# Patient Record
Sex: FEMALE | Race: WHITE | NOT HISPANIC OR LATINO | Employment: OTHER | URBAN - METROPOLITAN AREA
[De-identification: names, ages, dates, MRNs, and addresses within clinical notes are randomized per-mention and may not be internally consistent; named-entity substitution may affect disease eponyms.]

---

## 2017-04-28 ENCOUNTER — GENERIC CONVERSION - ENCOUNTER (OUTPATIENT)
Dept: OTHER | Facility: OTHER | Age: 67
End: 2017-04-28

## 2017-05-09 ENCOUNTER — APPOINTMENT (OUTPATIENT)
Dept: LAB | Facility: CLINIC | Age: 67
End: 2017-05-09
Payer: MEDICARE

## 2017-05-09 ENCOUNTER — TRANSCRIBE ORDERS (OUTPATIENT)
Dept: LAB | Facility: CLINIC | Age: 67
End: 2017-05-09

## 2017-05-09 ENCOUNTER — ALLSCRIPTS OFFICE VISIT (OUTPATIENT)
Dept: OTHER | Facility: OTHER | Age: 67
End: 2017-05-09

## 2017-05-09 DIAGNOSIS — M48.061 SPINAL STENOSIS OF LUMBAR REGION WITHOUT NEUROGENIC CLAUDICATION: ICD-10-CM

## 2017-05-09 DIAGNOSIS — M54.16 RADICULOPATHY OF LUMBAR REGION: ICD-10-CM

## 2017-05-09 DIAGNOSIS — M25.551 PAIN IN RIGHT HIP: ICD-10-CM

## 2017-05-09 DIAGNOSIS — R14.0 ABDOMINAL DISTENSION (GASEOUS): ICD-10-CM

## 2017-05-09 PROCEDURE — 36415 COLL VENOUS BLD VENIPUNCTURE: CPT

## 2017-05-09 PROCEDURE — 83516 IMMUNOASSAY NONANTIBODY: CPT

## 2017-05-10 ENCOUNTER — APPOINTMENT (OUTPATIENT)
Dept: LAB | Facility: CLINIC | Age: 67
End: 2017-05-10
Payer: MEDICARE

## 2017-05-10 DIAGNOSIS — R14.0 ABDOMINAL DISTENSION (GASEOUS): ICD-10-CM

## 2017-05-10 LAB — TTG IGA SER-ACNC: <2 U/ML (ref 0–3)

## 2017-05-10 PROCEDURE — 87338 HPYLORI STOOL AG IA: CPT

## 2017-05-11 LAB — H PYLORI AG STL QL IA: NEGATIVE

## 2017-05-15 ENCOUNTER — GENERIC CONVERSION - ENCOUNTER (OUTPATIENT)
Dept: OTHER | Facility: OTHER | Age: 67
End: 2017-05-15

## 2017-06-05 ENCOUNTER — TRANSCRIBE ORDERS (OUTPATIENT)
Dept: ADMINISTRATIVE | Facility: HOSPITAL | Age: 67
End: 2017-06-05

## 2017-06-05 ENCOUNTER — ALLSCRIPTS OFFICE VISIT (OUTPATIENT)
Dept: OTHER | Facility: OTHER | Age: 67
End: 2017-06-05

## 2017-06-05 DIAGNOSIS — M48.061 SPINAL STENOSIS, LUMBAR REGION, WITHOUT NEUROGENIC CLAUDICATION: Primary | ICD-10-CM

## 2017-06-05 RX ORDER — ALPRAZOLAM 0.25 MG/1
0.25 TABLET ORAL DAILY PRN
COMMUNITY
End: 2018-07-05 | Stop reason: ALTCHOICE

## 2017-06-05 RX ORDER — POTASSIUM CHLORIDE 1500 MG/1
20 TABLET, FILM COATED, EXTENDED RELEASE ORAL 2 TIMES DAILY
Status: ON HOLD | COMMUNITY
End: 2017-06-06 | Stop reason: ALTCHOICE

## 2017-06-06 ENCOUNTER — ANESTHESIA EVENT (OUTPATIENT)
Dept: GASTROENTEROLOGY | Facility: MEDICAL CENTER | Age: 67
End: 2017-06-06
Payer: MEDICARE

## 2017-06-06 ENCOUNTER — ANESTHESIA (OUTPATIENT)
Dept: GASTROENTEROLOGY | Facility: MEDICAL CENTER | Age: 67
End: 2017-06-06
Payer: MEDICARE

## 2017-06-06 ENCOUNTER — HOSPITAL ENCOUNTER (OUTPATIENT)
Facility: MEDICAL CENTER | Age: 67
Setting detail: OUTPATIENT SURGERY
Discharge: HOME | End: 2017-06-06
Attending: INTERNAL MEDICINE | Admitting: INTERNAL MEDICINE
Payer: MEDICARE

## 2017-06-06 ENCOUNTER — GENERIC CONVERSION - ENCOUNTER (OUTPATIENT)
Dept: GASTROENTEROLOGY | Facility: MEDICAL CENTER | Age: 67
End: 2017-06-06

## 2017-06-06 VITALS
OXYGEN SATURATION: 98 % | BODY MASS INDEX: 30.82 KG/M2 | HEIGHT: 65 IN | RESPIRATION RATE: 16 BRPM | SYSTOLIC BLOOD PRESSURE: 130 MMHG | HEART RATE: 77 BPM | WEIGHT: 185 LBS | DIASTOLIC BLOOD PRESSURE: 68 MMHG | TEMPERATURE: 97.3 F

## 2017-06-06 RX ORDER — SODIUM CHLORIDE 9 MG/ML
125 INJECTION, SOLUTION INTRAVENOUS CONTINUOUS
Status: DISCONTINUED | OUTPATIENT
Start: 2017-06-06 | End: 2017-06-06 | Stop reason: HOSPADM

## 2017-06-06 RX ORDER — ONDANSETRON 2 MG/ML
4 INJECTION INTRAMUSCULAR; INTRAVENOUS ONCE AS NEEDED
Status: DISCONTINUED | OUTPATIENT
Start: 2017-06-06 | End: 2017-06-06 | Stop reason: HOSPADM

## 2017-06-06 RX ORDER — PROPOFOL 10 MG/ML
INJECTION, EMULSION INTRAVENOUS AS NEEDED
Status: DISCONTINUED | OUTPATIENT
Start: 2017-06-06 | End: 2017-06-06 | Stop reason: SURG

## 2017-06-06 RX ADMIN — PROPOFOL 50 MG: 10 INJECTION, EMULSION INTRAVENOUS at 14:38

## 2017-06-06 RX ADMIN — PROPOFOL 30 MG: 10 INJECTION, EMULSION INTRAVENOUS at 14:44

## 2017-06-06 RX ADMIN — PROPOFOL 30 MG: 10 INJECTION, EMULSION INTRAVENOUS at 14:42

## 2017-06-06 RX ADMIN — PROPOFOL 40 MG: 10 INJECTION, EMULSION INTRAVENOUS at 14:46

## 2017-06-06 RX ADMIN — SODIUM CHLORIDE 125 ML/HR: 0.9 INJECTION, SOLUTION INTRAVENOUS at 14:17

## 2017-06-06 RX ADMIN — PROPOFOL 30 MG: 10 INJECTION, EMULSION INTRAVENOUS at 14:40

## 2017-06-06 RX ADMIN — PROPOFOL 40 MG: 10 INJECTION, EMULSION INTRAVENOUS at 14:48

## 2017-06-06 RX ADMIN — PROPOFOL 40 MG: 10 INJECTION, EMULSION INTRAVENOUS at 14:50

## 2017-06-12 ENCOUNTER — APPOINTMENT (OUTPATIENT)
Dept: RADIOLOGY | Facility: MEDICAL CENTER | Age: 67
End: 2017-06-12
Payer: MEDICARE

## 2017-06-12 ENCOUNTER — HOSPITAL ENCOUNTER (OUTPATIENT)
Dept: RADIOLOGY | Age: 67
Discharge: HOME | End: 2017-06-12
Payer: MEDICARE

## 2017-06-12 ENCOUNTER — GENERIC CONVERSION - ENCOUNTER (OUTPATIENT)
Dept: OTHER | Facility: OTHER | Age: 67
End: 2017-06-12

## 2017-06-12 DIAGNOSIS — M25.551 PAIN IN RIGHT HIP: ICD-10-CM

## 2017-06-12 DIAGNOSIS — M48.061 SPINAL STENOSIS, LUMBAR REGION, WITHOUT NEUROGENIC CLAUDICATION: ICD-10-CM

## 2017-06-12 PROCEDURE — 72148 MRI LUMBAR SPINE W/O DYE: CPT

## 2017-06-12 PROCEDURE — 73502 X-RAY EXAM HIP UNI 2-3 VIEWS: CPT

## 2017-06-13 ENCOUNTER — GENERIC CONVERSION - ENCOUNTER (OUTPATIENT)
Dept: OTHER | Facility: OTHER | Age: 67
End: 2017-06-13

## 2017-06-13 ENCOUNTER — APPOINTMENT (OUTPATIENT)
Dept: PHYSICAL THERAPY | Facility: REHABILITATION | Age: 67
End: 2017-06-13
Payer: MEDICARE

## 2017-06-13 DIAGNOSIS — M54.16 RADICULOPATHY OF LUMBAR REGION: ICD-10-CM

## 2017-06-13 PROCEDURE — 97162 PT EVAL MOD COMPLEX 30 MIN: CPT

## 2017-06-13 PROCEDURE — G8991 OTHER PT/OT GOAL STATUS: HCPCS

## 2017-06-13 PROCEDURE — G8990 OTHER PT/OT CURRENT STATUS: HCPCS

## 2017-06-14 ENCOUNTER — GENERIC CONVERSION - ENCOUNTER (OUTPATIENT)
Dept: OTHER | Facility: OTHER | Age: 67
End: 2017-06-14

## 2017-06-20 ENCOUNTER — ALLSCRIPTS OFFICE VISIT (OUTPATIENT)
Dept: OTHER | Facility: OTHER | Age: 67
End: 2017-06-20

## 2017-06-20 ENCOUNTER — APPOINTMENT (OUTPATIENT)
Dept: PHYSICAL THERAPY | Facility: REHABILITATION | Age: 67
End: 2017-06-20
Payer: MEDICARE

## 2017-06-20 DIAGNOSIS — E78.5 HYPERLIPIDEMIA: ICD-10-CM

## 2017-06-20 DIAGNOSIS — Z00.00 ENCOUNTER FOR GENERAL ADULT MEDICAL EXAMINATION WITHOUT ABNORMAL FINDINGS: ICD-10-CM

## 2017-06-20 PROCEDURE — 97014 ELECTRIC STIMULATION THERAPY: CPT

## 2017-06-20 PROCEDURE — 97110 THERAPEUTIC EXERCISES: CPT

## 2017-06-20 PROCEDURE — 97140 MANUAL THERAPY 1/> REGIONS: CPT

## 2017-06-21 ENCOUNTER — GENERIC CONVERSION - ENCOUNTER (OUTPATIENT)
Dept: OTHER | Facility: OTHER | Age: 67
End: 2017-06-21

## 2017-06-22 ENCOUNTER — APPOINTMENT (OUTPATIENT)
Dept: PHYSICAL THERAPY | Facility: REHABILITATION | Age: 67
End: 2017-06-22
Payer: MEDICARE

## 2017-06-22 PROCEDURE — 97110 THERAPEUTIC EXERCISES: CPT

## 2017-06-22 PROCEDURE — 97140 MANUAL THERAPY 1/> REGIONS: CPT

## 2017-06-22 PROCEDURE — 97014 ELECTRIC STIMULATION THERAPY: CPT

## 2017-06-27 ENCOUNTER — APPOINTMENT (OUTPATIENT)
Dept: PHYSICAL THERAPY | Facility: REHABILITATION | Age: 67
End: 2017-06-27
Payer: MEDICARE

## 2017-06-27 PROCEDURE — 97140 MANUAL THERAPY 1/> REGIONS: CPT

## 2017-06-27 PROCEDURE — 97014 ELECTRIC STIMULATION THERAPY: CPT

## 2017-06-27 PROCEDURE — 97110 THERAPEUTIC EXERCISES: CPT

## 2017-06-29 ENCOUNTER — APPOINTMENT (OUTPATIENT)
Dept: PHYSICAL THERAPY | Facility: REHABILITATION | Age: 67
End: 2017-06-29
Payer: MEDICARE

## 2017-06-29 PROCEDURE — 97110 THERAPEUTIC EXERCISES: CPT

## 2017-06-29 PROCEDURE — 97014 ELECTRIC STIMULATION THERAPY: CPT

## 2017-06-29 PROCEDURE — 97140 MANUAL THERAPY 1/> REGIONS: CPT

## 2017-07-03 ENCOUNTER — APPOINTMENT (OUTPATIENT)
Dept: PHYSICAL THERAPY | Facility: REHABILITATION | Age: 67
End: 2017-07-03
Payer: MEDICARE

## 2017-07-03 PROCEDURE — 97110 THERAPEUTIC EXERCISES: CPT

## 2017-07-03 PROCEDURE — 97014 ELECTRIC STIMULATION THERAPY: CPT

## 2017-07-03 PROCEDURE — 97140 MANUAL THERAPY 1/> REGIONS: CPT

## 2017-07-06 ENCOUNTER — APPOINTMENT (OUTPATIENT)
Dept: PHYSICAL THERAPY | Facility: REHABILITATION | Age: 67
End: 2017-07-06
Payer: MEDICARE

## 2017-07-11 ENCOUNTER — APPOINTMENT (OUTPATIENT)
Dept: PHYSICAL THERAPY | Facility: REHABILITATION | Age: 67
End: 2017-07-11
Payer: MEDICARE

## 2017-07-11 PROCEDURE — 97140 MANUAL THERAPY 1/> REGIONS: CPT

## 2017-07-11 PROCEDURE — 97014 ELECTRIC STIMULATION THERAPY: CPT

## 2017-07-11 PROCEDURE — 97110 THERAPEUTIC EXERCISES: CPT

## 2017-07-13 ENCOUNTER — APPOINTMENT (OUTPATIENT)
Dept: PHYSICAL THERAPY | Facility: REHABILITATION | Age: 67
End: 2017-07-13
Payer: MEDICARE

## 2017-07-13 PROCEDURE — G8990 OTHER PT/OT CURRENT STATUS: HCPCS | Performed by: PHYSICAL THERAPIST

## 2017-07-13 PROCEDURE — 97140 MANUAL THERAPY 1/> REGIONS: CPT

## 2017-07-13 PROCEDURE — 97110 THERAPEUTIC EXERCISES: CPT

## 2017-07-13 PROCEDURE — G8991 OTHER PT/OT GOAL STATUS: HCPCS | Performed by: PHYSICAL THERAPIST

## 2017-07-13 PROCEDURE — 97014 ELECTRIC STIMULATION THERAPY: CPT

## 2017-07-14 ENCOUNTER — APPOINTMENT (OUTPATIENT)
Dept: LAB | Facility: MEDICAL CENTER | Age: 67
End: 2017-07-14
Payer: MEDICARE

## 2017-07-14 ENCOUNTER — TRANSCRIBE ORDERS (OUTPATIENT)
Dept: ADMINISTRATIVE | Facility: HOSPITAL | Age: 67
End: 2017-07-14

## 2017-07-14 DIAGNOSIS — E78.5 HYPERLIPIDEMIA: ICD-10-CM

## 2017-07-14 LAB
ALBUMIN SERPL BCP-MCNC: 3.9 G/DL (ref 3.5–5)
ALP SERPL-CCNC: 83 U/L (ref 46–116)
ALT SERPL W P-5'-P-CCNC: 38 U/L (ref 12–78)
ANION GAP SERPL CALCULATED.3IONS-SCNC: 5 MMOL/L (ref 4–13)
AST SERPL W P-5'-P-CCNC: 22 U/L (ref 5–45)
BASOPHILS # BLD AUTO: 0.03 THOUSANDS/ΜL (ref 0–0.1)
BASOPHILS NFR BLD AUTO: 0 % (ref 0–1)
BILIRUB SERPL-MCNC: 0.71 MG/DL (ref 0.2–1)
BUN SERPL-MCNC: 14 MG/DL (ref 5–25)
CALCIUM SERPL-MCNC: 9.4 MG/DL (ref 8.3–10.1)
CHLORIDE SERPL-SCNC: 110 MMOL/L (ref 100–108)
CHOLEST SERPL-MCNC: 173 MG/DL (ref 50–200)
CO2 SERPL-SCNC: 27 MMOL/L (ref 21–32)
CREAT SERPL-MCNC: 0.94 MG/DL (ref 0.6–1.3)
EOSINOPHIL # BLD AUTO: 0.15 THOUSAND/ΜL (ref 0–0.61)
EOSINOPHIL NFR BLD AUTO: 2 % (ref 0–6)
ERYTHROCYTE [DISTWIDTH] IN BLOOD BY AUTOMATED COUNT: 14.1 % (ref 11.6–15.1)
GFR SERPL CREATININE-BSD FRML MDRD: 59.8 ML/MIN/1.73SQ M
GLUCOSE P FAST SERPL-MCNC: 82 MG/DL (ref 65–99)
HCT VFR BLD AUTO: 41.5 % (ref 34.8–46.1)
HDLC SERPL-MCNC: 65 MG/DL (ref 40–60)
HGB BLD-MCNC: 13.5 G/DL (ref 11.5–15.4)
LDLC SERPL CALC-MCNC: 81 MG/DL (ref 0–100)
LYMPHOCYTES # BLD AUTO: 2.25 THOUSANDS/ΜL (ref 0.6–4.47)
LYMPHOCYTES NFR BLD AUTO: 29 % (ref 14–44)
MCH RBC QN AUTO: 31.8 PG (ref 26.8–34.3)
MCHC RBC AUTO-ENTMCNC: 32.5 G/DL (ref 31.4–37.4)
MCV RBC AUTO: 98 FL (ref 82–98)
MONOCYTES # BLD AUTO: 0.6 THOUSAND/ΜL (ref 0.17–1.22)
MONOCYTES NFR BLD AUTO: 8 % (ref 4–12)
NEUTROPHILS # BLD AUTO: 4.75 THOUSANDS/ΜL (ref 1.85–7.62)
NEUTS SEG NFR BLD AUTO: 61 % (ref 43–75)
NRBC BLD AUTO-RTO: 0 /100 WBCS
PLATELET # BLD AUTO: 258 THOUSANDS/UL (ref 149–390)
PMV BLD AUTO: 11.2 FL (ref 8.9–12.7)
POTASSIUM SERPL-SCNC: 3.7 MMOL/L (ref 3.5–5.3)
PROT SERPL-MCNC: 6.9 G/DL (ref 6.4–8.2)
RBC # BLD AUTO: 4.24 MILLION/UL (ref 3.81–5.12)
SODIUM SERPL-SCNC: 142 MMOL/L (ref 136–145)
TRIGL SERPL-MCNC: 134 MG/DL
TSH SERPL DL<=0.05 MIU/L-ACNC: 1.88 UIU/ML (ref 0.36–3.74)
WBC # BLD AUTO: 7.79 THOUSAND/UL (ref 4.31–10.16)

## 2017-07-14 PROCEDURE — 80053 COMPREHEN METABOLIC PANEL: CPT

## 2017-07-14 PROCEDURE — 85025 COMPLETE CBC W/AUTO DIFF WBC: CPT

## 2017-07-14 PROCEDURE — 84443 ASSAY THYROID STIM HORMONE: CPT

## 2017-07-14 PROCEDURE — 80061 LIPID PANEL: CPT

## 2017-07-14 PROCEDURE — 36415 COLL VENOUS BLD VENIPUNCTURE: CPT

## 2017-07-18 ENCOUNTER — APPOINTMENT (OUTPATIENT)
Dept: PHYSICAL THERAPY | Facility: REHABILITATION | Age: 67
End: 2017-07-18
Payer: MEDICARE

## 2017-07-18 ENCOUNTER — ALLSCRIPTS OFFICE VISIT (OUTPATIENT)
Dept: OTHER | Facility: OTHER | Age: 67
End: 2017-07-18

## 2017-07-20 ENCOUNTER — APPOINTMENT (OUTPATIENT)
Dept: PHYSICAL THERAPY | Facility: REHABILITATION | Age: 67
End: 2017-07-20
Payer: MEDICARE

## 2017-08-22 ENCOUNTER — GENERIC CONVERSION - ENCOUNTER (OUTPATIENT)
Dept: OTHER | Facility: OTHER | Age: 67
End: 2017-08-22

## 2017-09-19 ENCOUNTER — GENERIC CONVERSION - ENCOUNTER (OUTPATIENT)
Dept: OTHER | Facility: OTHER | Age: 67
End: 2017-09-19

## 2017-09-19 DIAGNOSIS — Z12.31 ENCOUNTER FOR SCREENING MAMMOGRAM FOR MALIGNANT NEOPLASM OF BREAST: ICD-10-CM

## 2017-10-10 ENCOUNTER — OFFICE VISIT (OUTPATIENT)
Dept: URGENT CARE | Facility: MEDICAL CENTER | Age: 67
End: 2017-10-10
Payer: MEDICARE

## 2017-10-10 PROCEDURE — 99203 OFFICE O/P NEW LOW 30 MIN: CPT

## 2017-10-10 PROCEDURE — G0463 HOSPITAL OUTPT CLINIC VISIT: HCPCS

## 2017-10-13 NOTE — PROGRESS NOTES
Assessment  1  Acute bronchitis with bronchospasm (466 0) (J20 9)    Plan  Acute bronchitis with bronchospasm    · Azithromycin 250 MG Oral Tablet; TAKE 2 TABLETS ON DAY 1 THEN TAKE 1  TABLET A DAY FOR 4 DAYS  Wheezing    · Benzonatate 100 MG Oral Capsule; TAKE 1 CAPSULE EVERY 8 HOURS AS  NEEDED   · LevoFLOXacin 500 MG Oral Tablet (Levaquin); TAKE 1 TABLET DAILY UNTIL  FINISHED   · ProAir  (90 Base) MCG/ACT Inhalation Aerosol Solution; INHALE 1 TO 2  PUFFS EVERY 4 TO 6 HOURS AS NEEDED    Discussion/Summary  Discussion Summary:   Today your symptoms are consistent with an upper respiratory infection which has now evolved into an acute bronchitis and were found to have wheezing on examinationthe levaquin as directedinhaler/cough medicine as directedfluids at homecan alternate motrin and tylenol as neededsaline nasal spray as ijxchnY14 gargles/lozenges as neededusing a humidifier in your bedroom  with your primary care physician for re-evaluation within 1 weekyou have worsening symptoms, shortness of breath, chest pain, high fever, or any signs of distress please go to the nearest ER  Medication Side Effects Reviewed: Possible side effects of new medications were reviewed with the patient/guardian today  Understands and agrees with treatment plan: The treatment plan was reviewed with the patient/guardian  The patient/guardian understands and agrees with the treatment plan      Chief Complaint  1  Cold Symptoms  Chief Complaint Free Text Note Form: Patient relates has been sick for 4 days with sinus pain, pressure, congestion, cough and chills  History of Present Illness  HPI: Patient is a 72-year-old female presents today for an evaluation of cold symptoms that started over the weekend  Patient states that it started out with a nasal congestion, sinus pressure and postnasal drip  She has been using Nasonex without relief   Patient states that now she has developed a cough and has noticed that she has been wheezing and feeling short of breath  Patient also admits to chills  Patient admits to being a smoker however since she states that she has not smoked since she has become sick  Hospital Based Practices Required Assessment:   Abuse And Domestic Violence Screen    Yes, the patient is safe at home -The patient states no one is hurting them  Depression And Suicide Screen  No, the patient has not had thoughts of hurting themself  No, the patient has not felt depressed in the past 7 days  Prefered Language is  english  Primary Language is  english  Review of Systems  Focused-Female:   Constitutional: chills, but-no fever  ENT: nasal discharge, but-no earache-and-no sore throat  Cardiovascular: no chest pain  Respiratory: shortness of breath,-cough-and-wheezing  Gastrointestinal: no abdominal pain  Neurological: no headache  ROS Reviewed:   ROS reviewed  Active Problems  1  Acute sinusitis (461 9) (J01 90)   2  Chronic right hip pain (719 45,338 29) (M25 551,G89 29)   3  Cough (786 2) (R05)   4  Depression with anxiety (300 4) (F41 8)   5  Encounter for annual routine gynecological examination (V72 31) (Z01 419)   6  Encounter for screening mammogram for malignant neoplasm of breast (V76 12)   (Z12 31)   7  Hyperlipidemia (272 4) (E78 5)   8  Hypertension (401 9) (I10)   9  Irritable bowel syndrome with diarrhea (564 1) (K58 0)   10  Lumbar degenerative disc disease (722 52) (M51 36)   11  Lumbar radiculopathy (724 4) (M54 16)   12  Lumbar stenosis (724 02) (M48 061)   13  Medicare welcome exam (V70 0) (Z00 00)   14  Sacroiliitis (720 2) (M46 1)   15  Spinal stenosis (724 00) (M48 00)   16  Spondylosis of lumbar region without myelopathy or radiculopathy (721 3) (M47 816)   17  Systolic murmur of aorta (785 2) (I35 8)   18  Vitamin D deficiency (268 9) (E55 9)    Past Medical History  1  History of Greater trochanteric bursitis of right hip (726 5) (M70 61)   2   History of Clostridium difficile infection (V12 09) (Z86 19)   3  History of Pituitary adenoma (227 3) (D35 2)   4  Screening for other and unspecified genitourinary condition (V81 6) (Z13 89)  Active Problems And Past Medical History Reviewed: The active problems and past medical history were reviewed and updated today  Family History  Mother    1  Family history of Colon Cancer (V16 0)   2  Family history of Coronary artery disease   3  Family history of colonic polyps (V18 51) (Z83 71)   4  Family history of liver cancer (V16 0) (Z80 0)  Father    5  Family history of Multiple sclerosis  Sibling    6  Family history of Hypertension  Family History Reviewed: The family history was reviewed and updated today  Social History   · Current every day smoker (305 1) (F17 200)   · Social alcohol use (Z78 9)  Social History Reviewed: The social history was reviewed and updated today  The social history was reviewed and is unchanged  Surgical History  1  History of Cholecystectomy   2  History of Incisional Breast Biopsy   3  History of Neuroplasty Decompression Median Nerve At Carpal Tunnel   4  History of Rotator Cuff Repair   5  History of Salpingo-oophorectomy   6  History of Surgery Excision Of Parotid Tumor/Gland  Surgical History Reviewed: The surgical history was reviewed and updated today  Current Meds   1  Atorvastatin Calcium 20 MG Oral Tablet; TAKE 1 TABLET DAILY AS DIRECTED    Requested for: 80Zef3888; Last Rx:44Zph9580 Ordered   2  Diclofenac Sodium 1 % Transdermal Gel; Apply 2 g of gel to affected area twice a day   when necessary; Therapy: 67PBF0833 to (Evaluate:82Tli3563)  Requested for: 02RHF9765; Last   Rx:05Jun2017 Ordered   3  Escitalopram Oxalate 10 MG Oral Tablet; take 0 5 tablet daily; Therapy: 25XYC7486 to (Evaluate:18Mar2018)  Requested for: 89Lyo9622; Last   Rx:60Rtu4444 Ordered   4   Gabapentin 100 MG Oral Capsule; TAKE 1 CAPSULE BEDTIME MAY INCREASE TO 3   CAPSULES AT BEDTIME AS TOLERATED; Therapy: 29ZYV9358 to (Evaluate:13Kth7161)  Requested for: 51ZVH4708; Last   Rx:05Jun2017 Ordered   5  Multi Vitamin Daily Oral Tablet; Therapy: 11LQL6497 to Recorded   6  Zostavax 01352 UNT/0 65ML Subcutaneous Solution Reconstituted; DILUTE AND   INJECT 0 65ML UNDER THE SKIN AS DIRECTED; Therapy: 26ETH0908 to (Evaluate:21Jun2017); Last Rx:20Jun2017 Ordered   7  ZyrTEC Allergy 10 MG Oral Capsule; Therapy: 41QFV9779 to Recorded  Medication List Reviewed: The medication list was reviewed and updated today  Allergies  1  Ancef   2  Penicillins   3  Tetracyclines  4  FRUIT    Vitals  Signs   Recorded: 69AYE2473 02:16PM   Temperature: 96 8 F, Tympanic  Heart Rate: 96  Respiration: 20  Systolic: 696, LUE, Sitting  Diastolic: 78, LUE, Sitting  Height: 5 ft 3 5 in  Weight: 187 lb   BMI Calculated: 32 61  BSA Calculated: 1 89  O2 Saturation: 96, RA  Pain Scale: 8    Physical Exam    Constitutional   General appearance: No acute distress, well appearing and well nourished  Eyes   Conjunctiva and lids: No swelling, erythema or discharge  Pupils and irises: Equal, round and reactive to light  Ears, Nose, Mouth, and Throat   External inspection of ears and nose: Normal     Otoscopic examination: Tympanic membranes translucent with normal light reflex  Canals patent without erythema  Nasal mucosa, septum, and turbinates: Normal without edema or erythema  Oropharynx: Abnormal  -cobblestoning of posterior pharynx  Pulmonary   Respiratory effort: No increased work of breathing or signs of respiratory distress  Auscultation of lungs: Abnormal   wheezing over both midlung fields-and-wheezing over both bases  Cardiovascular   Auscultation of heart: Normal rate and rhythm, normal S1 and S2, without murmurs  Lymphatic   Palpation of lymph nodes in neck: No lymphadenopathy  Skin   Skin and subcutaneous tissue: Normal without rashes or lesions      Additional Exam:  Sinuses: mild tenderness upon palpation of frontal and maxillary sinuses B/L  Provider Comments  Provider Comments:   Patient has allergies to penicillin and tetracycline and she states that Z-packs do not work for her therefore I placed her on levaquin  Future Appointments    Date/Time Provider Specialty Site   09/25/2018 08:15 AM VANESSA Manning   Obstetrics/Gynecology [facility]   12/18/2017 09:40 AM Yasmin Ley DO Internal Medicine [facility] IM     Signatures   Electronically signed by : Caden Sellers [facility]; Oct 11 2017  7:42AM EST                       (Author)    Electronically signed by : VANESSA Sheets ; Oct 12 2017 12:03PM EST                       (Co-author)

## 2017-12-05 ENCOUNTER — ALLSCRIPTS OFFICE VISIT (OUTPATIENT)
Dept: OTHER | Facility: OTHER | Age: 67
End: 2017-12-05

## 2017-12-06 NOTE — PROGRESS NOTES
Assessment    1  Lumbar degenerative disc disease (722 52) (M51 36)   2  Lumbar radiculopathy (724 4) (M54 16)   3  Lumbar stenosis (724 02) (M48 061)   4  Spondylosis of lumbar region without myelopathy or radiculopathy (721 3) (M47 816)    Plan  Lumbar radiculopathy    · Gabapentin 100 MG Oral Capsule; TAKE 1 CAPSULE BEDTIME MAY INCREASETO 3 CAPSULES AT BEDTIME AS TOLERATED   Rx By: Francisca Amaro; Dispense: 90 Days ; #:270 Capsule; Refill: 1;Lumbar radiculopathy; LUCIA = N; Verified Transmission to [facility] #[id number] Last Updated By: [employer]; 12/5/2017 1:22:56 PM  42-year-old female returning for follow-up of lumbosacral back pain with radiculopathy in the L4 and L5 distributions of the right lower extremity with associated numbness, paresthesias, and subjective weakness  The patient does have moderate central and bilateral foraminal stenosis at L3-4, L4-5, and moderate bilateral foraminal stenosis at L5-S1  There is possible bilateral L4 and L5 nerve root encroachment  The patient does note that her right lower extremity symptoms seem to be becoming worse  The patient continues to take gabapentin 100 mg q h s  the and does find some relief with this, however did try to increase the dose in the past and was unable tolerate side effects  1  I did offer the patient again a right L4 and L5 TFESI  The patient is interested in pursuing this option, however she needs to find someone to watch her grandchildren and will call our office when she has found a  to schedule the procedure  The procedure was discussed in detail using diagrams and models  Risks associated with the procedure were discussed with the patient including, but not limited to bleeding, infection, bruising, and nerve damage  2  I did encourage the patient to try increasing the gabapentin to 200 mg q h s  and if she is unable to tolerate this she may consider trying 100 mg b i d     The patient was agreeable to this 3  the patient will continue with her home exercise program 4  I will follow up the patient in 2 months or if she pursues injection 4-6 weeks after injection   Discussion/Summary  The patient has the current Goals: Reduced pain and improved function  The patent has the current Barriers: Lumbar stenosis and tobacco abuse  Patient is able to Self-Care  Possible side effects of new medications were reviewed with the patient/guardian today  The treatment plan was reviewed with the patient/guardian  The patient/guardian understands and agrees with the treatment plan   The patient was counseled regarding diagnostic results,-- instructions for management,-- risk factor reductions,-- prognosis,-- patient and family education,-- impressions,-- risks and benefits of treatment options-- and-- importance of compliance with treatment  total time of encounter was 15 minutes  Self Referrals: No      Chief Complaint    1  Thigh Pain  Low back and right leg pain      History of Present Illness  62-year-old female returning for follow-up of lumbosacral back pain with radiculopathy in the L4 and L5 distributions of the right lower extremity with associated numbness, paresthesias, and subjective weakness  The patient does have moderate central and bilateral foraminal stenosis at L3-4, L4-5, and moderate bilateral foraminal stenosis at L5-S1  There is possible bilateral L4 and L5 nerve root encroachment  The patient does note that her right lower extremity symptoms seem to be coming worse  She denies any bladder or bowel incontinence or saddle anesthesia  is currently taking gabapentin 100 mg q h s    She tried to increase to 200 mg q h s , however was experiencing some side effects  The patient would like to try titrating up on the medication again to see if she can get better relief as she does get some relief at this dose and with less side effects   The patient was scheduled for a right L4 and L5 TFESI, however she canceled this procedure as she felt that her symptoms were improving, however she is now reconsidering the epidural since her symptoms are worsening  patient rates her pain a 5 to 6/10 on the pain does not follow any particular pattern throughout the day  The pain is intermittent and described as burning, numbness, and pins and needles  The pain is worse with standing, walking, and bending and twisting at the waist  The pain is alleviated with sitting and lying down  I have personally reviewed and/or updated the patient's past medical history, past surgical history, family history, social history, allergies, and vital signs today  than as stated above, the patient denies any interval changes in medications, medical condition, mental condition, symptoms, or allergies since the last office visit  Armando Maradiaga presents with complaints of intermittent episodes of right anterior thigh pain, described as burning  Symptoms are unchanged  Review of Systems   Constitutional: no fever,-- no recent weight gain-- and-- no recent weight loss  Eyes: no double vision-- and-- no blurry vision  Cardiovascular: no chest pain,-- no palpitations-- and-- no lower extremity edema  Respiratory: no complaints of shortness of breath-- and-- no wheezing  Musculoskeletal: difficulty walking-- and-- muscle weakness, but-- no joint stiffness,-- no joint swelling,-- no limb swelling,-- no pain in extremity-- and-- no decreased range of motion  Neurological: no dizziness,-- no difficulty swallowing,-- no memory loss,-- no loss of consciousness-- and-- no seizures  Gastrointestinal: no nausea,-- no vomiting,-- no constipation-- and-- no diarrhea  Genitourinary: no difficulty initiating urine stream,-- no genital pain-- and-- no frequent urination  Integumentary: no complaints of skin rash  Psychiatric: no depression  Endocrine: no excessive thirst,-- no adrenal disease,-- no hypothyroidism-- and-- no hyperthyroidism    Hematologic/Lymphatic: no tendency for easy bruising-- and-- no tendency for easy bleeding  ROS reviewed  Active Problems    1  Acute bronchitis with bronchospasm (466 0) (J20 9)   2  Acute sinusitis (461 9) (J01 90)   3  Acute upper respiratory infection (465 9) (J06 9)   4  Chronic right hip pain (719 45,338 29) (M25 551,G89 29)   5  Cough (786 2) (R05)   6  Depression with anxiety (300 4) (F41 8)   7  Encounter for annual routine gynecological examination (V72 31) (Z01 419)   8  Encounter for screening mammogram for malignant neoplasm of breast (V76 12) (Z12 31)   9  Hyperlipidemia (272 4) (E78 5)   10  Hypertension (401 9) (I10)   11  Irritable bowel syndrome with diarrhea (564 1) (K58 0)   12  Lumbar degenerative disc disease (722 52) (M51 36)   13  Lumbar radiculopathy (724 4) (M54 16)   14  Lumbar stenosis (724 02) (M48 061)   15  Medicare welcome exam (V70 0) (Z00 00)   16  Sacroiliitis (720 2) (M46 1)   17  Spinal stenosis (724 00) (M48 00)   18  Spondylosis of lumbar region without myelopathy or radiculopathy (721 3) (M47 816)   19  Systolic murmur of aorta (785 2) (I35 8)   20  Vitamin D deficiency (268 9) (E55 9)   21  Wheezing (786 07) (R06 2)    Past Medical History  1  History of Greater trochanteric bursitis of right hip (726 5) (M70 61)   2  History of Clostridium difficile infection (V12 09) (Z86 19)   3  History of Pituitary adenoma (227 3) (D35 2)   4  Screening for other and unspecified genitourinary condition (V81 6) (Z13 89)    Surgical History  1  History of Cholecystectomy   2  History of Incisional Breast Biopsy   3  History of Neuroplasty Decompression Median Nerve At Carpal Tunnel   4  History of Rotator Cuff Repair   5  History of Salpingo-oophorectomy   6  History of Surgery Excision Of Parotid Tumor/Gland    Family History  Mother    1  Family history of Colon Cancer (V16 0)   2  Family history of Coronary artery disease   3  Family history of colonic polyps (V18 51) (Z83 71)   4   Family history of liver cancer (V16 0) (Z80 0)  Father    5  Family history of Multiple sclerosis  Sibling    6  Family history of Hypertension    Social History     · Current every day smoker (305 1) (F17 200)   · Social alcohol use (Z78 9)    Current Meds   1  Atorvastatin Calcium 20 MG Oral Tablet; TAKE 1 TABLET DAILY AS DIRECTED  Requested for: 19Sep2017; Last Rx:19Sep2017 Ordered   2  Diclofenac Sodium 1 % Transdermal Gel; Apply 2 g of gel to affected area twice a day when necessary; Therapy: 38SHM7714 to (Evaluate:03Sep2017)  Requested for: 43TYQ0693; Last Rx:05Jun2017 Ordered   3  Escitalopram Oxalate 10 MG Oral Tablet; take 0 5 tablet daily; Therapy: 58QVD7364 to (Evaluate:18Mar2018)  Requested for: 19Sep2017; Last Rx:19Sep2017 Ordered   4  Gabapentin 100 MG Oral Capsule; TAKE 1 CAPSULE BEDTIME MAY INCREASE TO 3 CAPSULES AT BEDTIME AS TOLERATED; Therapy: 64LKX8097 to (Evaluate:29Oan1683)  Requested for: 33YPW3901; Last Rx:05Jun2017 Ordered   5  Multi Vitamin Daily Oral Tablet; Therapy: 61MVE4570 to Recorded   6  ProAir  (90 Base) MCG/ACT Inhalation Aerosol Solution; INHALE 1 TO 2 PUFFS EVERY 4 TO 6 HOURS AS NEEDED; Therapy: 55MBE3734 to (Last Rx:10Oct2017)  Requested for: 39MKW4828 Ordered   7  ZyrTEC Allergy 10 MG Oral Capsule; Therapy: 86ZDE4399 to Recorded    Allergies  1  Ancef   2  Penicillins   3  Tetracyclines  4  FRUIT    Vitals  Vital Signs    Recorded: 07JPN7251 12:50PM   Temperature 99 F   Heart Rate 87   Systolic 956   Diastolic 77   Height 5 ft 3 5 in   Weight 181 lb 8 oz   BMI Calculated 31 65   BSA Calculated 1 87       Physical Exam   Constitutional  General appearance: Well developed, well nourished, alert, in no distress, non-toxic and no overt pain behavior  Eyes  Sclera: anicteric  HEENT  Hearing grossly intact  Neck  Neck: Supple, symmetric, trachea midline, no masses  Pulmonary  Respiratory effort: Even and unlabored  Abdomen  Abdomen: Soft, non-tender, non-distended       Skin  Skin and subcutaneous tissue: Normal without rashes or lesions, well hydrated  Psychiatric  Mood and affect: Mood and affect appropriate  Neurologic  the muscle tone was normal  Musculoskeletal  Gait and station: Normal    Lumbar/Sacral Spine examination demonstrates  Bilateral lumbar paraspinals mildly tender to palpation on the right more than the left  Positive seated straight leg raise on the right and negative on the left  Bilateral lower extremity strength 5/5 in all muscle groups  Results/Data  Results Free Text Form Pain Mng[facility]:   Results    I personally reviewed the films/images in the office today  Future Appointments    Date/Time Provider Specialty Site   09/25/2018 08:15 AM VANESSA Jennings   Obstetrics/Gynecology [facility]   12/21/2017 10:20 AM Peyton Anguiano DO Internal Medicine [facility] IM   01/30/2018 03:00 PM Shirlene Schultz DO Pain Management [facility]       Signatures   Electronically signed by : Ana Temple DO; Dec  5 2017  4:46PM EST                       (Author)

## 2017-12-11 ENCOUNTER — GENERIC CONVERSION - ENCOUNTER (OUTPATIENT)
Dept: INTERNAL MEDICINE CLINIC | Facility: CLINIC | Age: 67
End: 2017-12-11

## 2018-01-04 ENCOUNTER — GENERIC CONVERSION - ENCOUNTER (OUTPATIENT)
Dept: OTHER | Facility: OTHER | Age: 68
End: 2018-01-04

## 2018-01-04 DIAGNOSIS — M19.90 OSTEOARTHRITIS: ICD-10-CM

## 2018-01-04 DIAGNOSIS — E78.5 HYPERLIPIDEMIA: ICD-10-CM

## 2018-01-04 DIAGNOSIS — I10 ESSENTIAL (PRIMARY) HYPERTENSION: ICD-10-CM

## 2018-01-05 ENCOUNTER — APPOINTMENT (OUTPATIENT)
Dept: RADIOLOGY | Facility: MEDICAL CENTER | Age: 68
End: 2018-01-05
Payer: MEDICARE

## 2018-01-05 ENCOUNTER — TRANSCRIBE ORDERS (OUTPATIENT)
Dept: ADMINISTRATIVE | Facility: HOSPITAL | Age: 68
End: 2018-01-05

## 2018-01-05 DIAGNOSIS — M19.90 OSTEOARTHRITIS: ICD-10-CM

## 2018-01-05 PROCEDURE — 73130 X-RAY EXAM OF HAND: CPT

## 2018-01-06 ENCOUNTER — HOSPITAL ENCOUNTER (OUTPATIENT)
Dept: RADIOLOGY | Age: 68
Discharge: HOME | End: 2018-01-06
Payer: MEDICARE

## 2018-01-06 DIAGNOSIS — Z00.00 ENCOUNTER FOR GENERAL ADULT MEDICAL EXAMINATION WITHOUT ABNORMAL FINDINGS: ICD-10-CM

## 2018-01-06 PROCEDURE — 77080 DXA BONE DENSITY AXIAL: CPT

## 2018-01-10 NOTE — RESULT NOTES
Verified Results  (1) TISSUE TRANSGLUTAMINASE IGA [id number] 03:03PM Romana [facility] Order Number: [id number]     Test Name Result Flag Reference   tTG IGA <2 U/mL  0 - 3   Negative        0 -  3                                Weak Positive   4 - 10                                Positive           >10   Tissue Transglutaminase (tTG) has been identified   as the endomysial antigen  Studies have demonstr-   ated that endomysial IgA antibodies have over 99%   specificity for gluten sensitive enteropathy      Performed at:  [address] [place] [address]  [id number]  : Qiana Olivo MD, Phone:  [phone]

## 2018-01-11 NOTE — RESULT NOTES
Verified Results  (1) Kelvin Donald, RASHIDA [id number] 10:27AM Precious Oliva Order Number: [id number]     Test Name Result Flag Reference   H PYLORI ANTIGEN Negative  Negative   Performed at:  [address]  [id number]  : Abbie Shaffer MD, Phone:  [phone]

## 2018-01-11 NOTE — RESULT NOTES
Verified Results  * XR HIP/PELV 2-3 VWS RIGHT W PELVIS IF PERFORMED 12Jun2017 09:35AM [facility] Order Number: [id number]     Test Name Result Flag Reference   * XR HIP/PELV 2-3 VWS RIGHT (Report)     RIGHT HIP     INDICATION: Right lateral hip pain  COMPARISON: None     VIEWS: AP pelvis and 2 coned down views of the hip     IMAGES: 3     FINDINGS:     There is no acute fracture or dislocation  Hip joint spaces are preserved  Advanced osteoarthritis is present within the lumbar spine  No lytic or blastic lesions are seen  Soft tissues are unremarkable  IMPRESSION:     No acute osseous abnormality  Workstation performed: [id number]     Signed by:    Esthela Poon MD   6/13/17

## 2018-01-11 NOTE — PROGRESS NOTES
Assessment    1  Encounter for preventive health examination (V70 0) (Z00 00)    Plan  Diarrhea    · (1) STOOL CULTURE; Source:Rectum; Status:Active; Requested for:01Dec2016;    · (Q) CLOSTRIDIUM DIFFICILE TOXIN B SCR (CYTOTOXIN); Status:Active; Requested  for:01Dec2016; Health Maintenance    · Zostavax 54509 UNT/0 65ML Subcutaneous Solution Reconstituted (Zostavax  77794 UNT/0 65ML Subcutaneous Solution Reconstituted); DILUTE AND INJECT  0 65ML UNDER THE SKIN AS DIRECTED  Hypertension    · HydroCHLOROthiazide 25 MG Oral Tablet; Take 1 tablet daily   · (1) COMPREHENSIVE METABOLIC PANEL; Status:Active; Requested for:01Dec2016;     Discussion/Summary  health maintenance visit cervical cancer screening is managed by Dr Shawn Almeida Breast cancer screening: mammogram has been ordered  Colorectal cancer screening: colonoscopy has been ordered  Advice and education were given regarding nutrition  [employer] [facility] [place] was seen and examined in the office today  Her physical exam reveals a systolic murmur, normal pulmonary and abdominal examination  Skin was examined and there do not appear to be any suspicious lesions  Her repeat blood pressure was on the lower side (92/60) and was told to take 1/2 tab of her HCTZ with her diarrhea right now  She is going to have stool studies and electrolytes completed  HM: She is going to schedule her Colonoscopy for early next year  Mammogram is due and will be scheduling this  She did receive the influenza vaccine and did get a script for the Zostavax  She will see if it is covered in the office  Next visit will be her Welcome to Medicare physical    The patient was counseled regarding instructions for management  Chief Complaint  patient is here for a annual physical exam      History of Present Illness  HM, Adult Female: The patient is being seen for a health maintenance evaluation  The last health maintenance visit was 1 year(s) ago     Social History: Work status: working part-time and [place] at [employer]  The patient is a former cigarette smoker  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  She has never used illicit drugs  General Health: She has regular dental visits  She complains of vision problems  She denies hearing loss  Immunizations status: up to date  Lifestyle:  She consumes a diverse and healthy diet  She does not have any weight concerns  She does not exercise regularly  She does not use tobacco  She denies alcohol use  She denies drug use  Screening:   HPI: [facility]  KJ is here today for a routine physical  Since her last visit, she reports that she has been feeling great until last Wednesday  She stopped smoking and stopped drinking  Her mood has been great  Her medical history was reviewed and updated  Chart was reviewed  She reports noticing diarrhea that started on Wednesday  She does not have fevers, abdominal pain, nausea, or vomiting  She reports no recent no recent antibiotics  She otherwise denies any other recent complaints  Review of Systems    Constitutional: No fever, no chills, feels well, no tiredness, no recent weight gain or weight loss  Eyes: No complaints of eye pain, no red eyes, no eyesight problems, no discharge, no dry eyes, no itching of eyes  ENT: no complaints of earache, no loss of hearing, no nose bleeds, no nasal discharge, no sore throat, no hoarseness  Cardiovascular: No complaints of slow heart rate, no fast heart rate, no chest pain, no palpitations, no leg claudication, no lower extremity edema  Respiratory: No complaints of shortness of breath, no wheezing, no cough, no SOB on exertion, no orthopnea, no PND  Gastrointestinal: diarrhea, but no abdominal pain, no nausea, no vomiting, no constipation and no blood in stools  Genitourinary: no dysuria and no pelvic pain  Musculoskeletal: arthralgias, but no joint swelling, no limb pain, no joint stiffness and no limb swelling     Integumentary: No complaints of skin rash or lesions, no itching, no skin wounds, no breast pain or lump  Neurological: No complaints of headache, no confusion, no convulsions, no numbness, no dizziness or fainting, no tingling, no limb weakness, no difficulty walking  Psychiatric: no anxiety, no sleep disturbances and no depression  Hematologic/Lymphatic: No complaints of swollen glands, no swollen glands in the neck, does not bleed easily, does not bruise easily  ROS reviewed  Active Problems    1  Breast disorder (611 9) (N64 9)   2  Depression with anxiety (300 4) (F41 8)   3  Encounter for routine gynecological examination (V72 31) (Z01 419)   4  Encounter for screening mammogram for malignant neoplasm of breast (V76 12)   (Z12 31)   5  Flu vaccine need (V04 81) (Z23)   6  Hyperlipidemia (272 4) (E78 5)   7  Hypertension (401 9) (I10)   8  Spinal stenosis (724 00) (M48 00)   9  Systolic murmur of aorta (785 2) (I35 8)   10  Vitamin D deficiency (268 9) (E55 9)    Past Medical History    · History of Clostridium difficile infection (V12 09) (Z86 19)   · History of Pituitary adenoma (227 3) (D35 2)    Surgical History    · History of Cholecystectomy   · History of Incisional Breast Biopsy   · History of Rotator Cuff Repair   · History of Salpingo-oophorectomy   · History of Surgery Excision Of Parotid Tumor/Gland    Family History  Mother    · Family history of Colon Cancer (V16 0)   · Family history of Coronary artery disease  Father    · Family history of Multiple sclerosis  Sibling    · Family history of Hypertension    Current Meds   1  ALPRAZolam 0 25 MG Oral Tablet; TAKE 1 TABLET Daily PRN anxiety; Therapy: 42JHN3384 to (Evaluate:17Jan2016); Last Rx:[id number] Ordered   2  Atorvastatin Calcium 20 MG Oral Tablet; TAKE 1 TABLET DAILY AS DIRECTED    Requested for: 88Gdo1944; Last Rx:[id number] Ordered   3  Escitalopram Oxalate 10 MG Oral Tablet; take 0 5 tablet daily; Therapy: 23CHM5601 to  Requested for: 07DFS4198 Recorded   4  HydroCHLOROthiazide 25 MG Oral Tablet; Take 1 tablet daily; Therapy: 12ZFH3433 to (Evaluate:84Wrl9839)  Requested for: 70PBU3952; Last   Rx:45Elp2658 Ordered    Allergies    1  Ancef   2  Penicillins   3  Tetracyclines    4  FRUIT    Vitals   Recorded: 26NBX2671 08:49AM   Heart Rate 90   Systolic 111   Diastolic 68   Height 5 ft 4 in   Weight 174 lb 4 oz   BMI Calculated 29 91   BSA Calculated 1 84   O2 Saturation 94     Physical Exam    Constitutional   General appearance: No acute distress, well appearing and well nourished  Head and Face   Head and face: Normal     Palpation of the face and sinuses: No sinus tenderness  Eyes   Conjunctiva and lids: No swelling, erythema or discharge  Pupils and irises: Equal, round, reactive to light  Ears, Nose, Mouth, and Throat   External inspection of ears and nose: Normal     Otoscopic examination: Tympanic membranes translucent with normal light reflex  Canals patent without erythema  Hearing: Normal     Lips, teeth, and gums: Normal, good dentition  Oropharynx: Normal with no erythema, edema, exudate or lesions  Neck   Neck: Supple, symmetric, trachea midline, no masses  Thyroid: Normal, no thyromegaly  Pulmonary   Respiratory effort: No increased work of breathing or signs of respiratory distress  Auscultation of lungs: Clear to auscultation  Cardiovascular   Palpation of heart: Normal PMI, no thrills  Auscultation of heart: Abnormal   Systolic murmur  Peripheral vascular exam: Normal     Examination of extremities for edema and/or varicosities: Normal     Abdomen   Abdomen: Non-tender, no masses  Liver and spleen: No hepatomegaly or splenomegaly  Examination for hernias: No hernia appreciated  Lymphatic   Palpation of lymph nodes in neck: No lymphadenopathy  Palpation of lymph nodes in axillae: No lymphadenopathy  Musculoskeletal   Gait and station: Normal     Digits and nails: Normal without clubbing or cyanosis  Joints, bones, and muscles: Normal     Range of motion: Normal     Stability: Normal     Muscle strength/tone: Normal     Skin   Skin and subcutaneous tissue: Normal without rashes or lesions  Neurologic   Cranial nerves: Cranial nerves II-XII intact  Cortical function: Normal mental status  Sensation: No sensory loss  Psychiatric   Judgment and insight: Normal     Orientation to person, place, and time: Normal     Recent and remote memory: Intact  Mood and affect: Normal        Future Appointments    Date/Time Provider Specialty Site   09/18/2017 08:30 AM VANESSA Mccracken   Obstetrics/Gynecology [facility]     Signatures   Electronically signed by : Gennette Leyden, DO; Dec  1 2016  9:25AM EST                       (Author)

## 2018-01-12 NOTE — MISCELLANEOUS
Message  GI Reminder Recall [facility]:   Date: 08/22/2017   Dear Philip Jc:     Review of our records shows you are due for the following: Follow Up Visit  Please call the following office to schedule your appointment:   [address], [address], [place], [facility], [address] [facility] [phone]  We look forward to hearing from you!      Sincerely,     [facility]      Signatures   Electronically signed by : Jovani Murdock, ; Aug 22 2017 11:54AM EST                       (Author)

## 2018-01-13 VITALS
HEIGHT: 64 IN | DIASTOLIC BLOOD PRESSURE: 81 MMHG | HEART RATE: 84 BPM | WEIGHT: 185.38 LBS | BODY MASS INDEX: 31.65 KG/M2 | SYSTOLIC BLOOD PRESSURE: 146 MMHG | TEMPERATURE: 98.8 F

## 2018-01-13 VITALS
DIASTOLIC BLOOD PRESSURE: 66 MMHG | OXYGEN SATURATION: 96 % | BODY MASS INDEX: 32.27 KG/M2 | SYSTOLIC BLOOD PRESSURE: 132 MMHG | TEMPERATURE: 98 F | WEIGHT: 189 LBS | HEART RATE: 81 BPM | HEIGHT: 64 IN

## 2018-01-13 VITALS
BODY MASS INDEX: 31.28 KG/M2 | DIASTOLIC BLOOD PRESSURE: 72 MMHG | HEART RATE: 93 BPM | SYSTOLIC BLOOD PRESSURE: 137 MMHG | HEIGHT: 64 IN | WEIGHT: 183.25 LBS | TEMPERATURE: 99.3 F

## 2018-01-14 VITALS
HEART RATE: 83 BPM | OXYGEN SATURATION: 97 % | TEMPERATURE: 98.3 F | DIASTOLIC BLOOD PRESSURE: 80 MMHG | SYSTOLIC BLOOD PRESSURE: 140 MMHG | BODY MASS INDEX: 31.58 KG/M2 | WEIGHT: 185 LBS | HEIGHT: 64 IN

## 2018-01-14 NOTE — PROGRESS NOTES
Assessment    1  Medicare welcome exam (V70 0) (Z00 00)   2  Irritable bowel syndrome with diarrhea (564 1) (K58 0)   3  Hypertension (401 9) (I10)   4  Hyperlipidemia (272 4) (E78 5)   5  Current tobacco use (305 1) (Z72 0)    Plan  Depression with anxiety    · ALPRAZolam 0 25 MG Oral Tablet (Xanax)   · Escitalopram Oxalate 10 MG Oral Tablet (Lexapro); take 0 5 tablet daily  Health Maintenance    · Zostavax 96245 UNT/0 65ML Subcutaneous Solution Reconstituted (Zostavax  19354 UNT/0 65ML Subcutaneous Solution Reconstituted); DILUTE AND INJECT  0 65ML UNDER THE SKIN AS DIRECTED   · * DXA BONE DENSITY SPINE HIP AND PELVIS; Status:Hold For - Scheduling;  Requested for:20Jun2017;    · There are many exercise options for seniors ; Status:Complete;   Done: 98IZP2456  Hyperlipidemia    · Atorvastatin Calcium 20 MG Oral Tablet; TAKE 1 TABLET DAILY AS DIRECTED   · (1) CBC/PLT/DIFF; Status:Active; Requested OYY:75HZR5501;    · (1) COMPREHENSIVE METABOLIC PANEL; Status:Active; Requested TYU:28TSI2241;    · (1) LIPID PANEL, FASTING; Status:Active; Requested MPC:19MFU3615;    · (1) TSH WITH FT4 REFLEX; Status:Active;  Requested KAA:68NPV6080;   Irritable bowel syndrome with diarrhea    · Cholestyramine 4 GM Oral Packet; MIX THE CONTENTS OF 1 POWDER PACKET  WITH 2-6 OZ OF NONCARBONATED BEVERAGE AND SWALLOW ONCE DAILY  Medicare welcome exam    · EKG/ECG- POC; Status:Complete;   Done: 74WRS8098 01:54PM   · SNELLEN VISION- POC; Status:Complete;   Done: 38FAL8065 01:54PM  Need for Tdap vaccination    · Prevnar 13 Intramuscular Suspension  PMH: Colon cancer screening    · Suprep Bowel Prep Kit 17 5-3 13-1 6 GM/180ML Oral Solution  PMH: History of diarrhea    · Imodium Multi-Symptom Relief 2-125 MG Oral Tablet  PMH: History of hypokalemia    · Potassium Chloride ER 20 MEQ Oral Tablet Extended Release  Screening for other and unspecified genitourinary condition    · *VB - Urinary Incontinence Screen (Dx Z13 89 Screen for UI); Status:Complete;   Done:  94SKF0288 02:17PM    Discussion/Summary    Micky Lay was seen and examined in the office today  Overall, she is doing well  EKG was completed and found largely within normal limits  In terms of her hip/back discomfort, she will continue follow up with Dr Mariam Brock  She reports her mood is largely control and does not use the Alprazolam  She will continue with the Lexapro  She will continue with the Lipitor for her hyperlipidemia and will complete blood work  She does have possible underlying IBS and will attempt cholestyramine for her symptoms  HM: She was given a script for a DEXA scan to complete  Prevnar was given today as well  Impression: Welcome to Medicare Visit, with preventive exam as well as age and risk appropriate counseling completed  Patient Discussion: plan discussed with the patient, follow-up visit needed in 6  Possible side effects of new medications were reviewed with the patient/guardian today  The treatment plan was reviewed with the patient/guardian  The patient/guardian understands and agrees with the treatment plan      Chief Complaint  Welcome to Medicare PE      History of Present Illness  HPI: Micky Lay is here today for her welcome to medicare physical  Since her last visit, she generally has been doing well but is bothered by her chronic back/right hip pain with neuropathy down the leg  Note from Dr Mariam Brock was reviewed  She reports most bothersome is the numbness down the leg but does feel that she is functioning better while being on the Gabapentin  She continues to take 100 mg daily as the higher dosage made her too sleepy  Other history was reviewed and updated in the chart  Welcome to [employer] and Wellness Visits: The patient is being seen for the welcome to medicare visit  Medicare Screening and Risk Factors   Hospitalizations: no previous hospitalizations    Medicare Screening Tests Risk Questions   Osteoporosis risk assessment: , female gender, over 48years of age, tobacco use and alcohol use  HIV risk assessment: none indicated  Drug and Alcohol Use: The patient currently smokes 3 per day cigarettes per day  The patient reports frequent alcohol use and drinking 1 drinks per day  Alcohol concern:   The patient has no concerns about alcohol abuse  She has never used illicit drugs  Diet and Physical Activity: Current diet includes well balanced meals, limited junk food, 1 servings of vegetables per day, 1 servings of meat per day, 2 servings of whole grains per day, 2 servings of simple carbohydrates per day, 1 servings of dairy products per day and 2 cups of coffee per day  She exercises 2-3 times per week  Exercise: swimming  Mood Disorder and Cognitive Impairment Screening: PHQ-9 Depression Scale   Over the past 2 weeks, how often have you been bothered by the following problems? 1 ) Little interest or pleasure in doing things? Not at all    2 ) Feeling down, depressed or hopeless? Not at all    3 ) Trouble falling asleep or sleeping too much? Not at all    4 ) Feeling tired or having little energy? Not at all    5 ) Poor appetite or overeating? Not at all    6 ) Feeling bad about yourself, or that you are a failure, or have let yourself or your family down? Not at all    7 ) Trouble concentrating on things, such as reading a newspaper or watching television? Not at all    8 ) Moving or speaking so slowly that other people could have noticed, or the opposite, moving or speaking faster than usual? Not at all    9 ) Thoughts that you would be off dead or of hurting yourself in some way? Not at all  TOTAL SCORE: 0  She denies feeling down, depressed, or hopeless over the past two weeks  She denies feeling little interest or pleasure in doing things over the past two weeks     Cognitive impairment screening: denies difficulty learning/retaining new information, denies difficulty handling complex tasks, denies difficulty with reasoning, denies difficulty with spatial ability and orientation, denies difficulty with language and denies difficulty with behavior  Functional Ability/Level of Safety: Hearing is a hearing aid is not used  She denies hearing difficulties  The patient is currently able to do activities of daily living without limitations, able to do instrumental activities of daily living without limitations and able to participate in social activities without limitations  Activities of daily living details: does not need help using the phone, no transportation help needed, does not need help shopping, no meal preparation help needed, does not need help doing housework, does not need help doing laundry, does not need help managing medications and does not need help managing money  Fall risk factors:  alcohol use, but The patient fell 0 times in the past 12 months , no polypharmacy, no mobility impairment, no antidepressant use, no deconditioning, no postural hypotension, no sedative use, no visual impairment, no urinary incontinence, no antihypertensive use, no cognitive impairment, up and go test was normal and no previous fall  Home safety risk factors:  no grab bars in the bathroom and no handrails on the stairs, but no unfamiliar surroundings, no loose rugs, no poor household lighting, no uneven floors and no household clutter  Advance Directives: Advance directives: no living will, no durable power of  for health care directives and no advance directives  Co-Managers and Medical Equipment/Suppliers: See Patient Care Team   Preventive Quality Program 65 and Older: Falls Risk: The patient fell 0 times in the past 12 months  The patient is currently asymptomatic Symptoms Include: The patient is currently experiencing urinary symptoms   Urinary Incontinence Symptoms includes: urinary urgency    Date of last glaucoma screen was 2 years      Patient Care Team    Care Team Member Role Specialty Office Number   Lloyd JORDAN  Obstetrics/Gynecology [phone] Specialist Gastroenterology Adult [phone]   Mane Booker DO  Pain Management [phone]     Review of Systems    Constitutional: negative  Head and Face: negative  Eyes: negative  ENT: negative  Cardiovascular: negative  Respiratory: negative  Gastrointestinal: abdominal bloating and diarrhea, but no abdominal pain, no nausea, no vomiting, no constipation, no bright red blood per rectum and no melena  Genitourinary: urinary urgency, but no dysuria, no urinary frequency, no suprapubic pain and no pelvic pain  Musculoskeletal: back pain, but no joint swelling, no joint stiffness, no pain in other joints and no limping  Integumentary and Breasts: no rashes, no skin lesions, not blister, no nodule, no plaque and no breast pain  Neurological: paresthesias, leg numbness and difficulty walking, but negative, no headache and no leg weakness  Psychiatric: no insomnia, no anxiety and no depression  Hematologic and Lymphatic: negative  Active Problems    1  Breast disorder (611 9) (N64 9)   2  Chronic right hip pain (719 45,338 29) (M25 551,G89 29)   3  Depression with anxiety (300 4) (F41 8)   4  Hyperlipidemia (272 4) (E78 5)   5  Hypertension (401 9) (I10)   6  Lumbar degenerative disc disease (722 52) (M51 36)   7  Lumbar radiculopathy (724 4) (M54 16)   8  Lumbar stenosis (724 02) (M48 06)   9  Myofascial pain (729 1) (M79 1)   10  Sacroiliitis (720 2) (M46 1)   11  Spinal stenosis (724 00) (M48 00)   12  Spondylosis of lumbar region without myelopathy or radiculopathy (721 3) (M47 816)   13  Systolic murmur of aorta (785 2) (I35 8)   14   Vitamin D deficiency (268 9) (E55 9)    Past Medical History    · History of Clostridium difficile infection (V12 09) (Z86 19)   · History of Pituitary adenoma (227 3) (D35 2)    Surgical History    · History of Cholecystectomy   · History of Incisional Breast Biopsy   · History of Neuroplasty Decompression Median Nerve At Carpal Tunnel   · History of Rotator Cuff Repair   · History of Salpingo-oophorectomy   · History of Surgery Excision Of Parotid Tumor/Gland    The surgical history was reviewed and updated today  Family History  Mother    · Family history of Colon Cancer (V16 0)   · Family history of Coronary artery disease   · Family history of colonic polyps (V18 51) (Z83 71)   · Family history of liver cancer (V16 0) (Z80 0)  Father    · Family history of Multiple sclerosis  Sibling    · Family history of Hypertension    The family history was reviewed and updated today  Social History    · Social alcohol use (Z78 9)  The social history was reviewed and updated today  Current Meds   1  ALPRAZolam 0 25 MG Oral Tablet; TAKE 1 TABLET Daily PRN anxiety; Therapy: 96DPN3027 to (Evaluate:17Jan2016); Last Rx:18Nov2015 Ordered   2  Atorvastatin Calcium 20 MG Oral Tablet; TAKE 1 TABLET DAILY AS DIRECTED    Requested for: 26Ley3599; Last Rx:15Sep2016 Ordered   3  Diclofenac Sodium 1 % Transdermal Gel; Apply 2 g of gel to affected area twice a day   when necessary; Therapy: 95OSQ3280 to (Evaluate:87Anp8337)  Requested for: 95YRM3342; Last   Rx:05Jun2017 Ordered   4  Escitalopram Oxalate 10 MG Oral Tablet; take 0 5 tablet daily; Therapy: 95IMI2777 to  Requested for: 75SPM9137 Recorded   5  Gabapentin 100 MG Oral Capsule; TAKE 1 CAPSULE BEDTIME MAY INCREASE TO 3   CAPSULES AT BEDTIME AS TOLERATED; Therapy: 13EET2575 to (Evaluate:45Owl3370)  Requested for: 98LWT9714; Last   Rx:05Jun2017 Ordered   6  Imodium Multi-Symptom Relief 2-125 MG Oral Tablet; take by mouth every six hours as   needed for diarrhea; Therapy: 03LQU4114 to (Pee Vieyra)  Requested for: 48ZUM7949; Last   AQ:03QXP5722 Ordered   7  Multi Vitamin Daily Oral Tablet; Therapy: 48MOL5251 to Recorded   8  Potassium Chloride ER 20 MEQ Oral Tablet Extended Release; Take 1 tablet twice daily;    Therapy: 71BZX6242 to (Evaluate:55Tpm0460)  Requested for: 38EWN0579; Last   Rx:29Juu6130 Ordered   9  Suprep Bowel Prep Kit 17 5-3 13-1 6 GM/180ML Oral Solution; DILUTE CONTENTS AND   USE AS DIRECTED FOR BOWEL PREP; Therapy: 05BRW6557 to (Evaluate:63Abb3241)  Requested for: 93FZI3000; Last   Rx:89Oxj5347 Ordered   10  ZyrTEC Allergy 10 MG Oral Capsule; Therapy: 96GAL7172 to Recorded    Allergies    1  Ancef   2  Penicillins   3  Tetracyclines    4  FRUIT    Immunizations   1 2    Influenza  18-Nov-2015 15-Oct-2016    Tetanus  18-Nov-2015      Vitals  Signs    Temperature: 98 F, Tympanic  Heart Rate: 81  Systolic: 565, LUE, Sitting  Diastolic: 66, LUE, Sitting  Height: 5 ft 3 5 in  Weight: 189 lb   BMI Calculated: 32 95  BSA Calculated: 1 9  O2 Saturation: 96    Physical Exam    Constitutional   General appearance: No acute distress, well appearing and well nourished  obese  Head and Face   Head and face: Normal     Palpation of the face and sinuses: No sinus tenderness  Eyes   Conjunctiva and lids: No swelling, erythema or discharge  Pupils and irises: Equal, round, reactive to light  Ears, Nose, Mouth, and Throat   External inspection of ears and nose: Normal     Otoscopic examination: Tympanic membranes translucent with normal light reflex  Canals patent without erythema  Hearing: Normal     Oropharynx: Normal with no erythema, edema, exudate or lesions  Neck   Neck: Supple, symmetric, trachea midline, no masses  Thyroid: Normal, no thyromegaly  Pulmonary   Respiratory effort: No increased work of breathing or signs of respiratory distress  Auscultation of lungs: Clear to auscultation  Cardiovascular   Palpation of heart: Normal PMI, no thrills  Auscultation of heart: Normal rate and rhythm, normal S1 and S2, no murmurs  Pedal pulses: 2+ bilaterally  Examination of extremities for edema and/or varicosities: Normal     Abdomen   Abdomen: Non-tender, no masses      Liver and spleen: No hepatomegaly or splenomegaly  Examination for hernias: No hernia appreciated  Lymphatic   Palpation of lymph nodes in neck: No lymphadenopathy  Musculoskeletal   Gait and station: Normal     Digits and nails: Normal without clubbing or cyanosis  Joints, bones, and muscles: Normal     Range of motion: Normal     Neurologic   Cranial nerves: Cranial nerves II-XII intact  Cortical function: Normal mental status  Sensation: No sensory loss  Psychiatric   Judgment and insight: Normal     Orientation to person, place, and time: Normal     Recent and remote memory: Intact  Mood and affect: Normal        Results/Data  *VB - Urinary Incontinence Screen (Dx Z13 89 Screen for UI) 20Jun2017 02:17PM Mary Ribeiro     Test Name Result Flag Reference   Urinary Incontinence Assessment 20Jun2017       EKG/ECG- POC 47DIZ0320 01:54PM Mary Ribeiro     Test Name Result Flag Reference   EKG/ECG 06/20/2017       SNELLEN VISION- POC 72EGC4958 01:54PM Mary Ribeiro     Test Name Result Flag Reference   Right Eye 20/20 corrected     Left Eye 20/20 corrected     Bilateral Eyes 20/15 corrected         Procedure    Procedure: Visual Acuity Test    Indication: routine screening  Inforrmation supplied by Loretta Hinojosa  a Snellen chart  Results: 20/15 in both eyes with corrective device, 20/20 in the right eye with corrective device, 20/20 in the left eye with corrective device   The patient tolerated the procedure well  There were no complications  Future Appointments    Date/Time Provider Specialty Site   09/18/2017 08:30 AM VANESSA Edwards   Obstetrics/Gynecology [facility]   07/18/2017 09:15 AM LIANG Felix Pain Management [facility] SPINE   12/18/2017 09:40 AM Mary Ribeiro DO Internal Medicine [facility]   06/28/2017 02:30 PM Kristina Engle DO Pain Management [place] OUTPATIENT     Signatures   Electronically signed by : Rosie Sheikh DO; Jun 20 2017  4:16PM EST                       (Author)

## 2018-01-15 NOTE — RESULT NOTES
Verified Results  * XR CHEST PA & LATERAL 58Syf0218 09:55AM Kristen MAHAJAN Order Number: [id number]     Test Name Result Flag Reference   XR CHEST PA & LATERAL (Report)     CHEST - DUAL ENERGY     INDICATION: 3 months of dry cough  COMPARISON: None     VIEWS: PA (including soft tissue/bone algorithms) and lateral projections; 4 images     FINDINGS:        Cardiomediastinal silhouette appears unremarkable  The lungs are clear  No pneumothorax or pleural effusion  Visualized osseous structures appear within normal limits for the patient's age  IMPRESSION:     No active pulmonary disease         Workstation performed: [id number]     Signed by:   Waldo Horne DO   9/16/16

## 2018-01-16 NOTE — RESULT NOTES
Message   Recorded as Task   Date: 06/13/2017 07:00 PM, Created By: Yarelis Taylor   Task Name: Call Patient with results   Assigned To: [facility],Team   Regarding Patient: Leisa Alexander, Status: Active   Comment:    Joshua Burch - 13 Jun 2017 7:00 PM     Patient Phone: [phone]      Please notify the patient the x-ray of her right hip did not show any significant degenerative changes or fractures/dislocations   Maria L Coyne - 14 Jun 2017 8:43 AM     TASK REASSIGNED: Previously Assigned To Valery Leigh - 14 Jun 2017 9:54 AM     TASK EDITED  S/w the pt  and she is aware          Signatures   Electronically signed by : Stephy Cheek, ; Jun 14 2017  9:54AM EST                       (Author)

## 2018-01-18 NOTE — MISCELLANEOUS
Message  Patient called today around 9:45 am  The patient states that she took a dose of levaquin last night and then again this morning  She states that she noticed hives on her back and arms after taking the second levaquin dose  She took a benadryl about a half hour ago  The patient denies tongue swelling, difficulty swallowing or breathing  I instructed the patient to discontinue the levaquin and I sent a prescription for azithromycin to her pharmacy  I instructed on continue use of benadryl every 4-6 hours as needed  I instructed patient to go immediately to the ER if the rash gets worse, she develops tongue/throat swelling, difficulty swallowing, difficulty breathing or any signs of distress  She is in agreement        Plan  Acute bronchitis with bronchospasm    · Azithromycin 250 MG Oral Tablet; TAKE 2 TABLETS ON DAY 1 THEN TAKE 1  TABLET A DAY FOR 4 DAYS  Wheezing    · Benzonatate 100 MG Oral Capsule; TAKE 1 CAPSULE EVERY 8 HOURS AS  NEEDED   · LevoFLOXacin 500 MG Oral Tablet (Levaquin); TAKE 1 TABLET DAILY UNTIL  FINISHED   · ProAir  (90 Base) MCG/ACT Inhalation Aerosol Solution; INHALE 1 TO 2  PUFFS EVERY 4 TO 6 HOURS AS NEEDED    Signatures   Electronically signed by : Milton Shafer, [facility]; Oct 11 2017  9:49AM EST                       (Author)

## 2018-01-18 NOTE — RESULT NOTES
Verified Results  * MRI LUMBAR SPINE WO CONTRAST [id number] 05:57AM Tate Schulte     Test Name Result Flag Reference   MRI LUMBAR SPINE WO CONTRAST (Report)     This is a summary report  The complete report is available in the patient's medical record  If you cannot access the medical record, please contact the sending organization for a detailed fax or copy  MRI LUMBAR SPINE WITHOUT CONTRAST     INDICATION: 68-year-old female, low back pain, right hip and thigh pain   COMPARISON: None  TECHNIQUE: Sagittal T1, sagittal T2, sagittal inversion recovery, axial T1 and axial T2, coronal T2       IMAGE QUALITY: Diagnostic     FINDINGS:     ALIGNMENT:    Mild S-shaped thoracolumbar scoliosis   Grade 1 anterolisthesis T11-T12, L4-5       MARROW SIGNAL: Normal marrow signal is identified within the visualized bony structures  No discrete marrow lesion  DISTAL CORD AND CONUS: Normal size and signal within the distal cord and conus  The conus ends at the T12-L1 level  PARASPINAL SOFT TISSUES: Paraspinal soft tissues are unremarkable  SACRUM: Normal signal within the sacrum  No evidence of insufficiency or stress fracture       LOWER THORACIC DISC SPACES:    Mild degenerative disc and facet disease, grade 1 anterolisthesis, bulging annulus, mild central canal stenosis T11-12     LUMBAR DISC SPACES:        L1-L2: Normal disc, mild degenerative facet hypertrophy, no stenosis     L2-L3: Moderate degenerative disc and facet disease, mild bilateral foraminal stenosis, small left foraminal disc protrusion, possible left L2 nerve root encroachment     L3-L4: Moderate to severe degenerative disc and facet disease, bulging annulus, moderate central canal and bilateral foraminal stenosis, possible bilateral L4 nerve root encroachment     L4-L5: Moderate degenerative disc and facet disease, grade 1 anterolisthesis, small central disc protrusion, moderate central canal and bilateral lateral recess stenosis, moderate bilateral foraminal stenosis, possible bilateral L5 nerve root    encroachment     L5-S1: Moderate to severe degenerative disc and facet disease, moderate bilateral foraminal stenosis, possible bilateral L5 nerve root encroachment       IMPRESSION:   Multilevel degenerative spondylosis     Grade 1 anterolisthesis, mild central canal stenosis T11-12     Mild bilateral foraminal stenosis, small left foraminal disc protrusion L2-3       Moderate central canal and bilateral foraminal stenosis L3-4     Grade 1 anterolisthesis, moderate central canal, lateral recess and bilateral foraminal stenosis, small central disc protrusion L4-5     Moderate bilateral foraminal stenosis L5-S1       Workstation performed: [id number]     Signed by:   Phu Jasmine MD   6/12/17

## 2018-01-22 VITALS
HEART RATE: 87 BPM | TEMPERATURE: 99 F | BODY MASS INDEX: 30.99 KG/M2 | DIASTOLIC BLOOD PRESSURE: 77 MMHG | SYSTOLIC BLOOD PRESSURE: 144 MMHG | HEIGHT: 64 IN | WEIGHT: 181.5 LBS

## 2018-01-22 VITALS
HEIGHT: 64 IN | WEIGHT: 181 LBS | DIASTOLIC BLOOD PRESSURE: 60 MMHG | SYSTOLIC BLOOD PRESSURE: 124 MMHG | BODY MASS INDEX: 30.9 KG/M2

## 2018-01-24 VITALS
RESPIRATION RATE: 18 BRPM | BODY MASS INDEX: 31.28 KG/M2 | HEIGHT: 64 IN | WEIGHT: 183.25 LBS | OXYGEN SATURATION: 99 % | SYSTOLIC BLOOD PRESSURE: 124 MMHG | DIASTOLIC BLOOD PRESSURE: 70 MMHG | HEART RATE: 89 BPM | TEMPERATURE: 98.3 F

## 2018-03-30 DIAGNOSIS — M54.16 LUMBAR RADICULOPATHY: Primary | ICD-10-CM

## 2018-03-30 RX ORDER — GABAPENTIN 100 MG/1
200 CAPSULE ORAL
Qty: 60 CAPSULE | Refills: 2 | Status: SHIPPED | OUTPATIENT
Start: 2018-03-30 | End: 2018-03-30 | Stop reason: SDUPTHER

## 2018-03-30 RX ORDER — GABAPENTIN 100 MG/1
200 CAPSULE ORAL
Qty: 180 CAPSULE | Refills: 1 | Status: SHIPPED | OUTPATIENT
Start: 2018-03-30 | End: 2018-09-11 | Stop reason: SDUPTHER

## 2018-03-30 NOTE — TELEPHONE ENCOUNTER
S/W pt who would like a 90 day script for the gabapentin 100 mg 2 capsules at bedtime sent to her [facility]  She said she never could tolerate taking 3 capsules at bedtime  She said she has 4-5 days worth left  **Pt said no need to c/b one sent [facility] will [facility] when ready  **

## 2018-03-30 NOTE — TELEPHONE ENCOUNTER
Pt called requesting a refill on her Gabapetnin 100mg capsules  She takes 2 at bedtime  Would like it called into [facility] pharmacy in [place]

## 2018-03-30 NOTE — TELEPHONE ENCOUNTER
S/W pharmacist Anthony Stewart at [facility] and informed her to cx the first script for the gabapentin which was sent over as a 30 day script and fill the 90 day script that was just sent over  No need to notify pt as she instructed earlier that [facility] will contact her once script is ready

## 2018-06-26 ENCOUNTER — APPOINTMENT (OUTPATIENT)
Dept: LAB | Facility: CLINIC | Age: 68
End: 2018-06-26
Payer: MEDICARE

## 2018-06-26 DIAGNOSIS — I10 ESSENTIAL (PRIMARY) HYPERTENSION: ICD-10-CM

## 2018-06-26 DIAGNOSIS — E78.5 HYPERLIPIDEMIA: ICD-10-CM

## 2018-06-26 LAB
ANION GAP SERPL CALCULATED.3IONS-SCNC: 8 MMOL/L (ref 4–13)
BASOPHILS # BLD AUTO: 0.06 THOUSANDS/ΜL (ref 0–0.1)
BASOPHILS NFR BLD AUTO: 1 % (ref 0–1)
BUN SERPL-MCNC: 14 MG/DL (ref 5–25)
CALCIUM SERPL-MCNC: 9.1 MG/DL (ref 8.3–10.1)
CHLORIDE SERPL-SCNC: 107 MMOL/L (ref 100–108)
CHOLEST SERPL-MCNC: 174 MG/DL (ref 50–200)
CO2 SERPL-SCNC: 27 MMOL/L (ref 21–32)
CREAT SERPL-MCNC: 0.88 MG/DL (ref 0.6–1.3)
EOSINOPHIL # BLD AUTO: 0.13 THOUSAND/ΜL (ref 0–0.61)
EOSINOPHIL NFR BLD AUTO: 2 % (ref 0–6)
ERYTHROCYTE [DISTWIDTH] IN BLOOD BY AUTOMATED COUNT: 13.9 % (ref 11.6–15.1)
GFR SERPL CREATININE-BSD FRML MDRD: 69 ML/MIN/1.73SQ M
GLUCOSE P FAST SERPL-MCNC: 87 MG/DL (ref 65–99)
HCT VFR BLD AUTO: 41.9 % (ref 34.8–46.1)
HDLC SERPL-MCNC: 62 MG/DL (ref 40–60)
HGB BLD-MCNC: 13.1 G/DL (ref 11.5–15.4)
IMM GRANULOCYTES # BLD AUTO: 0.02 THOUSAND/UL (ref 0–0.2)
IMM GRANULOCYTES NFR BLD AUTO: 0 % (ref 0–2)
LDLC SERPL CALC-MCNC: 79 MG/DL (ref 0–100)
LYMPHOCYTES # BLD AUTO: 2.44 THOUSANDS/ΜL (ref 0.6–4.47)
LYMPHOCYTES NFR BLD AUTO: 30 % (ref 14–44)
MCH RBC QN AUTO: 32 PG (ref 26.8–34.3)
MCHC RBC AUTO-ENTMCNC: 31.3 G/DL (ref 31.4–37.4)
MCV RBC AUTO: 102 FL (ref 82–98)
MONOCYTES # BLD AUTO: 0.61 THOUSAND/ΜL (ref 0.17–1.22)
MONOCYTES NFR BLD AUTO: 8 % (ref 4–12)
NEUTROPHILS # BLD AUTO: 4.85 THOUSANDS/ΜL (ref 1.85–7.62)
NEUTS SEG NFR BLD AUTO: 59 % (ref 43–75)
NONHDLC SERPL-MCNC: 112 MG/DL
NRBC BLD AUTO-RTO: 0 /100 WBCS
PLATELET # BLD AUTO: 267 THOUSANDS/UL (ref 149–390)
PMV BLD AUTO: 11.1 FL (ref 8.9–12.7)
POTASSIUM SERPL-SCNC: 3.6 MMOL/L (ref 3.5–5.3)
RBC # BLD AUTO: 4.1 MILLION/UL (ref 3.81–5.12)
SODIUM SERPL-SCNC: 142 MMOL/L (ref 136–145)
TRIGL SERPL-MCNC: 166 MG/DL
TSH SERPL DL<=0.05 MIU/L-ACNC: 1.96 UIU/ML (ref 0.36–3.74)
WBC # BLD AUTO: 8.11 THOUSAND/UL (ref 4.31–10.16)

## 2018-06-26 PROCEDURE — 84443 ASSAY THYROID STIM HORMONE: CPT

## 2018-06-26 PROCEDURE — 85025 COMPLETE CBC W/AUTO DIFF WBC: CPT

## 2018-06-26 PROCEDURE — 80061 LIPID PANEL: CPT

## 2018-06-26 PROCEDURE — 80048 BASIC METABOLIC PNL TOTAL CA: CPT

## 2018-06-26 PROCEDURE — 36415 COLL VENOUS BLD VENIPUNCTURE: CPT

## 2018-07-05 ENCOUNTER — OFFICE VISIT (OUTPATIENT)
Dept: INTERNAL MEDICINE CLINIC | Facility: CLINIC | Age: 68
End: 2018-07-05
Payer: MEDICARE

## 2018-07-05 VITALS
DIASTOLIC BLOOD PRESSURE: 70 MMHG | OXYGEN SATURATION: 95 % | SYSTOLIC BLOOD PRESSURE: 140 MMHG | RESPIRATION RATE: 18 BRPM | WEIGHT: 183.4 LBS | HEART RATE: 85 BPM | TEMPERATURE: 97.8 F | HEIGHT: 65 IN | BODY MASS INDEX: 30.56 KG/M2

## 2018-07-05 DIAGNOSIS — I10 ESSENTIAL HYPERTENSION: Primary | ICD-10-CM

## 2018-07-05 DIAGNOSIS — Z72.0 TOBACCO ABUSE: ICD-10-CM

## 2018-07-05 DIAGNOSIS — E78.5 HYPERLIPIDEMIA, UNSPECIFIED HYPERLIPIDEMIA TYPE: ICD-10-CM

## 2018-07-05 DIAGNOSIS — F41.8 DEPRESSION WITH ANXIETY: ICD-10-CM

## 2018-07-05 DIAGNOSIS — Z23 NEED FOR 23-POLYVALENT PNEUMOCOCCAL POLYSACCHARIDE VACCINE: ICD-10-CM

## 2018-07-05 PROBLEM — M51.369 LUMBAR DEGENERATIVE DISC DISEASE: Status: ACTIVE | Noted: 2017-06-05

## 2018-07-05 PROBLEM — M51.36 LUMBAR DEGENERATIVE DISC DISEASE: Status: ACTIVE | Noted: 2017-06-05

## 2018-07-05 PROBLEM — M19.90 ARTHRITIS: Status: ACTIVE | Noted: 2018-01-04

## 2018-07-05 PROBLEM — K58.0 IRRITABLE BOWEL SYNDROME WITH DIARRHEA: Status: ACTIVE | Noted: 2017-06-20

## 2018-07-05 PROCEDURE — 90732 PPSV23 VACC 2 YRS+ SUBQ/IM: CPT

## 2018-07-05 PROCEDURE — 99214 OFFICE O/P EST MOD 30 MIN: CPT | Performed by: INTERNAL MEDICINE

## 2018-07-05 PROCEDURE — G0009 ADMIN PNEUMOCOCCAL VACCINE: HCPCS

## 2018-07-05 RX ORDER — ESCITALOPRAM OXALATE 10 MG/1
10 TABLET ORAL DAILY
Refills: 1 | COMMUNITY
Start: 2018-04-14 | End: 2018-09-25 | Stop reason: SDUPTHER

## 2018-07-05 NOTE — PROGRESS NOTES
Assessment/Plan:     Diagnoses and all orders for this visit:    Essential hypertension    Depression with anxiety    Hyperlipidemia, unspecified hyperlipidemia type    Need for 23-polyvalent pneumococcal polysaccharide vaccine  -     PNEUMOCOCCAL POLYSACCHARIDE VACCINE 23-VALENT =>3YO SQ IM    Tobacco abuse  -     CT lung screening program; Future    Other orders  -     Cetirizine HCl (ZYRTEC ALLERGY) 10 MG CAPS; Take by mouth  -     escitalopram (LEXAPRO) 10 mg tablet; Take 10 mg by mouth daily       Keturah Collins was seen and examined in the office today  Repeat blood pressure was 144/82, cardiac exam reveals a murmur, and there are 2 macular regions noted of the legs that are of unknown cause  They have not changed and she cannot recall any trauma  They appeared after started Neurontin  She continues to smoke and has been smoking for about 26 years, at times smoking more  I did give her a script for [place] lung screening program as well  She was given Pneumovax today as well  She is UTD with mammogram screening and Cscope screening  She did not get the shingles vaccination given coverage  Blood work was reviewed as well  Otherwise no other changes were made  Subjective:      Patient ID: Roshan Marte is a 79 y o  female  Keturah Collins is here today for a routine follow up  Since her last visit, she reports feeling okay  She has had increased anxiety secondary to a home situation with her father in law and his health but generally is okay  ROS is otherwise largely negative  The following portions of the patient's history were reviewed and updated as appropriate: allergies, past family history, past medical history, past social history, past surgical history and problem list     Review of Systems   Constitutional: Negative for chills, fever and unexpected weight change  HENT: Negative for sinus pain, sinus pressure, sore throat and voice change  Eyes: Negative for visual disturbance     Respiratory: Negative for cough, chest tightness, shortness of breath and wheezing  Cardiovascular: Negative for chest pain, palpitations and leg swelling  Gastrointestinal: Positive for diarrhea  Negative for abdominal pain, blood in stool, constipation, nausea and vomiting  Genitourinary: Negative for difficulty urinating and dysuria  Musculoskeletal: Negative for arthralgias, back pain and myalgias  Neurological: Negative for dizziness, syncope, numbness and headaches  Psychiatric/Behavioral: Positive for sleep disturbance  Negative for dysphoric mood  The patient is nervous/anxious  Objective:      /70 (BP Location: Left arm, Patient Position: Sitting, Cuff Size: Large)   Pulse 85   Temp 97 8 °F (36 6 °C) (Tympanic)   Resp 18   Ht 5' 5" (1 651 m)   Wt 83 2 kg (183 lb 6 4 oz)   SpO2 95%   BMI 30 52 kg/m²          Physical Exam   Constitutional: She is oriented to person, place, and time  She appears well-developed and well-nourished  No distress  HENT:   Head: Normocephalic and atraumatic  Eyes: Conjunctivae and EOM are normal  Right eye exhibits no discharge  Left eye exhibits no discharge  No scleral icterus  Neck: Normal range of motion  No tracheal deviation present  No thyromegaly present  Cardiovascular: Normal rate and regular rhythm  Murmur heard  Pulmonary/Chest: Effort normal  No respiratory distress  She has no wheezes  Decreased throughout    Abdominal: Soft  Bowel sounds are normal  There is no tenderness  There is no rebound  Musculoskeletal: Normal range of motion  She exhibits no edema  Lymphadenopathy:     She has no cervical adenopathy  Neurological: She is alert and oriented to person, place, and time  She has normal reflexes  No cranial nerve deficit  Skin: Skin is warm and dry  She is not diaphoretic  Psychiatric: She has a normal mood and affect  Her speech is normal and behavior is normal  Judgment and thought content normal    Vitals reviewed

## 2018-09-11 ENCOUNTER — OFFICE VISIT (OUTPATIENT)
Dept: PAIN MEDICINE | Facility: CLINIC | Age: 68
End: 2018-09-11
Payer: MEDICARE

## 2018-09-11 VITALS
WEIGHT: 184 LBS | SYSTOLIC BLOOD PRESSURE: 152 MMHG | TEMPERATURE: 98.3 F | DIASTOLIC BLOOD PRESSURE: 86 MMHG | BODY MASS INDEX: 30.66 KG/M2 | HEIGHT: 65 IN | HEART RATE: 80 BPM

## 2018-09-11 DIAGNOSIS — M51.36 LUMBAR DEGENERATIVE DISC DISEASE: ICD-10-CM

## 2018-09-11 DIAGNOSIS — M47.816 LUMBAR SPONDYLOSIS: ICD-10-CM

## 2018-09-11 DIAGNOSIS — M54.16 LUMBAR RADICULOPATHY: ICD-10-CM

## 2018-09-11 DIAGNOSIS — M70.61 GREATER TROCHANTERIC BURSITIS OF RIGHT HIP: ICD-10-CM

## 2018-09-11 DIAGNOSIS — M46.1 SACROILIITIS (HCC): Primary | ICD-10-CM

## 2018-09-11 DIAGNOSIS — M51.16 LUMBAR DISC DISEASE WITH RADICULOPATHY: ICD-10-CM

## 2018-09-11 DIAGNOSIS — M53.3 SACROILIAC JOINT DYSFUNCTION: ICD-10-CM

## 2018-09-11 PROCEDURE — 99214 OFFICE O/P EST MOD 30 MIN: CPT | Performed by: NURSE PRACTITIONER

## 2018-09-11 RX ORDER — GABAPENTIN 100 MG/1
300 CAPSULE ORAL
Qty: 270 CAPSULE | Refills: 0 | Status: SHIPPED | OUTPATIENT
Start: 2018-09-11 | End: 2019-01-08 | Stop reason: ALTCHOICE

## 2018-09-11 RX ORDER — IBUPROFEN 800 MG/1
800 TABLET ORAL 3 TIMES DAILY PRN
Qty: 90 TABLET | Refills: 2 | Status: SHIPPED | OUTPATIENT
Start: 2018-09-11 | End: 2018-11-26 | Stop reason: HOSPADM

## 2018-09-11 NOTE — PROGRESS NOTES
Assessment:  1  Sacroiliitis ([address] )    2  Greater trochanteric bursitis of right hip - Right    3  Lumbar radiculopathy    4  Lumbar degenerative disc disease    5  Lumbar disc disease with radiculopathy    6  Lumbar spondylosis    7  Sacroiliac joint dysfunction        Plan:  1  Based on patient report and physical exam, the patient's symptomatology does seem to be multifactorial in nature, however consistent with sacroiliac mediated pain from sacroiliitis  We will schedule the patient for a right SIJ injection to decrease any inflammatory component of the patient's pain symptoms  I will also schedule the patient for right greater trochanter bursa injection to help with the inflammatory component of the patient's pain as she does have evidence of greater trochanter bursitis upon exam  Complete risks and benefits including bleeding, infection, tissue reaction, nerve injury and allergic reaction were discussed  The approach was demonstrated using models and literature was provided  Verbal and written consent was obtained  2   I will increase the patient's gabapentin to 300 mg at bedtime for her neuropathic pain complaints  She was unable to tolerate this dose in the past, however wants to try again  I advised the patient that if they experience any side effects or issues with the changes in their medication regiment, they should give our office a call to discuss  I also advised the patient not to drive or operate machinery until they see how the changes in the medication regimen affects them  The patient was agreeable and verbalized an understanding  3   I will prescribe the patient ibuprofen 800 mg t i d  p r n  max 2400 mg in a day for ease of adminitration as she is already taking this medication OTC, 200mg tablets 4 at a time and gets rather good relief  She denies any side effects to this current medication    Educated risks of mixing NSAIDS (also known as non-steroidal anti-inflammatory pills) including advil, ibuprofen, motrin, aleve, naproxen, aspirin, and ibuprofen containing products with each other or with steroids (such as prednisone, medrol)  Explained risks of mixing these medications including stomach ulcer, severe internal bleeding, and kidney failure  Instructed not to take NSAIDS if have history of stomach ulcers, kidney issues, or hypertension  Instructed patient to use only one brand as prescribed  Patient expressed understanding and agreed to plan  4   The patient may benefit from a right L4 and L5 TFESI in the future  She would like to attempt the sacroiliac joint and greater trochanter bursa injection 1st as these are less invasive and will reevaluate after the injections  I do feel this is reasonable at this time  5   The patient will continue with her home exercise program  6  I will follow up with the patient pending results of the injection or sooner if needed  The patient was instructed to call the office in the interim if symptoms worsen or change  The patient was agreeable and verbalized an understanding  I attest that I have spent at least 25 minutes face to face with the patient and that at least 50% of the time was spent educating and/or discussing the patient's symptoms and treatment plan options  My impressions and treatment recommendations were discussed in detail with the patient who verbalized understanding and had no further questions  Discharge instructions were provided  I personally saw and examined the patient and I agree with the above discussed plan of care  Orders Placed This Encounter   Procedures    FL spine and pain procedure     Standing Status:   Future     Standing Expiration Date:   9/11/2019     Order Specific Question:   Reason for Exam:     Answer:   R SIJ injection and R GTB injection     Order Specific Question:   Anticoagulant hold needed?      Answer:   N/A     New Medications Ordered This Visit   Medications    gabapentin (NEURONTIN) 100 mg capsule     Sig: Take 3 capsules (300 mg total) by mouth daily at bedtime     Dispense:  270 capsule     Refill:  0    ibuprofen (MOTRIN) 800 mg tablet     Sig: Take 1 tablet (800 mg total) by mouth 3 (three) times a day as needed for mild pain     Dispense:  90 tablet     Refill:  2       History of Present Illness:  Gerry Allen is a 79 y o  female last seen in the office on December 5, 2017 who presents for a follow up office visit in regards to Back Pain (right hip/right leg radiates to knee) secondary to lumbar spondylosis, lumbar degenerative disc disease, lumbar spondylosis, sacroiliitis, greater trochanter bursitis  At today's office visit, the patient complains of right low back pain and right hip pain which seems to be localized over her right SI joint and right greater trochanter bursa  She states that the pain radiates into the right hip into the anterolateral aspect of her thigh terminating above her knee  The pain does appear to be multifactorial in nature as lumbar radiculopathy cannot completely be ruled out, however exam does coincide with sacroiliitis  She does state that in the past, she fell on black ice and was seen by her PCP who told her that she "dislocated" her SI joint  She denies any symptoms into the left lower extremity  She denies any bowel or bladder incontinence or saddle anesthesia  MRI of the lumbar spine from June 12, 2017 does demonstrate multilevel degenerative spondylosis, grade 1 anterolisthesis and mild central canal stenosis at T11-12, mild bilateral foraminal stenosis at L2-3, moderate central canal and bilateral foraminal stenosis at L3-4,  grade 1 anterolisthesis, moderate central canal and bilateral foraminal stenosis at L4-5, and moderate bilateral foraminal stenosis at L5-S1  X-ray of her right hip from June 12, 2017 is within normal limits  She currently rates her pain as 6/10 on the numeric pain rating scale    She states the pain is constant in nature and bothersome in the evening and at night  She characterizes the pain as dull aching, sharp and cramping  Current pain medications includes:  Gabapentin 200 mg at bedtime and ibuprofen p r n     The patient reports that this regimen is providing minimal pain relief  The patient is reporting no side effects from this pain medication regimen  She does state that she has tried to increase her gabapentin in the past and experienced excessive drowsiness  She does not tolerate opioids wellness sensitive to many medications  I have personally reviewed and/or updated the patient's past medical history, past surgical history, family history, social history, current medications, allergies, and vital signs today  Review of Systems   Respiratory: Negative for shortness of breath  Cardiovascular: Negative for chest pain  Gastrointestinal: Negative for constipation, diarrhea, nausea and vomiting  Musculoskeletal: Positive for gait problem and joint swelling  Negative for arthralgias and myalgias  Skin: Negative for rash  Neurological: Negative for dizziness, seizures and weakness  All other systems reviewed and are negative        Patient Active Problem List   Diagnosis    Arthritis    Depression with anxiety    Hyperlipidemia    Hypertension    Irritable bowel syndrome with diarrhea    Lumbar degenerative disc disease    Vitamin D deficiency    Tobacco abuse       Past Medical History:   Diagnosis Date    Anxiety     Bursitis of hip     Last Assessed:6/5/2017    Depression     Herniated lumbar intervertebral disc     Hyperlipidemia     Hypertension     Hypokalemia     Pituitary aden[address] ([facility] [address] )     Last Assessed:2/12/2015 ; Previously on bromocriptine    Spinal stenosis     Systolic murmur of aorta     Vitamin D deficiency        Past Surgical History:   Procedure Laterality Date    BREAST SURGERY      Incisional breast biopsy    CARPAL TUNNEL RELEASE Left     CHOLECYSTECTOMY  OTHER SURGICAL HISTORY      EXCISION OF PAROTID TUMOR/GLAND    PAROTIDECTOMY      OR COLONOSCOPY FLX DX W/COLLJ SPEC WHEN PFRMD N/A 6/6/2017    Procedure: COLONOSCOPY;  Surgeon: Duglas Cabrera DO;  Location: [facility]; Service: Gastroenterology    ROTATOR CUFF REPAIR      SALPINGOOPHORECTOMY         Family History   Problem Relation Age of Onset    Colon cancer Mother     Coronary artery disease Mother     Colonic polyp Mother     Liver cancer Mother     Multiple sclerosis Father     Hypertension Other        Social History     Occupational History    Not on file  Social History Main Topics    Smoking status: Current Every Day Smoker    Smokeless tobacco: Never Used      Comment: 3 cigarettes a day    Alcohol use 0 6 oz/week     1 Shots of liquor per week      Comment: 1 [place] [place] a night/social    Drug use: No    Sexual activity: Not on file       Current Outpatient Prescriptions on File Prior to Visit   Medication Sig    ATORVASTATIN CALCIUM PO Take 20 mg by mouth daily    Cetirizine HCl (ZYRTEC ALLERGY) 10 MG CAPS Take by mouth    escitalopram (LEXAPRO) 10 mg tablet Take 10 mg by mouth daily     Multiple Vitamin (MULTI VITAMIN DAILY PO) Take by mouth    [DISCONTINUED] gabapentin (NEURONTIN) 100 mg capsule Take 2 capsules (200 mg total) by mouth daily at bedtime     No current facility-administered medications on file prior to visit          Allergies   Allergen Reactions    Ancef [Cefazolin]     Cephalosporins Other (See Comments)     c diff    Fruit Extracts      Annotation - [id number]: peaches and strawberries    Levofloxacin     Morphine GI Intolerance    Other      FRUIT    Penicillins Hives    Tetracycline Edema       Physical Exam:    /86   Pulse 80   Temp 98 3 °F (36 8 °C) (Oral)   Ht 5' 5" (1 651 m)   Wt 83 5 kg (184 lb)   BMI 30 62 kg/m²     Constitutional:normal, well developed, well nourished, alert, in no distress and non-toxic and no overt pain behavior  Eyes:anicteric  HEENT:grossly intact  Neck:supple, symmetric, trachea midline and no masses   Pulmonary:even and unlabored  Cardiovascular:No edema or pitting edema present  Skin:Normal without rashes or lesions and well hydrated  Psychiatric:Mood and affect appropriate  Neurologic:Cranial Nerves II-XII grossly intact  Musculoskeletal:normal gait  Bilateral lower extremity strength 5/5 in all muscle groups  Right SI joint tender to palpation, left SI joint nontender to palpation  Right greater trochanter bursa tender to palpation  Left greater trochanter bursa nontender to palpation  Tonio's test, AP compression, and Gaenslen's test positive on the right and negative on the left  Positive pelvic distraction on the right and negative on the left  Negative straight leg raise bilaterally  Negative Stinchfield test bilaterally  External rotation of the right hit did reproduce the patient's low back pain  Imaging  MRI LUMBAR SPINE WITHOUT CONTRAST     INDICATION:  22-year-old female, low back pain, right hip and thigh pain  COMPARISON:  None      TECHNIQUE:  Sagittal T1, sagittal T2, sagittal inversion recovery, axial T1 and axial T2, coronal T2        IMAGE QUALITY:  Diagnostic     FINDINGS:     ALIGNMENT:    Mild S-shaped thoracolumbar scoliosis  Grade 1 anterolisthesis T11-T12, L4-5        MARROW SIGNAL:  Normal marrow signal is identified within the visualized bony structures  No discrete marrow lesion      DISTAL CORD AND CONUS:  Normal size and signal within the distal cord and conus  The conus ends at the T12-L1 level      PARASPINAL SOFT TISSUES:  Paraspinal soft tissues are unremarkable      SACRUM:  Normal signal within the sacrum   No evidence of insufficiency or stress fracture      LOWER THORACIC DISC SPACES:    Mild degenerative disc and facet disease, grade 1 anterolisthesis, bulging annulus, mild central canal stenosis T11-12     LUMBAR DISC SPACES:          L1-L2:  Normal disc, mild degenerative facet hypertrophy, no stenosis     L2-L3:  Moderate degenerative disc and facet disease, mild bilateral foraminal stenosis, small left foraminal disc protrusion, possible left L2 nerve root encroachment     L3-L4:  Moderate to severe degenerative disc and facet disease, bulging annulus, moderate central canal and bilateral foraminal stenosis, possible bilateral L4 nerve root encroachment     L4-L5:  Moderate degenerative disc and facet disease, grade 1 anterolisthesis, small central disc protrusion, moderate central canal and bilateral lateral recess stenosis, moderate bilateral foraminal stenosis, possible bilateral L5 nerve root   encroachment     L5-S1:  Moderate to severe degenerative disc and facet disease, moderate bilateral foraminal stenosis, possible bilateral L5 nerve root encroachment     IMPRESSION:  Multilevel degenerative spondylosis     Grade 1 anterolisthesis, mild central canal stenosis T11-12     Mild bilateral foraminal stenosis, small left foraminal disc protrusion L2-3      Moderate central canal and bilateral foraminal stenosis L3-4     Grade 1 anterolisthesis, moderate central canal, lateral recess and bilateral foraminal stenosis, small central disc protrusion L4-5     Moderate bilateral foraminal stenosis L5-S1

## 2018-09-22 DIAGNOSIS — M54.16 LUMBAR RADICULOPATHY: ICD-10-CM

## 2018-09-25 ENCOUNTER — ANNUAL EXAM (OUTPATIENT)
Dept: OBGYN CLINIC | Facility: MEDICAL CENTER | Age: 68
End: 2018-09-25
Payer: MEDICARE

## 2018-09-25 ENCOUNTER — HOSPITAL ENCOUNTER (OUTPATIENT)
Dept: RADIOLOGY | Facility: CLINIC | Age: 68
Discharge: HOME | End: 2018-09-25
Payer: MEDICARE

## 2018-09-25 VITALS
DIASTOLIC BLOOD PRESSURE: 82 MMHG | WEIGHT: 183.7 LBS | HEIGHT: 64 IN | SYSTOLIC BLOOD PRESSURE: 130 MMHG | BODY MASS INDEX: 31.36 KG/M2

## 2018-09-25 VITALS
OXYGEN SATURATION: 100 % | TEMPERATURE: 98.4 F | HEART RATE: 88 BPM | SYSTOLIC BLOOD PRESSURE: 118 MMHG | RESPIRATION RATE: 20 BRPM | DIASTOLIC BLOOD PRESSURE: 68 MMHG

## 2018-09-25 DIAGNOSIS — Z01.419 ENCOUNTER FOR GYNECOLOGICAL EXAMINATION: Primary | ICD-10-CM

## 2018-09-25 DIAGNOSIS — F41.8 DEPRESSION WITH ANXIETY: ICD-10-CM

## 2018-09-25 DIAGNOSIS — J30.2 SEASONAL ALLERGIC RHINITIS, UNSPECIFIED TRIGGER: ICD-10-CM

## 2018-09-25 DIAGNOSIS — M46.1 SACROILIITIS (HCC): ICD-10-CM

## 2018-09-25 DIAGNOSIS — Z12.31 ENCOUNTER FOR SCREENING MAMMOGRAM FOR MALIGNANT NEOPLASM OF BREAST: ICD-10-CM

## 2018-09-25 DIAGNOSIS — E78.00 HIGH CHOLESTEROL: ICD-10-CM

## 2018-09-25 PROCEDURE — 77002 NEEDLE LOCALIZATION BY XRAY: CPT | Performed by: ANESTHESIOLOGY

## 2018-09-25 PROCEDURE — 20610 DRAIN/INJ JOINT/BURSA W/O US: CPT | Performed by: ANESTHESIOLOGY

## 2018-09-25 PROCEDURE — G0101 CA SCREEN;PELVIC/BREAST EXAM: HCPCS | Performed by: OBSTETRICS & GYNECOLOGY

## 2018-09-25 PROCEDURE — 27096 INJECT SACROILIAC JOINT: CPT | Performed by: ANESTHESIOLOGY

## 2018-09-25 RX ORDER — BUPIVACAINE HCL/PF 2.5 MG/ML
30 VIAL (ML) INJECTION ONCE
Status: COMPLETED | OUTPATIENT
Start: 2018-09-25 | End: 2018-09-25

## 2018-09-25 RX ORDER — GABAPENTIN 100 MG/1
CAPSULE ORAL
Qty: 180 CAPSULE | Refills: 1 | OUTPATIENT
Start: 2018-09-25

## 2018-09-25 RX ORDER — ESCITALOPRAM OXALATE 10 MG/1
10 TABLET ORAL DAILY
Qty: 30 TABLET | Refills: 11 | Status: SHIPPED | OUTPATIENT
Start: 2018-09-25 | End: 2019-10-03 | Stop reason: SDUPTHER

## 2018-09-25 RX ORDER — ATORVASTATIN CALCIUM 20 MG/1
20 TABLET, FILM COATED ORAL DAILY
Qty: 90 TABLET | Refills: 3 | Status: SHIPPED | OUTPATIENT
Start: 2018-09-25 | End: 2019-10-03 | Stop reason: SDUPTHER

## 2018-09-25 RX ORDER — ALBUTEROL SULFATE 90 UG/1
1 AEROSOL, METERED RESPIRATORY (INHALATION) EVERY 6 HOURS PRN
Qty: 6.7 G | Refills: 0 | Status: SHIPPED | OUTPATIENT
Start: 2018-09-25 | End: 2019-11-26 | Stop reason: SDUPTHER

## 2018-09-25 RX ORDER — METHYLPREDNISOLONE ACETATE 40 MG/ML
40 INJECTION, SUSPENSION INTRA-ARTICULAR; INTRALESIONAL; INTRAMUSCULAR; PARENTERAL; SOFT TISSUE ONCE
Status: COMPLETED | OUTPATIENT
Start: 2018-09-25 | End: 2018-09-25

## 2018-09-25 RX ORDER — LIDOCAINE HYDROCHLORIDE 10 MG/ML
10 INJECTION, SOLUTION EPIDURAL; INFILTRATION; INTRACAUDAL; PERINEURAL ONCE
Status: COMPLETED | OUTPATIENT
Start: 2018-09-25 | End: 2018-09-25

## 2018-09-25 RX ADMIN — IOHEXOL 2 ML: 300 INJECTION, SOLUTION INTRAVENOUS at 13:31

## 2018-09-25 RX ADMIN — METHYLPREDNISOLONE ACETATE 40 MG: 40 INJECTION, SUSPENSION INTRA-ARTICULAR; INTRALESIONAL; INTRAMUSCULAR; SOFT TISSUE at 13:33

## 2018-09-25 RX ADMIN — BUPIVACAINE HYDROCHLORIDE 3.5 ML: 2.5 INJECTION, SOLUTION EPIDURAL; INFILTRATION; INTRACAUDAL at 13:33

## 2018-09-25 RX ADMIN — LIDOCAINE HYDROCHLORIDE 4 ML: 10 INJECTION, SOLUTION EPIDURAL; INFILTRATION; INTRACAUDAL; PERINEURAL at 13:27

## 2018-09-25 NOTE — H&P
History of Present Illness: The patient is a 79 y o  female who presents with complaints of right-sided low back and right hip pain  Patient Active Problem List   Diagnosis    Arthritis    Depression with anxiety    Hyperlipidemia    Hypertension    Irritable bowel syndrome with diarrhea    Lumbar degenerative disc disease    Vitamin D deficiency    Tobacco abuse       Past Medical History:   Diagnosis Date    Anxiety     Bursitis of hip     Last Assessed:6/5/2017    Depression     Herniated lumbar intervertebral disc     Hyperlipidemia     Hypertension     Hypokalemia     Pituitary adenoma ([address] )     Last Assessed:2/12/2015 ; Previously on bromocriptine    Spinal stenosis     Systolic murmur of aorta     Vitamin D deficiency        Past Surgical History:   Procedure Laterality Date    BREAST SURGERY      Incisional breast biopsy    CARPAL TUNNEL RELEASE Left     CHOLECYSTECTOMY      OTHER SURGICAL HISTORY      EXCISION OF PAROTID TUMOR/GLAND    PAROTIDECTOMY      VT COLONOSCOPY FLX DX W/COLLJ SPEC WHEN PFRMD N/A 6/6/2017    Procedure: COLONOSCOPY;  Surgeon: [employer], DO;  Location: [facility];   Service: Gastroenterology    ROTATOR CUFF REPAIR      SALPINGOOPHORECTOMY           Current Outpatient Prescriptions:     albuterol (PROVENTIL HFA) 90 mcg/act inhaler, Inhale 1 puff every 6 (six) hours as needed for wheezing, Disp: 6 7 g, Rfl: 0    atorvastatin (LIPITOR) 20 mg tablet, Take 1 tablet (20 mg total) by mouth daily, Disp: 90 tablet, Rfl: 3    Cetirizine HCl (ZYRTEC ALLERGY) 10 MG CAPS, Take by mouth, Disp: , Rfl:     escitalopram (LEXAPRO) 10 mg tablet, Take 1 tablet (10 mg total) by mouth daily, Disp: 30 tablet, Rfl: 11    gabapentin (NEURONTIN) 100 mg capsule, Take 3 capsules (300 mg total) by mouth daily at bedtime, Disp: 270 capsule, Rfl: 0    ibuprofen (MOTRIN) 800 mg tablet, Take 1 tablet (800 mg total) by mouth 3 (three) times a day as needed for mild pain, Disp: 90 tablet, Rfl: 2    Multiple Vitamin (MULTI VITAMIN DAILY PO), Take by mouth, Disp: , Rfl:     Current Facility-Administered Medications:     bupivacaine (PF) (MARCAINE) 0 25 % injection 30 mL, 30 mL, Intra-articular, Once, Joshua Burch,     iohexol (OMNIPAQUE) 300 mg/mL injection 50 mL, 50 mL, Injection, Once, Joshua Burch,     lidocaine (PF) (XYLOCAINE-MPF) 1 % injection 10 mL, 10 mL, Intra-articular, Once, Huma Velez, DO    methylPREDNISolone acetate (DEPO-MEDROL) injection 40 mg, 40 mg, Intra-articular, Once, Eduinon Haring, DO    methylPREDNISolone acetate (DEPO-MEDROL) injection 40 mg, 40 mg, Intra-articular, Once, Eduinon Haring, DO    Allergies   Allergen Reactions    Ancef [Cefazolin]     Cephalosporins Other (See Comments)     c diff    Fruit Extracts      Annotation - [id number]: peaches and strawberries    Levofloxacin     Morphine GI Intolerance    Other      FRUIT    Penicillins Hives    Tetracycline Edema       Physical Exam:   Vitals:    09/25/18 1254   BP: 126/73   Pulse: 65   Resp: 20   Temp: 98 4 °F (36 9 °C)   SpO2: 98%     General: Awake, Alert, Oriented x 3, Mood and affect appropriate  Respiratory: Respirations even and unlabored  Cardiovascular: Peripheral pulses intact; no edema  Musculoskeletal Exam:   Tenderness to palpation over the right SI joint and right greater trochanter  ASA Score: 2    Patient/Chart Verification  Patient ID Verified: Verbal  ID Band Applied: No  Consents Confirmed: Procedural  H&P( within 30 days) Verified: To be obtained in the Pre-Procedure area  Interval H&P(within 24 hr) Complete (required for Outpatients and Surgery Admit only): To be obtained in the Pre-Procedure area  Allergies Reviewed: Yes  Anticoag/NSAID held?: No  Currently on antibiotics?: No  Pre-op Lab/Test Results Available: N/A  Pregnancy Lab Collected: N/A comment    Assessment:   1  Sacroiliitis ([address] )     2    Right greater trochanteric bursitis    Plan: R SIJ injection and R GTB injection

## 2018-09-25 NOTE — DISCHARGE INSTRUCTIONS
Steroid Joint Injection   WHAT YOU NEED TO KNOW:   A steroid joint injection is a procedure to inject steroid medicine into a joint  Steroid medicine decreases pain and inflammation  The injection may also contain an anesthetic (numbing medicine) to decrease pain  It may be done to treat conditions such as arthritis, gout, or carpal tunnel syndrome  The injections may be given in your knee, ankle, shoulder, elbow, wrist, ankle or sacroiliac joint  1  Do not apply heat to any area that is numb  If you have discomfort or soreness at the injection site, you may apply ice today, 20 minutes on and 20 minutes off  Tomorrow you may use ice or warm, moist heat  Do not apply ice or heat directly to the skin  2  You may have an increase or change in the discomfort for 36-48 hours after your treatment  Apply ice and continue with any pain medicine you have been prescribed  3  Do not do anything strenuous today  You may shower, but no tub baths or hot tubs today  You may resume your normal activities tomorrow, but do not overdo it  Resume normal activities slowly when you are feeling better  4  If you experience redness, drainage or swelling at the injection site, or if you develop a fever above 100 degrees, please call [facility] at [phone] or go to the Emergency Room  5  Continue to take all routine medicines prescribed by your primary care physician unless otherwise instructed by our staff  Most blood thinners should be started again according to your regularly scheduled dosing  If you have any questions, please give our office a call  If you have a problem specifically related to your procedure, please call our office at [phone]  Problems not related to your procedure should be directed to your primary care physician

## 2018-09-25 NOTE — PROGRESS NOTES
ASSESSMENT & PLAN: Con Baird is a 79 y o  [id number] with normal gynecologic exam     1   Routine well woman exam done today  2  Pap and HPV:  The patient's last pap and hpv was   It was normal     Pap and cotesting was not done today  Current ASCCP Guidelines reviewed  3   Mammogram ordered  4  Colonoscopy up to date  11  DEXA up to date  10  The following were reviewed in today's visit: breast self exam, mammography screening ordered, osteoporosis, adequate intake of calcium and vitamin D, exercise and healthy diet  7  Smoking cessation encouraged    CC:  Annual Gynecologic Examination    HPI: Con Baird is a 79 y o  Nena Mckeon who presents for annual gynecologic examination  She has the following concerns:  None     Health Maintenance:    She wears her seatbelt routinely  She does perform regular monthly self breast exams  She feels safe at home  Past Medical History:   Diagnosis Date    Anxiety     Bursitis of hip     Last Assessed:2017    Depression     Herniated lumbar intervertebral disc     Hyperlipidemia     Hypertension     Hypokalemia     Pituitary adenoma ([address] )     Last Assessed:2015 ; Previously on bromocriptine    Spinal stenosis     Systolic murmur of aorta     Vitamin D deficiency        Past Surgical History:   Procedure Laterality Date    BREAST SURGERY      Incisional breast biopsy    CARPAL TUNNEL RELEASE Left     CHOLECYSTECTOMY      OTHER SURGICAL HISTORY      EXCISION OF PAROTID TUMOR/GLAND    PAROTIDECTOMY      MD COLONOSCOPY FLX DX W/COLLJ SPEC WHEN PFRMD N/A 2017    Procedure: COLONOSCOPY;  Surgeon: Lisa Sosa DO;  Location: [facility] GI [facility];   Service: Gastroenterology    ROTATOR CUFF REPAIR      SALPINGOOPHORECTOMY         Past OB/Gyn History:  OB History      Para Term  AB Living    3 3 3     3    SAB TAB Ectopic Multiple Live Births            3            Family History   Problem Relation Age of Onset    Colon cancer Mother     Coronary artery disease Mother     Colonic polyp Mother     Liver cancer Mother     Multiple sclerosis Father     Hypertension Other        Social History:  Social History     Social History    Marital status: /Civil Union     Spouse name: N/A    Number of children: N/A    Years of education: N/A     Occupational History    Not on file       Social History Main Topics    Smoking status: Current Every Day Smoker    Smokeless tobacco: Never Used      Comment: 3 cigarettes a day    Alcohol use 0 6 oz/week     1 Shots of liquor per week      Comment: 1 Susanne Hale a night/social    Drug use: No    Sexual activity: Not on file     Other Topics Concern    Not on file     Social History Narrative    No narrative on file       Allergies   Allergen Reactions    Ancef [Cefazolin]     Cephalosporins Other (See Comments)     c diff    Fruit Extracts      Annotation - [id number]: peaches and strawberries    Levofloxacin     Morphine GI Intolerance    Other      FRUIT    Penicillins Hives    Tetracycline Edema         Current Outpatient Prescriptions:     albuterol (PROVENTIL HFA) 90 mcg/act inhaler, Inhale 1 puff every 6 (six) hours as needed for wheezing, Disp: 6 7 g, Rfl: 0    atorvastatin (LIPITOR) 20 mg tablet, Take 1 tablet (20 mg total) by mouth daily, Disp: 90 tablet, Rfl: 3    Cetirizine HCl (ZYRTEC ALLERGY) 10 MG CAPS, Take by mouth, Disp: , Rfl:     escitalopram (LEXAPRO) 10 mg tablet, Take 1 tablet (10 mg total) by mouth daily, Disp: 30 tablet, Rfl: 11    gabapentin (NEURONTIN) 100 mg capsule, Take 3 capsules (300 mg total) by mouth daily at bedtime, Disp: 270 capsule, Rfl: 0    ibuprofen (MOTRIN) 800 mg tablet, Take 1 tablet (800 mg total) by mouth 3 (three) times a day as needed for mild pain, Disp: 90 tablet, Rfl: 2    Multiple Vitamin (MULTI VITAMIN DAILY PO), Take by mouth, Disp: , Rfl:     Review of Systems  Constitutional :no fever, feels well, no tiredness, no recent weight gain or loss  ENT: no ear ache, no loss of hearing, no nosebleeds or nasal discharge, no sore throat or hoarseness  Cardiovascular: no complaints of slow or fast heart beat, no chest pain, no palpitations, no leg claudication or lower extremity edema  Respiratory: no complaints of shortness of shortness of breath, no RUVALCABA  Breasts:no complaints of breast pain, breast lump, or nipple discharge  Gastrointestinal: no complaints of abdominal pain, constipation, nausea, vomiting, or diarrhea or bloody stools  Genitourinary : no complaints of dysuria, incontinence, pelvic pain, no dysmenorrhea, vaginal discharge or abnormal vaginal bleeding and as noted in HPI  Musculoskeletal: no complaints of arthralgia, no myalgia, no joint swelling or stiffness, no limb pain or swelling  Integumentary: no complaints of skin rash or lesion, itching or dry skin  Neurological: no complaints of headache, no confusion, no numbness or tingling, no dizziness or fainting    Objective      /82   Ht 5' 3 75" (1 619 m)   Wt 83 3 kg (183 lb 11 2 oz)   LMP  (LMP Unknown)   Breastfeeding? No   BMI 31 78 kg/m²   General:   appears stated age, cooperative, alert normal mood and affect   Heart: regular rate and rhythm, S1, S2 normal, no murmur, click, rub or gallop   Lungs: clear to auscultation bilaterally   Breasts: normal appearance, no masses or tenderness, Normal to palpation without dominant masses   Abdomen: soft, non-tender, without masses or organomegaly   Vulva: normal   Vagina: normal vagina, no discharge, exudate, lesion, or erythema   Urethra: normal   Cervix: Normal, no discharge  Nontender     Uterus: normal size, contour, position, consistency, mobility, non-tender   Adnexa: no mass, fullness, tenderness   Psychiatric orientation to person, place, and time: normal  mood and affect: normal

## 2018-09-25 NOTE — TELEPHONE ENCOUNTER
On 9/11/18 [facility] [place] increased the dose and send in a new script

## 2018-10-02 ENCOUNTER — TELEPHONE (OUTPATIENT)
Dept: RADIOLOGY | Facility: CLINIC | Age: 68
End: 2018-10-02

## 2018-10-02 NOTE — TELEPHONE ENCOUNTER
Pt states that she is doing good  States that she was a little sore the day after but since then she has had no pain  Pain score 0/10 and she received 100% relief from the injection

## 2018-11-23 ENCOUNTER — APPOINTMENT (EMERGENCY)
Dept: RADIOLOGY | Facility: HOSPITAL | Age: 68
DRG: 202 | End: 2018-11-23
Payer: MEDICARE

## 2018-11-23 ENCOUNTER — HOSPITAL ENCOUNTER (INPATIENT)
Facility: HOSPITAL | Age: 68
LOS: 3 days | Discharge: HOME | DRG: 202 | End: 2018-11-26
Attending: EMERGENCY MEDICINE | Admitting: INTERNAL MEDICINE
Payer: MEDICARE

## 2018-11-23 ENCOUNTER — OFFICE VISIT (OUTPATIENT)
Dept: FAMILY MEDICINE CLINIC | Facility: CLINIC | Age: 68
End: 2018-11-23
Payer: MEDICARE

## 2018-11-23 VITALS
DIASTOLIC BLOOD PRESSURE: 68 MMHG | WEIGHT: 169.8 LBS | HEIGHT: 65 IN | BODY MASS INDEX: 28.29 KG/M2 | TEMPERATURE: 98.9 F | HEART RATE: 92 BPM | OXYGEN SATURATION: 97 % | SYSTOLIC BLOOD PRESSURE: 122 MMHG

## 2018-11-23 DIAGNOSIS — E87.6 HYPOKALEMIA: Primary | ICD-10-CM

## 2018-11-23 DIAGNOSIS — R63.4 WEIGHT LOSS, NON-INTENTIONAL: ICD-10-CM

## 2018-11-23 DIAGNOSIS — F17.200 CURRENT EVERY DAY SMOKER: ICD-10-CM

## 2018-11-23 DIAGNOSIS — J40 BRONCHITIS: ICD-10-CM

## 2018-11-23 DIAGNOSIS — R53.83 MALAISE AND FATIGUE: ICD-10-CM

## 2018-11-23 DIAGNOSIS — I47.1 SVT (SUPRAVENTRICULAR TACHYCARDIA) (HCC): ICD-10-CM

## 2018-11-23 DIAGNOSIS — E87.6 HYPOKALEMIA: ICD-10-CM

## 2018-11-23 DIAGNOSIS — N17.9 AKI (ACUTE KIDNEY INJURY) (HCC): ICD-10-CM

## 2018-11-23 DIAGNOSIS — R53.81 MALAISE AND FATIGUE: ICD-10-CM

## 2018-11-23 DIAGNOSIS — K58.0 IRRITABLE BOWEL SYNDROME WITH DIARRHEA: ICD-10-CM

## 2018-11-23 DIAGNOSIS — J44.1 ACUTE EXACERBATION OF CHRONIC OBSTRUCTIVE PULMONARY DISEASE (COPD) (HCC): ICD-10-CM

## 2018-11-23 DIAGNOSIS — J44.1 ACUTE EXACERBATION OF CHRONIC OBSTRUCTIVE PULMONARY DISEASE (COPD) (HCC): Primary | ICD-10-CM

## 2018-11-23 DIAGNOSIS — R53.81 MALAISE: ICD-10-CM

## 2018-11-23 PROBLEM — I47.10 SVT (SUPRAVENTRICULAR TACHYCARDIA): Status: ACTIVE | Noted: 2018-11-23

## 2018-11-23 PROBLEM — J43.9 PULMONARY EMPHYSEMA (HCC): Status: ACTIVE | Noted: 2018-11-23

## 2018-11-23 PROBLEM — D72.829 LEUKOCYTOSIS: Status: ACTIVE | Noted: 2018-11-23

## 2018-11-23 LAB
ALBUMIN SERPL BCP-MCNC: 3.8 G/DL (ref 3.5–5)
ALP SERPL-CCNC: 119 U/L (ref 46–116)
ALT SERPL W P-5'-P-CCNC: 47 U/L (ref 12–78)
ANION GAP SERPL CALCULATED.3IONS-SCNC: 16 MMOL/L (ref 4–13)
APTT PPP: 34 SECONDS (ref 26–38)
AST SERPL W P-5'-P-CCNC: 26 U/L (ref 5–45)
BACTERIA UR QL AUTO: ABNORMAL /HPF
BASOPHILS # BLD AUTO: 0.1 THOUSANDS/ΜL (ref 0–0.1)
BASOPHILS NFR BLD AUTO: 1 % (ref 0–1)
BILIRUB SERPL-MCNC: 0.5 MG/DL (ref 0.2–1)
BILIRUB UR QL STRIP: NEGATIVE
BUN SERPL-MCNC: 25 MG/DL (ref 5–25)
CALCIUM SERPL-MCNC: 10.6 MG/DL (ref 8.3–10.1)
CHLORIDE SERPL-SCNC: 102 MMOL/L (ref 100–108)
CLARITY UR: CLEAR
CO2 SERPL-SCNC: 24 MMOL/L (ref 21–32)
COLOR UR: YELLOW
CREAT SERPL-MCNC: 1.63 MG/DL (ref 0.6–1.3)
EOSINOPHIL # BLD AUTO: 0.07 THOUSAND/ΜL (ref 0–0.61)
EOSINOPHIL NFR BLD AUTO: 1 % (ref 0–6)
ERYTHROCYTE [DISTWIDTH] IN BLOOD BY AUTOMATED COUNT: 12.9 % (ref 11.6–15.1)
GFR SERPL CREATININE-BSD FRML MDRD: 33 ML/MIN/1.73SQ M
GLUCOSE SERPL-MCNC: 122 MG/DL (ref 65–140)
GLUCOSE UR STRIP-MCNC: NEGATIVE MG/DL
HCT VFR BLD AUTO: 43.4 % (ref 34.8–46.1)
HGB BLD-MCNC: 14.6 G/DL (ref 11.5–15.4)
HGB UR QL STRIP.AUTO: NEGATIVE
IMM GRANULOCYTES # BLD AUTO: 0.06 THOUSAND/UL (ref 0–0.2)
IMM GRANULOCYTES NFR BLD AUTO: 0 % (ref 0–2)
INR PPP: 1.08 (ref 0.86–1.17)
KETONES UR STRIP-MCNC: NEGATIVE MG/DL
LEUKOCYTE ESTERASE UR QL STRIP: ABNORMAL
LYMPHOCYTES # BLD AUTO: 2.97 THOUSANDS/ΜL (ref 0.6–4.47)
LYMPHOCYTES NFR BLD AUTO: 22 % (ref 14–44)
MAGNESIUM SERPL-MCNC: 1.3 MG/DL (ref 1.6–2.6)
MCH RBC QN AUTO: 32.7 PG (ref 26.8–34.3)
MCHC RBC AUTO-ENTMCNC: 33.6 G/DL (ref 31.4–37.4)
MCV RBC AUTO: 97 FL (ref 82–98)
MONOCYTES # BLD AUTO: 0.95 THOUSAND/ΜL (ref 0.17–1.22)
MONOCYTES NFR BLD AUTO: 7 % (ref 4–12)
NEUTROPHILS # BLD AUTO: 9.54 THOUSANDS/ΜL (ref 1.85–7.62)
NEUTS SEG NFR BLD AUTO: 69 % (ref 43–75)
NITRITE UR QL STRIP: NEGATIVE
NON-SQ EPI CELLS URNS QL MICRO: ABNORMAL /HPF
NRBC BLD AUTO-RTO: 0 /100 WBCS
NT-PROBNP SERPL-MCNC: 221 PG/ML
PH UR STRIP.AUTO: 6.5 [PH] (ref 4.5–8)
PHOSPHATE SERPL-MCNC: 4.2 MG/DL (ref 2.3–4.1)
PLATELET # BLD AUTO: 371 THOUSANDS/UL (ref 149–390)
PMV BLD AUTO: 10.2 FL (ref 8.9–12.7)
POTASSIUM SERPL-SCNC: 2.2 MMOL/L (ref 3.5–5.3)
PROCALCITONIN SERPL-MCNC: <0.05 NG/ML
PROT SERPL-MCNC: 8 G/DL (ref 6.4–8.2)
PROT UR STRIP-MCNC: NEGATIVE MG/DL
PROTHROMBIN TIME: 14.1 SECONDS (ref 11.8–14.2)
RBC # BLD AUTO: 4.47 MILLION/UL (ref 3.81–5.12)
RBC #/AREA URNS AUTO: ABNORMAL /HPF
SODIUM SERPL-SCNC: 142 MMOL/L (ref 136–145)
SP GR UR STRIP.AUTO: 1.01 (ref 1–1.03)
TROPONIN I SERPL-MCNC: 0.04 NG/ML
UROBILINOGEN UR QL STRIP.AUTO: 0.2 E.U./DL
WBC # BLD AUTO: 13.69 THOUSAND/UL (ref 4.31–10.16)
WBC #/AREA URNS AUTO: ABNORMAL /HPF

## 2018-11-23 PROCEDURE — 83880 ASSAY OF NATRIURETIC PEPTIDE: CPT | Performed by: EMERGENCY MEDICINE

## 2018-11-23 PROCEDURE — 96368 THER/DIAG CONCURRENT INF: CPT

## 2018-11-23 PROCEDURE — 84145 PROCALCITONIN (PCT): CPT | Performed by: PHYSICIAN ASSISTANT

## 2018-11-23 PROCEDURE — 81001 URINALYSIS AUTO W/SCOPE: CPT | Performed by: PHYSICIAN ASSISTANT

## 2018-11-23 PROCEDURE — 80053 COMPREHEN METABOLIC PANEL: CPT | Performed by: EMERGENCY MEDICINE

## 2018-11-23 PROCEDURE — 99223 1ST HOSP IP/OBS HIGH 75: CPT | Performed by: INTERNAL MEDICINE

## 2018-11-23 PROCEDURE — 84100 ASSAY OF PHOSPHORUS: CPT | Performed by: EMERGENCY MEDICINE

## 2018-11-23 PROCEDURE — 99214 OFFICE O/P EST MOD 30 MIN: CPT | Performed by: FAMILY MEDICINE

## 2018-11-23 PROCEDURE — 71045 X-RAY EXAM CHEST 1 VIEW: CPT

## 2018-11-23 PROCEDURE — 83735 ASSAY OF MAGNESIUM: CPT | Performed by: EMERGENCY MEDICINE

## 2018-11-23 PROCEDURE — 96365 THER/PROPH/DIAG IV INF INIT: CPT

## 2018-11-23 PROCEDURE — 85610 PROTHROMBIN TIME: CPT | Performed by: EMERGENCY MEDICINE

## 2018-11-23 PROCEDURE — 85025 COMPLETE CBC W/AUTO DIFF WBC: CPT | Performed by: EMERGENCY MEDICINE

## 2018-11-23 PROCEDURE — 36415 COLL VENOUS BLD VENIPUNCTURE: CPT

## 2018-11-23 PROCEDURE — 85730 THROMBOPLASTIN TIME PARTIAL: CPT | Performed by: EMERGENCY MEDICINE

## 2018-11-23 PROCEDURE — 96375 TX/PRO/DX INJ NEW DRUG ADDON: CPT

## 2018-11-23 PROCEDURE — 93005 ELECTROCARDIOGRAM TRACING: CPT

## 2018-11-23 PROCEDURE — 99285 EMERGENCY DEPT VISIT HI MDM: CPT

## 2018-11-23 PROCEDURE — 94640 AIRWAY INHALATION TREATMENT: CPT

## 2018-11-23 PROCEDURE — 94760 N-INVAS EAR/PLS OXIMETRY 1: CPT

## 2018-11-23 PROCEDURE — 87631 RESP VIRUS 3-5 TARGETS: CPT | Performed by: PHYSICIAN ASSISTANT

## 2018-11-23 PROCEDURE — 84484 ASSAY OF TROPONIN QUANT: CPT | Performed by: EMERGENCY MEDICINE

## 2018-11-23 RX ORDER — SODIUM CHLORIDE 9 MG/ML
75 INJECTION, SOLUTION INTRAVENOUS CONTINUOUS
Status: DISCONTINUED | OUTPATIENT
Start: 2018-11-23 | End: 2018-11-25

## 2018-11-23 RX ORDER — LEVALBUTEROL 1.25 MG/.5ML
1.25 SOLUTION, CONCENTRATE RESPIRATORY (INHALATION)
Status: DISCONTINUED | OUTPATIENT
Start: 2018-11-23 | End: 2018-11-26 | Stop reason: HOSPADM

## 2018-11-23 RX ORDER — HYDRALAZINE HYDROCHLORIDE 20 MG/ML
10 INJECTION INTRAMUSCULAR; INTRAVENOUS EVERY 6 HOURS PRN
Status: DISCONTINUED | OUTPATIENT
Start: 2018-11-23 | End: 2018-11-26 | Stop reason: HOSPADM

## 2018-11-23 RX ORDER — METHYLPREDNISOLONE SODIUM SUCCINATE 40 MG/ML
40 INJECTION, POWDER, LYOPHILIZED, FOR SOLUTION INTRAMUSCULAR; INTRAVENOUS EVERY 8 HOURS
Status: DISCONTINUED | OUTPATIENT
Start: 2018-11-23 | End: 2018-11-24

## 2018-11-23 RX ORDER — SODIUM CHLORIDE, SODIUM LACTATE, POTASSIUM CHLORIDE, CALCIUM CHLORIDE 600; 310; 30; 20 MG/100ML; MG/100ML; MG/100ML; MG/100ML
125 INJECTION, SOLUTION INTRAVENOUS CONTINUOUS
Status: DISCONTINUED | OUTPATIENT
Start: 2018-11-23 | End: 2018-11-23

## 2018-11-23 RX ORDER — MAGNESIUM SULFATE HEPTAHYDRATE 40 MG/ML
2 INJECTION, SOLUTION INTRAVENOUS ONCE
Status: COMPLETED | OUTPATIENT
Start: 2018-11-23 | End: 2018-11-23

## 2018-11-23 RX ORDER — ALBUTEROL SULFATE 90 UG/1
2 AEROSOL, METERED RESPIRATORY (INHALATION) EVERY 4 HOURS PRN
Status: DISCONTINUED | OUTPATIENT
Start: 2018-11-23 | End: 2018-11-26 | Stop reason: HOSPADM

## 2018-11-23 RX ORDER — POTASSIUM CHLORIDE 20MEQ/15ML
40 LIQUID (ML) ORAL ONCE
Status: COMPLETED | OUTPATIENT
Start: 2018-11-23 | End: 2018-11-23

## 2018-11-23 RX ORDER — NICOTINE 21 MG/24HR
1 PATCH, TRANSDERMAL 24 HOURS TRANSDERMAL DAILY
Status: DISCONTINUED | OUTPATIENT
Start: 2018-11-24 | End: 2018-11-26 | Stop reason: HOSPADM

## 2018-11-23 RX ORDER — LEVALBUTEROL 1.25 MG/.5ML
1.25 SOLUTION, CONCENTRATE RESPIRATORY (INHALATION) EVERY 6 HOURS PRN
Status: DISCONTINUED | OUTPATIENT
Start: 2018-11-23 | End: 2018-11-26 | Stop reason: HOSPADM

## 2018-11-23 RX ORDER — LORATADINE 10 MG/1
10 TABLET ORAL DAILY
Status: DISCONTINUED | OUTPATIENT
Start: 2018-11-24 | End: 2018-11-26 | Stop reason: HOSPADM

## 2018-11-23 RX ORDER — SODIUM CHLORIDE FOR INHALATION 0.9 %
3 VIAL, NEBULIZER (ML) INHALATION EVERY 6 HOURS PRN
Status: DISCONTINUED | OUTPATIENT
Start: 2018-11-23 | End: 2018-11-23

## 2018-11-23 RX ORDER — IPRATROPIUM BROMIDE AND ALBUTEROL SULFATE 2.5; .5 MG/3ML; MG/3ML
3 SOLUTION RESPIRATORY (INHALATION)
Status: DISCONTINUED | OUTPATIENT
Start: 2018-11-23 | End: 2018-11-23

## 2018-11-23 RX ORDER — ATORVASTATIN CALCIUM 10 MG/1
20 TABLET, FILM COATED ORAL EVERY EVENING
Status: DISCONTINUED | OUTPATIENT
Start: 2018-11-24 | End: 2018-11-26 | Stop reason: HOSPADM

## 2018-11-23 RX ORDER — ESCITALOPRAM OXALATE 10 MG/1
10 TABLET ORAL DAILY
Status: DISCONTINUED | OUTPATIENT
Start: 2018-11-24 | End: 2018-11-26 | Stop reason: HOSPADM

## 2018-11-23 RX ORDER — LEVALBUTEROL 1.25 MG/.5ML
1.25 SOLUTION, CONCENTRATE RESPIRATORY (INHALATION)
Status: DISCONTINUED | OUTPATIENT
Start: 2018-11-23 | End: 2018-11-23

## 2018-11-23 RX ORDER — NICOTINE 21 MG/24HR
14 PATCH, TRANSDERMAL 24 HOURS TRANSDERMAL DAILY
Status: DISCONTINUED | OUTPATIENT
Start: 2018-11-24 | End: 2018-11-23 | Stop reason: SDUPTHER

## 2018-11-23 RX ORDER — ALBUTEROL SULFATE 2.5 MG/3ML
2.5 SOLUTION RESPIRATORY (INHALATION) ONCE
Status: DISCONTINUED | OUTPATIENT
Start: 2018-11-23 | End: 2018-11-23 | Stop reason: HOSPADM

## 2018-11-23 RX ORDER — GABAPENTIN 300 MG/1
300 CAPSULE ORAL
Status: DISCONTINUED | OUTPATIENT
Start: 2018-11-23 | End: 2018-11-26 | Stop reason: HOSPADM

## 2018-11-23 RX ORDER — HEPARIN SODIUM 5000 [USP'U]/ML
5000 INJECTION, SOLUTION INTRAVENOUS; SUBCUTANEOUS EVERY 8 HOURS SCHEDULED
Status: DISCONTINUED | OUTPATIENT
Start: 2018-11-23 | End: 2018-11-26 | Stop reason: HOSPADM

## 2018-11-23 RX ORDER — POTASSIUM CHLORIDE 14.9 MG/ML
20 INJECTION INTRAVENOUS
Status: COMPLETED | OUTPATIENT
Start: 2018-11-23 | End: 2018-11-23

## 2018-11-23 RX ORDER — POTASSIUM CHLORIDE 20 MEQ/1
40 TABLET, EXTENDED RELEASE ORAL ONCE
Status: COMPLETED | OUTPATIENT
Start: 2018-11-23 | End: 2018-11-23

## 2018-11-23 RX ORDER — METHYLPREDNISOLONE SODIUM SUCCINATE 125 MG/2ML
125 INJECTION, POWDER, LYOPHILIZED, FOR SOLUTION INTRAMUSCULAR; INTRAVENOUS ONCE
Status: COMPLETED | OUTPATIENT
Start: 2018-11-23 | End: 2018-11-23

## 2018-11-23 RX ADMIN — METHYLPREDNISOLONE SODIUM SUCCINATE 40 MG: 40 INJECTION, POWDER, FOR SOLUTION INTRAMUSCULAR; INTRAVENOUS at 21:15

## 2018-11-23 RX ADMIN — LEVALBUTEROL 1.25 MG: 1.25 SOLUTION, CONCENTRATE RESPIRATORY (INHALATION) at 19:28

## 2018-11-23 RX ADMIN — POTASSIUM CHLORIDE 40 MEQ: 1500 TABLET, EXTENDED RELEASE ORAL at 19:53

## 2018-11-23 RX ADMIN — GABAPENTIN 300 MG: 300 CAPSULE ORAL at 21:16

## 2018-11-23 RX ADMIN — IPRATROPIUM BROMIDE 0.5 MG: 0.5 SOLUTION RESPIRATORY (INHALATION) at 19:28

## 2018-11-23 RX ADMIN — IPRATROPIUM BROMIDE AND ALBUTEROL SULFATE 3 ML: .5; 3 SOLUTION RESPIRATORY (INHALATION) at 16:14

## 2018-11-23 RX ADMIN — SODIUM CHLORIDE 1000 ML: 0.9 INJECTION, SOLUTION INTRAVENOUS at 16:23

## 2018-11-23 RX ADMIN — POTASSIUM CHLORIDE 20 MEQ: 200 INJECTION, SOLUTION INTRAVENOUS at 16:48

## 2018-11-23 RX ADMIN — SODIUM CHLORIDE 75 ML/HR: 0.9 INJECTION, SOLUTION INTRAVENOUS at 19:58

## 2018-11-23 RX ADMIN — METHYLPREDNISOLONE SODIUM SUCCINATE 125 MG: 125 INJECTION, POWDER, FOR SOLUTION INTRAMUSCULAR; INTRAVENOUS at 16:16

## 2018-11-23 RX ADMIN — POTASSIUM CHLORIDE 40 MEQ: 20 SOLUTION ORAL at 17:28

## 2018-11-23 RX ADMIN — MAGNESIUM SULFATE HEPTAHYDRATE 2 G: 40 INJECTION, SOLUTION INTRAVENOUS at 16:47

## 2018-11-23 RX ADMIN — ALBUTEROL SULFATE 2.5 MG: 2.5 SOLUTION RESPIRATORY (INHALATION) at 14:11

## 2018-11-23 RX ADMIN — POTASSIUM CHLORIDE 20 MEQ: 200 INJECTION, SOLUTION INTRAVENOUS at 19:53

## 2018-11-23 RX ADMIN — HEPARIN SODIUM 5000 UNITS: 5000 INJECTION INTRAVENOUS; SUBCUTANEOUS at 21:16

## 2018-11-23 NOTE — ED NOTES
Seen at Dr Oscar Cook office today, referred to ED for bloodwork and CXR       Corwin Thomas RN  11/23/18 6031

## 2018-11-23 NOTE — H&P
H&P- Garret Perez 1950, 79 y o  female MRN: [id number]    Unit/Bed#: CASIE Encounter: [id number]    Primary Care Provider: DO Sugar   Date and time admitted to hospital: 11/23/2018  3:26 PM    History and Physical - [facility] Internal Medicine    Patient Information: Garret Perez 79 y o  female MRN: [id number]  Unit/Bed#: CASIE Encounter: [id number]  Admitting Physician: Sophia Clemens PA-C  PCP: DO Sugar  Date of Admission:  11/23/18    Assessment/Plan:    Hospital Problem List:     Principal Problem:    Bronchitis  Active Problems:    Pulmonary emph[facility]sema ([facility] )    SVT (supraventricular tachycardia) ([facility] )    Essential hypertension    Irritable bowel syndrome with diarrhea    Leukocytosis    Hypokalemia    JANELLE (acute kidney injury) ([facility] )      P    * Bronchitis   Assessment & Plan    Infectious likely viral   R/o flu  cxr negative for pna  Check procalcitonin, check rsv/flu by pcr  Start tussionex, supportive care w/nebs steroids as below  Monitor off abx given hx of recurrent c diff until procalcitonin results as VS stable     SVT (supraventricular tachycardia) (HCC)   Assessment & Plan    Brief episode of SVT noted by ED provider broken with use of vasovagal maneuver in setting of hypokalemia/hypomagnesemia  Monitor on telemetry check TSH, TTE     Pulmonary emphysema (HCC)   Assessment & Plan    Suspected/  W/acute exacerbation 2* bronchitis likely viral  Pt on ra rec'd solumedrol, albuterol  Will continue w/atrovent/xopenex/solumedrol 40mg q 8 h  Op PFTs     JANELLE (acute kidney injury) ([facility] )   Assessment & Plan    Likely be a poor oral intake and diarrhea w/ BUN creatinine ratio baseline creatinine appears to be 0 9-1 0  Start IV fluid hydration with lactated Ringer, check urinalysis, repeat BMP in a m  Hypokalemia   Assessment & Plan    W/diarrhea now resolved      With concomitant hypomagnesemia  Replete both, continue to monitor     Leukocytosis   Assessment & Plan    Likely in the setting of bronchitis  Check procalcitonin monitor off antibiotics at this time as this is likely viral  Monitor vital signs       Irritable bowel syndrome with diarrhea   Assessment & Plan    Pt reports episodic diarrhea which is watert up to 5-6 episodes/day  She notes most recent episode has been resolved for 2 days  Given hx of recurrent C diff monitor stool output closely     Essential hypertension   Assessment & Plan    Pt denies hx of off any meds  Start hydralazine 10mg prn sbp >160mmHg         ·     VTE Prophylaxis: Heparin  / sequential compression device   Code Status: FC  POLST: There is no POLST form on file for this patient (pre-hospital)    Anticipated Length of Stay:  Patient will be admitted on an Inpatient basis with an anticipated length of stay of  Greater than 2 midnights  Justification for Hospital Stay: copd/bronchitis    Total Time for Visit, including Counseling / Coordination of Care: 45 minutes  Greater than 50% of this total time spent on direct patient counseling and coordination of care  Chief Complaint:   Sob/cough x 1 week    History of Present Illness:    Shahla Bliss is a 79 y o  female who presents with PMH of COPD,  Recurrent C diff, IBS current smoker status coming the hospital for shortness of breath and cough x1 week  She is company by her son and  were bedside  Patient reports that over the last week she has had cough productive of yellow sputum, shortness of breath with exertion  She had 1 day of sore throat but does have frontal sinus congestion  She has had diffuse myalgias without headache or the arthralgias  She does have history of IBS and intermittently will have multiple episodes of 5-6 episodes a from [facility] diarrhea is nonbloody and nonmelanotic  She reports this is in keeping with her known diagnosis of IBS  Her diarrhea stopped 2 days prior to admission  She has also been having poor oral intake and weight loss due her acute illness    She did have 2 episodes of vomiting over the last week  No recent travel or sick contacts  She did take her flu shot and pna shot this year  She is on albuterol for her COPD but does not see a pulmonologist   Needs OP PFTs       Review of Systems:    Review of Systems    Past Medical and Surgical History:     Past Medical History:   Diagnosis Date    Anxiety     Bursitis of hip     Last Assessed:6/5/2017    Depression     Herniated lumbar intervertebral disc     Hyperlipidemia     Hypertension     Hypokalemia     Pituitary adenoma ([address] )     Last Assessed:2/12/2015 ; Previously on bromocriptine    Spinal stenosis     Systolic murmur of aorta     Vitamin D deficiency        Past Surgical History:   Procedure Laterality Date    BREAST SURGERY      Incisional breast biopsy    CARPAL TUNNEL RELEASE Left     CHOLECYSTECTOMY      OTHER SURGICAL HISTORY      EXCISION OF PAROTID TUMOR/GLAND    PAROTIDECTOMY      NV COLONOSCOPY FLX DX W/COLLJ SPEC WHEN PFRMD N/A 6/6/2017    Procedure: COLONOSCOPY;  Surgeon: Samantha Arevalo DO;  Location: [facility] GI LAB; Service: Gastroenterology    ROTATOR CUFF REPAIR      SALPINGOOPHORECTOMY         Meds/Allergies:    Prior to Admission medications    Medication Sig Start Date End Date Taking?  Authorizing Provider   albuterol (PROVENTIL HFA) 90 mcg/act inhaler Inhale 1 puff every 6 (six) hours as needed for wheezing 9/25/18  Yes Vishnu Amaro MD   atorvastatin (LIPITOR) 20 mg tablet Take 1 tablet (20 mg total) by mouth daily 9/25/18  Yes Vishnu Amaro MD   Cetirizine HCl (ZYRTEC ALLERGY) 10 MG CAPS Take by mouth as needed   5/9/17  Yes Historical Provider, MD   escitalopram (LEXAPRO) 10 mg tablet Take 1 tablet (10 mg total) by mouth daily 9/25/18  Yes Vishnu Amaro MD   gabapentin (NEURONTIN) 100 mg capsule Take 3 capsules (300 mg total) by mouth daily at bedtime 9/11/18  Yes LIANG Basurto   ibuprofen (MOTRIN) 800 mg tablet Take 1 tablet (800 mg total) by mouth 3 (three) times a day as needed for mild pain 9/11/18  Yes LIANG Basurto   Multiple Vitamin (MULTI VITAMIN DAILY PO) Take by mouth   Yes Historical Provider, MD     I have reviewed home medications with patient personally  Allergies: Allergies   Allergen Reactions    Ancef [Cefazolin]     Cephalosporins Other (See Comments)     c diff    Fruit Extracts      Annotation - [id number]: peaches and strawberries    Levofloxacin     Morphine GI Intolerance    Other      FRUIT    Penicillins Hives    Tetracycline Edema       Social History:     Marital Status: /Civil Union   Occupation:   Patient Pre-hospital Living Situation:   Patient Pre-hospital Level of Mobility:   Patient Pre-hospital Diet Restrictions:   Substance Use History:   History   Alcohol Use    0 6 oz/week    1 Shots of liquor per week     Comment: 1 martini a night/social     History   Smoking Status    Current Every Day Smoker    Packs/day: 1 00    Types: Cigarettes   Smokeless Tobacco    Never Used     Comment: 3 cigarettes a day     History   Drug Use No       Family History:    Family History   Problem Relation Age of Onset    Colon cancer Mother     Coronary artery disease Mother     Colonic polyp Mother     Liver cancer Mother     Multiple sclerosis Father     Hypertension Other        Physical Exam:     Vitals:   Blood Pressure: 121/56 (11/23/18 1730)  Pulse: 78 (11/23/18 1730)  Temperature: 97 6 °F (36 4 °C) (11/23/18 1414)  Temp Source: Oral (11/23/18 1414)  Respirations: 16 (11/23/18 1730)  Weight - Scale: 76 4 kg (168 lb 8 oz) (11/23/18 1414)  SpO2: 94 % (11/23/18 1730)    Physical Exam   Constitutional: She appears well-developed  Appears ill, appears stated age, no apparent distress   HENT:   Head: Normocephalic and atraumatic  Right Ear: External ear normal    Left Ear: External ear normal    Mouth/Throat: No oropharyngeal exudate     Mucous membranes are dry, posterior pharynx is erythematous without purulence   Eyes: Conjunctivae are normal    Cardiovascular: Normal rate, regular rhythm, normal heart sounds and intact distal pulses  Exam reveals no friction rub  No murmur heard  Pulmonary/Chest: No respiratory distress  She has wheezes (Coarse polyphonic expiratory wheezing throughout posterior lung fields bilaterally)  She has no rales  On room air with no work of breathing or conversational dyspnea   Abdominal: She exhibits no distension  There is no tenderness  There is no rebound and no guarding  Musculoskeletal: She exhibits no edema  Lymphadenopathy:     She has no cervical adenopathy  Neurological: She is alert  Skin: Skin is warm and dry  She is not diaphoretic  Psychiatric: She has a normal mood and affect  Vitals reviewed  (  Be Sure to Include Physical Exam: Delete this entire line when you have entered your exam)    Additional Data:     Lab Results: I have personally reviewed pertinent reports  Results from last 7 days  Lab Units 11/23/18  1435   WBC Thousand/uL 13 69*   HEMOGLOBIN g/dL 14 6   HEMATOCRIT % 43 4   PLATELETS Thousands/uL 371   NEUTROS PCT % 69   LYMPHS PCT % 22   MONOS PCT % 7   EOS PCT % 1       Results from last 7 days  Lab Units 11/23/18  1435   POTASSIUM mmol/L 2 2*   CHLORIDE mmol/L 102   CO2 mmol/L 24   BUN mg/dL 25   CREATININE mg/dL 1 63*   CALCIUM mg/dL 10 6*   ALK PHOS U/L 119*   ALT U/L 47   AST U/L 26       Results from last 7 days  Lab Units 11/23/18  1435   INR  1 08       Imaging: I have personally reviewed pertinent reports  Xr Chest Portable    Result Date: 11/23/2018  Narrative: CHEST INDICATION:   shortness of breath with exertion  COMPARISON:  9/15/2016 EXAM PERFORMED/VIEWS:  XR CHEST PORTABLE FINDINGS: Cardiomediastinal silhouette appears unremarkable  The lungs are clear  No pneumothorax or pleural effusion  Osseous structures appear within normal limits for patient age       Impression: No acute cardiopulmonary disease  Workstation performed: [id number]       EKG, Pathology, and Other Studies Reviewed on Admission:   · EKG: nsr   W/inferior lead ST depression w/?T wave inversion  asymptomatic    Allscripts / Epic Records Reviewed: Yes     ** Please Note: This note has been constructed using a voice recognition system   **

## 2018-11-23 NOTE — ED NOTES
Called XR regarding order at this time; will perform portable due to patient's heart rate being unstable      Aravind Leong RN  11/23/18 5989

## 2018-11-23 NOTE — ED NOTES
Patient placed on cardiac monitoring at this time, RN noted patient in SVT, provider notified; Provider at bedside evaluating patient, patient was asked to bare down and then patient broke into normal sinus tach;      Laz Hull RN  11/23/18 5905

## 2018-11-23 NOTE — ED PROVIDER NOTES
History  Chief Complaint   Patient presents with    Shortness of Breath     Started 9days ago, has shortness of breath with exertion  82-year-old female with past medical history of COPD, hyperlipidemia, and chronic back pain who is presenting with a 9 day history of shortness of breath  Initial symptoms included rhinorrhea, cough productive of yellow sputum, and malaise  Patient reports that her rhinorrhea has resolved  She has a persistent cough productive of yellow sputum  At baseline, patient does not have a cough  Patient states that she has had malaise throughout the past week  She has had decreased energy and appetite  Due to decreased appetite and nausea, patient reports that she has lost 15 lb in the past 9 days  Patient denies any fevers  No chest pain  No nausea at this time  No abdominal pain, diarrhea, constipation, melena, hematochezia, or urinary symptoms  No peripheral edema  Patient reports a history of COPD but states that it is mild  She only uses an albuterol inhaler as needed for symptoms  The albuterol inhaler has not been helping during the present episode  Patient is a current active smoker but has been cutting back  Patient reports a several month history of intermittent palpitations  No other concerns on review of systems  Prior to my arrival to evaluate the patient, she had an episode of SVT  Review of telemetry strips is compatible with SVT  Dr Guillermina Retana happened to be nearby and instructed the patient to perform a vagal maneuver which terminated her SVT  Patient denies a known history of arrhythmia    Assessment and plan:  82-year-old female presenting with 9 day history of shortness of breath, productive cough, and malaise associated with decreased appetite and weight loss  Differential considerations include COPD exacerbation, pneumonia, lung neoplasm, ACS, and CHF              Prior to Admission Medications   Prescriptions Last Dose Informant Patient Reported? Taking? Cetirizine HCl (ZYRTEC ALLERGY) 10 MG CAPS   Yes Yes   Sig: Take by mouth as needed     Multiple Vitamin (MULTI VITAMIN DAILY PO)   Yes Yes   Sig: Take by mouth   albuterol (PROVENTIL HFA) 90 mcg/act inhaler   No Yes   Sig: Inhale 1 puff every 6 (six) hours as needed for wheezing   atorvastatin (LIPITOR) 20 mg tablet   No Yes   Sig: Take 1 tablet (20 mg total) by mouth daily   escitalopram (LEXAPRO) 10 mg tablet   No Yes   Sig: Take 1 tablet (10 mg total) by mouth daily   gabapentin (NEURONTIN) 100 mg capsule   No Yes   Sig: Take 3 capsules (300 mg total) by mouth daily at bedtime   ibuprofen (MOTRIN) 800 mg tablet   No Yes   Sig: Take 1 tablet (800 mg total) by mouth 3 (three) times a day as needed for mild pain      Facility-Administered Medications Last Administration Doses Remaining   albuterol inhalation solution 2 5 mg 11/23/2018  2:11 PM 0          Past Medical History:   Diagnosis Date    Anxiety     Bursitis of hip     Last Assessed:6/5/2017    Depression     Herniated lumbar intervertebral disc     Hyperlipidemia     Hypertension     Hypokalemia     Pituitary adenoma ([facility] [address] )     Last Assessed:2/12/2015 ; Previously on bromocriptine    Spinal stenosis     Systolic murmur of aorta     Vitamin D deficiency        Past Surgical History:   Procedure Laterality Date    BREAST SURGERY      Incisional breast biopsy    CARPAL TUNNEL RELEASE Left     CHOLECYSTECTOMY      OTHER SURGICAL HISTORY      EXCISION OF PAROTID TUMOR/GLAND    PAROTIDECTOMY      DC COLONOSCOPY FLX DX W/COLLJ SPEC WHEN PFRMD N/A 6/6/2017    Procedure: COLONOSCOPY;  Surgeon: Yung Hanna DO;  Location: [facility];   Service: Gastroenterology    ROTATOR CUFF REPAIR      SALPINGOOPHORECTOMY         Family History   Problem Relation Age of Onset    Colon cancer Mother     Coronary artery disease Mother     Colonic polyp Mother     Liver cancer Mother     Multiple sclerosis Father     Hypertension Other      I have reviewed and agree with the history as documented  Social History   Substance Use Topics    Smoking status: Current Every Day Smoker     Packs/day: 1 00     Types: Cigarettes    Smokeless tobacco: Never Used      Comment: 3 cigarettes a day    Alcohol use 0 6 oz/week     1 Shots of liquor per week      Comment: 1 [employer] a night/social        Review of Systems   Constitutional: Positive for appetite change, fatigue and unexpected weight change (loss)  Negative for diaphoresis and fever  HENT: Negative for congestion, rhinorrhea and sore throat  Eyes: Negative for pain, discharge and visual disturbance  Respiratory: Positive for cough and shortness of breath  Negative for wheezing  Cardiovascular: Negative for chest pain, palpitations and leg swelling  Gastrointestinal: Positive for nausea and vomiting  Negative for abdominal pain, blood in stool, constipation and diarrhea  Genitourinary: Negative for dysuria, flank pain and hematuria  Musculoskeletal: Negative for arthralgias and joint swelling  Skin: Negative for rash and wound  Allergic/Immunologic: Negative for environmental allergies and food allergies  Neurological: Negative for dizziness, seizures, weakness and numbness  Hematological: Negative for adenopathy  Psychiatric/Behavioral: Negative for confusion and hallucinations         Physical Exam  ED Triage Vitals   Temperature Pulse Respirations Blood Pressure SpO2   11/23/18 1414 11/23/18 1414 11/23/18 1414 11/23/18 1414 11/23/18 1414   97 6 °F (36 4 °C) 90 20 143/70 96 %      Temp Source Heart Rate Source Patient Position - Orthostatic VS BP Location FiO2 (%)   11/23/18 1414 11/23/18 1600 11/23/18 1414 11/23/18 1414 --   Oral Monitor Sitting Right arm       Pain Score       11/23/18 1414       No Pain           Orthostatic Vital Signs  Vitals:    11/23/18 1730 11/23/18 1834 11/23/18 1914 11/24/18 0011   BP: 121/56 123/69 122/74 112/69   Pulse: 78 86 87 74 Patient Position - Orthostatic VS: Lying Lying Lying Lying       Physical Exam   Constitutional: She is oriented to person, place, and time  She appears well-developed and well-nourished  No distress  HENT:   Head: Normocephalic and atraumatic  Right Ear: External ear normal    Left Ear: External ear normal    Dry mucous membranes  Eyes: Pupils are equal, round, and reactive to light  Conjunctivae and EOM are normal    Neck: Normal range of motion  Neck supple  Cardiovascular: Regular rhythm and normal heart sounds  Tachycardia present  No murmur heard  Pulmonary/Chest: Effort normal  No respiratory distress  She has wheezes  She has no rales  Decreased air movement bilaterally  Expiratory wheezing without focal abnormality  Abdominal: Soft  Bowel sounds are normal  She exhibits no distension  There is no tenderness  There is no guarding  Musculoskeletal: Normal range of motion  She exhibits no deformity  Neurological: She is alert and oriented to person, place, and time  No gross motor deficits noted  Cranial nerves II-XII are intact  Speech is fluent without dysarthria or aphasia  Skin: Skin is warm and dry  Capillary refill takes less than 2 seconds  Psychiatric: She has a normal mood and affect  Her behavior is normal  Thought content normal    Nursing note and vitals reviewed        ED Medications  Medications   atorvastatin (LIPITOR) tablet 20 mg (not administered)   loratadine (CLARITIN) tablet 10 mg (not administered)   escitalopram (LEXAPRO) tablet 10 mg (not administered)   gabapentin (NEURONTIN) capsule 300 mg (300 mg Oral Given 11/23/18 2116)   nicotine (NICODERM CQ) 14 mg/24hr TD 24 hr patch 1 patch (not administered)   methylPREDNISolone sodium succinate (Solu-MEDROL) injection 40 mg (40 mg Intravenous Given 11/23/18 2115)   heparin (porcine) subcutaneous injection 5,000 Units (5,000 Units Subcutaneous Given 11/23/18 2116)   hydrALAZINE (APRESOLINE) injection 10 mg (not administered)   sodium chloride 0 9 % infusion (75 mL/hr Intravenous New Bag 11/23/18 1958)   levalbuterol (XOPENEX) inhalation solution 1 25 mg (1 25 mg Nebulization Given 11/23/18 1928)   ipratropium (ATROVENT) 0 02 % inhalation solution 0 5 mg (0 5 mg Nebulization Given 11/23/18 1928)   levalbuterol (Pecolia Roe) inhalation solution 1 25 mg (not administered)   ipratropium (ATROVENT) 0 02 % inhalation solution 0 5 mg (not administered)   albuterol (PROVENTIL HFA,VENTOLIN HFA) inhaler 2 puff (not administered)   methylPREDNISolone sodium succinate (Solu-MEDROL) injection 125 mg (125 mg Intravenous Given 11/23/18 1616)   sodium chloride 0 9 % bolus 1,000 mL (0 mL Intravenous Stopped 11/23/18 1909)   potassium chloride 20 mEq IVPB (premix) (20 mEq Intravenous New Bag 11/23/18 1953)   magnesium sulfate 2 g/50 mL IVPB (premix) 2 g (0 g Intravenous Stopped 11/23/18 1900)   potassium chloride 10 % oral solution 40 mEq (40 mEq Oral Given 11/23/18 1728)   potassium chloride (K-DUR,KLOR-CON) CR tablet 40 mEq (40 mEq Oral Given 11/23/18 1953)       Diagnostic Studies  Results Reviewed     Procedure Component Value Units Date/Time    NT-BNP PRO [[id number]]  (Abnormal) Collected:  11/23/18 1435    Lab Status:  Final result Specimen:  Blood from Arm, Right Updated:  11/23/18 1644     NT-proBNP 221 (H) pg/mL     Magnesium [[id number]]  (Abnormal) Collected:  11/23/18 1435    Lab Status:  Final result Specimen:  Blood from Arm, Right Updated:  11/23/18 1644     Magnesium 1 3 (L) mg/dL     Phosphorus [[id number]]  (Abnormal) Collected:  11/23/18 1435    Lab Status:  Final result Specimen:  Blood from Arm, Right Updated:  11/23/18 1644     Phosphorus 4 2 (H) mg/dL     Comprehensive metabolic panel [[id number]]  (Abnormal) Collected:  11/23/18 1435    Lab Status:  Final result Specimen:  Blood from Arm, Right Updated:  11/23/18 1623     Sodium 142 mmol/L      Potassium 2 2 (LL) mmol/L      Chloride 102 mmol/L      CO2 24 mmol/L      ANION GAP 16 (H) mmol/L      BUN 25 mg/dL      Creatinine 1 63 (H) mg/dL      Glucose 122 mg/dL      Calcium 10 6 (H) mg/dL      AST 26 U/L      ALT 47 U/L      Alkaline Phosphatase 119 (H) U/L      Total Protein 8 0 g/dL      Albumin 3 8 g/dL      Total Bilirubin 0 50 mg/dL      eGFR 33 ml/min/1 73sq m     Narrative:         National Kidney Disease Education Program recommendations are as follows:  GFR calculation is accurate only with a steady state creatinine  Chronic Kidney disease less than 60 ml/min/1 73 sq  meters  Kidney failure less than 15 ml/min/1 73 sq  meters      Troponin I [[id number]]  (Normal) Collected:  11/23/18 1435    Lab Status:  Final result Specimen:  Blood from Arm, Right Updated:  11/23/18 1511     Troponin I 0 04 ng/mL     Protime-INR [[id number]]  (Normal) Collected:  11/23/18 1435    Lab Status:  Final result Specimen:  Blood from Arm, Right Updated:  11/23/18 1504     Protime 14 1 seconds      INR 1 08    APTT [[id number]]  (Normal) Collected:  11/23/18 1435    Lab Status:  Final result Specimen:  Blood from Arm, Right Updated:  11/23/18 1504     PTT 34 seconds     CBC and differential [[id number]]  (Abnormal) Collected:  11/23/18 1435    Lab Status:  Final result Specimen:  Blood from Arm, Right Updated:  11/23/18 1448     WBC 13 69 (H) Thousand/uL      RBC 4 47 Million/uL      Hemoglobin 14 6 g/dL      Hematocrit 43 4 %      MCV 97 fL      MCH 32 7 pg      MCHC 33 6 g/dL      RDW 12 9 %      MPV 10 2 fL      Platelets 594 Thousands/uL      nRBC 0 /100 WBCs      Neutrophils Relative 69 %      Immat GRANS % 0 %      Lymphocytes Relative 22 %      Monocytes Relative 7 %      Eosinophils Relative 1 %      Basophils Relative 1 %      Neutrophils Absolute 9 54 (H) Thousands/µL      Immature Grans Absolute 0 06 Thousand/uL      Lymphocytes Absolute 2 97 Thousands/µL      Monocytes Absolute 0 95 Thousand/µL      Eosinophils Absolute 0 07 Thousand/µL      Basophils Absolute 0 10 Thousands/µL                  XR chest portable   Final Result by Jaime Leigh MD (11/23 9155)      No acute cardiopulmonary disease  Workstation performed: [id number]               Procedures  Procedures      Phone Consults  ED Phone Contact    ED Course  ED Course as of Nov 24 0024 Fri Nov 23, 2018   1532 Blood Pressure: 143/70   1532 Temperature: 97 6 °F (36 4 °C)   1532 Pulse: 90   1532 Respirations: 20   1532 SpO2: 96 %   1532 WBC: (!) 13 69   1532 Troponin I: 0 04   1552 Prior to my arrival in the room, patient had an episode of SVT  This was terminated with vagal maneuvers  Patient stable at this time  Placed on telemetry  Will continue to monitor  1623 Will give potassium  Potassium: (!!) 2 2   1623 Was 0 88 around 5 months ago  Creatinine: (!) 1 63   1625 Anion Gap: (!) 16   1646 Magnesium: (!) 1 3   1646 Phosphorus: (!) 4 2   1646 NT-proBNP: (!) 221                               MDM  Number of Diagnoses or Management Options  Acute exacerbation of chronic obstructive pulmonary disease (COPD) ([facility] [address] ): new and requires workup  JANELLE (acute kidney injury) ([facility] [address] ): new and requires workup  Hypokalemia: new and requires workup  Malaise: new and requires workup  SVT (supraventricular tachycardia) ([facility] [address] ): new and requires workup  Diagnosis management comments:     Patient presented with a several day history of productive cough, nausea, vomiting, decreased appetite, malaise, and weight loss  Prior to my evaluation, she also went into SVT which was converted with vagal maneuvers  Labs were obtained as above  Demonstrated acute kidney injury with hypokalemia and hypomagnesemia  Feel that patient's hypokalemia is likely secondary to multiple episodes of vomiting and poor PO intake  Etiology of acute kidney injury is likely similar  Chest x-ray did not demonstrate any acute disease  Productive cough is likely secondary to exacerbation of underlying COPD  Patient was treated with DuoNeb and Solu-Medrol    She was given IV fluids, magnesium, and potassium  Due to acute kidney injury and severe electrolyte abnormalities, patient will require admission  Amount and/or Complexity of Data Reviewed  Clinical lab tests: ordered and reviewed  Tests in the radiology section of CPT®: ordered and reviewed  Decide to obtain previous medical records or to obtain history from someone other than the patient: yes  Obtain history from someone other than the patient: yes  Review and summarize past medical records: yes  Discuss the patient with other providers: yes  Independent visualization of images, tracings, or specimens: yes    Risk of Complications, Morbidity, and/or Mortality  Presenting problems: moderate  Diagnostic procedures: minimal  Management options: minimal    Patient Progress  Patient progress: improved    CritCare Time    Disposition  Final diagnoses:   Hypokalemia   JANELLE (acute kidney injury) ([facility] )   SVT (supraventricular tachycardia) ([facility] )   Acute exacerbation of chronic obstructive pulmonary disease (COPD) ([facility] )   Malaise     Time reflects when diagnosis was documented in both MDM as applicable and the Disposition within this note     Time User Action Codes Description Comment    11/23/2018  5:50 PM Mallie Gilchrist Add [E87 6] Hypokalemia     11/23/2018  5:50 PM Mallie Gilchrist Add [N17 9] JANELLE (acute kidney injury) ([facility] )     11/23/2018  5:50 PM Mallie Gilchrist Add [I47 1] SVT (supraventricular tachycardia) ([facility] )     11/23/2018  5:50 PM Mallie Gilchrist Add [J44 1] Acute exacerbation of chronic obstructive pulmonary disease (COPD) ([facility] )     11/23/2018  5:50 PM Mallie Gilchrist Add [R53 81] [address]       ED Disposition     ED Disposition Condition Comment    Admit  Case was discussed with [facility] and the patient's admission status was agreed to be Admission Status: inpatient status to the service of Dr Cha Grace          Follow-up Information    None         Current Discharge Medication List      CONTINUE these medications which have NOT CHANGED    Details   albuterol (PROVENTIL HFA) 90 mcg/act inhaler Inhale 1 puff every 6 (six) hours as needed for wheezing  Qty: 6 7 g, Refills: 0    Associated Diagnoses: Seasonal allergic rhinitis, unspecified trigger      atorvastatin (LIPITOR) 20 mg tablet Take 1 tablet (20 mg total) by mouth daily  Qty: 90 tablet, Refills: 3    Associated Diagnoses: High cholesterol      Cetirizine HCl (ZYRTEC ALLERGY) 10 MG CAPS Take by mouth as needed        escitalopram (LEXAPRO) 10 mg tablet Take 1 tablet (10 mg total) by mouth daily  Qty: 30 tablet, Refills: 11    Associated Diagnoses: Depression with anxiety      gabapentin (NEURONTIN) 100 mg capsule Take 3 capsules (300 mg total) by mouth daily at bedtime  Qty: 270 capsule, Refills: 0    Associated Diagnoses: Lumbar radiculopathy      ibuprofen (MOTRIN) 800 mg tablet Take 1 tablet (800 mg total) by mouth 3 (three) times a day as needed for mild pain  Qty: 90 tablet, Refills: 2    Associated Diagnoses: Lumbar spondylosis      Multiple Vitamin (MULTI VITAMIN DAILY PO) Take by mouth           No discharge procedures on file  ED Provider  Attending physically available and evaluated [place]  CHIRAG managed the patient along with the ED Attending      Electronically Signed by         Karlee Vuong MD  11/24/18 7835

## 2018-11-23 NOTE — ASSESSMENT & PLAN NOTE
W/acute exacerbation 2* bronchitis likely viral  Pt on ra  rec'd solumedrol, albuterol  Will continue w/atrovent/xopenex/solumedrol 40mg q 8 h

## 2018-11-23 NOTE — PROGRESS NOTES
FAMILY PRACTICE OFFICE VISIT  [address]  [address], [facility]      NAME: Ward Doran  AGE: 79 y o  SEX: female  : 1950   MRN: [id number]    DATE: 2018  TIME: 2:09 PM    Assessment and Plan     Problem List Items Addressed This Visit        Unprioritized    Current every day smoker    Irritable bowel syndrome with diarrhea    Relevant Orders    Transfer to other facility    Pulmonary emphysema ([facility] [address] ) - Primary    Relevant Medications    albuterol inhalation solution 2 5 mg (Start on 2018  2:15 PM)    Weight loss, non-intentional    Relevant Orders    CBC    Comprehensive metabolic panel    Transfer to other facility      Other Visit Diagnoses     Malaise and fatigue        Relevant Orders    Transfer to other facility    Hypokalemia        Relevant Orders    Transfer to other facility          Patient Instructions   We discussed exacerbation COPD, possible pneumonia, appears dehydrated, has dropped 15 lb recently, remote history significant hypokalemia after loose stools , rule out hypokalemia  Nebulizer treatment with albuterol given here today with only mild improvement, after discussion she will proceed to the emergency room for IV fluids, blood work, x-ray  ( daughter will drive her )      Chief Complaint     Chief Complaint   Patient presents with    Diarrhea     for the past 10 days     Nasal Congestion    Headache    Cough       History of Present Illness   Ward Doran is a 79y o -year-old female who has noted congestion the past 10 days, had some improvement but has worsened the past few days, decreased appetite, low-grade temperature, malaise, cough, shortness of breath, wheezing  Has actually dropped 15 lb vs her visit with us in September  Has remote history hypokalemia due to loose stools, has been having 3 to 4 bowel movements, nonbloody, daily recently    Feels very weak, feels needs IV fluids  Stopped smoking this week  Had not yet done CT screening lung ordered by Dr Jasiel Rodriguez few months ago  Retired nurse [employer]      Review of Systems   Review of Systems   Constitutional: Positive for appetite change, chills, fatigue and unexpected weight change  Negative for diaphoresis and fever  HENT: Positive for congestion, postnasal drip, rhinorrhea and sore throat  Negative for ear pain, facial swelling, hearing loss, mouth sores, nosebleeds and trouble swallowing  Eyes: Negative for pain and discharge  Respiratory: Positive for cough, shortness of breath and wheezing  Cardiovascular: Positive for palpitations (Occasional palpitations recently)  Negative for chest pain  Gastrointestinal: Positive for diarrhea and nausea  Negative for abdominal pain, blood in stool and vomiting (Vomited on occasion past few days)  Genitourinary: Negative for difficulty urinating and dysuria  Urine on the darker side   Skin: Negative for rash  Neurological: Positive for weakness, light-headedness and headaches  Negative for dizziness and syncope  Hematological: Negative for adenopathy  Does not bruise/bleed easily         Active Problem List     Patient Active Problem List   Diagnosis    Arthritis    Depression with anxiety    Hyperlipidemia    Essential hypertension    Irritable bowel syndrome with diarrhea    Lumbar degenerative disc disease    Vitamin D deficiency    Current every day smoker    Greater trochanteric bursitis of right hip    Weight loss, non-intentional    Pulmonary emphysema ([facility] [address] )       Past Medical History:  Past Medical History:   Diagnosis Date    Anxiety     Bursitis of hip     Last Assessed:6/5/2017    Depression     Herniated lumbar intervertebral disc     Hyperlipidemia     Hypertension     Hypokalemia     Pituitary adenoma ([address] )     Last Assessed:2/12/2015 ; Previously on bromocriptine    Spinal stenosis     Systolic murmur of aorta     Vitamin D deficiency        Past Surgical History:  Past Surgical History:   Procedure Laterality Date    BREAST SURGERY      Incisional breast biopsy    CARPAL TUNNEL RELEASE Left     CHOLECYSTECTOMY      OTHER SURGICAL HISTORY      EXCISION OF PAROTID TUMOR/GLAND    PAROTIDECTOMY      [place] COLONOSCOPY FLX DX W/COLLJ SPEC WHEN PFRMD N/A 6/6/2017    Procedure: COLONOSCOPY;  Surgeon: Ruth Ann Krause DO;  Location: [facility]; Service: Gastroenterology    ROTATOR CUFF REPAIR      SALPINGOOPHORECTOMY         Family History:  Family History   Problem Relation Age of Onset    Colon cancer Mother     Coronary artery disease Mother     Colonic polyp Mother     Liver cancer Mother     Multiple sclerosis Father     Hypertension Other        Social History:  Social History     Social History    Marital status: /Civil Union     Spouse name: N/A    Number of children: N/A    Years of education: N/A     Occupational History    Not on file  Social History Main Topics    Smoking status: Current Every Day Smoker    Smokeless tobacco: Never Used      Comment: 3 cigarettes a day    Alcohol use 0 6 oz/week     1 Shots of liquor per week      Comment: 1 Shaune Frankel a night/social    Drug use: No    Sexual activity: Not on file     Other Topics Concern    Not on file     Social History Narrative    No narrative on file       Objective     Vitals:    11/23/18 1312   BP: 122/68   BP Location: Left arm   Patient Position: Sitting   Cuff Size: Large   Pulse: 92   Temp: 98 9 °F (37 2 °C)   SpO2: 97%   Weight: 77 kg (169 lb 12 8 oz)   Height: 5' 5" (1 651 m)     Body mass index is 28 26 kg/m²      BP Readings from Last 3 Encounters:   11/23/18 122/68   09/25/18 118/68   09/25/18 130/82       Wt Readings from Last 3 Encounters:   11/23/18 77 kg (169 lb 12 8 oz)   09/25/18 83 3 kg (183 lb 11 2 oz)   09/11/18 83 5 kg (184 lb)       Physical Exam   Constitutional: She is oriented to person, place, and time  Frequent cough, looks washed out, oxygenating okay   HENT:   Right Ear: External ear normal    Left Ear: External ear normal    Mouth/Throat: Oropharynx is clear and moist    Eyes: Conjunctivae are normal  No scleral icterus  Neck: Neck supple  Cardiovascular: Normal rate, regular rhythm and normal heart sounds  Pulmonary/Chest:   Decreased breath sounds bilateral with bilateral rhonchi/wheezing  Slightly improved after nebulizer treatment with albuterol   Abdominal: Soft  There is no tenderness  Musculoskeletal: She exhibits no edema  Lymphadenopathy:     She has no cervical adenopathy  Neurological: She is alert and oriented to person, place, and time         ALLERGIES:  Allergies   Allergen Reactions    Ancef [Cefazolin]     Cephalosporins Other (See Comments)     c diff    Fruit Extracts      Annotation - [id number]: peaches and strawberries    Levofloxacin     Morphine GI Intolerance    Other      FRUIT    Penicillins Hives    Tetracycline Edema       Current Medications     Current Outpatient Prescriptions   Medication Sig Dispense Refill    atorvastatin (LIPITOR) 20 mg tablet Take 1 tablet (20 mg total) by mouth daily 90 tablet 3    Cetirizine HCl (ZYRTEC ALLERGY) 10 MG CAPS Take by mouth as needed        escitalopram (LEXAPRO) 10 mg tablet Take 1 tablet (10 mg total) by mouth daily 30 tablet 11    gabapentin (NEURONTIN) 100 mg capsule Take 3 capsules (300 mg total) by mouth daily at bedtime 270 capsule 0    Multiple Vitamin (MULTI VITAMIN DAILY PO) Take by mouth      albuterol (PROVENTIL HFA) 90 mcg/act inhaler Inhale 1 puff every 6 (six) hours as needed for wheezing 6 7 g 0    ibuprofen (MOTRIN) 800 mg tablet Take 1 tablet (800 mg total) by mouth 3 (three) times a day as needed for mild pain 90 tablet 2     Current Facility-Administered Medications   Medication Dose Route Frequency Provider Last Rate Last Dose    albuterol inhalation solution 2 5 mg  2 5 mg Nebulization Once Chino Cardenas DO                 Most recent labs available from [address] [place] [address]   ( others may be available in Care Everywhere / Media sections)  Lab Results   Component Value Date    WBC 8 11 06/26/2018    HGB 13 1 06/26/2018    HCT 41 9 06/26/2018     06/26/2018    CHOL 187 05/04/2015    TRIG 166 (H) 06/26/2018    HDL 62 (H) 06/26/2018    ALT 38 07/14/2017    AST 22 07/14/2017     05/04/2015    K 3 6 06/26/2018     06/26/2018    CREATININE 0 88 06/26/2018    BUN 14 06/26/2018    CO2 27 06/26/2018    GLUF 87 06/26/2018     Lab Results   Component Value Date    LDLCALC 79 06/26/2018     Lab Results   Component Value Date    SGC2ZVHVOPDN 1 960 06/26/2018         Orders Placed This Encounter   Procedures    Mini neb    CBC    Comprehensive metabolic panel    Transfer to other facility         Chino Cardenas DO

## 2018-11-23 NOTE — ASSESSMENT & PLAN NOTE
Likely in the setting of bronchitis  Check procalcitonin monitor off antibiotics at this time as this is likely viral  Monitor vital signs

## 2018-11-23 NOTE — ASSESSMENT & PLAN NOTE
Likely be a poor oral intake and diarrhea w/ BUN creatinine ratio baseline creatinine appears to be 0 9-1 0  Start IV fluid hydration with lactated Ringer, check urinalysis, repeat BMP in a m

## 2018-11-23 NOTE — PATIENT INSTRUCTIONS
We discussed exacerbation COPD, possible pneumonia, appears dehydrated, has dropped 15 lb recently, remote history significant hypokalemia after loose stools 2016, rule out hypokalemia  Nebulizer treatment with albuterol given here today with only mild improvement, after discussion she will proceed to the emergency room for IV fluids, blood work, x-ray     ( daughter will drive her )

## 2018-11-23 NOTE — ASSESSMENT & PLAN NOTE
Brief episode of SVT noted by ED provider broken with use of vasovagal maneuver in setting of hypokalemia/hypomagnesemia  Monitor on telemetry check TSH, TTE

## 2018-11-23 NOTE — ASSESSMENT & PLAN NOTE
Infectious likely viral   R/o flu  cxr negative for pna  Check procalcitonin, check rsv/flu by pcr  Start tussionex, supportive care w/nebs steroids as below  Monitor off abx given hx of recurrent c diff until procalcitonin results as VS stable

## 2018-11-23 NOTE — ED NOTES
Patient noted in SVT again, Dr Stewart Pennington at bedside; patient then broke out of SVT again;      Laz Hull RN  11/23/18 3837

## 2018-11-24 LAB
ANION GAP SERPL CALCULATED.3IONS-SCNC: 14 MMOL/L (ref 4–13)
ATRIAL RATE: 101 BPM
ATRIAL RATE: 106 BPM
ATRIAL RATE: 89 BPM
ATRIAL RATE: 91 BPM
BUN SERPL-MCNC: 25 MG/DL (ref 5–25)
CALCIUM SERPL-MCNC: 9.2 MG/DL (ref 8.3–10.1)
CHLORIDE SERPL-SCNC: 107 MMOL/L (ref 100–108)
CO2 SERPL-SCNC: 19 MMOL/L (ref 21–32)
CREAT SERPL-MCNC: 1.36 MG/DL (ref 0.6–1.3)
FLUAV AG SPEC QL: NORMAL
FLUBV AG SPEC QL: NORMAL
GFR SERPL CREATININE-BSD FRML MDRD: 41 ML/MIN/1.73SQ M
GLUCOSE SERPL-MCNC: 203 MG/DL (ref 65–140)
MAGNESIUM SERPL-MCNC: 1.8 MG/DL (ref 1.6–2.6)
P AXIS: 63 DEGREES
P AXIS: 72 DEGREES
P AXIS: 73 DEGREES
P AXIS: 73 DEGREES
POTASSIUM SERPL-SCNC: 2.8 MMOL/L (ref 3.5–5.3)
PR INTERVAL: 146 MS
PR INTERVAL: 152 MS
PR INTERVAL: 156 MS
PR INTERVAL: 160 MS
QRS AXIS: 11 DEGREES
QRS AXIS: 2 DEGREES
QRS AXIS: 26 DEGREES
QRS AXIS: 9 DEGREES
QRSD INTERVAL: 82 MS
QRSD INTERVAL: 84 MS
QRSD INTERVAL: 84 MS
QRSD INTERVAL: 88 MS
QT INTERVAL: 304 MS
QT INTERVAL: 380 MS
QTC INTERVAL: 403 MS
QTC INTERVAL: 462 MS
QTC INTERVAL: 468 MS
QTC INTERVAL: 491 MS
RSV B RNA SPEC QL NAA+PROBE: NORMAL
SODIUM SERPL-SCNC: 140 MMOL/L (ref 136–145)
T WAVE AXIS: 208 DEGREES
T WAVE AXIS: 70 DEGREES
T WAVE AXIS: 71 DEGREES
T WAVE AXIS: 79 DEGREES
TSH SERPL DL<=0.05 MIU/L-ACNC: 0.64 UIU/ML (ref 0.36–3.74)
VENTRICULAR RATE: 100 BPM
VENTRICULAR RATE: 106 BPM
VENTRICULAR RATE: 89 BPM
VENTRICULAR RATE: 91 BPM

## 2018-11-24 PROCEDURE — 94760 N-INVAS EAR/PLS OXIMETRY 1: CPT

## 2018-11-24 PROCEDURE — 93005 ELECTROCARDIOGRAM TRACING: CPT

## 2018-11-24 PROCEDURE — 87070 CULTURE OTHR SPECIMN AEROBIC: CPT | Performed by: PHYSICIAN ASSISTANT

## 2018-11-24 PROCEDURE — 87205 SMEAR GRAM STAIN: CPT | Performed by: PHYSICIAN ASSISTANT

## 2018-11-24 PROCEDURE — 99232 SBSQ HOSP IP/OBS MODERATE 35: CPT | Performed by: INTERNAL MEDICINE

## 2018-11-24 PROCEDURE — 93010 ELECTROCARDIOGRAM REPORT: CPT | Performed by: INTERNAL MEDICINE

## 2018-11-24 PROCEDURE — 94640 AIRWAY INHALATION TREATMENT: CPT

## 2018-11-24 PROCEDURE — 84443 ASSAY THYROID STIM HORMONE: CPT | Performed by: PHYSICIAN ASSISTANT

## 2018-11-24 PROCEDURE — 99222 1ST HOSP IP/OBS MODERATE 55: CPT | Performed by: INTERNAL MEDICINE

## 2018-11-24 PROCEDURE — 83735 ASSAY OF MAGNESIUM: CPT | Performed by: PHYSICIAN ASSISTANT

## 2018-11-24 PROCEDURE — 87185 SC STD ENZYME DETCJ PER NZM: CPT | Performed by: PHYSICIAN ASSISTANT

## 2018-11-24 PROCEDURE — 80048 BASIC METABOLIC PNL TOTAL CA: CPT | Performed by: PHYSICIAN ASSISTANT

## 2018-11-24 RX ORDER — ACETAMINOPHEN 325 MG/1
650 TABLET ORAL EVERY 6 HOURS PRN
Status: DISCONTINUED | OUTPATIENT
Start: 2018-11-24 | End: 2018-11-26 | Stop reason: HOSPADM

## 2018-11-24 RX ORDER — POTASSIUM CHLORIDE 20 MEQ/1
40 TABLET, EXTENDED RELEASE ORAL 2 TIMES DAILY
Status: COMPLETED | OUTPATIENT
Start: 2018-11-24 | End: 2018-11-24

## 2018-11-24 RX ORDER — ADENOSINE 3 MG/ML
6 INJECTION INTRAVENOUS ONCE
Status: DISCONTINUED | OUTPATIENT
Start: 2018-11-24 | End: 2018-11-24

## 2018-11-24 RX ORDER — GUAIFENESIN 100 MG/5ML
200 SOLUTION ORAL EVERY 4 HOURS PRN
Status: DISCONTINUED | OUTPATIENT
Start: 2018-11-24 | End: 2018-11-26 | Stop reason: HOSPADM

## 2018-11-24 RX ORDER — DILTIAZEM HYDROCHLORIDE 120 MG/1
120 CAPSULE, COATED, EXTENDED RELEASE ORAL DAILY
Status: DISCONTINUED | OUTPATIENT
Start: 2018-11-24 | End: 2018-11-26 | Stop reason: HOSPADM

## 2018-11-24 RX ORDER — METHYLPREDNISOLONE SODIUM SUCCINATE 40 MG/ML
20 INJECTION, POWDER, LYOPHILIZED, FOR SOLUTION INTRAMUSCULAR; INTRAVENOUS EVERY 8 HOURS
Status: DISCONTINUED | OUTPATIENT
Start: 2018-11-24 | End: 2018-11-26 | Stop reason: HOSPADM

## 2018-11-24 RX ADMIN — SODIUM CHLORIDE 75 ML/HR: 0.9 INJECTION, SOLUTION INTRAVENOUS at 02:12

## 2018-11-24 RX ADMIN — POTASSIUM CHLORIDE 40 MEQ: 1500 TABLET, EXTENDED RELEASE ORAL at 17:16

## 2018-11-24 RX ADMIN — POTASSIUM CHLORIDE 40 MEQ: 1500 TABLET, EXTENDED RELEASE ORAL at 10:33

## 2018-11-24 RX ADMIN — ALBUTEROL SULFATE 2 PUFF: 90 AEROSOL, METERED RESPIRATORY (INHALATION) at 15:55

## 2018-11-24 RX ADMIN — HEPARIN SODIUM 5000 UNITS: 5000 INJECTION INTRAVENOUS; SUBCUTANEOUS at 21:11

## 2018-11-24 RX ADMIN — ESCITALOPRAM OXALATE 10 MG: 10 TABLET ORAL at 09:09

## 2018-11-24 RX ADMIN — IPRATROPIUM BROMIDE 0.5 MG: 0.5 SOLUTION RESPIRATORY (INHALATION) at 07:57

## 2018-11-24 RX ADMIN — ACETAMINOPHEN 650 MG: 325 TABLET, FILM COATED ORAL at 15:55

## 2018-11-24 RX ADMIN — DILTIAZEM HYDROCHLORIDE 120 MG: 120 CAPSULE, COATED, EXTENDED RELEASE ORAL at 14:48

## 2018-11-24 RX ADMIN — METOPROLOL TARTRATE 12.5 MG: 25 TABLET ORAL at 12:26

## 2018-11-24 RX ADMIN — METHYLPREDNISOLONE SODIUM SUCCINATE 40 MG: 40 INJECTION, POWDER, FOR SOLUTION INTRAMUSCULAR; INTRAVENOUS at 05:20

## 2018-11-24 RX ADMIN — GUAIFENESIN 200 MG: 200 SOLUTION ORAL at 15:55

## 2018-11-24 RX ADMIN — NICOTINE 1 PATCH: 14 PATCH, EXTENDED RELEASE TRANSDERMAL at 09:07

## 2018-11-24 RX ADMIN — HEPARIN SODIUM 5000 UNITS: 5000 INJECTION INTRAVENOUS; SUBCUTANEOUS at 14:47

## 2018-11-24 RX ADMIN — LEVALBUTEROL 1.25 MG: 1.25 SOLUTION, CONCENTRATE RESPIRATORY (INHALATION) at 19:45

## 2018-11-24 RX ADMIN — IPRATROPIUM BROMIDE 0.5 MG: 0.5 SOLUTION RESPIRATORY (INHALATION) at 13:29

## 2018-11-24 RX ADMIN — METHYLPREDNISOLONE SODIUM SUCCINATE 20 MG: 40 INJECTION, POWDER, FOR SOLUTION INTRAMUSCULAR; INTRAVENOUS at 21:10

## 2018-11-24 RX ADMIN — GABAPENTIN 300 MG: 300 CAPSULE ORAL at 21:11

## 2018-11-24 RX ADMIN — LORATADINE 10 MG: 10 TABLET ORAL at 09:04

## 2018-11-24 RX ADMIN — IPRATROPIUM BROMIDE 0.5 MG: 0.5 SOLUTION RESPIRATORY (INHALATION) at 19:45

## 2018-11-24 RX ADMIN — HEPARIN SODIUM 5000 UNITS: 5000 INJECTION INTRAVENOUS; SUBCUTANEOUS at 05:20

## 2018-11-24 RX ADMIN — METHYLPREDNISOLONE SODIUM SUCCINATE 20 MG: 40 INJECTION, POWDER, FOR SOLUTION INTRAMUSCULAR; INTRAVENOUS at 10:33

## 2018-11-24 RX ADMIN — SODIUM CHLORIDE 75 ML/HR: 0.9 INJECTION, SOLUTION INTRAVENOUS at 15:15

## 2018-11-24 RX ADMIN — LEVALBUTEROL 1.25 MG: 1.25 SOLUTION, CONCENTRATE RESPIRATORY (INHALATION) at 13:29

## 2018-11-24 RX ADMIN — LEVALBUTEROL 1.25 MG: 1.25 SOLUTION, CONCENTRATE RESPIRATORY (INHALATION) at 07:57

## 2018-11-24 RX ADMIN — ATORVASTATIN CALCIUM 20 MG: 10 TABLET, FILM COATED ORAL at 17:16

## 2018-11-24 NOTE — NURSING NOTE
Pt developed supraventricular tacycardia @ 0930, EKG was done at bedside and code cart was retrieved  After being connected to the defibrillator, pt self converted back to NSR  Dr Ruddy Bull saw pt within 5 minutes of event  Patient was stable w/in 15 minutes of event

## 2018-11-24 NOTE — NURSING NOTE
Patient was resting in bed, heart rate on monitor suddenly appeared  Tachycardic, change from earlier rate  David Palma stated she felt her heart race  Johnny Juventinopadmack presently on unit asked for a 12 lead and ordered Adenosine  Charge KANNAN Barreto Caro came in with code cart and placed patient on monitor prior to Adenosine administration  Patients heart rate suddenly converted back to SR  Adenosine on hold not given  Will continue to closely monitor

## 2018-11-24 NOTE — PLAN OF CARE
CARDIOVASCULAR - ADULT     Maintains optimal cardiac output and hemodynamic stability Progressing     Absence of cardiac dysrhythmias or at baseline rhythm Progressing        DISCHARGE PLANNING     Discharge to home or other facility with appropriate resources Progressing        DISCHARGE PLANNING - CARE MANAGEMENT     Discharge to post-acute care or home with appropriate resources Progressing        HEMATOLOGIC - ADULT     Maintains hematologic stability Progressing        INFECTION - ADULT     Absence or prevention of progression during hospitalization Progressing     Absence of fever/infection during neutropenic period Progressing        Knowledge Deficit     Patient/family/caregiver demonstrates understanding of disease process, treatment plan, medications, and discharge instructions Progressing        METABOLIC, FLUID AND ELECTROLYTES - ADULT     Electrolytes maintained within normal limits Progressing     Fluid balance maintained Progressing        Nutrition/Hydration-ADULT     Nutrient/Hydration intake appropriate for improving, restoring or maintaining nutritional needs Progressing        PAIN - ADULT     Verbalizes/displays adequate comfort level or baseline comfort level Progressing        RESPIRATORY - ADULT     Achieves optimal ventilation and oxygenation Progressing        SAFETY ADULT     Patient will remain free of falls Progressing     Maintain or return to baseline ADL function Progressing     Maintain or return mobility status to optimal level Progressing

## 2018-11-24 NOTE — PROGRESS NOTES
[facility] Progress Note  Patient: Vanessa Hong 79 y o  female   MRN: [id number]  PCP: Devin Brown, DO  Unit/Bed#: E4 -[facility] Encounter: [id number]  Date Of Visit: 11/24/18      Assessment/plan  1  Acute copd exacerbation due to bronchitis- likely viral etiology  Awaiting flu pcr  Continue bronchodilators  Start steroid taper       2  sarah beth- hold nephrotoxic medications  Continue normal saline  Baseline creatinine is 0 8-1 0  Creatinine is improved to 1 36 from 1 63  Will check bmp in am    3  Metabolic acidosis- if this worsens may need to change to bicarb gtt  Will monitor  4  Recurrent svt- awaiting echo  Will consult cardiology  Keep Potassium greater than 4 0  Keep mg elevated  Will start low dose metoprolol low dose  5  Hypokalemia- will replace  Check bmp in am    6  IBS- pt does have on going diarrhea and has lost significant amount of weight over the last month  She will need a colonoscopy outpt  7  htn- stable  Subjective:   Called to see pt due to heart rate in the 190s  She is in svt  Pt tried vagal maneuvers but this failed to lower her heart rate  Pt luckily converted to sinus prior to medications  This is the 3rd time she has had svt since in the ed  She has had svt multiple times at home  She feels when she is in svt  She states she gets a pressure in her neck and then cp/palpitations  She denies f/c   She states her breathing is better today  No cough today  No n/v  She has her normal diarrhea  Objective:     Vitals: Blood pressure 110/64, pulse 80, temperature (!) 96 9 °F (36 1 °C), temperature source Tympanic, resp  rate 19, height 5' 4" (1 626 m), weight 76 7 kg (169 lb 1 5 oz), SpO2 100 %, not currently breastfeeding  ,Body mass index is 29 02 kg/m²      Lab, Imaging and other studies:    Results from last 7 days  Lab Units 11/23/18  1435   WBC Thousand/uL 13 69*   HEMOGLOBIN g/dL 14 6   HEMATOCRIT % 43 4   PLATELETS Thousands/uL 371   INR  1 08 Results from last 7 days  Lab Units 11/24/18  0504 11/23/18  1435   POTASSIUM mmol/L 2 8* 2 2*   CHLORIDE mmol/L 107 102   CO2 mmol/L 19* 24   BUN mg/dL 25 25   CREATININE mg/dL 1 36* 1 63*   CALCIUM mg/dL 9 2 10 6*   ALK PHOS U/L  --  119*   ALT U/L  --  47   AST U/L  --  26       Results from last 7 days  Lab Units 11/23/18  1435   TROPONIN I ng/mL 0 04     No results found for: Izella Pals      Xr Chest Portable    Result Date: 11/23/2018  Narrative: CHEST INDICATION:   shortness of breath with exertion  COMPARISON:  9/15/2016 EXAM PERFORMED/VIEWS:  XR CHEST PORTABLE FINDINGS: Cardiomediastinal silhouette appears unremarkable  The lungs are clear  No pneumothorax or pleural effusion  Osseous structures appear within normal limits for patient age  Impression: No acute cardiopulmonary disease   Workstation performed: [id number]       Scheduled Meds:   Current Facility-Administered Medications:  albuterol 2 puff Inhalation Q4H PRN Opal Ambron, DO    atorvastatin 20 mg Oral QPM Alba Monaco PA-C    escitalopram 10 mg Oral Daily Alba Monaco PA-C    gabapentin 300 mg Oral HS Alba Monaco PA-C    heparin (porcine) 5,000 Units Subcutaneous [facility] Alba Monaco PA-C    hydrALAZINE 10 mg Intravenous Q6H PRN Alba Monaco PA-C    ipratropium 0 5 mg Nebulization TID Opal Ambron, DO    ipratropium 0 5 mg Nebulization Q6H PRN Opal Ambron, DO    levalbuterol 1 25 mg Nebulization TID Opal Ambron, DO    levalbuterol 1 25 mg Nebulization Q6H PRN Opal Ambron, DO    loratadine 10 mg Oral Daily Alba Monaco PA-C    methylPREDNISolone sodium succinate 20 mg Intravenous Q8H Opal Ambron, DO    nicotine 1 patch Transdermal Daily Alba Monaco PA-C    potassium chloride 40 mEq Oral BID Opal Ambron, DO    sodium chloride 75 mL/hr Intravenous Continuous Opal Ambron, DO Last Rate: 75 mL/hr (11/24/18 9580)     Continuous Infusions: sodium chloride 75 mL/hr Last Rate: 75 mL/hr (11/24/18 0212)     PRN Meds:   albuterol    hydrALAZINE    ipratropium    levalbuterol      Physical exam:  Physical Exam  General appearance: alert and oriented, in no acute distress  Head: Normocephalic, without obvious abnormality, atraumatic  Eyes: conjunctivae/corneas clear  PERRL, EOM's intact  Fundi benign    Neck: no adenopathy, no carotid bruit, no JVD, supple, symmetrical, trachea midline and thyroid not enlarged, symmetric, no tenderness/mass/nodules  Lungs: minimal wheezing bilateral  Heart: regular rate and rhythm, S1, S2 normal, no murmur, click, rub or gallop  Abdomen: soft, non-tender; bowel sounds normal; no masses,  no organomegaly  Extremities: extremities normal, warm and well-perfused; no cyanosis, clubbing, or edema  Pulses: 2+ and symmetric  Skin: Skin color, texture, turgor normal  No rashes or lesions  Neurologic: Grossly normal      VTE Pharmacologic Prophylaxis: Heparin  VTE Mechanical Prophylaxis: sequential compression device    Counseling / Coordination of Care  Total floor / unit time spent today 20 minutes      Current Length of Stay: 1 day(s)    Current Patient Status: Inpatient       Code Status: Level 1 - Full Code

## 2018-11-24 NOTE — NURSING NOTE
Patient in bed resting  No changes since previous assessment  Complaining of headache  Dr Reynold Kelley pagenikole and waiting for return call as patient does not have any PRN medication for a headache  Call bell and personal items within close reach  Will continue to monitor patient

## 2018-11-24 NOTE — CONSULTS
Electrophysiology-Cardiology (EP)   Sandra Lopes 79 y o  female MRN: [id number]  Unit/Bed#: E4 -01 Encounter: [id number]        IMPRESSION:  1  SVT- 190bpm regular,  likely avnrt, short RP tachycardia(reveiwed and interpreted tele), has had 3 episodes here and multiple at home up to hour and half  She has been able to break w valsalva  Been going on for months  Has not had medications for it  She has had intolerance to betablockers in past    2  Mild Mitral stenosis w LAE, normal EF  3  Here w actue COPD exacerbation  H/o tobacco  4  CKD cr improving 1 3 from 1 6  5  Hypokalemia  6  HTN well controlled      PLAN:  1  D/c metoprolol, instead use diltiazem given h/o COPD, 120mg daily  2  Recommend outpatient SVT ablation  We went over details of ablation and risks and benefits and I will arrange outpatient scheduling  3  Will cancel repeat echo    Will sign off for now unless svt recurs while here  Referring Physian: Judith Smith DO    Chief Complain/Reason for Referal:  SVT  HPI:Almaz Umanzor is a 79 y o     Patient Active Problem List    Diagnosis Date Noted    Weight loss, non-intentional 11/23/2018     Priority: Low    Pulmonary emphysema ([facility] [address] ) 11/23/2018     Priority: Low    Leukocytosis 11/23/2018     Priority: Low    Hypokalemia 11/23/2018     Priority: Low    JANELLE (acute kidney injury) ([facility] [address] ) 11/23/2018     Priority: Low    [facility] 11/23/2018     Priority: Low    Bronchitis 11/23/2018     Priority: Low    Greater trochanteric bursitis of right hip      Priority: Low    Current every day smoker 07/05/2018     Priority: Low    Arthritis 01/04/2018     Priority: Low    Irritable bowel syndrome with diarrhea 06/20/2017     Priority: Low    Lumbar degenerative disc disease 06/05/2017     Priority: Low    Vitamin D deficiency 11/18/2015     Priority: Low    Essential hypertension 03/10/2015     Priority: Low    Depression with anxiety 08/26/2013 Priority: Low    Hyperlipidemia 08/26/2013     Priority: Low       1  SVT- 190bpm regular,  likely avnrt, short RP tachycardia(reveiwed and interpreted tele), has had 3 episodes here and multiple at home up to hour and half  She has been able to break w valsalva  Been going on for months  Has not had medications for it  She has had intolerance to betablockers in past    2  Mild Mitral stenosis w LAE, normal EF  3  Here w actue COPD exacerbation  H/o tobacco  4  CKD cr improving 1 3 from 1 6  5  Hypokalemia  6  HTN well controlled    Erle Carbine here for COPD execerbation, bronchitis, flu pcr pending  She had SVT in ER 2x broke both w valsalva and once on floor here 190bpm  Feels Cp/palpitations in neck and anxiety during episodes  Never been confirmed before on monitor but she has had these symptoms for months on and off, seems worse w stress, usually breaks w valsava          Past Medical History:   Diagnosis Date    Anxiety     Bursitis of hip     Last Assessed:6/5/2017    Depression     Herniated lumbar intervertebral disc     Hyperlipidemia     Hypertension     Hypokalemia     Pituitary adenoma (HCC)     Last Assessed:2/12/2015 ; Previously on bromocriptine    Spinal stenosis     Systolic murmur of aorta     Vitamin D deficiency        Facility-Administered Medications Prior to Admission   Medication    [COMPLETED] albuterol inhalation solution 2 5 mg     Prescriptions Prior to Admission   Medication    albuterol (PROVENTIL HFA) 90 mcg/act inhaler    atorvastatin (LIPITOR) 20 mg tablet    Cetirizine HCl (ZYRTEC ALLERGY) 10 MG CAPS    escitalopram (LEXAPRO) 10 mg tablet    gabapentin (NEURONTIN) 100 mg capsule    ibuprofen (MOTRIN) 800 mg tablet    Multiple Vitamin (MULTI VITAMIN DAILY PO)       Scheduled Meds:  Current Facility-Administered Medications:  albuterol 2 puff Inhalation Q4H PRN Opal Lopez DO    atorvastatin 20 mg Oral QPM Alba Monaco PA-C    escitalopram 10 mg Oral Daily Pepito Alexandre PA-C    gabapentin 300 mg Oral HS Alba Monaco PA-C    heparin (porcine) 5,000 Units Subcutaneous [facility] Alba Monaco PA-C    hydrALAZINE 10 mg Intravenous Q6H PRN Alba Monaco PA-C    ipratropium 0 5 mg Nebulization TID Opal Ambron, DO    ipratropium 0 5 mg Nebulization Q6H PRN Opal Ambron, DO    levalbuterol 1 25 mg Nebulization TID Opal Ambron, DO    levalbuterol 1 25 mg Nebulization Q6H PRN Opal Ambron, DO    loratadine 10 mg Oral Daily Alba Monaco PA-C    methylPREDNISolone sodium succinate 20 mg Intravenous Q8H Opal Ambron, DO    metoprolol tartrate 12 5 mg Oral Q12H [address] Opal Ambron, DO    nicotine 1 patch Transdermal Daily Alba Monaco PA-C    potassium chloride 40 mEq Oral BID Opal Ambron, DO    sodium chloride 75 mL/hr Intravenous Continuous Opal Ambron, DO Last Rate: 75 mL/hr (11/24/18 0221)     Continuous Infusions:  sodium chloride 75 mL/hr Last Rate: 75 mL/hr (11/24/18 5381)     PRN Meds:   albuterol    hydrALAZINE    ipratropium    levalbuterol  Allergies   Allergen Reactions    Ancef [Cefazolin]     Cephalosporins Other (See Comments)     c diff    Fruit Extracts      Annotation - [id number]: peaches and strawberries    Levofloxacin     Morphine GI Intolerance    Other      FRUIT    Penicillins Hives    Tetracycline Edema     I reviewed the Home Medication list in the chart  Family History   Problem Relation Age of Onset    Colon cancer Mother     Coronary artery disease Mother     Colonic polyp Mother     Liver cancer Mother     Multiple sclerosis Father     Hypertension Other        Social History     Social History    Marital status: /Civil Union     Spouse name: N/A    Number of children: N/A    Years of education: N/A     Occupational History    Not on file       Social History Main Topics    Smoking status: Current Every Day Smoker     Packs/day: 1 00     Types: Cigarettes    Smokeless tobacco: Never Used      Comment: 3 cigarettes a day    Alcohol use 0 6 oz/week     1 Shots of liquor per week      Comment: 1 Bin Bibles a night/social    Drug use: No    Sexual activity: Not on file     Other Topics Concern    Not on file     Social History Narrative    No narrative on file       Review of Systems -12 Point ROS reviewed and are negative or noted in chart except for Pertinent Positives Pertaining to Cardiovascular and Respiratory in HPI above  Vitals:    11/24/18 1200   BP: 104/63   Pulse: 77   Resp:    Temp:    SpO2:      Vitals:    11/23/18 1414 11/23/18 1834   Weight: 76 4 kg (168 lb 8 oz) 76 7 kg (169 lb 1 5 oz)     Intake/Output Summary (Last 24 hours) at 11/24/18 1324  Last data filed at 11/23/18 1929   Gross per 24 hour   Intake                0 ml   Output              100 ml   Net             -100 ml         GEN: No acute distress, Alert and oriented, well appearing  HEENT:Head, neck, ears, oral pharynx: Mucus membranes moist, oral pharynx clear, nares clear  External ears normal  EYES: Pupils equal, sclera anicteric, midline, normal conjuctiva  NECK: No JVD, supple, no obvious masses or thryomegaly or goiter  CARDIOVASCULAR: RRR, No murmur, rub, gallops S1,S2  LUNGS: Course bilateraly w mild wheezing, decreased resp sounds and poor lung movement  no rales, rhonchi, crackles  ABDOMEN: Soft, nondistended, nontender, without obvious organomegaly or ascites  EXTREMITIES/VASCULAR: No edema  Radial pulses intact, pedal pulses difficult to palpate, warm an well perfused  PSYCH: Normal Affect, no overt suicidal ideation, linear speech pattern without evidence of psychosis  NEURO: Grossly intact, moving all extremiteis equal, face symmetric, alert and responsive, no obvious focal defecits  HEME: No bleeding, bruising, petechia, purpura  SKIN: No significant rashes, warm, no diaphoresis or pallor       Lab Results:     CBC with diff:   Results from last 7 days  Lab Units 11/23/18  1435 WBC Thousand/uL 13 69*   HEMOGLOBIN g/dL 14 6   HEMATOCRIT % 43 4   MCV fL 97   PLATELETS Thousands/uL 371   MCH pg 32 7   MCHC g/dL 33 6   RDW % 12 9   MPV fL 10 2   NRBC AUTO /100 WBCs 0         CMP:  Results from last 7 days  Lab Units 11/24/18  0504 11/23/18  1435   POTASSIUM mmol/L 2 8* 2 2*   CHLORIDE mmol/L 107 102   CO2 mmol/L 19* 24   BUN mg/dL 25 25   CREATININE mg/dL 1 36* 1 63*   CALCIUM mg/dL 9 2 10 6*   AST U/L  --  26   ALT U/L  --  47   ALK PHOS U/L  --  119*   EGFR ml/min/1 73sq m 41 33         BMP:  Results from last 7 days  Lab Units 11/24/18  0504 11/23/18  1435   POTASSIUM mmol/L 2 8* 2 2*   CHLORIDE mmol/L 107 102   CO2 mmol/L 19* 24   BUN mg/dL 25 25   CREATININE mg/dL 1 36* 1 63*   CALCIUM mg/dL 9 2 10 6*       BNP:   Results Reviewed     Procedure Component Value Units Date/Time    NT-BNP PRO [[id number]]  (Abnormal) Collected:  11/23/18 1435    Lab Status:  Final result Specimen:  Blood from Arm, Right Updated:  11/23/18 1644     NT-proBNP 221 (H) pg/mL     Magnesium [[id number]]  (Abnormal) Collected:  11/23/18 1435    Lab Status:  Final result Specimen:  Blood from Arm, Right Updated:  11/23/18 1644     Magnesium 1 3 (L) mg/dL     Phosphorus [[id number]]  (Abnormal) Collected:  11/23/18 1435    Lab Status:  Final result Specimen:  Blood from Arm, Right Updated:  11/23/18 1644     Phosphorus 4 2 (H) mg/dL     Comprehensive metabolic panel [[id number]]  (Abnormal) Collected:  11/23/18 1435    Lab Status:  Final result Specimen:  Blood from Arm, Right Updated:  11/23/18 1623     Sodium 142 mmol/L      Potassium 2 2 (LL) mmol/L      Chloride 102 mmol/L      CO2 24 mmol/L      ANION GAP 16 (H) mmol/L      BUN 25 mg/dL      Creatinine 1 63 (H) mg/dL      Glucose 122 mg/dL      Calcium 10 6 (H) mg/dL      AST 26 U/L      ALT 47 U/L      Alkaline Phosphatase 119 (H) U/L      Total Protein 8 0 g/dL      Albumin 3 8 g/dL      Total Bilirubin 0 50 mg/dL      eGFR 33 ml/min/1 73sq m     Narrative: National Kidney Disease Education Program recommendations are as follows:  GFR calculation is accurate only with a steady state creatinine  Chronic Kidney disease less than 60 ml/min/1 73 sq  meters  Kidney failure less than 15 ml/min/1 73 sq  meters  Troponin I [[id number]]  (Normal) Collected:  11/23/18 1435    Lab Status:  Final result Specimen:  Blood from Arm, Right Updated:  11/23/18 1511     Troponin I 0 04 ng/mL     Protime-INR [[id number]]  (Normal) Collected:  11/23/18 1435    Lab Status:  Final result Specimen:  Blood from Arm, Right Updated:  11/23/18 1504     Protime 14 1 seconds      INR 1 08    APTT [[id number]]  (Normal) Collected:  11/23/18 1435    Lab Status:  Final result Specimen:  Blood from Arm, Right Updated:  11/23/18 1504     PTT 34 seconds     CBC and differential [[id number]]  (Abnormal) Collected:  11/23/18 1435    Lab Status:  Final result Specimen:  Blood from Arm, Right Updated:  11/23/18 1448     WBC 13 69 (H) Thousand/uL      RBC 4 47 Million/uL      Hemoglobin 14 6 g/dL      Hematocrit 43 4 %      MCV 97 fL      MCH 32 7 pg      MCHC 33 6 g/dL      RDW 12 9 %      MPV 10 2 fL      Platelets 039 Thousands/uL      nRBC 0 /100 WBCs      Neutrophils Relative 69 %      Immat GRANS % 0 %      Lymphocytes Relative 22 %      Monocytes Relative 7 %      Eosinophils Relative 1 %      Basophils Relative 1 %      Neutrophils Absolute 9 54 (H) Thousands/µL      Immature Grans Absolute 0 06 Thousand/uL      Lymphocytes Absolute 2 97 Thousands/µL      Monocytes Absolute 0 95 Thousand/µL      Eosinophils Absolute 0 07 Thousand/µL      Basophils Absolute 0 10 Thousands/µL         No results for input(s): BNP in the last 72 hours         Results from last 7 days  Lab Units 11/23/18  1435   TROPONIN I ng/mL 0 04         Magnesium:   Results from last 7 days  Lab Units 11/24/18  0504 11/23/18  1435   MAGNESIUM mg/dL 1 8 1 3*       Coags:   Results from last 7 days  Lab Units 11/23/18  1435   PTT seconds 34 INR  1 08       TSH:       Lipid Profile:         Cardiac testing:   Results for orders placed during the hospital encounter of 16   Echo complete with contrast if indicated    Narrative [facility]  [facility] [address]  [phone]    Transthoracic Echocardiogram  2D, M-mode, Doppler, and Color Doppler    Study date:  2016    Patient: Veronica Dee  MR number: [id number]  Account number: [de-[email or web address]  : 1950  Age: 61 years  Gender: Female  Status: Outpatient  Location: Echo lab  Height: 64 in  Weight: 184 6 lb  BP: 124/ 72 mmHg    Indications: Murmur    Diagnoses: R01 1 - Cardiac murmur, unspecified    Sonographer:  EUN Mo  Primary Physician:  Fina Espinosa MD  Referring Physician:  Fina Espinosa MD  Group:  [id number]  Interpreting Physician:  Rinku Patel MD    SUMMARY    PROCEDURE INFORMATION:  Echocardiographic views were limited due to decreased penetration  LEFT VENTRICLE:  Systolic function was normal by visual assessment  Ejection fraction was  estimated to be 60 %  There were no regional wall motion abnormalities  Features were consistent with a pseudonormal left ventricular filling pattern,  with concomitant abnormal relaxation and increased filling pressure (grade 2  diastolic dysfunction)  LEFT ATRIUM:  The atrium was mildly dilated  MITRAL VALVE:  There was mild to moderate annular calcification  Transmitral velocity was increased due to valvular stenosis  There was very mild stenosis  There was moderate regurgitation  Mean transmitral gradient was 428 mmHg  Mitral valve area by pressure half-time was 2 1 cm squared  AORTIC VALVE:  Transaortic velocity was increased due to valvular stenosis  There was very mild stenosis  There was mild regurgitation  Valve mean gradient was 9 06 mmHg  TRICUSPID VALVE:  There was trace regurgitation    The tricuspid jet envelope definition was inadequate for estimation of RV  systolic pressure  There are no indirect findings (abnormal RV volume or  geometry, altered pulmonary flow velocity profile, or leftward septal  displacement) which would suggest moderate or severe pulmonary hypertension  PULMONIC VALVE:  There was trace regurgitation  Not well visualized  HISTORY: PRIOR HISTORY: Hyperlipidemia, hypertension  PROCEDURE: The procedure was performed in the echo lab  This was a routine  study  The transthoracic approach was used  The study included complete 2D  imaging, M-mode, complete spectral Doppler, and color Doppler  Images were  obtained from the parasternal, apical, subcostal, and suprasternal notch  acoustic windows  Echocardiographic views were limited due to decreased  penetration  This was a technically difficult study  LEFT VENTRICLE: Size was normal  Systolic function was normal by visual  assessment  Ejection fraction was estimated to be 60 %  There were no regional  wall motion abnormalities  Wall thickness was normal  No evidence of apical  thrombus  DOPPLER: Features were consistent with a pseudonormal left  ventricular filling pattern, with concomitant abnormal relaxation and increased  filling pressure (grade 2 diastolic dysfunction)  RIGHT VENTRICLE: The size was normal  Systolic function was normal  Wall  thickness was normal     LEFT ATRIUM: The atrium was mildly dilated  RIGHT ATRIUM: Size was normal     MITRAL VALVE: There was mild to moderate annular calcification  Valve structure  was normal  There was mild-moderate diffuse thickening of the anterior and  posterior leaflets  There was mild-moderate calcification of the anterior and  posterior leaflets, with mild chordal involvement  There was lower normal  leaflet separation  DOPPLER: Transmitral velocity was increased due to valvular  stenosis  There was very mild stenosis  There was moderate regurgitation   The  regurgitant jet was centrally directed  AORTIC VALVE: DOPPLER: Transaortic velocity was increased due to valvular  stenosis  There was very mild stenosis  There was mild regurgitation  TRICUSPID VALVE: The valve structure was normal  There was normal leaflet  separation  DOPPLER: The transtricuspid velocity was within the normal range  There was no evidence for stenosis  There was trace regurgitation  The  tricuspid jet envelope definition was inadequate for estimation of RV systolic  pressure  There are no indirect findings (abnormal RV volume or geometry,  altered pulmonary flow velocity profile, or leftward septal displacement) which  would suggest moderate or severe pulmonary hypertension  PULMONIC VALVE: Not well visualized  DOPPLER: The transpulmonic velocity was  within the normal range  There was trace regurgitation  PERICARDIUM: There was no pericardial effusion  The pericardium was normal in  appearance  AORTA: The root exhibited normal size  SYSTEMIC VEINS: IVC: The inferior vena cava was normal in size      MEASUREMENT TABLES    DOPPLER MEASUREMENTS  Aortic valve   (Reference normals)  Peak carmel   2 2 cm/s   (--)  Mean carmel   1 4 cm/s   (--)  Peak gradient   19 1 mmHg   (--)  Mean gradient   9 06 mmHg   (--)  Valve area   1 5 cm squared   (--)  Valve area, cont   1 7 cm squared   (--)  Mitral valve   (Reference normals)  Mean gradient   428 mmHg   (--)  Pressure half-time   105 ms   (--)  Valve area, PHT   2 1 cm squared   (--)    SYSTEM MEASUREMENT TABLES    2D  %FS: 43 1 %  AV Diam: 2 8 cm  EDV(Teich): 93 8 ml  EF(Teich): 74 4 %  ESV(Teich): 24 ml  IVSd: 1 cm  LA Area: 19 9 cm2  LA Diam: 3 4 cm  LVEDV MOD A2C: 28 6 ml  LVEDV MOD A4C: 64 6 ml  LVEF MOD A4C: 59 8 %  LVESV MOD A4C: 26 ml  LVIDd: 4 5 cm  LVIDs: 2 6 cm  LVLd A2C: 6 3 cm  LVLd A4C: 7 1 cm  LVLs A4C: 6 cm  LVOT Diam: 1 9 cm  LVPWd: 0 9 cm  RA Area: 8 cm2  RV Diam : 2 9 cm  SI(Teich): 36 9 ml/m2  SV MOD A4C: 38 6 ml  SV(Cube): 75 8 ml  SV(Teich): 69 8 ml    CW  AR Dec [place]: 2 1 m/s2  AR Dec Time:  1 ms  AR PHT: 582 1 ms  AR Vmax: 4 2 m/s  AR maxP 4 mmHg  AV Env  Ti: 288 4 ms  AV VTI: 39 6 cm  AV Vmax: 2 2 m/s  AV Vmean: 1 4 m/s  AV maxP 1 mmHg  AV meanP 1 mmHg  MV VTI: 49 3 cm  MV Vmax: 1 8 m/s  MV Vmean: 1 m/s  MV maxP 5 mmHg  MV meanP 3 mmHg    PW  ZHAO (VTI): 1 7 cm2  ZHAO Vmax: 1 5 cm2  LVOT Env  Ti: 306 8 ms  LVOT VTI: 23 3 cm  LVOT Vmax: 1 1 m/s  LVOT Vmean: 0 8 m/s  LVOT maxP 2 mmHg  LVOT meanP 6 mmHg  MV A Jose Luis: 1 2 m/s  MV Dec [place]: 4 3 m/s2  MV DecT: 325 8 ms  MV E Jose Luis: 1 4 m/s  MV E/A Ratio: 1 2  MV PHT: 105 3 ms  MVA (VTI): 1 4 cm2  MVA By PHT: 2 1 cm2    [employer] Accredited Echocardiography Laboratory    Prepared and electronically signed by    Ameya Martin MD  Signed 2016 13:37:30       No results found for this or any previous visit  No results found for this or any previous visit  No results found for this or any previous visit          CXR: no actiave cp/disease on cxr    EKG/TELE: see discussion

## 2018-11-24 NOTE — ED ATTENDING ATTESTATION
Priyank Hassan DO, saw and evaluated the patient  I have discussed the patient with the resident/non-physician practitioner and agree with the resident's/non-physician practitioner's findings, Plan of Care, and MDM as documented in the resident's/non-physician practitioner's note, except where noted  All available labs and Radiology studies were reviewed  At this point I agree with the current assessment done in the Emergency Department  I have conducted an independent evaluation of this patient a history and physical is as follows:      79 yof with copd p/w sob  1 week  Cough, dyspnea  On arrival in SVT  Patient back to NSR with vagal maneuver  Non productive cough  No other asoc sx, including f/c edema  Exam c/w copd exacerbation with wheezing  A/P: copd, dyspnea  Cardiac workup, lytes given SVT, likely admit  Critical Care Time  The patient presented with a condition in which there was a high probability of imminent or life-threatening deterioration, and critical care services (excluding separately billable procedures) totalled 30-74 minutes (hypokalemia severe)          Procedures

## 2018-11-25 LAB
ANION GAP SERPL CALCULATED.3IONS-SCNC: 13 MMOL/L (ref 4–13)
BUN SERPL-MCNC: 26 MG/DL (ref 5–25)
CALCIUM SERPL-MCNC: 8.3 MG/DL (ref 8.3–10.1)
CHLORIDE SERPL-SCNC: 111 MMOL/L (ref 100–108)
CO2 SERPL-SCNC: 19 MMOL/L (ref 21–32)
CREAT SERPL-MCNC: 1.18 MG/DL (ref 0.6–1.3)
GFR SERPL CREATININE-BSD FRML MDRD: 48 ML/MIN/1.73SQ M
GLUCOSE SERPL-MCNC: 147 MG/DL (ref 65–140)
POTASSIUM SERPL-SCNC: 3.3 MMOL/L (ref 3.5–5.3)
SODIUM SERPL-SCNC: 143 MMOL/L (ref 136–145)

## 2018-11-25 PROCEDURE — 99232 SBSQ HOSP IP/OBS MODERATE 35: CPT | Performed by: INTERNAL MEDICINE

## 2018-11-25 PROCEDURE — 94760 N-INVAS EAR/PLS OXIMETRY 1: CPT

## 2018-11-25 PROCEDURE — 80048 BASIC METABOLIC PNL TOTAL CA: CPT | Performed by: INTERNAL MEDICINE

## 2018-11-25 PROCEDURE — 94640 AIRWAY INHALATION TREATMENT: CPT

## 2018-11-25 RX ORDER — POTASSIUM CHLORIDE 20 MEQ/1
40 TABLET, EXTENDED RELEASE ORAL ONCE
Status: COMPLETED | OUTPATIENT
Start: 2018-11-25 | End: 2018-11-25

## 2018-11-25 RX ORDER — POTASSIUM CHLORIDE AND SODIUM CHLORIDE 900; 300 MG/100ML; MG/100ML
75 INJECTION, SOLUTION INTRAVENOUS CONTINUOUS
Status: DISCONTINUED | OUTPATIENT
Start: 2018-11-25 | End: 2018-11-26 | Stop reason: HOSPADM

## 2018-11-25 RX ORDER — SODIUM CHLORIDE AND POTASSIUM CHLORIDE .9; .15 G/100ML; G/100ML
75 SOLUTION INTRAVENOUS CONTINUOUS
Status: DISCONTINUED | OUTPATIENT
Start: 2018-11-25 | End: 2018-11-25

## 2018-11-25 RX ADMIN — HEPARIN SODIUM 5000 UNITS: 5000 INJECTION INTRAVENOUS; SUBCUTANEOUS at 05:35

## 2018-11-25 RX ADMIN — IPRATROPIUM BROMIDE 0.5 MG: 0.5 SOLUTION RESPIRATORY (INHALATION) at 13:42

## 2018-11-25 RX ADMIN — LORATADINE 10 MG: 10 TABLET ORAL at 08:04

## 2018-11-25 RX ADMIN — ESCITALOPRAM OXALATE 10 MG: 10 TABLET ORAL at 08:05

## 2018-11-25 RX ADMIN — DILTIAZEM HYDROCHLORIDE 120 MG: 120 CAPSULE, COATED, EXTENDED RELEASE ORAL at 11:22

## 2018-11-25 RX ADMIN — METHYLPREDNISOLONE SODIUM SUCCINATE 20 MG: 40 INJECTION, POWDER, FOR SOLUTION INTRAMUSCULAR; INTRAVENOUS at 11:19

## 2018-11-25 RX ADMIN — GABAPENTIN 300 MG: 300 CAPSULE ORAL at 21:40

## 2018-11-25 RX ADMIN — GUAIFENESIN 200 MG: 200 SOLUTION ORAL at 15:28

## 2018-11-25 RX ADMIN — METHYLPREDNISOLONE SODIUM SUCCINATE 20 MG: 40 INJECTION, POWDER, FOR SOLUTION INTRAMUSCULAR; INTRAVENOUS at 03:37

## 2018-11-25 RX ADMIN — IPRATROPIUM BROMIDE 0.5 MG: 0.5 SOLUTION RESPIRATORY (INHALATION) at 07:30

## 2018-11-25 RX ADMIN — ATORVASTATIN CALCIUM 20 MG: 10 TABLET, FILM COATED ORAL at 17:25

## 2018-11-25 RX ADMIN — LEVALBUTEROL 1.25 MG: 1.25 SOLUTION, CONCENTRATE RESPIRATORY (INHALATION) at 19:00

## 2018-11-25 RX ADMIN — LEVALBUTEROL 1.25 MG: 1.25 SOLUTION, CONCENTRATE RESPIRATORY (INHALATION) at 07:30

## 2018-11-25 RX ADMIN — METHYLPREDNISOLONE SODIUM SUCCINATE 20 MG: 40 INJECTION, POWDER, FOR SOLUTION INTRAMUSCULAR; INTRAVENOUS at 20:41

## 2018-11-25 RX ADMIN — POTASSIUM CHLORIDE AND SODIUM CHLORIDE 75 ML/HR: 900; 300 INJECTION, SOLUTION INTRAVENOUS at 11:18

## 2018-11-25 RX ADMIN — SODIUM CHLORIDE 75 ML/HR: 0.9 INJECTION, SOLUTION INTRAVENOUS at 02:47

## 2018-11-25 RX ADMIN — HEPARIN SODIUM 5000 UNITS: 5000 INJECTION INTRAVENOUS; SUBCUTANEOUS at 21:40

## 2018-11-25 RX ADMIN — IPRATROPIUM BROMIDE 0.5 MG: 0.5 SOLUTION RESPIRATORY (INHALATION) at 19:00

## 2018-11-25 RX ADMIN — LEVALBUTEROL 1.25 MG: 1.25 SOLUTION, CONCENTRATE RESPIRATORY (INHALATION) at 13:42

## 2018-11-25 RX ADMIN — NICOTINE 1 PATCH: 14 PATCH, EXTENDED RELEASE TRANSDERMAL at 08:05

## 2018-11-25 RX ADMIN — POTASSIUM CHLORIDE 40 MEQ: 1500 TABLET, EXTENDED RELEASE ORAL at 11:18

## 2018-11-25 RX ADMIN — HEPARIN SODIUM 5000 UNITS: 5000 INJECTION INTRAVENOUS; SUBCUTANEOUS at 13:27

## 2018-11-25 RX ADMIN — GUAIFENESIN 200 MG: 200 SOLUTION ORAL at 21:40

## 2018-11-25 NOTE — PROGRESS NOTES
Progress Note - Mendel Barlow 79 y o  female MRN: [id number]    Unit/Bed#: E4 -01 Encounter: [id number]    Assessment/Plan:  Acute respiratory distress  related to COPD exacerbation/bronchitis, improving with nebulizer treatments and steroids    COPD exacerbation   appears related to bronchitis improving with nebulizer treatments and steroids    A KI     appears pre renal with dehydration, improving with IV fluids continue IV fluids times 24 hours    Hypokalemia    add potassium to IV fluids with p o  Supplementation    Tobacco abuse   will stop tobacco usage for ever continue nicotine patch    Hypertension    Stable    History of SVT   during ablations    Subjective:   Feels much better, less short of breath and cough denies chest pain nausea vomiting diarrhea no fevers chills appetite is okay    Objective:     Vitals: Blood pressure 106/53, pulse 68, temperature (!) 96 °F (35 6 °C), temperature source Tympanic, resp  rate 18, height 5' 4" (1 626 m), weight 76 7 kg (169 lb 1 5 oz), SpO2 99 %, not currently breastfeeding  ,Body mass index is 29 02 kg/m²  Results from last 7 days  Lab Units 11/23/18  1435   WBC Thousand/uL 13 69*   HEMOGLOBIN g/dL 14 6   HEMATOCRIT % 43 4   PLATELETS Thousands/uL 371   INR  1 08       Results from last 7 days  Lab Units 11/25/18  0557  11/23/18  1435   POTASSIUM mmol/L 3 3*  < > 2 2*   CHLORIDE mmol/L 111*  < > 102   CO2 mmol/L 19*  < > 24   BUN mg/dL 26*  < > 25   CREATININE mg/dL 1 18  < > 1 63*   CALCIUM mg/dL 8 3  < > 10 6*   ALK PHOS U/L  --   --  119*   ALT U/L  --   --  47   AST U/L  --   --  26   < > = values in this interval not displayed      Scheduled Meds:    Current Facility-Administered Medications:  acetaminophen 650 mg Oral Q6H PRN Opal Ambron, DO   albuterol 2 puff Inhalation Q4H PRN Opal Ambron, DO   atorvastatin 20 mg Oral QPM Alba Monaco PA-C   diltiazem 120 mg Oral Daily Pennelope Kayser, MD   escitalopram 10 mg Oral Daily Alba MENDEZ SURY Monaco   gabapentin 300 mg Oral HS Alba Monaco PA-C   guaiFENesin 200 mg Oral Q4H PRN Opal Ambron, DO   heparin (porcine) 5,000 Units Subcutaneous Count includes the [facility] Alba Monaco PA-C   hydrALAZINE 10 mg Intravenous Q6H PRN Alba Monaco PA-C   ipratropium 0 5 mg Nebulization TID Opal Ambron, DO   ipratropium 0 5 mg Nebulization Q6H PRN Opal Ambron, DO   levalbuterol 1 25 mg Nebulization TID Opal Ambron, DO   levalbuterol 1 25 mg Nebulization Q6H PRN Opal Ambron, DO   loratadine 10 mg Oral Daily Alba Monaco PA-C   methylPREDNISolone sodium succinate 20 mg Intravenous Q8H Opal Ambron, DO   nicotine 1 patch Transdermal Daily Alba Monaco PA-C   potassium chloride 40 mEq Oral Once Wei Dominik, DO   sodium chloride 0 9 % with KCl 40 mEq/L 75 mL/hr Intravenous Continuous Wei Dominik, DO       Continuous Infusions:    sodium chloride 0 9 % with KCl 40 mEq/L 75 mL/hr      Physical exam:  General appearance:  Alert no distress interaction appropriate  Head/Eyes:  Nonicteric PERRL EOMI  Neck:  Supple  Lungs:  Decreased BS bilateral no wheezing rhonchi or rales  Heart: normal S1 S2 regular  Abdomen: Soft nontender with bowel sounds  Extremities: no edema  Skin: no rash    Invasive Devices     Peripheral Intravenous Line            Peripheral IV 11/23/18 Right Antecubital 2 days                  VTE Pharmacologic Prophylaxis:  heparin   VTE Mechanical Prophylaxis:  SCDs                     Counseling / Coordination of Care  Total floor / unit time spent today  30   minutes  Greater than 50% of total time was spent with the patient and / or family counseling and / or coordination of care    A description of the counseling / coordination of care:

## 2018-11-26 ENCOUNTER — TRANSITIONAL CARE MANAGEMENT (OUTPATIENT)
Dept: FAMILY MEDICINE CLINIC | Facility: CLINIC | Age: 68
End: 2018-11-26

## 2018-11-26 VITALS
WEIGHT: 169.09 LBS | BODY MASS INDEX: 28.87 KG/M2 | SYSTOLIC BLOOD PRESSURE: 131 MMHG | HEART RATE: 67 BPM | HEIGHT: 64 IN | DIASTOLIC BLOOD PRESSURE: 72 MMHG | TEMPERATURE: 97.2 F | OXYGEN SATURATION: 95 % | RESPIRATION RATE: 18 BRPM

## 2018-11-26 PROBLEM — N17.9 AKI (ACUTE KIDNEY INJURY) (HCC): Status: RESOLVED | Noted: 2018-11-23 | Resolved: 2018-11-26

## 2018-11-26 PROBLEM — E87.6 HYPOKALEMIA: Status: RESOLVED | Noted: 2018-11-23 | Resolved: 2018-11-26

## 2018-11-26 PROBLEM — I47.1 SVT (SUPRAVENTRICULAR TACHYCARDIA) (HCC): Status: RESOLVED | Noted: 2018-11-23 | Resolved: 2018-11-26

## 2018-11-26 PROBLEM — I47.10 SVT (SUPRAVENTRICULAR TACHYCARDIA): Status: RESOLVED | Noted: 2018-11-23 | Resolved: 2018-11-26

## 2018-11-26 PROBLEM — D72.829 LEUKOCYTOSIS: Status: RESOLVED | Noted: 2018-11-23 | Resolved: 2018-11-26

## 2018-11-26 LAB
ANION GAP SERPL CALCULATED.3IONS-SCNC: 14 MMOL/L (ref 4–13)
BACTERIA SPT RESP CULT: ABNORMAL
BACTERIA SPT RESP CULT: ABNORMAL
BUN SERPL-MCNC: 25 MG/DL (ref 5–25)
CALCIUM SERPL-MCNC: 8 MG/DL (ref 8.3–10.1)
CHLORIDE SERPL-SCNC: 112 MMOL/L (ref 100–108)
CO2 SERPL-SCNC: 16 MMOL/L (ref 21–32)
CREAT SERPL-MCNC: 1.06 MG/DL (ref 0.6–1.3)
GFR SERPL CREATININE-BSD FRML MDRD: 55 ML/MIN/1.73SQ M
GLUCOSE SERPL-MCNC: 142 MG/DL (ref 65–140)
GRAM STN SPEC: ABNORMAL
MAGNESIUM SERPL-MCNC: 1.5 MG/DL (ref 1.6–2.6)
POTASSIUM SERPL-SCNC: 3.8 MMOL/L (ref 3.5–5.3)
SODIUM SERPL-SCNC: 142 MMOL/L (ref 136–145)

## 2018-11-26 PROCEDURE — 94640 AIRWAY INHALATION TREATMENT: CPT

## 2018-11-26 PROCEDURE — 94760 N-INVAS EAR/PLS OXIMETRY 1: CPT

## 2018-11-26 PROCEDURE — 80048 BASIC METABOLIC PNL TOTAL CA: CPT | Performed by: INTERNAL MEDICINE

## 2018-11-26 PROCEDURE — 83735 ASSAY OF MAGNESIUM: CPT | Performed by: INTERNAL MEDICINE

## 2018-11-26 PROCEDURE — 99239 HOSP IP/OBS DSCHRG MGMT >30: CPT | Performed by: HOSPITALIST

## 2018-11-26 RX ORDER — DILTIAZEM HYDROCHLORIDE 120 MG/1
120 CAPSULE, COATED, EXTENDED RELEASE ORAL DAILY
Qty: 30 CAPSULE | Refills: 0 | Status: SHIPPED | OUTPATIENT
Start: 2018-11-27 | End: 2018-12-19 | Stop reason: HOSPADM

## 2018-11-26 RX ORDER — AZITHROMYCIN 250 MG/1
500 TABLET, FILM COATED ORAL EVERY 24 HOURS
Status: DISCONTINUED | OUTPATIENT
Start: 2018-11-26 | End: 2018-11-26 | Stop reason: HOSPADM

## 2018-11-26 RX ORDER — NICOTINE 21 MG/24HR
1 PATCH, TRANSDERMAL 24 HOURS TRANSDERMAL DAILY
Qty: 28 PATCH | Refills: 0 | Status: SHIPPED | OUTPATIENT
Start: 2018-11-26 | End: 2019-01-08 | Stop reason: ALTCHOICE

## 2018-11-26 RX ORDER — GUAIFENESIN 100 MG/5ML
200 SOLUTION ORAL EVERY 4 HOURS PRN
Qty: 236 ML | Refills: 0 | Status: SHIPPED | OUTPATIENT
Start: 2018-11-26 | End: 2019-01-08 | Stop reason: ALTCHOICE

## 2018-11-26 RX ORDER — MAGNESIUM SULFATE HEPTAHYDRATE 40 MG/ML
2 INJECTION, SOLUTION INTRAVENOUS ONCE
Status: DISCONTINUED | OUTPATIENT
Start: 2018-11-26 | End: 2018-11-26

## 2018-11-26 RX ORDER — AZITHROMYCIN 500 MG/1
500 TABLET, FILM COATED ORAL EVERY 24 HOURS
Qty: 4 TABLET | Refills: 0 | Status: SHIPPED | OUTPATIENT
Start: 2018-11-27 | End: 2018-12-01

## 2018-11-26 RX ORDER — PREDNISONE 10 MG/1
TABLET ORAL
Qty: 18 TABLET | Refills: 0 | Status: SHIPPED | OUTPATIENT
Start: 2018-11-26 | End: 2019-01-08

## 2018-11-26 RX ADMIN — AZITHROMYCIN 500 MG: 250 TABLET, FILM COATED ORAL at 15:18

## 2018-11-26 RX ADMIN — GUAIFENESIN 200 MG: 200 SOLUTION ORAL at 05:23

## 2018-11-26 RX ADMIN — HEPARIN SODIUM 5000 UNITS: 5000 INJECTION INTRAVENOUS; SUBCUTANEOUS at 05:24

## 2018-11-26 RX ADMIN — LORATADINE 10 MG: 10 TABLET ORAL at 09:24

## 2018-11-26 RX ADMIN — ESCITALOPRAM OXALATE 10 MG: 10 TABLET ORAL at 09:24

## 2018-11-26 RX ADMIN — MAGNESIUM SULFATE HEPTAHYDRATE 2 G: 40 INJECTION, SOLUTION INTRAVENOUS at 11:03

## 2018-11-26 RX ADMIN — METHYLPREDNISOLONE SODIUM SUCCINATE 20 MG: 40 INJECTION, POWDER, FOR SOLUTION INTRAMUSCULAR; INTRAVENOUS at 05:23

## 2018-11-26 RX ADMIN — NICOTINE 1 PATCH: 14 PATCH, EXTENDED RELEASE TRANSDERMAL at 09:24

## 2018-11-26 RX ADMIN — POTASSIUM CHLORIDE AND SODIUM CHLORIDE 75 ML/HR: 900; 300 INJECTION, SOLUTION INTRAVENOUS at 01:13

## 2018-11-26 RX ADMIN — LEVALBUTEROL 1.25 MG: 1.25 SOLUTION, CONCENTRATE RESPIRATORY (INHALATION) at 08:22

## 2018-11-26 RX ADMIN — ALBUTEROL SULFATE 2 PUFF: 90 AEROSOL, METERED RESPIRATORY (INHALATION) at 00:02

## 2018-11-26 RX ADMIN — MAGNESIUM OXIDE TAB 400 MG (241.3 MG ELEMENTAL MG) 400 MG: 400 (241.3 MG) TAB at 15:18

## 2018-11-26 RX ADMIN — DILTIAZEM HYDROCHLORIDE 120 MG: 120 CAPSULE, COATED, EXTENDED RELEASE ORAL at 09:24

## 2018-11-26 RX ADMIN — IPRATROPIUM BROMIDE 0.5 MG: 0.5 SOLUTION RESPIRATORY (INHALATION) at 14:09

## 2018-11-26 RX ADMIN — IPRATROPIUM BROMIDE 0.5 MG: 0.5 SOLUTION RESPIRATORY (INHALATION) at 08:22

## 2018-11-26 RX ADMIN — LEVALBUTEROL 1.25 MG: 1.25 SOLUTION, CONCENTRATE RESPIRATORY (INHALATION) at 14:10

## 2018-11-26 NOTE — ASSESSMENT & PLAN NOTE
· W/diarrhea now resolved      · With concomitant hypomagnesemia  · Oral magnesium replacement today  · Recheck BMP and magnesium level as outpatient on 11/29

## 2018-11-26 NOTE — DISCHARGE INSTR - AVS FIRST PAGE
Follow up with your primary care physician  You should have your labs drawn on Thursday, 11/29 to recheck your potassium and magnesium levels  Follow up with Cardiology as scheduled  Take your medications as prescribed    You may consider following up with a pulmonologist

## 2018-11-26 NOTE — ASSESSMENT & PLAN NOTE
· With acute exacerbation  · Treated with nebulizers and solumedrol  · Improved with minimal residual dyspnea  · On room air  · Recommended follow up with pulmonology for PFT's after clinically improved

## 2018-11-26 NOTE — ASSESSMENT & PLAN NOTE
· Brief episode of SVT noted by ED provider   · Broken with use of vasovagal maneuver in setting of hypokalemia / hypomagnesemia  · Patient has these episodes at home  · On beta blocker in the past, due to COPD, Cardiology initiated therapy with diltiazem  · Patient to follow up with EP for ablation

## 2018-11-26 NOTE — ASSESSMENT & PLAN NOTE
· Likely be a poor oral intake and diarrhea w/ BUN creatinine ratio   · baseline creatinine appears to be 0 9-1 0  · Improved with IV fluid hydration

## 2018-11-26 NOTE — DISCHARGE SUMMARY
[facility]  Discharge- Jenny Plata 1950, 79 y o  female MRN: [id number]    Unit/Bed#: E4 -01 Encounter: [id number]    Primary Care Provider: Sarwat Ribeiro DO   Date and time admitted to hospital: 11/23/2018  3:26 PM        * Bronchitis   Assessment & Plan    · Infectious likely viral     · CXR negative  · Influenza / RSV negative  · Procalcitonin <0 05  · Afebrile  · Leukocytosis present  · Sputum culture with +4 growth of Moraxella catarrhalis  · Will treat empirically with course of azithromycin     Pulmonary emphysema (HCC)   Assessment & Plan    · With acute exacerbation  · Treated with nebulizers and solumedrol  · Improved with minimal residual dyspnea  · On room air  · Recommended follow up with pulmonology for PFT's after clinically improved       Irritable bowel syndrome with diarrhea   Assessment & Plan    · Pt reports episodic diarrhea which is watert up to 5-6 episodes/day      · She notes most recent episode has been resolved for 2 days  · Hx of recurrent C diff     Leukocytosisresolved as of 11/26/2018   Assessment & Plan    · Likely in the setting of bronchitis  Check procalcitonin monitor off antibiotics at this time as this is likely viral  Monitor vital signs       Essential hypertensionresolved as of 11/26/2018   Assessment & Plan    · Pt denies hx of off any meds  · Start hydralazine 10mg prn sbp >160mmHg     SVT (supraventricular tachycardia) (HCC)resolved as of 11/26/2018   Assessment & Plan    · Brief episode of SVT noted by ED provider   · Broken with use of vasovagal maneuver in setting of hypokalemia / hypomagnesemia  · Patient has these episodes at home  · On beta blocker in the past, due to COPD, Cardiology initiated therapy with diltiazem  · Patient to follow up with EP for ablation     JANELLE (acute kidney injury) (HCC)resolved as of 11/26/2018   Assessment & Plan    · Likely be a poor oral intake and diarrhea w/ BUN creatinine ratio   · baseline creatinine appears to be 0 9-1 0  · Improved with IV fluid hydration     Hypokalemiaresolved as of 11/26/2018   Assessment & Plan    · W/diarrhea now resolved  · With concomitant hypomagnesemia  · Oral magnesium replacement today  · Recheck BMP and magnesium level as outpatient on 11/29             Discharging Physician / Practitioner: LIANG Dudley  PCP: Roxana Landa DO  Admission Date:   Admission Orders     Ordered        11/23/18 1752  Inpatient Admission (expected length of stay for this patient is greater than two midnights)  Once             Discharge Date: 11/26/18    Resolved Problems  Date Reviewed: 11/26/2018          Resolved    Essential hypertension 11/26/2018     Resolved by  LIANG Dudley    Leukocytosis 11/26/2018     Resolved by  Erika Marshall, LIANG    Hypokalemia 11/26/2018     Resolved by  LIANG Dudley    JANELLE (acute kidney injury) ([facility] ) 11/26/2018     Resolved by  Erika Marshall, LIANG    SVT (supraventricular tachycardia) ([facility] ) 11/26/2018     Resolved by  Erika Marshall, [facility] Stay:  · Electrophysiology    Procedures Performed:     · CXR, 11/23:  No acute cardiopulmonary disease    Significant Findings / Test Results:     · Sputum culture with +4 growth of Moraxella catarrhalis  · Severe hypokalemia, 2 2  · Acute kidney injury, creatinine of 1 63  · Hypermagnesemia, 1 3  · Episode of SVT  · Leukocytosis    Incidental Findings:   · None     Test Results Pending at Discharge (will require follow up): · None     Outpatient Tests Requested:  · BMP and magnesium level on Thursday, November 67RL    Complications:  None    Reason for Admission:  Acute bronchitis    Hospital Course:     Arline Swan is a 79 y o  female patient who originally presented to the hospital on 11/23/2018 due to shortness of breath    The patient initially had symptoms to include rhinorrhea, productive cough with yellow sputum, and malaise  She also endorsed poor appetite and an acute loss of weight  Patient did report a history of COPD although mild and only utilizes a rescue inhaler as needed  She is a current smoker  While in the emergency room, patient had a brief after episode of SVT  She performed a vagal maneuver which terminated her SVT  She reports having these episodes frequently at home and performs Valsalva maneuver to break the tachyarrhythmia  Patient had been on a beta-blocker in the past   Her workup in the emergency room revealed a negative chest x-ray, negative influenza and RSV screen, leukocytosis, profound hypokalemia at 2 2 and magnesemia at 1 3, and also acute kidney injury  She was admitted for further medical management  The patient was hydrated with IV fluids, given IV Solu-Medrol, and nebulizer treatments in the form of Xopenex and Atrovent  A consultation with electrophysiology was requested and they initiated therapy with diltiazem  They also recommended outpatient follow-up for an ablation  The patient's labs were monitored while here and her creatinine did eventually normalize  She received electrolyte replacement and her basic metabolic panels were monitored as well  Her potassium normalized  Her magnesium normalized but today was mildly decreased again  Oral magnesium was slurred  The patient will have a repeat basic metabolic panel as an outpatient on Thursday of this week  The sputum culture was collected which resulted today with a +4 growth of Moraxella catarrhalis  In light of her hospitalization due to her bronchitis and underlying lung disease, a decision was made to treat her empirically with azithromycin  Recommendations were given to the patient to follow up with pulmonology as an outpatient as well  She felt well enough today to be discharged home  She was afebrile throughout her stay  She remained on room air throughout her stay  She was able to ambulate without difficulty  She reported only minimal residual shortness of breath  She is discharged home today with a tapering prednisone course and instructed to follow up with her primary care physician within 1 week  She has Cardiology follow-up scheduled for possible ablation  Please see above list of diagnoses and related plan for additional information  Condition at Discharge: stable     Discharge Day Visit / Exam:     Subjective:  Patient feels close to baseline with her respiratory status  She feels well enough to be discharged home  Vitals: Blood Pressure: 131/72 (11/26/18 1157)  Pulse: 67 (11/26/18 1157)  Temperature: (!) 97 2 °F (36 2 °C) (11/26/18 0804)  Temp Source: Tympanic (11/26/18 0804)  Respirations: 18 (11/26/18 1157)  Height: 5' 4" (162 6 cm) (Stated ) (11/23/18 1834)  Weight - Scale: 76 7 kg (169 lb 1 5 oz) (11/23/18 1834)  SpO2: 95 % (11/26/18 1410)  Exam:   Physical Exam   Constitutional: She is oriented to person, place, and time  She appears well-developed and well-nourished  No distress  HENT:   Head: Normocephalic and atraumatic  Eyes: Conjunctivae are normal  No scleral icterus  Cardiovascular: Normal rate, regular rhythm and intact distal pulses  No murmur heard  Pulmonary/Chest: Effort normal and breath sounds normal  No respiratory distress  She has no wheezes  She has no rales  Abdominal: Soft  Bowel sounds are normal  She exhibits no distension  There is no tenderness  Musculoskeletal: Normal range of motion  She exhibits no edema or tenderness  Neurological: She is alert and oriented to person, place, and time  Skin: Skin is warm and dry  She is not diaphoretic  Psychiatric: She has a normal mood and affect  Her behavior is normal    Nursing note and vitals reviewed  Discussion with Family:  Plan of care with patient    Discharge instructions/Information to patient and family:   See after visit summary for information provided to patient and family        Provisions for Follow-Up Care:  See after visit summary for information related to follow-up care and any pertinent home health orders  Disposition:     Home    For Discharges to [facility] SNF:   · Not Applicable to this Patient - Not Applicable to this Patient    Planned Readmission:  None     Discharge Statement:  I spent 60 minutes discharging the patient  This time was spent on the day of discharge  I had direct contact with the patient on the day of discharge  Greater than 50% of the total time was spent examining patient, answering all patient questions, arranging and discussing plan of care with patient as well as directly providing post-discharge instructions  Additional time then spent on discharge activities  Discharge Medications:  See after visit summary for reconciled discharge medications provided to patient and family        ** Please Note: This note has been constructed using a voice recognition system **

## 2018-11-26 NOTE — ASSESSMENT & PLAN NOTE
· Pt reports episodic diarrhea which is watert up to 5-6 episodes/day      · She notes most recent episode has been resolved for 2 days  · Hx of recurrent C diff

## 2018-11-26 NOTE — ASSESSMENT & PLAN NOTE
· Infectious likely viral     · CXR negative  · Influenza / RSV negative  · Procalcitonin <0 05  · Afebrile  · Leukocytosis present  · Sputum culture with +4 growth of Moraxella catarrhalis  · Will treat empirically with course of azithromycin

## 2018-11-26 NOTE — DISCHARGE INSTRUCTIONS
Supraventricular Tachycardia   WHAT YOU NEED TO KNOW:   Supraventricular tachycardia (SVT) is a condition that causes your heart to beat much faster than it should  SVT is a type of abnormal heart rhythm, called an arrhythmia, that starts in the upper part of your heart  It may last a few seconds or hours to several days  DISCHARGE INSTRUCTIONS:   Call 911 for the following:   · You have any of the following signs of a heart attack:      ¨ Squeezing, pressure, or pain in your chest that lasts longer than 5 minutes or returns    ¨ Discomfort or pain in your back, neck, jaw, stomach, or arm     ¨ Trouble breathing    ¨ Nausea or vomiting    ¨ Lightheadedness or a sudden cold sweat, especially with chest pain or trouble breathing    Seek care immediately if:   · You have dizziness, lightheadedness, or feel faint  · You have sudden numbness or weakness in your arms or legs  Contact your healthcare provider if:   · Your symptoms get worse, or you have new symptoms  · You have swelling in your ankles or feet  · You have questions or concerns about your condition or care  Medicines:   · Medicines  can help control your heart rate and rhythm  You may need more than one medicine to treat your symptoms  · Take your medicine as directed  Contact your healthcare provider if you think your medicine is not helping or if you have side effects  Tell him or her if you are allergic to any medicine  Keep a list of the medicines, vitamins, and herbs you take  Include the amounts, and when and why you take them  Bring the list or the pill bottles to follow-up visits  Carry your medicine list with you in case of an emergency  How to manage or prevent SVT:   · Perform vagal maneuvers as directed when you have symptoms of SVT  Lie down flat and bear down like you are having a bowel movement  Do this for 10 to 30 seconds  · Do not drink caffeine or alcohol  These can increase your risk for SVT      · Keep a record of your symptoms  Write down what you ate or what you were doing before an episode of SVT  Also write down anything you did to make the SVT stop  Bring your record to follow up visits with your healthcare provider  · Eat heart-healthy foods  These include fruits, vegetables, whole-grain breads, low-fat dairy products, beans, lean meats, and fish  Replace butter and margarine with heart-healthy oils such as olive oil and canola oil  · Exercise regularly and maintain a healthy weight  Ask about the best exercise plan for you  Ask your healthcare provider how much you should weigh  Ask him to help you create a weight loss plan if you are overweight  · Do not smoke  Nicotine and other chemicals in cigarettes and cigars can cause heart and lung damage  Ask your healthcare provider for information if you currently smoke and need help to quit  E-cigarettes or smokeless tobacco still contain nicotine  Talk to your healthcare provider before you use these products  Follow up with your healthcare provider as directed:  Write down your questions so you remember to ask them during your visits  © 2017 [facility] Information is for End User's use only and may not be sold, redistributed or otherwise used for commercial purposes  All illustrations and images included in CareNotes® are the copyrighted property of A D A M , Inc  or [employer]  The above information is an  only  It is not intended as medical advice for individual conditions or treatments  Talk to your doctor, nurse or pharmacist before following any medical regimen to see if it is safe and effective for you  How to Quit Using Smokeless Tobacco   WHAT YOU NEED TO KNOW:   Smokeless tobacco comes in many forms  Examples include chew, snuff, dip, dissolvable tobacco, and snus  All smokeless tobacco products contain nicotine and may contain as much nicotine as 3 cigarettes   You may be physically dependent on nicotine  You may also be emotionally addicted to it  The cravings can be strong, but it is important to quit using smokeless tobacco  You will improve your health and decrease your cancer, stroke, and heart attack risk  Mouth sores and tooth problems will also improve when you quit  You can benefit from quitting no matter how long you have used smokeless tobacco    DISCHARGE INSTRUCTIONS:   Prepare to stop using smokeless tobacco:  Nicotine is a highly addictive drug  Withdrawal symptoms can happen when you stop and make it hard to quit  The following can help keep you on track:  · Set a quit date  If possible, set the date about 3 to 4 weeks in the future  This will help you prepare your home and routine for the change  Do not set the date too far away  You might change your mind or lose your resolve to quit  Know the triggers that tempt you, and make a plan to avoid them  · Tell friends, family, and coworkers that you plan to quit  Explain that you may have withdrawal symptoms when you quit  Ask them to support you  They may be able to encourage you and help reduce your stress to make it easier for you to quit  Ask them not to use any tobacco products around you  Do not allow them to use tobacco products in your home or car  · Remove all smokeless tobacco products from your home, car, and workplace  Remove anything else that will tempt you  Tools that can help you quit:   · Counseling  from a healthcare provider can provide you with support and skills to quit  The counselor can also teach you to manage your withdrawal symptoms and cravings  He may help you learn methods such as meditation that can help you feel less anxious or jittery  You may receive counseling from one counselor, in group therapy, or through phone therapy called a quit line  · Taper down  means you use less smokeless tobacco each day until your body no longer craves the nicotine   You can also reduce the number of places you use it or use a different kind that has less nicotine  Wait as long as possible before you use smokeless tobacco when you have a craving  You may be able to increase the amount of time you can wait after each craving  This will help decrease the amount you use and the number of cravings each day  · Nicotine replacement therapy (NRT)  such as patches, gum, or lozenges may help reduce your nicotine cravings and withdrawal symptoms  NRT is available without a doctor's order  Follow directions so you do not get too much nicotine  An overdose can be life-threatening  Do not switch to cigarettes as a way to quit using smokeless tobacco  If you are pregnant or have heart disease, talk to your healthcare provider before you start NRT  He may recommend other ways to quit, or he may want you to come in for follow-up visits while you use NRT  · Prescription medicines  such as nasal sprays or nicotine inhalers may help reduce your withdrawal symptoms  Ask your healthcare provider about these and other medicines to help reduce cravings  You may need to start certain medicines 2 weeks before your quit date for them to work well  · Chew sugarless gum or sunflower seeds  as a substitute for smokeless tobacco   Manage weight gain after you quit:  Nicotine can affect your metabolism  You may gain a few pounds after you quit  The following can help you control your weight:  · Eat healthy foods  Healthy foods include fruits, vegetables, whole-grain breads, low-fat dairy products, beans, lean meats, and fish  You may also find it helpful to chew sugarless gum or eat healthy snacks  · Drink water before, during, and between meals  This will make your stomach feel full and help prevent you from overeating  Ask your healthcare provider how much liquid to drink each day and which liquids are best for you  · Exercise as directed    Exercise may help reduce cravings and stress from nicotine withdrawal  Take a walk or do some kind of exercise every day  © 2017 [facility] Information is for End User's use only and may not be sold, redistributed or otherwise used for commercial purposes  All illustrations and images included in CareNotes® are the copyrighted property of A D A M , Inc  or [employer]  The above information is an  only  It is not intended as medical advice for individual conditions or treatments  Talk to your doctor, nurse or pharmacist before following any medical regimen to see if it is safe and effective for you

## 2018-11-26 NOTE — NURSING NOTE
Reviewed and discussed pt discharge in structions  RN educated pt on her inahlation therapy with teachback  Pt able to tell RN how to do her inhalers  RN reviewed medications and educated pt on abx and steriods, dosages and sideffects  Pt able to repeat directions in an informative response  Reviewed and discussed f/u appts

## 2018-11-26 NOTE — ASSESSMENT & PLAN NOTE
· Likely in the setting of bronchitis  Check procalcitonin monitor off antibiotics at this time as this is likely viral  Monitor vital signs

## 2018-11-27 ENCOUNTER — PATIENT OUTREACH (OUTPATIENT)
Dept: CASE MANAGEMENT | Facility: HOSPITAL | Age: 68
End: 2018-11-27

## 2018-11-27 NOTE — PHYSICIAN ADVISOR
Current patient class: Inpatient  The patient is currently on Hospital Day: 4 at [facility]      The patient was admitted to the hospital at [id number] on 11/23/18 for the following diagnosis:  Hypokalemia [E87 6]  SVT (supraventricular tachycardia) (HCC) [I47 1]  Malaise [R53 81]  SOB (shortness of breath) [R06 02]  Acute exacerbation of chronic obstructive pulmonary disease (COPD) ([facility] ) [J44 1]  JANELLE (acute kidney injury) ([facility] ) [N17 9]       There is documentation in the medical record of an expected length of stay of at least 2 midnights  The patient is therefore expected to satisfy the 2 midnight benchmark and given the 2 midnight presumption is appropriate for INPATIENT ADMISSION  Given this expectation of a satisfying stay, CMS instructs us that the patient is most often appropriate for inpatient admission under part A provided medical necessity is documented in the chart  After review of the relevant documentation, labs, vital signs and test results, the patient is appropriate for INPATIENT ADMISSION  Admission to the hospital as an inpatient is a complex decision making process which requires the practitioner to consider the patients presenting complaint, history and physical examination and all relevant testing  With this in mind, in this case, the patient was deemed appropriate for INPATIENT ADMISSION  After review of the documentation and testing available at the time of the admission I concur with this clinical determination of medical necessity  Rationale is as follows: The patient is a 79 yrs old Female who presented to the ED at 11/23/2018  3:26 PM with a chief complaint of Shortness of Breath (Started 9days ago, has shortness of breath with exertion )     Given the need for further hospitalization, and along with the documentation of medical necessity present in the chart, the patient is appropriate for inpatient admission    The patient is expected to satisfy the 2 midnight benchmark, and will require further acute medical care  The patient does have comorbid conditions which increases the risk for significant adverse outcome  Given this the patient is appropriate for inpatient admission  The patients vitals on arrival were ED Triage Vitals   Temperature Pulse Respirations Blood Pressure SpO2   11/23/18 1414 11/23/18 1414 11/23/18 1414 11/23/18 1414 11/23/18 1414   97 6 °F (36 4 °C) 90 20 143/70 96 %      Temp Source Heart Rate Source Patient Position - Orthostatic VS BP Location FiO2 (%)   11/23/18 1414 11/23/18 1600 11/23/18 1414 11/23/18 1414 --   Oral Monitor Sitting Right arm       Pain Score       11/23/18 1414       No Pain           Past Medical History:   Diagnosis Date    Anxiety     Bursitis of hip     Last Assessed:6/5/2017    Depression     Herniated lumbar intervertebral disc     Hyperlipidemia     Hypertension     Hypokalemia     Pituitary adenoma ([address] )     Last Assessed:2/12/2015 ; Previously on bromocriptine    Spinal stenosis     Systolic murmur of aorta     Vitamin D deficiency      Past Surgical History:   Procedure Laterality Date    BREAST SURGERY      Incisional breast biopsy    CARPAL TUNNEL RELEASE Left     CHOLECYSTECTOMY      OTHER SURGICAL HISTORY      EXCISION OF PAROTID TUMOR/GLAND    PAROTIDECTOMY      MT COLONOSCOPY FLX DX W/COLLJ SPEC WHEN PFRMD N/A 6/6/2017    Procedure: COLONOSCOPY;  Surgeon[place] [facility] [place];  Location: [facility];   Service: Gastroenterology    ROTATOR CUFF REPAIR      SALPINGOOPHORECTOMY             Consults have been placed to:   IP CONSULT TO CARDIOLOGY    Vitals:    11/26/18 0804 11/26/18 0822 11/26/18 1157 11/26/18 1410   BP: 116/70  131/72    BP Location: Left arm  Left arm    Pulse: 83  67    Resp: 18  18    Temp: (!) 97 2 °F (36 2 °C)      TempSrc: Tympanic      SpO2: 97% 95%  95%   Weight:       Height:           Most recent labs:    Recent Labs      11/26/18   0542   K  3 8 CALCIUM  8 0*   BUN  25   CREATININE  1 06       Scheduled Meds:  Continuous Infusions:  No current facility-administered medications for this encounter  PRN Meds:      Surgical procedures (if appropriate):

## 2018-11-27 NOTE — PROGRESS NOTES
Communication:  Date of Discharge: 11/26/2018  30 Day Readmission: no    Identified Risk for Readmission Per Risk Stratification: Medium Risk  Current Disposition: Home    FEV1 %: UNKNOWN, no Spirometry on file   Gold Stage of COPD: Unknown   History   Smoking Status    Current Every Day Smoker    Packs/day: 1 00    Types: Cigarettes   Smokeless Tobacco    Never Used     Comment: 3 cigarettes a day        Symptoms:  Wheezing: No  Cough: Yes  Difficulty Breathing (Dyspnea): No  Fever: No  Sputum Expectoration: No      Oxygenation:    Room air: Yes      Zone Tool Status: Yellow  Follow up Appointments: PCP: 12/3 ( 7 days)     Counseling and Topics Reviewed:  Smoking Cessation, Energy conservation, Physician follow-ups, Medication compliance    Did you feel ready to be discharged from the hospital? Yes    Were you given written and/or verbal educational materials specific to your COPD diagnosis? Yes    Were you given the opportunity to walk in the hallways with staff assistance to assess your breathing? Yes    Were you instructed on when to return to your normal activities (ie: work, social activities, etc)? Yes    Did staff offer to assist you in making follow-up appointments with your family doctor or other specialists? Yes    Narrative Summary: (ie: respiratory status, sputum production, persistent wheezing or coughing)  Almaz reports feeling improved and had a good night's rest  She has secured PCP and cardiology appts, has obtained all discharge RX and understands the medication schedule  Advised to drink Gatorade for elyte replacement s/p several days of diarrhea with abnormal renal labs noted

## 2018-11-29 ENCOUNTER — TELEPHONE (OUTPATIENT)
Dept: FAMILY MEDICINE CLINIC | Facility: CLINIC | Age: 68
End: 2018-11-29

## 2018-11-29 ENCOUNTER — APPOINTMENT (OUTPATIENT)
Dept: LAB | Age: 68
End: 2018-11-29
Payer: MEDICARE

## 2018-11-29 DIAGNOSIS — E87.6 HYPOKALEMIA: ICD-10-CM

## 2018-11-29 DIAGNOSIS — I47.1 SVT (SUPRAVENTRICULAR TACHYCARDIA) (HCC): ICD-10-CM

## 2018-11-29 DIAGNOSIS — E87.6 HYPOKALEMIA: Primary | ICD-10-CM

## 2018-11-29 LAB
ANION GAP SERPL CALCULATED.3IONS-SCNC: 7 MMOL/L (ref 4–13)
BUN SERPL-MCNC: 18 MG/DL (ref 5–25)
CALCIUM SERPL-MCNC: 8.7 MG/DL (ref 8.3–10.1)
CHLORIDE SERPL-SCNC: 109 MMOL/L (ref 100–108)
CO2 SERPL-SCNC: 24 MMOL/L (ref 21–32)
CREAT SERPL-MCNC: 0.99 MG/DL (ref 0.6–1.3)
GFR SERPL CREATININE-BSD FRML MDRD: 60 ML/MIN/1.73SQ M
GLUCOSE P FAST SERPL-MCNC: 87 MG/DL (ref 65–99)
MAGNESIUM SERPL-MCNC: 1.6 MG/DL (ref 1.6–2.6)
POTASSIUM SERPL-SCNC: 2.5 MMOL/L (ref 3.5–5.3)
SODIUM SERPL-SCNC: 140 MMOL/L (ref 136–145)

## 2018-11-29 PROCEDURE — 80048 BASIC METABOLIC PNL TOTAL CA: CPT

## 2018-11-29 PROCEDURE — 36415 COLL VENOUS BLD VENIPUNCTURE: CPT

## 2018-11-29 PROCEDURE — 83735 ASSAY OF MAGNESIUM: CPT

## 2018-11-29 RX ORDER — POTASSIUM CHLORIDE 20 MEQ/1
20 TABLET, EXTENDED RELEASE ORAL 2 TIMES DAILY
Qty: 30 TABLET | Refills: 0 | Status: SHIPPED | OUTPATIENT
Start: 2018-11-29 | End: 2019-01-08 | Stop reason: ALTCHOICE

## 2018-11-29 NOTE — QUICK NOTE
Patient was discharged on the hospital on 11/26/18  While here, she had hypokalemia and hypomagnesemia  A BMP was scheduled for today as an outpatient  The lab called with her potassium of 2 5  A call was placed to her PCP, Dr Tonja Parish, to inform her  The patient has a follow up appointment with her on Monday, 12/3  I spoke with the patient who is feeling well and improving with her cough  She denied any palpitations  A prescription was called in to the pharmacy of her choice for K-dur 40 mEq twice daily which she will begin promptly tonight

## 2018-11-29 NOTE — PROGRESS NOTES
I received call regarding low potassium, potassium 2 5, she is status post discharge, discharge potassium was 3 8, she has no further significant diarrhea, I am unsure why potassium dropped again  She relates she received notification she was to start potassium, rx not at pharmacy - I sent in potassium 20 mEq to use twice daily, she does have appointment in 4 days, keep that appointment  Plan redo blood work then  If she would worsen in the meantime she will present to the emergency room but she relates she is feeling much better at present

## 2018-12-03 ENCOUNTER — APPOINTMENT (OUTPATIENT)
Dept: LAB | Facility: CLINIC | Age: 68
End: 2018-12-03
Payer: MEDICARE

## 2018-12-03 ENCOUNTER — OFFICE VISIT (OUTPATIENT)
Dept: FAMILY MEDICINE CLINIC | Facility: CLINIC | Age: 68
End: 2018-12-03
Payer: MEDICARE

## 2018-12-03 VITALS
DIASTOLIC BLOOD PRESSURE: 78 MMHG | HEIGHT: 64 IN | SYSTOLIC BLOOD PRESSURE: 136 MMHG | OXYGEN SATURATION: 98 % | TEMPERATURE: 98.1 F | WEIGHT: 180.2 LBS | RESPIRATION RATE: 18 BRPM | BODY MASS INDEX: 30.77 KG/M2 | HEART RATE: 70 BPM

## 2018-12-03 DIAGNOSIS — F41.8 DEPRESSION WITH ANXIETY: ICD-10-CM

## 2018-12-03 DIAGNOSIS — J40 BRONCHITIS: Primary | ICD-10-CM

## 2018-12-03 DIAGNOSIS — E87.6 HYPOKALEMIA: Primary | ICD-10-CM

## 2018-12-03 DIAGNOSIS — F17.200 CURRENT EVERY DAY SMOKER: ICD-10-CM

## 2018-12-03 DIAGNOSIS — E87.6 HYPOKALEMIA: ICD-10-CM

## 2018-12-03 PROBLEM — R63.4 WEIGHT LOSS, NON-INTENTIONAL: Status: RESOLVED | Noted: 2018-11-23 | Resolved: 2018-12-03

## 2018-12-03 LAB
ANION GAP SERPL CALCULATED.3IONS-SCNC: 8 MMOL/L (ref 4–13)
BUN SERPL-MCNC: 23 MG/DL (ref 5–25)
CALCIUM SERPL-MCNC: 8.7 MG/DL (ref 8.3–10.1)
CHLORIDE SERPL-SCNC: 104 MMOL/L (ref 100–108)
CO2 SERPL-SCNC: 29 MMOL/L (ref 21–32)
CREAT SERPL-MCNC: 0.9 MG/DL (ref 0.6–1.3)
GFR SERPL CREATININE-BSD FRML MDRD: 67 ML/MIN/1.73SQ M
GLUCOSE SERPL-MCNC: 92 MG/DL (ref 65–140)
POTASSIUM SERPL-SCNC: 3.2 MMOL/L (ref 3.5–5.3)
SODIUM SERPL-SCNC: 141 MMOL/L (ref 136–145)

## 2018-12-03 PROCEDURE — 36415 COLL VENOUS BLD VENIPUNCTURE: CPT | Performed by: INTERNAL MEDICINE

## 2018-12-03 PROCEDURE — 99495 TRANSJ CARE MGMT MOD F2F 14D: CPT | Performed by: INTERNAL MEDICINE

## 2018-12-03 PROCEDURE — 80048 BASIC METABOLIC PNL TOTAL CA: CPT | Performed by: INTERNAL MEDICINE

## 2018-12-03 NOTE — PROGRESS NOTES
Assessment/Plan:     Diagnoses and all orders for this visit:    Bronchitis    Depression with anxiety    Hypokalemia  -     Basic metabolic panel    Current every day smoker      Stacey Benz was seen and examined in the office today  Overall, she is improving  I encouraged that she continue to refrain from smoking and is hopeful in doing this  She has 2 more days of prednisone left  Her mood is okay overall on the Lexapro but I did suggested possibly also seeing a counselor  She does need follow up with the hypokalemia and will complete a BMP today  Of note, she also has an upcoming cardiac ablation scheduled in about 2 weeks secondary to recurrent episodes of SVT  Otherwise no other changes were made  Subjective:      Patient ID: Lucero Mesa is a 79 y o  female  Stacey Benz is here today for a hospital follow up  Chart was reviewed  She reports noticing feeling ill for a while and admits that she was smoking 1 ppd and was drinking 2-3 martinis a night  She did not feel like she was caring for herself properly  She finally brought herself into the office and was advised to get evaluated in the ED  She was hospitalized for a few days while undergoing testing  She was ultimately diagnosed with viral bronchitis but did have some bacteria growing in the sputum  She was put on an antibiotic and steroids  She was eventually discharged home with a steroid taper  She reports diarrhea has resolved (believes it was related to IBS)  She has felt well overall since she has been home  She has stopped smoking and drinking and is motivated in taking care of herself  She has a good support system as well  The following portions of the patient's history were reviewed and updated as appropriate: allergies, current medications, past family history, past medical history, past social history, past surgical history and problem list     Review of Systems   Constitutional: Negative for chills, fever and unexpected weight change  HENT: Negative for sinus pain, sinus pressure and sore throat  Eyes: Negative for visual disturbance  Respiratory: Negative for cough, chest tightness, shortness of breath and wheezing  Cardiovascular: Negative for chest pain, palpitations and leg swelling  Gastrointestinal: Negative for abdominal pain, blood in stool, constipation, diarrhea, nausea and vomiting  Genitourinary: Negative for dysuria  Neurological: Negative for dizziness, syncope, numbness and headaches  Psychiatric/Behavioral: Positive for dysphoric mood  Negative for sleep disturbance  The patient is not nervous/anxious  Objective:      /78   Pulse 70   Temp 98 1 °F (36 7 °C)   Resp 18   Ht 5' 4" (1 626 m)   Wt 81 7 kg (180 lb 3 2 oz)   LMP  (LMP Unknown)   SpO2 98%   BMI 30 93 kg/m²          Physical Exam   Constitutional: She is oriented to person, place, and time  She appears well-developed and well-nourished  No distress  HENT:   Head: Normocephalic and atraumatic  Right Ear: External ear normal    Left Ear: External ear normal    Mouth/Throat: Oropharynx is clear and moist    Eyes: Pupils are equal, round, and reactive to light  Conjunctivae and EOM are normal  Right eye exhibits no discharge  Left eye exhibits no discharge  Neck: Normal range of motion  Neck supple  No thyromegaly present  Cardiovascular: Normal rate and regular rhythm  Murmur heard  Pulmonary/Chest: Effort normal and breath sounds normal  No respiratory distress  She has no wheezes  Abdominal: Soft  Bowel sounds are normal  There is no tenderness  Musculoskeletal: Normal range of motion  Neurological: She is alert and oriented to person, place, and time  No cranial nerve deficit  Skin: Skin is warm and dry  She is not diaphoretic  Psychiatric: She has a normal mood and affect  Her speech is normal and behavior is normal  Judgment and thought content normal    Vitals reviewed          TCM Call (since 11/2/2018) Date and time call was made  11/26/2018  6:04 PM    Hospital care reviewed  Records reviewed    Patient was hospitialized at  [facility] [place] - CLOSED    Date of Admission  11/23/18    Date of discharge  11/26/18    Diagnosis  bronchitis    Disposition  Home    Were the patients medications reviewed and updated  No    Current Symptoms  None      TCM Call (since 11/2/2018)     Post hospital issues  None    Should patient be enrolled in anticoag monitoring? No    Scheduled for follow up?   Yes    Did you obtain your prescribed medications  Yes    Do you need help managing your prescriptions or medications  No    Is transportation to your appointment needed  No    I have advised the patient to call PCP with any new or worsening symptoms  lhunter    Living Arrangements  Family members    Have you fallen in the last 12 months  No    Comments  pt scheduled with Dr Emil Chan 12/3

## 2018-12-06 ENCOUNTER — TELEPHONE (OUTPATIENT)
Dept: FAMILY MEDICINE CLINIC | Facility: CLINIC | Age: 68
End: 2018-12-06

## 2018-12-06 NOTE — TELEPHONE ENCOUNTER
----- Message from Kirstin Sessions sent at 12/5/2018 12:05 PM EST -----      ----- Message -----  From: Everett Lucas DO  Sent: 12/4/2018   1:49 PM  To: Aishwarya Copeland is coming to the office in January for a follow up  Can we make that an AWV and send her the paperwork   Thanks

## 2018-12-07 ENCOUNTER — LAB (OUTPATIENT)
Dept: LAB | Facility: CLINIC | Age: 68
End: 2018-12-07
Payer: MEDICARE

## 2018-12-07 DIAGNOSIS — E87.6 HYPOKALEMIA: ICD-10-CM

## 2018-12-07 DIAGNOSIS — E87.6 HYPOKALEMIA: Primary | ICD-10-CM

## 2018-12-07 LAB
ANION GAP SERPL CALCULATED.3IONS-SCNC: 6 MMOL/L (ref 4–13)
BUN SERPL-MCNC: 20 MG/DL (ref 5–25)
CALCIUM SERPL-MCNC: 9.9 MG/DL (ref 8.3–10.1)
CHLORIDE SERPL-SCNC: 100 MMOL/L (ref 100–108)
CO2 SERPL-SCNC: 30 MMOL/L (ref 21–32)
CREAT SERPL-MCNC: 0.99 MG/DL (ref 0.6–1.3)
GFR SERPL CREATININE-BSD FRML MDRD: 60 ML/MIN/1.73SQ M
GLUCOSE SERPL-MCNC: 70 MG/DL (ref 65–140)
POTASSIUM SERPL-SCNC: 4.6 MMOL/L (ref 3.5–5.3)
SODIUM SERPL-SCNC: 136 MMOL/L (ref 136–145)

## 2018-12-07 PROCEDURE — 80048 BASIC METABOLIC PNL TOTAL CA: CPT

## 2018-12-07 PROCEDURE — 36415 COLL VENOUS BLD VENIPUNCTURE: CPT

## 2018-12-07 NOTE — PROGRESS NOTES
Please let patient know that labs are fine and she can stop the potassium  Repeat the bmp in 1 week to make sure it stays ok

## 2018-12-14 ENCOUNTER — APPOINTMENT (OUTPATIENT)
Dept: LAB | Facility: CLINIC | Age: 68
End: 2018-12-14
Payer: MEDICARE

## 2018-12-14 LAB
ALBUMIN SERPL BCP-MCNC: 3.6 G/DL (ref 3.5–5)
ANION GAP SERPL CALCULATED.3IONS-SCNC: 5 MMOL/L (ref 4–13)
BUN SERPL-MCNC: 18 MG/DL (ref 5–25)
CALCIUM ALBUM COR SERPL-MCNC: 10.8 MG/DL (ref 8.3–10.1)
CALCIUM SERPL-MCNC: 10.5 MG/DL (ref 8.3–10.1)
CALCIUM SERPL-MCNC: 10.5 MG/DL (ref 8.3–10.1)
CHLORIDE SERPL-SCNC: 105 MMOL/L (ref 100–108)
CO2 SERPL-SCNC: 28 MMOL/L (ref 21–32)
CREAT SERPL-MCNC: 1.06 MG/DL (ref 0.6–1.3)
GFR SERPL CREATININE-BSD FRML MDRD: 55 ML/MIN/1.73SQ M
GLUCOSE SERPL-MCNC: 67 MG/DL (ref 65–140)
POTASSIUM SERPL-SCNC: 3.8 MMOL/L (ref 3.5–5.3)
SODIUM SERPL-SCNC: 138 MMOL/L (ref 136–145)

## 2018-12-14 PROCEDURE — 36415 COLL VENOUS BLD VENIPUNCTURE: CPT | Performed by: INTERNAL MEDICINE

## 2018-12-14 PROCEDURE — 80048 BASIC METABOLIC PNL TOTAL CA: CPT | Performed by: INTERNAL MEDICINE

## 2018-12-14 PROCEDURE — 82040 ASSAY OF SERUM ALBUMIN: CPT | Performed by: INTERNAL MEDICINE

## 2018-12-17 ENCOUNTER — TELEPHONE (OUTPATIENT)
Dept: INPATIENT UNIT | Facility: HOSPITAL | Age: 68
End: 2018-12-17

## 2018-12-18 ENCOUNTER — ANESTHESIA (OUTPATIENT)
Dept: SURGERY | Facility: HOSPITAL | Age: 68
End: 2018-12-18
Payer: MEDICARE

## 2018-12-18 ENCOUNTER — HOSPITAL ENCOUNTER (OUTPATIENT)
Dept: NON INVASIVE DIAGNOSTICS | Facility: HOSPITAL | Age: 68
Discharge: HOME | End: 2018-12-19
Attending: INTERNAL MEDICINE | Admitting: INTERNAL MEDICINE
Payer: MEDICARE

## 2018-12-18 ENCOUNTER — ANESTHESIA EVENT (OUTPATIENT)
Dept: SURGERY | Facility: HOSPITAL | Age: 68
End: 2018-12-18
Payer: MEDICARE

## 2018-12-18 DIAGNOSIS — I47.1 SVT (SUPRAVENTRICULAR TACHYCARDIA) (HCC): ICD-10-CM

## 2018-12-18 DIAGNOSIS — I10 HYPERTENSION: Primary | ICD-10-CM

## 2018-12-18 LAB
ANION GAP SERPL CALCULATED.3IONS-SCNC: 6 MMOL/L (ref 4–13)
ATRIAL RATE: 101 BPM
ATRIAL RATE: 83 BPM
BASOPHILS # BLD AUTO: 0.03 THOUSANDS/ΜL (ref 0–0.1)
BASOPHILS NFR BLD AUTO: 1 % (ref 0–1)
BUN SERPL-MCNC: 13 MG/DL (ref 5–25)
CALCIUM SERPL-MCNC: 8.8 MG/DL (ref 8.3–10.1)
CHLORIDE SERPL-SCNC: 110 MMOL/L (ref 100–108)
CO2 SERPL-SCNC: 26 MMOL/L (ref 21–32)
CREAT SERPL-MCNC: 1.02 MG/DL (ref 0.6–1.3)
EOSINOPHIL # BLD AUTO: 0.18 THOUSAND/ΜL (ref 0–0.61)
EOSINOPHIL NFR BLD AUTO: 4 % (ref 0–6)
ERYTHROCYTE [DISTWIDTH] IN BLOOD BY AUTOMATED COUNT: 12.8 % (ref 11.6–15.1)
GFR SERPL CREATININE-BSD FRML MDRD: 57 ML/MIN/1.73SQ M
GLUCOSE P FAST SERPL-MCNC: 89 MG/DL (ref 65–99)
GLUCOSE SERPL-MCNC: 89 MG/DL (ref 65–140)
HCT VFR BLD AUTO: 36.9 % (ref 34.8–46.1)
HGB BLD-MCNC: 11.8 G/DL (ref 11.5–15.4)
IMM GRANULOCYTES # BLD AUTO: 0.01 THOUSAND/UL (ref 0–0.2)
IMM GRANULOCYTES NFR BLD AUTO: 0 % (ref 0–2)
INR PPP: 0.99 (ref 0.86–1.17)
KCT BLD-ACNC: 132 SEC (ref 89–137)
KCT BLD-ACNC: 242 SEC (ref 89–137)
KCT BLD-ACNC: 254 SEC (ref 89–137)
KCT BLD-ACNC: 335 SEC (ref 89–137)
LYMPHOCYTES # BLD AUTO: 1.39 THOUSANDS/ΜL (ref 0.6–4.47)
LYMPHOCYTES NFR BLD AUTO: 27 % (ref 14–44)
MCH RBC QN AUTO: 32.7 PG (ref 26.8–34.3)
MCHC RBC AUTO-ENTMCNC: 32 G/DL (ref 31.4–37.4)
MCV RBC AUTO: 102 FL (ref 82–98)
MONOCYTES # BLD AUTO: 0.53 THOUSAND/ΜL (ref 0.17–1.22)
MONOCYTES NFR BLD AUTO: 10 % (ref 4–12)
NEUTROPHILS # BLD AUTO: 3.05 THOUSANDS/ΜL (ref 1.85–7.62)
NEUTS SEG NFR BLD AUTO: 58 % (ref 43–75)
NRBC BLD AUTO-RTO: 0 /100 WBCS
P AXIS: -2 DEGREES
P AXIS: 66 DEGREES
PLATELET # BLD AUTO: 204 THOUSANDS/UL (ref 149–390)
PMV BLD AUTO: 10.2 FL (ref 8.9–12.7)
POTASSIUM SERPL-SCNC: 3.7 MMOL/L (ref 3.5–5.3)
PR INTERVAL: 162 MS
PR INTERVAL: 164 MS
PROTHROMBIN TIME: 13.2 SECONDS (ref 11.8–14.2)
QRS AXIS: -14 DEGREES
QRS AXIS: -15 DEGREES
QRSD INTERVAL: 74 MS
QRSD INTERVAL: 76 MS
QT INTERVAL: 352 MS
QT INTERVAL: 370 MS
QTC INTERVAL: 434 MS
QTC INTERVAL: 456 MS
RBC # BLD AUTO: 3.61 MILLION/UL (ref 3.81–5.12)
SODIUM SERPL-SCNC: 142 MMOL/L (ref 136–145)
SPECIMEN SOURCE: ABNORMAL
SPECIMEN SOURCE: NORMAL
T WAVE AXIS: 40 DEGREES
T WAVE AXIS: 50 DEGREES
VENTRICULAR RATE: 101 BPM
VENTRICULAR RATE: 83 BPM
WBC # BLD AUTO: 5.19 THOUSAND/UL (ref 4.31–10.16)

## 2018-12-18 PROCEDURE — 93655 ICAR CATH ABLTJ DSCRT ARRHYT: CPT | Performed by: INTERNAL MEDICINE

## 2018-12-18 PROCEDURE — 85610 PROTHROMBIN TIME: CPT | Performed by: PHYSICIAN ASSISTANT

## 2018-12-18 PROCEDURE — 93005 ELECTROCARDIOGRAM TRACING: CPT

## 2018-12-18 PROCEDURE — 93662 INTRACARDIAC ECG (ICE): CPT | Performed by: INTERNAL MEDICINE

## 2018-12-18 PROCEDURE — C1893 INTRO/SHEATH, FIXED,NON-PEEL: HCPCS | Performed by: INTERNAL MEDICINE

## 2018-12-18 PROCEDURE — 93462 L HRT CATH TRNSPTL PUNCTURE: CPT | Performed by: INTERNAL MEDICINE

## 2018-12-18 PROCEDURE — 93613 INTRACARDIAC EPHYS 3D MAPG: CPT | Performed by: INTERNAL MEDICINE

## 2018-12-18 PROCEDURE — C1894 INTRO/SHEATH, NON-LASER: HCPCS | Performed by: INTERNAL MEDICINE

## 2018-12-18 PROCEDURE — 85025 COMPLETE CBC W/AUTO DIFF WBC: CPT | Performed by: PHYSICIAN ASSISTANT

## 2018-12-18 PROCEDURE — 1123F ACP DISCUSS/DSCN MKR DOCD: CPT | Performed by: INTERNAL MEDICINE

## 2018-12-18 PROCEDURE — 93621 COMP EP EVL L PAC&REC C SINS: CPT | Performed by: INTERNAL MEDICINE

## 2018-12-18 PROCEDURE — 93623 PRGRMD STIMJ&PACG IV RX NFS: CPT | Performed by: INTERNAL MEDICINE

## 2018-12-18 PROCEDURE — 85347 COAGULATION TIME ACTIVATED: CPT

## 2018-12-18 PROCEDURE — C1730 CATH, EP, 19 OR FEW ELECT: HCPCS | Performed by: INTERNAL MEDICINE

## 2018-12-18 PROCEDURE — C1759 CATH, INTRA ECHOCARDIOGRAPHY: HCPCS | Performed by: INTERNAL MEDICINE

## 2018-12-18 PROCEDURE — 80048 BASIC METABOLIC PNL TOTAL CA: CPT | Performed by: PHYSICIAN ASSISTANT

## 2018-12-18 PROCEDURE — 93653 COMPRE EP EVAL TX SVT: CPT | Performed by: INTERNAL MEDICINE

## 2018-12-18 PROCEDURE — 93010 ELECTROCARDIOGRAM REPORT: CPT | Performed by: INTERNAL MEDICINE

## 2018-12-18 PROCEDURE — 92960 CARDIOVERSION ELECTRIC EXT: CPT | Performed by: INTERNAL MEDICINE

## 2018-12-18 PROCEDURE — C1732 CATH, EP, DIAG/ABL, 3D/VECT: HCPCS | Performed by: INTERNAL MEDICINE

## 2018-12-18 RX ORDER — HEPARIN SODIUM 10000 [USP'U]/100ML
INJECTION, SOLUTION INTRAVENOUS
Status: COMPLETED | OUTPATIENT
Start: 2018-12-18 | End: 2018-12-18

## 2018-12-18 RX ORDER — PROPOFOL 10 MG/ML
INJECTION, EMULSION INTRAVENOUS AS NEEDED
Status: DISCONTINUED | OUTPATIENT
Start: 2018-12-18 | End: 2018-12-18 | Stop reason: SURG

## 2018-12-18 RX ORDER — MIDAZOLAM HYDROCHLORIDE 1 MG/ML
INJECTION INTRAMUSCULAR; INTRAVENOUS AS NEEDED
Status: DISCONTINUED | OUTPATIENT
Start: 2018-12-18 | End: 2018-12-18 | Stop reason: SURG

## 2018-12-18 RX ORDER — LIDOCAINE HYDROCHLORIDE 10 MG/ML
INJECTION, SOLUTION INFILTRATION; PERINEURAL CODE/TRAUMA/SEDATION MEDICATION
Status: COMPLETED | OUTPATIENT
Start: 2018-12-18 | End: 2018-12-18

## 2018-12-18 RX ORDER — PROPOFOL 10 MG/ML
INJECTION, EMULSION INTRAVENOUS CONTINUOUS PRN
Status: DISCONTINUED | OUTPATIENT
Start: 2018-12-18 | End: 2018-12-18 | Stop reason: SURG

## 2018-12-18 RX ORDER — ATORVASTATIN CALCIUM 20 MG/1
20 TABLET, FILM COATED ORAL DAILY
Status: DISCONTINUED | OUTPATIENT
Start: 2018-12-18 | End: 2018-12-19 | Stop reason: HOSPADM

## 2018-12-18 RX ORDER — PROTAMINE SULFATE 10 MG/ML
INJECTION, SOLUTION INTRAVENOUS AS NEEDED
Status: DISCONTINUED | OUTPATIENT
Start: 2018-12-18 | End: 2018-12-18 | Stop reason: SURG

## 2018-12-18 RX ORDER — DILTIAZEM HYDROCHLORIDE 120 MG/1
120 CAPSULE, COATED, EXTENDED RELEASE ORAL DAILY
Status: DISCONTINUED | OUTPATIENT
Start: 2018-12-18 | End: 2018-12-18

## 2018-12-18 RX ORDER — POTASSIUM CHLORIDE 20 MEQ/1
20 TABLET, EXTENDED RELEASE ORAL 2 TIMES DAILY
Status: DISCONTINUED | OUTPATIENT
Start: 2018-12-18 | End: 2018-12-19 | Stop reason: HOSPADM

## 2018-12-18 RX ORDER — LORATADINE 10 MG/1
10 TABLET ORAL DAILY
Status: DISCONTINUED | OUTPATIENT
Start: 2018-12-18 | End: 2018-12-19 | Stop reason: HOSPADM

## 2018-12-18 RX ORDER — ACETAMINOPHEN 325 MG/1
650 TABLET ORAL EVERY 4 HOURS PRN
Status: DISCONTINUED | OUTPATIENT
Start: 2018-12-18 | End: 2018-12-19 | Stop reason: HOSPADM

## 2018-12-18 RX ORDER — SODIUM CHLORIDE 9 MG/ML
INJECTION, SOLUTION INTRAVENOUS CONTINUOUS PRN
Status: DISCONTINUED | OUTPATIENT
Start: 2018-12-18 | End: 2018-12-18 | Stop reason: SURG

## 2018-12-18 RX ORDER — HEPARIN SODIUM 1000 [USP'U]/ML
INJECTION, SOLUTION INTRAVENOUS; SUBCUTANEOUS CODE/TRAUMA/SEDATION MEDICATION
Status: COMPLETED | OUTPATIENT
Start: 2018-12-18 | End: 2018-12-18

## 2018-12-18 RX ORDER — NICOTINE 21 MG/24HR
1 PATCH, TRANSDERMAL 24 HOURS TRANSDERMAL DAILY
Status: DISCONTINUED | OUTPATIENT
Start: 2018-12-18 | End: 2018-12-19 | Stop reason: HOSPADM

## 2018-12-18 RX ORDER — FENTANYL CITRATE 50 UG/ML
INJECTION, SOLUTION INTRAMUSCULAR; INTRAVENOUS AS NEEDED
Status: DISCONTINUED | OUTPATIENT
Start: 2018-12-18 | End: 2018-12-18 | Stop reason: SURG

## 2018-12-18 RX ORDER — ESCITALOPRAM OXALATE 10 MG/1
10 TABLET ORAL DAILY
Status: DISCONTINUED | OUTPATIENT
Start: 2018-12-18 | End: 2018-12-19 | Stop reason: HOSPADM

## 2018-12-18 RX ORDER — ADENOSINE 3 MG/ML
INJECTION, SOLUTION INTRAVENOUS CODE/TRAUMA/SEDATION MEDICATION
Status: COMPLETED | OUTPATIENT
Start: 2018-12-18 | End: 2018-12-18

## 2018-12-18 RX ORDER — GABAPENTIN 300 MG/1
300 CAPSULE ORAL
Status: DISCONTINUED | OUTPATIENT
Start: 2018-12-18 | End: 2018-12-19 | Stop reason: HOSPADM

## 2018-12-18 RX ORDER — ALBUTEROL SULFATE 90 UG/1
1 AEROSOL, METERED RESPIRATORY (INHALATION) EVERY 6 HOURS PRN
Status: DISCONTINUED | OUTPATIENT
Start: 2018-12-18 | End: 2018-12-19 | Stop reason: HOSPADM

## 2018-12-18 RX ADMIN — PROTAMINE SULFATE 40 MG: 10 INJECTION, SOLUTION INTRAVENOUS at 11:20

## 2018-12-18 RX ADMIN — ADENOSINE 18 MG: 3 INJECTION, SOLUTION INTRAVENOUS at 10:38

## 2018-12-18 RX ADMIN — ADENOSINE 24 MG: 3 INJECTION, SOLUTION INTRAVENOUS at 08:42

## 2018-12-18 RX ADMIN — FENTANYL CITRATE 25 MCG: 50 INJECTION, SOLUTION INTRAMUSCULAR; INTRAVENOUS at 08:13

## 2018-12-18 RX ADMIN — LIDOCAINE HYDROCHLORIDE 9 ML: 10 INJECTION, SOLUTION INFILTRATION; PERINEURAL at 08:19

## 2018-12-18 RX ADMIN — HEPARIN SODIUM 9000 UNITS: 1000 INJECTION INTRAVENOUS; SUBCUTANEOUS at 08:48

## 2018-12-18 RX ADMIN — ISOPROTERENOL HYDROCHLORIDE 2 MCG/MIN: 0.2 INJECTION, SOLUTION INTRAMUSCULAR; INTRAVENOUS at 09:32

## 2018-12-18 RX ADMIN — ADENOSINE 18 MG: 3 INJECTION, SOLUTION INTRAVENOUS at 08:42

## 2018-12-18 RX ADMIN — HEPARIN SODIUM 3000 UNITS/HR: 10000 INJECTION, SOLUTION INTRAVENOUS at 08:48

## 2018-12-18 RX ADMIN — SODIUM CHLORIDE: 0.9 INJECTION, SOLUTION INTRAVENOUS at 07:28

## 2018-12-18 RX ADMIN — MIDAZOLAM HYDROCHLORIDE 1 MG: 1 INJECTION, SOLUTION INTRAMUSCULAR; INTRAVENOUS at 10:30

## 2018-12-18 RX ADMIN — FENTANYL CITRATE 25 MCG: 50 INJECTION, SOLUTION INTRAMUSCULAR; INTRAVENOUS at 08:04

## 2018-12-18 RX ADMIN — PROPOFOL 50 MCG/KG/MIN: 10 INJECTION, EMULSION INTRAVENOUS at 08:17

## 2018-12-18 RX ADMIN — NICOTINE 1 PATCH: 14 PATCH, EXTENDED RELEASE TRANSDERMAL at 18:03

## 2018-12-18 RX ADMIN — PROPOFOL 50 MG: 10 INJECTION, EMULSION INTRAVENOUS at 10:06

## 2018-12-18 RX ADMIN — LIDOCAINE HYDROCHLORIDE 9 ML: 10 INJECTION, SOLUTION INFILTRATION; PERINEURAL at 08:20

## 2018-12-18 RX ADMIN — PHENYLEPHRINE HYDROCHLORIDE 20 MCG/MIN: 10 INJECTION INTRAVENOUS at 09:17

## 2018-12-18 RX ADMIN — ACETAMINOPHEN 650 MG: 325 TABLET ORAL at 21:24

## 2018-12-18 RX ADMIN — MIDAZOLAM HYDROCHLORIDE 1 MG: 1 INJECTION, SOLUTION INTRAMUSCULAR; INTRAVENOUS at 08:13

## 2018-12-18 RX ADMIN — RIVAROXABAN 20 MG: 20 TABLET, FILM COATED ORAL at 19:14

## 2018-12-18 RX ADMIN — FENTANYL CITRATE 50 MCG: 50 INJECTION, SOLUTION INTRAMUSCULAR; INTRAVENOUS at 10:30

## 2018-12-18 RX ADMIN — MIDAZOLAM HYDROCHLORIDE 2 MG: 1 INJECTION, SOLUTION INTRAMUSCULAR; INTRAVENOUS at 07:57

## 2018-12-18 RX ADMIN — PROPOFOL 20 MG: 10 INJECTION, EMULSION INTRAVENOUS at 08:52

## 2018-12-18 RX ADMIN — ACETAMINOPHEN 650 MG: 325 TABLET ORAL at 15:26

## 2018-12-18 RX ADMIN — GABAPENTIN 300 MG: 300 CAPSULE ORAL at 21:23

## 2018-12-18 RX ADMIN — FENTANYL CITRATE 50 MCG: 50 INJECTION, SOLUTION INTRAMUSCULAR; INTRAVENOUS at 08:20

## 2018-12-18 NOTE — ANESTHESIA PREPROCEDURE EVALUATION
Review of Systems/Medical History  Patient summary reviewed  Chart reviewed  No history of anesthetic complications     Cardiovascular  Hyperlipidemia, Hypertension , Valvular heart disease (Mild MS) ,   Comment: SVT,  Pulmonary  Smoker (Quit 1 month, Prev 1ppd) cigarette smoker  , COPD (Acute exacerbation 11/23/2018) ,        GI/Hepatic      Comment: IBS     Kidney disease (JANELLE Cr 1 06, K 3 8) , No chronic kidney disease ,        Endo/Other    Comment: Hx pituitary adenoma--Rx Bromocriptine    GYN  Negative gynecology ROS          Hematology   Musculoskeletal  Osteoarthritis,   Comment: Spinal stenosis      Neurology      Comment: Peripheral neuropathy Psychology   Anxiety, Depression ,              Physical Exam    Airway    Mallampati score: II  TM Distance: >3 FB       Dental   No notable dental hx     Cardiovascular  Rhythm: regular,     Pulmonary  Breath sounds clear to auscultation,     Other Findings  Very anxious preop      Anesthesia Plan  ASA Score- 3     Anesthesia Type- IV sedation with anesthesia with ASA Monitors  Additional Monitors:   Airway Plan:         Plan Factors-  Patient did not smoke on day of surgery  Induction- intravenous  Postoperative Plan-     Informed Consent- Anesthetic plan and risks discussed with patient  I personally reviewed this patient with the CRNA  Discussed and agreed on the Anesthesia Plan with the CRNA  Ingrid Prince

## 2018-12-18 NOTE — SOCIAL WORK
Janki Schrader script:     CM received a Xeralto script, from Cardiology resident Jakub Nieto  Pt requesting [facility] pharmacy for cost assessment  Script tubed to [facility]  CM received return call from Ebony Gaemz at [facility]; Xeralto=$42/month with 1st month free  Pt advised and requested bed delivery upon discharge home tomorrow 12/19  CM notified Ebony Gamez in [address] and advised her that the patient will move to room 213-2 later this evening  Cm will follow prn  KANNAN Bunch advised

## 2018-12-18 NOTE — INTERVAL H&P NOTE
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

## 2018-12-18 NOTE — H&P (VIEW-ONLY)
Electrophysiology-Cardiology (EP)   Pattie Coley 79 y o  female MRN: [id number]  Unit/Bed#: E4 -01 Encounter: [id number]        IMPRESSION:  1  SVT- 190bpm regular,  likely avnrt, short RP tachycardia(reveiwed and interpreted tele), has had 3 episodes here and multiple at home up to hour and half  She has been able to break w valsalva  Been going on for months  Has not had medications for it  She has had intolerance to betablockers in past    2  Mild Mitral stenosis w LAE, normal EF  3  Here w actue COPD exacerbation  H/o tobacco  4  CKD cr improving 1 3 from 1 6  5  Hypokalemia  6  HTN well controlled      PLAN:  1  D/c metoprolol, instead use diltiazem given h/o COPD, 120mg daily  2  Recommend outpatient SVT ablation  We went over details of ablation and risks and benefits and I will arrange outpatient scheduling  3  Will cancel repeat echo    Will sign off for now unless svt recurs while here  Referring Physian: Raoul Chairez DO    Chief Complain/Reason for Referal:  SVT  HPI:Almaz Schultz is a 79 y o     Patient Active Problem List    Diagnosis Date Noted    Weight loss, non-intentional 11/23/2018     Priority: Low    Pulmonary emphysema ([facility] [address] [facility] ) 11/23/2018     Priority: Low    Leukocytosis 11/23/2018     Priority: Low    Hypokalemia 11/23/2018     Priority: Low    JANELLE (acute kidney injury) ([facility] [address] ) 11/23/2018     Priority: Low    [facility] 11/23/2018     Priority: Low    Bronchitis 11/23/2018     Priority: Low    Greater trochanteric bursitis of right hip      Priority: Low    Current every day smoker 07/05/2018     Priority: Low    Arthritis 01/04/2018     Priority: Low    Irritable bowel syndrome with diarrhea 06/20/2017     Priority: Low    Lumbar degenerative disc disease 06/05/2017     Priority: Low    Vitamin D deficiency 11/18/2015     Priority: Low    Essential hypertension 03/10/2015     Priority: Low    Depression with anxiety 08/26/2013 Priority: Low    Hyperlipidemia 08/26/2013     Priority: Low       1  SVT- 190bpm regular,  likely avnrt, short RP tachycardia(reveiwed and interpreted tele), has had 3 episodes here and multiple at home up to hour and half  She has been able to break w valsalva  Been going on for months  Has not had medications for it  She has had intolerance to betablockers in past    2  Mild Mitral stenosis w LAE, normal EF  3  Here w actue COPD exacerbation  H/o tobacco  4  CKD cr improving 1 3 from 1 6  5  Hypokalemia  6  HTN well controlled    Gale Riedel here for COPD execerbation, bronchitis, flu pcr pending  She had SVT in ER 2x broke both w valsalva and once on floor here 190bpm  Feels Cp/palpitations in neck and anxiety during episodes  Never been confirmed before on monitor but she has had these symptoms for months on and off, seems worse w stress, usually breaks w valsava          Past Medical History:   Diagnosis Date    Anxiety     Bursitis of hip     Last Assessed:6/5/2017    Depression     Herniated lumbar intervertebral disc     Hyperlipidemia     Hypertension     Hypokalemia     Pituitary adenoma (HCC)     Last Assessed:2/12/2015 ; Previously on bromocriptine    Spinal stenosis     Systolic murmur of aorta     Vitamin D deficiency        Facility-Administered Medications Prior to Admission   Medication    [COMPLETED] albuterol inhalation solution 2 5 mg     Prescriptions Prior to Admission   Medication    albuterol (PROVENTIL HFA) 90 mcg/act inhaler    atorvastatin (LIPITOR) 20 mg tablet    Cetirizine HCl (ZYRTEC ALLERGY) 10 MG CAPS    escitalopram (LEXAPRO) 10 mg tablet    gabapentin (NEURONTIN) 100 mg capsule    ibuprofen (MOTRIN) 800 mg tablet    Multiple Vitamin (MULTI VITAMIN DAILY PO)       Scheduled Meds:  Current Facility-Administered Medications:  albuterol 2 puff Inhalation Q4H PRN Opal Lopez DO    atorvastatin 20 mg Oral QPM Alba Monaco PA-C    escitalopram 10 mg Oral Daily [employer]    gabapentin 300 mg Oral HS Alba Monaco PA-C    heparin (porcine) 5,000 Units Subcutaneous [facility] Alba Monaco PA-C    hydrALAZINE 10 mg Intravenous Q6H PRN Alba Monaco PA-C    ipratropium 0 5 mg Nebulization TID Opal Ambron, DO    ipratropium 0 5 mg Nebulization Q6H PRN Opla Ambron, DO    levalbuterol 1 25 mg Nebulization TID Opal Ambron, DO    levalbuterol 1 25 mg Nebulization Q6H PRN Opal Ambron, DO    loratadine 10 mg Oral Daily Alba Monaco PA-C    methylPREDNISolone sodium succinate 20 mg Intravenous Q8H Oapl Ambron, DO    metoprolol tartrate 12 5 mg Oral Q12H [facility] Opal Ambron, DO    nicotine 1 patch Transdermal Daily Alba Monaco PA-C    potassium chloride 40 mEq Oral BID Opal Ambron, DO    sodium chloride 75 mL/hr Intravenous Continuous Opal Ambron, DO Last Rate: 75 mL/hr (11/24/18 8933)     Continuous Infusions:  sodium chloride 75 mL/hr Last Rate: 75 mL/hr (11/24/18 3193)     PRN Meds:   albuterol    hydrALAZINE    ipratropium    levalbuterol  Allergies   Allergen Reactions    Ancef [Cefazolin]     Cephalosporins Other (See Comments)     c diff    Fruit Extracts      Annotation - [id number]: peaches and strawberries    Levofloxacin     Morphine GI Intolerance    Other      FRUIT    Penicillins Hives    Tetracycline Edema     I reviewed the Home Medication list in the chart  Family History   Problem Relation Age of Onset    Colon cancer Mother     Coronary artery disease Mother     Colonic polyp Mother     Liver cancer Mother     Multiple sclerosis Father     Hypertension Other        Social History     Social History    Marital status: /Civil Union     Spouse name: N/A    Number of children: N/A    Years of education: N/A     Occupational History    Not on file       Social History Main Topics    Smoking status: Current Every Day Smoker     Packs/day: 1 00     Types: Cigarettes    Smokeless tobacco: Never Used      Comment: 3 cigarettes a day    Alcohol use 0 6 oz/week     1 Shots of liquor per week      Comment: 1 Yin Berta a night/social    Drug use: No    Sexual activity: Not on file     Other Topics Concern    Not on file     Social History Narrative    No narrative on file       Review of Systems -12 Point ROS reviewed and are negative or noted in chart except for Pertinent Positives Pertaining to Cardiovascular and Respiratory in HPI above  Vitals:    11/24/18 1200   BP: 104/63   Pulse: 77   Resp:    Temp:    SpO2:      Vitals:    11/23/18 1414 11/23/18 1834   Weight: 76 4 kg (168 lb 8 oz) 76 7 kg (169 lb 1 5 oz)     Intake/Output Summary (Last 24 hours) at 11/24/18 1324  Last data filed at 11/23/18 1929   Gross per 24 hour   Intake                0 ml   Output              100 ml   Net             -100 ml         GEN: No acute distress, Alert and oriented, well appearing  HEENT:Head, neck, ears, oral pharynx: Mucus membranes moist, oral pharynx clear, nares clear  External ears normal  EYES: Pupils equal, sclera anicteric, midline, normal conjuctiva  NECK: No JVD, supple, no obvious masses or thryomegaly or goiter  CARDIOVASCULAR: RRR, No murmur, rub, gallops S1,S2  LUNGS: Course bilateraly w mild wheezing, decreased resp sounds and poor lung movement  no rales, rhonchi, crackles  ABDOMEN: Soft, nondistended, nontender, without obvious organomegaly or ascites  EXTREMITIES/VASCULAR: No edema  Radial pulses intact, pedal pulses difficult to palpate, warm an well perfused  PSYCH: Normal Affect, no overt suicidal ideation, linear speech pattern without evidence of psychosis  NEURO: Grossly intact, moving all extremiteis equal, face symmetric, alert and responsive, no obvious focal defecits  HEME: No bleeding, bruising, petechia, purpura  SKIN: No significant rashes, warm, no diaphoresis or pallor       Lab Results:     CBC with diff:   Results from last 7 days  Lab Units 11/23/18  1435 WBC Thousand/uL 13 69*   HEMOGLOBIN g/dL 14 6   HEMATOCRIT % 43 4   MCV fL 97   PLATELETS Thousands/uL 371   MCH pg 32 7   MCHC g/dL 33 6   RDW % 12 9   MPV fL 10 2   NRBC AUTO /100 WBCs 0         CMP:  Results from last 7 days  Lab Units 11/24/18  0504 11/23/18  1435   POTASSIUM mmol/L 2 8* 2 2*   CHLORIDE mmol/L 107 102   CO2 mmol/L 19* 24   BUN mg/dL 25 25   CREATININE mg/dL 1 36* 1 63*   CALCIUM mg/dL 9 2 10 6*   AST U/L  --  26   ALT U/L  --  47   ALK PHOS U/L  --  119*   EGFR ml/min/1 73sq m 41 33         BMP:  Results from last 7 days  Lab Units 11/24/18  0504 11/23/18  1435   POTASSIUM mmol/L 2 8* 2 2*   CHLORIDE mmol/L 107 102   CO2 mmol/L 19* 24   BUN mg/dL 25 25   CREATININE mg/dL 1 36* 1 63*   CALCIUM mg/dL 9 2 10 6*       BNP:   Results Reviewed     Procedure Component Value Units Date/Time    NT-BNP PRO [[id number]]  (Abnormal) Collected:  11/23/18 1435    Lab Status:  Final result Specimen:  Blood from Arm, Right Updated:  11/23/18 1644     NT-proBNP 221 (H) pg/mL     Magnesium [[id number]]  (Abnormal) Collected:  11/23/18 1435    Lab Status:  Final result Specimen:  Blood from Arm, Right Updated:  11/23/18 1644     Magnesium 1 3 (L) mg/dL     Phosphorus [[id number]]  (Abnormal) Collected:  11/23/18 1435    Lab Status:  Final result Specimen:  Blood from Arm, Right Updated:  11/23/18 1644     Phosphorus 4 2 (H) mg/dL     Comprehensive metabolic panel [[id number]]  (Abnormal) Collected:  11/23/18 1435    Lab Status:  Final result Specimen:  Blood from Arm, Right Updated:  11/23/18 1623     Sodium 142 mmol/L      Potassium 2 2 (LL) mmol/L      Chloride 102 mmol/L      CO2 24 mmol/L      ANION GAP 16 (H) mmol/L      BUN 25 mg/dL      Creatinine 1 63 (H) mg/dL      Glucose 122 mg/dL      Calcium 10 6 (H) mg/dL      AST 26 U/L      ALT 47 U/L      Alkaline Phosphatase 119 (H) U/L      Total Protein 8 0 g/dL      Albumin 3 8 g/dL      Total Bilirubin 0 50 mg/dL      eGFR 33 ml/min/1 73sq m     Narrative: National Kidney Disease Education Program recommendations are as follows:  GFR calculation is accurate only with a steady state creatinine  Chronic Kidney disease less than 60 ml/min/1 73 sq  meters  Kidney failure less than 15 ml/min/1 73 sq  meters  Troponin I [[id number]]  (Normal) Collected:  11/23/18 1435    Lab Status:  Final result Specimen:  Blood from Arm, Right Updated:  11/23/18 1511     Troponin I 0 04 ng/mL     Protime-INR [[id number]]  (Normal) Collected:  11/23/18 1435    Lab Status:  Final result Specimen:  Blood from Arm, Right Updated:  11/23/18 1504     Protime 14 1 seconds      INR 1 08    APTT [[id number]]  (Normal) Collected:  11/23/18 1435    Lab Status:  Final result Specimen:  Blood from Arm, Right Updated:  11/23/18 1504     PTT 34 seconds     CBC and differential [[id number]]  (Abnormal) Collected:  11/23/18 1435    Lab Status:  Final result Specimen:  Blood from Arm, Right Updated:  11/23/18 1448     WBC 13 69 (H) Thousand/uL      RBC 4 47 Million/uL      Hemoglobin 14 6 g/dL      Hematocrit 43 4 %      MCV 97 fL      MCH 32 7 pg      MCHC 33 6 g/dL      RDW 12 9 %      MPV 10 2 fL      Platelets 526 Thousands/uL      nRBC 0 /100 WBCs      Neutrophils Relative 69 %      Immat GRANS % 0 %      Lymphocytes Relative 22 %      Monocytes Relative 7 %      Eosinophils Relative 1 %      Basophils Relative 1 %      Neutrophils Absolute 9 54 (H) Thousands/µL      Immature Grans Absolute 0 06 Thousand/uL      Lymphocytes Absolute 2 97 Thousands/µL      Monocytes Absolute 0 95 Thousand/µL      Eosinophils Absolute 0 07 Thousand/µL      Basophils Absolute 0 10 Thousands/µL         No results for input(s): BNP in the last 72 hours         Results from last 7 days  Lab Units 11/23/18  1435   TROPONIN I ng/mL 0 04         Magnesium:   Results from last 7 days  Lab Units 11/24/18  0504 11/23/18  1435   MAGNESIUM mg/dL 1 8 1 3*       Coags:   Results from last 7 days  Lab Units 11/23/18  1435   PTT seconds 34 INR  1 08       TSH:       Lipid Profile:         Cardiac testing:   Results for orders placed during the hospital encounter of 16   Echo complete with contrast if indicated    Narrative [facility]  [facility] [address]  [phone]    Transthoracic Echocardiogram  2D, M-mode, Doppler, and Color Doppler    Study date:  2016    Patient: Rosalia Martinez  MR number: [id number]  Account number: [de-identified]  : 1950  Age: 61 years  Gender: Female  Status: Outpatient  Location: Echo lab  Height: 64 in  Weight: 184 6 lb  BP: 124/ 72 mmHg    Indications: Murmur    Diagnoses: R01 1 - Cardiac murmur, unspecified    Sonographer:  EUN Berg  Primary Physician:  Arpita Guzman MD  Referring Physician:  Arpita Guzman MD  Group:  [facility]  Interpreting Physician:  Krishna Ott MD    SUMMARY    PROCEDURE INFORMATION:  Echocardiographic views were limited due to decreased penetration  LEFT VENTRICLE:  Systolic function was normal by visual assessment  Ejection fraction was  estimated to be 60 %  There were no regional wall motion abnormalities  Features were consistent with a pseudonormal left ventricular filling pattern,  with concomitant abnormal relaxation and increased filling pressure (grade 2  diastolic dysfunction)  LEFT ATRIUM:  The atrium was mildly dilated  MITRAL VALVE:  There was mild to moderate annular calcification  Transmitral velocity was increased due to valvular stenosis  There was very mild stenosis  There was moderate regurgitation  Mean transmitral gradient was 428 mmHg  Mitral valve area by pressure half-time was 2 1 cm squared  AORTIC VALVE:  Transaortic velocity was increased due to valvular stenosis  There was very mild stenosis  There was mild regurgitation  Valve mean gradient was 9 06 mmHg  TRICUSPID VALVE:  There was trace regurgitation    The tricuspid jet envelope definition was inadequate for estimation of RV  systolic pressure  There are no indirect findings (abnormal RV volume or  geometry, altered pulmonary flow velocity profile, or leftward septal  displacement) which would suggest moderate or severe pulmonary hypertension  PULMONIC VALVE:  There was trace regurgitation  Not well visualized  HISTORY: PRIOR HISTORY: Hyperlipidemia, hypertension  PROCEDURE: The procedure was performed in the echo lab  This was a routine  study  The transthoracic approach was used  The study included complete 2D  imaging, M-mode, complete spectral Doppler, and color Doppler  Images were  obtained from the parasternal, apical, subcostal, and suprasternal notch  acoustic windows  Echocardiographic views were limited due to decreased  penetration  This was a technically difficult study  LEFT VENTRICLE: Size was normal  Systolic function was normal by visual  assessment  Ejection fraction was estimated to be 60 %  There were no regional  wall motion abnormalities  Wall thickness was normal  No evidence of apical  thrombus  DOPPLER: Features were consistent with a pseudonormal left  ventricular filling pattern, with concomitant abnormal relaxation and increased  filling pressure (grade 2 diastolic dysfunction)  RIGHT VENTRICLE: The size was normal  Systolic function was normal  Wall  thickness was normal     LEFT ATRIUM: The atrium was mildly dilated  RIGHT ATRIUM: Size was normal     MITRAL VALVE: There was mild to moderate annular calcification  Valve structure  was normal  There was mild-moderate diffuse thickening of the anterior and  posterior leaflets  There was mild-moderate calcification of the anterior and  posterior leaflets, with mild chordal involvement  There was lower normal  leaflet separation  DOPPLER: Transmitral velocity was increased due to valvular  stenosis  There was very mild stenosis  There was moderate regurgitation   The  regurgitant jet was centrally directed  AORTIC VALVE: DOPPLER: Transaortic velocity was increased due to valvular  stenosis  There was very mild stenosis  There was mild regurgitation  TRICUSPID VALVE: The valve structure was normal  There was normal leaflet  separation  DOPPLER: The transtricuspid velocity was within the normal range  There was no evidence for stenosis  There was trace regurgitation  The  tricuspid jet envelope definition was inadequate for estimation of RV systolic  pressure  There are no indirect findings (abnormal RV volume or geometry,  altered pulmonary flow velocity profile, or leftward septal displacement) which  would suggest moderate or severe pulmonary hypertension  PULMONIC VALVE: Not well visualized  DOPPLER: The transpulmonic velocity was  within the normal range  There was trace regurgitation  PERICARDIUM: There was no pericardial effusion  The pericardium was normal in  appearance  AORTA: The root exhibited normal size  SYSTEMIC VEINS: IVC: The inferior vena cava was normal in size      MEASUREMENT TABLES    DOPPLER MEASUREMENTS  Aortic valve   (Reference normals)  Peak carmel   2 2 cm/s   (--)  Mean carmel   1 4 cm/s   (--)  Peak gradient   19 1 mmHg   (--)  Mean gradient   9 06 mmHg   (--)  Valve area   1 5 cm squared   (--)  Valve area, cont   1 7 cm squared   (--)  Mitral valve   (Reference normals)  Mean gradient   428 mmHg   (--)  Pressure half-time   105 ms   (--)  Valve area, PHT   2 1 cm squared   (--)    SYSTEM MEASUREMENT TABLES    2D  %FS: 43 1 %  AV Diam: 2 8 cm  EDV(Teich): 93 8 ml  EF(Teich): 74 4 %  ESV(Teich): 24 ml  IVSd: 1 cm  LA Area: 19 9 cm2  LA Diam: 3 4 cm  LVEDV MOD A2C: 28 6 ml  LVEDV MOD A4C: 64 6 ml  LVEF MOD A4C: 59 8 %  LVESV MOD A4C: 26 ml  LVIDd: 4 5 cm  LVIDs: 2 6 cm  LVLd A2C: 6 3 cm  LVLd A4C: 7 1 cm  LVLs A4C: 6 cm  LVOT Diam: 1 9 cm  LVPWd: 0 9 cm  RA Area: 8 cm2  RV Diam : 2 9 cm  SI(Teich): 36 9 ml/m2  SV MOD A4C: 38 6 ml  SV(Cube): 75 8 ml  SV(Teich): 69 8 ml    CW  AR Dec [place]: 2 1 m/s2  AR Dec Time:  1 ms  AR PHT: 582 1 ms  AR Vmax: 4 2 m/s  AR maxP 4 mmHg  AV Env  Ti: 288 4 ms  AV VTI: 39 6 cm  AV Vmax: 2 2 m/s  AV Vmean: 1 4 m/s  AV maxP 1 mmHg  AV meanP 1 mmHg  MV VTI: 49 3 cm  MV Vmax: 1 8 m/s  MV Vmean: 1 m/s  MV maxP 5 mmHg  MV meanP 3 mmHg    PW  ZHAO (VTI): 1 7 cm2  ZHAO Vmax: 1 5 cm2  LVOT Env  Ti: 306 8 ms  LVOT VTI: 23 3 cm  LVOT Vmax: 1 1 m/s  LVOT Vmean: 0 8 m/s  LVOT maxP 2 mmHg  LVOT meanP 6 mmHg  MV A [place]: 1 2 m/s  MV Dec [place]: 4 3 m/s2  MV DecT: 325 8 ms  MV E Jose Luis: 1 4 m/s  MV E/A Ratio: 1 2  MV PHT: 105 3 ms  MVA (VTI): 1 4 cm2  MVA By PHT: 2 1 cm2    [facility] Accredited Echocardiography [facility]    Prepared and electronically signed by    Monik Cruz MD  Signed 2016 13:37:30       No results found for this or any previous visit  No results found for this or any previous visit  No results found for this or any previous visit          CXR: no actiave cp/disease on cxr    EKG/TELE: see discussion

## 2018-12-18 NOTE — ANESTHESIA POSTPROCEDURE EVALUATION
Post-Op Assessment Note      CV Status:  Stable    Mental Status:  Alert and awake    Hydration Status:  Euvolemic    PONV Controlled:  Controlled    Airway Patency:  Patent    Post Op Vitals Reviewed: Yes          Staff: CRNA           BP   95/62   Temp      Pulse  85   Resp   22   SpO2   96 RA

## 2018-12-19 VITALS
SYSTOLIC BLOOD PRESSURE: 152 MMHG | RESPIRATION RATE: 18 BRPM | OXYGEN SATURATION: 94 % | BODY MASS INDEX: 30.75 KG/M2 | HEIGHT: 64 IN | WEIGHT: 180.12 LBS | DIASTOLIC BLOOD PRESSURE: 71 MMHG | HEART RATE: 91 BPM | TEMPERATURE: 98.2 F

## 2018-12-19 LAB
ANION GAP SERPL CALCULATED.3IONS-SCNC: 7 MMOL/L (ref 4–13)
BASOPHILS # BLD AUTO: 0.03 THOUSANDS/ΜL (ref 0–0.1)
BASOPHILS NFR BLD AUTO: 1 % (ref 0–1)
BUN SERPL-MCNC: 13 MG/DL (ref 5–25)
CALCIUM SERPL-MCNC: 8.3 MG/DL (ref 8.3–10.1)
CHLORIDE SERPL-SCNC: 110 MMOL/L (ref 100–108)
CO2 SERPL-SCNC: 23 MMOL/L (ref 21–32)
CREAT SERPL-MCNC: 0.91 MG/DL (ref 0.6–1.3)
EOSINOPHIL # BLD AUTO: 0.15 THOUSAND/ΜL (ref 0–0.61)
EOSINOPHIL NFR BLD AUTO: 3 % (ref 0–6)
ERYTHROCYTE [DISTWIDTH] IN BLOOD BY AUTOMATED COUNT: 12.7 % (ref 11.6–15.1)
GFR SERPL CREATININE-BSD FRML MDRD: 66 ML/MIN/1.73SQ M
GLUCOSE SERPL-MCNC: 80 MG/DL (ref 65–140)
HCT VFR BLD AUTO: 35 % (ref 34.8–46.1)
HGB BLD-MCNC: 10.9 G/DL (ref 11.5–15.4)
IMM GRANULOCYTES # BLD AUTO: 0.02 THOUSAND/UL (ref 0–0.2)
IMM GRANULOCYTES NFR BLD AUTO: 0 % (ref 0–2)
LYMPHOCYTES # BLD AUTO: 1.21 THOUSANDS/ΜL (ref 0.6–4.47)
LYMPHOCYTES NFR BLD AUTO: 25 % (ref 14–44)
MCH RBC QN AUTO: 32.1 PG (ref 26.8–34.3)
MCHC RBC AUTO-ENTMCNC: 31.1 G/DL (ref 31.4–37.4)
MCV RBC AUTO: 103 FL (ref 82–98)
MONOCYTES # BLD AUTO: 0.42 THOUSAND/ΜL (ref 0.17–1.22)
MONOCYTES NFR BLD AUTO: 9 % (ref 4–12)
NEUTROPHILS # BLD AUTO: 3 THOUSANDS/ΜL (ref 1.85–7.62)
NEUTS SEG NFR BLD AUTO: 62 % (ref 43–75)
NRBC BLD AUTO-RTO: 0 /100 WBCS
PLATELET # BLD AUTO: 201 THOUSANDS/UL (ref 149–390)
PMV BLD AUTO: 10.1 FL (ref 8.9–12.7)
POTASSIUM SERPL-SCNC: 3.6 MMOL/L (ref 3.5–5.3)
RBC # BLD AUTO: 3.4 MILLION/UL (ref 3.81–5.12)
SODIUM SERPL-SCNC: 140 MMOL/L (ref 136–145)
WBC # BLD AUTO: 4.83 THOUSAND/UL (ref 4.31–10.16)

## 2018-12-19 PROCEDURE — 85025 COMPLETE CBC W/AUTO DIFF WBC: CPT | Performed by: PHYSICIAN ASSISTANT

## 2018-12-19 PROCEDURE — 99217 PR OBSERVATION CARE DISCHARGE MANAGEMENT: CPT | Performed by: PHYSICIAN ASSISTANT

## 2018-12-19 PROCEDURE — 80048 BASIC METABOLIC PNL TOTAL CA: CPT | Performed by: PHYSICIAN ASSISTANT

## 2018-12-19 RX ORDER — AMLODIPINE BESYLATE 2.5 MG/1
2.5 TABLET ORAL DAILY
Qty: 30 TABLET | Refills: 3 | Status: SHIPPED | OUTPATIENT
Start: 2018-12-19 | End: 2019-01-08

## 2018-12-19 RX ORDER — POTASSIUM CHLORIDE 20 MEQ/1
40 TABLET, EXTENDED RELEASE ORAL ONCE
Status: COMPLETED | OUTPATIENT
Start: 2018-12-19 | End: 2018-12-19

## 2018-12-19 RX ADMIN — ATORVASTATIN CALCIUM 20 MG: 20 TABLET, FILM COATED ORAL at 09:06

## 2018-12-19 RX ADMIN — NICOTINE 1 PATCH: 14 PATCH, EXTENDED RELEASE TRANSDERMAL at 09:06

## 2018-12-19 RX ADMIN — POTASSIUM CHLORIDE 40 MEQ: 1500 TABLET, EXTENDED RELEASE ORAL at 10:18

## 2018-12-19 RX ADMIN — POTASSIUM CHLORIDE 20 MEQ: 1500 TABLET, EXTENDED RELEASE ORAL at 09:06

## 2018-12-19 RX ADMIN — LORATADINE 10 MG: 10 TABLET ORAL at 09:06

## 2018-12-19 RX ADMIN — ACETAMINOPHEN 650 MG: 325 TABLET ORAL at 09:06

## 2018-12-19 RX ADMIN — ESCITALOPRAM OXALATE 10 MG: 10 TABLET ORAL at 09:06

## 2018-12-19 NOTE — DISCHARGE INSTRUCTIONS
Please take Xarelto 20 mg daily with a meal for 1 month, at which time it may be discontinued  Please discontinue diltiazem  Please begin Norvasc 2 5 mg daily, and monitor your blood pressure at home  No heavy lifting or strenuous activity for one week  No soaking in a bath tub/hot tub/swimming pool for one week or until groin heals  If you notice ongoing bleeding, swelling, or firm lumps in groin near ablation incision, please contact Dr Emil Mandujano office - [phone]

## 2018-12-19 NOTE — PLAN OF CARE
DISCHARGE PLANNING     Discharge to home or other facility with appropriate resources Progressing        PAIN - ADULT     Verbalizes/displays adequate comfort level or baseline comfort level Progressing        Potential for Falls     Patient will remain free of falls Progressing        SAFETY ADULT     Maintain or return mobility status to optimal level Progressing

## 2018-12-19 NOTE — DISCHARGE SUMMARY
Discharge Summary - Arline Swan 79 y o  female MRN: [id number]    Unit/Bed#: [facility] 325-41 Encounter: [id number]      Admission Date: 12/18/2018   Discharge Date: 12/19/2018    Discharge Diagnosis:   1  Symptomatic SVT, status post ablation of PVR T via left lateral pathway and right SVC atrial tachycardia  2  Preserved LV systolic function with ejection fraction 60% per echo 06/2016, with grade 2 diastolic dysfunction  3  Mild to moderate mitral regurgitation  4  Moderate to severe pulmonary hypertension  5  Hypertension  6  CKD  7  COPD with history of tobacco abuse  8  Hyperlipidemia    Procedures Performed:  Orders Placed This Encounter   Procedures    Cardiac eps/svt ablation   1  Ablation of supraventricular tachycardia- AV Reentry tachycardia left lateral pathway  2  Ablation of second arrhythmia different mechanism- Atrial tachycardia from Superior Vena Cava (SVC)  3  3-D mapping with CARTO  3  Drug infusion with isoproterenol  4  EP testing with left atrial pacing and recording  5  Transseptal puncture  6  Intracardiac echo    Consultants: none    HPI: Please refer to the initial history and physical as well as procedure notes for full details  Briefly, Arline Swan is a 79y o  year old female with a history of hypertension, CKD, tobacco abuse with COPD  She was recently admitted to [facility] with COPD exacerbation  While there, she had several documented episodes of SVT, and she reports experiencing these at home as well  An ablation was ultimately recommended, and she presented to undergo the procedure  Hospital Course: Arline Swan presented at her baseline state of health  After the procedure was explained in detail and consent was obtained, she underwent the above-listed procedures without complications  Please see procedure note by Dr Sunita Mazariegos for full details  She tolerated the procedure well  She was then monitored overnight for further observation      There were no acute issues or events overnight  The following morning she denied all cardiac complaints, including chest pain/pressure, dyspnea, palpitations, dizziness, lightheadedness, or syncope  Her vital signs were reviewed and labs are stable  Her blood pressure was slightly elevated throughout  Her potassium was low normal, and was repleted prior to discharge  Telemetry showed normal sinus rhythm without arrhythmias  Her groins were soft without significant hematoma or recurrent bleeding  Physical exam:  GEN: NAD, alert and oriented, well appearing  SKIN: dry without significant lesions or rashes  HEENT: NCAT, PERRL, EOMs intact  NECK: No JVD appreciated  CARDIOVASCULAR: RRR, normal S1, S2 without rubs or gallops appreciated; systolic murmur noted  LUNGS: Clear to auscultation bilaterally without wheezes, rhonchi, or rales  ABDOMEN: Soft, nontender, nondistended  EXTREMITIES/VASCULAR: perfused without clubbing, cyanosis, or edema b/l  PSYCH: Normal mood and affect  NEURO: CN ll-Xll grossly intact    She was given routine post ablation discharge instructions and restrictions, and these were explained in detail  She was given a follow-up appointment with myself, and she was instructed to follow up with her primary cardiologist as previously instructed  In terms of her medications, her diltiazem has been discontinued  Given that she had an ablation of the left lateral pathway, we recommended Xarelto 20 mg once daily for 1 month, at which time it may be discontinued  Since her blood pressure was elevated throughout her admission, I will add Norvasc 2 5 mg daily  I asked her to check her blood pressures at home, and to contact us if they are not well controlled  She is stable for discharge at this time with all questions answered  She was discussed in detail with Dr Kristen Lornezo who is in agreement with this discharge summary       Discharge Medications:  See after visit summary for reconciled discharge medications provided to patient and family  Prescriptions Prior to Admission   Medication    albuterol (PROVENTIL HFA) 90 mcg/act inhaler    atorvastatin (LIPITOR) 20 mg tablet    Cetirizine HCl (ZYRTEC ALLERGY) 10 MG CAPS    diltiazem (CARDIZEM CD) 120 mg 24 hr capsule    escitalopram (LEXAPRO) 10 mg tablet    gabapentin (NEURONTIN) 100 mg capsule    Multiple Vitamin (MULTI VITAMIN DAILY PO)    nicotine (NICODERM CQ) 14 mg/24hr TD 24 hr patch    potassium chloride (K-DUR,KLOR-CON) 20 mEq tablet    guaiFENesin (ROBITUSSIN) 100 mg/5 mL oral solution    ipratropium-albuterol (COMBIVENT RESPIMAT) inhaler    predniSONE 10 mg tablet         Pertininet Labs/diagnostics:  CBC with diff:   Results from last 7 days  Lab Units 12/19/18  0546 12/18/18  0700   WBC Thousand/uL 4 83 5 19   HEMOGLOBIN g/dL 10 9* 11 8   HEMATOCRIT % 35 0 36 9   MCV fL 103* 102*   PLATELETS Thousands/uL 201 204   MCH pg 32 1 32 7   MCHC g/dL 31 1* 32 0   RDW % 12 7 12 8   MPV fL 10 1 10 2   NRBC AUTO /100 WBCs 0 0       BMP:  Results from last 7 days  Lab Units 12/19/18  0546 12/18/18  0700 12/14/18  1047   POTASSIUM mmol/L 3 6 3 7 3 8   CHLORIDE mmol/L 110* 110* 105   CO2 mmol/L 23 26 28   BUN mg/dL 13 13 18   CREATININE mg/dL 0 91 1 02 1 06   CALCIUM mg/dL 8 3 8 8 10 5*       Magnesium:       Coags:   Results from last 7 days  Lab Units 12/18/18  0700   INR  1 89         Complications: none    Condition at Discharge: good     Discharge instructions/Information to patient and family:   See after visit summary for information provided to patient and family  Provisions for Follow-Up Care:  See after visit summary for information related to follow-up care and any pertinent home health orders  Disposition: Home    Planned Readmission: No    Discharge Statement   I spent 45 minutes minutes discharging the patient  This time was spent on the day of discharge  I had direct contact with the patient on the day of discharge   Additional documentation is required if more than 30 minutes were spent on discharge   Evaluating the incision, discussing discharge instructions and restrictions, arranging follow up appointments, discussing medications

## 2018-12-19 NOTE — UTILIZATION REVIEW
12/18/18 1107  Outpatient No Charge Bed Once     Transfer Service: Cardiology       Question: Admitting Physician Answer: Tanya Prakash        Cardiac eps/svt ablation    /71 (BP Location: Left arm)   Pulse 91   Temp 98 2 °F (36 8 °C) (Oral)   Resp 18   Ht 5' 4" (1 626 m)   Wt 81 7 kg (180 lb 1 9 oz)   LMP  (LMP Unknown)   SpO2 94%   BMI 30 92 kg/m²     Scheduled Meds:  Current Facility-Administered Medications:  acetaminophen 650 mg Oral Q4H PRN Suma Soden, PA-C   albuterol 1 puff Inhalation Q6H PRN Suma Soden, PA-C   atorvastatin 20 mg Oral Daily Suma Soden, PA-C   escitalopram 10 mg Oral Daily Suma Soden, PA-C   gabapentin 300 mg Oral HS Suma Soden, PA-C   loratadine 10 mg Oral Daily Suma Soden, PA-C   nicotine 1 patch Transdermal Daily Suma Soden, PA-C   potassium chloride 20 mEq Oral BID Suma Soden, PA-C   rivaroxaban 20 mg Oral Daily With SURY Figueroa

## 2018-12-21 ENCOUNTER — TELEPHONE (OUTPATIENT)
Dept: CARDIOLOGY CLINIC | Facility: CLINIC | Age: 68
End: 2018-12-21

## 2018-12-21 DIAGNOSIS — L27.0 ALLERGIC DRUG RASH: Primary | ICD-10-CM

## 2018-12-21 NOTE — TELEPHONE ENCOUNTER
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

## 2018-12-21 NOTE — TELEPHONE ENCOUNTER
Patient called, SVT ablation with Dr Bonnie Ayala 12/18/18, d/c 12/19/18, started with rash on torso (front and back) raised, does not itch  Benadryl seems to be helping, seems less  Newly on Xarelto and Norvasc  Feels from one of the new meds  Dr Bonnie Ayala Rx'd Xarelto X30 days post ablation  Patient also c/o SOB and a dry cough  Also uses an inhaler, ? From one of new meds also  States looks like a measle-type rash  She woke up with it this morning  Please advise if need to d/c meds or change meds    Thanks

## 2019-01-02 RX ORDER — PREDNISONE 20 MG/1
40 TABLET ORAL DAILY
Qty: 4 TABLET | Refills: 0 | OUTPATIENT
Start: 2019-01-02 | End: 2019-01-08 | Stop reason: ALTCHOICE

## 2019-01-08 ENCOUNTER — OFFICE VISIT (OUTPATIENT)
Dept: FAMILY MEDICINE CLINIC | Facility: CLINIC | Age: 69
End: 2019-01-08
Payer: MEDICARE

## 2019-01-08 ENCOUNTER — APPOINTMENT (OUTPATIENT)
Dept: LAB | Facility: CLINIC | Age: 69
End: 2019-01-08
Payer: MEDICARE

## 2019-01-08 VITALS
HEIGHT: 64 IN | WEIGHT: 179.6 LBS | SYSTOLIC BLOOD PRESSURE: 128 MMHG | DIASTOLIC BLOOD PRESSURE: 66 MMHG | TEMPERATURE: 98 F | BODY MASS INDEX: 30.66 KG/M2 | RESPIRATION RATE: 18 BRPM | HEART RATE: 100 BPM

## 2019-01-08 DIAGNOSIS — Z00.00 MEDICARE ANNUAL WELLNESS VISIT, SUBSEQUENT: ICD-10-CM

## 2019-01-08 DIAGNOSIS — F41.8 DEPRESSION WITH ANXIETY: ICD-10-CM

## 2019-01-08 DIAGNOSIS — E83.52 HYPERCALCEMIA: ICD-10-CM

## 2019-01-08 DIAGNOSIS — Z11.59 ENCOUNTER FOR HEPATITIS C SCREENING TEST FOR LOW RISK PATIENT: ICD-10-CM

## 2019-01-08 DIAGNOSIS — E78.5 HYPERLIPIDEMIA, UNSPECIFIED HYPERLIPIDEMIA TYPE: ICD-10-CM

## 2019-01-08 DIAGNOSIS — E87.6 HYPOKALEMIA: Primary | ICD-10-CM

## 2019-01-08 DIAGNOSIS — I47.1 SVT (SUPRAVENTRICULAR TACHYCARDIA) (HCC): Primary | ICD-10-CM

## 2019-01-08 PROBLEM — F17.200 CURRENT EVERY DAY SMOKER: Status: RESOLVED | Noted: 2018-07-05 | Resolved: 2019-01-08

## 2019-01-08 PROBLEM — J40 BRONCHITIS: Status: RESOLVED | Noted: 2018-11-23 | Resolved: 2019-01-08

## 2019-01-08 LAB
ALBUMIN SERPL BCP-MCNC: 3.9 G/DL (ref 3.5–5)
ALP SERPL-CCNC: 102 U/L (ref 46–116)
ALT SERPL W P-5'-P-CCNC: 30 U/L (ref 12–78)
ANION GAP SERPL CALCULATED.3IONS-SCNC: 8 MMOL/L (ref 4–13)
AST SERPL W P-5'-P-CCNC: 20 U/L (ref 5–45)
BILIRUB SERPL-MCNC: 0.54 MG/DL (ref 0.2–1)
BUN SERPL-MCNC: 17 MG/DL (ref 5–25)
CALCIUM SERPL-MCNC: 9.5 MG/DL (ref 8.3–10.1)
CHLORIDE SERPL-SCNC: 109 MMOL/L (ref 100–108)
CO2 SERPL-SCNC: 25 MMOL/L (ref 21–32)
CREAT SERPL-MCNC: 1.08 MG/DL (ref 0.6–1.3)
GFR SERPL CREATININE-BSD FRML MDRD: 54 ML/MIN/1.73SQ M
GLUCOSE SERPL-MCNC: 87 MG/DL (ref 65–140)
MAGNESIUM SERPL-MCNC: 1.8 MG/DL (ref 1.6–2.6)
POTASSIUM SERPL-SCNC: 3 MMOL/L (ref 3.5–5.3)
PROT SERPL-MCNC: 7.4 G/DL (ref 6.4–8.2)
SODIUM SERPL-SCNC: 142 MMOL/L (ref 136–145)

## 2019-01-08 PROCEDURE — 83735 ASSAY OF MAGNESIUM: CPT

## 2019-01-08 PROCEDURE — 36415 COLL VENOUS BLD VENIPUNCTURE: CPT | Performed by: INTERNAL MEDICINE

## 2019-01-08 PROCEDURE — 86803 HEPATITIS C AB TEST: CPT

## 2019-01-08 PROCEDURE — 99213 OFFICE O/P EST LOW 20 MIN: CPT | Performed by: INTERNAL MEDICINE

## 2019-01-08 PROCEDURE — 80053 COMPREHEN METABOLIC PANEL: CPT | Performed by: INTERNAL MEDICINE

## 2019-01-08 PROCEDURE — G0439 PPPS, SUBSEQ VISIT: HCPCS | Performed by: INTERNAL MEDICINE

## 2019-01-08 RX ORDER — POTASSIUM CHLORIDE 20 MEQ/1
20 TABLET, EXTENDED RELEASE ORAL 2 TIMES DAILY
Qty: 6 TABLET | Refills: 0 | Status: SHIPPED | OUTPATIENT
Start: 2019-01-08 | End: 2019-03-22 | Stop reason: ALTCHOICE

## 2019-01-08 RX ORDER — ASPIRIN 325 MG
325 TABLET, DELAYED RELEASE (ENTERIC COATED) ORAL DAILY
Qty: 30 TABLET | Refills: 0
Start: 2019-01-08 | End: 2019-01-16 | Stop reason: ALTCHOICE

## 2019-01-08 NOTE — PROGRESS NOTES
Assessment and Plan:    Problem List Items Addressed This Visit     None        Health Maintenance Due   Topic Date Due    Hepatitis C Screening  1950    Medicare Annual Wellness Visit (AWV)  1950         HPI:  Cathy Pandey is a 79 y o  female here for her Subsequent Wellness Visit  Patient Active Problem List   Diagnosis    Arthritis    Depression with anxiety    Hyperlipidemia    Irritable bowel syndrome with diarrhea    Lumbar degenerative disc disease    Vitamin D deficiency    Current every day smoker    Greater trochanteric bursitis of right hip    Pulmonary emphysema (HCC)    Hypokalemia    SVT (supraventricular tachycardia) (HCC)    Bronchitis     Past Medical History:   Diagnosis Date    Anxiety     Bursitis of hip     Last Assessed:6/5/2017    Depression     Herniated lumbar intervertebral disc     Hyperlipidemia     Hypertension     Hypokalemia     Pituitary adenoma ([address] )     Last Assessed:2/12/2015 ; Previously on bromocriptine    Spinal stenosis     Systolic murmur of aorta     Vitamin D deficiency      Past Surgical History:   Procedure Laterality Date    BREAST SURGERY      Incisional breast biopsy    CARPAL TUNNEL RELEASE Left     CHOLECYSTECTOMY      OTHER SURGICAL HISTORY      EXCISION OF PAROTID TUMOR/GLAND    PAROTIDECTOMY      FL COLONOSCOPY FLX DX W/COLLJ SPEC WHEN PFRMD N/A 6/6/2017    Procedure: COLONOSCOPY;  Surgeon: Angela Pisano DO;  Location: [facility];   Service: Gastroenterology    ROTATOR CUFF REPAIR      SALPINGOOPHORECTOMY       Family History   Problem Relation Age of Onset    Colon cancer Mother     Coronary artery disease Mother     Colonic polyp Mother     Liver cancer Mother     Multiple sclerosis Father     Hypertension Other      History   Smoking Status    Current Every Day Smoker    Packs/day: 1 00    Types: Cigarettes   Smokeless Tobacco    Never Used     Comment: 3 cigarettes a day     History   Alcohol Use  0 6 oz/week    1 Shots of liquor per week     Comment: 1 martini a night/social      History   Drug Use No       Current Outpatient Prescriptions   Medication Sig Dispense Refill    albuterol (PROVENTIL HFA) 90 mcg/act inhaler Inhale 1 puff every 6 (six) hours as needed for wheezing 6 7 g 0    amLODIPine (NORVASC) 2 5 mg tablet Take 1 tablet (2 5 mg total) by mouth daily 30 tablet 3    atorvastatin (LIPITOR) 20 mg tablet Take 1 tablet (20 mg total) by mouth daily 90 tablet 3    Cetirizine HCl (ZYRTEC ALLERGY) 10 MG CAPS Take by mouth as needed        escitalopram (LEXAPRO) 10 mg tablet Take 1 tablet (10 mg total) by mouth daily 30 tablet 11    gabapentin (NEURONTIN) 100 mg capsule Take 3 capsules (300 mg total) by mouth daily at bedtime 270 capsule 0    guaiFENesin (ROBITUSSIN) 100 mg/5 mL oral solution Take 10 mL (200 mg total) by mouth every 4 (four) hours as needed (cough) (Patient not taking: Reported on 12/18/2018 ) 236 mL 0    ipratropium-albuterol (COMBIVENT RESPIMAT) inhaler Inhale 1 puff 4 (four) times a day (Patient not taking: Reported on 12/3/2018 ) 4 g 0    Multiple Vitamin (MULTI VITAMIN DAILY PO) Take by mouth      nicotine (NICODERM CQ) 14 mg/24hr TD 24 hr patch Place 1 patch on the skin daily Apply a new patch every 24 hours to a clean, dry, hairless site on the upper arm or hip  28 patch 0    potassium chloride (K-DUR,KLOR-CON) 20 mEq tablet Take 1 tablet (20 mEq total) by mouth 2 (two) times a day 30 tablet 0    predniSONE 10 mg tablet Take 1 tab TID x 3 days, BID x 3 days, daily x 3 days (Patient not taking: Reported on 12/18/2018 ) 18 tablet 0    predniSONE 20 mg tablet Take 2 tablets (40 mg total) by mouth daily 4 tablet 0    rivaroxaban (XARELTO) 20 mg tablet Take 1 tablet (20 mg total) by mouth daily with breakfast 30 tablet 0     No current facility-administered medications for this visit        Allergies   Allergen Reactions    Ancef [Cefazolin]     Cephalosporins Other (See Comments)     c diff    Fruit Extracts      Annotation - [id number]: peaches and strawberries    Levofloxacin     Morphine GI Intolerance    Other      FRUIT    Penicillins Hives    Strawberry C [Ascorbate]     Tetracycline Edema     Immunization History   Administered Date(s) Administered    H1N1, All Formulations 10/27/2009    Influenza 01/17/2013, 11/14/2013, 09/10/2018    Influenza Quadrivalent, 6-35 Months IM 11/18/2015    Influenza Split High Dose Preservative Free IM 11/30/2017, 09/10/2018    Influenza TIV (IM) 10/15/2016    Pneumococcal Conjugate 13-Valent 06/20/2017    Pneumococcal Polysaccharide PPV23 07/05/2018    TD (adult) Preservative Free 11/18/2015    Td (adult), Unspecified 05/14/2005       Patient Care Team:  Janie Shen, DO as PCP - Ericka Shepherd, DO  MD Kanchan Simpson, DO    Medicare Screening Tests and Risk Assessments:  Kvng Will is here for her Subsequent Wellness visit  Health Risk Assessment:  Patient rates overall health as good  Patient feels that their physical health rating is Slightly better  Eyesight was rated as Same  Hearing was rated as Same  Patient feels that their emotional and mental health rating is Same  Pain experienced by patient in the last 7 days has been Some  Patient's pain rating has been 3/10  Patient states that she has experienced no weight loss or gain in last 6 months  Emotional/Mental Health:  Patient has been feeling nervous/anxious  PHQ-9 Depression Screening:    Frequency of the following problems over the past two weeks:      1  Little interest or pleasure in doing things: 0 - not at all      2  Feeling down, depressed, or hopeless: 0 - not at all  PHQ-2 Score: 0          Broken Bones/Falls: Fall Risk Assessment:    In the past year, patient has experienced: No history of falling in past year          Bladder/Bowel:  Patient has leaked urine accidently in the last six months  Patient reports no loss of bowel control  Immunizations:  Patient has had a flu vaccination within the last year  Patient has received a pneumonia shot  Patient has not received a shingles shot  Patient has not received tetanus/diphtheria shot  Home Safety:  Patient does not have trouble with stairs inside or outside of their home  Patient currently reports that there are no safety hazards present in home, working smoke alarms, working carbon monoxide detectors  Preventative Screenings:   Breast cancer screening performed, colon cancer screen completed, cholesterol screen completed, glaucoma eye exam completed,     Nutrition:  Current diet: Regular with servings of the following:    Medications:  Patient is currently taking over-the-counter supplements  Patient is able to manage medications  Lifestyle Choices:  Patient reports no tobacco use  Patient has not smoked or used tobacco in the past   Patient reports alcohol use  Patient drives a vehicle  Patient wears seat belt  Activities of Daily Living:  Can get out of bed by his or her self, able to dress self, able to make own meals, able to do own shopping, able to bathe self, can do own laundry/housekeeping, can manage own money, pay bills and track expenses    Previous Hospitalizations:  Hospitalization or ED visit in past 12 months  Number of hospitalizations within the last year: 1-2        Advanced Directives:  Patient has decided on a power of   Patient has spoken to designated power of   Patient has not completed advanced directive          Preventative Screening/Counseling:      Cardiovascular:      General: Risks and Benefits Discussed and Screening Current          Diabetes:      General: Risks and Benefits Discussed and Screening Current          Colorectal Cancer:      General: Screening Current          Breast Cancer:      General: Screening Current          Cervical Cancer:      General: Screening Current          Osteoporosis:      Counseling: Calcium and Vitamin D Intake          AAA:      General: Screening Not Indicated          Glaucoma:      General: Screening Current          HIV:      General: Screening Not Indicated          Hepatitis C:      Counseling: has received general HCV counseling        Advanced Directives:   Patient has living will for healthcare, patient has an advanced directive

## 2019-01-08 NOTE — PROGRESS NOTES
Assessment/Plan:   Diagnoses and all orders for this visit:    SVT (supraventricular tachycardia) (HCC)  -     aspirin (ECOTRIN) 325 mg EC tablet; Take 1 tablet (325 mg total) by mouth daily    Hypercalcemia  -     Comprehensive metabolic panel  -     Magnesium; Future    Encounter for hepatitis C screening test for low risk patient  -     Hepatitis C antibody; Future    Hyperlipidemia, unspecified hyperlipidemia type    Medicare annual wellness visit, subsequent    Depression with anxiety      Tiffany Diaz was seen and examined in the office today  Repeat blood pressure after sitting for some time was much improved  She is doing okay off the amlodipine and does check her BP at home as well  She was given follow up blood work to complete and also reminded to complete her CT lung screening  She has a follow up with Cardiology next with secondary to her SVT s/p ablation  Mood is stable on Lexapro  Otherwise no other changes were made  Subjective:      Patient ID: Arpit Box is a 79 y o  female  Tiffany Diaz is here today for a follow up/AWV visit  She reports generally feeling well  She underwent an elective catheter ablation for SVT  She was started on amlodipine as well as Xarelto (for a short period) and developed a rash  She ended up stopping this and was started on Aspirin  Overall, she is feeling well  She continues to refrain from smoking  She does report some alcohol use but limits this  Overall, she feels well and is largely without complaints  The following portions of the patient's history were reviewed and updated as appropriate: allergies, current medications, past family history, past medical history, past social history, past surgical history and problem list     Review of Systems   Constitutional: Negative for chills, fever and unexpected weight change  HENT: Positive for postnasal drip  Negative for sinus pain, sinus pressure and sore throat  Eyes: Negative for visual disturbance  Respiratory: Negative for cough, chest tightness, shortness of breath and wheezing  Cardiovascular: Negative for chest pain, palpitations and leg swelling  Gastrointestinal: Negative for abdominal pain, blood in stool, constipation, diarrhea, nausea and vomiting  Musculoskeletal: Positive for back pain  Negative for arthralgias and myalgias  Neurological: Negative for dizziness, syncope, numbness and headaches  Psychiatric/Behavioral: Negative for dysphoric mood and sleep disturbance  The patient is not nervous/anxious  Objective:      /66   Pulse 100   Temp 98 °F (36 7 °C)   Resp 18   Ht 5' 4" (1 626 m)   Wt 81 5 kg (179 lb 9 6 oz)   LMP  (LMP Unknown)   BMI 30 83 kg/m²          Physical Exam   Constitutional: She is oriented to person, place, and time  She appears well-developed and well-nourished  No distress  HENT:   Head: Normocephalic and atraumatic  Eyes: Conjunctivae and EOM are normal  Right eye exhibits no discharge  Left eye exhibits no discharge  No scleral icterus  Neck: Normal range of motion  Cardiovascular: Normal rate and regular rhythm  Murmur heard  Pulmonary/Chest: Effort normal and breath sounds normal  No respiratory distress  She has no wheezes  She exhibits no tenderness  Abdominal: Soft  Bowel sounds are normal  There is no tenderness  Musculoskeletal: Normal range of motion  Lymphadenopathy:     She has no cervical adenopathy  Neurological: She is alert and oriented to person, place, and time  No cranial nerve deficit  Skin: Skin is warm and dry  She is not diaphoretic  No erythema  Psychiatric: She has a normal mood and affect  Her speech is normal and behavior is normal  Judgment and thought content normal    Vitals reviewed

## 2019-01-09 ENCOUNTER — TELEPHONE (OUTPATIENT)
Dept: FAMILY MEDICINE CLINIC | Facility: CLINIC | Age: 69
End: 2019-01-09

## 2019-01-09 LAB — HCV AB SER QL: NORMAL

## 2019-01-09 NOTE — TELEPHONE ENCOUNTER
Notes recorded by Jonelle Payne on 1/8/2019 at 5:36 PM EST  SPOKE TO PT GAVE THE ABOVE RESULTS  ------    Notes recorded by Amaury Joseph DO on 1/8/2019 at 5:05 PM EST  Her potassium is still low with magnesium normal low  I want her to do another test called a urine potassium  I will send in a supplement for the potassium too but first do the urine potassium  The potassium she will take twice daily for 3 days   Calcium is fine

## 2019-01-09 NOTE — TELEPHONE ENCOUNTER
Pt called and was confused about her lab results and orders  I reviewed them with her   She already took a potassium today  She will hold any more until she submits a urine specimen

## 2019-01-10 ENCOUNTER — APPOINTMENT (OUTPATIENT)
Dept: LAB | Age: 69
End: 2019-01-10
Payer: MEDICARE

## 2019-01-10 LAB — POTASSIUM UR-SCNC: 30.6 MMOL/L (ref 1–300)

## 2019-01-10 PROCEDURE — 84133 ASSAY OF URINE POTASSIUM: CPT | Performed by: INTERNAL MEDICINE

## 2019-01-11 DIAGNOSIS — E87.6 HYPOKALEMIA: Primary | ICD-10-CM

## 2019-01-15 ENCOUNTER — APPOINTMENT (OUTPATIENT)
Dept: LAB | Age: 69
End: 2019-01-15
Payer: MEDICARE

## 2019-01-15 DIAGNOSIS — E87.6 HYPOPOTASSEMIA: Primary | ICD-10-CM

## 2019-01-15 LAB
ANION GAP SERPL CALCULATED.3IONS-SCNC: 9 MMOL/L (ref 4–13)
BUN SERPL-MCNC: 13 MG/DL (ref 5–25)
CALCIUM SERPL-MCNC: 9 MG/DL (ref 8.3–10.1)
CHLORIDE SERPL-SCNC: 110 MMOL/L (ref 100–108)
CO2 SERPL-SCNC: 25 MMOL/L (ref 21–32)
CREAT SERPL-MCNC: 0.92 MG/DL (ref 0.6–1.3)
GFR SERPL CREATININE-BSD FRML MDRD: 65 ML/MIN/1.73SQ M
GLUCOSE P FAST SERPL-MCNC: 92 MG/DL (ref 65–99)
POTASSIUM SERPL-SCNC: 3.9 MMOL/L (ref 3.5–5.3)
SODIUM SERPL-SCNC: 144 MMOL/L (ref 136–145)

## 2019-01-15 PROCEDURE — 82088 ASSAY OF ALDOSTERONE: CPT

## 2019-01-15 PROCEDURE — 36415 COLL VENOUS BLD VENIPUNCTURE: CPT | Performed by: INTERNAL MEDICINE

## 2019-01-15 PROCEDURE — 80048 BASIC METABOLIC PNL TOTAL CA: CPT | Performed by: INTERNAL MEDICINE

## 2019-01-16 ENCOUNTER — OFFICE VISIT (OUTPATIENT)
Dept: CARDIOLOGY CLINIC | Facility: CLINIC | Age: 69
End: 2019-01-16
Payer: MEDICARE

## 2019-01-16 VITALS
WEIGHT: 187.7 LBS | SYSTOLIC BLOOD PRESSURE: 128 MMHG | HEART RATE: 90 BPM | DIASTOLIC BLOOD PRESSURE: 84 MMHG | BODY MASS INDEX: 32.04 KG/M2 | HEIGHT: 64 IN

## 2019-01-16 DIAGNOSIS — I10 ESSENTIAL HYPERTENSION: ICD-10-CM

## 2019-01-16 DIAGNOSIS — E87.6 HYPOKALEMIA: ICD-10-CM

## 2019-01-16 DIAGNOSIS — I47.1 SVT (SUPRAVENTRICULAR TACHYCARDIA) (HCC): Primary | ICD-10-CM

## 2019-01-16 DIAGNOSIS — I05.9 MITRAL VALVE DISEASE: ICD-10-CM

## 2019-01-16 PROCEDURE — 99214 OFFICE O/P EST MOD 30 MIN: CPT | Performed by: PHYSICIAN ASSISTANT

## 2019-01-16 PROCEDURE — 93000 ELECTROCARDIOGRAM COMPLETE: CPT | Performed by: PHYSICIAN ASSISTANT

## 2019-01-16 RX ORDER — IBUPROFEN 800 MG/1
800 TABLET ORAL AS NEEDED
COMMUNITY
End: 2021-03-25 | Stop reason: ALTCHOICE

## 2019-01-16 NOTE — PROGRESS NOTES
Cardiology Follow Up    Rodrigo Galeas  1950  [id number]  HEART & VASCULAR [facility]  [address]        Assessment/Plan     1  SVT (supraventricular tachycardia) (HCC)  POCT ECG   2  Mitral valve disease  Echo complete with contrast if indicated   3  Hypokalemia     4  Essential hypertension         ASSESSMENT:  1  Symptomatic SVT, status post ablation of AVRT via left lateral pathway and right SVC atrial tachycardia 12/19/2018  2  Preserved LV systolic function with ejection fraction 60% per echo 06/2016, with grade 2 diastolic dysfunction  3  Mild to moderate mitral regurgitation  4  Moderate to severe pulmonary hypertension  5  Hypertension  6  CKD  7  COPD with history of tobacco abuse  8  Hyperlipidemia    DISCUSSION/SUMMARY:  1  Symptomatic SVT status post ablation 12/2018 - she has had no recurrent episodes since her ablation  She recovered well from the procedure, with no complications noted  She questioned her baseline heart rate, and feels it is slightly elevated  Upon review of prior EKGs it seems her heart rate typically runs between 90 and 100 beats per minute  She completed 1 month of anticoagulation, and no longer requires this from our standpoint  She is currently not on any AV chandler agents, and no changes are necessary at this time  2  Reported history of PVCs - she reports a history of PVCs, and does note some irregularities at times when she checks her pulse  She feels these have improved since the ablation  Her EKG today shows normal sinus rhythm without ectopy  I recommended that if she has ever worsening symptoms or if she has a day when she is having significant skipped beats, to contact our office so a Holter monitor can be placed to better determine what she is experiencing  Otherwise, she is asymptomatic  3  Hypertension - her skin rash may have been due to amlodipine, and this has been discontinued    Her blood pressure today is well controlled, and it was also well controlled another recent office visit  She will continue to monitor this at home, and will contact us if it is consistently elevated  No need for medications at this time  4  Mild-to-moderate mitral regurgitation - her last echo was in 2016, and she does have a murmur noted on exam   I have asked her to recheck an echocardiogram at her convenience for ongoing monitoring of her valvular disease  5  Moderate to severe pulmonary hypertension with history of COPD - she is currently not on medications, but does use her rescue inhaler as needed  We again will recheck an echocardiogram, but she will otherwise follow up with her primary physician for her respiratory issues  6  Hypokalemia - her PCP did blood work recently which showed a potassium of 3 0  Urine studies were obtained, and she was on a short course of potassium supplementation  Her blood work yesterday showed potassium 3 9  A nephrology consultation had previously been recommended, and I would agree with this  She will follow up again in 6 months, but she may not need this appointment if she continues to feel well from a cardiac standpoint  I will await the results of her echocardiogram to see if there is any progression of her valvular disease  She knows to monitor her blood pressure, and to contact us if it is ever consistently elevated  Otherwise, I asked her to contact us with any questions, concerns, or changes in symptoms  History of Present Illness     HPI/INTERVAL HISTORY: Salud Richard is a 79 y o  female with a history of hypertension, CKD, and tobacco abuse with COPD  She was previously admitted to [facility] with a COPD exacerbation  While there, she had several documented episodes of SVT, and she reported experiencing those at home as well    An ablation was ultimately recommended, and on 12/19/2018 she underwent ablation of AVRT via a left lateral pathway as well as a right SVC atrial tachycardia  As this was a left-sided ablation, she was discharged on Xarelto 20 mg daily for 1 month  Her blood pressures were also noted to be elevated throughout the admission, thus Norvasc 2 5 mg daily was added  She was otherwise discharged home the following day without significant complications  Following discharge she noted a skin rash which was thought to be related to 1 of her new medications  Both Norvasc and Xarelto were discontinued, and her symptoms resolved within a week  After Xarelto was discontinued, she took aspirin 325 mg daily to complete a 1 month post ablation course  She states her blood pressures have been stable since discharge despite not being on any antihypertensive medications  Otherwise, she denies chest pain or pressure, palpitations, dizziness, lightheadedness, edema, orthopnea, paroxysmal nocturnal dyspnea, presyncope, or syncope  She did experience fatigue following the procedure, but states this is improving overall  She has ongoing shortness of breath which occur sporadically, which she states is unchanged from her baseline  She does have a rescue inhaler which she takes occasionally  She admits to occasional lightheadedness if she gets up too quickly in the morning, but denies associated presyncope or syncope  She can sometimes feel an irregular heartbeat when taking her own pulse, but cannot actually feel palpitations and states that this is improving since the ablation  She offers no specific complaints today  Review of Systems  Per written review of systems in paper chart this is positive for back pain, anxiety, and numbness/tingling in the right leg  Otherwise ROS as noted above, all other 12 point review of systems was performed and is negative         Historical Information   Social History     Social History    Marital status: /Civil Union     Spouse name: N/A    Number of children: N/A    Years of education: N/A     Occupational History    Not on file  Social History Main Topics    Smoking status: Former Smoker     Packs/day: 1 00     Types: Cigarettes     Quit date: 11/23/2018    Smokeless tobacco: Never Used      Comment: 3 cigarettes a day    Alcohol use 0 6 oz/week     1 Shots of liquor per week      Comment: 1 Narinder Schillings a night/social    Drug use: No    Sexual activity: Not on file     Other Topics Concern    Not on file     Social History Narrative    No narrative on file     Past Medical History:   Diagnosis Date    Anxiety     Bursitis of hip     Last Assessed:6/5/2017    Depression     Herniated lumbar intervertebral disc     Hyperlipidemia     Hypertension     Hypokalemia     Pituitary adenoma ([facility] [address] )     Last Assessed:2/12/2015 ; Previously on bromocriptine    Spinal stenosis     Systolic murmur of aorta     Vitamin D deficiency      Past Surgical History:   Procedure Laterality Date    BREAST SURGERY      Incisional breast biopsy    CARPAL TUNNEL RELEASE Left     CHOLECYSTECTOMY      OTHER SURGICAL HISTORY      EXCISION OF PAROTID TUMOR/GLAND    PAROTIDECTOMY      KY COLONOSCOPY FLX DX W/COLLJ SPEC WHEN PFRMD N/A 6/6/2017    Procedure: COLONOSCOPY;  Surgeon: Leigha Samaniego DO;  Location: [facility];   Service: Gastroenterology    ROTATOR CUFF REPAIR      SALPINGOOPHORECTOMY       History   Alcohol Use    0 6 oz/week    1 Shots of liquor per week     Comment: 1 Narinder Schillings a night/social     History   Drug Use No     History   Smoking Status    Former Smoker    Packs/day: 1 00    Types: Cigarettes    Quit date: 11/23/2018   Smokeless Tobacco    Never Used     Comment: 3 cigarettes a day     Family History   Problem Relation Age of Onset    Colon cancer Mother     Coronary artery disease Mother     Colonic polyp Mother     Liver cancer Mother     Multiple sclerosis Father     Hypertension Other        Meds/Allergies       Current Outpatient Prescriptions:    albuterol (PROVENTIL HFA) 90 mcg/act inhaler, Inhale 1 puff every 6 (six) hours as needed for wheezing, Disp: 6 7 g, Rfl: 0    atorvastatin (LIPITOR) 20 mg tablet, Take 1 tablet (20 mg total) by mouth daily, Disp: 90 tablet, Rfl: 3    escitalopram (LEXAPRO) 10 mg tablet, Take 1 tablet (10 mg total) by mouth daily, Disp: 30 tablet, Rfl: 11    ibuprofen (MOTRIN) 800 mg tablet, Take 800 mg by mouth daily, Disp: , Rfl:     Multiple Vitamin (MULTI VITAMIN DAILY PO), Take 1 tablet by mouth daily  , Disp: , Rfl:     potassium chloride (K-DUR,KLOR-CON) 20 mEq tablet, Take 1 tablet (20 mEq total) by mouth 2 (two) times a day, Disp: 6 tablet, Rfl: 0    Allergies   Allergen Reactions    Ancef [Cefazolin]     Cephalosporins Other (See Comments)     c diff    Fruit Extracts      Annotation - [id number]: peaches and strawberries    Levofloxacin     Morphine GI Intolerance    Other      FRUIT    Penicillins Hives    Strawberry C [Ascorbate]     Tetracycline Edema    Amlodipine Rash    Xarelto [Rivaroxaban] Rash       Objective   Vitals: Blood pressure 128/84, pulse 90, height 5' 4" (1 626 m), weight 85 1 kg (187 lb 11 2 oz), not currently breastfeeding          Physical Exam  GEN: NAD, alert and oriented, well appearing  SKIN: dry without significant lesions or rashes  HEENT: NCAT, PERRL, EOMs intact  NECK: No JVD appreciated  CARDIOVASCULAR: RRR, normal S1, S2 without murmurs, rubs, or gallops appreciated  LUNGS: Clear to auscultation bilaterally without wheezes, rhonchi, or rales  ABDOMEN: Soft, nontender, nondistended  EXTREMITIES/VASCULAR: perfused without clubbing, cyanosis, or edema b/l  PSYCH: Normal mood and affect  NEURO: CN ll-Xll grossly intact        Labs:  Orders Only on 01/11/2019   Component Date Value    Sodium 01/15/2019 144     Potassium 01/15/2019 3 9     Chloride 01/15/2019 110*    CO2 01/15/2019 25     ANION GAP 01/15/2019 9     BUN 01/15/2019 13     Creatinine 01/15/2019 0 92     Glucose, Fasting 01/15/2019 92     Calcium 01/15/2019 9 0     eGFR 01/15/2019 65    Orders Only on 01/08/2019   Component Date Value    Potassium Urine Timed 01/10/2019 30 6    Appointment on 01/08/2019   Component Date Value    Hepatitis C Ab 01/08/2019 Non-reactive     Magnesium 01/08/2019 1 8    Office Visit on 01/08/2019   Component Date Value    Sodium 01/08/2019 142     Potassium 01/08/2019 3 0*    Chloride 01/08/2019 109*    CO2 01/08/2019 25     ANION GAP 01/08/2019 8     BUN 01/08/2019 17     Creatinine 01/08/2019 1 08     Glucose 01/08/2019 87     Calcium 01/08/2019 9 5     AST 01/08/2019 20     ALT 01/08/2019 30     Alkaline Phosphatase 01/08/2019 102     Total Protein 01/08/2019 7 4     Albumin 01/08/2019 3 9     Total Bilirubin 01/08/2019 0 54     eGFR 01/08/2019 54    Admission on 12/18/2018, Discharged on 12/19/2018   Component Date Value    Sodium 12/18/2018 142     Potassium 12/18/2018 3 7     Chloride 12/18/2018 110*    CO2 12/18/2018 26     ANION GAP 12/18/2018 6     BUN 12/18/2018 13     Creatinine 12/18/2018 1 02     Glucose 12/18/2018 89     Glucose, Fasting 12/18/2018 89     Calcium 12/18/2018 8 8     eGFR 12/18/2018 57     WBC 12/18/2018 5 19     RBC 12/18/2018 3 61*    Hemoglobin 12/18/2018 11 8     Hematocrit 12/18/2018 36 9     MCV 12/18/2018 102*    MCH 12/18/2018 32 7     MCHC 12/18/2018 32 0     RDW 12/18/2018 12 8     MPV 12/18/2018 10 2     Platelets 52/77/7558 204     nRBC 12/18/2018 0     Neutrophils Relative 12/18/2018 58     Immat GRANS % 12/18/2018 0     Lymphocytes Relative 12/18/2018 27     Monocytes Relative 12/18/2018 10     Eosinophils Relative 12/18/2018 4     Basophils Relative 12/18/2018 1     Neutrophils Absolute 12/18/2018 3 05     Immature Grans Absolute 12/18/2018 0 01     Lymphocytes Absolute 12/18/2018 1 39     Monocytes Absolute 12/18/2018 0 53     Eosinophils Absolute 12/18/2018 0 18     Basophils Absolute 12/18/2018 0 03     Protime 12/18/2018 13 2     INR 12/18/2018 0 99     Ventricular Rate 12/18/2018 83     Atrial Rate 12/18/2018 83     IN Interval 12/18/2018 162     QRSD Interval 12/18/2018 74     QT Interval 12/18/2018 370     QTC Interval 12/18/2018 434     P Axis 12/18/2018 -2     QRS [place] 12/18/2018 -15     T Wave Axis 12/18/2018 40     Activated Clotting Time,* 12/18/2018 254*    Specimen Type 12/18/2018 VENOUS     Activated Clotting Time,* 12/18/2018 335*    Specimen Type 12/18/2018 VENOUS     Activated Clotting Time,* 12/18/2018 242*    Specimen Type 12/18/2018 VENOUS     Activated Clotting Time,* 12/18/2018 132     Specimen Type 12/18/2018 VENOUS     Ventricular Rate 12/18/2018 101     Atrial Rate 12/18/2018 101     IN Interval 12/18/2018 164     QRSD Interval 12/18/2018 76     QT Interval 12/18/2018 352     QTC Interval 12/18/2018 456     P Axis 12/18/2018 66     QRS Axis 12/18/2018 -14     T Wave Axis 12/18/2018 50     Sodium 12/19/2018 140     Potassium 12/19/2018 3 6     Chloride 12/19/2018 110*    CO2 12/19/2018 23     ANION GAP 12/19/2018 7     BUN 12/19/2018 13     Creatinine 12/19/2018 0 91     Glucose 12/19/2018 80     Calcium 12/19/2018 8 3     eGFR 12/19/2018 66     WBC 12/19/2018 4 83     RBC 12/19/2018 3 40*    Hemoglobin 12/19/2018 10 9*    Hematocrit 12/19/2018 35 0     MCV 12/19/2018 103*    MCH 12/19/2018 32 1     MCHC 12/19/2018 31 1*    RDW 12/19/2018 12 7     MPV 12/19/2018 10 1     Platelets 68/70/3343 201     nRBC 12/19/2018 0     Neutrophils Relative 12/19/2018 62     Immat GRANS % 12/19/2018 0     Lymphocytes Relative 12/19/2018 25     Monocytes Relative 12/19/2018 9     Eosinophils Relative 12/19/2018 3     Basophils Relative 12/19/2018 1     Neutrophils Absolute 12/19/2018 3 00     Immature Grans Absolute 12/19/2018 0 02     Lymphocytes Absolute 12/19/2018 1 21     Monocytes Absolute 12/19/2018 0 42     Eosinophils Absolute 12/19/2018 0 15     Basophils Absolute 12/19/2018 0 03    Orders Only on 12/07/2018   Component Date Value    Sodium 12/14/2018 138     Potassium 12/14/2018 3 8     Chloride 12/14/2018 105     CO2 12/14/2018 28     ANION GAP 12/14/2018 5     BUN 12/14/2018 18     Creatinine 12/14/2018 1 06     Glucose 12/14/2018 67     Calcium 12/14/2018 10 5*    eGFR 12/14/2018 55     Albumin 12/14/2018 3 6     Corrected Calcium 12/14/2018 10 8*    CALCIUM FOR CA/ALB 12/14/2018 10 5*   Lab on 12/07/2018   Component Date Value    Sodium 12/07/2018 136     Potassium 12/07/2018 4 6     Chloride 12/07/2018 100     CO2 12/07/2018 30     ANION GAP 12/07/2018 6     BUN 12/07/2018 20     Creatinine 12/07/2018 0 99     Glucose 12/07/2018 70     Calcium 12/07/2018 9 9     eGFR 12/07/2018 60    Office Visit on 12/03/2018   Component Date Value    Sodium 12/03/2018 141     Potassium 12/03/2018 3 2*    Chloride 12/03/2018 104     CO2 12/03/2018 29     ANION GAP 12/03/2018 8     BUN 12/03/2018 23     Creatinine 12/03/2018 0 90     Glucose 12/03/2018 92     Calcium 12/03/2018 8 7     eGFR 12/03/2018 67    Appointment on 11/29/2018   Component Date Value    Sodium 11/29/2018 140     Potassium 11/29/2018 2 5*    Chloride 11/29/2018 109*    CO2 11/29/2018 24     ANION GAP 11/29/2018 7     BUN 11/29/2018 18     Creatinine 11/29/2018 0 99     Glucose, Fasting 11/29/2018 87     Calcium 11/29/2018 8 7     eGFR 11/29/2018 60     Magnesium 11/29/2018 1 6    Admission on 11/23/2018, Discharged on 11/26/2018   Component Date Value    Sodium 11/23/2018 142     Potassium 11/23/2018 2 2*    Chloride 11/23/2018 102     CO2 11/23/2018 24     ANION GAP 11/23/2018 16*    BUN 11/23/2018 25     Creatinine 11/23/2018 1 63*    Glucose 11/23/2018 122     Calcium 11/23/2018 10 6*    AST 11/23/2018 26     ALT 11/23/2018 47     Alkaline Phosphatase 11/23/2018 119*    Total Protein 11/23/2018 8 0     Albumin 11/23/2018 3 8     Total Bilirubin 11/23/2018 0 50     eGFR 11/23/2018 33     WBC 11/23/2018 13 69*    RBC 11/23/2018 4 47     Hemoglobin 11/23/2018 14 6     Hematocrit 11/23/2018 43 4     MCV 11/23/2018 97     MCH 11/23/2018 32 7     MCHC 11/23/2018 33 6     RDW 11/23/2018 12 9     MPV 11/23/2018 10 2     Platelets 54/53/1253 371     nRBC 11/23/2018 0     Neutrophils Relative 11/23/2018 69     Immat GRANS % 11/23/2018 0     Lymphocytes Relative 11/23/2018 22     Monocytes Relative 11/23/2018 7     Eosinophils Relative 11/23/2018 1     Basophils Relative 11/23/2018 1     Neutrophils Absolute 11/23/2018 9 54*    Immature Grans Absolute 11/23/2018 0 06     Lymphocytes Absolute 11/23/2018 2 97     Monocytes Absolute 11/23/2018 0 95     Eosinophils Absolute 11/23/2018 0 07     Basophils Absolute 11/23/2018 0 10     Troponin I 11/23/2018 0 04     Protime 11/23/2018 14 1     INR 11/23/2018 1 08     PTT 11/23/2018 34     NT-proBNP 11/23/2018 221*    Magnesium 11/23/2018 1 3*    Phosphorus 11/23/2018 4 2*    INFLU A PCR 11/23/2018 None Detected     INFLU B PCR 11/23/2018 None Detected     RSV PCR 11/23/2018 None Detected     Procalcitonin 11/23/2018 <0 05     Sputum Culture 11/24/2018 4+ Growth of Moraxella catarrhalis*    Sputum Culture 11/24/2018 4+ Growth of      Gram Stain Result 11/24/2018 Rare Epithelial Cells     Gram Stain Result 11/24/2018 2+ Polys     Gram Stain Result 11/24/2018 4+ Gram negative diplococci     Gram Stain Result 11/24/2018 2+ Gram positive cocci in pairs and chains     Gram Stain Result 11/24/2018 1+ Gram negative rods     Clarity, UA 11/23/2018 Clear     Color, UA 11/23/2018 Yellow     Specific Gravity, UA 11/23/2018 1 010     pH, UA 11/23/2018 6 5     Glucose, UA 11/23/2018 Negative     Ketones, UA 11/23/2018 Negative     Blood, UA 11/23/2018 Negative     Protein, UA 11/23/2018 Negative     Nitrite, UA 11/23/2018 Negative     Bilirubin, UA 11/23/2018 Negative     Urobilinogen, UA 11/23/2018 0 2     Leukocytes, UA 11/23/2018 Small*    WBC, UA 11/23/2018 2-4*    RBC, UA 11/23/2018 None Seen     Bacteria, UA 11/23/2018 Occasional     Epithelial Cells 11/23/2018 Occasional     Sodium 11/24/2018 140     Potassium 11/24/2018 2 8*    Chloride 11/24/2018 107     CO2 11/24/2018 19*    ANION GAP 11/24/2018 14*    BUN 11/24/2018 25     Creatinine 11/24/2018 1 36*    Glucose 11/24/2018 203*    Calcium 11/24/2018 9 2     eGFR 11/24/2018 41     Magnesium 11/24/2018 1 8     TSH 3RD GENERATON 11/24/2018 0 643     Ventricular Rate 11/23/2018 89     Atrial Rate 11/23/2018 89     LA Interval 11/23/2018 156     QRSD Interval 11/23/2018 84     QT Interval 11/23/2018 380     QTC Interval 11/23/2018 462     P Axis 11/23/2018 72     QRS Axis 11/23/2018 9     T Wave Axis 11/23/2018 71     Ventricular Rate 11/23/2018 106     Atrial Rate 11/23/2018 106     LA Interval 11/23/2018 160     QRSD Interval 11/23/2018 88     QT Interval 11/23/2018 304     QTC Interval 11/23/2018 403     P Axis 11/23/2018 73     QRS Axis 11/23/2018 26     T Wave Axis 11/23/2018 70     Ventricular Rate 11/23/2018 100     Atrial Rate 11/23/2018 101     LA Interval 11/23/2018 152     QRSD Interval 11/23/2018 82     QT Interval 11/23/2018 380     QTC Interval 11/23/2018 491     P Axis 11/23/2018 73     QRS Axis 11/23/2018 2     T Wave Axis 11/23/2018 79     Ventricular Rate 11/24/2018 91     Atrial Rate 11/24/2018 91     LA Interval 11/24/2018 146     QRSD Interval 11/24/2018 84     QT Interval 11/24/2018 380     QTC Interval 11/24/2018 468     P Axis 11/24/2018 63     QRS Axis 11/24/2018 11     T Wave [place] 11/24/2018 208     Sodium 11/25/2018 143     Potassium 11/25/2018 3 3*    Chloride 11/25/2018 111*    CO2 11/25/2018 19*    ANION GAP 11/25/2018 13     BUN 11/25/2018 26*    Creatinine 11/25/2018 1 18     Glucose 11/25/2018 147*  Calcium 2018 8 3     eGFR 2018 48     Magnesium 2018 1 5*    Sodium 2018 142     Potassium 2018 3 8     Chloride 2018 112*    CO2 2018 16*    ANION GAP 2018 14*    BUN 2018 25     Creatinine 2018 1 06     Glucose 2018 142*    Calcium 2018 8 0*    eGFR 2018 55    There may be more visits with results that are not included  Imaging: I have personally reviewed pertinent reports  ECHO:   Results for orders placed during the hospital encounter of 16   Echo complete with contrast if indicated    Narrative [address] [place] [address], [address]  [phone]    Transthoracic Echocardiogram  2D, M-mode, Doppler, and Color Doppler    Study date:  2016    Patient: Luigi Taylor  MR number: [id number]  Account number: [de-identified]  : 1950  Age: 61 years  Gender: Female  Status: Outpatient  Location: Echo lab  Height: 64 in  Weight: 184 6 lb  BP: 124/ 72 mmHg    Indications: Murmur    Diagnoses: R01 1 - Cardiac murmur, unspecified    Sonographer:  EUN Schmidt  Primary Physician:  Flash Abraham MD  Referring Physician:  Flash Abraham MD  [facility]  [facility]  Interpreting Physician:  Antony Sahu MD    SUMMARY    PROCEDURE INFORMATION:  Echocardiographic views were limited due to decreased penetration  LEFT VENTRICLE:  Systolic function was normal by visual assessment  Ejection fraction was  estimated to be 60 %  There were no regional wall motion abnormalities  Features were consistent with a pseudonormal left ventricular filling pattern,  with concomitant abnormal relaxation and increased filling pressure (grade 2  diastolic dysfunction)  LEFT ATRIUM:  The atrium was mildly dilated  MITRAL VALVE:  There was mild to moderate annular calcification    Transmitral velocity was increased due to valvular stenosis  There was very mild stenosis  There was moderate regurgitation  Mean transmitral gradient was 428 mmHg  Mitral valve area by pressure half-time was 2 1 cm squared  AORTIC VALVE:  Transaortic velocity was increased due to valvular stenosis  There was very mild stenosis  There was mild regurgitation  Valve mean gradient was 9 06 mmHg  TRICUSPID VALVE:  There was trace regurgitation  The tricuspid jet envelope definition was inadequate for estimation of RV  systolic pressure  There are no indirect findings (abnormal RV volume or  geometry, altered pulmonary flow velocity profile, or leftward septal  displacement) which would suggest moderate or severe pulmonary hypertension  PULMONIC VALVE:  There was trace regurgitation  Not well visualized  HISTORY: PRIOR HISTORY: Hyperlipidemia, hypertension  PROCEDURE: The procedure was performed in the echo lab  This was a routine  study  The transthoracic approach was used  The study included complete 2D  imaging, M-mode, complete spectral Doppler, and color Doppler  Images were  obtained from the parasternal, apical, subcostal, and suprasternal notch  acoustic windows  Echocardiographic views were limited due to decreased  penetration  This was a technically difficult study  LEFT VENTRICLE: Size was normal  Systolic function was normal by visual  assessment  Ejection fraction was estimated to be 60 %  There were no regional  wall motion abnormalities  Wall thickness was normal  No evidence of apical  thrombus  DOPPLER: Features were consistent with a pseudonormal left  ventricular filling pattern, with concomitant abnormal relaxation and increased  filling pressure (grade 2 diastolic dysfunction)  RIGHT VENTRICLE: The size was normal  Systolic function was normal  Wall  thickness was normal     LEFT ATRIUM: The atrium was mildly dilated  RIGHT ATRIUM: Size was normal     MITRAL VALVE: There was mild to moderate annular calcification  Valve structure  was normal  There was mild-moderate diffuse thickening of the anterior and  posterior leaflets  There was mild-moderate calcification of the anterior and  posterior leaflets, with mild chordal involvement  There was lower normal  leaflet separation  DOPPLER: Transmitral velocity was increased due to valvular  stenosis  There was very mild stenosis  There was moderate regurgitation  The  regurgitant jet was centrally directed  AORTIC VALVE: DOPPLER: Transaortic velocity was increased due to valvular  stenosis  There was very mild stenosis  There was mild regurgitation  TRICUSPID VALVE: The valve structure was normal  There was normal leaflet  separation  DOPPLER: The transtricuspid velocity was within the normal range  There was no evidence for stenosis  There was trace regurgitation  The  tricuspid jet envelope definition was inadequate for estimation of RV systolic  pressure  There are no indirect findings (abnormal RV volume or geometry,  altered pulmonary flow velocity profile, or leftward septal displacement) which  would suggest moderate or severe pulmonary hypertension  PULMONIC VALVE: Not well visualized  DOPPLER: The transpulmonic velocity was  within the normal range  There was trace regurgitation  PERICARDIUM: There was no pericardial effusion  The pericardium was normal in  appearance  AORTA: The root exhibited normal size  SYSTEMIC VEINS: IVC: The inferior vena cava was normal in size      MEASUREMENT TABLES    DOPPLER MEASUREMENTS  Aortic valve   (Reference normals)  Peak carmel   2 2 cm/s   (--)  Mean carmel   1 4 cm/s   (--)  Peak gradient   19 1 mmHg   (--)  Mean gradient   9 06 mmHg   (--)  Valve area   1 5 cm squared   (--)  Valve area, cont   1 7 cm squared   (--)  Mitral valve   (Reference normals)  Mean gradient   428 mmHg   (--)  Pressure half-time   105 ms   (--)  Valve area, PHT   2 1 cm squared   (--)    SYSTEM MEASUREMENT TABLES    2D  %FS: 43 1 %  AV Diam: 2 8 cm  EDV(Teich): 93 8 ml  EF(Teich): 74 4 %  ESV(Teich): 24 ml  IVSd: 1 cm  LA Area: 19 9 cm2  LA Diam: 3 4 cm  LVEDV MOD A2C: 28 6 ml  LVEDV MOD A4C: 64 6 ml  LVEF MOD A4C: 59 8 %  LVESV MOD A4C: 26 ml  LVIDd: 4 5 cm  LVIDs: 2 6 cm  LVLd A2C: 6 3 cm  LVLd A4C: 7 1 cm  LVLs A4C: 6 cm  LVOT Diam: 1 9 cm  LVPWd: 0 9 cm  RA Area: 8 cm2  RV Diam : 2 9 cm  SI(Teich): 36 9 ml/m2  SV MOD A4C: 38 6 ml  SV(Cube): 75 8 ml  SV(Teich): 69 8 ml    CW  AR Dec Burleson: 2 1 m/s2  AR Dec Time:  1 ms  AR PHT: 582 1 ms  AR Vmax: 4 2 m/s  AR maxP 4 mmHg  AV Env  Ti: 288 4 ms  AV VTI: 39 6 cm  AV Vmax: 2 2 m/s  AV Vmean: 1 4 m/s  AV maxP 1 mmHg  AV meanP 1 mmHg  MV VTI: 49 3 cm  MV Vmax: 1 8 m/s  MV Vmean: 1 m/s  MV maxP 5 mmHg  MV meanP 3 mmHg    PW  ZHAO (VTI): 1 7 cm2  ZHAO Vmax: 1 5 cm2  LVOT Env  Ti: 306 8 ms  LVOT VTI: 23 3 cm  LVOT Vmax: 1 1 m/s  LVOT Vmean: 0 8 m/s  LVOT maxP 2 mmHg  LVOT meanP 6 mmHg  MV A Jose Luis: 1 2 m/s  MV Dec Burleson: 4 3 m/s2  MV DecT: 325 8 ms  MV E Jose Luis: 1 4 m/s  MV E/A Ratio: 1 2  MV PHT: 105 3 ms  MVA (VTI): 1 4 cm2  MVA By PHT: 2 1 cm2    [employer] Accredited Echocardiography Laboratory    Prepared and electronically signed by    Parvin Holloway MD  Signed 2016 13:37:30         CATH/STRESS TEST:  No studies available for review    EKG:  Normal sinus rhythm, heart rate 90 beats per minute, normal axis, no acute ischemia noted

## 2019-01-16 NOTE — Clinical Note
PETER - doing well since her ablation, no recurrent SVT  She has a history of mitral disease so I'm rechecking an echo (last one 2016)  Otherwise good from an EP standpoint

## 2019-01-17 ENCOUNTER — HOSPITAL ENCOUNTER (OUTPATIENT)
Dept: NON INVASIVE DIAGNOSTICS | Facility: CLINIC | Age: 69
Discharge: HOME | End: 2019-01-17
Payer: MEDICARE

## 2019-01-17 DIAGNOSIS — I05.9 MITRAL VALVE DISEASE: ICD-10-CM

## 2019-01-17 PROCEDURE — 93306 TTE W/DOPPLER COMPLETE: CPT | Performed by: INTERNAL MEDICINE

## 2019-01-17 PROCEDURE — 93306 TTE W/DOPPLER COMPLETE: CPT

## 2019-01-18 LAB — ALDOST SERPL-MCNC: 2.8 NG/DL (ref 0–30)

## 2019-01-22 DIAGNOSIS — E87.6 LOW SERUM POTASSIUM LEVEL: Primary | ICD-10-CM

## 2019-01-29 ENCOUNTER — CONSULT (OUTPATIENT)
Dept: NEPHROLOGY | Facility: CLINIC | Age: 69
End: 2019-01-29
Payer: MEDICARE

## 2019-01-29 ENCOUNTER — TELEPHONE (OUTPATIENT)
Dept: NEPHROLOGY | Facility: CLINIC | Age: 69
End: 2019-01-29

## 2019-01-29 VITALS
WEIGHT: 186.8 LBS | SYSTOLIC BLOOD PRESSURE: 164 MMHG | BODY MASS INDEX: 31.89 KG/M2 | DIASTOLIC BLOOD PRESSURE: 88 MMHG | HEIGHT: 64 IN

## 2019-01-29 DIAGNOSIS — I51.89 DIASTOLIC DYSFUNCTION: ICD-10-CM

## 2019-01-29 DIAGNOSIS — E87.6 LOW SERUM POTASSIUM LEVEL: ICD-10-CM

## 2019-01-29 DIAGNOSIS — E87.6 HYPOKALEMIA: Primary | ICD-10-CM

## 2019-01-29 DIAGNOSIS — I10 ESSENTIAL HYPERTENSION: ICD-10-CM

## 2019-01-29 LAB
SL AMB  POCT GLUCOSE, UA: NEGATIVE
SL AMB LEUKOCYTE ESTERASE,UA: NEGATIVE
SL AMB POCT BILIRUBIN,UA: NEGATIVE
SL AMB POCT BLOOD,UA: NEGATIVE
SL AMB POCT CLARITY,UA: CLEAR
SL AMB POCT COLOR,UA: YELLOW
SL AMB POCT KETONES,UA: NEGATIVE
SL AMB POCT NITRITE,UA: NEGATIVE
SL AMB POCT PH,UA: 6
SL AMB POCT SPECIFIC GRAVITY,UA: 1.03
SL AMB POCT URINE PROTEIN: 30
SL AMB POCT UROBILINOGEN: 0.2

## 2019-01-29 PROCEDURE — 81002 URINALYSIS NONAUTO W/O SCOPE: CPT | Performed by: INTERNAL MEDICINE

## 2019-01-29 PROCEDURE — 99214 OFFICE O/P EST MOD 30 MIN: CPT | Performed by: INTERNAL MEDICINE

## 2019-01-29 NOTE — LETTER
January 29, 2019     Everett Lucas DO  [address] [id number] [facility]    Patient: Toan Childs   YOB: 1950   Date of Visit: 1/29/2019     Dear Dr Yinka Caban      Thank you for referring Toan Childs to me for evaluation  Below are the relevant portions of my assessment and plan of care  If you have questions, please do not hesitate to call me  I look forward to following Aleida Rucker along with you  Sincerely,        Lynn Peter DO        CC: No Recipients    Progress Notes:    ASSESSMENT and PLAN:  1  Episodic hypokalemia, likely multifactorial, component of her intermittent diarrhea although possible underlying component  2  Suspected hypertension, start recording blood pressure at home  3  Apparent history of pituitary adenoma, given hypokalemia, check 24 urine for cortisol  4  Diastolic dysfunction  5  Pulmonary hypertension  6  History of tobacco use    · Given hypokalemia as well as hypertension would repeat aldosterone, renin as well as checking 24 urine for cortisol  Would also check renal Doppler, rule out renal artery stenosis  · Will plan to recheck urine potassium with creatinine as well as urine osmolality  · If persistent hypokalemia likely will add spironolactone, 25 mg daily  · Await repeat laboratory studies  · Tentative plan to see her in approximately 3 months    · Home blood pressure monitoring, record for diary

## 2019-01-29 NOTE — PROGRESS NOTES
NEPHROLOGY OUTPATIENT CONSULTATION   Lucero Mesa 79 y o  female MRN: [id number]    Reason for consultation:  Episodic hypokalemia    ASSESSMENT and PLAN:  1  Episodic hypokalemia, likely multifactorial, component of her intermittent diarrhea although possible underlying component  2  Suspected hypertension, start recording blood pressure at home  3  Apparent history of pituitary adenoma, given hypokalemia, check 24 urine for cortisol  4  Diastolic dysfunction  5  Pulmonary hypertension  6  History of tobacco use    · Given hypokalemia as well as hypertension would repeat aldosterone, renin as well as checking 24 urine for cortisol  Would also check renal Doppler, rule out renal artery stenosis  · Will plan to recheck urine potassium with creatinine as well as urine osmolality  · If persistent hypokalemia likely will add spironolactone, 25 mg daily  · Await repeat laboratory studies  · Tentative plan to see her in approximately 3 months  · Home blood pressure monitoring, record for diary    HISTORY OF PRESENT ILLNESS:  We had the pleasure of seeing Roslyn Mathis for nephrology consultation secondary to episodic hypokalemia  It appears that she has had at least 2 episodes since 2016 with potassiums in the low 2s  On each occasion she did have significant diarrhea with bowel movements approximately 6-8 times per day  She did have further workup which included a colonoscopy but etiology is has been on clear  Recently potassium also was low again at 3 0, status post supplementation with improvement potassium 3 9    She has had a history of supra ventricular tachycardia status post ablation    Recently stop smoking approximately in 2 weeks ago  Occasional swelling  Denies any abdominal bloating  Denies any significant chest pain she does have some shortness of breath from her long history of smoking  Denies any dizziness or lightheadedness  Denies any headaches          Review of Systems   All other systems reviewed and are negative  PAST MEDICAL HISTORY:  Past Medical History:   Diagnosis Date    Anxiety     Bursitis of hip     Last Assessed:6/5/2017    Depression     Herniated lumbar intervertebral disc     Hyperlipidemia     Hypertension     Hypokalemia     Pituitary adenoma ([address] )     Last Assessed:2/12/2015 ; Previously on bromocriptine    Spinal stenosis     Systolic murmur of aorta     Vitamin D deficiency        PAST SURGICAL HISTORY:  Past Surgical History:   Procedure Laterality Date    BREAST SURGERY      Incisional breast biopsy    CARDIAC ELECTROPHYSIOLOGY STUDY AND ABLATION      CARPAL TUNNEL RELEASE Left     CHOLECYSTECTOMY      OTHER SURGICAL HISTORY      EXCISION OF PAROTID TUMOR/GLAND    PAROTIDECTOMY      [place] COLONOSCOPY FLX DX W/COLLJ SPEC WHEN PFRMD N/A 6/6/2017    Procedure: COLONOSCOPY;  Surgeon: Amarjit Payne DO;  Location: [facility];   Service: Gastroenterology    ROTATOR CUFF REPAIR      SALPINGOOPHORECTOMY         ALLERGIES:  Allergies   Allergen Reactions    Ancef [Cefazolin]     Cephalosporins Other (See Comments)     c diff    Fruit Extracts      Annotation - [id number]: peaches and strawberries    Levofloxacin     Morphine GI Intolerance    Other      FRUIT    Penicillins Hives    Strawberry C [Ascorbate]     Tetracycline Edema    Amlodipine Rash    Xarelto [Rivaroxaban] Rash       SOCIAL HISTORY:  History   Alcohol Use    0 6 oz/week    1 Shots of liquor per week     Comment: 1 [place] Roxy a night/social     History   Drug Use No     History   Smoking Status    Former Smoker    Packs/day: 1 00    Years: 30 00    Types: Cigarettes    Quit date: 11/23/2018   Smokeless Tobacco    Never Used     Comment: 3 cigarettes a day       FAMILY HISTORY:  Family History   Problem Relation Age of Onset    Colon cancer Mother     Coronary artery disease Mother     Colonic polyp Mother     Liver cancer Mother     Multiple sclerosis Father     Hypertension Other     Hypertension Brother        MEDICATIONS:    Current Outpatient Prescriptions:     albuterol (PROVENTIL HFA) 90 mcg/act inhaler, Inhale 1 puff every 6 (six) hours as needed for wheezing, Disp: 6 7 g, Rfl: 0    atorvastatin (LIPITOR) 20 mg tablet, Take 1 tablet (20 mg total) by mouth daily, Disp: 90 tablet, Rfl: 3    escitalopram (LEXAPRO) 10 mg tablet, Take 1 tablet (10 mg total) by mouth daily, Disp: 30 tablet, Rfl: 11    ibuprofen (MOTRIN) 800 mg tablet, Take 800 mg by mouth daily, Disp: , Rfl:     Multiple Vitamin (MULTI VITAMIN DAILY PO), Take 1 tablet by mouth daily  , Disp: , Rfl:     potassium chloride (K-DUR,KLOR-CON) 20 mEq tablet, Take 1 tablet (20 mEq total) by mouth 2 (two) times a day (Patient not taking: Reported on 1/29/2019 ), Disp: 6 tablet, Rfl: 0        PHYSICAL EXAM:  Vitals:    01/29/19 1056   BP: 164/88   BP Location: Left arm   Patient Position: Sitting   Cuff Size: Large   Weight: 84 7 kg (186 lb 12 8 oz)   Height: 5' 4" (1 626 m)       Physical Exam   Constitutional: She is oriented to person, place, and time  No distress  HENT:   Head: Normocephalic  Eyes: No scleral icterus  Neck: Neck supple  Cardiovascular: Normal rate and regular rhythm  Pulmonary/Chest: Effort normal  She has decreased breath sounds  Abdominal: Soft  She exhibits no distension  Musculoskeletal: She exhibits edema (Trace edema)  Neurological: She is alert and oriented to person, place, and time  Skin: Skin is warm and dry  Psychiatric: She has a normal mood and affect           Laboratory results:   Results for orders placed or performed in visit on 01/29/19   POCT urine dip   Result Value Ref Range    LEUKOCYTE ESTERASE,UA negative     NITRITE,UA negative     SL AMB POCT UROBILINOGEN 0 2     POCT URINE PROTEIN 30      PH,UA 6     BLOOD,UA negative     SPECIFIC GRAVITY,UA 1 030     KETONES,UA negative     BILIRUBIN,UA negative     GLUCOSE, UA negative      COLOR,UA yellow CLARITY,UA clear

## 2019-01-29 NOTE — PATIENT INSTRUCTIONS
ASSESSMENT and PLAN:  1  Episodic hypokalemia, likely multifactorial, component of her intermittent diarrhea although possible underlying component  2  Suspected hypertension, start recording blood pressure at home  3  Apparent history of pituitary adenoma, given hypokalemia, check 24 urine for cortisol  4  Diastolic dysfunction  5  Pulmonary hypertension  6  History of tobacco use    · Given hypokalemia as well as hypertension would repeat aldosterone, renin as well as checking 24 urine for cortisol  Would also check renal Doppler, rule out renal artery stenosis  · Will plan to recheck urine potassium with creatinine as well as urine osmolality  · If persistent hypokalemia likely will add spironolactone, 25 mg daily  · Await repeat laboratory studies  · Tentative plan to see her in approximately 3 months    · Home blood pressure monitoring, record for diary

## 2019-02-01 ENCOUNTER — LAB (OUTPATIENT)
Dept: LAB | Age: 69
End: 2019-02-01
Payer: MEDICARE

## 2019-02-01 DIAGNOSIS — I51.89 DIASTOLIC DYSFUNCTION: ICD-10-CM

## 2019-02-01 DIAGNOSIS — I10 ESSENTIAL HYPERTENSION: ICD-10-CM

## 2019-02-01 DIAGNOSIS — E87.6 LOW SERUM POTASSIUM LEVEL: ICD-10-CM

## 2019-02-01 DIAGNOSIS — E87.6 HYPOKALEMIA: ICD-10-CM

## 2019-02-01 LAB
ANION GAP SERPL CALCULATED.3IONS-SCNC: 6 MMOL/L (ref 4–13)
BUN SERPL-MCNC: 14 MG/DL (ref 5–25)
CALCIUM SERPL-MCNC: 9.3 MG/DL (ref 8.3–10.1)
CHLORIDE SERPL-SCNC: 108 MMOL/L (ref 100–108)
CO2 SERPL-SCNC: 26 MMOL/L (ref 21–32)
CREAT SERPL-MCNC: 0.92 MG/DL (ref 0.6–1.3)
CREAT UR-MCNC: 112 MG/DL
GFR SERPL CREATININE-BSD FRML MDRD: 65 ML/MIN/1.73SQ M
GLUCOSE P FAST SERPL-MCNC: 89 MG/DL (ref 65–99)
OSMOLALITY UR: 743 MMOL/KG
POTASSIUM SERPL-SCNC: 3.9 MMOL/L (ref 3.5–5.3)
POTASSIUM UR-SCNC: 29.6 MMOL/L (ref 1–300)
SODIUM 24H UR-SCNC: 182 MOL/L
SODIUM SERPL-SCNC: 140 MMOL/L (ref 136–145)

## 2019-02-01 PROCEDURE — 84244 ASSAY OF RENIN: CPT

## 2019-02-01 PROCEDURE — 82570 ASSAY OF URINE CREATININE: CPT | Performed by: INTERNAL MEDICINE

## 2019-02-01 PROCEDURE — 84300 ASSAY OF URINE SODIUM: CPT | Performed by: INTERNAL MEDICINE

## 2019-02-01 PROCEDURE — 84133 ASSAY OF URINE POTASSIUM: CPT | Performed by: INTERNAL MEDICINE

## 2019-02-01 PROCEDURE — 82088 ASSAY OF ALDOSTERONE: CPT

## 2019-02-01 PROCEDURE — 83935 ASSAY OF URINE OSMOLALITY: CPT | Performed by: INTERNAL MEDICINE

## 2019-02-01 PROCEDURE — 80048 BASIC METABOLIC PNL TOTAL CA: CPT

## 2019-02-01 PROCEDURE — 36415 COLL VENOUS BLD VENIPUNCTURE: CPT

## 2019-02-04 NOTE — PROGRESS NOTES
Please let patient know that labs are fine  Potassium is holding   There are some labs still pending

## 2019-02-05 ENCOUNTER — HOSPITAL ENCOUNTER (OUTPATIENT)
Dept: NON INVASIVE DIAGNOSTICS | Facility: CLINIC | Age: 69
Discharge: HOME | End: 2019-02-05
Payer: MEDICARE

## 2019-02-05 DIAGNOSIS — E87.6 HYPOKALEMIA: ICD-10-CM

## 2019-02-05 DIAGNOSIS — I51.89 DIASTOLIC DYSFUNCTION: ICD-10-CM

## 2019-02-05 DIAGNOSIS — E87.6 LOW SERUM POTASSIUM LEVEL: ICD-10-CM

## 2019-02-05 DIAGNOSIS — I10 ESSENTIAL HYPERTENSION: ICD-10-CM

## 2019-02-05 LAB — RENIN PLAS-CCNC: <0.167 NG/ML/HR (ref 0.17–5.38)

## 2019-02-05 PROCEDURE — 93975 VASCULAR STUDY: CPT

## 2019-02-05 PROCEDURE — 93975 VASCULAR STUDY: CPT | Performed by: SURGERY

## 2019-02-07 ENCOUNTER — TELEPHONE (OUTPATIENT)
Dept: NEPHROLOGY | Facility: CLINIC | Age: 69
End: 2019-02-07

## 2019-02-07 DIAGNOSIS — I15.8 OTHER SECONDARY HYPERTENSION: Primary | ICD-10-CM

## 2019-02-07 RX ORDER — SPIRONOLACTONE 25 MG/1
25 TABLET ORAL DAILY
Qty: 90 TABLET | Refills: 3 | Status: SHIPPED | OUTPATIENT
Start: 2019-02-07 | End: 2020-01-07

## 2019-02-07 NOTE — PROGRESS NOTES
Reviewed laboratory studies, urine studies show some propensity for potassium wasting  Repeat potassium was 3 9  Aldosterone level is pending, renin was undetectable  Discussed blood pressure with patient at home, readings have been in the 150s with some ankle edema  Will go ahead and empirically prescribe spironolactone starting at 25 mg per day  Plan to repeat laboratory studies in 2 weeks  Awaiting 24 urine for cortisol as well as serum aldosterone level

## 2019-02-08 ENCOUNTER — APPOINTMENT (OUTPATIENT)
Dept: LAB | Age: 69
End: 2019-02-08
Payer: MEDICARE

## 2019-02-08 DIAGNOSIS — E87.6 HYPOKALEMIA: ICD-10-CM

## 2019-02-08 DIAGNOSIS — I51.89 DIASTOLIC DYSFUNCTION: ICD-10-CM

## 2019-02-08 DIAGNOSIS — I10 ESSENTIAL HYPERTENSION: ICD-10-CM

## 2019-02-08 DIAGNOSIS — E87.6 LOW SERUM POTASSIUM LEVEL: ICD-10-CM

## 2019-02-08 LAB — ALDOST SERPL-MCNC: <1 NG/DL (ref 0–30)

## 2019-02-08 PROCEDURE — 82530 CORTISOL FREE: CPT

## 2019-02-12 LAB
CORTIS F 24H UR-MRATE: 12 UG/24 HR (ref 0–50)
CORTIS F UR-MCNC: 12 UG/L

## 2019-02-13 ENCOUNTER — TELEPHONE (OUTPATIENT)
Dept: NEPHROLOGY | Facility: CLINIC | Age: 69
End: 2019-02-13

## 2019-02-13 DIAGNOSIS — I15.8 OTHER SECONDARY HYPERTENSION: Primary | ICD-10-CM

## 2019-02-13 NOTE — PROGRESS NOTES
Discussed with patient, overall swelling has improved however blood pressure still in the 872-955 systolic range  She has tolerated Cardizem in the past and so will start 120 mg a day  She is to check in 1-2 weeks with her blood pressure readings

## 2019-02-13 NOTE — TELEPHONE ENCOUNTER
Lashawn Abraham called and want to know if pt can take cardizem cd instead stated pt took this in the past due to it being more cost efficient, so they want to know if they can switch

## 2019-02-19 ENCOUNTER — APPOINTMENT (OUTPATIENT)
Dept: LAB | Age: 69
End: 2019-02-19
Payer: MEDICARE

## 2019-02-19 DIAGNOSIS — I15.8 OTHER SECONDARY HYPERTENSION: ICD-10-CM

## 2019-02-19 LAB
ANION GAP SERPL CALCULATED.3IONS-SCNC: 11 MMOL/L (ref 4–13)
BUN SERPL-MCNC: 17 MG/DL (ref 5–25)
CALCIUM SERPL-MCNC: 10 MG/DL (ref 8.3–10.1)
CHLORIDE SERPL-SCNC: 104 MMOL/L (ref 100–108)
CO2 SERPL-SCNC: 22 MMOL/L (ref 21–32)
CREAT SERPL-MCNC: 1.05 MG/DL (ref 0.6–1.3)
GFR SERPL CREATININE-BSD FRML MDRD: 55 ML/MIN/1.73SQ M
GLUCOSE P FAST SERPL-MCNC: 109 MG/DL (ref 65–99)
POTASSIUM SERPL-SCNC: 4.2 MMOL/L (ref 3.5–5.3)
SODIUM SERPL-SCNC: 137 MMOL/L (ref 136–145)

## 2019-02-19 PROCEDURE — 36415 COLL VENOUS BLD VENIPUNCTURE: CPT

## 2019-02-19 PROCEDURE — 80048 BASIC METABOLIC PNL TOTAL CA: CPT

## 2019-03-01 DIAGNOSIS — I10 ESSENTIAL HYPERTENSION: Primary | ICD-10-CM

## 2019-03-01 RX ORDER — DILTIAZEM HYDROCHLORIDE 120 MG/1
120 CAPSULE, COATED, EXTENDED RELEASE ORAL DAILY
Qty: 30 CAPSULE | Refills: 5 | Status: SHIPPED | OUTPATIENT
Start: 2019-03-01 | End: 2019-09-23 | Stop reason: SDUPTHER

## 2019-03-01 NOTE — TELEPHONE ENCOUNTER
I spoke to the patient and she is aware it's ok to switch to the Cardizem XT, which is a long acting  I am sending a script to her pharmacy so she has it when she needs it renewed

## 2019-03-01 NOTE — TELEPHONE ENCOUNTER
Patient called and said she got a letter from her insurance that her Cardizem LA 120mg daily will not be covered any longer, and the cost would be $500 per month  The insurance WILL cover Cardizem XT 120mg, with a copay of $3  Can we switch the type of medication?

## 2019-03-21 NOTE — PROGRESS NOTES
Cardiology Outpatient Follow up Note    Sanaz Leon 79 y o  female MRN: [id number]    03/22/19          Assessment/Plan:    1  SVT s/p ablation of AVRT/left lateral pathway and SVC atrial tachycardia 12/18  No current palpitations since ablation  On Diltiazem currently more for BP control  2  HTN  Had presumed rash to Amlodipine versus Xarelto  On diltiazem and spironolactone  BP currently controlled  3  HL  On Atorvastatin 20 and fish oil 1 tablet daily  Lipid panel 6/18 showed total cholesterol 174, , HDL 62, LDL 79     4  Mild MS/Mild AS with moderate AI/moderate MR  Currently asymptomatic  Last echo 1/19  Will repeat echo in 1/20, if stable will repeat every 2 years  1  SVT (supraventricular tachycardia) ([address] )     2  Essential hypertension     3  Diastolic dysfunction     4  Dyslipidemia         HPI: 79 y o  with a history of SVT s/p ablation of AVRT/left lateral pathway and SVC atrial tachycardia 12/18, COPD, HTN, who is here for follow up HTN  She was smoking 1-1 5 ppd for 30 years, quit 11/18  She reports she was admitted with respiratory infection 11/18, was noted to have SVT by telemetry, was seen by EP and set up for outpt SVT ablation in 12/18  She reports she had been having palpitations for a year before 11/18, would feel throat fullness and SOB with palpitations, would last 2-3 minutes, would resolve by vagal maneuvers  Echo 1/19 showed normal LV size and function, EF 60%, no RWMA, grade II diastolic dysfunction  Normal RV size and function  Mild MAC  Mild MS, with moderate eccentric, posteriorly directed MR  Mild AS with moderate AI  Mild TR with PASP 35 mm Hg  She reports she has had no further palpitations since ablation  She is seeing Dr Mayur Hill of Nephrology for management of potassium, she was started on spironolactone and Cardizem  Previously was on Norvasc, but developed a rash on Xarelto and Norvasc, unclear which one caused rash   She was switched to Cardizem at that time  She is taking Ibuprofen 800 mg daily as needed for back pain/radiculopathy  She is exercising in the form of work and walking, taking care of grandkids  She retired from working as a nurse, 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 [employer]  No exertional SOB  No CP  No LE edema, resolved after starting spironolactone  No orthopnea, uses 1 pillow to sleep, no PND  Occasional dizziness if she gets up too fast in mornings from bed, she is trying to sit at edge of bed prior to standing  Only wheezing when she has a cold, has inhaler to use PRN  She does note her HR tends to run slightly elevated at home in the mornings, usually in 90-100s  She doesn't feel differently though at this HR       Patient Active Problem List   Diagnosis    Arthritis    Depression with anxiety    Dyslipidemia    Essential hypertension    Irritable bowel syndrome with diarrhea    Lumbar degenerative disc disease    Vitamin D deficiency    Greater trochanteric bursitis of right hip    Pulmonary emphysema (HCC)    Hypokalemia    [facility]) [facility] [address] [facility] [address]    Medicare annual wellness visit, subsequent    Hypercalcemia    Diastolic dysfunction       Allergies   Allergen Reactions    Ancef [Cefazolin]     Cephalosporins Other (See Comments)     c diff    Fruit Extracts      Annotation - [id number]: peaches and strawberries    Levofloxacin     Morphine GI Intolerance    Other      FRUIT    Penicillins Hives    Strawberry C [Ascorbate]     Tetracycline Edema    Amlodipine Rash    Xarelto [Rivaroxaban] Rash         Current Outpatient Medications:     albuterol (PROVENTIL HFA) 90 mcg/act inhaler, Inhale 1 puff every 6 (six) hours as needed for wheezing, Disp: 6 7 g, Rfl: 0    atorvastatin (LIPITOR) 20 mg tablet, Take 1 tablet (20 mg total) by mouth daily, Disp: 90 tablet, Rfl: 3    diltiazem (CARDIZEM CD) 120 mg 24 hr capsule, Take 1 capsule (120 mg total) by mouth daily, Disp: 30 capsule, Rfl: 5    escitalopram (LEXAPRO) 10 mg tablet, Take 1 tablet (10 mg total) by mouth daily, Disp: 30 tablet, Rfl: 11    ibuprofen (MOTRIN) 800 mg tablet, Take 800 mg by mouth daily, Disp: , Rfl:     Multiple Vitamin (MULTI VITAMIN DAILY PO), Take 1 tablet by mouth daily  , Disp: , Rfl:     Omega-3 Fatty Acids (FISH OIL) 1,000 mg, Take 1,000 mg by mouth daily, Disp: , Rfl:     spironolactone (ALDACTONE) 25 mg tablet, Take 1 tablet (25 mg total) by mouth daily, Disp: 90 tablet, Rfl: 3    Past Medical History:   Diagnosis Date    Anxiety     Bursitis of hip     Last Assessed:6/5/2017    Depression     Herniated lumbar intervertebral disc     Hyperlipidemia     Hypertension     Hypokalemia     Pit[address] aden[address] [address] )     Last Assessed:2/12/2015 ; Previously on bromocriptine    Spinal stenosis     Systolic murmur of aorta     Vitamin D deficiency        Family History   Problem Relation Age of Onset    Colon cancer Mother     Coronary artery disease Mother     Colonic polyp Mother     Liver cancer Mother     Multiple sclerosis Father     Hypertension Other     Hypertension Brother        Past Surgical History:   Procedure Laterality Date    BREAST SURGERY      Incisional breast biopsy    CARDIAC ELECTROPHYSIOLOGY STUDY AND ABLATION      CARPAL TUNNEL RELEASE Left     CHOLECYSTECTOMY      OTHER SURGICAL HISTORY      EXCISION OF PAROTID TUMOR/GLAND    PAROTIDECTOMY      [place] COLONOSCOPY FLX DX W/COLLJ SPEC WHEN PFRMD N/A 6/6/2017    Procedure: COLONOSCOPY;  Surgeon: Noe Mansfield DO;  [facility];   Service: Gastroenterology    ROTATOR CUFF REPAIR      SALPINGOOPHORECTOMY         Social History     Socioeconomic History    Marital status: /Civil Union     Spouse name: Not on file    Number of children: Not on file    Years of education: Not on file    Highest education level: Not on file   Occupational History    Not on file   Social Needs    Financial resource strain: Not on file    Food insecurity:     Worry: Not on file     Inability: Not on file    Transportation needs:     Medical: Not on file     Non-medical: Not on file   Tobacco Use    Smoking status: Former Smoker     Packs/day:      Years:      Pack years:      Types: Cigarettes     Last attempt to quit: 2018     Years since quittin 3    Smokeless tobacco: Never Used    Tobacco comment: 3 cigarettes a day   Substance and Sexual Activity    Alcohol use: Yes     Alcohol/week: 0 6 oz     Types: 1 Shots of liquor per week     Comment: 1 [place] a night/social    Drug use: No    Sexual activity: Not on file   Lifestyle    Physical activity:     Days per week: Not on file     Minutes per session: Not on file    Stress: Not on file   Relationships    Social connections:     Talks on phone: Not on file     Gets together: Not on file     Attends Yarsani service: Not on file     Active member of club or organization: Not on file     Attends meetings of clubs or organizations: Not on file     Relationship status: Not on file    Intimate partner violence:     Fear of current or ex partner: Not on file     Emotionally abused: Not on file     Physically abused: Not on file     Forced sexual activity: Not on file   Other Topics Concern    Not on file   Social History Narrative    Not on file       Review of Systems   Constitution: Negative for chills, decreased appetite, diaphoresis, fever, malaise/fatigue, night sweats, weight gain and weight loss  HENT: Negative for ear pain, hearing loss, hoarse voice, nosebleeds, sore throat and tinnitus  Eyes: Negative for blurred vision and pain  Cardiovascular: Negative  Negative for chest pain, claudication, cyanosis, dyspnea on exertion, irregular heartbeat, leg swelling, near-syncope, orthopnea, palpitations, paroxysmal nocturnal dyspnea and syncope     Respiratory: Negative for cough, hemoptysis, shortness of breath, sleep disturbances due to breathing, snoring, sputum production and wheezing  Hematologic/Lymphatic: Negative for adenopathy and bleeding problem  Does not bruise/bleed easily  Skin: Negative for color change, dry skin, flushing, itching, poor wound healing and rash  Musculoskeletal: Positive for arthritis  Negative for back pain, falls, joint pain, muscle cramps, muscle weakness, myalgias and neck pain  Gastrointestinal: Negative for abdominal pain, constipation, diarrhea, dysphagia, heartburn, hematemesis, hematochezia, melena, nausea and vomiting  Genitourinary: Negative for dysuria, frequency, hematuria, hesitancy, non-menstrual bleeding and urgency  Neurological: Negative for excessive daytime sleepiness, dizziness, focal weakness, headaches, light-headedness, loss of balance, numbness, paresthesias, tremors, vertigo and weakness  Radiculopathy down right leg due to spinal disc disease  Psychiatric/Behavioral: Negative for altered mental status, depression and memory loss  The patient does not have insomnia and is not nervous/anxious  Allergic/Immunologic: Positive for environmental allergies  Negative for persistent infections  Vitals: /78 (BP Location: Right arm, Patient Position: Sitting, Cuff Size: Standard)   Pulse 92   Ht 5' 4" (1 626 m)   Wt 85 7 kg (189 lb)   LMP  (LMP Unknown)   BMI 32 44 kg/m²     Physical Exam:     GEN: Alert and oriented x 3, in no acute distress  Well appearing and well nourished  HEENT: Sclera anicteric, conjunctivae pink, mucous membranes moist  Oropharynx clear  NECK: Supple, no carotid bruits, no significant JVD  Trachea midline, no thyromegaly  HEART: Regular rhythm, normal S1 and S2, no murmurs, clicks, gallops or rubs  PMI nondisplaced, no thrills  LUNGS: Clear to auscultation bilaterally; no wheezes, rales, or rhonchi  No increased work of breathing or signs of respiratory distress     ABDOMEN: Soft, nontender, nondistended, normoactive bowel sounds  EXTREMITIES: Skin warm and well perfused, no clubbing, cyanosis, or edema  NEURO: No focal findings  Normal gait  Normal speech  Mood and affect normal    SKIN: Normal without suspicious lesions on exposed skin        Lab Results:       No results found for: HGBA1C  Lab Results   Component Value Date    CHOL 187 05/04/2015    CHOL 199 08/20/2014     Lab Results   Component Value Date    HDL 62 (H) 06/26/2018    HDL 65 (H) 07/14/2017    HDL 79 05/04/2015     Lab Results   Component Value Date    LDLCALC 79 06/26/2018    LDLCALC 81 07/14/2017    LDLCALC 83 05/04/2015     Lab Results   Component Value Date    TRIG 166 (H) 06/26/2018    TRIG 134 07/14/2017    TRIG 127 05/04/2015     No results found for: CHOLHDL

## 2019-03-22 ENCOUNTER — OFFICE VISIT (OUTPATIENT)
Dept: CARDIOLOGY CLINIC | Facility: CLINIC | Age: 69
End: 2019-03-22
Payer: MEDICARE

## 2019-03-22 VITALS
SYSTOLIC BLOOD PRESSURE: 138 MMHG | DIASTOLIC BLOOD PRESSURE: 78 MMHG | HEART RATE: 92 BPM | HEIGHT: 64 IN | BODY MASS INDEX: 32.27 KG/M2 | WEIGHT: 189 LBS

## 2019-03-22 DIAGNOSIS — E78.5 DYSLIPIDEMIA: ICD-10-CM

## 2019-03-22 DIAGNOSIS — I47.1 SVT (SUPRAVENTRICULAR TACHYCARDIA) (HCC): Primary | ICD-10-CM

## 2019-03-22 DIAGNOSIS — I10 ESSENTIAL HYPERTENSION: ICD-10-CM

## 2019-03-22 DIAGNOSIS — I51.89 DIASTOLIC DYSFUNCTION: ICD-10-CM

## 2019-03-22 PROBLEM — I05.9 MITRAL VALVE DISORDER: Status: ACTIVE | Noted: 2019-03-22

## 2019-03-22 PROBLEM — I35.1 AORTIC VALVE INSUFFICIENCY: Status: ACTIVE | Noted: 2019-03-22

## 2019-03-22 PROCEDURE — 99214 OFFICE O/P EST MOD 30 MIN: CPT | Performed by: INTERNAL MEDICINE

## 2019-03-22 RX ORDER — CHLORAL HYDRATE 500 MG
1000 CAPSULE ORAL DAILY
COMMUNITY
End: 2019-10-01 | Stop reason: ALTCHOICE

## 2019-04-02 ENCOUNTER — APPOINTMENT (OUTPATIENT)
Dept: LAB | Age: 69
End: 2019-04-02
Payer: MEDICARE

## 2019-04-02 DIAGNOSIS — I10 ESSENTIAL HYPERTENSION: ICD-10-CM

## 2019-04-02 DIAGNOSIS — E87.6 LOW SERUM POTASSIUM LEVEL: ICD-10-CM

## 2019-04-02 DIAGNOSIS — E87.6 HYPOKALEMIA: ICD-10-CM

## 2019-04-02 DIAGNOSIS — I51.89 DIASTOLIC DYSFUNCTION: ICD-10-CM

## 2019-04-02 LAB
ALBUMIN SERPL BCP-MCNC: 3.9 G/DL (ref 3.5–5)
ANION GAP SERPL CALCULATED.3IONS-SCNC: 5 MMOL/L (ref 4–13)
BUN SERPL-MCNC: 15 MG/DL (ref 5–25)
CALCIUM SERPL-MCNC: 9.4 MG/DL (ref 8.3–10.1)
CHLORIDE SERPL-SCNC: 109 MMOL/L (ref 100–108)
CO2 SERPL-SCNC: 27 MMOL/L (ref 21–32)
CREAT SERPL-MCNC: 1.15 MG/DL (ref 0.6–1.3)
CREAT UR-MCNC: 436 MG/DL
GFR SERPL CREATININE-BSD FRML MDRD: 50 ML/MIN/1.73SQ M
GLUCOSE SERPL-MCNC: 119 MG/DL (ref 65–140)
MICROALBUMIN UR-MCNC: 120 MG/L (ref 0–20)
MICROALBUMIN/CREAT 24H UR: 28 MG/G CREATININE (ref 0–30)
PHOSPHATE SERPL-MCNC: 3.1 MG/DL (ref 2.3–4.1)
POTASSIUM SERPL-SCNC: 3.9 MMOL/L (ref 3.5–5.3)
SODIUM SERPL-SCNC: 141 MMOL/L (ref 136–145)
URATE SERPL-MCNC: 6.5 MG/DL (ref 2–6.8)

## 2019-04-02 PROCEDURE — 82043 UR ALBUMIN QUANTITATIVE: CPT

## 2019-04-02 PROCEDURE — 84550 ASSAY OF BLOOD/URIC ACID: CPT

## 2019-04-02 PROCEDURE — 36415 COLL VENOUS BLD VENIPUNCTURE: CPT

## 2019-04-02 PROCEDURE — 80069 RENAL FUNCTION PANEL: CPT

## 2019-04-02 PROCEDURE — 82570 ASSAY OF URINE CREATININE: CPT

## 2019-04-10 ENCOUNTER — TELEPHONE (OUTPATIENT)
Dept: NEPHROLOGY | Facility: CLINIC | Age: 69
End: 2019-04-10

## 2019-04-11 ENCOUNTER — OFFICE VISIT (OUTPATIENT)
Dept: NEPHROLOGY | Facility: CLINIC | Age: 69
End: 2019-04-11
Payer: MEDICARE

## 2019-04-11 VITALS
HEART RATE: 80 BPM | SYSTOLIC BLOOD PRESSURE: 128 MMHG | BODY MASS INDEX: 32.27 KG/M2 | HEIGHT: 64 IN | DIASTOLIC BLOOD PRESSURE: 80 MMHG | WEIGHT: 189 LBS

## 2019-04-11 DIAGNOSIS — N18.30 STAGE 3 CHRONIC KIDNEY DISEASE (HCC): Primary | ICD-10-CM

## 2019-04-11 DIAGNOSIS — I12.9 HYPERTENSIVE CHRONIC KIDNEY DISEASE WITH STAGE 1 THROUGH STAGE 4 CHRONIC KIDNEY DISEASE, OR UNSPECIFIED CHRONIC KIDNEY DISEASE: ICD-10-CM

## 2019-04-11 PROCEDURE — 99213 OFFICE O/P EST LOW 20 MIN: CPT | Performed by: INTERNAL MEDICINE

## 2019-04-11 RX ORDER — CETIRIZINE HYDROCHLORIDE 10 MG/1
10 TABLET ORAL AS NEEDED
COMMUNITY

## 2019-06-04 ENCOUNTER — OFFICE VISIT (OUTPATIENT)
Dept: FAMILY MEDICINE CLINIC | Facility: CLINIC | Age: 69
End: 2019-06-04
Payer: MEDICARE

## 2019-06-04 ENCOUNTER — APPOINTMENT (OUTPATIENT)
Dept: RADIOLOGY | Facility: MEDICAL CENTER | Age: 69
End: 2019-06-04
Payer: MEDICARE

## 2019-06-04 ENCOUNTER — APPOINTMENT (OUTPATIENT)
Dept: LAB | Facility: CLINIC | Age: 69
End: 2019-06-04
Payer: MEDICARE

## 2019-06-04 VITALS
HEART RATE: 84 BPM | WEIGHT: 191.8 LBS | BODY MASS INDEX: 32.74 KG/M2 | TEMPERATURE: 98.7 F | DIASTOLIC BLOOD PRESSURE: 78 MMHG | SYSTOLIC BLOOD PRESSURE: 132 MMHG | OXYGEN SATURATION: 96 % | RESPIRATION RATE: 20 BRPM | HEIGHT: 64 IN

## 2019-06-04 DIAGNOSIS — M25.572 ACUTE LEFT ANKLE PAIN: ICD-10-CM

## 2019-06-04 DIAGNOSIS — E78.5 DYSLIPIDEMIA: ICD-10-CM

## 2019-06-04 DIAGNOSIS — F41.8 DEPRESSION WITH ANXIETY: ICD-10-CM

## 2019-06-04 DIAGNOSIS — I10 ESSENTIAL HYPERTENSION: Primary | ICD-10-CM

## 2019-06-04 LAB
ANION GAP SERPL CALCULATED.3IONS-SCNC: 7 MMOL/L (ref 4–13)
BUN SERPL-MCNC: 16 MG/DL (ref 5–25)
CALCIUM SERPL-MCNC: 8.9 MG/DL (ref 8.3–10.1)
CHLORIDE SERPL-SCNC: 107 MMOL/L (ref 100–108)
CHOLEST SERPL-MCNC: 156 MG/DL (ref 50–200)
CO2 SERPL-SCNC: 24 MMOL/L (ref 21–32)
CREAT SERPL-MCNC: 1.14 MG/DL (ref 0.6–1.3)
GFR SERPL CREATININE-BSD FRML MDRD: 50 ML/MIN/1.73SQ M
GLUCOSE P FAST SERPL-MCNC: 87 MG/DL (ref 65–99)
HDLC SERPL-MCNC: 59 MG/DL (ref 40–60)
LDLC SERPL CALC-MCNC: 57 MG/DL (ref 0–100)
NONHDLC SERPL-MCNC: 97 MG/DL
POTASSIUM SERPL-SCNC: 3.9 MMOL/L (ref 3.5–5.3)
SODIUM SERPL-SCNC: 138 MMOL/L (ref 136–145)
TRIGL SERPL-MCNC: 198 MG/DL

## 2019-06-04 PROCEDURE — 36415 COLL VENOUS BLD VENIPUNCTURE: CPT | Performed by: INTERNAL MEDICINE

## 2019-06-04 PROCEDURE — 80048 BASIC METABOLIC PNL TOTAL CA: CPT | Performed by: INTERNAL MEDICINE

## 2019-06-04 PROCEDURE — 73610 X-RAY EXAM OF ANKLE: CPT

## 2019-06-04 PROCEDURE — 99214 OFFICE O/P EST MOD 30 MIN: CPT | Performed by: INTERNAL MEDICINE

## 2019-06-04 PROCEDURE — 80061 LIPID PANEL: CPT | Performed by: INTERNAL MEDICINE

## 2019-06-10 ENCOUNTER — TELEPHONE (OUTPATIENT)
Dept: FAMILY MEDICINE CLINIC | Facility: CLINIC | Age: 69
End: 2019-06-10

## 2019-06-10 DIAGNOSIS — M25.572 ACUTE LEFT ANKLE PAIN: Primary | ICD-10-CM

## 2019-06-10 DIAGNOSIS — M19.90 ARTHRITIS: Primary | ICD-10-CM

## 2019-06-10 RX ORDER — DICLOFENAC POTASSIUM 50 MG/1
50 TABLET, FILM COATED ORAL 3 TIMES DAILY PRN
Qty: 30 TABLET | Refills: 0 | Status: SHIPPED | OUTPATIENT
Start: 2019-06-10 | End: 2019-06-12 | Stop reason: ALTCHOICE

## 2019-06-12 ENCOUNTER — TELEPHONE (OUTPATIENT)
Dept: FAMILY MEDICINE CLINIC | Facility: CLINIC | Age: 69
End: 2019-06-12

## 2019-06-12 DIAGNOSIS — M25.572 ACUTE LEFT ANKLE PAIN: Primary | ICD-10-CM

## 2019-06-13 NOTE — ASSESSMENT & PLAN NOTE
Pt reports episodic diarrhea which is watert up to 5-6 episodes/day    She notes most recent episode has been resolved for 2 days  Given hx of recurrent C diff monitor stool output closely English

## 2019-06-25 ENCOUNTER — TELEPHONE (OUTPATIENT)
Dept: OBGYN CLINIC | Facility: HOSPITAL | Age: 69
End: 2019-06-25

## 2019-06-25 ENCOUNTER — CONSULT (OUTPATIENT)
Dept: OBGYN CLINIC | Facility: CLINIC | Age: 69
End: 2019-06-25
Payer: MEDICARE

## 2019-06-25 VITALS
BODY MASS INDEX: 32.61 KG/M2 | HEIGHT: 64 IN | WEIGHT: 191 LBS | SYSTOLIC BLOOD PRESSURE: 156 MMHG | DIASTOLIC BLOOD PRESSURE: 82 MMHG | HEART RATE: 86 BPM

## 2019-06-25 DIAGNOSIS — M25.572 ACUTE LEFT ANKLE PAIN: ICD-10-CM

## 2019-06-25 DIAGNOSIS — M76.822 POSTERIOR TIBIAL TENDINITIS, LEFT: ICD-10-CM

## 2019-06-25 DIAGNOSIS — M76.62 ACHILLES TENDINITIS OF LEFT LOWER EXTREMITY: ICD-10-CM

## 2019-06-25 DIAGNOSIS — M76.62 ACHILLES TENDINITIS OF LEFT LOWER EXTREMITY: Primary | ICD-10-CM

## 2019-06-25 DIAGNOSIS — M76.822 POSTERIOR TIBIAL TENDINITIS, LEFT: Primary | ICD-10-CM

## 2019-06-25 PROCEDURE — 99203 OFFICE O/P NEW LOW 30 MIN: CPT | Performed by: ORTHOPAEDIC SURGERY

## 2019-06-25 PROCEDURE — 1124F ACP DISCUSS-NO DSCNMKR DOCD: CPT | Performed by: ORTHOPAEDIC SURGERY

## 2019-06-25 RX ORDER — DEXAMETHASONE SODIUM PHOSPHATE 4 MG/ML
4 INJECTION, SOLUTION INTRA-ARTICULAR; INTRALESIONAL; INTRAMUSCULAR; INTRAVENOUS; SOFT TISSUE
Qty: 30 ML | Refills: 0 | Status: SHIPPED | OUTPATIENT
Start: 2019-06-25 | End: 2019-10-01 | Stop reason: ALTCHOICE

## 2019-06-25 RX ORDER — DEXAMETHASONE SODIUM PHOSPHATE 4 MG/ML
4 INJECTION, SOLUTION INTRA-ARTICULAR; INTRALESIONAL; INTRAMUSCULAR; INTRAVENOUS; SOFT TISSUE
Qty: 20 ML | Refills: 0 | Status: SHIPPED | OUTPATIENT
Start: 2019-06-25 | End: 2019-06-25

## 2019-07-10 ENCOUNTER — EVALUATION (OUTPATIENT)
Dept: PHYSICAL THERAPY | Facility: MEDICAL CENTER | Age: 69
End: 2019-07-10
Payer: MEDICARE

## 2019-07-10 DIAGNOSIS — M76.62 ACHILLES TENDINITIS OF LEFT LOWER EXTREMITY: ICD-10-CM

## 2019-07-10 DIAGNOSIS — M76.822 POSTERIOR TIBIAL TENDINITIS, LEFT: ICD-10-CM

## 2019-07-10 PROCEDURE — 97161 PT EVAL LOW COMPLEX 20 MIN: CPT | Performed by: PHYSICAL THERAPIST

## 2019-07-10 NOTE — PROGRESS NOTES
PT Evaluation     Today's date: 7/10/2019  Patient name: Rell Francis  : 1950  MRN: [id number]  Referring provider: Jordan Villagomez  Dx:   Encounter Diagnosis     ICD-10-CM    1  Achilles tendinitis of left lower extremity M76 62 Ambulatory referral to Physical Therapy   2  Posterior tibial tendinitis, left S5 520 Ambulatory referral to Physical Therapy                  Assessment  Assessment details: Rell Francis is a 81 y o  female who came to outpatient PT on 7/10/19  Pt presents with complaints of L ankle pain  The patient's greatest concerns are  the pain she is experiencing, worry over not knowing what's wrong and fear of not being able to keep active  Following manual therapy to L ankle, patient reported less ankle stiffness and pain  No further referral appears necessary at this time based upon examination results  Rell Francis has the impairments listed below resulting in functional impairment and are having a negative impact on her quality of life  Patient is appropriate and would benefit from skilled PT intervention to have an optimal return to function and achieve patient specific goals  All of the patient's questions were answered to their satisfaction and the patient agreed to the plan of care  Patient's greatest impairments are:  1 ) pain - to be addressed with ice, elevation, AROM  2 ) decreased ankle ROM - to be addressed with joint mobilizations, self and therapist stretching and soft tissue mobilizations  3 ) decreased ankle strength - to be addressed with progressive resistance training and neuromuscular reeducation  Primary movement impairment diagnosis of L ankle pain and ROM resulting in pathoanatomical symptoms of Diagnoses of Achilles tendinitis of left lower extremity and Posterior tibial tendinitis, left were pertinent to this visit  and limiting her ability to exercise or recreation, walk without pain    Etiologic factors include none recalled by the patient, poor lower extremity strength, poor hip mobility and poor balance  Impairments: abnormal or restricted ROM, activity intolerance, impaired balance, impaired physical strength, lacks appropriate home exercise program and pain with function    Symptom irritability: moderateUnderstanding of Dx/Px/POC: good   Prognosis: good    Goals  Patient will successfully transition to HEP  Patient will be able to manage symptoms independently  Patient will regain full ROM  Patient will regain full strength  Patient will have no ankle swelling  Patient will report no limitations in ADL's, walking, stairs, etc     Plan  Plan details: Patient will be seen 1-2x per week for 12 weeks  Patient would benefit from: skilled physical therapy  Referral necessary: No  Planned modality interventions: cryotherapy and iontophoresis  Planned therapy interventions: abdominal trunk stabilization, activity modification, balance, flexibility, functional ROM exercises, graded exercise, home exercise program, therapeutic exercise, therapeutic activities, stretching, strengthening, patient education, neuromuscular re-education, manual therapy and joint mobilization        Subjective Evaluation    History of Present Illness  Mechanism of injury: Rell Francis is a 81 y o  female who came to outpatient PT on 7/10/19  Patient presents with complaints of L ankle pain  The pain started about 2-3 months ago and does not recall an ROSHAN  The pain is located at the heel, as well as, medially and laterally  The pain is at a worst when she first tries to walk after rest which is an 4-5/10  At best, her pain is a 1-2/10  The patient also reports chronic R sided LBP that causes numbness and tingling into her ant thigh above the knee  Patient is being seen by physician to manage this  Due to this, patient reports taking Ibuprofen everyday   The patients greatest concerns are the pain she is experiencing, worry over not knowing what's wrong and fear of not being able to keep active  Denied any Red Flags  Patient finds aggravating factors are:  1 ) walking  2 ) any activity    Patients finds most relieving factors are:  1 ) rest  2 ) Ibuprofen    No further referral appears necessary at this time based upon examination results  Objective     Observations   Left Ankle/Foot   Positive for edema  Right Ankle/Foot   Negative for abrasion  Palpation   Left   No palpable tenderness to the anterior tibialis, lateral gastrocnemius and medial gastrocnemius  Tenderness of the peroneus and posterior tibialis  Right   No palpable tenderness to the anterior tibialis, lateral gastrocnemius, medial gastrocnemius, peroneus and posterior tibialis  Active Range of Motion   Left Ankle/Foot   Great toe extension: WFL    Right Ankle/Foot   Normal active range of motion    Additional Active Range of Motion Details  L dorsiflexion is moderately restricted  L plantarflexion is moderately restricted and painful   L inversion is moderately restricted and painful   L eversion is moderately restricted and painful     Passive Range of Motion     Right Ankle/Foot  Normal passive range of motion    Additional Passive Range of Motion Details  L and R hip flexion is moderately limited  L and R IR and ER is moderately limited  L dorsiflexion is moderately restricted  L plantarflexion is moderately restricted and painful with empty end feel  L inversion is moderately restricted and painful with empty end feel  L eversion is moderately restricted and painful with empty end feel      Joint Play   Left Ankle/Foot  Hypomobile in the midfoot and forefoot  Right Ankle/Foot  Hypomobile in the midfoot and forefoot       Strength/Myotome Testing     Left Ankle/Foot   Dorsiflexion: 3+  Plantar flexion: 3+  Inversion: 3+  Eversion: 3+    Right Ankle/Foot   Dorsiflexion: 4  Plantar flexion: 4  Inversion: 4-  Eversion: 4-    Ambulation     Observational Gait   Gait: antalgic and asymmetric   Walking speed within functional limits  Additional Observational Gait Details  L LE ER throughout gait cycle  Decreased stance time on LLE      Functional Assessment        Comments  Single leg balance: RLE 15 secs with slight ankle instability, LLE attempted, but unable secondary to pain and subjective feelings of unsteadiness             Precautions: depression and anxiety      Manual  7/10            Talocrural distraction KD                                                                    Exercise Diary                                                                                                                                                                                                                                                                                      Modalities  7/10            Iontoporesis achilles tendon

## 2019-07-15 ENCOUNTER — OFFICE VISIT (OUTPATIENT)
Dept: PHYSICAL THERAPY | Facility: MEDICAL CENTER | Age: 69
End: 2019-07-15
Payer: MEDICARE

## 2019-07-15 DIAGNOSIS — M76.62 ACHILLES TENDINITIS OF LEFT LOWER EXTREMITY: Primary | ICD-10-CM

## 2019-07-15 DIAGNOSIS — M76.822 POSTERIOR TIBIAL TENDINITIS, LEFT: ICD-10-CM

## 2019-07-15 PROCEDURE — 97140 MANUAL THERAPY 1/> REGIONS: CPT | Performed by: PHYSICAL THERAPIST

## 2019-07-15 PROCEDURE — 97110 THERAPEUTIC EXERCISES: CPT | Performed by: PHYSICAL THERAPIST

## 2019-07-15 NOTE — PROGRESS NOTES
Daily Note     Today's date: 7/15/2019  Patient name: Ewelina Gage  : 1950  MRN: [id number]  Referring provider: Jose Cherry  Dx:   Encounter Diagnosis     ICD-10-CM    1  Achilles tendinitis of left lower extremity M76 62    2  Posterior tibial tendinitis, left [id number]                   Subjective: Patient reports she has felt good since her last visit  She notcies the swelling has gone down which has decreased her pain and increased her ROM  But she still reports tenderness on the lat and med side of her ankle  Patient reported she liked iontophoresis  Objective: See treatment diary below      Assessment:  Patient had an observable decrease in swelling  Patient responded well to talocrural distraction noting increased ankle ROM and decreased pain  Patient was able to tolerate isometric exercises of the ankle and reported no pain, but slight stiffness  Iontophoresis was applied 2" distal to lateral and medial malleoli as per request of the patient and based upon her pain with palpation  Will continue to progress strengthening and ROM as able  Tolerated treatment well  Patient would benefit from continued PT      Plan: Continue per plan of care        Precautions: depression and anxiety      Manual  7/10 7/15           Talocrural distraction KD KD           Subtalar distraction  KD                                                      Exercise Diary   7/15           BAPS Board  20x each way            Ankle isometrics  20x PF, ev, inv                                                                                                                                                                                                                                                         Modalities  7/10 7/15           Iontoporesis achilles tendon 2" distal to lateral and medial malleolus

## 2019-07-17 ENCOUNTER — APPOINTMENT (OUTPATIENT)
Dept: PHYSICAL THERAPY | Facility: MEDICAL CENTER | Age: 69
End: 2019-07-17
Payer: MEDICARE

## 2019-07-22 ENCOUNTER — APPOINTMENT (OUTPATIENT)
Dept: PHYSICAL THERAPY | Facility: MEDICAL CENTER | Age: 69
End: 2019-07-22
Payer: MEDICARE

## 2019-07-29 ENCOUNTER — OFFICE VISIT (OUTPATIENT)
Dept: PHYSICAL THERAPY | Facility: MEDICAL CENTER | Age: 69
End: 2019-07-29
Payer: MEDICARE

## 2019-07-29 DIAGNOSIS — M76.62 ACHILLES TENDINITIS OF LEFT LOWER EXTREMITY: Primary | ICD-10-CM

## 2019-07-29 DIAGNOSIS — M76.822 POSTERIOR TIBIAL TENDINITIS, LEFT: ICD-10-CM

## 2019-07-29 PROCEDURE — 97110 THERAPEUTIC EXERCISES: CPT | Performed by: PHYSICAL THERAPIST

## 2019-07-29 PROCEDURE — 97140 MANUAL THERAPY 1/> REGIONS: CPT | Performed by: PHYSICAL THERAPIST

## 2019-07-29 NOTE — PROGRESS NOTES
Daily Note     Today's date: 2019  Patient name: Roshan Marte  : 1950  MRN: [id number]  Referring provider: Mariah Alexander  Dx:   Encounter Diagnosis     ICD-10-CM    1  Achilles tendinitis of left lower extremity M76 62    2  Posterior tibial tendinitis, left [id number]                   Subjective: Patient reports she is not having any pain anymore, but her lateral ankle is swollen  She reports walking more than usual this weekend  Objective: See treatment diary below      Assessment: Patient presents with decreased pain and increased ROM and was able to do more activity this weekend (ie  Walking), but came in with some lateral malleoli swelling  Patient had no palpable tenderness along peroneals  She does present with moderate soft tissue restrictions in the gastrocnemius and soleus that was addressed with IASTM  Patient also tolerated light resistance training today without reports of pain  Patient was instructed to add TB ankle exercises, seated calf raises, standing gastrocnemius and soleus stretches to her HEP and to continue to ice as necessary  Patient gave verbal understanding  Patient also felt there is benefit from iontophoresis so applied again today  Tolerated treatment well  Patient would benefit from continued PT      Plan: Continue per plan of care        Precautions: depression and anxiety      Manual  7/10 7/15 7/29          Talocrural distraction KD KD KD          Subtalar distraction  KD KD          IASTM to calf   KD                                        Exercise Diary   7/15 7/29          BAPS Board  20x each way  20x each way          Ankle isometrics  20x PF, ev, inv           Gastroc stretch   30 sec 3x          Soleus stretch   30 sec 3x          Seated heel rises   30x          TB ankle inv, ev, PF, DF   20x Modalities  7/10 7/15 7/29          Iontoporesis achilles tendon 2" distal to lateral and medial malleolus 2" distal to lateral and medial malleolus

## 2019-09-05 ENCOUNTER — APPOINTMENT (OUTPATIENT)
Dept: LAB | Facility: MEDICAL CENTER | Age: 69
End: 2019-09-05
Payer: MEDICARE

## 2019-09-05 DIAGNOSIS — I12.9 HYPERTENSIVE CHRONIC KIDNEY DISEASE WITH STAGE 1 THROUGH STAGE 4 CHRONIC KIDNEY DISEASE, OR UNSPECIFIED CHRONIC KIDNEY DISEASE: ICD-10-CM

## 2019-09-05 DIAGNOSIS — N18.30 STAGE 3 CHRONIC KIDNEY DISEASE (HCC): ICD-10-CM

## 2019-09-05 LAB
ALBUMIN SERPL BCP-MCNC: 4.5 G/DL (ref 3.5–5)
ANION GAP SERPL CALCULATED.3IONS-SCNC: 8 MMOL/L (ref 4–13)
BUN SERPL-MCNC: 18 MG/DL (ref 5–25)
CALCIUM ALBUM COR SERPL-MCNC: 10.2 MG/DL (ref 8.3–10.1)
CALCIUM SERPL-MCNC: 10.6 MG/DL (ref 8.3–10.1)
CALCIUM SERPL-MCNC: 10.6 MG/DL (ref 8.3–10.1)
CHLORIDE SERPL-SCNC: 107 MMOL/L (ref 100–108)
CO2 SERPL-SCNC: 27 MMOL/L (ref 21–32)
CREAT SERPL-MCNC: 1.29 MG/DL (ref 0.6–1.3)
GFR SERPL CREATININE-BSD FRML MDRD: 43 ML/MIN/1.73SQ M
GLUCOSE P FAST SERPL-MCNC: 102 MG/DL (ref 65–99)
POTASSIUM SERPL-SCNC: 4.2 MMOL/L (ref 3.5–5.3)
SODIUM SERPL-SCNC: 142 MMOL/L (ref 136–145)

## 2019-09-05 PROCEDURE — 80048 BASIC METABOLIC PNL TOTAL CA: CPT

## 2019-09-05 PROCEDURE — 82040 ASSAY OF SERUM ALBUMIN: CPT

## 2019-09-05 PROCEDURE — 36415 COLL VENOUS BLD VENIPUNCTURE: CPT

## 2019-09-10 DIAGNOSIS — E83.52 HYPERCALCEMIA: Primary | ICD-10-CM

## 2019-09-10 NOTE — PROGRESS NOTES
Reviewed laboratory studies, calcium elevated at 10 6    Slight worsening renal function of 1 29, discussed with patient, increase fluid intake, discontinue multivitamin, repeat chemistries in 2 weeks with PTH as well as serum electrophoresis

## 2019-09-23 DIAGNOSIS — I10 ESSENTIAL HYPERTENSION: ICD-10-CM

## 2019-09-23 RX ORDER — DILTIAZEM HYDROCHLORIDE 120 MG/1
120 CAPSULE, COATED, EXTENDED RELEASE ORAL DAILY
Qty: 90 CAPSULE | Refills: 3 | Status: SHIPPED | OUTPATIENT
Start: 2019-09-23 | End: 2020-01-07 | Stop reason: SDUPTHER

## 2019-10-01 ENCOUNTER — OFFICE VISIT (OUTPATIENT)
Dept: CARDIOLOGY CLINIC | Facility: CLINIC | Age: 69
End: 2019-10-01
Payer: MEDICARE

## 2019-10-01 VITALS
HEART RATE: 78 BPM | WEIGHT: 189 LBS | DIASTOLIC BLOOD PRESSURE: 80 MMHG | BODY MASS INDEX: 32.27 KG/M2 | SYSTOLIC BLOOD PRESSURE: 140 MMHG | HEIGHT: 64 IN

## 2019-10-01 DIAGNOSIS — I47.1 SVT (SUPRAVENTRICULAR TACHYCARDIA) (HCC): Primary | ICD-10-CM

## 2019-10-01 PROCEDURE — 99213 OFFICE O/P EST LOW 20 MIN: CPT | Performed by: INTERNAL MEDICINE

## 2019-10-01 PROCEDURE — 93000 ELECTROCARDIOGRAM COMPLETE: CPT | Performed by: INTERNAL MEDICINE

## 2019-10-01 NOTE — PROGRESS NOTES
[facility] [address]     Outpatient Follow-up  Today's Date: 10/01/19        Patient name: Sarah James  YOB: 1950  Sex: female         Chief Complaint: f/u svt      ASSESSMENT:  Problem List Items Addressed This Visit        Cardiovascular and Mediastinum    SVT (supraventricular tachycardia) ([address] ) - Primary    Relevant Orders    POCT ECG        80 yo female  1) S/p SVT ablation, had AVRT w left lateral pathway and SVC- atach  Since ablation no recurrence  2) PAC's/PVC's mild palpitations  NOthing sustained  3  Moderate MR-sees Dr Lala Hearn  4  HTN reasonable control w dilt/spirolactone  5  H/o hypokalemia  6  Moderate 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  7  COPD, quit smoking a year ago        PLAN:  1  If any recurrent sustained palpitations would do event monitor  But otherwise she is no longer having SVT symptoms since ablation   Recommend she f/u w Dr Lala Hearn regularly and me prn  Orders Placed This Encounter   Procedures    POCT ECG     Medications Discontinued During This Encounter   Medication Reason    Omega-3 Fatty Acids (FISH OIL) 1,000 mg Therapy completed    diclofenac sodium (VOLTAREN) 50 mg EC tablet Therapy completed    dexamethasone (DECADRON) 4 mg/mL Therapy completed             HPI/Subjective:   80 yo female  1) S/p SVT ablation, had AVRT w left lateral pathway and SVC- atach  Since ablation no recurrence  2) PAC's/PVC's mild palpitations  NOthing sustained  3  Moderate MR-sees Dr Lala Hearn  4  HTN reasonable control w dilt/spirolactone  5  H/o hypokalemia  6  Moderate 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  7  COPD, quit smoking a year ago      Please note HPI is listed by problem with with update following it, it is copied again in the assessment above and reflects medical decision making as well         Complete 12 point ROS reviewed and otherwise non pertinent or negative except as per HPI pertinent positives in Cardiovascular and Respiratory emphasized  Please see paper chart for outpatient clinic patients where the patient completed the 12 point ROS survey  Past Medical History:   Diagnosis Date    Anxiety     Bursitis of hip     Last Assessed:6/5/2017    Depression     Herniated lumbar intervertebral disc     Hyperlipidemia     Hypertension     Hypokalemia     Pituitary adenoma (HCC)     Last Assessed:2/12/2015 ; Previously on bromocriptine    Spinal stenosis     Systolic murmur of aorta     Vitamin D deficiency        Allergies   Allergen Reactions    Amlodipine Rash    Ancef [Cefazolin]     Cephalosporins Other (See Comments)     c diff    Fruit Extracts      Annotation - [id number]: peaches and strawberries    Levofloxacin     Morphine GI Intolerance    Other      FRUIT    Penicillins Hives    Strawberry C [Ascorbate]     Tetracycline Edema    Xarelto [Rivaroxaban] Rash     I reviewed the Home Medication list and Allergies in the chart     Scheduled Meds:  Current Outpatient Medications   Medication Sig Dispense Refill    albuterol (PROVENTIL HFA) 90 mcg/act inhaler Inhale 1 puff every 6 (six) hours as needed for wheezing 6 7 g 0    atorvastatin (LIPITOR) 20 mg tablet Take 1 tablet (20 mg total) by mouth daily 90 tablet 3    cetirizine (ZyrTEC) 10 mg tablet Take 10 mg by mouth daily      diltiazem (CARDIZEM CD) 120 mg 24 hr capsule Take 1 capsule (120 mg total) by mouth daily 90 capsule 3    escitalopram (LEXAPRO) 10 mg tablet Take 1 tablet (10 mg total) by mouth daily 30 tablet 11    ibuprofen (MOTRIN) 800 mg tablet Take 800 mg by mouth daily      Ipratropium-Albuterol (COMBIVENT IN) Inhale      Multiple Vitamin (MULTI VITAMIN DAILY PO) Take 1 tablet by mouth daily        spironolactone (ALDACTONE) 25 mg tablet Take 1 tablet (25 mg total) by mouth daily 90 tablet 3     No current facility-administered medications for this visit  PRN Meds:         Family History   Problem Relation Age of Onset   Juan Mariano Colon cancer Mother     Coronary artery disease Mother     Colonic polyp Mother     Liver cancer Mother     Multiple sclerosis Father     Hypertension Other     Hypertension Brother        Social History     Socioeconomic History    Marital status: /Civil Union     Spouse name: Not on file    Number of children: Not on file    Years of education: Not on file    Highest education level: Not on file   Occupational History    Not on file   Social Needs    Financial resource strain: Not on file    Food insecurity:     Worry: Not on file     Inability: Not on file    Transportation needs:     Medical: Not on file     Non-medical: Not on file   Tobacco Use    Smoking status: Former Smoker     Packs/day:      Years: 30      Pack years: 30      Types: Cigarettes     Last attempt to quit: 2018     Years since quittin 8    Smokeless tobacco: Never Used   Substance and Sexual Activity    Alcohol use:  Yes     Alcohol/week: 1 0 standard drinks     Types: 1 Shots of liquor per week     Comment: 1 Irlanda Shirts a night/social    Drug use: No    Sexual activity: Not on file   Lifestyle    Physical activity:     Days per week: Not on file     Minutes per session: Not on file    Stress: Not on file   Relationships    Social connections:     Talks on phone: Not on file     Gets together: Not on file     Attends Sabianism service: Not on file     Active member of club or organization: Not on file     Attends meetings of clubs or organizations: Not on file     Relationship status: Not on file    Intimate partner violence:     Fear of current or ex partner: Not on file     Emotionally abused: Not on file     Physically abused: Not on file     Forced sexual activity: Not on file   Other Topics Concern    Not on file   Social History Narrative    Not on file         OBJECTIVE:    /80   Pulse 78   Ht 5' 4" (1 626 m)   Wt 85 7 kg (189 lb)   LMP  (LMP Unknown)   BMI 32 44 kg/m²   Vitals:    10/01/19 1038   Weight: 85 7 kg (189 lb)     GEN: No acute distress, Alert and oriented, well appearing  HEENT:Head, neck, ears, oral pharynx: Mucus membranes moist, oral pharynx clear, nares clear  External ears normal    She has raspy voice todya  EYES: Pupils equal, sclera anicteric, midline, normal conjuctiva  NECK: No JVD, supple, no obvious masses or thryomegaly or goiter  CARDIOVASCULAR:  RRR, No murmur, rub, gallops S1,S2  LUNGS: Prolonged exp phase  No  Wheezing  Clear To auscultation bilaterally, normal effort, no rales, rhonchi, crackles  ABDOMEN:  nondistended,  without obvious organomegaly or ascites  EXTREMITIES/VASCULAR:  No edema  Radial pulses intact, pedal pulses difficult to palpate, warm an well perfused  PSYCH: Normal Affect, no overt suicidal ideation, linear speech pattern without evidence of psychosis  NEURO: Grossly intact, moving all extremiteis equal, face symmetric, alert and responsive, no obvious focal defecits  GAIT:  Ambulates normally without difficulty  HEME: No bleeding, bruising, petechia, purpura  SKIN: No significant rashes, warm, no diaphoresis or pallor       Lab Results:       LABS:      Chemistry        Component Value Date/Time     05/04/2015 0920    K 4 2 09/05/2019 1020    K 3 8 05/04/2015 0920     09/05/2019 1020    CL 99 (L) 05/04/2015 0920    CO2 27 09/05/2019 1020    CO2 30 05/04/2015 0920    BUN 18 09/05/2019 1020    BUN 20 05/04/2015 0920    CREATININE 1 29 09/05/2019 1020    CREATININE 1 11 05/04/2015 0920        Component Value Date/Time    CALCIUM 10 6 (H) 09/05/2019 1020    CALCIUM 9 6 05/04/2015 0920    ALKPHOS 102 01/08/2019 0943    ALKPHOS 80 05/04/2015 0920    AST 20 01/08/2019 0943    AST 25 05/04/2015 0920    ALT 30 01/08/2019 0943    ALT 38 05/04/2015 0920    BILITOT 0 72 05/04/2015 0920            Lab Results   Component Value Date    CHOL 187 2015    CHOL 199 2014     Lab Results   Component Value Date    HDL 59 2019    HDL 62 (H) 2018    HDL 65 (H) 2017     Lab Results   Component Value Date    LDLCALC 57 2019    LDLCALC 79 2018    LDLCALC 81 2017     Lab Results   Component Value Date    TRIG 198 (H) 2019    TRIG 166 (H) 2018    TRIG 134 2017     No results found for: CHOLHDL    IMAGING: No results found  Cardiac testing:   Results for orders placed during the hospital encounter of 19   Echo complete with contrast if indicated    Narrative [address]  [address] [facility], [address] [facility]  [phone]    Transthoracic Echocardiogram  2D, M-mode, Doppler, and Color Doppler    Study date:  2019    Patient: Howard Marcum  MR number: [id number]  Account number: [de-identified]  : 1950  Age: 79 years  Gender: Female  Status: Outpatient  Location: [address] [place] [address] [facility]  Height: 64 in  Weight: 187 lb  BP: 128/ 84 mmHg    Indications: Mitral valve disease    Diagnoses: 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, unspecified    Sonographer:  EUN Garza  Primary Physician:  Demetris Iqbal MD  Referring Physician:  Yodit Otero PA-C  Group:  [facility]  Interpreting Physician:  Casi Messer MD    SUMMARY    LEFT VENTRICLE:  Size was normal   Systolic function was normal  Ejection fraction was estimated to be 60 %  There were no regional wall motion abnormalities  Wall thickness was normal   Features were consistent with a pseudonormal left ventricular filling pattern, with concomitant abnormal relaxation and increased filling pressure (grade 2 diastolic dysfunction)  LEFT ATRIUM:  The atrium was mildly dilated  MITRAL VALVE:  There was mild annular calcification  There was calcification, with moderate chordal involvement  There was very mild stenosis    There was moderate regurgitation  The regurgitant jet was eccentric and directed posteriorly  AORTIC VALVE:  The valve was probably trileaflet  Leaflets exhibited mild to moderate calcification  There was mild stenosis  There was moderate regurgitation  TRICUSPID VALVE:  There was mild regurgitation  Estimated peak PA pressure was 35 mmHg  HISTORY: PRIOR HISTORY: Hypertension, hyperlipidemia, MV disorder, supraventricular tachycardia, former smoker    PROCEDURE: The study was performed in the [address] [facility]  This was a routine study  The transthoracic approach was used  The study included complete 2D imaging, M-mode, complete spectral Doppler, and color Doppler  Image  quality was adequate  LEFT VENTRICLE: Size was normal  Systolic function was normal  Ejection fraction was estimated to be 60 %  There were no regional wall motion abnormalities  Wall thickness was normal  DOPPLER: Features were consistent with a pseudonormal  left ventricular filling pattern, with concomitant abnormal relaxation and increased filling pressure (grade 2 diastolic dysfunction)  RIGHT VENTRICLE: The size was normal  Systolic function was normal  Wall thickness was normal     LEFT ATRIUM: The atrium was mildly dilated  RIGHT ATRIUM: Size was normal     MITRAL VALVE: There was mild annular calcification  There was calcification, with moderate chordal involvement  DOPPLER: There was very mild stenosis  There was moderate regurgitation  The regurgitant jet was eccentric and directed  posteriorly  AORTIC VALVE: The valve was probably trileaflet  Leaflets exhibited mild to moderate calcification  DOPPLER: Transaortic velocity was within the normal range  There was mild stenosis  There was moderate regurgitation  TRICUSPID VALVE: DOPPLER: There was mild regurgitation  Estimated peak PA pressure was 35 mmHg  PULMONIC VALVE: Not well visualized  PERICARDIUM: There was no pericardial effusion   The pericardium was normal in appearance  AORTA: The root exhibited normal size  SYSTEM MEASUREMENT TABLES    2D  %FS: 29 48 %  Ao Diam: 2 9 cm  EDV(Teich): 90 44 ml  EF(Cube): 64 94 %  EF(Teich): 56 62 %  ESV(Cube): 31 07 ml  ESV(Teich): 39 23 ml  IVSd: 0 91 cm  LA Area: 22 15 cm2  LA Diam: 3 89 cm  LVEDV MOD A4C: 96 43 ml  LVEF MOD A4C: 52 77 %  LVESV MOD A4C: 45 54 ml  LVIDd: 4 46 cm  LVIDs: 3 14 cm  LVLd A4C: 7 49 cm  LVLs A4C: 6 25 cm  LVOT Diam: 1 98 cm  LVPWd: 1 04 cm  RA Area: 14 01 cm2  RV Diam : 3 71 cm  SV MOD A4C: 50 89 ml  SV(Cube): 57 54 ml  SV(Teich): 51 21 ml    CW  AR Dec Stephens: 3 m/s2  AR Dec Time: 1510 32 ms  AR PHT: 437 99 ms  AR Vmax: 4 51 m/s  AR maxP 33 mmHg  AV Env  Ti: 309 3 ms  AV VTI: 51 43 cm  AV Vmax: 2 56 m/s  AV Vmean: 1 66 m/s  AV maxP 16 mmHg  AV meanP 97 mmHg  TR Vmax: 2 75 m/s  TR maxP 29 mmHg    MM  TAPSE: 1 9 cm    PW  ZHAO (VTI): 1 47 cm2  ZHAO Vmax: 1 43 cm2  E': 0 05 m/s  E/E': 35 71  LVOT Env  Ti: 345 04 ms  LVOT VTI: 24 55 cm  LVOT Vmax: 1 19 m/s  LVOT Vmean: 0 71 m/s  LVOT maxP 63 mmHg  LVOT meanP 51 mmHg  LVSV Dopp: 75 51 ml  MV A Jose Luis: 1 65 m/s  MV Dec Stephens: 6 34 m/s2  MV DecT: 274 82 ms  MV E Jose Luis: 1 74 m/s  MV E/A Ratio: 1 06    [employer] Accredited Echocardiography Laboratory    Prepared and electronically signed by    Kendall Edmondson MD  Signed 2019 11:23:03       No results found for this or any previous visit  No results found for this or any previous visit  No results found for this or any previous visit

## 2019-10-03 ENCOUNTER — ANNUAL EXAM (OUTPATIENT)
Dept: OBGYN CLINIC | Facility: MEDICAL CENTER | Age: 69
End: 2019-10-03
Payer: MEDICARE

## 2019-10-03 ENCOUNTER — APPOINTMENT (OUTPATIENT)
Dept: LAB | Facility: MEDICAL CENTER | Age: 69
End: 2019-10-03
Payer: MEDICARE

## 2019-10-03 VITALS — WEIGHT: 189.7 LBS | SYSTOLIC BLOOD PRESSURE: 132 MMHG | DIASTOLIC BLOOD PRESSURE: 80 MMHG | BODY MASS INDEX: 32.56 KG/M2

## 2019-10-03 DIAGNOSIS — Z01.419 ENCOUNTER FOR ANNUAL ROUTINE GYNECOLOGICAL EXAMINATION: ICD-10-CM

## 2019-10-03 DIAGNOSIS — E78.00 HIGH CHOLESTEROL: ICD-10-CM

## 2019-10-03 DIAGNOSIS — Z12.31 ENCOUNTER FOR SCREENING MAMMOGRAM FOR MALIGNANT NEOPLASM OF BREAST: ICD-10-CM

## 2019-10-03 DIAGNOSIS — F41.8 DEPRESSION WITH ANXIETY: ICD-10-CM

## 2019-10-03 DIAGNOSIS — E83.52 HYPERCALCEMIA: ICD-10-CM

## 2019-10-03 DIAGNOSIS — Z01.419 ENCOUNTER FOR ROUTINE GYNECOLOGICAL EXAMINATION WITH PAPANICOLAOU SMEAR OF CERVIX: Primary | ICD-10-CM

## 2019-10-03 LAB
ALBUMIN SERPL BCP-MCNC: 4.3 G/DL (ref 3.5–5)
ANION GAP SERPL CALCULATED.3IONS-SCNC: 8 MMOL/L (ref 4–13)
BUN SERPL-MCNC: 20 MG/DL (ref 5–25)
CALCIUM ALBUM COR SERPL-MCNC: 10.1 MG/DL (ref 8.3–10.1)
CALCIUM SERPL-MCNC: 10.3 MG/DL (ref 8.3–10.1)
CALCIUM SERPL-MCNC: 10.3 MG/DL (ref 8.3–10.1)
CHLORIDE SERPL-SCNC: 107 MMOL/L (ref 100–108)
CO2 SERPL-SCNC: 26 MMOL/L (ref 21–32)
CREAT SERPL-MCNC: 1.25 MG/DL (ref 0.6–1.3)
GFR SERPL CREATININE-BSD FRML MDRD: 45 ML/MIN/1.73SQ M
GLUCOSE SERPL-MCNC: 75 MG/DL (ref 65–140)
POTASSIUM SERPL-SCNC: 4.3 MMOL/L (ref 3.5–5.3)
PTH-INTACT SERPL-MCNC: 50 PG/ML (ref 18.4–80.1)
SODIUM SERPL-SCNC: 141 MMOL/L (ref 136–145)

## 2019-10-03 PROCEDURE — 83970 ASSAY OF PARATHORMONE: CPT

## 2019-10-03 PROCEDURE — G0145 SCR C/V CYTO,THINLAYER,RESCR: HCPCS | Performed by: OBSTETRICS & GYNECOLOGY

## 2019-10-03 PROCEDURE — 84165 PROTEIN E-PHORESIS SERUM: CPT | Performed by: PATHOLOGY

## 2019-10-03 PROCEDURE — 84165 PROTEIN E-PHORESIS SERUM: CPT

## 2019-10-03 PROCEDURE — 87624 HPV HI-RISK TYP POOLED RSLT: CPT | Performed by: OBSTETRICS & GYNECOLOGY

## 2019-10-03 PROCEDURE — 82040 ASSAY OF SERUM ALBUMIN: CPT

## 2019-10-03 PROCEDURE — 36415 COLL VENOUS BLD VENIPUNCTURE: CPT

## 2019-10-03 PROCEDURE — 80048 BASIC METABOLIC PNL TOTAL CA: CPT

## 2019-10-03 PROCEDURE — G0101 CA SCREEN;PELVIC/BREAST EXAM: HCPCS | Performed by: OBSTETRICS & GYNECOLOGY

## 2019-10-03 RX ORDER — ESCITALOPRAM OXALATE 10 MG/1
10 TABLET ORAL DAILY
Qty: 30 TABLET | Refills: 11 | Status: SHIPPED | OUTPATIENT
Start: 2019-10-03 | End: 2020-09-03 | Stop reason: SDUPTHER

## 2019-10-03 RX ORDER — ATORVASTATIN CALCIUM 20 MG/1
20 TABLET, FILM COATED ORAL DAILY
Qty: 90 TABLET | Refills: 3 | Status: SHIPPED | OUTPATIENT
Start: 2019-10-03 | End: 2020-09-28 | Stop reason: SDUPTHER

## 2019-10-03 NOTE — PROGRESS NOTES
ASSESSMENT & PLAN: Silvana Sotelo is a 81 y o  [id number] with normal gynecologic exam     1   Routine well woman exam done today  2  Pap and HPV:  The patient's last pap and hpv was   It was normal     Pap with cotesting was done today  - per patient request/ agrees if normal ok for this to be her last one   Current ASCCP Guidelines reviewed  3   Mammogram ordered  4  Colorectal cancer screening was notordered  - currently up to date   11  The following were reviewed in today's visit: breast self exam, mammography screening ordered, menopause, osteoporosis, adequate intake of calcium and vitamin D, exercise and healthy diet  CC:  Annual Gynecologic Examination    HPI: Silvana Sotelo is a 81 y o  Mary Vincea who presents for annual gynecologic examination  She has the following concerns:  None / quit smoking since our last visit      Health Maintenance:    She wears her seatbelt routinely  She does perform regular monthly self breast exams  She feels safe at home  Past Medical History:   Diagnosis Date    Anxiety     Bursitis of hip     Last Assessed:2017    Depression     Herniated lumbar intervertebral disc     Hyperlipidemia     Hypertension     Hypokalemia     Pituitary adenoma ([facility] [address] )     Last Assessed:2015 ; Previously on bromocriptine    Spinal stenosis     Systolic murmur of aorta     Vitamin D deficiency        Past Surgical History:   Procedure Laterality Date    BREAST SURGERY      Incisional breast biopsy    CARDIAC ELECTROPHYSIOLOGY STUDY AND ABLATION      CARPAL TUNNEL RELEASE Left     CHOLECYSTECTOMY      OTHER SURGICAL HISTORY      EXCISION OF PAROTID TUMOR/GLAND    PAROTIDECTOMY      MT COLONOSCOPY FLX DX W/COLLJ SPEC WHEN PFRMD N/A 2017    Procedure: COLONOSCOPY;  Surgeon: Amanda Teran DO;  Location: [facility];   Service: Gastroenterology    ROTATOR CUFF REPAIR      SALPINGOOPHORECTOMY Right        Past OB/Gyn History:  OB History      3    Para   3    Term   3            AB        Living   3       SAB        TAB        Ectopic        Multiple        Live Births   3                 Family History   Problem Relation Age of Onset   Willy Bones Colon cancer Mother     Coronary artery disease Mother     Colonic polyp Mother     Liver cancer Mother     Multiple sclerosis Father     Hypertension Other     Hypertension Brother        Social History:  Social History     Socioeconomic History    Marital status: /Civil Union     Spouse name: Not on file    Number of children: Not on file    Years of education: Not on file    Highest education level: Not on file   Occupational History    Not on file   Social Needs    Financial resource strain: Not on file    Food insecurity:     Worry: Not on file     Inability: Not on file    Transportation needs:     Medical: Not on file     Non-medical: Not on file   Tobacco Use    Smoking status: Former Smoker     Packs/day: 1 00     Years: 30 00     Pack years: 30 00     Types: Cigarettes     Last attempt to quit: 2018     Years since quittin 8    Smokeless tobacco: Never Used   Substance and Sexual Activity    Alcohol use:  Yes     Alcohol/week: 1 0 standard drinks     Types: 1 Shots of liquor per week     Frequency: 4 or more times a week     Comment: 1 Lovetta Eulalio a night/social    Drug use: No    Sexual activity: Not Currently     Birth control/protection: Post-menopausal   Lifestyle    Physical activity:     Days per week: Not on file     Minutes per session: Not on file    Stress: Not on file   Relationships    Social connections:     Talks on phone: Not on file     Gets together: Not on file     Attends Quaker service: Not on file     Active member of club or organization: Not on file     Attends meetings of clubs or organizations: Not on file     Relationship status: Not on file    Intimate partner violence:     Fear of current or ex partner: Not on file     Emotionally abused: Not on file     Physically abused: Not on file     Forced sexual activity: Not on file   Other Topics Concern    Not on file   Social History Narrative    Not on file         Allergies   Allergen Reactions    Amlodipine Rash    Ancef [Cefazolin]     Cephalosporins Other (See Comments)     c diff    Fruit Extracts      Annotation - [id number]: peaches and strawberries    Levofloxacin     Morphine GI Intolerance    Other      FRUIT    Penicillins Hives    Strawberry C [Ascorbate]     Tetracycline Edema    Xarelto [Rivaroxaban] Rash         Current Outpatient Medications:     albuterol (PROVENTIL HFA) 90 mcg/act inhaler, Inhale 1 puff every 6 (six) hours as needed for wheezing, Disp: 6 7 g, Rfl: 0    atorvastatin (LIPITOR) 20 mg tablet, Take 1 tablet (20 mg total) by mouth daily, Disp: 90 tablet, Rfl: 3    cetirizine (ZyrTEC) 10 mg tablet, Take 10 mg by mouth daily, Disp: , Rfl:     diltiazem (CARDIZEM CD) 120 mg 24 hr capsule, Take 1 capsule (120 mg total) by mouth daily, Disp: 90 capsule, Rfl: 3    escitalopram (LEXAPRO) 10 mg tablet, Take 1 tablet (10 mg total) by mouth daily, Disp: 30 tablet, Rfl: 11    ibuprofen (MOTRIN) 800 mg tablet, Take 800 mg by mouth daily, Disp: , Rfl:     Ipratropium-Albuterol (COMBIVENT IN), Inhale, Disp: , Rfl:     Multiple Vitamin (MULTI VITAMIN DAILY PO), Take 1 tablet by mouth daily  , Disp: , Rfl:     spironolactone (ALDACTONE) 25 mg tablet, Take 1 tablet (25 mg total) by mouth daily, Disp: 90 tablet, Rfl: 3    Review of Systems  Constitutional :no fever, feels well, no tiredness, no recent weight gain or loss  ENT: no ear ache, no loss of hearing, no nosebleeds or nasal discharge, no sore throat or hoarseness  Cardiovascular: no complaints of slow or fast heart beat, no chest pain, no palpitations, no leg claudication or lower extremity edema    Respiratory: no complaints of shortness of shortness of breath, no RUVALCABA  Breasts:no complaints of breast pain, breast lump, or nipple discharge  Gastrointestinal: no complaints of abdominal pain, constipation, nausea, vomiting, or diarrhea or bloody stools  Genitourinary : no complaints of dysuria, incontinence, pelvic pain, no dysmenorrhea, vaginal discharge or abnormal vaginal bleeding and as noted in HPI  Musculoskeletal: no complaints of arthralgia, no myalgia, no joint swelling or stiffness, no limb pain or swelling  Integumentary: no complaints of skin rash or lesion, itching or dry skin  Neurological: no complaints of headache, no confusion, no numbness or tingling, no dizziness or fainting    Objective      /80   Wt 86 kg (189 lb 11 2 oz)   LMP  (LMP Unknown)   BMI 32 56 kg/m²   General:   appears stated age, cooperative, alert normal mood and affect   Breasts: normal appearance, no masses or tenderness   Abdomen: soft, non-tender, without masses or organomegaly   Vulva: normal   Vagina: normal vagina, no discharge, exudate, lesion, or erythema   Urethra: normal   Cervix: Normal, no discharge  Nontender     Uterus: normal size, contour, position, consistency, mobility, non-tender   Adnexa: no mass, fullness, tenderness   Psychiatric orientation to person, place, and time: normal  mood and affect: normal

## 2019-10-04 LAB
ALBUMIN SERPL ELPH-MCNC: 4.47 G/DL (ref 3.5–5)
ALBUMIN SERPL ELPH-MCNC: 62.9 % (ref 52–65)
ALPHA1 GLOB SERPL ELPH-MCNC: 0.35 G/DL (ref 0.1–0.4)
ALPHA1 GLOB SERPL ELPH-MCNC: 4.9 % (ref 2.5–5)
ALPHA2 GLOB SERPL ELPH-MCNC: 0.82 G/DL (ref 0.4–1.2)
ALPHA2 GLOB SERPL ELPH-MCNC: 11.5 % (ref 7–13)
BETA GLOB ABNORMAL SERPL ELPH-MCNC: 0.4 G/DL (ref 0.4–0.8)
BETA1 GLOB SERPL ELPH-MCNC: 5.7 % (ref 5–13)
BETA2 GLOB SERPL ELPH-MCNC: 4.2 % (ref 2–8)
BETA2+GAMMA GLOB SERPL ELPH-MCNC: 0.3 G/DL (ref 0.2–0.5)
GAMMA GLOB ABNORMAL SERPL ELPH-MCNC: 0.77 G/DL (ref 0.5–1.6)
GAMMA GLOB SERPL ELPH-MCNC: 10.8 % (ref 12–22)
HPV HR 12 DNA CVX QL NAA+PROBE: NEGATIVE
HPV16 DNA CVX QL NAA+PROBE: NEGATIVE
HPV18 DNA CVX QL NAA+PROBE: NEGATIVE
IGG/ALB SER: 1.7 {RATIO} (ref 1.1–1.8)
PROT PATTERN SERPL ELPH-IMP: ABNORMAL
PROT SERPL-MCNC: 7.1 G/DL (ref 6.4–8.2)

## 2019-10-09 LAB
LAB AP GYN PRIMARY INTERPRETATION: NORMAL
Lab: NORMAL

## 2019-10-22 ENCOUNTER — TELEPHONE (OUTPATIENT)
Dept: FAMILY MEDICINE CLINIC | Facility: CLINIC | Age: 69
End: 2019-10-22

## 2019-10-28 ENCOUNTER — IMMUNIZATIONS (OUTPATIENT)
Dept: FAMILY MEDICINE CLINIC | Facility: CLINIC | Age: 69
End: 2019-10-28
Payer: MEDICARE

## 2019-10-28 DIAGNOSIS — Z23 FLU VACCINE NEED: Primary | ICD-10-CM

## 2019-10-28 PROCEDURE — 90662 IIV NO PRSV INCREASED AG IM: CPT

## 2019-10-28 PROCEDURE — G0008 ADMIN INFLUENZA VIRUS VAC: HCPCS

## 2019-11-25 NOTE — PROGRESS NOTES
Cardiology Outpatient Follow up Note    Pedro Mccartney 81 y o  female MRN: [id number]    11/26/19          Assessment/Plan:    1  SVT s/p ablation of AVRT/left lateral pathway and SVC atrial tachycardia 12/18  No current palpitations since ablation  On Diltiazem currently more for BP control  2  HTN  Had presumed rash to Amlodipine versus Xarelto  On diltiazem and spironolactone  BP currently controlled  3  HL  On Atorvastatin 20 and fish oil 1 tablet daily  Lipid panel 6/18 showed total cholesterol 174, , HDL 62, LDL 79  Lipid panel 6/19 total 156, , HDL 59, LDL 57      4  Mild MS/Mild AS with moderate AI/moderate MR  Currently asymptomatic  Last echo 1/19  Will repeat echo in 1/20, if stable will repeat every 2 years  1  SVT (supraventricular tachycardia) ([address] )     2  Essential hypertension     3  Mitral valve disorder  Echo complete with contrast if indicated   4  Nonrheumatic aortic valve insufficiency  Echo complete with contrast if indicated   5  Dyslipidemia     6  Seasonal allergic rhinitis, unspecified trigger  ipratropium-albuterol (COMBIVENT RESPIMAT) inhaler    albuterol (PROVENTIL HFA) 90 mcg/act inhaler       HPI: 81 y o  with a history of SVT s/p ablation of AVRT/left lateral pathway and SVC atrial tachycardia 12/18, COPD, HTN, who is here for follow up HTN  She was smoking 1-1 5 ppd for 30 years, quit 11/18  She reports she was admitted with respiratory infection 11/18, was noted to have SVT by telemetry, was seen by EP and set up for outpt SVT ablation in 12/18  She reports she had been having palpitations for a year before 11/18, would feel throat fullness and SOB with palpitations, would last 2-3 minutes, would resolve by vagal maneuvers  Echo 1/19 showed normal LV size and function, EF 60%, no RWMA, grade II diastolic dysfunction  Normal RV size and function  Mild MAC  Mild MS, with moderate eccentric, posteriorly directed MR  Mild AS with moderate AI   Mild TR with PASP 35 mm Hg  She reports she has had no further significant palpitations since ablation  She is seeing Dr Neda Andino of Nephrology for management of potassium, she was started on spironolactone and Cardizem  Previously was on Norvasc, but developed a rash on Xarelto and Norvasc, unclear which one caused rash  She was switched to Cardizem at that time  She is taking Ibuprofen 800 mg daily as needed for back pain/radiculopathy  She is exercising in the form of walking occasionally  She retired from working as a nurse, 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 [employer]  Occasional exertional SOB with cold, currently using inhalers  No CP  No LE edema  No orthopnea, uses 1 pillow to sleep, no PND  Occasional dizziness if she gets up too fast in mornings from bed, she is trying to sit at edge of bed prior to standing  No significant palpitations, only occasional PACs per her report were seen on EKG when she seen in Dr Birgit Salvador office 10/1/19      Patient Active Problem List   Diagnosis    Arthritis    Depression with anxiety    Dyslipidemia    Essential hypertension    Irritable bowel syndrome with diarrhea    Lumbar degenerative disc disease    Vitamin D deficiency    Greater trochanteric bursitis of right hip    Pulmonary emphysema (HCC)    Hypokalemia    SVT (supraventricular tachycardia) ([address] )    Medicare annual wellness visit, subsequent    Hypercalcemia    Diastolic dysfunction    Mitral valve disorder    Aortic valve insufficiency    Acute left ankle pain    Achilles tendinitis of left lower extremity    Posterior tibial tendinitis, left       Allergies   Allergen Reactions    Amlodipine Rash    Ancef [Cefazolin]     Cephalosporins Other (See Comments)     c diff    Fruit Extracts      Annotation - [id number]: peaches and strawberries    Levofloxacin     Morphine GI Intolerance    Other      FRUIT    Penicillins Hives    Strawberry C [Ascorbate]     Tetracycline Edema    Xarelto [Rivaroxaban] Rash         Current Outpatient Medications:     albuterol (PROVENTIL HFA) 90 mcg/act inhaler, Inhale 1 puff every 6 (six) hours as needed for wheezing, Disp: 6 7 g, Rfl: 11    atorvastatin (LIPITOR) 20 mg tablet, Take 1 tablet (20 mg total) by mouth daily, Disp: 90 tablet, Rfl: 3    diltiazem (CARDIZEM CD) 120 mg 24 hr capsule, Take 1 capsule (120 mg total) by mouth daily, Disp: 90 capsule, Rfl: 3    escitalopram (LEXAPRO) 10 mg tablet, Take 1 tablet (10 mg total) by mouth daily, Disp: 30 tablet, Rfl: 11    ibuprofen (MOTRIN) 800 mg tablet, Take 800 mg by mouth daily, Disp: , Rfl:     Multiple Vitamin (MULTI VITAMIN DAILY PO), Take 1 tablet by mouth daily  , Disp: , Rfl:     spironolactone (ALDACTONE) 25 mg tablet, Take 1 tablet (25 mg total) by mouth daily, Disp: 90 tablet, Rfl: 3    cetirizine (ZyrTEC) 10 mg tablet, Take 10 mg by mouth daily, Disp: , Rfl:     ipratropium-albuterol (COMBIVENT RESPIMAT) inhaler, Inhale 1 puff 3 (three) times a day, Disp: 4 g, Rfl: 11    Past Medical History:   Diagnosis Date    Anxiety     Bursitis of hip     Last Assessed:6/5/2017    Depression     Herniated lumbar intervertebral disc     Hyperlipidemia     Hypertension     Hypokalemia     Pituitary adenoma ([facility] [address] )     Last Assessed:2/12/2015 ; Previously on bromocriptine    Spinal stenosis     Systolic murmur of aorta     Vitamin D deficiency        Family History   Problem Relation Age of Onset    Colon cancer Mother     Coronary artery disease Mother     Colonic polyp Mother     Liver cancer Mother     Multiple sclerosis Father     Hypertension Other     Hypertension Brother        Past Surgical History:   Procedure Laterality Date    BREAST SURGERY      Incisional breast biopsy    CARDIAC ELECTROPHYSIOLOGY STUDY AND ABLATION      CARPAL TUNNEL RELEASE Left     CHOLECYSTECTOMY      OTHER SURGICAL HISTORY      EXCISION OF PAROTID TUMOR/GLAND    PAROTIDECTOMY      CO COLONOSCOPY FLX DX W/COLLJ SPEC WHEN PFRMD N/A 2017    Procedure: COLONOSCOPY;  Surgeon: Zachary Smith DO;  Location: [facility] GI [facility]; Service: Gastroenterology    ROTATOR CUFF REPAIR      SALPINGOOPHORECTOMY Right        Social History     Socioeconomic History    Marital status: /Civil Union     Spouse name: Not on file    Number of children: Not on file    Years of education: Not on file    Highest education level: Not on file   Occupational History    Not on file   Social Needs    Financial resource strain: Not on file    Food insecurity:     Worry: Not on file     Inability: Not on file    Transportation needs:     Medical: Not on file     Non-medical: Not on file   Tobacco Use    Smoking status: Former Smoker     Packs/day:      Years: 30      Pack years:      Types: Cigarettes     Last attempt to quit: 2018     Years since quittin 0    Smokeless tobacco: Never Used   Substance and Sexual Activity    Alcohol use:  Yes     Alcohol/week: 1 0 standard drinks     Types: 1 Shots of liquor per week     Frequency: 4 or more times a week     Comment: 1 martini a night/social    Drug use: No    Sexual activity: Not Currently     Birth control/protection: Post-menopausal   Lifestyle    Physical activity:     Days per week: Not on file     Minutes per session: Not on file    Stress: Not on file   Relationships    Social connections:     Talks on phone: Not on file     Gets together: Not on file     Attends Hoahaoism service: Not on file     Active member of club or organization: Not on file     Attends meetings of clubs or organizations: Not on file     Relationship status: Not on file    Intimate partner violence:     Fear of current or ex partner: Not on file     Emotionally abused: Not on file     Physically abused: Not on file     Forced sexual activity: Not on file   Other Topics Concern    Not on file   Social History Narrative    Not on file       Review of Systems   Constitution: Negative for chills, decreased appetite, diaphoresis, fever, malaise/fatigue, night sweats, weight gain and weight loss  HENT: Negative for ear pain, hearing loss, hoarse voice, nosebleeds, sore throat and tinnitus  Eyes: Negative for blurred vision and pain  Cardiovascular: Negative  Negative for chest pain, claudication, cyanosis, dyspnea on exertion, irregular heartbeat, leg swelling, near-syncope, orthopnea, palpitations, paroxysmal nocturnal dyspnea and syncope  Respiratory: Positive for wheezing  Negative for cough, hemoptysis, shortness of breath, sleep disturbances due to breathing, snoring and sputum production  Hematologic/Lymphatic: Negative for adenopathy and bleeding problem  Does not bruise/bleed easily  Skin: Negative for color change, dry skin, flushing, itching, poor wound healing and rash  Musculoskeletal: Positive for arthritis  Negative for back pain, falls, joint pain, muscle cramps, muscle weakness, myalgias and neck pain  Gastrointestinal: Negative for abdominal pain, constipation, diarrhea, dysphagia, heartburn, hematemesis, hematochezia, melena, nausea and vomiting  Genitourinary: Negative for dysuria, frequency, hematuria, hesitancy, non-menstrual bleeding and urgency  Neurological: Negative for excessive daytime sleepiness, dizziness, focal weakness, headaches, light-headedness, loss of balance, numbness, paresthesias, tremors, vertigo and weakness  Radiculopathy down right leg due to spinal disc disease  Psychiatric/Behavioral: Negative for altered mental status, depression and memory loss  The patient does not have insomnia and is not nervous/anxious  Allergic/Immunologic: Positive for environmental allergies  Negative for persistent infections         Vitals: /84 (BP Location: Left arm, Patient Position: Sitting, Cuff Size: Standard)   Pulse 80   Ht 5' 4" (1 626 m)   Wt 86 kg (189 lb 9 6 oz)   LMP (LMP Unknown)   BMI 32 54 kg/m²     Physical Exam:     GEN: Alert and oriented x 3, in no acute distress  Well appearing and well nourished  HEENT: Sclera anicteric, conjunctivae pink, mucous membranes moist  Oropharynx clear  NECK: Supple, no carotid bruits, no significant JVD  Trachea midline, no thyromegaly  HEART: Regular rhythm, normal S1 and S2, no murmurs, clicks, gallops or rubs  PMI nondisplaced, no thrills  LUNGS: Clear to auscultation bilaterally; no wheezes, rales, or rhonchi  No increased work of breathing or signs of respiratory distress  ABDOMEN: Soft, nontender, nondistended, normoactive bowel sounds  EXTREMITIES: Skin warm and well perfused, no clubbing, cyanosis, or edema  NEURO: No focal findings  Normal gait  Normal speech  Mood and affect normal    SKIN: Normal without suspicious lesions on exposed skin        Lab Results:       No results found for: HGBA1C  Lab Results   Component Value Date    CHOL 187 05/04/2015    CHOL 199 08/20/2014     Lab Results   Component Value Date    HDL 59 06/04/2019    HDL 62 (H) 06/26/2018    HDL 65 (H) 07/14/2017     Lab Results   Component Value Date    LDLCALC 57 06/04/2019    LDLCALC 79 06/26/2018    LDLCALC 81 07/14/2017     Lab Results   Component Value Date    TRIG 198 (H) 06/04/2019    TRIG 166 (H) 06/26/2018    TRIG 134 07/14/2017     No results found for: CHOLHDL

## 2019-11-26 ENCOUNTER — OFFICE VISIT (OUTPATIENT)
Dept: CARDIOLOGY CLINIC | Facility: CLINIC | Age: 69
End: 2019-11-26
Payer: MEDICARE

## 2019-11-26 VITALS
SYSTOLIC BLOOD PRESSURE: 136 MMHG | HEART RATE: 80 BPM | WEIGHT: 189.6 LBS | HEIGHT: 64 IN | DIASTOLIC BLOOD PRESSURE: 84 MMHG | BODY MASS INDEX: 32.37 KG/M2

## 2019-11-26 DIAGNOSIS — I35.1 NONRHEUMATIC AORTIC VALVE INSUFFICIENCY: ICD-10-CM

## 2019-11-26 DIAGNOSIS — I47.1 SVT (SUPRAVENTRICULAR TACHYCARDIA) (HCC): Primary | ICD-10-CM

## 2019-11-26 DIAGNOSIS — I10 ESSENTIAL HYPERTENSION: ICD-10-CM

## 2019-11-26 DIAGNOSIS — E78.5 DYSLIPIDEMIA: ICD-10-CM

## 2019-11-26 DIAGNOSIS — I05.9 MITRAL VALVE DISORDER: ICD-10-CM

## 2019-11-26 DIAGNOSIS — J30.2 SEASONAL ALLERGIC RHINITIS, UNSPECIFIED TRIGGER: ICD-10-CM

## 2019-11-26 PROCEDURE — 99214 OFFICE O/P EST MOD 30 MIN: CPT | Performed by: INTERNAL MEDICINE

## 2019-11-26 RX ORDER — ALBUTEROL SULFATE 90 UG/1
1 AEROSOL, METERED RESPIRATORY (INHALATION) EVERY 6 HOURS PRN
Qty: 6.7 G | Refills: 11 | Status: SHIPPED | OUTPATIENT
Start: 2019-11-26

## 2020-01-07 ENCOUNTER — OFFICE VISIT (OUTPATIENT)
Dept: FAMILY MEDICINE CLINIC | Facility: CLINIC | Age: 70
End: 2020-01-07
Payer: MEDICARE

## 2020-01-07 VITALS
TEMPERATURE: 98.2 F | DIASTOLIC BLOOD PRESSURE: 82 MMHG | HEIGHT: 64 IN | BODY MASS INDEX: 32.37 KG/M2 | OXYGEN SATURATION: 96 % | HEART RATE: 78 BPM | RESPIRATION RATE: 20 BRPM | WEIGHT: 189.6 LBS | SYSTOLIC BLOOD PRESSURE: 140 MMHG

## 2020-01-07 DIAGNOSIS — I10 ESSENTIAL HYPERTENSION: Primary | ICD-10-CM

## 2020-01-07 DIAGNOSIS — F41.8 DEPRESSION WITH ANXIETY: ICD-10-CM

## 2020-01-07 DIAGNOSIS — E78.5 DYSLIPIDEMIA: ICD-10-CM

## 2020-01-07 DIAGNOSIS — I47.1 SVT (SUPRAVENTRICULAR TACHYCARDIA) (HCC): ICD-10-CM

## 2020-01-07 DIAGNOSIS — E66.9 OBESITY (BMI 30-39.9): ICD-10-CM

## 2020-01-07 DIAGNOSIS — Z00.00 MEDICARE ANNUAL WELLNESS VISIT, SUBSEQUENT: ICD-10-CM

## 2020-01-07 PROBLEM — M25.572 ACUTE LEFT ANKLE PAIN: Status: RESOLVED | Noted: 2019-06-04 | Resolved: 2020-01-07

## 2020-01-07 PROBLEM — E83.52 HYPERCALCEMIA: Status: RESOLVED | Noted: 2019-01-08 | Resolved: 2020-01-07

## 2020-01-07 PROBLEM — E87.6 HYPOKALEMIA: Status: RESOLVED | Noted: 2018-11-23 | Resolved: 2020-01-07

## 2020-01-07 PROCEDURE — G0438 PPPS, INITIAL VISIT: HCPCS | Performed by: INTERNAL MEDICINE

## 2020-01-07 PROCEDURE — 99214 OFFICE O/P EST MOD 30 MIN: CPT | Performed by: INTERNAL MEDICINE

## 2020-01-07 PROCEDURE — 1123F ACP DISCUSS/DSCN MKR DOCD: CPT | Performed by: INTERNAL MEDICINE

## 2020-01-07 RX ORDER — DILTIAZEM HYDROCHLORIDE 240 MG/1
240 CAPSULE, COATED, EXTENDED RELEASE ORAL DAILY
Start: 2020-01-07 | End: 2020-08-26

## 2020-01-07 RX ORDER — SPIRONOLACTONE 25 MG/1
25 TABLET ORAL DAILY
Qty: 90 TABLET | Refills: 3
Start: 2020-01-07 | End: 2020-09-25 | Stop reason: SDUPTHER

## 2020-01-07 NOTE — PROGRESS NOTES
Assessment/Plan:       Diagnoses and all orders for this visit:    Essential hypertension  -     spironolactone (ALDACTONE) 25 mg tablet; Take 1 tablet (25 mg total) by mouth daily  -     diltiazem (CARDIZEM CD) 240 mg 24 hr capsule; Take 1 capsule (240 mg total) by mouth daily  Blood pressure is on the higher side  Repeat was 146/88  She notes higher readings at home as well  Will increase the Diltiazem and she will call back with readings  Depression with anxiety  -Mostly doing well on the [facility]  Depression Screening Follow-up Plan: Patient's depression screening was positive with a PHQ-2 score of 0  Their PHQ-9 score was 0  Patient assessed for underlying major depression  They have no active suicidal ideations  Brief counseling provided and recommend additional follow-up/re-evaluation next office visit  Dyslipidemia  -Currently on statin  Last completed in June with slightly elevated with the TGs  Obesity (BMI 30-39  9)  BMI Counseling: Body mass index is 32 54 kg/m²  The BMI is above normal  Nutrition recommendations include 3-5 servings of fruits/vegetables daily  Exercise recommendations include exercising 3-5 times per week  SVT (supraventricular tachycardia) (HCC)  -Overall doing well  Did see EP and no changes made  Medicare annual wellness visit, subsequent    -Reviewed today and largely okay  Subjective:      Patient ID: Bard Fong is a 81 y o  female  Daysi Saul is here today for a routine follow up/AWV  Reports feeling well overall but has had right hip pain on and off  Currently taking Motrin as needed  She also reports noticing that her BP has been on the higher side  Chart was reviewed and updated         The following portions of the patient's history were reviewed and updated as appropriate: allergies, current medications, past family history, past medical history, past social history, past surgical history and problem list     Review of Systems   Constitutional: Negative for chills, fever and unexpected weight change  HENT: Negative for sinus pressure, sinus pain and sore throat  Eyes: Negative for visual disturbance  Respiratory: Negative for cough, chest tightness, shortness of breath and wheezing  Cardiovascular: Negative for chest pain, palpitations and leg swelling  Gastrointestinal: Negative for abdominal pain, blood in stool, constipation, diarrhea, nausea and vomiting  Genitourinary: Negative for difficulty urinating and dysuria  Musculoskeletal: Positive for arthralgias  Negative for back pain and myalgias  Neurological: Negative for dizziness, syncope, numbness and headaches  Psychiatric/Behavioral: Negative for dysphoric mood and sleep disturbance  The patient is not nervous/anxious  Objective:      /82   Pulse 78   Temp 98 2 °F (36 8 °C)   Resp 20   Ht 5' 4" (1 626 m)   Wt 86 kg (189 lb 9 6 oz)   LMP  (LMP Unknown)   SpO2 96%   BMI 32 54 kg/m²          Physical Exam   Constitutional: She is oriented to person, place, and time  She appears well-developed and well-nourished  No distress  HENT:   Head: Normocephalic and atraumatic  Right Ear: External ear normal    Left Ear: External ear normal    Mouth/Throat: Oropharynx is clear and moist    Eyes: Pupils are equal, round, and reactive to light  Conjunctivae and EOM are normal  Right eye exhibits no discharge  Left eye exhibits no discharge  Neck: Normal range of motion  Neck supple  No tracheal deviation present  No thyromegaly present  Cardiovascular: Normal rate and regular rhythm  Murmur heard  Pulmonary/Chest: Effort normal and breath sounds normal  No respiratory distress  She has no wheezes  Abdominal: Soft  Bowel sounds are normal  There is no tenderness  Musculoskeletal: Normal range of motion  She exhibits no edema  Lymphadenopathy:     She has no cervical adenopathy  Neurological: She is alert and oriented to person, place, and time     Skin: Skin is warm and dry  She is not diaphoretic  Psychiatric: She has a normal mood and affect  Her speech is normal and behavior is normal  Judgment and thought content normal    Vitals reviewed

## 2020-01-07 NOTE — PROGRESS NOTES
Assessment and Plan:     Problem List Items Addressed This Visit        Cardiovascular and Mediastinum    Essential hypertension - Primary    Relevant Medications    spironolactone (ALDACTONE) 25 mg tablet    diltiazem (CARDIZEM CD) 240 mg 24 hr capsule    SVT (supraventricular tachycardia) (HCC)    Relevant Medications    diltiazem (CARDIZEM CD) 240 mg 24 hr capsule       Other    Depression with anxiety    Dyslipidemia    Medicare annual wellness visit, subsequent    Obesity (BMI 30-39  9)      Other Visit Diagnoses     Other secondary hypertension        Relevant Medications    spironolactone (ALDACTONE) 25 mg tablet    diltiazem (CARDIZEM CD) 240 mg 24 hr capsule           Preventive health issues were discussed with patient, and age appropriate screening tests were ordered as noted in patient's After Visit Summary  Personalized health advice and appropriate referrals for health education or preventive services given if needed, as noted in patient's After Visit Summary  History of Present Illness:     Patient presents for Medicare Annual Wellness visit    Patient Care Team:  [facility] DO MILLI as PCP - Jania Loya DO Cheryl Barrs, MD Celestina Ochoa, DO Celestina Ochoa, DO as Endoscopist     Problem List:     Patient Active Problem List   Diagnosis    Arthritis    Depression with anxiety    Dyslipidemia    Essential hypertension    Irritable bowel syndrome with diarrhea    Lumbar degenerative disc disease    Vitamin D deficiency    Greater trochanteric bursitis of right hip    Pulmonary emphysema ([address] )    SVT (supraventricular tachycardia) ([address] )    Medicare annual wellness visit, subsequent    Diastolic dysfunction    Mitral valve disorder    Aortic valve insufficiency    Achilles tendinitis of left lower extremity    Posterior tibial tendinitis, left    Obesity (BMI 30-39  9)      Past Medical and Surgical History:     Past Medical History:   Diagnosis Date    Bursitis of hip     Last Assessed:2017    Herniated lumbar intervertebral disc     Pituitary adenoma ([address] )     Last Assessed:2015 ; Previously on bromocriptine    Spinal stenosis     Systolic murmur of aorta      Past Surgical History:   Procedure Laterality Date    BREAST SURGERY      Incisional breast biopsy    CARDIAC ELECTROPHYSIOLOGY STUDY AND ABLATION      CARPAL TUNNEL RELEASE Left     CHOLECYSTECTOMY      OTHER SURGICAL HISTORY      EXCISION OF PAROTID TUMOR/GLAND    PAROTIDECTOMY      OR COLONOSCOPY FLX DX W/COLLJ SPEC WHEN PFRMD N/A 2017    Procedure: COLONOSCOPY;  Surgeon: Jacob Ortiz DO;  Location: [facility]; Service: Gastroenterology    ROTATOR CUFF REPAIR      SALPINGOOPHORECTOMY Right       Family History:     Family History   Problem Relation Age of Onset    Colon cancer Mother     Coronary artery disease Mother     Colonic polyp Mother     Liver cancer Mother     Multiple sclerosis Father     Hypertension Other     Hypertension Brother       Social History:     Social History     Socioeconomic History    Marital status: /Civil Union     Spouse name: None    Number of children: None    Years of education: None    Highest education level: None   Occupational History    None   Social Needs    Financial resource strain: None    Food insecurity:     Worry: None     Inability: None    Transportation needs:     Medical: None     Non-medical: None   Tobacco Use    Smoking status: Former Smoker     Packs/day: 1 00     Years: 30 00     Pack years: 30 00     Types: Cigarettes     Last attempt to quit: 2018     Years since quittin 1    Smokeless tobacco: Never Used   Substance and Sexual Activity    Alcohol use:  Yes     Alcohol/week: 1 0 standard drinks     Types: 1 Shots of liquor per week     Frequency: 4 or more times a week     Comment: 1 martini a night/social    Drug use: No    Sexual activity: Not Currently     Birth control/protection: Post-menopausal   Lifestyle    Physical activity:     Days per week: None     Minutes per session: None    Stress: None   Relationships    Social connections:     Talks on phone: None     Gets together: None     Attends Denominational service: None     Active member of club or organization: None     Attends meetings of clubs or organizations: None     Relationship status: None    Intimate partner violence:     Fear of current or ex partner: None     Emotionally abused: None     Physically abused: None     Forced sexual activity: None   Other Topics Concern    None   Social History Narrative    None       Medications and Allergies:     Current Outpatient Medications   Medication Sig Dispense Refill    albuterol (PROVENTIL HFA) 90 mcg/act inhaler Inhale 1 puff every 6 (six) hours as needed for wheezing 6 7 g 11    atorvastatin (LIPITOR) 20 mg tablet Take 1 tablet (20 mg total) by mouth daily 90 tablet 3    cetirizine (ZyrTEC) 10 mg tablet Take 10 mg by mouth daily      diltiazem (CARDIZEM CD) 240 mg 24 hr capsule Take 1 capsule (240 mg total) by mouth daily      escitalopram (LEXAPRO) 10 mg tablet Take 1 tablet (10 mg total) by mouth daily 30 tablet 11    ibuprofen (MOTRIN) 800 mg tablet Take 800 mg by mouth daily      ipratropium-albuterol (COMBIVENT RESPIMAT) inhaler Inhale 1 puff 3 (three) times a day 4 g 11    Multiple Vitamin (MULTI VITAMIN DAILY PO) Take 1 tablet by mouth daily        spironolactone (ALDACTONE) 25 mg tablet Take 1 tablet (25 mg total) by mouth daily 90 tablet 3     No current facility-administered medications for this visit        Allergies   Allergen Reactions    Amlodipine Rash    Ancef [Cefazolin]     Cephalosporins Other (See Comments)     c diff    Fruit Extracts      Annotation - [id number]: peaches and strawberries    Levofloxacin     Morphine GI Intolerance    Other      FRUIT    Penicillins Hives    Strawberry C [Ascorbate]     Tetracycline Edema  Xarelto [Rivaroxaban] Rash      Immunizations:     Immunization History   Administered Date(s) Administered    H1N1, All Formulations 10/27/2009    INFLUENZA 01/17/2013, 11/14/2013, 09/10/2018    Influenza Quadrivalent, 6-35 Months IM 11/18/2015    Influenza Split High Dose Preservative Free IM 11/30/2017, 09/10/2018    Influenza TIV (IM) 10/15/2016    Influenza, high dose seasonal 0 5 mL 10/28/2019    Pneumococcal Conjugate 13-Valent 06/20/2017    Pneumococcal Polysaccharide PPV23 07/05/2018    TD (adult) Preservative Free 11/18/2015    Td (adult), Unspecified 05/14/2005      Health Maintenance:         Topic Date Due    MAMMOGRAM  05/10/2019    CRC Screening: Colonoscopy  06/06/2027    Hepatitis C Screening  Completed         Topic Date Due    DTaP,Tdap,and Td Vaccines (1 - Tdap) 04/16/1961      Medicare Health Risk Assessment:     /82   Pulse 78   Temp 98 2 °F (36 8 °C)   Resp 20   Ht 5' 4" (1 626 m)   Wt 86 kg (189 lb 9 6 oz)   LMP  (LMP Unknown)   SpO2 96%   BMI 32 54 kg/m²      Gorge Faustin is here for her Subsequent Wellness visit  Health Risk Assessment:   Patient rates overall health as very good  Patient feels that their physical health rating is same  Eyesight was rated as same  Hearing was rated as same  Patient feels that their emotional and mental health rating is same  Pain experienced in the last 7 days has been none  Patient states that she has experienced no weight loss or gain in last 6 months  Depression Screening:   PHQ-2 Score: 0  PHQ-9 Score: 0      Fall Risk Screening: In the past year, patient has experienced: no history of falling in past year      Urinary Incontinence Screening:   Patient has not leaked urine accidently in the last six months  Home Safety:  Patient does not have trouble with stairs inside or outside of their home  Patient has working smoke alarms and has working carbon monoxide detector  Home safety hazards include: none  Nutrition:   Current diet is Regular  Medications:   Patient is currently taking over-the-counter supplements  OTC medications include: see medication list  Patient is able to manage medications  Activities of Daily Living (ADLs)/Instrumental Activities of Daily Living (IADLs):   Walk and transfer into and out of bed and chair?: Yes  Dress and groom yourself?: Yes    Bathe or shower yourself?: Yes    Feed yourself?  Yes  Do your laundry/housekeeping?: Yes  Manage your money, pay your bills and track your expenses?: Yes  Make your own meals?: Yes    Do your own shopping?: Yes    Previous Hospitalizations:   Any hospitalizations or ED visits within the last 12 months?: No      Advance Care Planning:   Living will: No    Durable POA for healthcare: No    Advanced directive: No    Advanced directive counseling given: Yes      Comments: Blue folder given    Cognitive Screening:   Provider or family/friend/caregiver concerned regarding cognition?: No    PREVENTIVE SCREENINGS      Cardiovascular Screening:    General: Screening Not Indicated and History Lipid Disorder      Diabetes Screening:     General: Screening Current      Colorectal Cancer Screening:     General: Screening Current      Breast Cancer Screening:     General: Screening Current      Cervical Cancer Screening:    General: Screening Not Indicated      Osteoporosis Screening:    General: Screening Not Indicated and Screening Current      Abdominal Aortic Aneurysm (AAA) Screening:        General: Screening Not Indicated      Hepatitis C Screening:    General: Screening Current      Vera Howell,

## 2020-01-07 NOTE — PATIENT INSTRUCTIONS
Medicare Preventive Visit Patient Instructions  Thank you for completing your Welcome to Medicare Visit or Medicare Annual Wellness Visit today  Your next wellness visit will be due in one year (1/7/2021)  The screening/preventive services that you may require over the next 5-10 years are detailed below  Some tests may not apply to you based off risk factors and/or age  Screening tests ordered at today's visit but not completed yet may show as past due  Also, please note that scanned in results may not display below  Preventive Screenings:  Service Recommendations Previous Testing/Comments   Colorectal Cancer Screening  * Colonoscopy    * Fecal Occult Blood Test (FOBT)/Fecal Immunochemical Test (FIT)  * Fecal DNA/Cologuard Test  * Flexible Sigmoidoscopy Age: 54-65 years old   Colonoscopy: every 10 years (may be performed more frequently if at higher risk)  OR  FOBT/FIT: every 1 year  OR  Cologuard: every 3 years  OR  Sigmoidoscopy: every 5 years  Screening may be recommended earlier than age 48 if at higher risk for colorectal cancer  Also, an individualized decision between you and your healthcare provider will decide whether screening between the ages of 74-80 would be appropriate  Colonoscopy: 06/06/2017  FOBT/FIT: Not on file  Cologuard: Not on file  Sigmoidoscopy: Not on file    Screening Current     Breast Cancer Screening Age: 36 years old  Frequency: every 1-2 years  Not required if history of left and right mastectomy Mammogram: 05/10/2018    Screening Current   Cervical Cancer Screening Between the ages of 21-29, pap smear recommended once every 3 years  Between the ages of 33-67, can perform pap smear with HPV co-testing every 5 years     Recommendations may differ for women with a history of total hysterectomy, cervical cancer, or abnormal pap smears in past  Pap Smear: 10/03/2019    Screening Not Indicated   Hepatitis C Screening Once for adults born between 1945 and 1965  More frequently in patients at high risk for Hepatitis C Hep C Antibody: 01/08/2019    Screening Current   Diabetes Screening 1-2 times per year if you're at risk for diabetes or have pre-diabetes Fasting glucose: 102 mg/dL   A1C: No results in last 5 years    Screening Current   Cholesterol Screening Once every 5 years if you don't have a lipid disorder  May order more often based on risk factors  Lipid panel: 06/04/2019    Screening Not Indicated  History Lipid Disorder     Other Preventive Screenings Covered by Medicare:  1  Abdominal Aortic Aneurysm (AAA) Screening: covered once if your at risk  You're considered to be at risk if you have a family history of AAA  2  Lung Cancer Screening: covers low dose CT scan once per year if you meet all of the following conditions: (1) Age 50-69; (2) No signs or symptoms of lung cancer; (3) Current smoker or have quit smoking within the last 15 years; (4) You have a tobacco smoking history of at least 30 pack years (packs per day multiplied by number of years you smoked); (5) You get a written order from a healthcare provider  3  Glaucoma Screening: covered annually if you're considered high risk: (1) You have diabetes OR (2) Family history of glaucoma OR (3)  aged 48 and older OR (3)  American aged 72 and older  3  Osteoporosis Screening: covered every 2 years if you meet one of the following conditions: (1) You're estrogen deficient and at risk for osteoporosis based off medical history and other findings; (2) Have a vertebral abnormality; (3) On glucocorticoid therapy for more than 3 months; (4) Have primary hyperparathyroidism; (5) On osteoporosis medications and need to assess response to drug therapy  · Last bone density test (DXA Scan): 01/06/2018  5  HIV Screening: covered annually if you're between the age of 12-76  Also covered annually if you are younger than 13 and older than 72 with risk factors for HIV infection   For pregnant patients, it is covered up to 3 times per pregnancy  Immunizations:  Immunization Recommendations   Influenza Vaccine Annual influenza vaccination during flu season is recommended for all persons aged >= 6 months who do not have contraindications   Pneumococcal Vaccine (Prevnar and Pneumovax)  * Prevnar = PCV13  * Pneumovax = PPSV23   Adults 25-60 years old: 1-3 doses may be recommended based on certain risk factors  Adults 72 years old: Prevnar (PCV13) vaccine recommended followed by Pneumovax (PPSV23) vaccine  If already received PPSV23 since turning 65, then PCV13 recommended at least one year after PPSV23 dose  Hepatitis B Vaccine 3 dose series if at intermediate or high risk (ex: diabetes, end stage renal disease, liver disease)   Tetanus (Td) Vaccine - COST NOT COVERED BY MEDICARE PART B Following completion of primary series, a booster dose should be given every 10 years to maintain immunity against tetanus  Td may also be given as tetanus wound prophylaxis  Tdap Vaccine - COST NOT COVERED BY MEDICARE PART B Recommended at least once for all adults  For pregnant patients, recommended with each pregnancy  Shingles Vaccine (Shingrix) - COST NOT COVERED BY MEDICARE PART B  2 shot series recommended in those aged 48 and above     Health Maintenance Due:      Topic Date Due    MAMMOGRAM  05/10/2019    CRC Screening: Colonoscopy  06/06/2027    Hepatitis C Screening  Completed     Immunizations Due:      Topic Date Due    DTaP,Tdap,and Td Vaccines (1 - Tdap) 04/16/1963     Advance Directives   What are advance directives? Advance directives are legal documents that state your wishes and plans for medical care  These plans are made ahead of time in case you lose your ability to make decisions for yourself  Advance directives can apply to any medical decision, such as the treatments you want, and if you want to donate organs  What are the types of advance directives?   There are many types of advance directives, and each state has rules about how to use them  You may choose a combination of any of the following:  · Living will: This is a written record of the treatment you want  You can also choose which treatments you do not want, which to limit, and which to stop at a certain time  This includes surgery, medicine, IV fluid, and tube feedings  · Durable power of  for healthcare [facility]): This is a written record that states who you want to make healthcare choices for you when you are unable to make them for yourself  This person, called a proxy, is usually a family member or a friend  You may choose more than 1 proxy  · Do not resuscitate (DNR) order:  A DNR order is used in case your heart stops beating or you stop breathing  It is a request not to have certain forms of treatment, such as CPR  A DNR order may be included in other types of advance directives  · Medical directive: This covers the care that you want if you are in a coma, near death, or unable to make decisions for yourself  You can list the treatments you want for each condition  Treatment may include pain medicine, surgery, blood transfusions, dialysis, IV or tube feedings, and a ventilator (breathing machine)  · Values history: This document has questions about your views, beliefs, and how you feel and think about life  This information can help others choose the care that you would choose  Why are advance directives important? An advance directive helps you control your care  Although spoken wishes may be used, it is better to have your wishes written down  Spoken wishes can be misunderstood, or not followed  Treatments may be given even if you do not want them  An advance directive may make it easier for your family to make difficult choices about your care     Weight Management   Why it is important to manage your weight:  Being overweight increases your risk of health conditions such as heart disease, high blood pressure, type 2 diabetes, and certain types of cancer  It can also increase your risk for osteoarthritis, sleep apnea, and other respiratory problems  Aim for a slow, steady weight loss  Even a small amount of weight loss can lower your risk of health problems  How to lose weight safely:  A safe and healthy way to lose weight is to eat fewer calories and get regular exercise  You can lose up about 1 pound a week by decreasing the number of calories you eat by 500 calories each day  Healthy meal plan for weight management:  A healthy meal plan includes a variety of foods, contains fewer calories, and helps you stay healthy  A healthy meal plan includes the following:  · Eat whole-grain foods more often  A healthy meal plan should contain fiber  Fiber is the part of grains, fruits, and vegetables that is not broken down by your body  Whole-grain foods are healthy and provide extra fiber in your diet  Some examples of whole-grain foods are whole-wheat breads and pastas, oatmeal, brown rice, and bulgur  · Eat a variety of vegetables every day  Include dark, leafy greens such as spinach, kale, anderson greens, and mustard greens  Eat yellow and orange vegetables such as carrots, sweet potatoes, and winter squash  · Eat a variety of fruits every day  Choose fresh or canned fruit (canned in its own juice or light syrup) instead of juice  Fruit juice has very little or no fiber  · Eat low-fat dairy foods  Drink fat-free (skim) milk or 1% milk  Eat fat-free yogurt and low-fat cottage cheese  Try low-fat cheeses such as mozzarella and other reduced-fat cheeses  · Choose meat and other protein foods that are low in fat  Choose beans or other legumes such as split peas or lentils  Choose fish, skinless poultry (chicken or turkey), or lean cuts of red meat (beef or pork)  Before you cook meat or poultry, cut off any visible fat  · Use less fat and oil  Try baking foods instead of frying them   Add less fat, such as margarine, sour cream, regular salad dressing and mayonnaise to foods  Eat fewer high-fat foods  Some examples of high-fat foods include french fries, doughnuts, ice cream, and cakes  · Eat fewer sweets  Limit foods and drinks that are high in sugar  This includes candy, cookies, regular soda, and sweetened drinks  Exercise:  Exercise at least 30 minutes per day on most days of the week  Some examples of exercise include walking, biking, dancing, and swimming  You can also fit in more physical activity by taking the stairs instead of the elevator or parking farther away from stores  Ask your healthcare provider about the best exercise plan for you  © Copyright [employer] 2018 Information is for End User's use only and may not be sold, redistributed or otherwise used for commercial purposes   All illustrations and images included in CareNotes® are the copyrighted property of A D A M [employer]  or [address] [place] [address]

## 2020-01-14 ENCOUNTER — HOSPITAL ENCOUNTER (OUTPATIENT)
Dept: NON INVASIVE DIAGNOSTICS | Facility: CLINIC | Age: 70
Discharge: HOME | End: 2020-01-14
Payer: MEDICARE

## 2020-01-14 DIAGNOSIS — I35.1 NONRHEUMATIC AORTIC VALVE INSUFFICIENCY: ICD-10-CM

## 2020-01-14 DIAGNOSIS — I05.9 MITRAL VALVE DISORDER: ICD-10-CM

## 2020-01-14 PROCEDURE — 93306 TTE W/DOPPLER COMPLETE: CPT | Performed by: INTERNAL MEDICINE

## 2020-01-14 PROCEDURE — 93306 TTE W/DOPPLER COMPLETE: CPT

## 2020-02-25 ENCOUNTER — OFFICE VISIT (OUTPATIENT)
Dept: OBGYN CLINIC | Facility: MEDICAL CENTER | Age: 70
End: 2020-02-25
Payer: MEDICARE

## 2020-02-25 VITALS — DIASTOLIC BLOOD PRESSURE: 78 MMHG | WEIGHT: 192.4 LBS | BODY MASS INDEX: 33.03 KG/M2 | SYSTOLIC BLOOD PRESSURE: 130 MMHG

## 2020-02-25 DIAGNOSIS — N81.6 RECTOCELE: Primary | ICD-10-CM

## 2020-02-25 PROCEDURE — 1036F TOBACCO NON-USER: CPT | Performed by: OBSTETRICS & GYNECOLOGY

## 2020-02-25 PROCEDURE — 1160F RVW MEDS BY RX/DR IN RCRD: CPT | Performed by: OBSTETRICS & GYNECOLOGY

## 2020-02-25 PROCEDURE — 4040F PNEUMOC VAC/ADMIN/RCVD: CPT | Performed by: OBSTETRICS & GYNECOLOGY

## 2020-02-25 PROCEDURE — 3075F SYST BP GE 130 - 139MM HG: CPT | Performed by: OBSTETRICS & GYNECOLOGY

## 2020-02-25 PROCEDURE — 99213 OFFICE O/P EST LOW 20 MIN: CPT | Performed by: OBSTETRICS & GYNECOLOGY

## 2020-02-25 PROCEDURE — 3078F DIAST BP <80 MM HG: CPT | Performed by: OBSTETRICS & GYNECOLOGY

## 2020-02-25 NOTE — PROGRESS NOTES
Assessment Dexter Orantes was seen today for vaginal prolapse  Diagnoses and all orders for this visit:    Rectocele         Plan  Options discussed, conservative, pessary, or surgical evaluation  Prefers surgery  Will refer to Dr Singh Karlee is a 81 y o  female here for a problem visit  Patient is complaining of increased vaginal pressure  States symptoms started approximately 1 month ago, but worsened 2 weeks ago after playing with family dog  Denies urinary complaints of retention, dysuria, hematuria, or incontinence  States occasionally has urgency  Denies constipation, splinting, hematochezia  Patient Active Problem List   Diagnosis    Arthritis    Depression with anxiety    Dyslipidemia    Essential hypertension    Irritable bowel syndrome with diarrhea    Lumbar degenerative disc disease    Vitamin D deficiency    Greater trochanteric bursitis of right hip    Pulmonary emphysema (HCC)    SV[facility]ra[facility]ricular tachycardia) [facility]    Medicare annual wellness visit, subsequent    Diastolic dysfunction    Mitral valve disorder    Aortic valve insufficiency    Achilles tendinitis of left lower extremity    Posterior tibial tendinitis, left    Obesity (BMI 30-39  9)       Gynecologic History  No LMP recorded (lmp unknown)  Patient is postmenopausal   The current method of family planning is post menopausal status      Past Medical History:   Diagnosis Date    Bursitis of hip     Last Assessed:6/5/2017    Herniated lumbar intervertebral disc     Pituitary adenoma ([facility] )     Last Assessed:2/12/2015 ; Previously on bromocriptine    Spinal stenosis     Systolic murmur of aorta      Past Surgical History:   Procedure Laterality Date    BREAST SURGERY      Incisional breast biopsy    CARDIAC ELECTROPHYSIOLOGY STUDY AND ABLATION      CARPAL TUNNEL RELEASE Left     CHOLECYSTECTOMY      OTHER SURGICAL HISTORY      EXCISION OF PAROTID TUMOR/GLAND    PAROTIDECTOMY      AK COLONOSCOPY FLX DX W/COLLJ SPEC WHEN PFRMD N/A 2017    Procedure: COLONOSCOPY;  Surgeon: Linda Delatorre DO;  Location: [facility]; Service: Gastroenterology    ROTATOR CUFF REPAIR      SALPINGOOPHORECTOMY Right      Family History   Problem Relation Age of Onset    Colon cancer Mother     Coronary artery disease Mother     Colonic polyp Mother     Liver cancer Mother     Multiple sclerosis Father     Hypertension Other     Hypertension Brother      Social History     Socioeconomic History    Marital status: /Civil Union     Spouse name: Not on file    Number of children: Not on file    Years of education: Not on file    Highest education level: Not on file   Occupational History    Not on file   Social Needs    Financial resource strain: Not on file    Food insecurity:     Worry: Not on file     Inability: Not on file    Transportation needs:     Medical: Not on file     Non-medical: Not on file   Tobacco Use    Smoking status: Former Smoker     Packs/day:      Years:      Pack years:      Types: Cigarettes     Last attempt to quit: 2018     Years since quittin 2    Smokeless tobacco: Never Used   Substance and Sexual Activity    Alcohol use:  Yes     Alcohol/week: 1 0 standard drinks     Types: 1 Shots of liquor per week     Frequency: 4 or more times a week     Comment: 1 Angelito Cary a night/social    Drug use: No    Sexual activity: Not Currently     Birth control/protection: Post-menopausal   Lifestyle    Physical activity:     Days per week: Not on file     Minutes per session: Not on file    Stress: Not on file   Relationships    Social connections:     Talks on phone: Not on file     Gets together: Not on file     Attends [employer]ism service: Not on file     Active member of club or organization: Not on file     Attends meetings of clubs or organizations: Not on file     Relationship status: Not on file    Intimate partner violence:     Fear of current or ex partner: Not on file     Emotionally abused: Not on file     Physically abused: Not on file     Forced sexual activity: Not on file   Other Topics Concern    Not on file   Social History Narrative    Not on file     Allergies   Allergen Reactions    Amlodipine Rash    Ancef [Cefazolin]     Cephalosporins Other (See Comments)     c diff    Fruit Extracts      Annotation - [id number]: peaches and strawberries    Levofloxacin     Morphine GI Intolerance    Other      FRUIT    Penicillins Hives    Strawberry C [Ascorbate]     Tetracycline Edema    Xarelto [Rivaroxaban] Rash       Current Outpatient Medications:     albuterol (PROVENTIL HFA) 90 mcg/act inhaler, Inhale 1 puff every 6 (six) hours as needed for wheezing, Disp: 6 7 g, Rfl: 11    atorvastatin (LIPITOR) 20 mg tablet, Take 1 tablet (20 mg total) by mouth daily, Disp: 90 tablet, Rfl: 3    cetirizine (ZyrTEC) 10 mg tablet, Take 10 mg by mouth daily, Disp: , Rfl:     diltiazem (CARDIZEM CD) 240 mg 24 hr capsule, Take 1 capsule (240 mg total) by mouth daily, Disp: , Rfl:     escitalopram (LEXAPRO) 10 mg tablet, Take 1 tablet (10 mg total) by mouth daily, Disp: 30 tablet, Rfl: 11    ibuprofen (MOTRIN) 800 mg tablet, Take 800 mg by mouth daily, Disp: , Rfl:     ipratropium-albuterol (COMBIVENT RESPIMAT) inhaler, Inhale 1 puff 3 (three) times a day, Disp: 4 g, Rfl: 11    Multiple Vitamin (MULTI VITAMIN DAILY PO), Take 1 tablet by mouth daily  , Disp: , Rfl:     spironolactone (ALDACTONE) 25 mg tablet, Take 1 tablet (25 mg total) by mouth daily, Disp: 90 tablet, Rfl: 3    Review of Systems  Constitutional :no fever, feels well, no tiredness, no recent weight gain or loss  ENT: no ear ache, no loss of hearing, no nosebleeds or nasal discharge, no sore throat or hoarseness  Cardiovascular: no complaints of slow or fast heart beat, no chest pain, no palpitations, no leg claudication or lower extremity edema    Respiratory: no complaints of shortness of shortness of breath, no RUVALCABA  Breasts:no complaints of breast pain, breast lump, or nipple discharge  Gastrointestinal: no complaints of abdominal pain, constipation, nausea, vomiting, or diarrhea or bloody stools  Genitourinary : no complaints of dysuria, incontinence, pelvic pain, no dysmenorrhea, vaginal discharge or abnormal vaginal bleeding and as noted in HPI  Musculoskeletal: no complaints of arthralgia, no myalgia, no joint swelling or stiffness, no limb pain or swelling  Integumentary: no complaints of skin rash or lesion, itching or dry skin  Neurological: no complaints of headache, no confusion, no numbness or tingling, no dizziness or fainting     Objective     /78   Wt 87 3 kg (192 lb 6 4 oz)   LMP  (LMP Unknown)   BMI 33 03 kg/m²     General:   appears stated age, cooperative, alert normal mood and affect   Vulva: normal female genitalia, Bartholin's, Urethra, [place] normal, no lesions, normal female hair distribution   Vagina: vagina positive for rectocele, stage III with valsalva   Urethra: normal   Cervix: Normal, no discharge  Uterus: normal size, contour, position, consistency, mobility, non-tender   Adnexa: no mass, fullness, tenderness   Lymphatic palpation of lymph nodes in neck, axilla, groin and/or other locations: no lymphadenopathy or masses noted   Skin normal skin turgor and no rashes     Psychiatric orientation to person, place, and time: normal  mood and affect: normal

## 2020-04-02 ENCOUNTER — APPOINTMENT (OUTPATIENT)
Dept: LAB | Facility: CLINIC | Age: 70
End: 2020-04-02
Payer: MEDICARE

## 2020-04-02 DIAGNOSIS — I12.9 HYPERTENSIVE CHRONIC KIDNEY DISEASE WITH STAGE 1 THROUGH STAGE 4 CHRONIC KIDNEY DISEASE, OR UNSPECIFIED CHRONIC KIDNEY DISEASE: ICD-10-CM

## 2020-04-02 DIAGNOSIS — N18.30 STAGE 3 CHRONIC KIDNEY DISEASE (HCC): ICD-10-CM

## 2020-04-02 LAB
ALBUMIN SERPL BCP-MCNC: 4 G/DL (ref 3.5–5)
ANION GAP SERPL CALCULATED.3IONS-SCNC: 6 MMOL/L (ref 4–13)
BUN SERPL-MCNC: 20 MG/DL (ref 5–25)
CALCIUM SERPL-MCNC: 9.7 MG/DL (ref 8.3–10.1)
CHLORIDE SERPL-SCNC: 106 MMOL/L (ref 100–108)
CO2 SERPL-SCNC: 27 MMOL/L (ref 21–32)
CREAT SERPL-MCNC: 1.3 MG/DL (ref 0.6–1.3)
ERYTHROCYTE [DISTWIDTH] IN BLOOD BY AUTOMATED COUNT: 13 % (ref 11.6–15.1)
GFR SERPL CREATININE-BSD FRML MDRD: 43 ML/MIN/1.73SQ M
GLUCOSE P FAST SERPL-MCNC: 96 MG/DL (ref 65–99)
HCT VFR BLD AUTO: 41.5 % (ref 34.8–46.1)
HGB BLD-MCNC: 13.2 G/DL (ref 11.5–15.4)
MCH RBC QN AUTO: 32.2 PG (ref 26.8–34.3)
MCHC RBC AUTO-ENTMCNC: 31.8 G/DL (ref 31.4–37.4)
MCV RBC AUTO: 101 FL (ref 82–98)
PHOSPHATE SERPL-MCNC: 3.6 MG/DL (ref 2.3–4.1)
PLATELET # BLD AUTO: 286 THOUSANDS/UL (ref 149–390)
PMV BLD AUTO: 10.9 FL (ref 8.9–12.7)
POTASSIUM SERPL-SCNC: 4.5 MMOL/L (ref 3.5–5.3)
PTH-INTACT SERPL-MCNC: 62 PG/ML (ref 18.4–80.1)
RBC # BLD AUTO: 4.1 MILLION/UL (ref 3.81–5.12)
SODIUM SERPL-SCNC: 139 MMOL/L (ref 136–145)
URATE SERPL-MCNC: 6.8 MG/DL (ref 2–6.8)
WBC # BLD AUTO: 8.24 THOUSAND/UL (ref 4.31–10.16)

## 2020-04-02 PROCEDURE — 85027 COMPLETE CBC AUTOMATED: CPT

## 2020-04-02 PROCEDURE — 84550 ASSAY OF BLOOD/URIC ACID: CPT

## 2020-04-02 PROCEDURE — 83970 ASSAY OF PARATHORMONE: CPT

## 2020-04-02 PROCEDURE — 36415 COLL VENOUS BLD VENIPUNCTURE: CPT

## 2020-04-02 PROCEDURE — 80069 RENAL FUNCTION PANEL: CPT

## 2020-04-06 ENCOUNTER — TELEPHONE (OUTPATIENT)
Dept: NEPHROLOGY | Facility: CLINIC | Age: 70
End: 2020-04-06

## 2020-04-14 ENCOUNTER — TELEMEDICINE (OUTPATIENT)
Dept: NEPHROLOGY | Facility: CLINIC | Age: 70
End: 2020-04-14
Payer: MEDICARE

## 2020-04-14 DIAGNOSIS — N18.30 STAGE 3 CHRONIC KIDNEY DISEASE (HCC): Primary | ICD-10-CM

## 2020-04-14 DIAGNOSIS — N18.30 HYPERTENSIVE KIDNEY DISEASE WITH STAGE 3 CHRONIC KIDNEY DISEASE (HCC): ICD-10-CM

## 2020-04-14 DIAGNOSIS — I51.89 DIASTOLIC DYSFUNCTION: ICD-10-CM

## 2020-04-14 DIAGNOSIS — I12.9 HYPERTENSIVE KIDNEY DISEASE WITH STAGE 3 CHRONIC KIDNEY DISEASE (HCC): ICD-10-CM

## 2020-04-14 PROCEDURE — 99442 PR PHYS/QHP TELEPHONE EVALUATION 11-20 MIN: CPT | Performed by: INTERNAL MEDICINE

## 2020-08-26 ENCOUNTER — TELEPHONE (OUTPATIENT)
Dept: ADMINISTRATIVE | Facility: OTHER | Age: 70
End: 2020-08-26

## 2020-08-26 ENCOUNTER — OFFICE VISIT (OUTPATIENT)
Dept: FAMILY MEDICINE CLINIC | Facility: CLINIC | Age: 70
End: 2020-08-26
Payer: MEDICARE

## 2020-08-26 VITALS
HEIGHT: 64 IN | BODY MASS INDEX: 32.01 KG/M2 | SYSTOLIC BLOOD PRESSURE: 140 MMHG | RESPIRATION RATE: 18 BRPM | DIASTOLIC BLOOD PRESSURE: 70 MMHG | HEART RATE: 80 BPM | OXYGEN SATURATION: 97 % | TEMPERATURE: 97.6 F | WEIGHT: 187.5 LBS

## 2020-08-26 DIAGNOSIS — F41.8 DEPRESSION WITH ANXIETY: ICD-10-CM

## 2020-08-26 DIAGNOSIS — E78.5 DYSLIPIDEMIA: ICD-10-CM

## 2020-08-26 DIAGNOSIS — N18.30 STAGE 3 CHRONIC KIDNEY DISEASE (HCC): ICD-10-CM

## 2020-08-26 DIAGNOSIS — J43.9 PULMONARY EMPHYSEMA, UNSPECIFIED EMPHYSEMA TYPE (HCC): ICD-10-CM

## 2020-08-26 DIAGNOSIS — I12.9 HYPERTENSIVE KIDNEY DISEASE WITH STAGE 3 CHRONIC KIDNEY DISEASE (HCC): Primary | ICD-10-CM

## 2020-08-26 DIAGNOSIS — I10 ESSENTIAL HYPERTENSION: ICD-10-CM

## 2020-08-26 DIAGNOSIS — N18.30 HYPERTENSIVE KIDNEY DISEASE WITH STAGE 3 CHRONIC KIDNEY DISEASE (HCC): Primary | ICD-10-CM

## 2020-08-26 PROBLEM — M19.90 ARTHRITIS: Status: RESOLVED | Noted: 2018-01-04 | Resolved: 2020-08-26

## 2020-08-26 PROCEDURE — 3077F SYST BP >= 140 MM HG: CPT | Performed by: INTERNAL MEDICINE

## 2020-08-26 PROCEDURE — 3008F BODY MASS INDEX DOCD: CPT | Performed by: INTERNAL MEDICINE

## 2020-08-26 PROCEDURE — 3078F DIAST BP <80 MM HG: CPT | Performed by: INTERNAL MEDICINE

## 2020-08-26 PROCEDURE — 1160F RVW MEDS BY RX/DR IN RCRD: CPT | Performed by: INTERNAL MEDICINE

## 2020-08-26 PROCEDURE — 99214 OFFICE O/P EST MOD 30 MIN: CPT | Performed by: INTERNAL MEDICINE

## 2020-08-26 PROCEDURE — 1036F TOBACCO NON-USER: CPT | Performed by: INTERNAL MEDICINE

## 2020-08-26 PROCEDURE — 4040F PNEUMOC VAC/ADMIN/RCVD: CPT | Performed by: INTERNAL MEDICINE

## 2020-08-26 RX ORDER — DILTIAZEM HYDROCHLORIDE 120 MG/1
120 CAPSULE, COATED, EXTENDED RELEASE ORAL DAILY
Qty: 90 CAPSULE | Refills: 1 | Status: SHIPPED | OUTPATIENT
Start: 2020-08-26 | End: 2021-02-19

## 2020-08-26 RX ORDER — ESTRADIOL 0.1 MG/G
2 CREAM VAGINAL EVERY OTHER DAY
COMMUNITY
End: 2022-03-30

## 2020-08-26 RX ORDER — DILTIAZEM HYDROCHLORIDE 120 MG/1
120 CAPSULE, COATED, EXTENDED RELEASE ORAL DAILY
Start: 2020-08-26 | End: 2020-08-26 | Stop reason: SDUPTHER

## 2020-08-26 NOTE — TELEPHONE ENCOUNTER
----- Message from Kavitha Lackey MA sent at 8/26/2020  9:05 AM EDT -----  Regarding: mammogram - [facility]  Contact: [phone]  I have found documentation regarding Breast Cancer Screening within Care Everywhere (CE) Activity 5/2020  Please link and update the Patient's chart  Thank You

## 2020-08-26 NOTE — ASSESSMENT & PLAN NOTE
She has a follow up with DR Fidelina Hayes in the Fall  Blood work is pending for then as well  She should try and avoid NSAID use to help as well

## 2020-08-26 NOTE — ASSESSMENT & PLAN NOTE
Reports that she has not had bothersome symptoms currently  Reports feeling that she is not needing the inhaler much

## 2020-08-26 NOTE — PROGRESS NOTES
Assessment/Plan:     1  Hypertensive kidney disease with stage 3 chronic kidney disease ([facility] [address] )  -     Comprehensive metabolic panel; Future    2  Stage 3 chronic kidney disease ([facility] [address] )  Assessment & Plan:  She has a follow up with DR Lennox Officer in the Fall  Blood work is pending for then as well  She should try and avoid NSAID use to help as well  3  Dyslipidemia  Assessment & Plan: Will repeat blood work in the Fall as well  Remains on statin therapy  Orders:  -     Lipid panel    4  Essential hypertension  Assessment & Plan:  Stable on current regime  Continue for now  Orders:  -     diltiazem (CARDIZEM CD) 120 mg 24 hr capsule; Take 1 capsule (120 mg total) by mouth daily    5  Pulmonary emphysema, unspecified emphysema type ([facility] )  Assessment & Plan:  Reports that she has not had bothersome symptoms currently  Reports feeling that she is not needing the inhaler much  6  Depression with anxiety  Assessment & Plan:  Reports feeling that mood is stable on current Lexapro dosing  Subjective:      Patient ID: Garret Perez is a 78 y o  female  Abby Edmondsnova is here today for a routine follow up  She reports feeling well overall and is largely without complaints  Reports seeing Dr Billy Gómez recently secondary to a prolapse  She is currently on topical estrace and feeling okay  She also was seen by Dr Lennox Officer and no changes were made at their last visit  Other chart was reviewed and updated  She is otherwise without complaints         The following portions of the patient's history were reviewed and updated as appropriate: allergies, past family history, past medical history, past social history, past surgical history and problem list     Current Outpatient Medications:     albuterol (PROVENTIL HFA) 90 mcg/act inhaler, Inhale 1 puff every 6 (six) hours as needed for wheezing, Disp: 6 7 g, Rfl: 11    atorvastatin (LIPITOR) 20 mg tablet, Take 1 tablet (20 mg total) by mouth daily, Disp: 90 tablet, Rfl: 3    cetirizine (ZyrTEC) 10 mg tablet, Take 10 mg by mouth daily, Disp: , Rfl:     diltiazem (CARDIZEM CD) 120 mg 24 hr capsule, Take 1 capsule (120 mg total) by mouth daily, Disp: 90 capsule, Rfl: 1    escitalopram (LEXAPRO) 10 mg tablet, Take 1 tablet (10 mg total) by mouth daily, Disp: 30 tablet, Rfl: 11    estradiol (ESTRACE) 0 1 mg/g vaginal cream, Insert 2 g into the vagina daily, Disp: , Rfl:     ibuprofen (MOTRIN) 800 mg tablet, Take 800 mg by mouth daily, Disp: , Rfl:     Multiple Vitamin (MULTI VITAMIN DAILY PO), Take 1 tablet by mouth daily  , Disp: , Rfl:     spironolactone (ALDACTONE) 25 mg tablet, Take 1 tablet (25 mg total) by mouth daily, Disp: 90 tablet, Rfl: 3    Review of Systems   Constitutional: Negative for chills, fever and unexpected weight change  Respiratory: Negative for cough, chest tightness and shortness of breath  Cardiovascular: Negative for chest pain and leg swelling  Gastrointestinal: Negative for abdominal pain, diarrhea, nausea and vomiting  Genitourinary: Negative for difficulty urinating  Musculoskeletal: Negative for arthralgias and myalgias  Neurological: Negative for dizziness, numbness and headaches  Psychiatric/Behavioral: Negative for dysphoric mood and sleep disturbance  The patient is not nervous/anxious  Objective:      /70   Pulse 80   Temp 97 6 °F (36 4 °C)   Resp 18   Ht 5' 4" (1 626 m)   Wt 85 kg (187 lb 8 oz)   LMP  (LMP Unknown)   SpO2 97%   BMI 32 18 kg/m²          Physical Exam  Vitals signs reviewed  Constitutional:       General: She is not in acute distress  Appearance: She is well-developed  She is not diaphoretic  HENT:      Head: Normocephalic and atraumatic  Eyes:      General: No scleral icterus  Right eye: No discharge  Left eye: No discharge  Conjunctiva/sclera: Conjunctivae normal    Neck:      Musculoskeletal: Normal range of motion     Cardiovascular:      Rate and Rhythm: Normal rate and regular rhythm  Heart sounds: Murmur present  Pulmonary:      Effort: Pulmonary effort is normal  No respiratory distress  Breath sounds: Normal breath sounds  No wheezing  Abdominal:      General: Abdomen is flat  There is no distension  Tenderness: There is no abdominal tenderness  Musculoskeletal: Normal range of motion  Lymphadenopathy:      Cervical: No cervical adenopathy  Skin:     General: Skin is warm and dry  Findings: No erythema  Neurological:      Mental Status: She is alert and oriented to person, place, and time  Psychiatric:         Mood and Affect: Mood normal          Speech: Speech normal          Behavior: Behavior normal          Thought Content:  Thought content normal          Judgment: Judgment normal

## 2020-08-27 NOTE — TELEPHONE ENCOUNTER
Upon review of the In Basket request we were able to locate, review, and update the patient chart as requested for Mammogram     Any additional questions or concerns should be emailed to the Practice Liaisons via [email or web address] email, please do not reply via In Basket      Thank you  Serene Willson

## 2020-09-03 DIAGNOSIS — F41.8 DEPRESSION WITH ANXIETY: ICD-10-CM

## 2020-09-03 RX ORDER — ESCITALOPRAM OXALATE 10 MG/1
10 TABLET ORAL DAILY
Qty: 30 TABLET | Refills: 11 | Status: SHIPPED | OUTPATIENT
Start: 2020-09-03 | End: 2021-09-14

## 2020-09-18 ENCOUNTER — APPOINTMENT (OUTPATIENT)
Dept: LAB | Facility: CLINIC | Age: 70
End: 2020-09-18
Payer: MEDICARE

## 2020-09-18 DIAGNOSIS — I12.9 HYPERTENSIVE KIDNEY DISEASE WITH STAGE 3 CHRONIC KIDNEY DISEASE (HCC): ICD-10-CM

## 2020-09-18 DIAGNOSIS — N18.30 HYPERTENSIVE KIDNEY DISEASE WITH STAGE 3 CHRONIC KIDNEY DISEASE (HCC): ICD-10-CM

## 2020-09-18 DIAGNOSIS — I51.89 DIASTOLIC DYSFUNCTION: ICD-10-CM

## 2020-09-18 DIAGNOSIS — N18.30 STAGE 3 CHRONIC KIDNEY DISEASE (HCC): ICD-10-CM

## 2020-09-18 LAB
ALBUMIN SERPL BCP-MCNC: 4 G/DL (ref 3.5–5)
ALP SERPL-CCNC: 78 U/L (ref 46–116)
ALT SERPL W P-5'-P-CCNC: 82 U/L (ref 12–78)
ANION GAP SERPL CALCULATED.3IONS-SCNC: 8 MMOL/L (ref 4–13)
AST SERPL W P-5'-P-CCNC: 50 U/L (ref 5–45)
BILIRUB SERPL-MCNC: 0.72 MG/DL (ref 0.2–1)
BUN SERPL-MCNC: 19 MG/DL (ref 5–25)
CALCIUM SERPL-MCNC: 10 MG/DL (ref 8.3–10.1)
CHLORIDE SERPL-SCNC: 107 MMOL/L (ref 100–108)
CHOLEST SERPL-MCNC: 190 MG/DL (ref 50–200)
CO2 SERPL-SCNC: 25 MMOL/L (ref 21–32)
CREAT SERPL-MCNC: 1.2 MG/DL (ref 0.6–1.3)
CREAT UR-MCNC: 459 MG/DL
ERYTHROCYTE [DISTWIDTH] IN BLOOD BY AUTOMATED COUNT: 12.8 % (ref 11.6–15.1)
GFR SERPL CREATININE-BSD FRML MDRD: 47 ML/MIN/1.73SQ M
GLUCOSE P FAST SERPL-MCNC: 95 MG/DL (ref 65–99)
HCT VFR BLD AUTO: 39.2 % (ref 34.8–46.1)
HDLC SERPL-MCNC: 68 MG/DL
HGB BLD-MCNC: 12.6 G/DL (ref 11.5–15.4)
LDLC SERPL CALC-MCNC: 92 MG/DL (ref 0–100)
MCH RBC QN AUTO: 33.2 PG (ref 26.8–34.3)
MCHC RBC AUTO-ENTMCNC: 32.1 G/DL (ref 31.4–37.4)
MCV RBC AUTO: 103 FL (ref 82–98)
MICROALBUMIN UR-MCNC: 44.1 MG/L (ref 0–20)
MICROALBUMIN/CREAT 24H UR: 10 MG/G CREATININE (ref 0–30)
NONHDLC SERPL-MCNC: 122 MG/DL
PHOSPHATE SERPL-MCNC: 3.5 MG/DL (ref 2.3–4.1)
PLATELET # BLD AUTO: 254 THOUSANDS/UL (ref 149–390)
PMV BLD AUTO: 11.3 FL (ref 8.9–12.7)
POTASSIUM SERPL-SCNC: 3.9 MMOL/L (ref 3.5–5.3)
PROT SERPL-MCNC: 6.8 G/DL (ref 6.4–8.2)
PTH-INTACT SERPL-MCNC: 51.3 PG/ML (ref 18.4–80.1)
RBC # BLD AUTO: 3.8 MILLION/UL (ref 3.81–5.12)
SODIUM SERPL-SCNC: 140 MMOL/L (ref 136–145)
TRIGL SERPL-MCNC: 152 MG/DL
URATE SERPL-MCNC: 6.5 MG/DL (ref 2–6.8)
WBC # BLD AUTO: 6.51 THOUSAND/UL (ref 4.31–10.16)

## 2020-09-18 PROCEDURE — 83970 ASSAY OF PARATHORMONE: CPT

## 2020-09-18 PROCEDURE — 80053 COMPREHEN METABOLIC PANEL: CPT

## 2020-09-18 PROCEDURE — 85027 COMPLETE CBC AUTOMATED: CPT

## 2020-09-18 PROCEDURE — 80061 LIPID PANEL: CPT | Performed by: INTERNAL MEDICINE

## 2020-09-18 PROCEDURE — 82043 UR ALBUMIN QUANTITATIVE: CPT

## 2020-09-18 PROCEDURE — 84100 ASSAY OF PHOSPHORUS: CPT

## 2020-09-18 PROCEDURE — 84550 ASSAY OF BLOOD/URIC ACID: CPT

## 2020-09-18 PROCEDURE — 36415 COLL VENOUS BLD VENIPUNCTURE: CPT

## 2020-09-18 PROCEDURE — 82570 ASSAY OF URINE CREATININE: CPT

## 2020-09-24 NOTE — PROGRESS NOTES
FFICE FOLLOW UP - Nephrology   Talhastephanie Napoles 78 y o  female MRN: [id number]       ASSESSMENT and PLAN:  Velna Hodgkins was seen today for follow-up and chronic kidney disease  Diagnoses and all orders for this visit:    Stage 3 chronic kidney disease ([address] )    Hypertensive kidney disease with stage 3 chronic kidney disease (HCC)    Diastolic dysfunction    Essential hypertension  -     spironolactone (ALDACTONE) 25 mg tablet; Take 1 tablet (25 mg total) by mouth daily      Chronic kidney disease III:  Suspect secondary to hypertension nephrosclerosis  -after review medical records baseline creatinine 1 0-1 3 dating back to 2018  -most recent creatinine on 09/08/2020 was 1 2 with stable electrolytes and acid-base balance  -avoid nephrotoxins/NSAIDs-  -will have patient return in six months with updated blood work and urine studies for Chronic Kidney Disease management with Dr Fidelina Hayes    Hypertension with Chronic Kidney Disease:  BP stable at 122/60  -continue diltiazem 120 mg daily and spironolactone 25 mg daily  -call if blood pressure persistently greater than 150  -avoid NSAIDs-Motrin, ibuprofen, Aleve, Naprosyn, naproxen    Diastolic dysfunction:  Most recent echocardiogram 1/14/2020 reveals EF of 65% with grade 1 diastolic dysfunction  -patient examining euvolemic on exam  -low-sodium diet recommended  -Follows cardiology    Health maintenance:   -colonoscopy-patient reports had recent colonoscopy  -Reports had mammogram  -Flu vaccine reports gets annually         Patient Instructions   All questions asked and answered  The patient has been instructed to call office at [phone] with any questions or concerns      The patient has been instructed to obtain prescribed blood work and urine studies prior to next appointment  Avoid NSAID products to include Motrin, Ibuprofen, Aleve, Naprosyn, or naproxen   Continues on Low-sodium diet  Return in six months with updated blood work and urine studies with   Holly Padilla          HPI: Arpit Box is a 78 y o  female who is here for Follow-up and Chronic Kidney Disease  Patient returns to office for CKD  follow-up  She was last seen on 04/14/2020 with Dr Holly Padilla and renal function was stable at that time  Most recent blood work 09/18/2020 reveals stable creatinine 1 20, potassium 3 9, sodium 140, PTH 51 3, UACR 10  After review medical records baseline creatinine 1 0-1 3 dating back to 2018  Medication list reviewed  On discussion, feels well       ROS:   All the systems were reviewed and were negative except as documented on the HPI  Allergies: Amlodipine; Ancef [cefazolin]; Cephalosporins; Fruit extracts; Levofloxacin; Morphine; Other; Penicillins; Strawberry c [ascorbate];  Tetracycline; and Xarelto [rivaroxaban]    Medications:   Current Outpatient Medications:     albuterol (PROVENTIL HFA) 90 mcg/act inhaler, Inhale 1 puff every 6 (six) hours as needed for wheezing, Disp: 6 7 g, Rfl: 11    atorvastatin (LIPITOR) 20 mg tablet, Take 1 tablet (20 mg total) by mouth daily, Disp: 90 tablet, Rfl: 3    cetirizine (ZyrTEC) 10 mg tablet, Take 10 mg by mouth daily, Disp: , Rfl:     diltiazem (CARDIZEM CD) 120 mg 24 hr capsule, Take 1 capsule (120 mg total) by mouth daily, Disp: 90 capsule, Rfl: 1    escitalopram (LEXAPRO) 10 mg tablet, Take 1 tablet (10 mg total) by mouth daily, Disp: 30 tablet, Rfl: 11    estradiol (ESTRACE) 0 1 mg/g vaginal cream, Insert 2 g into the vagina daily, Disp: , Rfl:     ibuprofen (MOTRIN) 800 mg tablet, Take 800 mg by mouth as needed, Disp: , Rfl:     Multiple Vitamin (MULTI VITAMIN DAILY PO), Take 1 tablet by mouth daily  , Disp: , Rfl:     spironolactone (ALDACTONE) 25 mg tablet, Take 1 tablet (25 mg total) by mouth daily, Disp: 90 tablet, Rfl: 3    Past Medical History:   Diagnosis Date    Bursitis of hip     Last Assessed:6/5/2017    Herniated lumbar intervertebral disc     Pituitary adenoma ([facility] [address] )     Last Assessed:2/12/2015 ; Previously on bromocriptine    Spinal stenosis     Systolic murmur of aorta      Past Surgical History:   Procedure Laterality Date    BREAST SURGERY      Incisional breast biopsy    CARDIAC ELECTROPHYSIOLOGY STUDY AND ABLATION      CARPAL TUNNEL RELEASE Left     CHOLECYSTECTOMY      OTHER SURGICAL HISTORY      EXCISION OF PAROTID TUMOR/GLAND    PAROTIDECTOMY      NM COLONOSCOPY FLX DX W/COLLJ SPEC WHEN PFRMD N/A 6/6/2017    Procedure: COLONOSCOPY;  Surgeon: Jason Kaplan DO;  Location: [facility]; Service: Gastroenterology    ROTATOR CUFF REPAIR      SALPINGOOPHORECTOMY Right      Family History   Problem Relation Age of Onset    Colon cancer Mother     Coronary artery disease Mother     Colonic polyp Mother     Liver cancer Mother     Multiple sclerosis Father     Hypertension Other     Hypertension Brother       reports that she quit smoking about 22 months ago  Her smoking use included cigarettes  She has a 30 00 pack-year smoking history  She has never used smokeless tobacco  She reports current alcohol use of about 1 0 standard drinks of alcohol per week  She reports that she does not use drugs  Physical Exam:   Vitals:    09/25/20 0920   BP: 122/60   BP Location: Left arm   Patient Position: Sitting   Cuff Size: Standard   Pulse: 90   Resp: 16   Temp: (!) 96 8 °F (36 °C)   TempSrc: Temporal   Weight: 83 9 kg (185 lb)   Height: 5' 4" (1 626 m)     Body mass index is 31 76 kg/m²      General: cooperative, in not acute distress  Eyes: conjunctivae pink, anicteric sclerae  ENT: lips and mucous membranes moist  Neck: supple, no JVD  Chest: clear breath sounds bilateral, no crackles, ronchus or wheezings  CVS: normal rate, regular rhythm, + murmur  Abdomen: soft, non-tender, non-distended, normoactive bowel sounds  Back: no CVA tenderness  Extremities: no edema of both legs  Skin: no rash  Neuro: awake, alert, oriented      Lab Results:  Results for orders placed or performed in visit on 09/18/20   CBC   Result Value Ref Range    WBC 6 51 4 31 - 10 16 Thousand/uL    RBC 3 80 (L) 3 81 - 5 12 Million/uL    Hemoglobin 12 6 11 5 - 15 4 g/dL    Hematocrit 39 2 34 8 - 46 1 %     (H) 82 - 98 fL    MCH 33 2 26 8 - 34 3 pg    MCHC 32 1 31 4 - 37 4 g/dL    RDW 12 8 11 6 - 15 1 %    Platelets 840 574 - 520 Thousands/uL    MPV 11 3 8 9 - 12 7 fL   Uric acid   Result Value Ref Range    Uric Acid 6 5 2 0 - 6 8 mg/dL   PTH, intact   Result Value Ref Range    PTH 51 3 18 4 - 80 1 pg/mL   Microalbumin / creatinine urine ratio   Result Value Ref Range    Creatinine, Ur 459 0 mg/dL    Microalbum  ,U,Random 44 1 (H) 0 0 - 20 0 mg/L    Microalb Creat Ratio 10 0 - 30 mg/g creatinine   Comprehensive metabolic panel   Result Value Ref Range    Sodium 140 136 - 145 mmol/L    Potassium 3 9 3 5 - 5 3 mmol/L    Chloride 107 100 - 108 mmol/L    CO2 25 21 - 32 mmol/L    ANION GAP 8 4 - 13 mmol/L    BUN 19 5 - 25 mg/dL    Creatinine 1 20 0 60 - 1 30 mg/dL    Glucose, Fasting 95 65 - 99 mg/dL    Calcium 10 0 8 3 - 10 1 mg/dL    AST 50 (H) 5 - 45 U/L    ALT 82 (H) 12 - 78 U/L    Alkaline Phosphatase 78 46 - 116 U/L    Total Protein 6 8 6 4 - 8 2 g/dL    Albumin 4 0 3 5 - 5 0 g/dL    Total Bilirubin 0 72 0 20 - 1 00 mg/dL    eGFR 47 ml/min/1 73sq m   Phosphorus   Result Value Ref Range    Phosphorus 3 5 2 3 - 4 1 mg/dL       Results from last 7 days   Lab Units 09/18/20  1113   WBC Thousand/uL 6 51   HEMOGLOBIN g/dL 12 6   HEMATOCRIT % 39 2   PLATELETS Thousands/uL 254   SODIUM mmol/L 140   POTASSIUM mmol/L 3 9   CHLORIDE mmol/L 107   CO2 mmol/L 25   BUN mg/dL 19   CREATININE mg/dL 1 20   CALCIUM mg/dL 10 0   PHOSPHORUS mg/dL 3 5           Portions of the record may have been created with voice recognition software  Occasional wrong word or "sound a like" substitutions may have occurred due to the inherent limitations of voice recognition software   Read the chart carefully and recognize,

## 2020-09-24 NOTE — PATIENT INSTRUCTIONS
All questions asked and answered  The patient has been instructed to call office at [phone] with any questions or concerns      The patient has been instructed to obtain prescribed blood work and urine studies prior to next appointment  Avoid NSAID products to include Motrin, Ibuprofen, Aleve, Naprosyn, or naproxen   Continues on Low-sodium diet  Return in six months with updated blood work and urine studies with Dr Florentino Pore

## 2020-09-25 ENCOUNTER — OFFICE VISIT (OUTPATIENT)
Dept: NEPHROLOGY | Facility: CLINIC | Age: 70
End: 2020-09-25
Payer: MEDICARE

## 2020-09-25 VITALS
TEMPERATURE: 96.8 F | WEIGHT: 185 LBS | RESPIRATION RATE: 16 BRPM | HEART RATE: 90 BPM | SYSTOLIC BLOOD PRESSURE: 122 MMHG | HEIGHT: 64 IN | DIASTOLIC BLOOD PRESSURE: 60 MMHG | BODY MASS INDEX: 31.58 KG/M2

## 2020-09-25 DIAGNOSIS — N18.30 HYPERTENSIVE KIDNEY DISEASE WITH STAGE 3 CHRONIC KIDNEY DISEASE (HCC): ICD-10-CM

## 2020-09-25 DIAGNOSIS — N18.30 STAGE 3 CHRONIC KIDNEY DISEASE (HCC): Primary | ICD-10-CM

## 2020-09-25 DIAGNOSIS — I12.9 HYPERTENSIVE KIDNEY DISEASE WITH STAGE 3 CHRONIC KIDNEY DISEASE (HCC): ICD-10-CM

## 2020-09-25 DIAGNOSIS — I10 ESSENTIAL HYPERTENSION: ICD-10-CM

## 2020-09-25 DIAGNOSIS — I51.89 DIASTOLIC DYSFUNCTION: ICD-10-CM

## 2020-09-25 PROCEDURE — 99213 OFFICE O/P EST LOW 20 MIN: CPT | Performed by: NURSE PRACTITIONER

## 2020-09-25 RX ORDER — SPIRONOLACTONE 25 MG/1
25 TABLET ORAL DAILY
Qty: 90 TABLET | Refills: 3 | Status: SHIPPED | OUTPATIENT
Start: 2020-09-25 | End: 2021-10-14

## 2020-09-28 DIAGNOSIS — E78.00 HIGH CHOLESTEROL: ICD-10-CM

## 2020-09-28 RX ORDER — ATORVASTATIN CALCIUM 20 MG/1
20 TABLET, FILM COATED ORAL DAILY
Qty: 90 TABLET | Refills: 3 | Status: SHIPPED | OUTPATIENT
Start: 2020-09-28 | End: 2021-10-04

## 2020-10-21 ENCOUNTER — IMMUNIZATIONS (OUTPATIENT)
Dept: FAMILY MEDICINE CLINIC | Facility: CLINIC | Age: 70
End: 2020-10-21
Payer: MEDICARE

## 2020-10-21 ENCOUNTER — APPOINTMENT (OUTPATIENT)
Dept: LAB | Facility: CLINIC | Age: 70
End: 2020-10-21
Payer: MEDICARE

## 2020-10-21 VITALS — TEMPERATURE: 97.3 F

## 2020-10-21 DIAGNOSIS — R74.01 ELEVATED AST (SGOT): ICD-10-CM

## 2020-10-21 DIAGNOSIS — R74.01 ELEVATED AST (SGOT): Primary | ICD-10-CM

## 2020-10-21 DIAGNOSIS — Z23 NEED FOR IMMUNIZATION AGAINST INFLUENZA: Primary | ICD-10-CM

## 2020-10-21 LAB
ALBUMIN SERPL BCP-MCNC: 4 G/DL (ref 3.5–5)
ALP SERPL-CCNC: 86 U/L (ref 46–116)
ALT SERPL W P-5'-P-CCNC: 71 U/L (ref 12–78)
ANION GAP SERPL CALCULATED.3IONS-SCNC: 6 MMOL/L (ref 4–13)
AST SERPL W P-5'-P-CCNC: 50 U/L (ref 5–45)
BILIRUB SERPL-MCNC: 0.59 MG/DL (ref 0.2–1)
BUN SERPL-MCNC: 22 MG/DL (ref 5–25)
CALCIUM SERPL-MCNC: 9.9 MG/DL (ref 8.3–10.1)
CHLORIDE SERPL-SCNC: 107 MMOL/L (ref 100–108)
CO2 SERPL-SCNC: 28 MMOL/L (ref 21–32)
CREAT SERPL-MCNC: 1.3 MG/DL (ref 0.6–1.3)
GFR SERPL CREATININE-BSD FRML MDRD: 42 ML/MIN/1.73SQ M
GLUCOSE SERPL-MCNC: 104 MG/DL (ref 65–140)
POTASSIUM SERPL-SCNC: 4 MMOL/L (ref 3.5–5.3)
PROT SERPL-MCNC: 7.6 G/DL (ref 6.4–8.2)
SODIUM SERPL-SCNC: 141 MMOL/L (ref 136–145)

## 2020-10-21 PROCEDURE — 80053 COMPREHEN METABOLIC PANEL: CPT

## 2020-10-21 PROCEDURE — G0008 ADMIN INFLUENZA VIRUS VAC: HCPCS

## 2020-10-21 PROCEDURE — 90662 IIV NO PRSV INCREASED AG IM: CPT

## 2020-10-21 PROCEDURE — 36415 COLL VENOUS BLD VENIPUNCTURE: CPT

## 2020-11-12 ENCOUNTER — ANNUAL EXAM (OUTPATIENT)
Dept: OBGYN CLINIC | Facility: MEDICAL CENTER | Age: 70
End: 2020-11-12
Payer: MEDICARE

## 2020-11-12 VITALS — SYSTOLIC BLOOD PRESSURE: 130 MMHG | BODY MASS INDEX: 31.98 KG/M2 | WEIGHT: 186.3 LBS | DIASTOLIC BLOOD PRESSURE: 80 MMHG

## 2020-11-12 DIAGNOSIS — Z01.419 ENCOUNTER FOR GYNECOLOGICAL EXAMINATION: Primary | ICD-10-CM

## 2020-11-12 DIAGNOSIS — Z12.31 ENCOUNTER FOR SCREENING MAMMOGRAM FOR MALIGNANT NEOPLASM OF BREAST: ICD-10-CM

## 2020-11-12 PROCEDURE — G0101 CA SCREEN;PELVIC/BREAST EXAM: HCPCS | Performed by: OBSTETRICS & GYNECOLOGY

## 2020-11-19 ENCOUNTER — OFFICE VISIT (OUTPATIENT)
Dept: CARDIOLOGY CLINIC | Facility: CLINIC | Age: 70
End: 2020-11-19
Payer: MEDICARE

## 2020-11-19 VITALS
HEART RATE: 81 BPM | BODY MASS INDEX: 32.1 KG/M2 | HEIGHT: 64 IN | WEIGHT: 188 LBS | SYSTOLIC BLOOD PRESSURE: 148 MMHG | DIASTOLIC BLOOD PRESSURE: 80 MMHG

## 2020-11-19 DIAGNOSIS — I35.1 NONRHEUMATIC AORTIC VALVE INSUFFICIENCY: ICD-10-CM

## 2020-11-19 DIAGNOSIS — I47.1 SVT (SUPRAVENTRICULAR TACHYCARDIA) (HCC): Primary | ICD-10-CM

## 2020-11-19 DIAGNOSIS — I10 ESSENTIAL HYPERTENSION: ICD-10-CM

## 2020-11-19 PROCEDURE — 99214 OFFICE O/P EST MOD 30 MIN: CPT | Performed by: INTERNAL MEDICINE

## 2020-11-19 PROCEDURE — 93000 ELECTROCARDIOGRAM COMPLETE: CPT | Performed by: INTERNAL MEDICINE

## 2020-11-19 RX ORDER — LOSARTAN POTASSIUM 25 MG/1
25 TABLET ORAL DAILY
Qty: 90 TABLET | Refills: 3 | Status: SHIPPED | OUTPATIENT
Start: 2020-11-19 | End: 2021-11-08

## 2021-01-07 ENCOUNTER — TELEMEDICINE (OUTPATIENT)
Dept: FAMILY MEDICINE CLINIC | Facility: CLINIC | Age: 71
End: 2021-01-07
Payer: MEDICARE

## 2021-01-07 DIAGNOSIS — Z20.822 EXPOSURE TO COVID-19 VIRUS: ICD-10-CM

## 2021-01-07 DIAGNOSIS — B34.9 VIRAL INFECTION, UNSPECIFIED: ICD-10-CM

## 2021-01-07 PROCEDURE — 99213 OFFICE O/P EST LOW 20 MIN: CPT | Performed by: INTERNAL MEDICINE

## 2021-01-08 DIAGNOSIS — B34.9 VIRAL INFECTION, UNSPECIFIED: ICD-10-CM

## 2021-01-08 DIAGNOSIS — Z20.822 EXPOSURE TO COVID-19 VIRUS: ICD-10-CM

## 2021-01-08 PROCEDURE — U0003 INFECTIOUS AGENT DETECTION BY NUCLEIC ACID (DNA OR RNA); SEVERE ACUTE RESPIRATORY SYNDROME CORONAVIRUS 2 (SARS-COV-2) (CORONAVIRUS DISEASE [COVID-19]), AMPLIFIED PROBE TECHNIQUE, MAKING USE OF HIGH THROUGHPUT TECHNOLOGIES AS DESCRIBED BY CMS-2020-01-R: HCPCS | Performed by: INTERNAL MEDICINE

## 2021-01-08 PROCEDURE — U0005 INFEC AGEN DETEC AMPLI PROBE: HCPCS | Performed by: INTERNAL MEDICINE

## 2021-01-08 NOTE — PROGRESS NOTES
COVID-19 Virtual Visit     Assessment/Plan:    Problem List Items Addressed This Visit     None      Visit Diagnoses     Exposure to COVID-19 virus        Relevant Orders    Novel Coronavirus (COVID-19), PCR [facility] - Collected at [facility] or [facility]    Viral infection, unspecified        Relevant Orders    Novel Coronavirus (COVID-19), PCR [facility] - Collected at [facility] or [facility]         Disposition:         Patient was exposed to COVID-19 positive individual   She has symptoms that started on 01/05/2021  She will be tested she will be on isolation  I have spent 10 minutes directly with the patient  Encounter provider Vanessa Burns MD    Provider located at [address] [place] [address] [address] [facility] [id number]    Recent Visits  No visits were found meeting these conditions  Showing recent visits within past 7 days and meeting all other requirements     Today's Visits  Date Type Provider Dept   01/07/21 Asif Corral MD [facility] Primary Care   Showing today's visits and meeting all other requirements     Future Appointments  No visits were found meeting these conditions  Showing future appointments within next 150 days and meeting all other requirements      This virtual check-in was done via Microsoft Teams and patient was informed that this is a secure, HIPAA-compliant platform  She agrees to proceed  Patient agrees to participate in a virtual check in via telephone or video visit instead of presenting to the office to address urgent/immediate medical needs  Patient is aware this is a billable service  After connecting through [facility], the patient was identified by name and date of birth  aJs Pelaez was informed that this was a telemedicine visit and that the exam was being conducted confidentially over secure lines  My office door was closed   Jas Pelaez acknowledged consent and understanding of privacy and security of the telemedicine visit  I informed the patient that I have reviewed her record in Epic and presented the opportunity for her to ask any questions regarding the visit today  The patient agreed to participate  Subjective:   Jono Rios is a 78 y o  female who is concerned about COVID-19  Patient's symptoms include fatigue, nasal congestion, cough, shortness of breath, diarrhea and myalgias  Patient denies fever, chills, sore throat, nausea and vomiting  No results found for: Kanchan Rosa, SARSCORONARCADOI, [address]  Past Medical History:   Diagnosis Date    Bursitis of hip     Last Assessed:6/5/2017    Herniated lumbar intervertebral disc     Pituitary adenoma ([address] )     Last Assessed:2/12/2015 ; Previously on bromocriptine    Spinal stenosis     Systolic murmur of aorta      Past Surgical History:   Procedure Laterality Date    BREAST SURGERY      Incisional breast biopsy    CARDIAC ELECTROPHYSIOLOGY STUDY AND ABLATION      CARPAL TUNNEL RELEASE Left     CHOLECYSTECTOMY      OTHER SURGICAL HISTORY      EXCISION OF PAROTID TUMOR/GLAND    PAROTIDECTOMY      [place] COLONOSCOPY FLX DX W/COLLJ SPEC WHEN PFRMD N/A 6/6/2017    Procedure: COLONOSCOPY;  Surgeon: Avery Burgess DO;  Location: [facility] GI LAB;   Service: Gastroenterology    ROTATOR CUFF REPAIR      SALPINGOOPHORECTOMY Right      Current Outpatient Medications   Medication Sig Dispense Refill    albuterol (PROVENTIL HFA) 90 mcg/act inhaler Inhale 1 puff every 6 (six) hours as needed for wheezing 6 7 g 11    atorvastatin (LIPITOR) 20 mg tablet Take 1 tablet (20 mg total) by mouth daily 90 tablet 3    cetirizine (ZyrTEC) 10 mg tablet Take 10 mg by mouth as needed       diltiazem (CARDIZEM CD) 120 mg 24 hr capsule Take 1 capsule (120 mg total) by mouth daily 90 capsule 1    escitalopram (LEXAPRO) 10 mg tablet Take 1 tablet (10 mg total) by mouth daily 30 tablet 11    estradiol (ESTRACE) 0 1 mg/g vaginal cream Insert 2 g into the vagina every other day       ibuprofen (MOTRIN) 800 mg tablet Take 800 mg by mouth as needed      losartan (COZAAR) 25 mg tablet Take 1 tablet (25 mg total) by mouth daily 90 tablet 3    Multiple Vitamin (MULTI VITAMIN DAILY PO) Take 1 tablet by mouth daily        spironolactone (ALDACTONE) 25 mg tablet Take 1 tablet (25 mg total) by mouth daily 90 tablet 3     No current facility-administered medications for this visit  Allergies   Allergen Reactions    Amlodipine Rash    Ancef [Cefazolin]     Cephalosporins Other (See Comments)     c diff    Fruit Extracts      Annotation - [id number]: peaches and strawberries    Levofloxacin     Morphine GI Intolerance    Other      FRUIT    Penicillins Hives    Strawberry C [Ascorbate]     Tetracycline Edema    Xarelto [Rivaroxaban] Rash       Review of Systems   Constitutional: Positive for fatigue  Negative for chills and fever  HENT: Positive for congestion  Negative for sore throat  Respiratory: Positive for cough and shortness of breath  Negative for wheezing  Gastrointestinal: Positive for diarrhea  Negative for nausea and vomiting  Musculoskeletal: Positive for myalgias  Negative for arthralgias  Psychiatric/Behavioral: Negative for confusion  Objective: There were no vitals filed for this visit  Physical Exam  Constitutional:       General: She is not in acute distress  Appearance: She is not toxic-appearing  Neurological:      Mental Status: She is alert  VIRTUAL VISIT DISCLAIMER    Walter Bria acknowledges that she has consented to an online visit or consultation  She understands that the online visit is based solely on information provided by her, and that, in the absence of a face-to-face physical evaluation by the physician, the diagnosis she receives is both limited and provisional in terms of accuracy and completeness   This is not intended to replace a full medical face-to-face evaluation by the physician  Elsy Cuadra understands and accepts these terms

## 2021-01-09 LAB — SARS-COV-2 RNA SPEC QL NAA+PROBE: DETECTED

## 2021-01-12 ENCOUNTER — TELEMEDICINE (OUTPATIENT)
Dept: FAMILY MEDICINE CLINIC | Facility: CLINIC | Age: 71
End: 2021-01-12
Payer: MEDICARE

## 2021-01-12 DIAGNOSIS — U07.1 COVID-19: Primary | ICD-10-CM

## 2021-01-12 PROCEDURE — 99213 OFFICE O/P EST LOW 20 MIN: CPT | Performed by: INTERNAL MEDICINE

## 2021-01-12 NOTE — PROGRESS NOTES
COVID-19 Virtual Visit     Assessment/Plan:    Problem List Items Addressed This Visit     None         Disposition:         Patient's symptoms are much better  She is afebrile, no shortness of breath  She developed symptoms on 01/05/2021  She will stay on isolation for 10 days  Will follow up on her as needed  I have spent 7 minutes directly with the patient  Encounter provider Madi Cho MD    Provider located at [address] [place] [address] [address] [phone]    Recent Visits  Date Type Provider Dept   01/07/21 Addie Forrest MD [place] [id number] Primary Care   Showing recent visits within past 7 days and meeting all other requirements     Future Appointments  No visits were found meeting these conditions  Showing future appointments within next 150 days and meeting all other requirements      This virtual check-in was done via Microsoft Teams and patient was informed that this is a secure, HIPAA-compliant platform  She agrees to proceed  Patient agrees to participate in a virtual check in via telephone or video visit instead of presenting to the office to address urgent/immediate medical needs  Patient is aware this is a billable service  After connecting through [facility], the patient was identified by name and date of birth  Nighat Cheung was informed that this was a telemedicine visit and that the exam was being conducted confidentially over secure lines  My office door was closed  Nighat Cheung acknowledged consent and understanding of privacy and security of the telemedicine visit  I informed the patient that I have reviewed her record in Epic and presented the opportunity for her to ask any questions regarding the visit today  The patient agreed to participate  Subjective:   Nighat Cheung is a 78 y o  female who has been screened for COVID-19  Patient's symptoms include nasal congestion and cough   Patient denies fever, chills, sore throat, shortness of breath, nausea, vomiting, diarrhea and myalgias  Lab Results   Component Value Date    SARSCOV2 Detected (A) 01/08/2021     Past Medical History:   Diagnosis Date    Bursitis of hip     Last Assessed:6/5/2017    Herniated lumbar intervertebral disc     Pituitary aden[address] [facility] )     Last Assessed:2/12/2015 ; Previously on bromocriptine    Spinal stenosis     Systolic murmur of aorta      Past Surgical History:   Procedure Laterality Date    BREAST SURGERY      Incisional breast biopsy    CARDIAC ELECTROPHYSIOLOGY STUDY AND ABLATION      CARPAL TUNNEL RELEASE Left     CHOLECYSTECTOMY      OTHER SURGICAL HISTORY      EXCISION OF PAROTID TUMOR/GLAND    PAROTIDECTOMY      [place] COLONOSCOPY FLX DX W/COLLJ SPEC WHEN PFRMD N/A 6/6/2017    Procedure: COLONOSCOPY;  Surgeon: Burgess Elliott DO;  Location: [facility] GI [facility];   Service: Gastroenterology    ROTATOR CUFF REPAIR      SALPINGOOPHORECTOMY Right      Current Outpatient Medications   Medication Sig Dispense Refill    albuterol (PROVENTIL HFA) 90 mcg/act inhaler Inhale 1 puff every 6 (six) hours as needed for wheezing 6 7 g 11    atorvastatin (LIPITOR) 20 mg tablet Take 1 tablet (20 mg total) by mouth daily 90 tablet 3    cetirizine (ZyrTEC) 10 mg tablet Take 10 mg by mouth as needed       diltiazem (CARDIZEM CD) 120 mg 24 hr capsule Take 1 capsule (120 mg total) by mouth daily 90 capsule 1    escitalopram (LEXAPRO) 10 mg tablet Take 1 tablet (10 mg total) by mouth daily 30 tablet 11    estradiol (ESTRACE) 0 1 mg/g vaginal cream Insert 2 g into the vagina every other day       ibuprofen (MOTRIN) 800 mg tablet Take 800 mg by mouth as needed      losartan (COZAAR) 25 mg tablet Take 1 tablet (25 mg total) by mouth daily 90 tablet 3    Multiple Vitamin (MULTI VITAMIN DAILY PO) Take 1 tablet by mouth daily        spironolactone (ALDACTONE) 25 mg tablet Take 1 tablet (25 mg total) by mouth daily 90 tablet 3     No current facility-administered medications for this visit  Allergies   Allergen Reactions    Amlodipine Rash    Ancef [Cefazolin]     Cephalosporins Other (See Comments)     c diff    Fruit Extracts      Annotation - [id number]: peaches and strawberries    Levofloxacin     Morphine GI Intolerance    Other      FRUIT    Penicillins Hives    Strawberry C [Ascorbate]     Tetracycline Edema    Xarelto [Rivaroxaban] Rash       Review of Systems   Constitutional: Negative for chills and fever  HENT: Positive for congestion  Negative for sore throat  Respiratory: Positive for cough  Negative for shortness of breath and wheezing  Gastrointestinal: Negative for diarrhea, nausea and vomiting  Musculoskeletal: Negative for arthralgias and myalgias  Psychiatric/Behavioral: Negative for confusion  Objective: There were no vitals filed for this visit  Physical Exam  Constitutional:       General: She is not in acute distress  Appearance: She is not ill-appearing or toxic-appearing  Neurological:      Mental Status: She is alert  VIRTUAL VISIT DISCLAIMER    Talon Tea acknowledges that she has consented to an online visit or consultation  She understands that the online visit is based solely on information provided by her, and that, in the absence of a face-to-face physical evaluation by the physician, the diagnosis she receives is both limited and provisional in terms of accuracy and completeness  This is not intended to replace a full medical face-to-face evaluation by the physician  Talon Metcalf understands and accepts these terms

## 2021-01-27 ENCOUNTER — LAB (OUTPATIENT)
Dept: LAB | Age: 71
End: 2021-01-27
Payer: MEDICARE

## 2021-01-27 DIAGNOSIS — I10 ESSENTIAL HYPERTENSION: ICD-10-CM

## 2021-01-27 LAB
ANION GAP SERPL CALCULATED.3IONS-SCNC: 5 MMOL/L (ref 4–13)
BUN SERPL-MCNC: 21 MG/DL (ref 5–25)
CALCIUM SERPL-MCNC: 10.3 MG/DL (ref 8.3–10.1)
CHLORIDE SERPL-SCNC: 106 MMOL/L (ref 100–108)
CO2 SERPL-SCNC: 28 MMOL/L (ref 21–32)
CREAT SERPL-MCNC: 1.26 MG/DL (ref 0.6–1.3)
GFR SERPL CREATININE-BSD FRML MDRD: 44 ML/MIN/1.73SQ M
GLUCOSE P FAST SERPL-MCNC: 104 MG/DL (ref 65–99)
POTASSIUM SERPL-SCNC: 4.3 MMOL/L (ref 3.5–5.3)
SODIUM SERPL-SCNC: 139 MMOL/L (ref 136–145)

## 2021-01-27 PROCEDURE — 36415 COLL VENOUS BLD VENIPUNCTURE: CPT

## 2021-01-27 PROCEDURE — 80048 BASIC METABOLIC PNL TOTAL CA: CPT

## 2021-02-03 NOTE — RESULT ENCOUNTER NOTE
I called & talked to [facility], reviewed blood work  She is aware of her glucose level  She was not fasting, she had coffee with cream   She asked when her next appt is, I reviewed last note and said someone should call her in April for May appt

## 2021-02-19 DIAGNOSIS — I10 ESSENTIAL HYPERTENSION: ICD-10-CM

## 2021-02-19 RX ORDER — DILTIAZEM HYDROCHLORIDE 120 MG/1
CAPSULE, EXTENDED RELEASE ORAL
Qty: 90 CAPSULE | Refills: 1 | Status: SHIPPED | OUTPATIENT
Start: 2021-02-19 | End: 2021-08-13

## 2021-03-24 ENCOUNTER — APPOINTMENT (OUTPATIENT)
Dept: LAB | Facility: MEDICAL CENTER | Age: 71
End: 2021-03-24
Payer: MEDICARE

## 2021-03-24 DIAGNOSIS — N18.30 STAGE 3 CHRONIC KIDNEY DISEASE (HCC): ICD-10-CM

## 2021-03-24 LAB
ANION GAP SERPL CALCULATED.3IONS-SCNC: 7 MMOL/L (ref 4–13)
BASOPHILS # BLD AUTO: 0.05 THOUSANDS/ΜL (ref 0–0.1)
BASOPHILS NFR BLD AUTO: 1 % (ref 0–1)
BUN SERPL-MCNC: 23 MG/DL (ref 5–25)
CALCIUM SERPL-MCNC: 10.1 MG/DL (ref 8.3–10.1)
CHLORIDE SERPL-SCNC: 106 MMOL/L (ref 100–108)
CO2 SERPL-SCNC: 27 MMOL/L (ref 21–32)
CREAT SERPL-MCNC: 1.27 MG/DL (ref 0.6–1.3)
CREAT UR-MCNC: 339 MG/DL
EOSINOPHIL # BLD AUTO: 0.11 THOUSAND/ΜL (ref 0–0.61)
EOSINOPHIL NFR BLD AUTO: 2 % (ref 0–6)
ERYTHROCYTE [DISTWIDTH] IN BLOOD BY AUTOMATED COUNT: 13.2 % (ref 11.6–15.1)
GFR SERPL CREATININE-BSD FRML MDRD: 43 ML/MIN/1.73SQ M
GLUCOSE P FAST SERPL-MCNC: 95 MG/DL (ref 65–99)
HCT VFR BLD AUTO: 41.2 % (ref 34.8–46.1)
HGB BLD-MCNC: 13.2 G/DL (ref 11.5–15.4)
IMM GRANULOCYTES # BLD AUTO: 0.02 THOUSAND/UL (ref 0–0.2)
IMM GRANULOCYTES NFR BLD AUTO: 0 % (ref 0–2)
LYMPHOCYTES # BLD AUTO: 1.82 THOUSANDS/ΜL (ref 0.6–4.47)
LYMPHOCYTES NFR BLD AUTO: 30 % (ref 14–44)
MAGNESIUM SERPL-MCNC: 1.7 MG/DL (ref 1.6–2.6)
MCH RBC QN AUTO: 33.1 PG (ref 26.8–34.3)
MCHC RBC AUTO-ENTMCNC: 32 G/DL (ref 31.4–37.4)
MCV RBC AUTO: 103 FL (ref 82–98)
MONOCYTES # BLD AUTO: 0.5 THOUSAND/ΜL (ref 0.17–1.22)
MONOCYTES NFR BLD AUTO: 8 % (ref 4–12)
NEUTROPHILS # BLD AUTO: 3.49 THOUSANDS/ΜL (ref 1.85–7.62)
NEUTS SEG NFR BLD AUTO: 59 % (ref 43–75)
NRBC BLD AUTO-RTO: 0 /100 WBCS
PHOSPHATE SERPL-MCNC: 3.6 MG/DL (ref 2.3–4.1)
PLATELET # BLD AUTO: 280 THOUSANDS/UL (ref 149–390)
PMV BLD AUTO: 11.1 FL (ref 8.9–12.7)
POTASSIUM SERPL-SCNC: 4.5 MMOL/L (ref 3.5–5.3)
PROT UR-MCNC: 28 MG/DL
PROT/CREAT UR: 0.08 MG/G{CREAT} (ref 0–0.1)
PTH-INTACT SERPL-MCNC: 60.5 PG/ML (ref 18.4–80.1)
RBC # BLD AUTO: 3.99 MILLION/UL (ref 3.81–5.12)
SODIUM SERPL-SCNC: 140 MMOL/L (ref 136–145)
WBC # BLD AUTO: 5.99 THOUSAND/UL (ref 4.31–10.16)

## 2021-03-24 PROCEDURE — 85025 COMPLETE CBC W/AUTO DIFF WBC: CPT

## 2021-03-24 PROCEDURE — 83970 ASSAY OF PARATHORMONE: CPT

## 2021-03-24 PROCEDURE — 84100 ASSAY OF PHOSPHORUS: CPT

## 2021-03-24 PROCEDURE — 82570 ASSAY OF URINE CREATININE: CPT

## 2021-03-24 PROCEDURE — 36415 COLL VENOUS BLD VENIPUNCTURE: CPT

## 2021-03-24 PROCEDURE — 80048 BASIC METABOLIC PNL TOTAL CA: CPT

## 2021-03-24 PROCEDURE — 83735 ASSAY OF MAGNESIUM: CPT

## 2021-03-24 PROCEDURE — 84156 ASSAY OF PROTEIN URINE: CPT

## 2021-03-25 ENCOUNTER — OFFICE VISIT (OUTPATIENT)
Dept: FAMILY MEDICINE CLINIC | Facility: CLINIC | Age: 71
End: 2021-03-25
Payer: MEDICARE

## 2021-03-25 VITALS
RESPIRATION RATE: 18 BRPM | BODY MASS INDEX: 32.1 KG/M2 | WEIGHT: 188 LBS | DIASTOLIC BLOOD PRESSURE: 60 MMHG | HEIGHT: 64 IN | SYSTOLIC BLOOD PRESSURE: 128 MMHG | OXYGEN SATURATION: 98 % | HEART RATE: 88 BPM

## 2021-03-25 DIAGNOSIS — E66.9 OBESITY (BMI 30-39.9): ICD-10-CM

## 2021-03-25 DIAGNOSIS — I10 ESSENTIAL HYPERTENSION: ICD-10-CM

## 2021-03-25 DIAGNOSIS — R79.9 ABNORMAL FINDING OF BLOOD CHEMISTRY, UNSPECIFIED: ICD-10-CM

## 2021-03-25 DIAGNOSIS — J43.9 PULMONARY EMPHYSEMA, UNSPECIFIED EMPHYSEMA TYPE (HCC): ICD-10-CM

## 2021-03-25 DIAGNOSIS — Z00.00 MEDICARE ANNUAL WELLNESS VISIT, SUBSEQUENT: Primary | ICD-10-CM

## 2021-03-25 DIAGNOSIS — F33.9 DEPRESSION, RECURRENT (HCC): ICD-10-CM

## 2021-03-25 PROCEDURE — 1123F ACP DISCUSS/DSCN MKR DOCD: CPT | Performed by: INTERNAL MEDICINE

## 2021-03-25 PROCEDURE — 99214 OFFICE O/P EST MOD 30 MIN: CPT | Performed by: INTERNAL MEDICINE

## 2021-03-25 PROCEDURE — G0439 PPPS, SUBSEQ VISIT: HCPCS | Performed by: INTERNAL MEDICINE

## 2021-03-25 NOTE — PROGRESS NOTES
Assessment and Plan:     Problem List Items Addressed This Visit        Respiratory    Pulmonary emphysema ([facility] )       Cardiovascular and Mediastinum    Essential hypertension    Relevant Orders    TSH, 3rd generation with Free T4 reflex    UA (URINE) with reflex to Scope       Other    Medicare annual wellness visit, subsequent - Primary    Obesity (BMI 30-39  9)    Relevant Orders    Lipid panel    Vitamin D 25 hydroxy    HEMOGLOBIN A1C W/ EAG ESTIMATION    Depression, recurrent ([facility] )      Other Visit Diagnoses     Body mass index (BMI) 32 0-32 9, adult         Relevant Orders    Vitamin D 25 hydroxy    Abnormal finding of blood chemistry, unspecified         Relevant Orders    HEMOGLOBIN A1C W/ EAG ESTIMATION        BMI Counseling: Body mass index is 32 27 kg/m²  The BMI is above normal  Nutrition recommendations include encouraging healthy choices of fruits and vegetables  Exercise recommendations include moderate physical activity 150 minutes/week  Preventive health issues were discussed with patient, and age appropriate screening tests were ordered as noted in patient's After Visit Summary  Personalized health advice and appropriate referrals for health education or preventive services given if needed, as noted in patient's After Visit Summary       History of Present Illness:     Patient presents for Medicare Annual Wellness visit    Patient Care Team:  Paul Silverman MD as PCP - General (Internal Medicine)  Tamea Schirmer, CRNP Clydell Siemens, DO Dola Gip, MD Trixie Connors, DO Trixie Connors, DO as Endoscopist     Problem List:     Patient Active Problem List   Diagnosis    Depression with anxiety    Dyslipidemia    Essential hypertension    Irritable bowel syndrome with diarrhea    Lumbar degenerative disc disease    Vitamin D deficiency    Greater trochanteric bursitis of right hip    Pulmonary emphysema ([facility] )    SVT (supraventricular tachycardia) ([place] [address] )    Medicare annual wellness visit, subsequent    Diastolic dysfunction    Mitral valve disorder    Aortic valve insufficiency    Achilles tendinitis of left lower extremity    Posterior tibial tendinitis, left    Obesity (BMI 30-39  9)    Stage 3 chronic kidney disease    Hypertensive kidney disease with stage 3 chronic kidney disease    Depression, recurrent (HCC)      Past Medical and Surgical History:     Past Medical History:   Diagnosis Date    Bursitis of hip     Last Assessed:6/5/2017    Herniated lumbar intervertebral disc     Pituitary adenoma ([address] )     Last Assessed:2/12/2015 ; Previously on bromocriptine    Spinal stenosis     Systolic murmur of aorta      Past Surgical History:   Procedure Laterality Date    BREAST SURGERY      Incisional breast biopsy    CARDIAC ELECTROPHYSIOLOGY STUDY AND ABLATION      CARPAL TUNNEL RELEASE Left     CHOLECYSTECTOMY      OTHER SURGICAL HISTORY      EXCISION OF PAROTID TUMOR/GLAND    PAROTIDECTOMY      [place] COLONOSCOPY FLX DX W/COLLJ SPEC WHEN PFRMD N/A 6/6/2017    Procedure: COLONOSCOPY;  Surgeon: Zachary Smith DO;  Location: [facility] GI LAB;   Service: Gastroenterology    ROTATOR CUFF REPAIR      SALPINGOOPHORECTOMY Right       Family History:     Family History   Problem Relation Age of Onset    Colon cancer Mother     Coronary artery disease Mother     Colonic polyp Mother     Liver cancer Mother     Multiple sclerosis Father     Hypertension Other     Hypertension Brother       Social History:        Social History     Socioeconomic History    Marital status: /Civil Union     Spouse name: None    Number of children: None    Years of education: None    Highest education level: None   Occupational History    None   Social Needs    Financial resource strain: None    Food insecurity     Worry: None     Inability: None    Transportation needs     Medical: None     Non-medical: None   Tobacco Use    Smoking status: Former Smoker     Packs/day: 1 00     Years: 30 00     Pack years: 30 00     Types: Cigarettes     Quit date: 2018     Years since quittin 3    Smokeless tobacco: Never Used   Substance and Sexual Activity    Alcohol use:  Yes     Alcohol/week: 1 0 standard drinks     Types: 1 Shots of liquor per week     Frequency: 4 or more times a week     Comment: 1 Bo Fry a night/social    Drug use: No    Sexual activity: Not Currently     Birth control/protection: Post-menopausal   Lifestyle    Physical activity     Days per week: None     Minutes per session: None    Stress: None   Relationships    Social connections     Talks on phone: None     Gets together: None     Attends Orthodoxy service: None     Active member of club or organization: None     Attends meetings of clubs or organizations: None     Relationship status: None    Intimate partner violence     Fear of current or ex partner: None     Emotionally abused: None     Physically abused: None     Forced sexual activity: None   Other Topics Concern    None   Social History Narrative    None      Medications and Allergies:     Current Outpatient Medications   Medication Sig Dispense Refill    albuterol (PROVENTIL HFA) 90 mcg/act inhaler Inhale 1 puff every 6 (six) hours as needed for wheezing 6 7 g 11    atorvastatin (LIPITOR) 20 mg tablet Take 1 tablet (20 mg total) by mouth daily 90 tablet 3    Cartia  MG 24 hr capsule TAKE 1 CAPSULE BY MOUTH EVERY DAY 90 capsule 1    cetirizine (ZyrTEC) 10 mg tablet Take 10 mg by mouth as needed       escitalopram (LEXAPRO) 10 mg tablet Take 1 tablet (10 mg total) by mouth daily 30 tablet 11    estradiol (ESTRACE) 0 1 mg/g vaginal cream Insert 2 g into the vagina every other day       losartan (COZAAR) 25 mg tablet Take 1 tablet (25 mg total) by mouth daily 90 tablet 3    Multiple Vitamin (MULTI VITAMIN DAILY PO) Take 1 tablet by mouth daily        spironolactone (ALDACTONE) 25 mg tablet Take 1 tablet (25 mg total) by mouth daily 90 tablet 3     No current facility-administered medications for this visit  Allergies   Allergen Reactions    Amlodipine Rash    Ancef [Cefazolin]     Cephalosporins Other (See Comments)     c diff    Fruit Extracts      Annotation - [id number]: peaches and strawberries    Levofloxacin     Morphine GI Intolerance    Other      FRUIT    Penicillins Hives    Tetracycline Edema    Xarelto [Rivaroxaban] Rash      Immunizations:     Immunization History   Administered Date(s) Administered    H1N1, All Formulations 10/27/2009    INFLUENZA 2013, 2013, 09/10/2018    Influenza Quadrivalent, 6-35 Months IM 2015    Influenza Split High Dose Preservative Free IM 2017, 09/10/2018    Influenza, high dose seasonal 0 7 mL 10/28/2019, 10/21/2020    Influenza, seasonal, injectable 10/15/2016    Pneumococcal Conjugate 13-Valent 2017    Pneumococcal Polysaccharide PPV23 2018    TD (adult) Preservative Free 2015    Td (adult), Unspecified 2005      Health Maintenance:         Topic Date Due    Lung Cancer Screening  Never done    MAMMOGRAM  2021    Colorectal Cancer Screening  2027    Hepatitis C Screening  Completed         Topic Date Due    COVID-19 Vaccine (1) Never done    DTaP,Tdap,and Td Vaccines (1 - Tdap) 1973      Medicare Health Risk Assessment:     /60   Pulse 88   Resp 18   Ht 5' 4" (1 626 m)   Wt 85 3 kg (188 lb)   LMP  (LMP Unknown)   SpO2 98%   BMI 32 27 kg/m²      El Apgar is here for her Subsequent Wellness visit  Health Risk Assessment:   Patient rates overall health as good  Patient feels that their physical health rating is same  Patient is satisfied with their life  Eyesight was rated as same  Hearing was rated as same  Patient feels that their emotional and mental health rating is slightly worse  Patients states they are never, rarely angry   Patient states they are never, rarely unusually tired/fatigued  Pain experienced in the last 7 days has been some  Patient's pain rating has been 6/10  Depression Screening:   PHQ-2 Score: 2  PHQ-9 Score: 6      Fall Risk Screening: In the past year, patient has experienced: history of falling in past year    Number of falls: 1  Injured during fall?: No      Urinary Incontinence Screening:   Patient has not leaked urine accidently in the last six months  Home Safety:  Patient does not have trouble with stairs inside or outside of their home  Patient has working smoke alarms and has working carbon monoxide detector  Home safety hazards include: medications that cause fatigue  Nutrition:   Current diet is Regular  Medications:   Patient is currently taking over-the-counter supplements  OTC medications include: see medication list  Patient is able to manage medications  Activities of Daily Living (ADLs)/Instrumental Activities of Daily Living (IADLs):   Walk and transfer into and out of bed and chair?: Yes  Dress and groom yourself?: Yes    Bathe or shower yourself?: Yes    Feed yourself?  Yes  Do your laundry/housekeeping?: Yes  Manage your money, pay your bills and track your expenses?: Yes  Make your own meals?: Yes    Do your own shopping?: Yes    Previous Hospitalizations:   Any hospitalizations or ED visits within the last 12 months?: No      Advance Care Planning:   Living will: No    Durable POA for healthcare: No    Advanced directive: No      PREVENTIVE SCREENINGS      Cardiovascular Screening:    General: Screening Not Indicated and History Lipid Disorder      Diabetes Screening:     General: Screening Current      Colorectal Cancer Screening:     General: Screening Current      Breast Cancer Screening:     General: Screening Current      Cervical Cancer Screening:    General: Screening Not Indicated      Hepatitis C Screening:    General: Screening Current    Screening, Brief Intervention, and Referral to Treatment (SBIRT)    Screening  Typical number of drinks in a day: 1  Typical number of drinks in a week: 1  Interpretation: Low risk drinking behavior      Single Item Drug Screening:  How often have you used an illegal drug (including marijuana) or a prescription medication for non-medical reasons in the past year? never    Single Item Drug Screen Score: 0  Interpretation: Negative screen for possible drug use disorder      Paul Silverman MD

## 2021-03-25 NOTE — PROGRESS NOTES
Assessment/Plan:    No problem-specific Assessment & Plan notes found for this encounter  Diagnoses and all orders for this visit:    Medicare annual wellness visit, subsequent    Obesity (BMI 30-39 9)  -     Lipid panel; Future  -     Vitamin D 25 hydroxy; Future  -     HEMOGLOBIN A1C W/ EAG ESTIMATION; Future    Pulmonary emphysema, unspecified emphysema type ([facility] )  Comments:  Relatively well controlled  Patient quit smoking 2 years ago  Depression, recurrent ([facility] )  Comments:  Patient is doing well on Lexapro  Continue the same    Essential hypertension  Comments: Well controlled right now continue the same therapy  Orders:  -     TSH, 3rd generation with Free T4 reflex; Future  -     UA (URINE) with reflex to Scope    Body mass index (BMI) 32 0-32 9, adult   Comments:  Diet and exercise discussed  Orders:  -     Vitamin D 25 hydroxy; Future    Abnormal finding of blood chemistry, unspecified   -     HEMOGLOBIN A1C W/ EAG ESTIMATION; Future          Subjective:      Patient ID: Eros Perea is a 78 y o  female  Patient came today for annual wellness visit and for follow-up on her chronic problems  She denies any new active complaints  She does not report any chest pain, palpitation, abdominal pain or urinary symptoms  She has baseline shortness of breath usually when she physically exert herself but it is not something new  She follows up with Cardiology and she has echo of the heart which is scheduled for her  She has history of hypertension and her blood pressures are well controlled right now  She was recently started on Cardizem due to history of SVT  Also follows up with Nephrology due to stage III CKD which seems to be very stable right now as she had BMP done yesterday which showed creatinine level and GFR within her baseline  She is up-to-date on colonoscopy, she had in 2017 with recommendation to repeat in 10 years    She has scheduled mammogram   She follows up with OBGYN         The following portions of the patient's history were reviewed and updated as appropriate: allergies, current medications, past family history, past medical history, past social history, past surgical history, and problem list     Review of Systems   Constitutional: Negative for chills, fever and unexpected weight change  Respiratory: Negative for cough, chest tightness and shortness of breath  Cardiovascular: Negative for chest pain and leg swelling  Gastrointestinal: Negative for abdominal pain, diarrhea, nausea and vomiting  Genitourinary: Negative for difficulty urinating  Musculoskeletal: Negative for arthralgias and myalgias  Neurological: Negative for dizziness, numbness and headaches  Psychiatric/Behavioral: Negative for dysphoric mood and sleep disturbance  The patient is not nervous/anxious  Objective:      /60   Pulse 88   Resp 18   Ht 5' 4" (1 626 m)   Wt 85 3 kg (188 lb)   LMP  (LMP Unknown)   SpO2 98%   BMI 32 27 kg/m²          Physical Exam  Vitals signs reviewed  Constitutional:       General: She is not in acute distress  Appearance: She is well-developed  She is not diaphoretic  HENT:      Head: Normocephalic and atraumatic  Right Ear: External ear normal       Left Ear: External ear normal    Eyes:      General:         Right eye: No discharge  Left eye: No discharge  Neck:      Musculoskeletal: Normal range of motion and neck supple  Cardiovascular:      Rate and Rhythm: Normal rate and regular rhythm  Pulses: Normal pulses  Heart sounds: Murmur present  Pulmonary:      Effort: Pulmonary effort is normal  No respiratory distress  Breath sounds: Normal breath sounds  No wheezing  Abdominal:      General: Bowel sounds are normal       Palpations: Abdomen is soft  Tenderness: There is no abdominal tenderness  Musculoskeletal: Normal range of motion     Lymphadenopathy:      Cervical: No cervical adenopathy  Skin:     General: Skin is warm and dry  Neurological:      Mental Status: She is alert and oriented to person, place, and time  Cranial Nerves: No cranial nerve deficit  Psychiatric:         Speech: Speech normal          Behavior: Behavior normal          Thought Content:  Thought content normal          Judgment: Judgment normal                Current Outpatient Medications:     albuterol (PROVENTIL HFA) 90 mcg/act inhaler, Inhale 1 puff every 6 (six) hours as needed for wheezing, Disp: 6 7 g, Rfl: 11    atorvastatin (LIPITOR) 20 mg tablet, Take 1 tablet (20 mg total) by mouth daily, Disp: 90 tablet, Rfl: 3    Cartia  MG 24 hr capsule, TAKE 1 CAPSULE BY MOUTH EVERY DAY, Disp: 90 capsule, Rfl: 1    cetirizine (ZyrTEC) 10 mg tablet, Take 10 mg by mouth as needed , Disp: , Rfl:     escitalopram (LEXAPRO) 10 mg tablet, Take 1 tablet (10 mg total) by mouth daily, Disp: 30 tablet, Rfl: 11    estradiol (ESTRACE) 0 1 mg/g vaginal cream, Insert 2 g into the vagina every other day , Disp: , Rfl:     losartan (COZAAR) 25 mg tablet, Take 1 tablet (25 mg total) by mouth daily, Disp: 90 tablet, Rfl: 3    Multiple Vitamin (MULTI VITAMIN DAILY PO), Take 1 tablet by mouth daily  , Disp: , Rfl:     spironolactone (ALDACTONE) 25 mg tablet, Take 1 tablet (25 mg total) by mouth daily, Disp: 90 tablet, Rfl: 3

## 2021-03-31 ENCOUNTER — OFFICE VISIT (OUTPATIENT)
Dept: NEPHROLOGY | Facility: CLINIC | Age: 71
End: 2021-03-31
Payer: MEDICARE

## 2021-03-31 ENCOUNTER — APPOINTMENT (OUTPATIENT)
Dept: LAB | Facility: MEDICAL CENTER | Age: 71
End: 2021-03-31
Payer: MEDICARE

## 2021-03-31 VITALS
SYSTOLIC BLOOD PRESSURE: 120 MMHG | HEIGHT: 64 IN | WEIGHT: 188.4 LBS | BODY MASS INDEX: 32.17 KG/M2 | DIASTOLIC BLOOD PRESSURE: 70 MMHG

## 2021-03-31 DIAGNOSIS — N18.32 STAGE 3B CHRONIC KIDNEY DISEASE (HCC): Primary | ICD-10-CM

## 2021-03-31 DIAGNOSIS — I10 ESSENTIAL HYPERTENSION: ICD-10-CM

## 2021-03-31 DIAGNOSIS — N18.32 HYPERTENSIVE KIDNEY DISEASE WITH STAGE 3B CHRONIC KIDNEY DISEASE (HCC): ICD-10-CM

## 2021-03-31 DIAGNOSIS — E66.9 OBESITY (BMI 30-39.9): ICD-10-CM

## 2021-03-31 DIAGNOSIS — I51.89 DIASTOLIC DYSFUNCTION: ICD-10-CM

## 2021-03-31 DIAGNOSIS — I12.9 HYPERTENSIVE KIDNEY DISEASE WITH STAGE 3B CHRONIC KIDNEY DISEASE (HCC): ICD-10-CM

## 2021-03-31 DIAGNOSIS — E78.5 DYSLIPIDEMIA: ICD-10-CM

## 2021-03-31 DIAGNOSIS — R79.9 ABNORMAL FINDING OF BLOOD CHEMISTRY, UNSPECIFIED: ICD-10-CM

## 2021-03-31 LAB
25(OH)D3 SERPL-MCNC: 37.1 NG/ML (ref 30–100)
BACTERIA UR QL AUTO: ABNORMAL /HPF
BILIRUB UR QL STRIP: NEGATIVE
CHOLEST SERPL-MCNC: 198 MG/DL (ref 50–200)
CLARITY UR: CLEAR
COLOR UR: YELLOW
EST. AVERAGE GLUCOSE BLD GHB EST-MCNC: 114 MG/DL
GLUCOSE UR STRIP-MCNC: NEGATIVE MG/DL
HBA1C MFR BLD: 5.6 %
HDLC SERPL-MCNC: 65 MG/DL
HGB UR QL STRIP.AUTO: NEGATIVE
HYALINE CASTS #/AREA URNS LPF: ABNORMAL /LPF
KETONES UR STRIP-MCNC: NEGATIVE MG/DL
LDLC SERPL CALC-MCNC: 88 MG/DL (ref 0–100)
LEUKOCYTE ESTERASE UR QL STRIP: ABNORMAL
NITRITE UR QL STRIP: NEGATIVE
NON-SQ EPI CELLS URNS QL MICRO: ABNORMAL /HPF
NONHDLC SERPL-MCNC: 133 MG/DL
PH UR STRIP.AUTO: 6 [PH]
PROT UR STRIP-MCNC: NEGATIVE MG/DL
RBC #/AREA URNS AUTO: ABNORMAL /HPF
SP GR UR STRIP.AUTO: 1.02 (ref 1–1.03)
TRIGL SERPL-MCNC: 224 MG/DL
TSH SERPL DL<=0.05 MIU/L-ACNC: 2.74 UIU/ML (ref 0.36–3.74)
UROBILINOGEN UR QL STRIP.AUTO: 0.2 E.U./DL
WBC #/AREA URNS AUTO: ABNORMAL /HPF

## 2021-03-31 PROCEDURE — 82306 VITAMIN D 25 HYDROXY: CPT

## 2021-03-31 PROCEDURE — 84443 ASSAY THYROID STIM HORMONE: CPT

## 2021-03-31 PROCEDURE — 36415 COLL VENOUS BLD VENIPUNCTURE: CPT

## 2021-03-31 PROCEDURE — 81001 URINALYSIS AUTO W/SCOPE: CPT | Performed by: INTERNAL MEDICINE

## 2021-03-31 PROCEDURE — 80061 LIPID PANEL: CPT

## 2021-03-31 PROCEDURE — 83036 HEMOGLOBIN GLYCOSYLATED A1C: CPT

## 2021-03-31 PROCEDURE — 99214 OFFICE O/P EST MOD 30 MIN: CPT | Performed by: INTERNAL MEDICINE

## 2021-03-31 NOTE — PROGRESS NOTES
NEPHROLOGY OUTPATIENT PROGRESS NOTE   Makenzie Alva 78 y o  female MRN: [id number]  Reason for visit:  Chronic kidney disease    ASSESSMENT and PLAN:  1  Chronic kidney disease, stage III, most recent creatinine 1 27 estimated GFR 43  2  Hypertension appears well controlled, continue with current regimen including low-dose intensive receptor blocker  3  Diastolic heart failure volume status appears stable  4  Aortic stenosis/insufficiency  5  Recent mild hypercalcemia of 10 3 currently 10 1 and stable, PTH 60 5    · Overall renal function remains stable  · No changes in current regimen blood pressure and volume status acceptable  · Follow-up recommended in 6 months with repeat labs at that time    SUBJECTIVE / INTERVAL HISTORY:  She has been doing reasonably well  Unfortunately she did have COVID-19 infection in January fortunately did not require hospitalization  Since and has recovered fully  Blood pressure overall has been under good control with the addition of losartan  Blood pressures at home have been 120-1 30/80  No reports of chest pain or pressure  No significant shortness of breath bloating or swelling  Occasional headache, chronic lower back pain      OBJECTIVE:  /70 (BP Location: Left arm, Patient Position: Sitting, Cuff Size: Large)   Ht 5' 4" (1 626 m)   Wt 85 5 kg (188 lb 6 4 oz)   LMP  (LMP Unknown)   BMI 32 34 kg/m²   Vitals:    03/31/21 0908   Weight: 85 5 kg (188 lb 6 4 oz)       Physical Exam  Constitutional:       Appearance: She is not ill-appearing  HENT:      Head: Normocephalic and atraumatic  Eyes:      General: No scleral icterus  Cardiovascular:      Rate and Rhythm: Normal rate and regular rhythm  Pulmonary:      Effort: Pulmonary effort is normal       Breath sounds: Normal breath sounds  Abdominal:      General: There is no distension  Palpations: Abdomen is soft  Musculoskeletal:      Right lower leg: No edema  Left lower leg: No edema  Skin:     General: Skin is warm and dry  Findings: No rash  Neurological:      Mental Status: She is alert and oriented to person, place, and time             Medications:    Current Outpatient Medications:     albuterol (PROVENTIL HFA) 90 mcg/act inhaler, Inhale 1 puff every 6 (six) hours as needed for wheezing, Disp: 6 7 g, Rfl: 11    atorvastatin (LIPITOR) 20 mg tablet, Take 1 tablet (20 mg total) by mouth daily, Disp: 90 tablet, Rfl: 3    Cartia  MG 24 hr capsule, TAKE 1 CAPSULE BY MOUTH EVERY DAY, Disp: 90 capsule, Rfl: 1    cetirizine (ZyrTEC) 10 mg tablet, Take 10 mg by mouth as needed , Disp: , Rfl:     escitalopram (LEXAPRO) 10 mg tablet, Take 1 tablet (10 mg total) by mouth daily, Disp: 30 tablet, Rfl: 11    estradiol (ESTRACE) 0 1 mg/g vaginal cream, Insert 2 g into the vagina every other day , Disp: , Rfl:     losartan (COZAAR) 25 mg tablet, Take 1 tablet (25 mg total) by mouth daily, Disp: 90 tablet, Rfl: 3    Multiple Vitamin (MULTI VITAMIN DAILY PO), Take 1 tablet by mouth daily  , Disp: , Rfl:     spironolactone (ALDACTONE) 25 mg tablet, Take 1 tablet (25 mg total) by mouth daily, Disp: 90 tablet, Rfl: 3    Laboratory Results:  Results for orders placed or performed in visit on 89/05/32   Basic metabolic panel   Result Value Ref Range    Sodium 140 136 - 145 mmol/L    Potassium 4 5 3 5 - 5 3 mmol/L    Chloride 106 100 - 108 mmol/L    CO2 27 21 - 32 mmol/L    ANION GAP 7 4 - 13 mmol/L    BUN 23 5 - 25 mg/dL    Creatinine 1 27 0 60 - 1 30 mg/dL    Glucose, Fasting 95 65 - 99 mg/dL    Calcium 10 1 8 3 - 10 1 mg/dL    eGFR 43 ml/min/1 73sq m   CBC and differential   Result Value Ref Range    WBC 5 99 4 31 - 10 16 Thousand/uL    RBC 3 99 3 81 - 5 12 Million/uL    Hemoglobin 13 2 11 5 - 15 4 g/dL    Hematocrit 41 2 34 8 - 46 1 %     (H) 82 - 98 fL    MCH 33 1 26 8 - 34 3 pg    MCHC 32 0 31 4 - 37 4 g/dL    RDW 13 2 11 6 - 15 1 %    MPV 11 1 8 9 - 12 7 fL    Platelets 487 121 - 390 Thousands/uL    nRBC 0 /100 WBCs    Neutrophils Relative 59 43 - 75 %    Immat GRANS % 0 0 - 2 %    Lymphocytes Relative 30 14 - 44 %    Monocytes Relative 8 4 - 12 %    Eosinophils Relative 2 0 - 6 %    Basophils Relative 1 0 - 1 %    Neutrophils Absolute 3 49 1 85 - 7 62 Thousands/µL    Immature Grans Absolute 0 02 0 00 - 0 20 Thousand/uL    Lymphocytes Absolute 1 82 0 60 - 4 47 Thousands/µL    Monocytes Absolute 0 50 0 17 - 1 22 Thousand/µL    Eosinophils Absolute 0 11 0 00 - 0 61 Thousand/µL    Basophils Absolute 0 05 0 00 - 0 10 Thousands/µL   Magnesium   Result Value Ref Range    Magnesium 1 7 1 6 - 2 6 mg/dL   Phosphorus   Result Value Ref Range    Phosphorus 3 6 2 3 - 4 1 mg/dL   PTH, intact   Result Value Ref Range    PTH 60 5 18 4 - 80 1 pg/mL   Protein / creatinine ratio, urine   Result Value Ref Range    Creatinine, Ur 339 0 mg/dL    Protein Urine Random 28 mg/dL    Prot/Creat Ratio, Ur 0 08 0 00 - 0 10

## 2021-05-13 ENCOUNTER — HOSPITAL ENCOUNTER (OUTPATIENT)
Dept: NON INVASIVE DIAGNOSTICS | Facility: CLINIC | Age: 71
Discharge: HOME | End: 2021-05-13
Payer: MEDICARE

## 2021-05-13 DIAGNOSIS — I47.1 SVT (SUPRAVENTRICULAR TACHYCARDIA) (HCC): ICD-10-CM

## 2021-05-13 DIAGNOSIS — I35.1 NONRHEUMATIC AORTIC VALVE INSUFFICIENCY: ICD-10-CM

## 2021-05-13 PROCEDURE — 93306 TTE W/DOPPLER COMPLETE: CPT

## 2021-05-13 PROCEDURE — 93306 TTE W/DOPPLER COMPLETE: CPT | Performed by: INTERNAL MEDICINE

## 2021-08-12 DIAGNOSIS — I10 ESSENTIAL HYPERTENSION: ICD-10-CM

## 2021-08-13 RX ORDER — DILTIAZEM HYDROCHLORIDE 120 MG/1
CAPSULE, COATED, EXTENDED RELEASE ORAL
Qty: 90 CAPSULE | Refills: 1 | Status: SHIPPED | OUTPATIENT
Start: 2021-08-13 | End: 2022-02-05

## 2021-09-14 DIAGNOSIS — F41.8 DEPRESSION WITH ANXIETY: ICD-10-CM

## 2021-09-14 RX ORDER — ESCITALOPRAM OXALATE 10 MG/1
TABLET ORAL
Qty: 30 TABLET | Refills: 11 | Status: SHIPPED | OUTPATIENT
Start: 2021-09-14 | End: 2021-09-17

## 2021-09-17 DIAGNOSIS — F41.8 DEPRESSION WITH ANXIETY: ICD-10-CM

## 2021-09-17 RX ORDER — ESCITALOPRAM OXALATE 10 MG/1
TABLET ORAL
Qty: 30 TABLET | Refills: 11 | Status: SHIPPED | OUTPATIENT
Start: 2021-09-17

## 2021-09-30 ENCOUNTER — OFFICE VISIT (OUTPATIENT)
Dept: FAMILY MEDICINE CLINIC | Facility: CLINIC | Age: 71
End: 2021-09-30
Payer: MEDICARE

## 2021-09-30 ENCOUNTER — APPOINTMENT (OUTPATIENT)
Dept: LAB | Facility: MEDICAL CENTER | Age: 71
End: 2021-09-30
Payer: MEDICARE

## 2021-09-30 VITALS
HEART RATE: 84 BPM | BODY MASS INDEX: 31.41 KG/M2 | DIASTOLIC BLOOD PRESSURE: 80 MMHG | WEIGHT: 184 LBS | OXYGEN SATURATION: 97 % | SYSTOLIC BLOOD PRESSURE: 142 MMHG | HEIGHT: 64 IN | RESPIRATION RATE: 12 BRPM

## 2021-09-30 DIAGNOSIS — E66.9 OBESITY (BMI 30-39.9): ICD-10-CM

## 2021-09-30 DIAGNOSIS — I51.89 DIASTOLIC DYSFUNCTION: ICD-10-CM

## 2021-09-30 DIAGNOSIS — R79.9 ABNORMAL FINDING OF BLOOD CHEMISTRY, UNSPECIFIED: ICD-10-CM

## 2021-09-30 DIAGNOSIS — I12.9 HYPERTENSIVE KIDNEY DISEASE WITH STAGE 3B CHRONIC KIDNEY DISEASE (HCC): ICD-10-CM

## 2021-09-30 DIAGNOSIS — Z23 NEED FOR INFLUENZA VACCINATION: ICD-10-CM

## 2021-09-30 DIAGNOSIS — R93.89 ABNORMAL FINDINGS ON DIAGNOSTIC IMAGING OF OTHER SPECIFIED BODY STRUCTURES: ICD-10-CM

## 2021-09-30 DIAGNOSIS — E78.5 DYSLIPIDEMIA: ICD-10-CM

## 2021-09-30 DIAGNOSIS — Z23 NEED FOR PROPHYLACTIC VACCINATION AND INOCULATION AGAINST VARICELLA: Primary | ICD-10-CM

## 2021-09-30 DIAGNOSIS — R21 RASH: ICD-10-CM

## 2021-09-30 DIAGNOSIS — N18.32 STAGE 3B CHRONIC KIDNEY DISEASE (HCC): ICD-10-CM

## 2021-09-30 DIAGNOSIS — Z23 ENCOUNTER FOR HERPES ZOSTER VACCINATION: ICD-10-CM

## 2021-09-30 DIAGNOSIS — N18.32 HYPERTENSIVE KIDNEY DISEASE WITH STAGE 3B CHRONIC KIDNEY DISEASE (HCC): ICD-10-CM

## 2021-09-30 LAB
ALBUMIN SERPL BCP-MCNC: 4.1 G/DL (ref 3.5–5)
ALP SERPL-CCNC: 79 U/L (ref 46–116)
ALT SERPL W P-5'-P-CCNC: 53 U/L (ref 12–78)
ANION GAP SERPL CALCULATED.3IONS-SCNC: 5 MMOL/L (ref 4–13)
AST SERPL W P-5'-P-CCNC: 38 U/L (ref 5–45)
BACTERIA UR QL AUTO: ABNORMAL /HPF
BILIRUB SERPL-MCNC: 0.73 MG/DL (ref 0.2–1)
BILIRUB UR QL STRIP: NEGATIVE
BUN SERPL-MCNC: 18 MG/DL (ref 5–25)
CALCIUM SERPL-MCNC: 11 MG/DL (ref 8.3–10.1)
CHLORIDE SERPL-SCNC: 106 MMOL/L (ref 100–108)
CLARITY UR: CLEAR
CO2 SERPL-SCNC: 27 MMOL/L (ref 21–32)
COLOR UR: YELLOW
CREAT SERPL-MCNC: 1.38 MG/DL (ref 0.6–1.3)
CREAT UR-MCNC: 272 MG/DL
ERYTHROCYTE [DISTWIDTH] IN BLOOD BY AUTOMATED COUNT: 12.9 % (ref 11.6–15.1)
GFR SERPL CREATININE-BSD FRML MDRD: 39 ML/MIN/1.73SQ M
GLUCOSE P FAST SERPL-MCNC: 95 MG/DL (ref 65–99)
GLUCOSE UR STRIP-MCNC: NEGATIVE MG/DL
HCT VFR BLD AUTO: 42.9 % (ref 34.8–46.1)
HGB BLD-MCNC: 13.6 G/DL (ref 11.5–15.4)
HGB UR QL STRIP.AUTO: NEGATIVE
KETONES UR STRIP-MCNC: NEGATIVE MG/DL
LEUKOCYTE ESTERASE UR QL STRIP: ABNORMAL
MCH RBC QN AUTO: 32.8 PG (ref 26.8–34.3)
MCHC RBC AUTO-ENTMCNC: 31.7 G/DL (ref 31.4–37.4)
MCV RBC AUTO: 103 FL (ref 82–98)
MICROALBUMIN UR-MCNC: 26.4 MG/L (ref 0–20)
MICROALBUMIN/CREAT 24H UR: 10 MG/G CREATININE (ref 0–30)
NITRITE UR QL STRIP: NEGATIVE
NON-SQ EPI CELLS URNS QL MICRO: ABNORMAL /HPF
PH UR STRIP.AUTO: 5.5 [PH]
PHOSPHATE SERPL-MCNC: 3.8 MG/DL (ref 2.3–4.1)
PLATELET # BLD AUTO: 286 THOUSANDS/UL (ref 149–390)
PMV BLD AUTO: 11.1 FL (ref 8.9–12.7)
POTASSIUM SERPL-SCNC: 4.3 MMOL/L (ref 3.5–5.3)
PROT SERPL-MCNC: 7.5 G/DL (ref 6.4–8.2)
PROT UR STRIP-MCNC: NEGATIVE MG/DL
RBC # BLD AUTO: 4.15 MILLION/UL (ref 3.81–5.12)
RBC #/AREA URNS AUTO: ABNORMAL /HPF
SODIUM SERPL-SCNC: 138 MMOL/L (ref 136–145)
SP GR UR STRIP.AUTO: 1.02 (ref 1–1.03)
URATE SERPL-MCNC: 6.8 MG/DL (ref 2–6.8)
UROBILINOGEN UR QL STRIP.AUTO: 0.2 E.U./DL
WBC # BLD AUTO: 7.74 THOUSAND/UL (ref 4.31–10.16)
WBC #/AREA URNS AUTO: ABNORMAL /HPF

## 2021-09-30 PROCEDURE — 99214 OFFICE O/P EST MOD 30 MIN: CPT | Performed by: INTERNAL MEDICINE

## 2021-09-30 PROCEDURE — G0008 ADMIN INFLUENZA VIRUS VAC: HCPCS

## 2021-09-30 PROCEDURE — 80053 COMPREHEN METABOLIC PANEL: CPT

## 2021-09-30 PROCEDURE — 84550 ASSAY OF BLOOD/URIC ACID: CPT

## 2021-09-30 PROCEDURE — 82570 ASSAY OF URINE CREATININE: CPT

## 2021-09-30 PROCEDURE — 84100 ASSAY OF PHOSPHORUS: CPT

## 2021-09-30 PROCEDURE — 85027 COMPLETE CBC AUTOMATED: CPT

## 2021-09-30 PROCEDURE — 82043 UR ALBUMIN QUANTITATIVE: CPT

## 2021-09-30 PROCEDURE — 36415 COLL VENOUS BLD VENIPUNCTURE: CPT

## 2021-09-30 PROCEDURE — 90662 IIV NO PRSV INCREASED AG IM: CPT

## 2021-09-30 PROCEDURE — 81001 URINALYSIS AUTO W/SCOPE: CPT | Performed by: INTERNAL MEDICINE

## 2021-09-30 RX ORDER — ZOSTER VACCINE RECOMBINANT, ADJUVANTED 50 MCG/0.5
0.5 KIT INTRAMUSCULAR ONCE
Qty: 1 EACH | Refills: 1
Start: 2021-09-30 | End: 2021-09-30 | Stop reason: CLARIF

## 2021-09-30 RX ORDER — CLOTRIMAZOLE AND BETAMETHASONE DIPROPIONATE 10; .64 MG/G; MG/G
CREAM TOPICAL 2 TIMES DAILY
Qty: 30 G | Refills: 0 | Status: SHIPPED | OUTPATIENT
Start: 2021-09-30

## 2021-09-30 RX ORDER — ZOSTER VACCINE RECOMBINANT, ADJUVANTED 50 MCG/0.5
0.5 KIT INTRAMUSCULAR ONCE
Qty: 1 EACH | Refills: 1 | Status: SHIPPED | OUTPATIENT
Start: 2021-09-30 | End: 2021-09-30

## 2021-09-30 NOTE — PROGRESS NOTES
Assessment/Plan:    No problem-specific Assessment & Plan notes found for this encounter  Diagnoses and all orders for this visit:    Need for prophylactic vaccination and inoculation against varicella  -     Discontinue: Zoster Vac Recomb Adjuvanted (Shingrix) 50 MCG/0 5ML SUSR; Inject 0 5 mL into a muscle once for 1 dose Repeat dose in 2 to 6 months    Encounter for herpes zoster vaccination  -     Zoster Vac Recomb Adjuvanted (Shingrix) 50 MCG/0 5ML SUSR; Inject 0 5 mL into a muscle once for 1 dose Repeat dose in 2 to 6 months    Obesity (BMI 30-39 9)  -     Comprehensive metabolic panel; Future  -     TSH, 3rd generation with Free T4 reflex; Future  -     CBC and differential; Future  -     Lipid panel; Future  -     Vitamin D 25 hydroxy; Future  -     UA (URINE) with reflex to Scope  -     HEMOGLOBIN A1C W/ EAG ESTIMATION; Future    Abnormal findings on diagnostic imaging of other specified body structures   -     TSH, 3rd generation with Free T4 reflex; Future    Body mass index (BMI) 31 0-31 9, adult   -     Vitamin D 25 hydroxy; Future    Abnormal finding of blood chemistry, unspecified   -     HEMOGLOBIN A1C W/ EAG ESTIMATION; Future    Rash  -     clotrimazole-betamethasone (LOTRISONE) 1-0 05 % cream; Apply topically 2 (two) times a day Do not use more than 10 days  Need for influenza vaccination  -     influenza vaccine, high-dose, PF 0 7 mL (FLUZONE HIGH-DOSE)          Subjective:      Patient ID: Gem Mojica is a 73 y o  female  Patient came today for six-month follow-up  She does not report any active complaints  She is up-to-date on her colonoscopy, she will do her mammogram, it is already scheduled  Her vital signs are okay  She is trying to eat healthy  Last blood work in March 2020 was relatively normal   She wants her flu shot today  The script for Shingrix was given so she will do it in the pharmacy            The following portions of the patient's history were reviewed and updated as appropriate: allergies, current medications, past family history, past medical history, past social history, past surgical history, and problem list     Review of Systems   Constitutional: Negative for chills, fever and unexpected weight change  Respiratory: Negative for cough, chest tightness and shortness of breath  Cardiovascular: Negative for chest pain and leg swelling  Gastrointestinal: Negative for abdominal pain, diarrhea, nausea and vomiting  Genitourinary: Negative for difficulty urinating  Musculoskeletal: Negative for arthralgias and myalgias  Neurological: Negative for dizziness, numbness and headaches  Psychiatric/Behavioral: Negative for dysphoric mood and sleep disturbance  The patient is not nervous/anxious  Objective:      /80 (BP Location: Left arm, Patient Position: Sitting, Cuff Size: Standard)   Pulse 84   Resp 12   Ht 5' 4" (1 626 m)   Wt 83 5 kg (184 lb)   LMP  (LMP Unknown)   SpO2 97%   BMI 31 58 kg/m²          Physical Exam  Vitals reviewed  Constitutional:       General: She is not in acute distress  Appearance: She is well-developed  She is not diaphoretic  HENT:      Head: Normocephalic and atraumatic  Right Ear: External ear normal       Left Ear: External ear normal    Eyes:      General:         Right eye: No discharge  Left eye: No discharge  Cardiovascular:      Rate and Rhythm: Normal rate and regular rhythm  Pulses: Normal pulses  Heart sounds: Murmur heard  Pulmonary:      Effort: Pulmonary effort is normal  No respiratory distress  Breath sounds: Normal breath sounds  No wheezing  Abdominal:      General: Bowel sounds are normal       Palpations: Abdomen is soft  Tenderness: There is no abdominal tenderness  Musculoskeletal:         General: Normal range of motion  Cervical back: Normal range of motion and neck supple  Lymphadenopathy:      Cervical: No cervical adenopathy  Skin:     General: Skin is warm and dry  Neurological:      Mental Status: She is alert and oriented to person, place, and time  Cranial Nerves: No cranial nerve deficit  Psychiatric:         Speech: Speech normal          Behavior: Behavior normal          Thought Content: Thought content normal          Judgment: Judgment normal                Current Outpatient Medications:     albuterol (PROVENTIL HFA) 90 mcg/act inhaler, Inhale 1 puff every 6 (six) hours as needed for wheezing, Disp: 6 7 g, Rfl: 11    atorvastatin (LIPITOR) 20 mg tablet, Take 1 tablet (20 mg total) by mouth daily, Disp: 90 tablet, Rfl: 3    cetirizine (ZyrTEC) 10 mg tablet, Take 10 mg by mouth as needed , Disp: , Rfl:     diltiazem (CARDIZEM CD) 120 mg 24 hr capsule, TAKE 1 CAPSULE BY MOUTH EVERY DAY, Disp: 90 capsule, Rfl: 1    escitalopram (LEXAPRO) 10 mg tablet, TAKE 1 TABLET BY MOUTH EVERY DAY, Disp: 30 tablet, Rfl: 11    estradiol (ESTRACE) 0 1 mg/g vaginal cream, Insert 2 g into the vagina every other day , Disp: , Rfl:     losartan (COZAAR) 25 mg tablet, Take 1 tablet (25 mg total) by mouth daily, Disp: 90 tablet, Rfl: 3    Multiple Vitamin (MULTI VITAMIN DAILY PO), Take 1 tablet by mouth daily  , Disp: , Rfl:     spironolactone (ALDACTONE) 25 mg tablet, Take 1 tablet (25 mg total) by mouth daily, Disp: 90 tablet, Rfl: 3    clotrimazole-betamethasone (LOTRISONE) 1-0 05 % cream, Apply topically 2 (two) times a day Do not use more than 10 days  , Disp: 30 g, Rfl: 0    Zoster Vac Recomb Adjuvanted (Shingrix) 50 MCG/0 5ML SUSR, Inject 0 5 mL into a muscle once for 1 dose Repeat dose in 2 to 6 months, Disp: 1 each, Rfl: 1

## 2021-10-04 DIAGNOSIS — E78.00 HIGH CHOLESTEROL: ICD-10-CM

## 2021-10-04 RX ORDER — ATORVASTATIN CALCIUM 20 MG/1
TABLET, FILM COATED ORAL
Qty: 90 TABLET | Refills: 3 | Status: SHIPPED | OUTPATIENT
Start: 2021-10-04

## 2021-10-05 DIAGNOSIS — N18.32 STAGE 3B CHRONIC KIDNEY DISEASE (HCC): Primary | ICD-10-CM

## 2021-10-05 DIAGNOSIS — E83.52 HYPERCALCEMIA: ICD-10-CM

## 2021-10-14 DIAGNOSIS — I10 ESSENTIAL HYPERTENSION: ICD-10-CM

## 2021-10-14 RX ORDER — SPIRONOLACTONE 25 MG/1
TABLET ORAL
Qty: 90 TABLET | Refills: 3 | Status: SHIPPED | OUTPATIENT
Start: 2021-10-14

## 2021-10-27 ENCOUNTER — APPOINTMENT (OUTPATIENT)
Dept: LAB | Facility: MEDICAL CENTER | Age: 71
End: 2021-10-27
Payer: MEDICARE

## 2021-10-27 DIAGNOSIS — N18.32 STAGE 3B CHRONIC KIDNEY DISEASE (HCC): ICD-10-CM

## 2021-10-27 DIAGNOSIS — E83.52 HYPERCALCEMIA: ICD-10-CM

## 2021-10-27 LAB
ANION GAP SERPL CALCULATED.3IONS-SCNC: 5 MMOL/L (ref 4–13)
BACTERIA UR QL AUTO: NORMAL /HPF
BILIRUB UR QL STRIP: NEGATIVE
BUN SERPL-MCNC: 18 MG/DL (ref 5–25)
CALCIUM SERPL-MCNC: 10 MG/DL (ref 8.3–10.1)
CHLORIDE SERPL-SCNC: 106 MMOL/L (ref 100–108)
CLARITY UR: CLEAR
CO2 SERPL-SCNC: 25 MMOL/L (ref 21–32)
COLOR UR: YELLOW
CREAT SERPL-MCNC: 1.47 MG/DL (ref 0.6–1.3)
GFR SERPL CREATININE-BSD FRML MDRD: 36 ML/MIN/1.73SQ M
GLUCOSE P FAST SERPL-MCNC: 108 MG/DL (ref 65–99)
GLUCOSE UR STRIP-MCNC: NEGATIVE MG/DL
HGB UR QL STRIP.AUTO: NEGATIVE
HYALINE CASTS #/AREA URNS LPF: NORMAL /LPF
KETONES UR STRIP-MCNC: NEGATIVE MG/DL
LEUKOCYTE ESTERASE UR QL STRIP: NEGATIVE
NITRITE UR QL STRIP: NEGATIVE
NON-SQ EPI CELLS URNS QL MICRO: NORMAL /HPF
PH UR STRIP.AUTO: 5.5 [PH]
POTASSIUM SERPL-SCNC: 4.5 MMOL/L (ref 3.5–5.3)
PROT UR STRIP-MCNC: NEGATIVE MG/DL
PTH-INTACT SERPL-MCNC: 55.6 PG/ML (ref 18.4–80.1)
RBC #/AREA URNS AUTO: NORMAL /HPF
SODIUM SERPL-SCNC: 136 MMOL/L (ref 136–145)
SP GR UR STRIP.AUTO: 1.02 (ref 1–1.03)
UROBILINOGEN UR QL STRIP.AUTO: 0.2 E.U./DL
WBC #/AREA URNS AUTO: NORMAL /HPF

## 2021-10-27 PROCEDURE — 80048 BASIC METABOLIC PNL TOTAL CA: CPT

## 2021-10-27 PROCEDURE — 84165 PROTEIN E-PHORESIS SERUM: CPT | Performed by: PATHOLOGY

## 2021-10-27 PROCEDURE — 36415 COLL VENOUS BLD VENIPUNCTURE: CPT

## 2021-10-27 PROCEDURE — 81001 URINALYSIS AUTO W/SCOPE: CPT

## 2021-10-27 PROCEDURE — 84165 PROTEIN E-PHORESIS SERUM: CPT

## 2021-10-27 PROCEDURE — 83970 ASSAY OF PARATHORMONE: CPT

## 2021-10-28 LAB
ALBUMIN SERPL ELPH-MCNC: 4.49 G/DL (ref 3.5–5)
ALBUMIN SERPL ELPH-MCNC: 64.2 % (ref 52–65)
ALPHA1 GLOB SERPL ELPH-MCNC: 0.33 G/DL (ref 0.1–0.4)
ALPHA1 GLOB SERPL ELPH-MCNC: 4.7 % (ref 2.5–5)
ALPHA2 GLOB SERPL ELPH-MCNC: 0.8 G/DL (ref 0.4–1.2)
ALPHA2 GLOB SERPL ELPH-MCNC: 11.4 % (ref 7–13)
BETA GLOB ABNORMAL SERPL ELPH-MCNC: 0.41 G/DL (ref 0.4–0.8)
BETA1 GLOB SERPL ELPH-MCNC: 5.8 % (ref 5–13)
BETA2 GLOB SERPL ELPH-MCNC: 3.4 % (ref 2–8)
BETA2+GAMMA GLOB SERPL ELPH-MCNC: 0.24 G/DL (ref 0.2–0.5)
GAMMA GLOB ABNORMAL SERPL ELPH-MCNC: 0.74 G/DL (ref 0.5–1.6)
GAMMA GLOB SERPL ELPH-MCNC: 10.5 % (ref 12–22)
IGG/ALB SER: 1.79 {RATIO} (ref 1.1–1.8)
PROT PATTERN SERPL ELPH-IMP: ABNORMAL
PROT SERPL-MCNC: 7 G/DL (ref 6.4–8.2)

## 2021-11-01 ENCOUNTER — APPOINTMENT (OUTPATIENT)
Dept: LAB | Facility: MEDICAL CENTER | Age: 71
End: 2021-11-01
Payer: MEDICARE

## 2021-11-01 PROCEDURE — 84166 PROTEIN E-PHORESIS/URINE/CSF: CPT | Performed by: PATHOLOGY

## 2021-11-03 ENCOUNTER — OFFICE VISIT (OUTPATIENT)
Dept: NEPHROLOGY | Facility: CLINIC | Age: 71
End: 2021-11-03
Payer: MEDICARE

## 2021-11-03 VITALS
HEART RATE: 92 BPM | DIASTOLIC BLOOD PRESSURE: 72 MMHG | SYSTOLIC BLOOD PRESSURE: 135 MMHG | WEIGHT: 184 LBS | BODY MASS INDEX: 31.41 KG/M2 | HEIGHT: 64 IN

## 2021-11-03 DIAGNOSIS — N18.32 HYPERTENSIVE KIDNEY DISEASE WITH STAGE 3B CHRONIC KIDNEY DISEASE (HCC): ICD-10-CM

## 2021-11-03 DIAGNOSIS — I12.9 HYPERTENSIVE KIDNEY DISEASE WITH STAGE 3B CHRONIC KIDNEY DISEASE (HCC): ICD-10-CM

## 2021-11-03 DIAGNOSIS — N18.32 STAGE 3B CHRONIC KIDNEY DISEASE (HCC): Primary | ICD-10-CM

## 2021-11-03 DIAGNOSIS — E83.52 HYPERCALCEMIA: ICD-10-CM

## 2021-11-03 PROCEDURE — 99214 OFFICE O/P EST MOD 30 MIN: CPT | Performed by: INTERNAL MEDICINE

## 2021-11-07 DIAGNOSIS — I10 ESSENTIAL HYPERTENSION: ICD-10-CM

## 2021-11-08 RX ORDER — LOSARTAN POTASSIUM 25 MG/1
TABLET ORAL
Qty: 90 TABLET | Refills: 3 | Status: SHIPPED | OUTPATIENT
Start: 2021-11-08

## 2021-11-15 ENCOUNTER — ANNUAL EXAM (OUTPATIENT)
Dept: OBGYN CLINIC | Facility: MEDICAL CENTER | Age: 71
End: 2021-11-15
Payer: MEDICARE

## 2021-11-15 VITALS — WEIGHT: 184 LBS | DIASTOLIC BLOOD PRESSURE: 70 MMHG | SYSTOLIC BLOOD PRESSURE: 125 MMHG | BODY MASS INDEX: 31.58 KG/M2

## 2021-11-15 DIAGNOSIS — Z12.31 ENCOUNTER FOR SCREENING MAMMOGRAM FOR MALIGNANT NEOPLASM OF BREAST: Primary | ICD-10-CM

## 2021-11-15 PROCEDURE — G0101 CA SCREEN;PELVIC/BREAST EXAM: HCPCS | Performed by: OBSTETRICS & GYNECOLOGY

## 2021-12-09 ENCOUNTER — OFFICE VISIT (OUTPATIENT)
Dept: CARDIOLOGY CLINIC | Facility: CLINIC | Age: 71
End: 2021-12-09
Payer: MEDICARE

## 2021-12-09 VITALS
SYSTOLIC BLOOD PRESSURE: 116 MMHG | DIASTOLIC BLOOD PRESSURE: 56 MMHG | WEIGHT: 186.6 LBS | HEIGHT: 64 IN | BODY MASS INDEX: 31.86 KG/M2 | HEART RATE: 81 BPM

## 2021-12-09 DIAGNOSIS — I10 ESSENTIAL HYPERTENSION: ICD-10-CM

## 2021-12-09 DIAGNOSIS — I47.1 SVT (SUPRAVENTRICULAR TACHYCARDIA) (HCC): ICD-10-CM

## 2021-12-09 DIAGNOSIS — I35.1 NONRHEUMATIC AORTIC VALVE INSUFFICIENCY: Primary | ICD-10-CM

## 2021-12-09 DIAGNOSIS — I35.0 NON-RHEUMATIC AORTIC STENOSIS: ICD-10-CM

## 2021-12-09 PROCEDURE — 99214 OFFICE O/P EST MOD 30 MIN: CPT | Performed by: INTERNAL MEDICINE

## 2021-12-09 PROCEDURE — 93000 ELECTROCARDIOGRAM COMPLETE: CPT | Performed by: INTERNAL MEDICINE

## 2022-01-05 ENCOUNTER — APPOINTMENT (OUTPATIENT)
Dept: LAB | Facility: MEDICAL CENTER | Age: 72
End: 2022-01-05
Payer: MEDICARE

## 2022-01-05 DIAGNOSIS — N18.32 STAGE 3B CHRONIC KIDNEY DISEASE (HCC): ICD-10-CM

## 2022-01-05 DIAGNOSIS — E83.52 HYPERCALCEMIA: ICD-10-CM

## 2022-01-05 DIAGNOSIS — N18.32 HYPERTENSIVE KIDNEY DISEASE WITH STAGE 3B CHRONIC KIDNEY DISEASE (HCC): ICD-10-CM

## 2022-01-05 DIAGNOSIS — I12.9 HYPERTENSIVE KIDNEY DISEASE WITH STAGE 3B CHRONIC KIDNEY DISEASE (HCC): ICD-10-CM

## 2022-01-05 LAB
ANION GAP SERPL CALCULATED.3IONS-SCNC: 6 MMOL/L (ref 4–13)
BUN SERPL-MCNC: 17 MG/DL (ref 5–25)
CALCIUM SERPL-MCNC: 10.6 MG/DL (ref 8.3–10.1)
CHLORIDE SERPL-SCNC: 105 MMOL/L (ref 100–108)
CO2 SERPL-SCNC: 25 MMOL/L (ref 21–32)
CREAT SERPL-MCNC: 1.11 MG/DL (ref 0.6–1.3)
GFR SERPL CREATININE-BSD FRML MDRD: 50 ML/MIN/1.73SQ M
GLUCOSE P FAST SERPL-MCNC: 94 MG/DL (ref 65–99)
POTASSIUM SERPL-SCNC: 4.1 MMOL/L (ref 3.5–5.3)
SODIUM SERPL-SCNC: 136 MMOL/L (ref 136–145)

## 2022-01-05 PROCEDURE — 36415 COLL VENOUS BLD VENIPUNCTURE: CPT

## 2022-01-05 PROCEDURE — 80048 BASIC METABOLIC PNL TOTAL CA: CPT

## 2022-01-14 ENCOUNTER — LAB (OUTPATIENT)
Dept: LAB | Facility: IMAGING CENTER | Age: 72
End: 2022-01-14
Payer: MEDICARE

## 2022-01-14 DIAGNOSIS — E83.52 HYPERCALCEMIA: ICD-10-CM

## 2022-01-14 LAB
CALCIUM 24H UR-MCNC: 68.2 MG/24 HRS (ref 42–353)
SPECIMEN VOL UR: 1100 ML

## 2022-01-14 PROCEDURE — 82340 ASSAY OF CALCIUM IN URINE: CPT

## 2022-02-05 DIAGNOSIS — I10 ESSENTIAL HYPERTENSION: ICD-10-CM

## 2022-02-05 RX ORDER — DILTIAZEM HYDROCHLORIDE 120 MG/1
CAPSULE, COATED, EXTENDED RELEASE ORAL
Qty: 90 CAPSULE | Refills: 1 | Status: SHIPPED | OUTPATIENT
Start: 2022-02-05 | End: 2022-07-12 | Stop reason: SDUPTHER

## 2022-03-23 ENCOUNTER — APPOINTMENT (OUTPATIENT)
Dept: LAB | Facility: MEDICAL CENTER | Age: 72
End: 2022-03-23
Payer: MEDICARE

## 2022-03-23 DIAGNOSIS — R79.9 ABNORMAL FINDING OF BLOOD CHEMISTRY, UNSPECIFIED: ICD-10-CM

## 2022-03-23 DIAGNOSIS — E66.9 OBESITY (BMI 30-39.9): ICD-10-CM

## 2022-03-23 DIAGNOSIS — R93.89 ABNORMAL FINDINGS ON DIAGNOSTIC IMAGING OF OTHER SPECIFIED BODY STRUCTURES: ICD-10-CM

## 2022-03-23 LAB
25(OH)D3 SERPL-MCNC: 21.9 NG/ML (ref 30–100)
ALBUMIN SERPL BCP-MCNC: 3.9 G/DL (ref 3.5–5)
ALP SERPL-CCNC: 74 U/L (ref 46–116)
ALT SERPL W P-5'-P-CCNC: 31 U/L (ref 12–78)
ANION GAP SERPL CALCULATED.3IONS-SCNC: 3 MMOL/L (ref 4–13)
AST SERPL W P-5'-P-CCNC: 28 U/L (ref 5–45)
BACTERIA UR QL AUTO: ABNORMAL /HPF
BASOPHILS # BLD AUTO: 0.08 THOUSANDS/ΜL (ref 0–0.1)
BASOPHILS NFR BLD AUTO: 1 % (ref 0–1)
BILIRUB SERPL-MCNC: 0.64 MG/DL (ref 0.2–1)
BILIRUB UR QL STRIP: NEGATIVE
BUN SERPL-MCNC: 19 MG/DL (ref 5–25)
CALCIUM SERPL-MCNC: 10 MG/DL (ref 8.3–10.1)
CHLORIDE SERPL-SCNC: 111 MMOL/L (ref 100–108)
CHOLEST SERPL-MCNC: 216 MG/DL
CLARITY UR: ABNORMAL
CO2 SERPL-SCNC: 26 MMOL/L (ref 21–32)
COLOR UR: ABNORMAL
CREAT SERPL-MCNC: 1.14 MG/DL (ref 0.6–1.3)
EOSINOPHIL # BLD AUTO: 0.14 THOUSAND/ΜL (ref 0–0.61)
EOSINOPHIL NFR BLD AUTO: 2 % (ref 0–6)
ERYTHROCYTE [DISTWIDTH] IN BLOOD BY AUTOMATED COUNT: 13 % (ref 11.6–15.1)
EST. AVERAGE GLUCOSE BLD GHB EST-MCNC: 114 MG/DL
GFR SERPL CREATININE-BSD FRML MDRD: 49 ML/MIN/1.73SQ M
GLUCOSE P FAST SERPL-MCNC: 103 MG/DL (ref 65–99)
GLUCOSE UR STRIP-MCNC: NEGATIVE MG/DL
HBA1C MFR BLD: 5.6 %
HCT VFR BLD AUTO: 39.1 % (ref 34.8–46.1)
HDLC SERPL-MCNC: 62 MG/DL
HGB BLD-MCNC: 12.8 G/DL (ref 11.5–15.4)
HGB UR QL STRIP.AUTO: NEGATIVE
IMM GRANULOCYTES # BLD AUTO: 0.02 THOUSAND/UL (ref 0–0.2)
IMM GRANULOCYTES NFR BLD AUTO: 0 % (ref 0–2)
KETONES UR STRIP-MCNC: NEGATIVE MG/DL
LDLC SERPL CALC-MCNC: 103 MG/DL (ref 0–100)
LEUKOCYTE ESTERASE UR QL STRIP: ABNORMAL
LYMPHOCYTES # BLD AUTO: 1.85 THOUSANDS/ΜL (ref 0.6–4.47)
LYMPHOCYTES NFR BLD AUTO: 31 % (ref 14–44)
MCH RBC QN AUTO: 32 PG (ref 26.8–34.3)
MCHC RBC AUTO-ENTMCNC: 32.7 G/DL (ref 31.4–37.4)
MCV RBC AUTO: 98 FL (ref 82–98)
MONOCYTES # BLD AUTO: 0.57 THOUSAND/ΜL (ref 0.17–1.22)
MONOCYTES NFR BLD AUTO: 10 % (ref 4–12)
MUCOUS THREADS UR QL AUTO: ABNORMAL
NEUTROPHILS # BLD AUTO: 3.32 THOUSANDS/ΜL (ref 1.85–7.62)
NEUTS SEG NFR BLD AUTO: 56 % (ref 43–75)
NITRITE UR QL STRIP: NEGATIVE
NON-SQ EPI CELLS URNS QL MICRO: ABNORMAL /HPF
NONHDLC SERPL-MCNC: 154 MG/DL
NRBC BLD AUTO-RTO: 0 /100 WBCS
PH UR STRIP.AUTO: 5.5 [PH]
PLATELET # BLD AUTO: 246 THOUSANDS/UL (ref 149–390)
PMV BLD AUTO: 11.1 FL (ref 8.9–12.7)
POTASSIUM SERPL-SCNC: 4.5 MMOL/L (ref 3.5–5.3)
PROT SERPL-MCNC: 7.1 G/DL (ref 6.4–8.2)
PROT UR STRIP-MCNC: ABNORMAL MG/DL
RBC # BLD AUTO: 4 MILLION/UL (ref 3.81–5.12)
RBC #/AREA URNS AUTO: ABNORMAL /HPF
SODIUM SERPL-SCNC: 140 MMOL/L (ref 136–145)
SP GR UR STRIP.AUTO: 1.02 (ref 1–1.03)
TRIGL SERPL-MCNC: 256 MG/DL
TSH SERPL DL<=0.05 MIU/L-ACNC: 1.39 UIU/ML (ref 0.36–3.74)
UROBILINOGEN UR STRIP-ACNC: <2 MG/DL
WBC # BLD AUTO: 5.98 THOUSAND/UL (ref 4.31–10.16)
WBC #/AREA URNS AUTO: ABNORMAL /HPF

## 2022-03-23 PROCEDURE — 85025 COMPLETE CBC W/AUTO DIFF WBC: CPT

## 2022-03-23 PROCEDURE — 82306 VITAMIN D 25 HYDROXY: CPT

## 2022-03-23 PROCEDURE — 83036 HEMOGLOBIN GLYCOSYLATED A1C: CPT

## 2022-03-23 PROCEDURE — 80061 LIPID PANEL: CPT

## 2022-03-23 PROCEDURE — 81001 URINALYSIS AUTO W/SCOPE: CPT

## 2022-03-23 PROCEDURE — 84443 ASSAY THYROID STIM HORMONE: CPT

## 2022-03-23 PROCEDURE — 80053 COMPREHEN METABOLIC PANEL: CPT

## 2022-03-23 PROCEDURE — 36415 COLL VENOUS BLD VENIPUNCTURE: CPT

## 2022-03-30 ENCOUNTER — OFFICE VISIT (OUTPATIENT)
Dept: FAMILY MEDICINE CLINIC | Facility: CLINIC | Age: 72
End: 2022-03-30
Payer: MEDICARE

## 2022-03-30 VITALS
OXYGEN SATURATION: 97 % | WEIGHT: 186 LBS | RESPIRATION RATE: 12 BRPM | HEART RATE: 78 BPM | BODY MASS INDEX: 31.76 KG/M2 | HEIGHT: 64 IN | DIASTOLIC BLOOD PRESSURE: 74 MMHG | SYSTOLIC BLOOD PRESSURE: 142 MMHG

## 2022-03-30 DIAGNOSIS — F33.9 DEPRESSION, RECURRENT (HCC): ICD-10-CM

## 2022-03-30 DIAGNOSIS — M54.50 CHRONIC BILATERAL LOW BACK PAIN WITHOUT SCIATICA: ICD-10-CM

## 2022-03-30 DIAGNOSIS — N18.32 STAGE 3B CHRONIC KIDNEY DISEASE (HCC): ICD-10-CM

## 2022-03-30 DIAGNOSIS — I47.1 SVT (SUPRAVENTRICULAR TACHYCARDIA) (HCC): ICD-10-CM

## 2022-03-30 DIAGNOSIS — I10 ESSENTIAL HYPERTENSION: ICD-10-CM

## 2022-03-30 DIAGNOSIS — Z00.00 MEDICARE ANNUAL WELLNESS VISIT, SUBSEQUENT: Primary | ICD-10-CM

## 2022-03-30 DIAGNOSIS — G89.29 CHRONIC BILATERAL LOW BACK PAIN WITHOUT SCIATICA: ICD-10-CM

## 2022-03-30 DIAGNOSIS — F41.8 DEPRESSION WITH ANXIETY: ICD-10-CM

## 2022-03-30 PROCEDURE — G0439 PPPS, SUBSEQ VISIT: HCPCS | Performed by: INTERNAL MEDICINE

## 2022-03-30 PROCEDURE — 99214 OFFICE O/P EST MOD 30 MIN: CPT | Performed by: INTERNAL MEDICINE

## 2022-03-30 RX ORDER — SENNOSIDES 8.6 MG
650 CAPSULE ORAL EVERY 8 HOURS PRN
Qty: 90 TABLET | Refills: 0 | Status: SHIPPED | OUTPATIENT
Start: 2022-03-30

## 2022-03-30 NOTE — PATIENT INSTRUCTIONS
Medicare Preventive Visit Patient Instructions  Thank you for completing your Welcome to Medicare Visit or Medicare Annual Wellness Visit today  Your next wellness visit will be due in one year (3/31/2023)  The screening/preventive services that you may require over the next 5-10 years are detailed below  Some tests may not apply to you based off risk factors and/or age  Screening tests ordered at today's visit but not completed yet may show as past due  Also, please note that scanned in results may not display below  Preventive Screenings:  Service Recommendations Previous Testing/Comments   Colorectal Cancer Screening  * Colonoscopy    * Fecal Occult Blood Test (FOBT)/Fecal Immunochemical Test (FIT)  * Fecal DNA/Cologuard Test  * Flexible Sigmoidoscopy Age: 54-65 years old   Colonoscopy: every 10 years (may be performed more frequently if at higher risk)  OR  FOBT/FIT: every 1 year  OR  Cologuard: every 3 years  OR  Sigmoidoscopy: every 5 years  Screening may be recommended earlier than age 48 if at higher risk for colorectal cancer  Also, an individualized decision between you and your healthcare provider will decide whether screening between the ages of 74-80 would be appropriate  Colonoscopy: 06/06/2017  FOBT/FIT: Not on file  Cologuard: Not on file  Sigmoidoscopy: Not on file    Screening Current     Breast Cancer Screening Age: 36 years old  Frequency: every 1-2 years  Not required if history of left and right mastectomy Mammogram: 05/19/2020    Screening Current   Cervical Cancer Screening Between the ages of 21-29, pap smear recommended once every 3 years  Between the ages of 33-67, can perform pap smear with HPV co-testing every 5 years     Recommendations may differ for women with a history of total hysterectomy, cervical cancer, or abnormal pap smears in past  Pap Smear: 11/15/2021    Screening Not Indicated   Hepatitis C Screening Once for adults born between 1945 and 1965  More frequently in patients at high risk for Hepatitis C Hep C Antibody: 01/08/2019    Screening Current   Diabetes Screening 1-2 times per year if you're at risk for diabetes or have pre-diabetes Fasting glucose: 103 mg/dL   A1C: 5 6 %    Screening Current   Cholesterol Screening Once every 5 years if you don't have a lipid disorder  May order more often based on risk factors  Lipid panel: 03/23/2022    Screening Not Indicated  History Lipid Disorder     Other Preventive Screenings Covered by Medicare:  1  Abdominal Aortic Aneurysm (AAA) Screening: covered once if your at risk  You're considered to be at risk if you have a family history of AAA  2  Lung Cancer Screening: covers low dose CT scan once per year if you meet all of the following conditions: (1) Age 50-69; (2) No signs or symptoms of lung cancer; (3) Current smoker or have quit smoking within the last 15 years; (4) You have a tobacco smoking history of at least 30 pack years (packs per day multiplied by number of years you smoked); (5) You get a written order from a healthcare provider  3  Glaucoma Screening: covered annually if you're considered high risk: (1) You have diabetes OR (2) Family history of glaucoma OR (3)  aged 48 and older OR (3)  American aged 72 and older  3  Osteoporosis Screening: covered every 2 years if you meet one of the following conditions: (1) You're estrogen deficient and at risk for osteoporosis based off medical history and other findings; (2) Have a vertebral abnormality; (3) On glucocorticoid therapy for more than 3 months; (4) Have primary hyperparathyroidism; (5) On osteoporosis medications and need to assess response to drug therapy  · Last bone density test (DXA Scan): 01/06/2018  5  HIV Screening: covered annually if you're between the age of 12-76  Also covered annually if you are younger than 13 and older than 72 with risk factors for HIV infection   For pregnant patients, it is covered up to 3 times per pregnancy  Immunizations:  Immunization Recommendations   Influenza Vaccine Annual influenza vaccination during flu season is recommended for all persons aged >= 6 months who do not have contraindications   Pneumococcal Vaccine (Prevnar and Pneumovax)  * Prevnar = PCV13  * Pneumovax = PPSV23   Adults 25-60 years old: 1-3 doses may be recommended based on certain risk factors  Adults 72 years old: Prevnar (PCV13) vaccine recommended followed by Pneumovax (PPSV23) vaccine  If already received PPSV23 since turning 65, then PCV13 recommended at least one year after PPSV23 dose  Hepatitis B Vaccine 3 dose series if at intermediate or high risk (ex: diabetes, end stage renal disease, liver disease)   Tetanus (Td) Vaccine - COST NOT COVERED BY MEDICARE PART B Following completion of primary series, a booster dose should be given every 10 years to maintain immunity against tetanus  Td may also be given as tetanus wound prophylaxis  Tdap Vaccine - COST NOT COVERED BY MEDICARE PART B Recommended at least once for all adults  For pregnant patients, recommended with each pregnancy  Shingles Vaccine (Shingrix) - COST NOT COVERED BY MEDICARE PART B  2 shot series recommended in those aged 48 and above     Health Maintenance Due:      Topic Date Due    Lung Cancer Screening  Never done    Breast Cancer Screening: Mammogram  05/19/2021    Colorectal Cancer Screening  06/06/2027    Hepatitis C Screening  Completed     Immunizations Due:      Topic Date Due    DTaP,Tdap,and Td Vaccines (1 - Tdap) 11/19/2015     Advance Directives   What are advance directives? Advance directives are legal documents that state your wishes and plans for medical care  These plans are made ahead of time in case you lose your ability to make decisions for yourself  Advance directives can apply to any medical decision, such as the treatments you want, and if you want to donate organs  What are the types of advance directives?   There are many types of advance directives, and each state has rules about how to use them  You may choose a combination of any of the following:  · Living will: This is a written record of the treatment you want  You can also choose which treatments you do not want, which to limit, and which to stop at a certain time  This includes surgery, medicine, IV fluid, and tube feedings  · Durable power of  for healthcare [facility]): This is a written record that states who you want to make healthcare choices for you when you are unable to make them for yourself  This person, called a proxy, is usually a family member or a friend  You may choose more than 1 proxy  · Do not resuscitate (DNR) order:  A DNR order is used in case your heart stops beating or you stop breathing  It is a request not to have certain forms of treatment, such as CPR  A DNR order may be included in other types of advance directives  · Medical directive: This covers the care that you want if you are in a coma, near death, or unable to make decisions for yourself  You can list the treatments you want for each condition  Treatment may include pain medicine, surgery, blood transfusions, dialysis, IV or tube feedings, and a ventilator (breathing machine)  · Values history: This document has questions about your views, beliefs, and how you feel and think about life  This information can help others choose the care that you would choose  Why are advance directives important? An advance directive helps you control your care  Although spoken wishes may be used, it is better to have your wishes written down  Spoken wishes can be misunderstood, or not followed  Treatments may be given even if you do not want them  An advance directive may make it easier for your family to make difficult choices about your care     Weight Management   Why it is important to manage your weight:  Being overweight increases your risk of health conditions such as heart disease, high blood pressure, type 2 diabetes, and certain types of cancer  It can also increase your risk for osteoarthritis, sleep apnea, and other respiratory problems  Aim for a slow, steady weight loss  Even a small amount of weight loss can lower your risk of health problems  How to lose weight safely:  A safe and healthy way to lose weight is to eat fewer calories and get regular exercise  You can lose up about 1 pound a week by decreasing the number of calories you eat by 500 calories each day  Healthy meal plan for weight management:  A healthy meal plan includes a variety of foods, contains fewer calories, and helps you stay healthy  A healthy meal plan includes the following:  · Eat whole-grain foods more often  A healthy meal plan should contain fiber  Fiber is the part of grains, fruits, and vegetables that is not broken down by your body  Whole-grain foods are healthy and provide extra fiber in your diet  Some examples of whole-grain foods are whole-wheat breads and pastas, oatmeal, brown rice, and bulgur  · Eat a variety of vegetables every day  Include dark, leafy greens such as spinach, kale, anderson greens, and mustard greens  Eat yellow and orange vegetables such as carrots, sweet potatoes, and winter squash  · Eat a variety of fruits every day  Choose fresh or canned fruit (canned in its own juice or light syrup) instead of juice  Fruit juice has very little or no fiber  · Eat low-fat dairy foods  Drink fat-free (skim) milk or 1% milk  Eat fat-free yogurt and low-fat cottage cheese  Try low-fat cheeses such as mozzarella and other reduced-fat cheeses  · Choose meat and other protein foods that are low in fat  Choose beans or other legumes such as split peas or lentils  Choose fish, skinless poultry (chicken or turkey), or lean cuts of red meat (beef or pork)  Before you cook meat or poultry, cut off any visible fat  · Use less fat and oil  Try baking foods instead of frying them   Add less fat, such as margarine, sour cream, regular salad dressing and mayonnaise to foods  Eat fewer high-fat foods  Some examples of high-fat foods include french fries, doughnuts, ice cream, and cakes  · Eat fewer sweets  Limit foods and drinks that are high in sugar  This includes candy, cookies, regular soda, and sweetened drinks  Exercise:  Exercise at least 30 minutes per day on most days of the week  Some examples of exercise include walking, biking, dancing, and swimming  You can also fit in more physical activity by taking the stairs instead of the elevator or parking farther away from stores  Ask your healthcare provider about the best exercise plan for you  © Copyright [employer] 2018 Information is for End User's use only and may not be sold, redistributed or otherwise used for commercial purposes   All illustrations and images included in CareNotes® are the copyrighted property of [employer]  or [address] [place] [address]

## 2022-03-30 NOTE — PROGRESS NOTES
Assessment/Plan:    No problem-specific Assessment & Plan notes found for this encounter  There are no diagnoses linked to this encounter  Subjective:      Patient ID: Akash Moore is a 73 y o  female  HPI    The following portions of the patient's history were reviewed and updated as appropriate: allergies, current medications, past family history, past medical history, past social history, past surgical history, and problem list     Review of Systems   Constitutional: Negative for chills, fever and unexpected weight change  Respiratory: Negative for cough, chest tightness and shortness of breath  Cardiovascular: Negative for chest pain and leg swelling  Gastrointestinal: Negative for abdominal pain, diarrhea, nausea and vomiting  Genitourinary: Negative for difficulty urinating  Musculoskeletal: Negative for arthralgias and myalgias  Neurological: Negative for dizziness, numbness and headaches  Psychiatric/Behavioral: Negative for dysphoric mood and sleep disturbance  The patient is not nervous/anxious  Objective:      LMP  (LMP Unknown)          Physical Exam  Vitals reviewed  Constitutional:       General: She is not in acute distress  Appearance: She is well-developed  She is not diaphoretic  HENT:      Head: Normocephalic and atraumatic  Right Ear: External ear normal       Left Ear: External ear normal    Eyes:      General:         Right eye: No discharge  Left eye: No discharge  Cardiovascular:      Rate and Rhythm: Normal rate and regular rhythm  Pulses: Normal pulses  Heart sounds: Murmur heard  Pulmonary:      Effort: Pulmonary effort is normal  No respiratory distress  Breath sounds: Normal breath sounds  No wheezing  Abdominal:      General: Bowel sounds are normal       Palpations: Abdomen is soft  Tenderness: There is no abdominal tenderness  Musculoskeletal:         General: Normal range of motion  Cervical back: Normal range of motion and neck supple  Lymphadenopathy:      Cervical: No cervical adenopathy  Skin:     General: Skin is warm and dry  Neurological:      Mental Status: She is alert and oriented to person, place, and time  Cranial Nerves: No cranial nerve deficit  Psychiatric:         Speech: Speech normal          Behavior: Behavior normal          Thought Content: Thought content normal          Judgment: Judgment normal                Current Outpatient Medications:     albuterol (PROVENTIL HFA) 90 mcg/act inhaler, Inhale 1 puff every 6 (six) hours as needed for wheezing, Disp: 6 7 g, Rfl: 11    atorvastatin (LIPITOR) 20 mg tablet, TAKE 1 TABLET BY MOUTH EVERY DAY, Disp: 90 tablet, Rfl: 3    cetirizine (ZyrTEC) 10 mg tablet, Take 10 mg by mouth as needed , Disp: , Rfl:     clotrimazole-betamethasone (LOTRISONE) 1-0 05 % cream, Apply topically 2 (two) times a day Do not use more than 10 days  (Patient not taking: Reported on 12/9/2021 ), Disp: 30 g, Rfl: 0    diltiazem (CARDIZEM CD) 120 mg 24 hr capsule, TAKE 1 CAPSULE BY MOUTH EVERY DAY, Disp: 90 capsule, Rfl: 1    escitalopram (LEXAPRO) 10 mg tablet, TAKE 1 TABLET BY MOUTH EVERY DAY, Disp: 30 tablet, Rfl: 11    estradiol (ESTRACE) 0 1 mg/g vaginal cream, Insert 2 g into the vagina every other day  (Patient not taking: Reported on 12/9/2021 ), Disp: , Rfl:     losartan (COZAAR) 25 mg tablet, TAKE 1 TABLET BY MOUTH EVERY DAY, Disp: 90 tablet, Rfl: 3    Multiple Vitamin (MULTI VITAMIN DAILY PO), Take 1 tablet by mouth daily   (Patient not taking: Reported on 12/9/2021 ), Disp: , Rfl:     spironolactone (ALDACTONE) 25 mg tablet, TAKE 1 TABLET BY MOUTH EVERY DAY, Disp: 90 tablet, Rfl: 3          Harsh Stephens is here for her Subsequent Wellness visit  Health Risk Assessment:   Patient rates overall health as good  Patient feels that their physical health rating is slightly worse  Patient is satisfied with their life   Eyesight was rated as slightly worse  Hearing was rated as slightly worse  Patient feels that their emotional and mental health rating is same  Patients states they are never, rarely angry  Patient states they are sometimes unusually tired/fatigued  Pain experienced in the last 7 days has been none  Patient states that she has experienced no weight loss or gain in last 6 months  Depression Screening:   PHQ-9 Score: 0      Fall Risk Screening: In the past year, patient has experienced: no history of falling in past year      Urinary Incontinence Screening:   Patient has not leaked urine accidently in the last six months  Home Safety:  Patient has trouble with stairs inside or outside of their home  Patient has working smoke alarms and has working carbon monoxide detector  Home safety hazards include: none  Nutrition:   Current diet is Regular  Medications:   Patient is currently taking over-the-counter supplements  OTC medications include: see medication list  Patient is able to manage medications  Activities of Daily Living (ADLs)/Instrumental Activities of Daily Living (IADLs):   Walk and transfer into and out of bed and chair?: Yes  Dress and groom yourself?: Yes    Bathe or shower yourself?: Yes    Feed yourself?  Yes  Do your laundry/housekeeping?: Yes  Manage your money, pay your bills and track your expenses?: Yes  Make your own meals?: Yes    Do your own shopping?: Yes    Previous Hospitalizations:   Any hospitalizations or ED visits within the last 12 months?: No      PREVENTIVE SCREENINGS      Cardiovascular Screening:    General: Screening Not Indicated and History Lipid Disorder      Diabetes Screening:     General: Screening Current      Colorectal Cancer Screening:     General: Screening Current      Breast Cancer Screening:     General: Screening Current      Cervical Cancer Screening:    General: Screening Not Indicated      Hepatitis C Screening:    General: Screening Current    Screening, Brief Intervention, and Referral to Treatment (SBIRT)    Screening  Typical number of drinks in a day: 1  Typical number of drinks in a week: 7  Interpretation: Low risk drinking behavior      Single Item Drug Screening:  How often have you used an illegal drug (including marijuana) or a prescription medication for non-medical reasons in the past year? never    Single Item Drug Screen Score: 0  Interpretation: Negative screen for possible drug use disorder

## 2022-03-30 NOTE — ASSESSMENT & PLAN NOTE
Lab Results   Component Value Date    EGFR 49 03/23/2022    EGFR 50 01/05/2022    EGFR 36 10/27/2021    CREATININE 1 14 03/23/2022    CREATININE 1 11 01/05/2022    CREATININE 1 47 (H) 10/27/2021   Kidney function remains stable

## 2022-04-13 ENCOUNTER — TELEPHONE (OUTPATIENT)
Dept: NEPHROLOGY | Facility: CLINIC | Age: 72
End: 2022-04-13

## 2022-05-24 ENCOUNTER — APPOINTMENT (OUTPATIENT)
Dept: LAB | Facility: MEDICAL CENTER | Age: 72
End: 2022-05-24
Payer: MEDICARE

## 2022-05-24 DIAGNOSIS — E83.52 HYPERCALCEMIA: ICD-10-CM

## 2022-05-24 DIAGNOSIS — N18.32 HYPERTENSIVE KIDNEY DISEASE WITH STAGE 3B CHRONIC KIDNEY DISEASE (HCC): ICD-10-CM

## 2022-05-24 DIAGNOSIS — N18.32 STAGE 3B CHRONIC KIDNEY DISEASE (HCC): ICD-10-CM

## 2022-05-24 DIAGNOSIS — I12.9 HYPERTENSIVE KIDNEY DISEASE WITH STAGE 3B CHRONIC KIDNEY DISEASE (HCC): ICD-10-CM

## 2022-05-24 LAB
ALBUMIN SERPL BCP-MCNC: 4 G/DL (ref 3.5–5)
ALP SERPL-CCNC: 83 U/L (ref 46–116)
ALT SERPL W P-5'-P-CCNC: 32 U/L (ref 12–78)
ANION GAP SERPL CALCULATED.3IONS-SCNC: 8 MMOL/L (ref 4–13)
AST SERPL W P-5'-P-CCNC: 26 U/L (ref 5–45)
BILIRUB SERPL-MCNC: 0.83 MG/DL (ref 0.2–1)
BUN SERPL-MCNC: 18 MG/DL (ref 5–25)
CALCIUM SERPL-MCNC: 9.8 MG/DL (ref 8.3–10.1)
CHLORIDE SERPL-SCNC: 106 MMOL/L (ref 100–108)
CO2 SERPL-SCNC: 24 MMOL/L (ref 21–32)
CREAT SERPL-MCNC: 1.42 MG/DL (ref 0.6–1.3)
CREAT UR-MCNC: 85.1 MG/DL
ERYTHROCYTE [DISTWIDTH] IN BLOOD BY AUTOMATED COUNT: 13.7 % (ref 11.6–15.1)
GFR SERPL CREATININE-BSD FRML MDRD: 37 ML/MIN/1.73SQ M
GLUCOSE P FAST SERPL-MCNC: 92 MG/DL (ref 65–99)
HCT VFR BLD AUTO: 40.7 % (ref 34.8–46.1)
HGB BLD-MCNC: 12.4 G/DL (ref 11.5–15.4)
MCH RBC QN AUTO: 32.2 PG (ref 26.8–34.3)
MCHC RBC AUTO-ENTMCNC: 30.5 G/DL (ref 31.4–37.4)
MCV RBC AUTO: 106 FL (ref 82–98)
MICROALBUMIN UR-MCNC: 6.7 MG/L (ref 0–20)
MICROALBUMIN/CREAT 24H UR: 8 MG/G CREATININE (ref 0–30)
PLATELET # BLD AUTO: 291 THOUSANDS/UL (ref 149–390)
PMV BLD AUTO: 11.3 FL (ref 8.9–12.7)
POTASSIUM SERPL-SCNC: 4.5 MMOL/L (ref 3.5–5.3)
PROT SERPL-MCNC: 7.4 G/DL (ref 6.4–8.2)
PTH-INTACT SERPL-MCNC: 72.2 PG/ML (ref 18.4–80.1)
RBC # BLD AUTO: 3.85 MILLION/UL (ref 3.81–5.12)
SODIUM SERPL-SCNC: 138 MMOL/L (ref 136–145)
URATE SERPL-MCNC: 7.6 MG/DL (ref 2–6.8)
WBC # BLD AUTO: 7.8 THOUSAND/UL (ref 4.31–10.16)

## 2022-05-24 PROCEDURE — 80053 COMPREHEN METABOLIC PANEL: CPT

## 2022-05-24 PROCEDURE — 84550 ASSAY OF BLOOD/URIC ACID: CPT

## 2022-05-24 PROCEDURE — 82570 ASSAY OF URINE CREATININE: CPT

## 2022-05-24 PROCEDURE — 85027 COMPLETE CBC AUTOMATED: CPT

## 2022-05-24 PROCEDURE — 83970 ASSAY OF PARATHORMONE: CPT

## 2022-05-24 PROCEDURE — 36415 COLL VENOUS BLD VENIPUNCTURE: CPT

## 2022-05-24 PROCEDURE — 82043 UR ALBUMIN QUANTITATIVE: CPT

## 2022-06-10 ENCOUNTER — OFFICE VISIT (OUTPATIENT)
Dept: CARDIOLOGY CLINIC | Facility: CLINIC | Age: 72
End: 2022-06-10
Payer: MEDICARE

## 2022-06-10 ENCOUNTER — OFFICE VISIT (OUTPATIENT)
Dept: NEPHROLOGY | Facility: CLINIC | Age: 72
End: 2022-06-10
Payer: MEDICARE

## 2022-06-10 VITALS
WEIGHT: 186 LBS | OXYGEN SATURATION: 96 % | HEART RATE: 55 BPM | DIASTOLIC BLOOD PRESSURE: 80 MMHG | SYSTOLIC BLOOD PRESSURE: 112 MMHG | BODY MASS INDEX: 31.76 KG/M2 | HEIGHT: 64 IN

## 2022-06-10 DIAGNOSIS — N18.32 HYPERTENSIVE KIDNEY DISEASE WITH STAGE 3B CHRONIC KIDNEY DISEASE (HCC): ICD-10-CM

## 2022-06-10 DIAGNOSIS — I12.9 HYPERTENSIVE KIDNEY DISEASE WITH STAGE 3B CHRONIC KIDNEY DISEASE (HCC): ICD-10-CM

## 2022-06-10 DIAGNOSIS — N18.32 STAGE 3B CHRONIC KIDNEY DISEASE (HCC): Primary | ICD-10-CM

## 2022-06-10 DIAGNOSIS — I35.0 NON-RHEUMATIC AORTIC STENOSIS: ICD-10-CM

## 2022-06-10 DIAGNOSIS — I48.0 PAROXYSMAL ATRIAL FIBRILLATION (HCC): Primary | ICD-10-CM

## 2022-06-10 DIAGNOSIS — I10 ESSENTIAL HYPERTENSION: ICD-10-CM

## 2022-06-10 DIAGNOSIS — I49.9 IRREGULAR CARDIAC RHYTHM: ICD-10-CM

## 2022-06-10 DIAGNOSIS — I34.0 NONRHEUMATIC MITRAL VALVE REGURGITATION: ICD-10-CM

## 2022-06-10 DIAGNOSIS — I47.1 SVT (SUPRAVENTRICULAR TACHYCARDIA) (HCC): ICD-10-CM

## 2022-06-10 PROBLEM — I05.9 MITRAL VALVE DISORDER: Status: RESOLVED | Noted: 2019-03-22 | Resolved: 2022-06-10

## 2022-06-10 PROCEDURE — 99214 OFFICE O/P EST MOD 30 MIN: CPT | Performed by: INTERNAL MEDICINE

## 2022-06-10 PROCEDURE — 99213 OFFICE O/P EST LOW 20 MIN: CPT | Performed by: INTERNAL MEDICINE

## 2022-06-10 NOTE — PROGRESS NOTES
NEPHROLOGY OUTPATIENT PROGRESS NOTE   Makenzie Alva 81 y o  female MRN: [id number]  Reason for visit:  Chronic kidney disease    ASSESSMENT and PLAN:  1  Chronic kidney disease, stage III, baseline creatinine 1 1-1 4 most recent creatinine 1 42 estimated GFR 37   2  Hypertension well controlled, continue with current regimen  3  Hyperuricemia may be reflected secondary to relative dehydration plus CKD, recommend increasing fluid intake  4  Irregular heart rate no history of atrial fibrillation, recommend ECG monitoring  Discussed with Cardiology, Dr Lubna Block to go to the office for ECG  5  Aortic valve stenosis/regurgitation, normal ejection fraction 65%    Overall renal function remains quite stable   Blood pressure under good control   Referral for ECT testing given irregular heart rate  No changes from Nephrology standpoint   Follow-up in 6 months with repeat labs at that time    SUBJECTIVE / INTERVAL HISTORY:  She has been doing reasonably well  Offers no new complaints  Denies any chest pain  Does complain of mild shortness of breath expression with exertion  Denies any chest pain or palpitations  OBJECTIVE:  /80 (BP Location: Left arm, Patient Position: Sitting, Cuff Size: Large)   Pulse 55   Ht 5' 4" (1 626 m)   Wt 84 4 kg (186 lb)   LMP  (LMP Unknown)   SpO2 96%   BMI 31 93 kg/m²   Vitals:    06/10/22 0946   Weight: 84 4 kg (186 lb)       Physical Exam  Constitutional:       Appearance: She is not ill-appearing  HENT:      Head: Normocephalic and atraumatic  Eyes:      General: No scleral icterus  Cardiovascular:      Rate and Rhythm: Tachycardia present  Rhythm irregular  Pulmonary:      Effort: Pulmonary effort is normal       Breath sounds: Normal breath sounds  Abdominal:      General: There is no distension  Palpations: Abdomen is soft  Musculoskeletal:      Right lower leg: No edema  Left lower leg: No edema     Lymphadenopathy:      Cervical: No cervical adenopathy  Skin:     General: Skin is warm and dry  Neurological:      Mental Status: She is alert and oriented to person, place, and time  Medications:    Current Outpatient Medications:     acetaminophen (TYLENOL) 650 mg CR tablet, Take 1 tablet (650 mg total) by mouth every 8 (eight) hours as needed for mild pain, Disp: 90 tablet, Rfl: 0    albuterol (PROVENTIL HFA) 90 mcg/act inhaler, Inhale 1 puff every 6 (six) hours as needed for wheezing, Disp: 6 7 g, Rfl: 11    atorvastatin (LIPITOR) 20 mg tablet, TAKE 1 TABLET BY MOUTH EVERY DAY, Disp: 90 tablet, Rfl: 3    cetirizine (ZyrTEC) 10 mg tablet, Take 10 mg by mouth as needed , Disp: , Rfl:     diltiazem (CARDIZEM CD) 120 mg 24 hr capsule, TAKE 1 CAPSULE BY MOUTH EVERY DAY, Disp: 90 capsule, Rfl: 1    escitalopram (LEXAPRO) 10 mg tablet, TAKE 1 TABLET BY MOUTH EVERY DAY, Disp: 30 tablet, Rfl: 11    losartan (COZAAR) 25 mg tablet, TAKE 1 TABLET BY MOUTH EVERY DAY, Disp: 90 tablet, Rfl: 3    spironolactone (ALDACTONE) 25 mg tablet, TAKE 1 TABLET BY MOUTH EVERY DAY, Disp: 90 tablet, Rfl: 3    clotrimazole-betamethasone (LOTRISONE) 1-0 05 % cream, Apply topically 2 (two) times a day Do not use more than 10 days   (Patient not taking: No sig reported), Disp: 30 g, Rfl: 0    Multiple Vitamin (MULTI VITAMIN DAILY PO), Take 1 tablet by mouth daily   (Patient not taking: No sig reported), Disp: , Rfl:     Laboratory Results:  Results for orders placed or performed in visit on 05/24/22   Comprehensive metabolic panel   Result Value Ref Range    Sodium 138 136 - 145 mmol/L    Potassium 4 5 3 5 - 5 3 mmol/L    Chloride 106 100 - 108 mmol/L    CO2 24 21 - 32 mmol/L    ANION GAP 8 4 - 13 mmol/L    BUN 18 5 - 25 mg/dL    Creatinine 1 42 (H) 0 60 - 1 30 mg/dL    Glucose, Fasting 92 65 - 99 mg/dL    Calcium 9 8 8 3 - 10 1 mg/dL    AST 26 5 - 45 U/L    ALT 32 12 - 78 U/L    Alkaline Phosphatase 83 46 - 116 U/L    Total Protein 7 4 6 4 - 8 2 g/dL    Albumin 4 0 3 5 - 5 0 g/dL    Total Bilirubin 0 83 0 20 - 1 00 mg/dL    eGFR 37 ml/min/1 73sq m   Uric acid   Result Value Ref Range    Uric Acid 7 6 (H) 2 0 - 6 8 mg/dL   PTH, intact   Result Value Ref Range    PTH 72 2 18 4 - 80 1 pg/mL   Microalbumin / creatinine urine ratio   Result Value Ref Range    Creatinine, Ur 85 1 mg/dL    Microalbum  ,U,Random 6 7 0 0 - 20 0 mg/L    Microalb Creat Ratio 8 0 - 30 mg/g creatinine   CBC   Result Value Ref Range    WBC 7 80 4 31 - 10 16 Thousand/uL    RBC 3 85 3 81 - 5 12 Million/uL    Hemoglobin 12 4 11 5 - 15 4 g/dL    Hematocrit 40 7 34 8 - 46 1 %     (H) 82 - 98 fL    MCH 32 2 26 8 - 34 3 pg    MCHC 30 5 (L) 31 4 - 37 4 g/dL    RDW 13 7 11 6 - 15 1 %    Platelets 113 439 - 572 Thousands/uL    MPV 11 3 8 9 - 12 7 fL

## 2022-06-10 NOTE — PROGRESS NOTES
Cardiology Follow Up    Warren Carter  1950  [id number]  [facility] [place]  [address] [place]  [address]  [phone]    1  Paroxysmal atrial fibrillation (HCC)  Holter monitor    apixaban (Eliquis) 5 mg    Ambulatory Referral to Cardiac Electrophysiology   2  Essential hypertension     3  Non-rheumatic aortic stenosis     4  Nonrheumatic mitral valve regurgitation     5  SVT (supraventricular tachycardia) (Formerly [facility])           Discussion/Summary:    Her AF is new for her but duration of her AF is unclear  She is asymptomatic  1  Start Eliquis 5 mg BID  2  Continue other meds as before  3  48 hour Holter Monitor  4  Refer back to Dr Michelle Asif      Interval History: She is seen today, unscheduled visit for irregular heart rhythm  She had SVT ablation in 2018 with Dr Michelle Asif  Last ECHO showed mild to moderate AS, moderate MR, normal EF  She is active and denies CP, SOB, palpitations  ECG today - AF ( new ) ,   She says that she got severely bradycardia with Bystolic  She is presently on Cardizem  mg daily  Patient Active Problem List   Diagnosis    Depression with anxiety    Dyslipidemia    Essential hypertension    Irritable bowel syndrome with diarrhea    Lumbar degenerative disc disease    Vitamin D deficiency    Greater trochanteric bursitis of right hip    Pulmonary emphysema (HCC)    SV[facility]ra[facility]ricular tachycardia) ([facility]    Medicare annual wellness visit, subsequent    Diastolic dysfunction    Aortic valve insufficiency    Achilles tendinitis of left lower extremity    Posterior tibial tendinitis, left    Obesity (BMI 30-39  9)    Stage 3 chronic kidney disease ([address] )    Hypertensive kidney disease with stage 3 chronic kidney disease (HCC)    Depression, recurrent ([facility] )    Non-rheumatic aortic stenosis    Nonrheumatic mitral valve regurgitation     Past Medical History:   Diagnosis Date    Bursitis of hip     Last Assessed:6/5/2017    Herniated lumbar intervertebral disc     Pituitary adenoma ([address] )     Last Assessed:2/12/2015 ; Previously on bromocriptine    Spinal stenosis     Systolic murmur of aorta      Social History     Socioeconomic History    Marital status: /Civil Union     Spouse name: Not on file    Number of children: Not on file    Years of education: Not on file    Highest education level: Not on file   Occupational History    Not on file   Tobacco Use    Smoking status: Former Smoker     Packs/day: 1 00     Years: 30 00     Pack years: 30 00     Types: Cigarettes     Quit date: 11/23/2018     Years since quitting: 3 5    Smokeless tobacco: Never Used   Substance and Sexual Activity    Alcohol use:  Yes     Alcohol/week: 1 0 standard drink     Types: 1 Shots of liquor per week     Comment: 1 Brittny Rizwan night/social    Drug use: No    Sexual activity: Not Currently     Birth control/protection: Post-menopausal   Other Topics Concern    Not on file   Social History Narrative    Not on file     Social Determinants of Health     Financial Resource Strain: Not on file   Food Insecurity: Not on file   Transportation Needs: Not on file   Physical Activity: Not on file   Stress: Not on file   Social Connections: Not on file   Intimate Partner Violence: Not on file   Housing Stability: Not on file      Family History   Problem Relation Age of Onset    Colon cancer Mother     Coronary artery disease Mother     Colonic polyp Mother     Liver cancer Mother     Multiple sclerosis Father     Hypertension Other     Hypertension Brother      Past Surgical History:   Procedure Laterality Date    BREAST SURGERY      Incisional breast biopsy    CARDIAC ELECTROPHYSIOLOGY STUDY AND ABLATION      CARPAL TUNNEL RELEASE Left     CHOLECYSTECTOMY      OTHER SURGICAL HISTORY      EXCISION OF PAROTID TUMOR/GLAND    PAROTIDECTOMY      VT COLONOSCOPY FLX DX W/COLLJ SPEC WHEN PFRMD N/A 6/6/2017    Procedure: COLONOSCOPY;  Surgeon: Kirsten Osuna DO;  Location: [facility]; Service: Gastroenterology    ROTATOR CUFF REPAIR      SALPINGOOPHORECTOMY Right        Current Outpatient Medications:     apixaban (Eliquis) 5 mg, Take 1 tablet (5 mg total) by mouth 2 (two) times a day, Disp: 180 tablet, Rfl: 4    acetaminophen (TYLENOL) 650 mg CR tablet, Take 1 tablet (650 mg total) by mouth every 8 (eight) hours as needed for mild pain, Disp: 90 tablet, Rfl: 0    albuterol (PROVENTIL HFA) 90 mcg/act inhaler, Inhale 1 puff every 6 (six) hours as needed for wheezing, Disp: 6 7 g, Rfl: 11    atorvastatin (LIPITOR) 20 mg tablet, TAKE 1 TABLET BY MOUTH EVERY DAY, Disp: 90 tablet, Rfl: 3    cetirizine (ZyrTEC) 10 mg tablet, Take 10 mg by mouth as needed , Disp: , Rfl:     clotrimazole-betamethasone (LOTRISONE) 1-0 05 % cream, Apply topically 2 (two) times a day Do not use more than 10 days  (Patient not taking: No sig reported), Disp: 30 g, Rfl: 0    diltiazem (CARDIZEM CD) 120 mg 24 hr capsule, TAKE 1 CAPSULE BY MOUTH EVERY DAY, Disp: 90 capsule, Rfl: 1    escitalopram (LEXAPRO) 10 mg tablet, TAKE 1 TABLET BY MOUTH EVERY DAY, Disp: 30 tablet, Rfl: 11    losartan (COZAAR) 25 mg tablet, TAKE 1 TABLET BY MOUTH EVERY DAY, Disp: 90 tablet, Rfl: 3    Multiple Vitamin (MULTI VITAMIN DAILY PO), Take 1 tablet by mouth daily   (Patient not taking: No sig reported), Disp: , Rfl:     spironolactone (ALDACTONE) 25 mg tablet, TAKE 1 TABLET BY MOUTH EVERY DAY, Disp: 90 tablet, Rfl: 3  Allergies   Allergen Reactions    Amlodipine Rash    Ancef [Cefazolin]     Cephalosporins Other (See Comments)     c diff    Fruit Extracts      Annotation - [id number]: peaches and strawberries    Levofloxacin     Morphine GI Intolerance    Other      FRUIT    Penicillins Hives    Tetracycline Edema    Xarelto [Rivaroxaban] Rash     There were no vitals filed for this visit    Weight (last 2 days)     None         not currently breastfeeding  , There is no height or weight on file to calculate BMI  Labs:not applicable  Imaging: No results found  Review of Systems:  Review of Systems   Constitutional: Negative for diaphoresis, fatigue, fever and unexpected weight change  HENT: Negative  Respiratory: Negative for cough, shortness of breath and wheezing  Cardiovascular: Negative for chest pain, palpitations and leg swelling  Gastrointestinal: Negative for abdominal pain, diarrhea and nausea  Musculoskeletal: Negative for gait problem and myalgias  Skin: Negative for rash  Neurological: Negative for dizziness and numbness  Psychiatric/Behavioral: Negative  Physical Exam:  Physical Exam  Constitutional:       Appearance: She is well-developed  HENT:      Head: Normocephalic and atraumatic  Eyes:      Pupils: Pupils are equal, round, and reactive to light  Neck:      Vascular: No JVD  Cardiovascular:      Rate and Rhythm: Rhythm irregular  Pulses: Normal pulses  Carotid pulses are 2+ on the right side and 2+ on the left side  Heart sounds: S1 normal and S2 normal  Murmur heard  Systolic murmur is present with a grade of 2/6  Pulmonary:      Effort: Pulmonary effort is normal       Breath sounds: Normal breath sounds  No wheezing or rales  Abdominal:      General: Bowel sounds are normal       Palpations: Abdomen is soft  Tenderness: There is no abdominal tenderness  Musculoskeletal:         General: No tenderness  Normal range of motion  Cervical back: Normal range of motion and neck supple  Skin:     General: Skin is warm  Neurological:      Mental Status: She is alert and oriented to person, place, and time  Cranial Nerves: No cranial nerve deficit  Deep Tendon Reflexes: Reflexes are normal and symmetric

## 2022-06-13 ENCOUNTER — PROCEDURE VISIT (OUTPATIENT)
Dept: CARDIOLOGY CLINIC | Facility: CLINIC | Age: 72
End: 2022-06-13

## 2022-06-13 DIAGNOSIS — I48.91 ATRIAL FIBRILLATION, UNSPECIFIED TYPE (HCC): Primary | ICD-10-CM

## 2022-06-17 ENCOUNTER — HOSPITAL ENCOUNTER (OUTPATIENT)
Dept: NON INVASIVE DIAGNOSTICS | Facility: HOSPITAL | Age: 72
Discharge: HOME | End: 2022-06-17
Payer: MEDICARE

## 2022-06-17 ENCOUNTER — TELEPHONE (OUTPATIENT)
Dept: NEPHROLOGY | Facility: CLINIC | Age: 72
End: 2022-06-17

## 2022-06-17 ENCOUNTER — OFFICE VISIT (OUTPATIENT)
Dept: URGENT CARE | Age: 72
End: 2022-06-17
Payer: MEDICARE

## 2022-06-17 VITALS
SYSTOLIC BLOOD PRESSURE: 128 MMHG | OXYGEN SATURATION: 98 % | RESPIRATION RATE: 20 BRPM | TEMPERATURE: 97.3 F | HEART RATE: 140 BPM | DIASTOLIC BLOOD PRESSURE: 84 MMHG | BODY MASS INDEX: 30.93 KG/M2 | HEIGHT: 64 IN | WEIGHT: 181.2 LBS

## 2022-06-17 DIAGNOSIS — N30.01 ACUTE CYSTITIS WITH HEMATURIA: ICD-10-CM

## 2022-06-17 DIAGNOSIS — I10 ESSENTIAL HYPERTENSION: Primary | ICD-10-CM

## 2022-06-17 DIAGNOSIS — N18.32 STAGE 3B CHRONIC KIDNEY DISEASE (HCC): ICD-10-CM

## 2022-06-17 DIAGNOSIS — I48.0 PAROXYSMAL ATRIAL FIBRILLATION (HCC): ICD-10-CM

## 2022-06-17 DIAGNOSIS — R30.0 DYSURIA: Primary | ICD-10-CM

## 2022-06-17 LAB
SL AMB  POCT GLUCOSE, UA: NEGATIVE
SL AMB LEUKOCYTE ESTERASE,UA: ABNORMAL
SL AMB POCT BILIRUBIN,UA: NEGATIVE
SL AMB POCT BLOOD,UA: ABNORMAL
SL AMB POCT CLARITY,UA: ABNORMAL
SL AMB POCT COLOR,UA: YELLOW
SL AMB POCT KETONES,UA: NEGATIVE
SL AMB POCT NITRITE,UA: NEGATIVE
SL AMB POCT PH,UA: 5
SL AMB POCT SPECIFIC GRAVITY,UA: 1.01
SL AMB POCT URINE PROTEIN: 30
SL AMB POCT UROBILINOGEN: 0.2

## 2022-06-17 PROCEDURE — 87086 URINE CULTURE/COLONY COUNT: CPT | Performed by: PHYSICIAN ASSISTANT

## 2022-06-17 PROCEDURE — 93225 XTRNL ECG REC<48 HRS REC: CPT

## 2022-06-17 PROCEDURE — 93227 XTRNL ECG REC<48 HR R&I: CPT | Performed by: INTERNAL MEDICINE

## 2022-06-17 PROCEDURE — 81002 URINALYSIS NONAUTO W/O SCOPE: CPT | Performed by: PHYSICIAN ASSISTANT

## 2022-06-17 PROCEDURE — G0463 HOSPITAL OUTPT CLINIC VISIT: HCPCS | Performed by: PHYSICIAN ASSISTANT

## 2022-06-17 PROCEDURE — 99213 OFFICE O/P EST LOW 20 MIN: CPT | Performed by: PHYSICIAN ASSISTANT

## 2022-06-17 PROCEDURE — 93226 XTRNL ECG REC<48 HR SCAN A/R: CPT

## 2022-06-17 PROCEDURE — 87186 SC STD MICRODIL/AGAR DIL: CPT | Performed by: PHYSICIAN ASSISTANT

## 2022-06-17 RX ORDER — SULFAMETHOXAZOLE AND TRIMETHOPRIM 800; 160 MG/1; MG/1
1 TABLET ORAL EVERY 12 HOURS SCHEDULED
Qty: 10 TABLET | Refills: 0 | Status: SHIPPED | OUTPATIENT
Start: 2022-06-17 | End: 2022-06-22

## 2022-06-17 NOTE — PATIENT INSTRUCTIONS
Take antibiotics as prescribed  Complete the entire course  If you do not complete the entire course this may result in bacterial resistance making future infections very difficult or impossible to treat  You should never have leftover antibiotics unless your antibiotic is switched  Note that if you are taking birth control medications, antibiotics can decrease their effectiveness  Recommend abstinence or back up method while on antibiotics and for 3-5 days after completing the antibiotic course  We send all positive urine for culture, we will call you if your antibiotic needs to be changed based on culture results  If symptoms do not improve in 2-3 days, follow-up with your primary care provider  If you develop fever, chills, or worsening low back pain report to the emergency room  These are symptoms of a more serious condition or possible spread of the infection into your bloodstream      Urinary Tract Infection in Women   AMBULATORY CARE:   A urinary tract infection (UTI)  is caused by bacteria that get inside your urinary tract  Most bacteria that enter your urinary tract come out when you urinate  If the bacteria stay in your urinary tract, you may get an infection  Your urinary tract includes your kidneys, ureters, bladder, and urethra  Urine is made in your kidneys, and it flows from the ureters to the bladder  Urine leaves the bladder through the urethra  A UTI is more common in your lower urinary tract, which includes your bladder and urethra  Common symptoms include the following:   Urinating more often or waking from sleep to urinate    Pain or burning when you urinate    Pain or pressure in your lower abdomen     Urine that smells bad    Blood in your urine    Leaking urine    Seek care immediately if:   You are urinating very little or not at all  You have a high fever with shaking chills  You have side or back pain that gets worse  Call your doctor if:   You have a fever      You do not feel better after 2 days of taking antibiotics  You are vomiting  You have questions or concerns about your condition or care  Treatment for a UTI  may include antibiotics to treat a bacterial infection  You may also need medicines to decrease pain and burning, or decrease the urge to urinate often  If you have UTIs often (called recurrent UTIs), you may be given antibiotics to take regularly  You will be given directions for when and how to use antibiotics  The goal is to prevent UTIs but not cause antibiotic resistance by using antibiotics too often  Prevent a UTI:   Empty your bladder often  Urinate and empty your bladder as soon as you feel the need  Do not hold your urine for long periods of time  Wipe from front to back after you urinate or have a bowel movement  This will help prevent germs from getting into your urinary tract through your urethra  Drink liquids as directed  Ask how much liquid to drink each day and which liquids are best for you  You may need to drink more liquids than usual to help flush out the bacteria  Do not drink alcohol, caffeine, or citrus juices  These can irritate your bladder and increase your symptoms  Your healthcare provider may recommend cranberry juice to help prevent a UTI  Urinate after you have sex  This can help flush out bacteria passed during sex  Do not douche or use feminine deodorants  These can change the chemical balance in your vagina  Change sanitary pads or tampons often  This will help prevent germs from getting into your urinary tract  Talk to your healthcare provider about your birth control method  You may need to change your method if it is increasing your risk for UTIs  Wear cotton underwear and clothes that are loose  Tight pants and nylon underwear can trap moisture and cause bacteria to grow  Vaginal estrogen may be recommended    This medicine helps prevent UTIs in women who have gone through menopause or are in alexandra-menopause  Do pelvic muscle exercises often  Pelvic muscle exercises may help you start and stop urinating  Strong pelvic muscles may help you empty your bladder easier  Squeeze these muscles tightly for 5 seconds like you are trying to hold back urine  Then relax for 5 seconds  Gradually work up to squeezing for 10 seconds  Do 3 sets of 15 repetitions a day, or as directed  Follow up with your healthcare provider as directed:  Write down your questions so you remember to ask them during your visits  © Copyright [address] Information is for End User's use only and may not be sold, redistributed or otherwise used for commercial purposes  All illustrations and images included in CareNotes® are the copyrighted property of [employer] D [employer]  or [facility] [employer] [employer]   The above information is an  only  It is not intended as medical advice for individual conditions or treatments  Talk to your doctor, nurse or pharmacist before following any medical regimen to see if it is safe and effective for you

## 2022-06-17 NOTE — PROGRESS NOTES
[facility]        NAME: Nik Coles is a 81 y o  female  : 1950    MRN: [id number]  DATE: 2022  TIME: 10:47 AM    Assessment and Plan   Dysuria [R30 0]  1  Dysuria  POCT urine dip   2  Acute cystitis with hematuria  Urine culture    sulfamethoxazole-trimethoprim (BACTRIM DS) 800-160 mg per tablet   Pt presents with symptoms consistent with possible UTI  UA results are consistent with urinary tract infection  Pt will be started on Bactrim DS empirically to treat secondary to age and development of C  Diff with cephalosporins in the past  Will send urine for culture and notify her if there is any resistance  Pt will follow-up with her PCP in 2-3 days if symptoms are not improved  We discussed symptoms of pyelonephritis and urosepsis and when to report to the emergency department  Patient Instructions     Patient Instructions      Take antibiotics as prescribed  Complete the entire course  If you do not complete the entire course this may result in bacterial resistance making future infections very difficult or impossible to treat  You should never have leftover antibiotics unless your antibiotic is switched  Note that if you are taking birth control medications, antibiotics can decrease their effectiveness  Recommend abstinence or back up method while on antibiotics and for 3-5 days after completing the antibiotic course   We send all positive urine for culture, we will call you if your antibiotic needs to be changed based on culture results   If symptoms do not improve in 2-3 days, follow-up with your primary care provider   If you develop fever, chills, or worsening low back pain report to the emergency room  These are symptoms of a more serious condition or possible spread of the infection into your bloodstream      Urinary Tract Infection in Women   AMBULATORY CARE:   A urinary tract infection (UTI)  is caused by bacteria that get inside your urinary tract   Most bacteria that enter your urinary tract come out when you urinate  If the bacteria stay in your urinary tract, you may get an infection  Your urinary tract includes your kidneys, ureters, bladder, and urethra  Urine is made in your kidneys, and it flows from the ureters to the bladder  Urine leaves the bladder through the urethra  A UTI is more common in your lower urinary tract, which includes your bladder and urethra  Common symptoms include the following:   · Urinating more often or waking from sleep to urinate    · Pain or burning when you urinate    · Pain or pressure in your lower abdomen     · Urine that smells bad    · Blood in your urine    · Leaking urine    Seek care immediately if:   · You are urinating very little or not at all  · You have a high fever with shaking chills  · You have side or back pain that gets worse  Call your doctor if:   · You have a fever  · You do not feel better after 2 days of taking antibiotics  · You are vomiting  · You have questions or concerns about your condition or care  Treatment for a UTI  may include antibiotics to treat a bacterial infection  You may also need medicines to decrease pain and burning, or decrease the urge to urinate often  If you have UTIs often (called recurrent UTIs), you may be given antibiotics to take regularly  You will be given directions for when and how to use antibiotics  The goal is to prevent UTIs but not cause antibiotic resistance by using antibiotics too often  Prevent a UTI:   · Empty your bladder often  Urinate and empty your bladder as soon as you feel the need  Do not hold your urine for long periods of time  · Wipe from front to back after you urinate or have a bowel movement  This will help prevent germs from getting into your urinary tract through your urethra  · Drink liquids as directed  Ask how much liquid to drink each day and which liquids are best for you   You may need to drink more liquids than usual to help flush out the bacteria  Do not drink alcohol, caffeine, or citrus juices  These can irritate your bladder and increase your symptoms  Your healthcare provider may recommend cranberry juice to help prevent a UTI  · Urinate after you have sex  This can help flush out bacteria passed during sex  · Do not douche or use feminine deodorants  These can change the chemical balance in your vagina  · Change sanitary pads or tampons often  This will help prevent germs from getting into your urinary tract  · Talk to your healthcare provider about your birth control method  You may need to change your method if it is increasing your risk for UTIs  · Wear cotton underwear and clothes that are loose  Tight pants and nylon underwear can trap moisture and cause bacteria to grow  · Vaginal estrogen may be recommended  This medicine helps prevent UTIs in women who have gone through menopause or are in alexandra-menopause  · Do pelvic muscle exercises often  Pelvic muscle exercises may help you start and stop urinating  Strong pelvic muscles may help you empty your bladder easier  Squeeze these muscles tightly for 5 seconds like you are trying to hold back urine  Then relax for 5 seconds  Gradually work up to squeezing for 10 seconds  Do 3 sets of 15 repetitions a day, or as directed  Follow up with your healthcare provider as directed:  Write down your questions so you remember to ask them during your visits  © Copyright [address] Information is for End User's use only and may not be sold, redistributed or otherwise used for commercial purposes  All illustrations and images included in CareNotes® are the copyrighted property of [employer]  or [address] [place], [place] [address]brea   The above information is an  only  It is not intended as medical advice for individual conditions or treatments   Talk to your doctor, nurse or pharmacist before following any medical regimen to see if it is safe and effective for you  Follow up with PCP in 3-5 days  Proceed to  ER if symptoms worsen  Chief Complaint     Chief Complaint   Patient presents with    Possible UTI     Pt reports dysuria, urinary frequency, urinary urgency, incontinence, back pain and pelvic pressure x two days  History of Present Illness       81year old female presents with complaints of painful urination, urgency, and frequency for 2 days duration  Pt also notes pelvic pressure, back pain, and feeling more chilly recently  Pt denies  nausea, vomiting, diarrhea, and fevers  Pt is not currently sexually active and is not concerned for STI at this time  She has no other concerns of complaints today  Review of Systems   Review of Systems   Constitutional: Negative for chills and fever  Gastrointestinal: Negative for abdominal distention, abdominal pain, diarrhea, nausea and vomiting  Genitourinary: Positive for dysuria, frequency and urgency  Negative for flank pain  Musculoskeletal: Negative for back pain           Current Medications       Current Outpatient Medications:     acetaminophen (TYLENOL) 650 mg CR tablet, Take 1 tablet (650 mg total) by mouth every 8 (eight) hours as needed for mild pain, Disp: 90 tablet, Rfl: 0    albuterol (PROVENTIL HFA) 90 mcg/act inhaler, Inhale 1 puff every 6 (six) hours as needed for wheezing, Disp: 6 7 g, Rfl: 11    apixaban (Eliquis) 5 mg, Take 1 tablet (5 mg total) by mouth 2 (two) times a day, Disp: 180 tablet, Rfl: 4    atorvastatin (LIPITOR) 20 mg tablet, TAKE 1 TABLET BY MOUTH EVERY DAY, Disp: 90 tablet, Rfl: 3    cetirizine (ZyrTEC) 10 mg tablet, Take 10 mg by mouth as needed , Disp: , Rfl:     diltiazem (CARDIZEM CD) 120 mg 24 hr capsule, TAKE 1 CAPSULE BY MOUTH EVERY DAY, Disp: 90 capsule, Rfl: 1    escitalopram (LEXAPRO) 10 mg tablet, TAKE 1 TABLET BY MOUTH EVERY DAY, Disp: 30 tablet, Rfl: 11    spironolactone (ALDACTONE) 25 mg tablet, TAKE 1 TABLET BY MOUTH EVERY DAY, Disp: 90 tablet, Rfl: 3    sulfamethoxazole-trimethoprim (BACTRIM DS) 800-160 mg per tablet, Take 1 tablet by mouth every 12 (twelve) hours for 5 days, Disp: 10 tablet, Rfl: 0    clotrimazole-betamethasone (LOTRISONE) 1-0 05 % cream, Apply topically 2 (two) times a day Do not use more than 10 days   (Patient not taking: No sig reported), Disp: 30 g, Rfl: 0    losartan (COZAAR) 25 mg tablet, TAKE 1 TABLET BY MOUTH EVERY DAY, Disp: 90 tablet, Rfl: 3    Multiple Vitamin (MULTI VITAMIN DAILY PO), Take 1 tablet by mouth daily   (Patient not taking: No sig reported), Disp: , Rfl:     Current Allergies     Allergies as of 06/17/2022 - Reviewed 06/17/2022   Allergen Reaction Noted    Amlodipine Rash 01/08/2019    Ancef [cefazolin]  08/27/2014    Cephalosporins Other (See Comments)     Fruit extracts  08/26/2013    Levofloxacin  01/04/2018    Morphine GI Intolerance     Other  06/02/2017    Penicillins Hives 06/02/2017    Tetracycline Edema 06/02/2017    Xarelto [rivaroxaban] Rash 01/08/2019            The following portions of the patient's history were reviewed and updated as appropriate: allergies, current medications, past family history, past medical history, past social history, past surgical history and problem list      Past Medical History:   Diagnosis Date    Atrial fibrillation ([address] )     Bursitis of hip     Last Assessed:6/5/2017    Herniated lumbar intervertebral disc     Pituitary adenoma ([address] )     Last Assessed:2/12/2015 ; Previously on bromocriptine    Spinal stenosis     Systolic murmur of aorta        Past Surgical History:   Procedure Laterality Date    BREAST SURGERY      Incisional breast biopsy    CARDIAC ELECTROPHYSIOLOGY STUDY AND ABLATION      CARPAL TUNNEL RELEASE Left     CHOLECYSTECTOMY      OTHER SURGICAL HISTORY      EXCISION OF PAROTID TUMOR/GLAND    PAROTIDECTOMY      KS COLONOSCOPY FLX DX W/COLLJ SPEC WHEN PFRMD N/A 6/6/2017    Procedure: COLONOSCOPY; Surgeon: Arcelia Gonzales DO;  Location: [facility]; Service: Gastroenterology    ROTATOR CUFF REPAIR      SALPINGOOPHORECTOMY Right        Family History   Problem Relation Age of Onset    Colon cancer Mother     Coronary artery disease Mother     Colonic polyp Mother     Liver cancer Mother     Multiple sclerosis Father     Hypertension Other     Hypertension Brother          Medications have been verified  Objective   /84   Pulse (!) 140   Temp (!) 97 3 °F (36 3 °C)   Resp 20   Ht 5' 4" (1 626 m)   Wt 82 2 kg (181 lb 3 2 oz)   LMP  (LMP Unknown)   SpO2 98%   BMI 31 10 kg/m²   No LMP recorded (lmp unknown)  Patient is postmenopausal        Physical Exam     Physical Exam  Vitals and nursing note reviewed  Constitutional:       General: She is awake  She is not in acute distress  Appearance: Normal appearance  She is well-developed and well-groomed  She is not ill-appearing, toxic-appearing or diaphoretic  HENT:      Head: Normocephalic and atraumatic  Right Ear: Hearing and external ear normal       Left Ear: Hearing and external ear normal    Eyes:      General: Lids are normal  Vision grossly intact  Gaze aligned appropriately  Cardiovascular:      Rate and Rhythm: Normal rate  Pulmonary:      Effort: Pulmonary effort is normal    Abdominal:      General: Abdomen is flat  Bowel sounds are normal       Palpations: Abdomen is soft  Tenderness: There is no abdominal tenderness  There is no right CVA tenderness or left CVA tenderness  Musculoskeletal:      Cervical back: Normal range of motion  Skin:     General: Skin is warm and dry  Neurological:      Mental Status: She is alert and oriented to person, place, and time  Coordination: Coordination is intact  Gait: Gait is intact     Psychiatric:         Attention and Perception: Attention and perception normal          Mood and Affect: Mood and affect normal          Speech: Speech normal  Behavior: Behavior normal  Behavior is cooperative  Note: Portions of this record may have been created with voice recognition software  Occasional wrong word or "sound a like" substitutions may have occurred due to the inherent limitations of voice recognition software  Please read the chart carefully and recognize, using context, where substitutions have occurred  *

## 2022-06-17 NOTE — TELEPHONE ENCOUNTER
Pt called the office wanting to inform Dr Amira Rodriguez she was seen in the urgent care center due and was dx with a UTI and was prescribed Bactrim for the next 5 days  I told her I will let the provider know and if he has any recommendations we will give her a call

## 2022-06-19 LAB — BACTERIA UR CULT: ABNORMAL

## 2022-06-20 NOTE — TELEPHONE ENCOUNTER
I spoke with Mickie Martinez informed her Dr Hugo Session ok'd for her to take Bactrim and would like for her to complete a bmp this week  Pt verbalized understanding and will complete at a [facility]

## 2022-06-24 ENCOUNTER — APPOINTMENT (OUTPATIENT)
Dept: LAB | Facility: IMAGING CENTER | Age: 72
End: 2022-06-24
Payer: MEDICARE

## 2022-06-24 DIAGNOSIS — I10 ESSENTIAL HYPERTENSION: ICD-10-CM

## 2022-06-24 DIAGNOSIS — N18.32 STAGE 3B CHRONIC KIDNEY DISEASE (HCC): ICD-10-CM

## 2022-06-24 LAB
ANION GAP SERPL CALCULATED.3IONS-SCNC: 6 MMOL/L (ref 4–13)
BUN SERPL-MCNC: 21 MG/DL (ref 5–25)
CALCIUM SERPL-MCNC: 10.1 MG/DL (ref 8.3–10.1)
CHLORIDE SERPL-SCNC: 107 MMOL/L (ref 100–108)
CO2 SERPL-SCNC: 23 MMOL/L (ref 21–32)
CREAT SERPL-MCNC: 1.37 MG/DL (ref 0.6–1.3)
GFR SERPL CREATININE-BSD FRML MDRD: 39 ML/MIN/1.73SQ M
GLUCOSE P FAST SERPL-MCNC: 101 MG/DL (ref 65–99)
POTASSIUM SERPL-SCNC: 4.9 MMOL/L (ref 3.5–5.3)
SODIUM SERPL-SCNC: 136 MMOL/L (ref 136–145)

## 2022-06-24 PROCEDURE — 80048 BASIC METABOLIC PNL TOTAL CA: CPT

## 2022-06-24 PROCEDURE — 36415 COLL VENOUS BLD VENIPUNCTURE: CPT

## 2022-07-12 ENCOUNTER — CONSULT (OUTPATIENT)
Dept: CARDIOLOGY CLINIC | Facility: CLINIC | Age: 72
End: 2022-07-12
Payer: MEDICARE

## 2022-07-12 VITALS
HEART RATE: 101 BPM | DIASTOLIC BLOOD PRESSURE: 70 MMHG | BODY MASS INDEX: 30.56 KG/M2 | WEIGHT: 179 LBS | HEIGHT: 64 IN | SYSTOLIC BLOOD PRESSURE: 120 MMHG

## 2022-07-12 DIAGNOSIS — I48.0 PAROXYSMAL ATRIAL FIBRILLATION (HCC): Primary | ICD-10-CM

## 2022-07-12 DIAGNOSIS — I10 ESSENTIAL HYPERTENSION: ICD-10-CM

## 2022-07-12 PROCEDURE — 99215 OFFICE O/P EST HI 40 MIN: CPT | Performed by: INTERNAL MEDICINE

## 2022-07-12 PROCEDURE — 93000 ELECTROCARDIOGRAM COMPLETE: CPT | Performed by: INTERNAL MEDICINE

## 2022-07-12 RX ORDER — DILTIAZEM HYDROCHLORIDE 120 MG/1
240 CAPSULE, COATED, EXTENDED RELEASE ORAL DAILY
Qty: 90 CAPSULE | Refills: 1 | Status: SHIPPED | OUTPATIENT
Start: 2022-07-12 | End: 2022-09-19 | Stop reason: CLARIF

## 2022-07-12 NOTE — PROGRESS NOTES
Consultation - Electrophysiology Cardiology (EP)   Alber Aguilar 81 y o  female MRN: [id number]  Unit/Bed#:  Encounter: [id number]      Consults  PCP: Kirstin Bethea MD  Outpatient Cardiologist:     Assessment/Plan     Atrial Fibrillation most likely Persistent : Patient denies chest pain, dyspnea, and palpitations  Patient complains that she has been possibly more fatigued over the past couple months which could be related to being in a fib   -Most likely persistent with ECG on 6/10 showing atrial fibrillation  and holter 48 hour on 6/17 one week later with a 100% duration of a fib  Her last normal ecg on file was in December of 2021   -Risk factors for atrial fibrillation: never had sleep study for LESLIE, quit smoking 4 years ago, drinks alcohol occasionally with dinner, she is obese, no other drug use, she has a normal TSH   -I would repeat her TTE   -increase diltiazem to 120 mg q12h for better rate control to at least 70-80 bpm  Patient to call office if bp gets too low or HR too slow (she has bp cuff and pusle ox at 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)  -continue eliquis 5 mg BID and she was given free samples in the clinic today   -plan for atrial fibrillation pulmonary vein isolation ablation ablation with next available time slot in September   -loop recorder during the ablation  The patient's Clotilda [place] is 1 (Age, female, and HTN) and we discussed possibly coming off eliquis with long term monitoring after the ablation     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  -noted     Moderate MR and mild/moderate AS:  -worsening valvular disease could have made the patient prone to new atrial fibrillation   -will follow-up TTE        Case discussed and reviewed with Dr Leonid Corbin pending attending addendum  Thank you for involving us in the care of your patient        Domenica Rocha MD  Cardiology Fellow PGY-5    ==========================================================================================    History of Present Illness   Physician Requesting Consult: No att  providers found  Reason for Consult / Principal Problem: Atrial Fibrillation    HPI: Yi Carranza is a 81y o  year old female with a history of SVT S/p SVT ablation, had AVRT w left lateral pathway and SVC- atach, PAC's/PVC's mild palpitations  NOthing sustained, moderate MR and mild/moderate AS, HTN, moderate pHTN most likely from COPD, and former smoker presenting with atrial fibrillation  Saw Dr Bella Miranda at the beginning fo the month of June for an unscheduled visit for irregular heart rhythm  She had SVT ablation in 2018 with Dr Omi Sutton and the patient's Last ECHO showed mild to moderate AS, moderate MR, normal EF  Dr Bella Miranda noted that the patient has gotten severely bradycardia with Bystolic and is presently on Cardizem  mg daily  He started Start Eliquis 5 mg BID, continued cardiazem and placed the patient on holter monitor for 48 hours  Today the patient     Denies fevers/chills, nausea/vomiting, abdominal pain, diarrhea, cough, chest pain, shortness of breath, PND, orthopnea, presyncopal symptoms, syncopal episodes  ECG:  A fib with rates in the low 100s       DEVICE INTERROGATION      HOLTER  No results found for this or any previous visit  Review of Systems  ROS as noted above in HPI         Historical Information   Past Medical History:   Diagnosis Date    [facility]     Bursitis of hip     Last Assessed:6/5/2017    Herniated lumbar intervertebral disc     [facility] aden[facility]     Last Assessed:2/12/2015 ; Previously on bromocriptine    Spinal stenosis     Systolic murmur of aorta      Past Surgical History:   Procedure Laterality Date    BREAST SURGERY      Incisional breast biopsy    CARDIAC ELECTROPHYSIOLOGY STUDY AND ABLATION      CARPAL TUNNEL RELEASE Left     CHOLECYSTECTOMY      OTHER SURGICAL HISTORY      EXCISION OF PAROTID TUMOR/GLAND    PAROTIDECTOMY      OH COLONOSCOPY FLX DX W/COLLJ SPEC WHEN PFRMD N/A 6/6/2017    Procedure: COLONOSCOPY;  Surgeon: Angela Pisano DO;  [facility]; Service: Gastroenterology    ROTATOR CUFF REPAIR      SALPINGOOPHORECTOMY Right      Social History     Substance and Sexual Activity   Alcohol Use Yes    Alcohol/week: 1 0 standard drink    Types: 1 Shots of liquor per week    Comment: 1 Salli Juvencio a night/social     Social History     Substance and Sexual Activity   Drug Use No     Social History     Tobacco Use   Smoking Status Former Smoker    Packs/day: 1 00    Years: 30 00    Pack years: 30 00    Types: Cigarettes    Quit date: 11/23/2018    Years since quitting: 3 6   Smokeless Tobacco Never Used     Family History: non-contributory    Meds/Allergies   Hospital Medications:   No current facility-administered medications for this visit  Home Medications: (Not in a hospital admission)      Allergies   Allergen Reactions    Amlodipine Rash    Ancef [Cefazolin]     Cephalosporins Other (See Comments)     c diff    Fruit Extracts      Annotation - [id number]: peaches and strawberries    Levofloxacin     Morphine GI Intolerance    Other      FRUIT    Penicillins Hives    Tetracycline Edema    Xarelto [Rivaroxaban] Rash       Objective   Vitals: not currently breastfeeding        [unfilled]    Invasive Devices  Report    None                 Physical Exam    GEN: Cathy Pandey appears well, alert and oriented x 3, pleasant and cooperative   HEENT:  Normocephalic, atraumatic, anicteric, moist mucous membranes  NECK: No JVD or carotid bruits   HEART: II/VI systolic murmur at sternal border that is non radiating, JVP 6, irregular irregular rhythm, tachycardic rate, normal S1 and S2,  clicks, gallops or rubs   LUNGS: Clear to auscultation bilaterally; no wheezes, rales, or rhonchi; respiration nonlabored ABDOMEN:  Normoactive bowel sounds, soft, no tenderness, no distention  EXTREMITIES: peripheral pulses palpable; no edema  NEURO: no gross focal findings; cranial nerves grossly intact   SKIN:  Dry, intact, warm to touch    Lab Results: I have personally reviewed pertinent lab results  Imaging: I have personally reviewed pertinent reports  Previous STRESS TEST:  No results found for this or any previous visit  No results found for this or any previous visit  No results found for this or any previous visit  Previous Cath/PCI:  No results found for this or any previous visit  No results found for this or any previous visit  No results found for this or any previous visit  ECHO:  Results for orders placed during the hospital encounter of 21    Echo complete with contrast if indicated    Narrative  [address]  [phone]    Transthoracic Echocardiogram  2D, M-mode, Doppler, and Color Doppler    Study date:  13-May-2021    Patient: Tri Mccann  MR number: [id number]  Account number: [de-identified]  : 1950  Age: 73 years  Gender: Female  Status: Outpatient  Location: [address] [place] [address] [facility]  Height: 64 in  Weight: 187 7 lb  BP: 120/ 70 mmHg    Indications: SVT    Diagnoses: I47 1 - Supraventricular tachycardia    Sonographer:  Lakesha Decker RDCS  Primary Physician:  Inez Mitchell  Referring Physician:  Sumaya Donald MD  Group:  [id number]  Cardiology Fellow: Janey Horner MD  Interpreting Physician:  Lakesha Decker MD    SUMMARY    LEFT VENTRICLE:  Systolic function was vigorous  Ejection fraction was estimated to be 65 %  There were no regional wall motion abnormalities  Wall thickness was at the upper limits of normal     MITRAL VALVE:  There was mild annular calcification  There was mild to moderate regurgitation      AORTIC VALVE:  The valve was trileaflet  Leaflets exhibited moderately increased thickness, moderate calcification, and mild to moderately reduced cuspal separation  There was mild to moderate stenosis  There was mild to moderate regurgitation  The peak valve velocity was 306 cm/s  Valve mean gradient was 21 6 mmHg  TRICUSPID VALVE:  There was mild regurgitation  Pulmonary artery systolic pressure was mildly increased  Estimated peak PA pressure was 39 mmHg  HISTORY: PRIOR HISTORY: MV Disorder, AI, HTN, DLD, Obesity    PROCEDURE: The study was performed in the [address]  This was a routine study  The transthoracic approach was used  The study included complete 2D imaging, M-mode, complete spectral Doppler, and color Doppler  The  heart rate was 88 bpm, at the start of the study  Images were obtained from the parasternal, apical, subcostal, and suprasternal notch acoustic windows  Echocardiographic views were limited due to lung interference  Image quality was  adequate  LEFT VENTRICLE: Size was normal  Systolic function was vigorous  Ejection fraction was estimated to be 65 %  There were no regional wall motion abnormalities  Wall thickness was normal  Wall thickness was at the upper limits of normal   DOPPLER: Left ventricular diastolic function parameters were normal     RIGHT VENTRICLE: The size was normal  Systolic function was normal  Wall thickness was normal     LEFT ATRIUM: Size was normal     RIGHT ATRIUM: Size was normal     MITRAL VALVE: There was mild annular calcification  Valve structure was normal  There was mild thickening  There was mild-moderate calcification of the anterior and posterior leaflets, with mild chordal involvement  There was normal  leaflet separation  DOPPLER: The transmitral velocity was within the normal range  There was no evidence for stenosis  There was mild to moderate regurgitation  AORTIC VALVE: The valve was trileaflet   Leaflets exhibited moderately increased thickness, moderate calcification, and mild to moderately reduced cuspal separation  DOPPLER: Transaortic velocity was increased due to valvular stenosis  There was mild to moderate stenosis  There was mild to moderate regurgitation  TRICUSPID VALVE: There was normal leaflet separation  DOPPLER: There was mild regurgitation  Pulmonary artery systolic pressure was mildly increased  Estimated peak PA pressure was 39 mmHg  PULMONIC VALVE: Leaflets exhibited normal thickness, no calcification, and normal cuspal separation  DOPPLER: The transpulmonic velocity was within the normal range  There was no significant regurgitation  PERICARDIUM: There was no pericardial effusion  The pericardium was normal in appearance  AORTA: The root exhibited normal size  SYSTEMIC VEINS: IVC: The inferior vena cava was normal in size   Respirophasic changes were normal     MEASUREMENT TABLES    2D MEASUREMENTS  LVOT   (Reference normals)  Diam   19 mm   (--)    DOPPLER MEASUREMENTS  LVOT   (Reference normals)  Peak carmel   160 cm/s   (--)  Mean carmel   110 cm/s   (--)  VTI   31 4 cm   (--)  Peak gradient   7 mmHg   (--)  Mean gradient   5 15 mmHg   (--)  Stroke vol   89 03 ml   (--)  Aortic valve   (Reference normals)  Peak carmel   306 cm/s   (--)  Mean carmel   194 cm/s   (--)  VTI   60 cm   (--)  Peak gradient   33 mmHg   (--)  Mean gradient   21 6 mmHg   (--)  Obstr index, VTI   0 52    (--)  Area index, VTI   0 77 cmï¾²/mï¾²   (--)  Obstr index, Vmax   0 52    (--)  Valve area, Vmax   1 47 cmï¾²   (--)  Area index, Vmax   0 77 cmï¾²/mï¾²   (--)  Obstr index, Vmean   0 57    (--)  Valve area, Vmean   1 62 cmï¾²   (--)  Area index, Vmean   0 85 cmï¾²/mï¾²   (--)    SYSTEM MEASUREMENT TABLES    2D  %FS: 40 08 %  Ao Diam: 2 89 cm  Ao asc: 3 24 cm  EDV(Teich): 115 38 ml  EF(Teich): 70 53 %  ESV(Teich): 34 ml  IVSd: 1 07 cm  LA Diam: 3 56 cm  LAAs A4C: 15 92 cm2  LAESV A-L A4C: 42 92 ml  LAESV MOD A4C: 40 45 ml  LALs A4C: 5 01 cm  LVEDV MOD A4C: 52 89 ml  LVEF MOD A4C: 71 88 %  LVESV MOD A4C: 14 87 ml  LVIDd: 4 95 cm  LVIDs: 2 96 cm  LVLd A4C: 7 15 cm  LVLs A4C: 5 79 cm  LVOT Diam: 1 85 cm  LVPWd: 0 87 cm  RAEDV A-L: 24 89 ml  RAEDV MOD: 23 46 ml  RALd: 4 55 cm  RVIDd: 2 9 cm  SV MOD A4C: 38 02 ml  SV(Teich): 81 38 ml    CW  AR Dec Stearns: 2 32 m/s2  AR Dec Time: 1615 38 ms  AR PHT: 468 46 ms  AR Vmax: 3 75 m/s  AR maxP 2 mmHg  AV Env  Ti: 301 3 ms  AV VTI: 65 34 cm  AV Vmax: 3 06 m/s  AV Vmean: 2 17 m/s  AV maxP 61 mmHg  AV meanP 6 mmHg  MV VTI: 53 15 cm  MV Vmax: 1 73 m/s  MV Vmean: 0 83 m/s  MV maxP 01 mmHg  MV meanPG: 3 58 mmHg  TR Vmax: 2 9 m/s  TR maxP 69 mmHg    MM  TAPSE: 2 28 cm    PW  ZHAO (VTI): 1 22 cm2  ZHAO Vmax: 1 17 cm2  AVAI (VTI): 0 cm2/m2  AVAI Vmax: 0 cm2/m2  E' Sept: 0 08 m/s  E/E' Sept: 18 55  LVOT Env  Ti: 300 67 ms  LVOT VTI: 29 72 cm  LVOT Vmax: 1 33 m/s  LVOT Vmean: 0 99 m/s  LVOT maxP 09 mmHg  LVOT meanP 39 mmHg  LVSI Dopp: 41 86 ml/m2  LVSV Dopp: 79 95 ml  MV A Jose Luis: 1 54 m/s  MV Dec Stearns: 5 11 m/s2  MV DecT: 280 09 ms  MV E Jose Luis: 1 43 m/s  MV E/A Ratio: 0 93  MV PHT: 81 23 ms  MVA (VTI): 1 5 cm2  MVA By PHT: 2 71 cm2    [facility] Accredited Echocardiography Laboratory    Prepared and electronically signed by    Paz Hodge MD  Signed 13-May-2021 10:26:33    No results found for this or any previous visit  AVERY:  No results found for this or any previous visit  No results found for this or any previous visit  CMR:  No results found for this or any previous visit  No results found for this or any previous visit  No results found for this or any previous visit  HOLTER  No results found for this or any previous visit      Results for orders placed during the hospital encounter of 22    Holter monitor    Interpretation Summary  48 HOUR HOLTER MONITOR    INDICATION: Atrial Fibrillation    Average heart rate: 98 bpm  Lowest heart rate: 58 bpm  Highest heart rate: 167 bpm    VENTRICULAR ECTOPY - 0 1% of all monitored beats  Total number: 219  Runs: 0  Ventricular Couplets: 6  Ventricular Triplets: 0  Bigeminy: 0  Trigeminy: 0    ATRIAL FIBRILLATION - 99 9% of all monitored beats  Slowest ventricular response: 101 bpm  Fastest ventricular response: 167 bpm  Pauses: 0    SYMPTOMS  The patient experienced no significant symptoms other than fatigue and shortness of breath with minimal activity during the monitoring time period  Impression  1) Abnormal Holter monitor of 48 hrs duration  2) Normal burden of ventricular ectopic beats  3) Atrial fibrillation for the duration of the study period  4) Overall adequate rate control with occasional rapid ventricular response  5) No pauses noted  Interpreting Physician: Elva Sheikh MD, [facility]        Counseling / Coordination of Care  Total floor / unit time spent today 45 minutes minutes  Greater than 50% of total time was spent with the patient and / or family counseling and / or coordination of care  A description of the counseling / coordination of care:          Epic/ Allscripts/Care Everywhere records reviewed:     ** Please Note: Fluency DirectDictation voice to text software may have been used in the creation of this document   **

## 2022-07-14 ENCOUNTER — TELEPHONE (OUTPATIENT)
Dept: CARDIOLOGY CLINIC | Facility: CLINIC | Age: 72
End: 2022-07-14

## 2022-07-14 DIAGNOSIS — I48.0 PAROXYSMAL ATRIAL FIBRILLATION (HCC): Primary | ICD-10-CM

## 2022-07-14 NOTE — TELEPHONE ENCOUNTER
Spoke with patient in regard ablation procedure will be scheduled in September when new calendar will be open  Dr Woods Asp, patient wants me to let you know that the the increase dose on the medications Diltiazem is been working well on her,  Her pulse is been on 70 - 80BPM, also she said her ankles are no swelling any more

## 2022-07-14 NOTE — TELEPHONE ENCOUNTER
----- Message from Cassi Parish MD sent at 7/12/2022  4:25 PM EDT -----  Please schedule afib ablation, cryo, navx, deepali  Hold eliquis same day

## 2022-07-19 ENCOUNTER — HOSPITAL ENCOUNTER (OUTPATIENT)
Dept: NON INVASIVE DIAGNOSTICS | Facility: CLINIC | Age: 72
Discharge: HOME | End: 2022-07-19
Payer: MEDICARE

## 2022-07-19 VITALS
HEART RATE: 70 BPM | HEIGHT: 64 IN | BODY MASS INDEX: 30.56 KG/M2 | SYSTOLIC BLOOD PRESSURE: 120 MMHG | DIASTOLIC BLOOD PRESSURE: 70 MMHG | WEIGHT: 179 LBS

## 2022-07-19 DIAGNOSIS — I48.0 PAROXYSMAL ATRIAL FIBRILLATION (HCC): ICD-10-CM

## 2022-07-19 LAB
AORTIC ROOT: 2.9 CM
AORTIC VALVE MEAN VELOCITY: 22.1 M/S
APICAL FOUR CHAMBER EJECTION FRACTION: 68 %
ASCENDING AORTA: 3.5 CM
AV AREA BY CONTINUOUS VTI: 1.4 CM2
AV AREA PEAK VELOCITY: 1.3 CM2
AV LVOT MEAN GRADIENT: 4 MMHG
AV LVOT PEAK GRADIENT: 7 MMHG
AV MEAN GRADIENT: 21 MMHG
AV PEAK GRADIENT: 36 MMHG
AV REGURGITATION PRESSURE HALF TIME: 612 MS
AV VALVE AREA: 1.38 CM2
AV VELOCITY RATIO: 0.44
DOP CALC AO PEAK VEL: 2.99 M/S
DOP CALC AO VTI: 65.81 CM
DOP CALC LVOT AREA: 2.83 CM2
DOP CALC LVOT DIAMETER: 1.9 CM
DOP CALC LVOT PEAK VEL VTI: 31.97 CM
DOP CALC LVOT PEAK VEL: 1.32 M/S
DOP CALC LVOT STROKE INDEX: 50.8 ML/M2
DOP CALC LVOT STROKE VOLUME: 90.6 CM3
DOP CALC MV VTI: 57.61 CM
E WAVE DECELERATION TIME: 462 MS
FRACTIONAL SHORTENING: 24 % (ref 28–44)
INTERVENTRICULAR SEPTUM IN DIASTOLE (PARASTERNAL SHORT AXIS VIEW): 1.2 CM
INTERVENTRICULAR SEPTUM: 1.2 CM (ref 0.6–1.1)
LAAS-AP2: 26.6 CM2
LAAS-AP4: 24.4 CM2
LEFT ATRIUM SIZE: 4.1 CM
LEFT INTERNAL DIMENSION IN SYSTOLE: 3.8 CM (ref 2.1–4)
LEFT VENTRICULAR INTERNAL DIMENSION IN DIASTOLE: 5 CM (ref 3.5–6)
LEFT VENTRICULAR POSTERIOR WALL IN END DIASTOLE: 1 CM
LEFT VENTRICULAR STROKE VOLUME: 56 ML
LVSV (TEICH): 56 ML
MV E'TISSUE VEL-SEP: 8 CM/S
MV MEAN GRADIENT: 10 MMHG
MV PEAK A VEL: 0.01 M/S
MV PEAK E VEL: 186 CM/S
MV PEAK GRADIENT: 23 MMHG
MV STENOSIS PRESSURE HALF TIME: 134 MS
MV VALVE AREA BY CONTINUITY EQUATION: 1.57 CM2
MV VALVE AREA P 1/2 METHOD: 1.64 CM2
RIGHT ATRIUM AREA SYSTOLE A4C: 19.4 CM2
RIGHT VENTRICLE ID DIMENSION: 3 CM
SL CV AV DECELERATION TIME RETROGRADE: 2111 MS
SL CV AV PEAK GRADIENT RETROGRADE: 61 MMHG
SL CV LEFT ATRIUM LENGTH A2C: 6.6 CM
SL CV LV EF: 60
SL CV PED ECHO LEFT VENTRICLE DIASTOLIC VOLUME (MOD BIPLANE) 2D: 116 ML
SL CV PED ECHO LEFT VENTRICLE SYSTOLIC VOLUME (MOD BIPLANE) 2D: 60 ML
TR MAX PG: 41 MMHG
TR PEAK VELOCITY: 3.2 M/S
TRICUSPID VALVE PEAK REGURGITATION VELOCITY: 3.18 M/S

## 2022-07-19 PROCEDURE — 93306 TTE W/DOPPLER COMPLETE: CPT | Performed by: INTERNAL MEDICINE

## 2022-07-19 PROCEDURE — 93306 TTE W/DOPPLER COMPLETE: CPT

## 2022-07-22 ENCOUNTER — TELEPHONE (OUTPATIENT)
Dept: NEPHROLOGY | Facility: CLINIC | Age: 72
End: 2022-07-22

## 2022-07-25 NOTE — TELEPHONE ENCOUNTER
Sent patient message through [employer] to schedule follow up appointment  This is the second attempt

## 2022-08-03 NOTE — TELEPHONE ENCOUNTER
Patient scheduled for AVERY/A fib ablation at [facility] on 9/13/22 with Dr Eddie Rodriguez  patamrik aware of instructions, labs and COVID required  Mail out   meds holds : Spironolactone: Hold the AM of  Patient cover under medicare  Can we please have the case

## 2022-08-29 ENCOUNTER — APPOINTMENT (OUTPATIENT)
Dept: LAB | Age: 72
End: 2022-08-29
Payer: MEDICARE

## 2022-08-29 DIAGNOSIS — I48.0 PAROXYSMAL ATRIAL FIBRILLATION (HCC): ICD-10-CM

## 2022-08-29 LAB
ALBUMIN SERPL BCP-MCNC: 3.7 G/DL (ref 3.5–5)
ALP SERPL-CCNC: 92 U/L (ref 46–116)
ALT SERPL W P-5'-P-CCNC: 31 U/L (ref 12–78)
ANION GAP SERPL CALCULATED.3IONS-SCNC: 6 MMOL/L (ref 4–13)
AST SERPL W P-5'-P-CCNC: 21 U/L (ref 5–45)
BASOPHILS # BLD AUTO: 0.06 THOUSANDS/ΜL (ref 0–0.1)
BASOPHILS NFR BLD AUTO: 1 % (ref 0–1)
BILIRUB SERPL-MCNC: 0.6 MG/DL (ref 0.2–1)
BUN SERPL-MCNC: 23 MG/DL (ref 5–25)
CALCIUM SERPL-MCNC: 9.7 MG/DL (ref 8.3–10.1)
CHLORIDE SERPL-SCNC: 109 MMOL/L (ref 96–108)
CO2 SERPL-SCNC: 26 MMOL/L (ref 21–32)
CREAT SERPL-MCNC: 1.24 MG/DL (ref 0.6–1.3)
EOSINOPHIL # BLD AUTO: 0.06 THOUSAND/ΜL (ref 0–0.61)
EOSINOPHIL NFR BLD AUTO: 1 % (ref 0–6)
ERYTHROCYTE [DISTWIDTH] IN BLOOD BY AUTOMATED COUNT: 13.3 % (ref 11.6–15.1)
GFR SERPL CREATININE-BSD FRML MDRD: 44 ML/MIN/1.73SQ M
GLUCOSE P FAST SERPL-MCNC: 96 MG/DL (ref 65–99)
HCT VFR BLD AUTO: 40.8 % (ref 34.8–46.1)
HGB BLD-MCNC: 12.3 G/DL (ref 11.5–15.4)
IMM GRANULOCYTES # BLD AUTO: 0.03 THOUSAND/UL (ref 0–0.2)
IMM GRANULOCYTES NFR BLD AUTO: 0 % (ref 0–2)
LYMPHOCYTES # BLD AUTO: 1.76 THOUSANDS/ΜL (ref 0.6–4.47)
LYMPHOCYTES NFR BLD AUTO: 22 % (ref 14–44)
MCH RBC QN AUTO: 31.6 PG (ref 26.8–34.3)
MCHC RBC AUTO-ENTMCNC: 30.1 G/DL (ref 31.4–37.4)
MCV RBC AUTO: 105 FL (ref 82–98)
MONOCYTES # BLD AUTO: 0.74 THOUSAND/ΜL (ref 0.17–1.22)
MONOCYTES NFR BLD AUTO: 9 % (ref 4–12)
NEUTROPHILS # BLD AUTO: 5.35 THOUSANDS/ΜL (ref 1.85–7.62)
NEUTS SEG NFR BLD AUTO: 67 % (ref 43–75)
NRBC BLD AUTO-RTO: 0 /100 WBCS
PLATELET # BLD AUTO: 291 THOUSANDS/UL (ref 149–390)
PMV BLD AUTO: 11.2 FL (ref 8.9–12.7)
POTASSIUM SERPL-SCNC: 4.5 MMOL/L (ref 3.5–5.3)
PROT SERPL-MCNC: 7.3 G/DL (ref 6.4–8.4)
RBC # BLD AUTO: 3.89 MILLION/UL (ref 3.81–5.12)
SODIUM SERPL-SCNC: 141 MMOL/L (ref 135–147)
WBC # BLD AUTO: 8 THOUSAND/UL (ref 4.31–10.16)

## 2022-08-29 PROCEDURE — 80053 COMPREHEN METABOLIC PANEL: CPT

## 2022-08-29 PROCEDURE — 85025 COMPLETE CBC W/AUTO DIFF WBC: CPT

## 2022-08-29 PROCEDURE — U0003 INFECTIOUS AGENT DETECTION BY NUCLEIC ACID (DNA OR RNA); SEVERE ACUTE RESPIRATORY SYNDROME CORONAVIRUS 2 (SARS-COV-2) (CORONAVIRUS DISEASE [COVID-19]), AMPLIFIED PROBE TECHNIQUE, MAKING USE OF HIGH THROUGHPUT TECHNOLOGIES AS DESCRIBED BY CMS-2020-01-R: HCPCS | Performed by: INTERNAL MEDICINE

## 2022-08-29 PROCEDURE — 36415 COLL VENOUS BLD VENIPUNCTURE: CPT

## 2022-08-29 PROCEDURE — U0005 INFEC AGEN DETEC AMPLI PROBE: HCPCS | Performed by: INTERNAL MEDICINE

## 2022-08-30 LAB — SARS-COV-2 RNA RESP QL NAA+PROBE: NEGATIVE

## 2022-09-02 ENCOUNTER — APPOINTMENT (OUTPATIENT)
Dept: NON INVASIVE DIAGNOSTICS | Facility: HOSPITAL | Age: 72
DRG: 273 | End: 2022-09-02
Attending: INTERNAL MEDICINE
Payer: MEDICARE

## 2022-09-02 ENCOUNTER — ANESTHESIA EVENT (OUTPATIENT)
Dept: NON INVASIVE DIAGNOSTICS | Facility: HOSPITAL | Age: 72
DRG: 273 | End: 2022-09-02
Payer: MEDICARE

## 2022-09-02 ENCOUNTER — HOSPITAL ENCOUNTER (INPATIENT)
Facility: HOSPITAL | Age: 72
LOS: 1 days | Discharge: HOME | DRG: 273 | End: 2022-09-04
Attending: INTERNAL MEDICINE | Admitting: INTERNAL MEDICINE
Payer: MEDICARE

## 2022-09-02 DIAGNOSIS — I48.0 PAROXYSMAL ATRIAL FIBRILLATION (HCC): ICD-10-CM

## 2022-09-02 DIAGNOSIS — I51.89 DIASTOLIC DYSFUNCTION: Primary | ICD-10-CM

## 2022-09-02 PROBLEM — I48.91 ATRIAL FIBRILLATION (HCC): Status: ACTIVE | Noted: 2022-09-02

## 2022-09-02 LAB
ANION GAP SERPL CALCULATED.3IONS-SCNC: 5 MMOL/L (ref 4–13)
ATRIAL RATE: 117 BPM
ATRIAL RATE: 357 BPM
ATRIAL RATE: 84 BPM
BASOPHILS # BLD AUTO: 0.07 THOUSANDS/ΜL (ref 0–0.1)
BASOPHILS NFR BLD AUTO: 1 % (ref 0–1)
BUN SERPL-MCNC: 13 MG/DL (ref 5–25)
CALCIUM SERPL-MCNC: 10 MG/DL (ref 8.3–10.1)
CHLORIDE SERPL-SCNC: 112 MMOL/L (ref 96–108)
CO2 SERPL-SCNC: 26 MMOL/L (ref 21–32)
CREAT SERPL-MCNC: 1.04 MG/DL (ref 0.6–1.3)
EOSINOPHIL # BLD AUTO: 0.13 THOUSAND/ΜL (ref 0–0.61)
EOSINOPHIL NFR BLD AUTO: 2 % (ref 0–6)
ERYTHROCYTE [DISTWIDTH] IN BLOOD BY AUTOMATED COUNT: 13.5 % (ref 11.6–15.1)
GFR SERPL CREATININE-BSD FRML MDRD: 54 ML/MIN/1.73SQ M
GLUCOSE P FAST SERPL-MCNC: 100 MG/DL (ref 65–99)
GLUCOSE SERPL-MCNC: 100 MG/DL (ref 65–140)
HCT VFR BLD AUTO: 41.9 % (ref 34.8–46.1)
HGB BLD-MCNC: 13.1 G/DL (ref 11.5–15.4)
IMM GRANULOCYTES # BLD AUTO: 0.02 THOUSAND/UL (ref 0–0.2)
IMM GRANULOCYTES NFR BLD AUTO: 0 % (ref 0–2)
INR PPP: 1.65 (ref 0.84–1.19)
KCT BLD-ACNC: 309 SEC (ref 89–137)
KCT BLD-ACNC: 329 SEC (ref 89–137)
KCT BLD-ACNC: 337 SEC (ref 89–137)
KCT BLD-ACNC: 343 SEC (ref 89–137)
KCT BLD-ACNC: 433 SEC (ref 89–137)
LYMPHOCYTES # BLD AUTO: 1.67 THOUSANDS/ΜL (ref 0.6–4.47)
LYMPHOCYTES NFR BLD AUTO: 24 % (ref 14–44)
MCH RBC QN AUTO: 32.8 PG (ref 26.8–34.3)
MCHC RBC AUTO-ENTMCNC: 31.3 G/DL (ref 31.4–37.4)
MCV RBC AUTO: 105 FL (ref 82–98)
MONOCYTES # BLD AUTO: 0.56 THOUSAND/ΜL (ref 0.17–1.22)
MONOCYTES NFR BLD AUTO: 8 % (ref 4–12)
NEUTROPHILS # BLD AUTO: 4.62 THOUSANDS/ΜL (ref 1.85–7.62)
NEUTS SEG NFR BLD AUTO: 65 % (ref 43–75)
NRBC BLD AUTO-RTO: 0 /100 WBCS
P AXIS: 69 DEGREES
PLATELET # BLD AUTO: 277 THOUSANDS/UL (ref 149–390)
PMV BLD AUTO: 10.5 FL (ref 8.9–12.7)
POTASSIUM SERPL-SCNC: 4.5 MMOL/L (ref 3.5–5.3)
PR INTERVAL: 179 MS
PROTHROMBIN TIME: 19.7 SECONDS (ref 11.6–14.5)
QRS AXIS: -18 DEGREES
QRS AXIS: -23 DEGREES
QRS AXIS: 21 DEGREES
QRSD INTERVAL: 74 MS
QRSD INTERVAL: 74 MS
QRSD INTERVAL: 83 MS
QT INTERVAL: 340 MS
QT INTERVAL: 358 MS
QT INTERVAL: 363 MS
QTC INTERVAL: 430 MS
QTC INTERVAL: 449 MS
QTC INTERVAL: 464 MS
RBC # BLD AUTO: 3.99 MILLION/UL (ref 3.81–5.12)
SODIUM SERPL-SCNC: 143 MMOL/L (ref 135–147)
SPECIMEN SOURCE: ABNORMAL
T WAVE AXIS: 29 DEGREES
T WAVE AXIS: 52 DEGREES
T WAVE AXIS: 77 DEGREES
VENTRICULAR RATE: 101 BPM
VENTRICULAR RATE: 105 BPM
VENTRICULAR RATE: 84 BPM
WBC # BLD AUTO: 7.07 THOUSAND/UL (ref 4.31–10.16)

## 2022-09-02 PROCEDURE — 93005 ELECTROCARDIOGRAM TRACING: CPT

## 2022-09-02 PROCEDURE — 93010 ELECTROCARDIOGRAM REPORT: CPT | Performed by: INTERNAL MEDICINE

## 2022-09-02 PROCEDURE — 76937 US GUIDE VASCULAR ACCESS: CPT | Performed by: INTERNAL MEDICINE

## 2022-09-02 PROCEDURE — C1730 CATH, EP, 19 OR FEW ELECT: HCPCS | Performed by: INTERNAL MEDICINE

## 2022-09-02 PROCEDURE — C1893 INTRO/SHEATH, FIXED,NON-PEEL: HCPCS | Performed by: INTERNAL MEDICINE

## 2022-09-02 PROCEDURE — NC001 PR NO CHARGE: Performed by: PHYSICIAN ASSISTANT

## 2022-09-02 PROCEDURE — C1733 CATH, EP, OTHR THAN COOL-TIP: HCPCS | Performed by: INTERNAL MEDICINE

## 2022-09-02 PROCEDURE — 92960 CARDIOVERSION ELECTRIC EXT: CPT | Performed by: INTERNAL MEDICINE

## 2022-09-02 PROCEDURE — 85347 COAGULATION TIME ACTIVATED: CPT

## 2022-09-02 PROCEDURE — 85610 PROTHROMBIN TIME: CPT | Performed by: PHYSICIAN ASSISTANT

## 2022-09-02 PROCEDURE — 4A023FZ MEASUREMENT OF CARDIAC RHYTHM, PERCUTANEOUS APPROACH: ICD-10-PCS | Performed by: INTERNAL MEDICINE

## 2022-09-02 PROCEDURE — 5A2204Z RESTORATION OF CARDIAC RHYTHM, SINGLE: ICD-10-PCS | Performed by: INTERNAL MEDICINE

## 2022-09-02 PROCEDURE — 85025 COMPLETE CBC W/AUTO DIFF WBC: CPT | Performed by: PHYSICIAN ASSISTANT

## 2022-09-02 PROCEDURE — 93623 PRGRMD STIMJ&PACG IV RX NFS: CPT | Performed by: INTERNAL MEDICINE

## 2022-09-02 PROCEDURE — C1732 CATH, EP, DIAG/ABL, 3D/VECT: HCPCS | Performed by: INTERNAL MEDICINE

## 2022-09-02 PROCEDURE — 02K83ZZ MAP CONDUCTION MECHANISM, PERCUTANEOUS APPROACH: ICD-10-PCS | Performed by: INTERNAL MEDICINE

## 2022-09-02 PROCEDURE — C9113 INJ PANTOPRAZOLE SODIUM, VIA: HCPCS | Performed by: PHYSICIAN ASSISTANT

## 2022-09-02 PROCEDURE — C1894 INTRO/SHEATH, NON-LASER: HCPCS | Performed by: INTERNAL MEDICINE

## 2022-09-02 PROCEDURE — 80048 BASIC METABOLIC PNL TOTAL CA: CPT | Performed by: PHYSICIAN ASSISTANT

## 2022-09-02 PROCEDURE — 93656 COMPRE EP EVAL ABLTJ ATR FIB: CPT | Performed by: INTERNAL MEDICINE

## 2022-09-02 PROCEDURE — 93312 ECHO TRANSESOPHAGEAL: CPT

## 2022-09-02 PROCEDURE — 4A0234Z MEASUREMENT OF CARDIAC ELECTRICAL ACTIVITY, PERCUTANEOUS APPROACH: ICD-10-PCS | Performed by: INTERNAL MEDICINE

## 2022-09-02 PROCEDURE — C1759 CATH, INTRA ECHOCARDIOGRAPHY: HCPCS | Performed by: INTERNAL MEDICINE

## 2022-09-02 PROCEDURE — C1769 GUIDE WIRE: HCPCS | Performed by: INTERNAL MEDICINE

## 2022-09-02 PROCEDURE — 93657 TX L/R ATRIAL FIB ADDL: CPT | Performed by: INTERNAL MEDICINE

## 2022-09-02 PROCEDURE — 02583ZZ DESTRUCTION OF CONDUCTION MECHANISM, PERCUTANEOUS APPROACH: ICD-10-PCS | Performed by: INTERNAL MEDICINE

## 2022-09-02 RX ORDER — SUCCINYLCHOLINE/SOD CL,ISO/PF 100 MG/5ML
SYRINGE (ML) INTRAVENOUS AS NEEDED
Status: DISCONTINUED | OUTPATIENT
Start: 2022-09-02 | End: 2022-09-02

## 2022-09-02 RX ORDER — PROMETHAZINE HYDROCHLORIDE 25 MG/ML
25 INJECTION, SOLUTION INTRAMUSCULAR; INTRAVENOUS ONCE AS NEEDED
Status: DISCONTINUED | OUTPATIENT
Start: 2022-09-02 | End: 2022-09-02

## 2022-09-02 RX ORDER — HEPARIN SODIUM 10000 [USP'U]/100ML
INJECTION, SOLUTION INTRAVENOUS
Status: DISCONTINUED | OUTPATIENT
Start: 2022-09-02 | End: 2022-09-02 | Stop reason: HOSPADM

## 2022-09-02 RX ORDER — PROTAMINE SULFATE 10 MG/ML
INJECTION, SOLUTION INTRAVENOUS AS NEEDED
Status: DISCONTINUED | OUTPATIENT
Start: 2022-09-02 | End: 2022-09-02

## 2022-09-02 RX ORDER — ALBUTEROL SULFATE 90 UG/1
1 AEROSOL, METERED RESPIRATORY (INHALATION) EVERY 6 HOURS PRN
Status: DISCONTINUED | OUTPATIENT
Start: 2022-09-02 | End: 2022-09-03

## 2022-09-02 RX ORDER — ATORVASTATIN CALCIUM 20 MG/1
20 TABLET, FILM COATED ORAL DAILY
Status: DISCONTINUED | OUTPATIENT
Start: 2022-09-03 | End: 2022-09-04 | Stop reason: HOSPADM

## 2022-09-02 RX ORDER — LORATADINE 10 MG/1
10 TABLET ORAL DAILY
Status: DISCONTINUED | OUTPATIENT
Start: 2022-09-03 | End: 2022-09-04 | Stop reason: HOSPADM

## 2022-09-02 RX ORDER — LIDOCAINE HYDROCHLORIDE 10 MG/ML
INJECTION, SOLUTION EPIDURAL; INFILTRATION; INTRACAUDAL; PERINEURAL AS NEEDED
Status: DISCONTINUED | OUTPATIENT
Start: 2022-09-02 | End: 2022-09-02

## 2022-09-02 RX ORDER — DILTIAZEM HYDROCHLORIDE 120 MG/1
120 CAPSULE, COATED, EXTENDED RELEASE ORAL DAILY
Status: DISCONTINUED | OUTPATIENT
Start: 2022-09-03 | End: 2022-09-04 | Stop reason: HOSPADM

## 2022-09-02 RX ORDER — ONDANSETRON 2 MG/ML
4 INJECTION INTRAMUSCULAR; INTRAVENOUS ONCE AS NEEDED
Status: DISCONTINUED | OUTPATIENT
Start: 2022-09-02 | End: 2022-09-02

## 2022-09-02 RX ORDER — LOSARTAN POTASSIUM 25 MG/1
25 TABLET ORAL DAILY
Status: DISCONTINUED | OUTPATIENT
Start: 2022-09-03 | End: 2022-09-04 | Stop reason: HOSPADM

## 2022-09-02 RX ORDER — HEPARIN SODIUM 1000 [USP'U]/ML
INJECTION, SOLUTION INTRAVENOUS; SUBCUTANEOUS AS NEEDED
Status: DISCONTINUED | OUTPATIENT
Start: 2022-09-02 | End: 2022-09-02 | Stop reason: HOSPADM

## 2022-09-02 RX ORDER — SODIUM CHLORIDE 9 MG/ML
INJECTION, SOLUTION INTRAVENOUS CONTINUOUS PRN
Status: DISCONTINUED | OUTPATIENT
Start: 2022-09-02 | End: 2022-09-02

## 2022-09-02 RX ORDER — MEPERIDINE HYDROCHLORIDE 25 MG/ML
12.5 INJECTION INTRAMUSCULAR; INTRAVENOUS; SUBCUTANEOUS
Status: DISCONTINUED | OUTPATIENT
Start: 2022-09-02 | End: 2022-09-02

## 2022-09-02 RX ORDER — ONDANSETRON 2 MG/ML
INJECTION INTRAMUSCULAR; INTRAVENOUS AS NEEDED
Status: DISCONTINUED | OUTPATIENT
Start: 2022-09-02 | End: 2022-09-02

## 2022-09-02 RX ORDER — ESCITALOPRAM OXALATE 10 MG/1
10 TABLET ORAL DAILY
Status: DISCONTINUED | OUTPATIENT
Start: 2022-09-03 | End: 2022-09-04 | Stop reason: HOSPADM

## 2022-09-02 RX ORDER — SPIRONOLACTONE 25 MG/1
25 TABLET ORAL DAILY
Status: DISCONTINUED | OUTPATIENT
Start: 2022-09-03 | End: 2022-09-04 | Stop reason: HOSPADM

## 2022-09-02 RX ORDER — ACETAMINOPHEN 325 MG/1
650 TABLET ORAL EVERY 6 HOURS PRN
Status: DISCONTINUED | OUTPATIENT
Start: 2022-09-02 | End: 2022-09-04 | Stop reason: HOSPADM

## 2022-09-02 RX ORDER — PANTOPRAZOLE SODIUM 40 MG/1
40 TABLET, DELAYED RELEASE ORAL
Status: DISCONTINUED | OUTPATIENT
Start: 2022-09-03 | End: 2022-09-04 | Stop reason: HOSPADM

## 2022-09-02 RX ORDER — PANTOPRAZOLE SODIUM 40 MG/10ML
40 INJECTION, POWDER, LYOPHILIZED, FOR SOLUTION INTRAVENOUS ONCE
Status: COMPLETED | OUTPATIENT
Start: 2022-09-02 | End: 2022-09-02

## 2022-09-02 RX ORDER — FENTANYL CITRATE 50 UG/ML
INJECTION, SOLUTION INTRAMUSCULAR; INTRAVENOUS AS NEEDED
Status: DISCONTINUED | OUTPATIENT
Start: 2022-09-02 | End: 2022-09-02

## 2022-09-02 RX ORDER — PROPOFOL 10 MG/ML
INJECTION, EMULSION INTRAVENOUS AS NEEDED
Status: DISCONTINUED | OUTPATIENT
Start: 2022-09-02 | End: 2022-09-02

## 2022-09-02 RX ORDER — FENTANYL CITRATE/PF 50 MCG/ML
50 SYRINGE (ML) INJECTION
Status: DISCONTINUED | OUTPATIENT
Start: 2022-09-02 | End: 2022-09-04 | Stop reason: HOSPADM

## 2022-09-02 RX ORDER — SODIUM CHLORIDE, SODIUM LACTATE, POTASSIUM CHLORIDE, CALCIUM CHLORIDE 600; 310; 30; 20 MG/100ML; MG/100ML; MG/100ML; MG/100ML
125 INJECTION, SOLUTION INTRAVENOUS CONTINUOUS
Status: CANCELLED | OUTPATIENT
Start: 2022-09-02

## 2022-09-02 RX ORDER — HYDROMORPHONE HCL/PF 1 MG/ML
0.4 SYRINGE (ML) INJECTION
Status: DISCONTINUED | OUTPATIENT
Start: 2022-09-02 | End: 2022-09-04 | Stop reason: HOSPADM

## 2022-09-02 RX ORDER — DEXAMETHASONE SODIUM PHOSPHATE 10 MG/ML
INJECTION, SOLUTION INTRAMUSCULAR; INTRAVENOUS AS NEEDED
Status: DISCONTINUED | OUTPATIENT
Start: 2022-09-02 | End: 2022-09-02

## 2022-09-02 RX ADMIN — FENTANYL CITRATE 50 MCG: 50 INJECTION INTRAMUSCULAR; INTRAVENOUS at 09:08

## 2022-09-02 RX ADMIN — ONDANSETRON 4 MG: 2 INJECTION INTRAMUSCULAR; INTRAVENOUS at 11:05

## 2022-09-02 RX ADMIN — APIXABAN 5 MG: 5 TABLET, FILM COATED ORAL at 17:03

## 2022-09-02 RX ADMIN — PROPOFOL 50 MG: 10 INJECTION, EMULSION INTRAVENOUS at 08:12

## 2022-09-02 RX ADMIN — Medication 50 MCG: at 12:04

## 2022-09-02 RX ADMIN — PHENYLEPHRINE HYDROCHLORIDE 10 MCG/MIN: 10 INJECTION INTRAVENOUS at 08:07

## 2022-09-02 RX ADMIN — Medication 100 MG: at 08:01

## 2022-09-02 RX ADMIN — Medication 50 MCG: at 15:22

## 2022-09-02 RX ADMIN — LIDOCAINE HYDROCHLORIDE 50 MG: 10 INJECTION, SOLUTION EPIDURAL; INFILTRATION; INTRACAUDAL; PERINEURAL at 08:01

## 2022-09-02 RX ADMIN — SODIUM CHLORIDE: 0.9 INJECTION, SOLUTION INTRAVENOUS at 11:39

## 2022-09-02 RX ADMIN — PANTOPRAZOLE SODIUM 40 MG: 40 INJECTION, POWDER, FOR SOLUTION INTRAVENOUS at 08:10

## 2022-09-02 RX ADMIN — FENTANYL CITRATE 50 MCG: 50 INJECTION INTRAMUSCULAR; INTRAVENOUS at 08:22

## 2022-09-02 RX ADMIN — ACETAMINOPHEN 650 MG: 325 TABLET ORAL at 23:46

## 2022-09-02 RX ADMIN — NOREPINEPHRINE BITARTRATE 5 MCG/MIN: 1 INJECTION INTRAVENOUS at 08:26

## 2022-09-02 RX ADMIN — DEXAMETHASONE SODIUM PHOSPHATE 10 MG: 10 INJECTION, SOLUTION INTRAMUSCULAR; INTRAVENOUS at 08:16

## 2022-09-02 RX ADMIN — SODIUM CHLORIDE: 0.9 INJECTION, SOLUTION INTRAVENOUS at 07:54

## 2022-09-02 RX ADMIN — PROTAMINE SULFATE 40 MG: 10 INJECTION, SOLUTION INTRAVENOUS at 11:19

## 2022-09-02 RX ADMIN — PROPOFOL 150 MG: 10 INJECTION, EMULSION INTRAVENOUS at 08:01

## 2022-09-02 NOTE — PLAN OF CARE
Problem: PAIN - ADULT  Goal: Verbalizes/displays adequate comfort level or baseline comfort level  Description: Interventions:  - Encourage patient to monitor pain and request assistance  - Assess pain using appropriate pain scale  - Administer analgesics based on type and severity of pain and evaluate response  - Implement non-pharmacological measures as appropriate and evaluate response  - Consider cultural and social influences on pain and pain management  - Notify physician/advanced practitioner if interventions unsuccessful or patient reports new pain  Outcome: Progressing     Problem: INFECTION - ADULT  Goal: Absence or prevention of progression during hospitalization  Description: INTERVENTIONS:  - Assess and monitor for signs and symptoms of infection  - Monitor lab/diagnostic results  - Monitor all insertion sites, i e  indwelling lines, tubes, and drains  - Monitor endotracheal if appropriate and nasal secretions for changes in amount and color  - [place] appropriate cooling/warming therapies per order  - Administer medications as ordered  - Instruct and encourage patient and family to use good hand hygiene technique  - Identify and instruct in appropriate isolation precautions for identified infection/condition  Outcome: Progressing  Goal: Absence of fever/infection during neutropenic period  Description: INTERVENTIONS:  - Monitor WBC    Outcome: Progressing     Problem: SAFETY ADULT  Goal: Patient will remain free of falls  Description: INTERVENTIONS:  - Educate patient/family on patient safety including physical limitations  - Instruct patient to call for assistance with activity   - Consult OT/PT to assist with strengthening/mobility   - Keep Call bell within reach  - Keep bed low and locked with side rails adjusted as appropriate  - Keep care items and personal belongings within reach  - Initiate and maintain comfort rounds  - Make Fall Risk Sign visible to staff  - Offer Toileting every  Hours, in advance of need  - Initiate/Maintain alarm  - Obtain necessary fall risk management equipment:   - Apply yellow socks and bracelet for high fall risk patients  - Consider moving patient to room near nurses station  Outcome: Progressing  Goal: Maintain or return to baseline ADL function  Description: INTERVENTIONS:  -  Assess patient's ability to carry out ADLs; assess patient's baseline for ADL function and identify physical deficits which impact ability to perform ADLs (bathing, care of mouth/teeth, toileting, grooming, dressing, etc )  - Assess/evaluate cause of self-care deficits   - Assess range of motion  - Assess patient's mobility; develop plan if impaired  - Assess patient's need for assistive devices and provide as appropriate  - Encourage maximum independence but intervene and supervise when necessary  - Involve family in performance of ADLs  - Assess for home care needs following discharge   - Consider OT consult to assist with ADL evaluation and planning for discharge  - Provide patient education as appropriate  Outcome: Progressing  Goal: Maintains/Returns to pre admission functional level  Description: INTERVENTIONS:  - Perform BMAT or MOVE assessment daily    - Set and communicate daily mobility goal to care team and patient/family/caregiver  - Collaborate with rehabilitation services on mobility goals if consulted  - Perform Range of Motion  times a day  - Reposition patient every  hours    - Dangle patient  times a day  - Stand patient  times a day  - Ambulate patient  times a day  - Out of bed to chair  times a day   - Out of bed for meal times a day  - Out of bed for toileting  - Record patient progress and toleration of activity level   Outcome: Progressing     Problem: DISCHARGE PLANNING  Goal: Discharge to home or other facility with appropriate resources  Description: INTERVENTIONS:  - Identify barriers to discharge w/patient and caregiver  - Arrange for needed discharge resources and transportation as appropriate  - Identify discharge learning needs (meds, wound care, etc )  - Arrange for interpretive services to assist at discharge as needed  - Refer to Case Management Department for coordinating discharge planning if the patient needs post-hospital services based on physician/advanced practitioner order or complex needs related to functional status, cognitive ability, or social support system  Outcome: Progressing     Problem: Knowledge Deficit  Goal: Patient/family/caregiver demonstrates understanding of disease process, treatment plan, medications, and discharge instructions  Description: Complete learning assessment and assess knowledge base    Interventions:  - Provide teaching at level of understanding  - Provide teaching via preferred learning methods  Outcome: Progressing

## 2022-09-02 NOTE — H&P
H&P Exam - Cardiology   Cathy Pandey 81 y o  female MRN: [id number]  Unit/Bed#: BE CATH LAB ROOM Encounter: [id number]    Assessment/Plan     Assessment:  1  Persistent atrial fibrillation with rapid ventricular response   A ) First noted at office visit 06/2022   B ) maintained on Eliquis anticoagulation, rate controlled with Cardizem (increased at office visit 07/2022 due to ongoing tachycardia, but history of sinus bradycardia on beta-blockers)  2  SVT, status post ablation of AVRT via left lateral pathway and right SVC atrial tachycardia 12/2018  3  Preserved LV systolic function with ejection fraction 60% per echo 07/2022  4  Moderate mitral stenosis, mild-to-moderate aortic stenosis, mild-to-moderate tricuspid regurgitation  5  Moderate to severe pulmonary hypertension  6  Hypertension  7  CKD  8  COPD with history of tobacco abuse  9  Hyperlipidemia      Plan:  AFib ablation      History of Present Illness   HPI:  Cathy Pandey is a 81y o  year old female with SVT with prior ablation of AVRT via left lateral pathway and right SVC atrial tachycardia ablation 12/2018, preserved LV systolic function, hypertension, hyperlipidemia, moderate mitral stenosis/mild-to-moderate aortic stenosis/mild-to-moderate tricuspid regurgitation, pulmonary hypertension, COPD, and CKD  She typically follows with Dr Td Glasgow as an outpatient  From an EP standpoint, she has a history of palpitations and was noted to have SVT during a hospital admission for COPD in 2018  She subsequently underwent successful ablation of AVRT and atrial tachycardia by Dr Hoa Gibson 12/2018  She did well until 06/2022, when she was seen urgently in the office for an irregular heart rate  She is found to be in new onset atrial fibrillation with rapid ventricular response  She was started on Eliquis anticoagulation, and was later seen again by Dr Billie Escobar 07/2022 for ongoing management    An atrial fibrillation ablation was recommended, and in the meantime her Cardizem was increased  She presents today to undergo that procedure  She reports lower extremity swelling on the higher dose of Cardizem, but was able to handle the lower dose well  Otherwise, no significant changes since she was recently seen  Review of Systems  ROS as noted above, otherwise 12 point review of systems was performed and is negative  Historical Information   Past Medical History:   Diagnosis Date    Atrial fibrillation ([facility] )     Bursitis of hip     Last Assessed:6/5/2017    Herniated lumbar intervertebral disc     Pituitary adenoma ([facility] )     Last Assessed:2/12/2015 ; Previously on bromocriptine    Spinal stenosis     Systolic murmur of aorta      Past Surgical History:   Procedure Laterality Date    BREAST SURGERY      Incisional breast biopsy    CARDIAC ELECTROPHYSIOLOGY STUDY AND ABLATION      CARPAL TUNNEL RELEASE Left     CHOLECYSTECTOMY      OTHER SURGICAL HISTORY      EXCISION OF PAROTID TUMOR/GLAND    PAROTIDECTOMY      MT COLONOSCOPY FLX DX W/COLLJ SPEC WHEN PFRMD N/A 6/6/2017    Procedure: COLONOSCOPY;  Surgeon: Christopher Álvarez DO;  Location: [facility];   Service: Gastroenterology    ROTATOR CUFF REPAIR      SALPINGOOPHORECTOMY Right      Family History:   Family History   Problem Relation Age of Onset    Colon cancer Mother     Coronary artery disease Mother     Colonic polyp Mother     Liver cancer Mother     Multiple sclerosis Father     Hypertension Other     Hypertension Brother        Social History   Social History     Substance and Sexual Activity   Alcohol Use Yes    Alcohol/week: 1 0 standard drink    Types: 1 Shots of liquor per week    Comment: 1 [place] a night/social     Social History     Substance and Sexual Activity   Drug Use No     Social History     Tobacco Use   Smoking Status Former Smoker    Packs/day: 1 00    Years: 30 00    Pack years: 30 00    Types: Cigarettes    Quit date: 11/23/2018    Years since quitting: 3 7   Smokeless Tobacco Never Used         Meds/Allergies   all medications and allergies reviewed  Home Medications:   Medications Prior to Admission   Medication    acetaminophen (TYLENOL) 650 mg CR tablet    albuterol (PROVENTIL HFA) 90 mcg/act inhaler    apixaban (Eliquis) 5 mg    atorvastatin (LIPITOR) 20 mg tablet    cetirizine (ZyrTEC) 10 mg tablet    diltiazem (CARDIZEM CD) 120 mg 24 hr capsule    escitalopram (LEXAPRO) 10 mg tablet    losartan (COZAAR) 25 mg tablet    spironolactone (ALDACTONE) 25 mg tablet    clotrimazole-betamethasone (LOTRISONE) 1-0 05 % cream    Multiple Vitamin (MULTI VITAMIN DAILY PO)       Allergies   Allergen Reactions    Amlodipine Rash    Ancef [Cefazolin]     Cephalosporins Other (See Comments)     c diff    Fruit Extracts      Annotation - [id number]: peaches and strawberries    Levofloxacin     Morphine GI Intolerance    Other      FRUIT    Penicillins Hives    Tetracycline Edema    Xarelto [Rivaroxaban] Rash       Objective   Vitals: Blood pressure 113/75, pulse (!) 119, temperature (!) 97 4 °F (36 3 °C), temperature source Temporal, resp  rate 18, height 5' 4" (1 626 m), weight 81 2 kg (179 lb 0 2 oz), SpO2 96 %, not currently breastfeeding    Orthostatic Blood Pressures    Flowsheet Row Most Recent Value   Blood Pressure 113/75 filed at 09/02/2022 0708          No intake or output data in the 24 hours ending 09/02/22 0737    Invasive Devices  Report    Peripheral Intravenous Line  Duration           Peripheral IV 09/02/22 Left Antecubital <1 day    Peripheral IV 09/02/22 Proximal;Right;Ventral (anterior) Forearm <1 day                Physical Exam  GEN: NAD, alert and oriented x 3, well appearing  SKIN: dry without significant lesions or rashes  HEENT: NCAT, PERRL, EOMs intact  NECK: No JVD appreciated  CARDIOVASCULAR:  Irregularly irregular, normal S1, S2 without murmurs, rubs, or gallops appreciated  LUNGS: Clear to auscultation bilaterally without wheezes, rhonchi, or rales  ABDOMEN: Soft, nontender, nondistended  EXTREMITIES/VASCULAR: perfused without clubbing, cyanosis, or LE edema b/l  PSYCH: Normal mood and affect  NEURO: CN ll-Xll grossly intact      Lab Results: I have personally reviewed pertinent lab results  Results from last 7 days   Lab Units 22  0647 22  0930   WBC Thousand/uL 7 07 8 00   HEMOGLOBIN g/dL 13 1 12 3   HEMATOCRIT % 41 9 40 8   PLATELETS Thousands/uL 277 291     Results from last 7 days   Lab Units 22  0647 22  0930   POTASSIUM mmol/L 4 5 4 5   CHLORIDE mmol/L 112* 109*   CO2 mmol/L 26 26   BUN mg/dL 13 23   CREATININE mg/dL 1 04 1 24   CALCIUM mg/dL 10 0 9 7     Results from last 7 days   Lab Units 22  0647   INR  1 65*             Imaging: I have personally reviewed pertinent reports  Results for orders placed during the hospital encounter of 21    Echo complete with contrast if indicated    Narrative  [facility]  [address] [place] [address]  [phone]    Transthoracic Echocardiogram  2D, M-mode, Doppler, and Color Doppler    Study date:  13-May-2021    Patient: Praveen Parish  MR number: [id number]  Account number: [de-identified]  : 1950  Age: 73 years  Gender: Female  Status: Outpatient  [facility]: [address] [place] [address] [facility]  Height: 64 in  Weight: 187 7 lb  BP: 120/ 70 mmHg    Indications: SVT    Diagnoses: I47 1 - Supraventricular tachycardia    Sonographer:  Froylan Rogers RDCS  Primary Physician:  Cuong Alexander  Referring Physician:  Francis Herman MD  Group:  [id number]  Cardiology Fellow: Ricky Mckeon MD  Interpreting Physician:  Froylan Rogers MD    SUMMARY    LEFT VENTRICLE:  Systolic function was vigorous  Ejection fraction was estimated to be 65 %  There were no regional wall motion abnormalities    Wall thickness was at the upper limits of normal     MITRAL VALVE:  There was mild annular calcification  There was mild to moderate regurgitation  AORTIC VALVE:  The valve was trileaflet  Leaflets exhibited moderately increased thickness, moderate calcification, and mild to moderately reduced cuspal separation  There was mild to moderate stenosis  There was mild to moderate regurgitation  The peak valve velocity was 306 cm/s  Valve mean gradient was 21 6 mmHg  TRICUSPID VALVE:  There was mild regurgitation  Pulmonary artery systolic pressure was mildly increased  Estimated peak PA pressure was 39 mmHg  HISTORY: PRIOR HISTORY: MV Disorder, AI, HTN, DLD, Obesity    PROCEDURE: The study was performed in the [address]  This was a routine study  The transthoracic approach was used  The study included complete 2D imaging, M-mode, complete spectral Doppler, and color Doppler  The  heart rate was 88 bpm, at the start of the study  Images were obtained from the parasternal, apical, subcostal, and suprasternal notch acoustic windows  Echocardiographic views were limited due to lung interference  Image quality was  adequate  LEFT VENTRICLE: Size was normal  Systolic function was vigorous  Ejection fraction was estimated to be 65 %  There were no regional wall motion abnormalities  Wall thickness was normal  Wall thickness was at the upper limits of normal   DOPPLER: Left ventricular diastolic function parameters were normal     RIGHT VENTRICLE: The size was normal  Systolic function was normal  Wall thickness was normal     LEFT ATRIUM: Size was normal     RIGHT ATRIUM: Size was normal     MITRAL VALVE: There was mild annular calcification  Valve structure was normal  There was mild thickening  There was mild-moderate calcification of the anterior and posterior leaflets, with mild chordal involvement  There was normal  leaflet separation  DOPPLER: The transmitral velocity was within the normal range  There was no evidence for stenosis   There was mild to moderate regurgitation  AORTIC VALVE: The valve was trileaflet  Leaflets exhibited moderately increased thickness, moderate calcification, and mild to moderately reduced cuspal separation  DOPPLER: Transaortic velocity was increased due to valvular stenosis  There was mild to moderate stenosis  There was mild to moderate regurgitation  TRICUSPID VALVE: There was normal leaflet separation  DOPPLER: There was mild regurgitation  Pulmonary artery systolic pressure was mildly increased  Estimated peak PA pressure was 39 mmHg  PULMONIC VALVE: Leaflets exhibited normal thickness, no calcification, and normal cuspal separation  DOPPLER: The transpulmonic velocity was within the normal range  There was no significant regurgitation  PERICARDIUM: There was no pericardial effusion  The pericardium was normal in appearance  AORTA: The root exhibited normal size  SYSTEMIC VEINS: IVC: The inferior vena cava was normal in size   Respirophasic changes were normal     MEASUREMENT TABLES    2D MEASUREMENTS  LVOT   (Reference normals)  Diam   19 mm   (--)    DOPPLER MEASUREMENTS  LVOT   (Reference normals)  Peak carmel   160 cm/s   (--)  Mean carmel   110 cm/s   (--)  VTI   31 4 cm   (--)  Peak gradient   7 mmHg   (--)  Mean gradient   5 15 mmHg   (--)  Stroke vol   89 03 ml   (--)  Aortic valve   (Reference normals)  Peak carmel   306 cm/s   (--)  Mean carmel   194 cm/s   (--)  VTI   60 cm   (--)  Peak gradient   33 mmHg   (--)  Mean gradient   21 6 mmHg   (--)  Obstr index, VTI   0 52    (--)  Area index, VTI   0 77 cmï¾²/mï¾²   (--)  Obstr index, Vmax   0 52    (--)  Valve area, Vmax   1 47 cmï¾²   (--)  Area index, Vmax   0 77 cmï¾²/mï¾²   (--)  Obstr index, Vmean   0 57    (--)  Valve area, Vmean   1 62 cmï¾²   (--)  Area index, Vmean   0 85 cmï¾²/mï¾²   (--)    SYSTEM MEASUREMENT TABLES    2D  %FS: 40 08 %  Ao Diam: 2 89 cm  Ao asc: 3 24 cm  EDV(Teich): 115 38 ml  EF(Teich): 70 53 %  ESV(Teich): 34 ml  IVSd: 1 07 cm  LA Diam: 3 56 cm  LAAs A4C: 15 92 cm2  LAESV A-L A4C: 42 92 ml  LAESV MOD A4C: 40 45 ml  LALs A4C: 5 01 cm  LVEDV MOD A4C: 52 89 ml  LVEF MOD A4C: 71 88 %  LVESV MOD A4C: 14 87 ml  LVIDd: 4 95 cm  LVIDs: 2 96 cm  LVLd A4C: 7 15 cm  LVLs A4C: 5 79 cm  LVOT Diam: 1 85 cm  LVPWd: 0 87 cm  RAEDV A-L: 24 89 ml  RAEDV MOD: 23 46 ml  RALd: 4 55 cm  RVIDd: 2 9 cm  SV MOD A4C: 38 02 ml  SV(Teich): 81 38 ml    CW  AR Dec Wood: 2 32 m/s2  AR Dec Time: 1615 38 ms  AR PHT: 468 46 ms  AR Vmax: 3 75 m/s  AR maxP 2 mmHg  AV Env  Ti: 301 3 ms  AV VTI: 65 34 cm  AV Vmax: 3 06 m/s  AV Vmean: 2 17 m/s  AV maxP 61 mmHg  AV meanP 6 mmHg  MV VTI: 53 15 cm  MV Vmax: 1 73 m/s  MV Vmean: 0 83 m/s  MV maxP 01 mmHg  MV meanPG: 3 58 mmHg  TR Vmax: 2 9 m/s  TR maxP 69 mmHg    MM  TAPSE: 2 28 cm    PW  ZHAO (VTI): 1 22 cm2  ZHAO Vmax: 1 17 cm2  AVAI (VTI): 0 cm2/m2  AVAI Vmax: 0 cm2/m2  E' Sept: 0 08 m/s  E/E' Sept: 18 55  LVOT Env  Ti: 300 67 ms  LVOT VTI: 29 72 cm  LVOT Vmax: 1 33 m/s  LVOT Vmean: 0 99 m/s  LVOT maxP 09 mmHg  LVOT meanP 39 mmHg  LVSI Dopp: 41 86 ml/m2  LVSV Dopp: 79 95 ml  MV A Jose Luis: 1 54 m/s  MV Dec Wood: 5 11 m/s2  MV DecT: 280 09 ms  MV E Jose Luis: 1 43 m/s  MV E/A Ratio: 0 93  MV PHT: 81 23 ms  MVA (VTI): 1 5 cm2  MVA By PHT: 2 71 cm2    [employer] Accredited Echocardiography Laboratory    Prepared and electronically signed by    Alyssa Lomas MD  Signed 13-May-2021 10:26:33      EKG:  Pending        Code Status: Level 1 - Full Code

## 2022-09-02 NOTE — DISCHARGE SUMMARY
Discharge Summary - Adelaide Perez 81 y o  female MRN: [id number]    Unit/Bed#: -01 Encounter: [id number]      Admission Date: 9/2/2022   Discharge Date: 9/4/2022    Discharge Diagnosis:   1  Persistent atrial fibrillation with rapid ventricular response, status post cryoablation with pulmonary vein isolation and posterior wall isolation 9/2/2022              A ) First noted at office visit 06/2022              B ) maintained on Eliquis anticoagulation, rate controlled with Cardizem (increased at office visit 07/2022 due to ongoing tachycardia, but history of sinus bradycardia on beta-blockers)  2  SVT, status post ablation of AVRT via left lateral pathway and right SVC atrial tachycardia 12/2018  3  Preserved LV systolic function with ejection fraction 60% per echo 07/2022  4  Moderate mitral stenosis, mild-to-moderate aortic stenosis, mild-to-moderate tricuspid regurgitation  5  Moderate to severe pulmonary hypertension  6  Hypertension  7  CKD  8  COPD with history of tobacco abuse  9  Hyperlipidemia  10  Acute HFpEF       Procedures Performed:   Orders Placed This Encounter   Procedures    Cardiac ep lab eps/ablations   1  Atrial fibrillation ablation using Cryoablation  2  Left atrial posterior wall isolation  3  3-D map with NAVX   4  Intracardiac Echocardiography ICE  5  Left atrial pacing and recording  6  DC Cardioversion- she was in afib at baseline  7  EP Study with drug infusion, on isoproterenol      Consultants: none      HPI: Please refer to the initial history and physical as well as procedure notes for full details  Briefly, Adelaide Perez is a 81y o  year old female with prior ablation of AVRT via left lateral pathway and right SVC atrial tachycardia ablation 12/2018, preserved LV systolic function, hypertension, hyperlipidemia, moderate mitral stenosis/mild-to-moderate aortic stenosis/mild-to-moderate tricuspid regurgitation, pulmonary hypertension, COPD, and CKD    She typically follows with Dr Td Glasgow as an outpatient  From an EP standpoint, she has a history of palpitations and was noted to have SVT during a hospital admission for COPD in 2018  She subsequently underwent successful ablation of AVRT and atrial tachycardia by Dr Hoa Gibson 12/2018  She did well until 06/2022, when she was seen urgently in the office for an irregular heart rate  She is found to be in new onset atrial fibrillation with rapid ventricular response  She was started on Eliquis anticoagulation, and was later seen again by Dr Billie Escobar 07/2022 for ongoing management  An atrial fibrillation ablation was recommended, and in the meantime her Cardizem was increased  She presented this hospital admission to undergo this procedure  Hospital Course: Cathy Pandey presented at her baseline state of health  After the procedure was explained in detail and consent was obtained, she underwent the above procedures without complications  Please see operative notes by Dr Hoa Gibson for full details  She tolerated the procedure well  She was then monitored overnight for further observation  There were no acute issues or events overnight  The following morning she denied: chest pain/pressure,  palpitations, dizziness, lightheadedness, or syncope  Her vital signs were reviewed and labs are stable  Telemetry showed NSR  Her groins were soft without significant hematoma or recurrent bleeding, and sutures were removed without incident  Patient concerned with RUVALCABA on HOD#1  With ambulation did have hypoxia with O2 saturations dropping into the low 80's  Exam showed wheezing with possible rales  Was given ipratropium nebulizer with wheezing being resolved  Did have underlying rales in lower lung fields was initiated on lasix 20mg IV BID and kept overnight  On HOD#2 she was negative 650cc's UO with over 1 5L UO since lasix initiation  With ambulation she did NOT have any hypoxia and stated she has felt the best she has in weeks!  Due to this she was discharged with lasix 40mg QDay for 3 days, fluid restriction of 1500cc's for 3 days, BMP on Thursday and will have our office reach out to schedule 1-2 week post hospital follow up with COLLEENNP to ensure no further HF signs/symptoms  Physical exam:  GEN: NAD, alert and oriented x 3, well appearing  SKIN: dry without significant lesions or rashes  HEENT: NCAT, PERRL, EOMs intact  NECK: No JVD appreciated  CARDIOVASCULAR: RRR, normal S1, S2 without murmurs, rubs, or gallops appreciated  LUNGS: Clear to auscultation in upper and middle lung fields, slight rales at bases   ABDOMEN: Soft, nontender, nondistended  EXTREMITIES/VASCULAR: perfused without clubbing, cyanosis, or LE edema b/l  PSYCH: Normal mood and affect  NEURO: CN ll-Xll grossly intact    She was given routine post ablation discharge instructions and restrictions, and these were explained in detail  She was given a follow up appointment with Duglas Carrasquillo PA-C, and she was instructed to follow up with her primary cardiologist as previously instructed  In terms of her medications,  her Cardizem was lowered back to 120 mg daily  She will continue Eliquis anticoagulation as previously instructed  She was asked to take pantoprazole 40 mg daily for 30 days, at which time it can be discontinued  She is stable for discharge at this time with all questions answered  She was discussed in detail with Dr Rodney Stone who is in agreement with this discharge summary  Discharge Medications:  See after visit summary for reconciled discharge medications provided to patient and family      Medications Prior to Admission   Medication    acetaminophen (TYLENOL) 650 mg CR tablet    albuterol (PROVENTIL HFA) 90 mcg/act inhaler    apixaban (Eliquis) 5 mg    atorvastatin (LIPITOR) 20 mg tablet    cetirizine (ZyrTEC) 10 mg tablet    diltiazem (CARDIZEM CD) 120 mg 24 hr capsule    escitalopram (LEXAPRO) 10 mg tablet    losartan (COZAAR) 25 mg tablet    spironolactone (ALDACTONE) 25 mg tablet    clotrimazole-betamethasone (LOTRISONE) 1-0 05 % cream    Multiple Vitamin (MULTI VITAMIN DAILY PO)         [facility]  CBC with diff:   Results from last 7 days   Lab Units 09/03/22 0439 09/02/22  0647 08/29/22  0930   WBC Thousand/uL 7 77 7 07 8 00   HEMOGLOBIN g/dL 11 4* 13 1 12 3   HEMATOCRIT % 37 8 41 9 40 8   MCV fL 107* 105* 105*   PLATELETS Thousands/uL 216 277 291   MCH pg 32 1 32 8 31 6   MCHC g/dL 30 2* 31 3* 30 1*   RDW % 13 4 13 5 13 3   MPV fL 11 5 10 5 11 2   NRBC AUTO /100 WBCs  --  0 0       BMP:  Results from last 7 days   Lab Units 09/04/22  0430 09/03/22  0439 09/02/22  0647 08/29/22  0930   POTASSIUM mmol/L 4 3 4 7 4 5 4 5   CHLORIDE mmol/L 106 110* 112* 109*   CO2 mmol/L 27 22 26 26   BUN mg/dL 27* 20 13 23   CREATININE mg/dL 1 23 1 22 1 04 1 24   CALCIUM mg/dL 9 1 8 4 10 0 9 7       Magnesium:   Results from last 7 days   Lab Units 09/03/22  0439   MAGNESIUM mg/dL 1 4*       Coags:   Results from last 7 days   Lab Units 09/02/22  0647   INR  6 54*         Complications: none    Condition at Discharge: good     Discharge instructions/Information to patient and family:   See after visit summary for information provided to patient and family  Provisions for Follow-Up Care:  See after visit summary for information related to follow-up care and any pertinent home health orders  Disposition: Home    Planned Readmission: No    Discharge Statement   I spent 45 minutes minutes discharging the patient  This time was spent on the day of discharge  I had direct contact with the patient on the day of discharge  Additional documentation is required if more than 30 minutes were spent on discharge   Evaluating the incision, discussing discharge instructions and restrictions, arranging follow up appointments, discussing medications

## 2022-09-02 NOTE — DISCHARGE INSTRUCTIONS
PLEASE NOTE THE FOLLOWING MEDICATION RECOMMENDATIONS:  - take Cardizem 120 mg daily  - continue Eliquis as previously instructed  - take pantoprazole 40 mg daily for 30 days, at which time it can be discontinued  - take furosemide 40mg for 3 days starting tomorrow AM   - RESTRICT fluid intake to 1500cc's until Thursday   - our office will contact you to schedule hospital follow up with Dr Remy Montoya in next 1-2 weeks        RESTRICTIONS:  No heavy lifting or strenuous activity for one week  No soaking in a bath tub/hot tub/swimming pool for one week or until groin heals  You may shower - please let soap and water run over the groins, no scrubbing, and pat the area dry  Please place band-aid on groins daily for up to five days, but you may remove sooner if no issues are noted  If you notice ongoing bleeding, swelling, or firm lumps in groin near ablation incision, please contact Dr Shaniqua Paul' office - [phone]

## 2022-09-02 NOTE — ANESTHESIA POSTPROCEDURE EVALUATION
Post-Op Assessment Note    CV Status:  Stable    Pain management: adequate     Mental Status:  Alert and awake   Hydration Status:  Euvolemic   PONV Controlled:  Controlled   Airway Patency:  Patent      Post Op Vitals Reviewed: Yes      Staff: CRNA   Comments: VSS report RN        There were no known complications for this encounter      BP   106/61   Temp     Pulse 83   Resp      SpO2  95 N/C 5l

## 2022-09-02 NOTE — ANESTHESIA PREPROCEDURE EVALUATION
Procedure:  Cardiac eps/afib ablation (N/A Chest)    Relevant Problems   ANESTHESIA (within normal limits)      CARDIO  Echo:    Left Ventricle: Left ventricular cavity size is normal  Wall thickness is mildly increased  The left ventricular ejection fraction is 60%  Systolic function is normal  Wall motion is normal     Right Ventricle: Right ventricular cavity size is mildly dilated    Left Atrium: The atrium is moderately dilated    Aortic Valve: The aortic valve is trileaflet  The leaflets are not thickened  The leaflets are moderately calcified  There is mildly reduced mobility  There is mild regurgitation  There is mild to moderate stenosis  The aortic valve velocity is increased due to stenosis    Mitral Valve: There is moderate calcification  There is moderately reduced mobility  There is mild annular calcification  There is mild regurgitation  There is moderate stenosis    Tricuspid Valve: There is mild to moderate regurgitation      (+) Aortic valve insufficiency   (+) Essential hypertension   (+) Non-rheumatic aortic stenosis   (+) Nonrheumatic mitral valve regurgitation   (+) SVT (supraventricular tachycardia) (HCC)      GI/HEPATIC   (-) Chronic liver disease   (-) Gastroesophageal reflux disease      /RENAL   (+) Hypertensive kidney disease with stage 3 chronic kidney disease (HCC)   (+) Stage 3 chronic kidney disease (HCC)      MUSCULOSKELETAL   (+) Lumbar degenerative disc disease      NEURO/PSYCH   (+) Depression with anxiety   (+) Depression, recurrent (HCC)   (-) CVA (cerebral vascular accident) ([address] )   (-) Seizures ([address] )      PULMONARY   (+) Pulmonary emphysema (HCC) (PRN inhaler)        Physical Exam    Airway    Mallampati score:  I  TM Distance: >3 FB  Neck ROM: full     Dental   No notable dental hx     Cardiovascular  Rhythm: irregular, Rate: abnormal,     Pulmonary  Pulmonary exam normal     Other Findings        Anesthesia Plan  ASA Score- 3     Anesthesia Type- general with ASA Monitors  Additional Monitors:   Airway Plan: ETT  Plan Factors-Exercise tolerance (METS): >4 METS  Induction- intravenous  Postoperative Plan-     Informed Consent- Anesthetic plan and risks discussed with patient  I personally reviewed this patient with the CRNA  Discussed and agreed on the Anesthesia Plan with the CRNA  Kristopher Alan

## 2022-09-03 ENCOUNTER — APPOINTMENT (OUTPATIENT)
Dept: RADIOLOGY | Facility: HOSPITAL | Age: 72
DRG: 273 | End: 2022-09-03
Payer: MEDICARE

## 2022-09-03 LAB
ANION GAP SERPL CALCULATED.3IONS-SCNC: 8 MMOL/L (ref 4–13)
BUN SERPL-MCNC: 20 MG/DL (ref 5–25)
CALCIUM SERPL-MCNC: 8.4 MG/DL (ref 8.3–10.1)
CHLORIDE SERPL-SCNC: 110 MMOL/L (ref 96–108)
CO2 SERPL-SCNC: 22 MMOL/L (ref 21–32)
CREAT SERPL-MCNC: 1.22 MG/DL (ref 0.6–1.3)
ERYTHROCYTE [DISTWIDTH] IN BLOOD BY AUTOMATED COUNT: 13.4 % (ref 11.6–15.1)
GFR SERPL CREATININE-BSD FRML MDRD: 44 ML/MIN/1.73SQ M
GLUCOSE SERPL-MCNC: 152 MG/DL (ref 65–140)
HCT VFR BLD AUTO: 37.8 % (ref 34.8–46.1)
HGB BLD-MCNC: 11.4 G/DL (ref 11.5–15.4)
MAGNESIUM SERPL-MCNC: 1.4 MG/DL (ref 1.6–2.6)
MCH RBC QN AUTO: 32.1 PG (ref 26.8–34.3)
MCHC RBC AUTO-ENTMCNC: 30.2 G/DL (ref 31.4–37.4)
MCV RBC AUTO: 107 FL (ref 82–98)
PLATELET # BLD AUTO: 216 THOUSANDS/UL (ref 149–390)
PMV BLD AUTO: 11.5 FL (ref 8.9–12.7)
POTASSIUM SERPL-SCNC: 4.7 MMOL/L (ref 3.5–5.3)
RBC # BLD AUTO: 3.55 MILLION/UL (ref 3.81–5.12)
SODIUM SERPL-SCNC: 140 MMOL/L (ref 135–147)
WBC # BLD AUTO: 7.77 THOUSAND/UL (ref 4.31–10.16)

## 2022-09-03 PROCEDURE — 94640 AIRWAY INHALATION TREATMENT: CPT

## 2022-09-03 PROCEDURE — 85027 COMPLETE CBC AUTOMATED: CPT | Performed by: PHYSICIAN ASSISTANT

## 2022-09-03 PROCEDURE — 93312 ECHO TRANSESOPHAGEAL: CPT | Performed by: INTERNAL MEDICINE

## 2022-09-03 PROCEDURE — 83735 ASSAY OF MAGNESIUM: CPT | Performed by: PHYSICIAN ASSISTANT

## 2022-09-03 PROCEDURE — 93321 DOPPLER ECHO F-UP/LMTD STD: CPT | Performed by: INTERNAL MEDICINE

## 2022-09-03 PROCEDURE — 71045 X-RAY EXAM CHEST 1 VIEW: CPT

## 2022-09-03 PROCEDURE — 93325 DOPPLER ECHO COLOR FLOW MAPG: CPT | Performed by: INTERNAL MEDICINE

## 2022-09-03 PROCEDURE — 80048 BASIC METABOLIC PNL TOTAL CA: CPT | Performed by: PHYSICIAN ASSISTANT

## 2022-09-03 PROCEDURE — NC001 PR NO CHARGE: Performed by: PHYSICIAN ASSISTANT

## 2022-09-03 RX ORDER — FUROSEMIDE 10 MG/ML
20 INJECTION INTRAMUSCULAR; INTRAVENOUS
Status: DISCONTINUED | OUTPATIENT
Start: 2022-09-03 | End: 2022-09-04 | Stop reason: HOSPADM

## 2022-09-03 RX ORDER — FUROSEMIDE 10 MG/ML
20 INJECTION INTRAMUSCULAR; INTRAVENOUS ONCE
Status: DISCONTINUED | OUTPATIENT
Start: 2022-09-03 | End: 2022-09-03

## 2022-09-03 RX ORDER — MAGNESIUM SULFATE HEPTAHYDRATE 40 MG/ML
2 INJECTION, SOLUTION INTRAVENOUS ONCE
Status: COMPLETED | OUTPATIENT
Start: 2022-09-03 | End: 2022-09-03

## 2022-09-03 RX ORDER — FUROSEMIDE 10 MG/ML
20 INJECTION INTRAMUSCULAR; INTRAVENOUS ONCE
Status: COMPLETED | OUTPATIENT
Start: 2022-09-03 | End: 2022-09-03

## 2022-09-03 RX ADMIN — LORATADINE 10 MG: 10 TABLET ORAL at 08:41

## 2022-09-03 RX ADMIN — APIXABAN 5 MG: 5 TABLET, FILM COATED ORAL at 08:41

## 2022-09-03 RX ADMIN — ATORVASTATIN CALCIUM 20 MG: 20 TABLET, FILM COATED ORAL at 08:40

## 2022-09-03 RX ADMIN — FUROSEMIDE 20 MG: 10 INJECTION, SOLUTION INTRAMUSCULAR; INTRAVENOUS at 16:29

## 2022-09-03 RX ADMIN — ACETAMINOPHEN 650 MG: 325 TABLET ORAL at 13:54

## 2022-09-03 RX ADMIN — FUROSEMIDE 20 MG: 10 INJECTION, SOLUTION INTRAMUSCULAR; INTRAVENOUS at 11:09

## 2022-09-03 RX ADMIN — APIXABAN 5 MG: 5 TABLET, FILM COATED ORAL at 17:21

## 2022-09-03 RX ADMIN — MAGNESIUM SULFATE HEPTAHYDRATE 2 G: 40 INJECTION, SOLUTION INTRAVENOUS at 13:54

## 2022-09-03 RX ADMIN — PANTOPRAZOLE SODIUM 40 MG: 40 TABLET, DELAYED RELEASE ORAL at 05:28

## 2022-09-03 RX ADMIN — ACETAMINOPHEN 650 MG: 325 TABLET ORAL at 21:20

## 2022-09-03 RX ADMIN — IPRATROPIUM BROMIDE 0.5 MG: 0.5 SOLUTION RESPIRATORY (INHALATION) at 10:11

## 2022-09-03 RX ADMIN — DILTIAZEM HYDROCHLORIDE 120 MG: 120 CAPSULE, COATED, EXTENDED RELEASE ORAL at 08:40

## 2022-09-03 RX ADMIN — SPIRONOLACTONE 25 MG: 25 TABLET ORAL at 08:41

## 2022-09-03 RX ADMIN — ESCITALOPRAM OXALATE 10 MG: 10 TABLET ORAL at 08:41

## 2022-09-03 RX ADMIN — LOSARTAN POTASSIUM 25 MG: 25 TABLET, FILM COATED ORAL at 08:41

## 2022-09-03 NOTE — QUICK NOTE
Patient concerned about hypoxia with ambulation to bathroom  O2 Sat into upper 80's  Lungs difficult to ascultate has some wheezing with known COPD only treated with PRN albuterol  Had her ambulate short distance around unit with O2 dropping as low as 83%  No LE edema or JVD  Will order small dose IV lasix x 1 as she is net positive 955cc's + ipratroprium bromide + CXR  Will f/u with her this afternoon on how she is feeling  If no improvement will consider pulmonary consult  Patient understands she may not be discharged home today

## 2022-09-03 NOTE — PROGRESS NOTES
Progress Note - Electrophysiology-Cardiology (EP)   Salud Richard 81 y o  female MRN: [id number]  Unit/Bed#: -01 Encounter: [id number]      Assessment/Plan:   Primary diagnosis:   1  Persistent atrial fibrillation    * patient admitted s/p cryo PVI with posterior wall isolation by Dr Chuck Zee on 22    * this AM in NSR    * no afib on tele monitoring    * could not increase cardizem to 240mg due to bradycardia    * not on AAD post procedure     2  Acute HFpEF    * ambulated patient around unit a short distance and O2 dropped to 83%  Had some wheezing on exam s/p ipratroprium nebulizer  After this can hear clear basilar rales  * CXR + exam consistent with acute HFpEF  Will start IV lasix with close I/O's monitoring  * repeat BMP in AM, K+ today was 4 7    3  Hypomagnesia    * will replete as Mg was 1 4 this AM     Secondary diagnosis:   1  Hx SVT s/p slow pathway modification   2  Mod MS   3  HTN   4  CKD   5  COPD   6  HLD         EKG:   NSR     Imaging: I have personally reviewed pertinent reports  Results for orders placed during the hospital encounter of 21    Echo complete with contrast if indicated    Narrative  [address]  [address] [place] [address]  [phone]    Transthoracic Echocardiogram  2D, M-mode, Doppler, and Color Doppler    Study date:  13-May-2021    Patient: Gianna Manzanares  MR number: [id number]  Account number: [de-identified]  : 1950  Age: 73 years  Gender: Female  Status: Outpatient  Location: [address] [place] [address] [facility] [place]  Height: 64 in  Weight: 187 7 lb  BP: 120/ 70 mmHg    Indications: SVT    Diagnoses: I47 1 - Supraventricular tachycardia    Sonographer:  Batsheva Stark RDCS  Primary Physician:  Kenn Estrada  Referring Physician:  Abi Bauman MD  Group:  [facility]  Cardiology Fellow:   Adam Willard MD  Interpreting Physician:  Batsheva Stark MD    SUMMARY    LEFT VENTRICLE:  Systolic function was vigorous  Ejection fraction was estimated to be 65 %  There were no regional wall motion abnormalities  Wall thickness was at the upper limits of normal     MITRAL VALVE:  There was mild annular calcification  There was mild to moderate regurgitation  AORTIC VALVE:  The valve was trileaflet  Leaflets exhibited moderately increased thickness, moderate calcification, and mild to moderately reduced cuspal separation  There was mild to moderate stenosis  There was mild to moderate regurgitation  The peak valve velocity was 306 cm/s  Valve mean gradient was 21 6 mmHg  TRICUSPID VALVE:  There was mild regurgitation  Pulmonary artery systolic pressure was mildly increased  Estimated peak PA pressure was 39 mmHg  HISTORY: PRIOR HISTORY: MV Disorder, AI, HTN, DLD, Obesity    PROCEDURE: The study was performed in the [address] [facility]  This was a routine study  The transthoracic approach was used  The study included complete 2D imaging, M-mode, complete spectral Doppler, and color Doppler  The  heart rate was 88 bpm, at the start of the study  Images were obtained from the parasternal, apical, subcostal, and suprasternal notch acoustic windows  Echocardiographic views were limited due to lung interference  Image quality was  adequate  LEFT VENTRICLE: Size was normal  Systolic function was vigorous  Ejection fraction was estimated to be 65 %  There were no regional wall motion abnormalities  Wall thickness was normal  Wall thickness was at the upper limits of normal   DOPPLER: Left ventricular diastolic function parameters were normal     RIGHT VENTRICLE: The size was normal  Systolic function was normal  Wall thickness was normal     LEFT ATRIUM: Size was normal     RIGHT ATRIUM: Size was normal     MITRAL VALVE: There was mild annular calcification  Valve structure was normal  There was mild thickening   There was mild-moderate calcification of the anterior and posterior leaflets, with mild chordal involvement  There was normal  leaflet separation  DOPPLER: The transmitral velocity was within the normal range  There was no evidence for stenosis  There was mild to moderate regurgitation  AORTIC VALVE: The valve was trileaflet  Leaflets exhibited moderately increased thickness, moderate calcification, and mild to moderately reduced cuspal separation  DOPPLER: Transaortic velocity was increased due to valvular stenosis  There was mild to moderate stenosis  There was mild to moderate regurgitation  TRICUSPID VALVE: There was normal leaflet separation  DOPPLER: There was mild regurgitation  Pulmonary artery systolic pressure was mildly increased  Estimated peak PA pressure was 39 mmHg  PULMONIC VALVE: Leaflets exhibited normal thickness, no calcification, and normal cuspal separation  DOPPLER: The transpulmonic velocity was within the normal range  There was no significant regurgitation  PERICARDIUM: There was no pericardial effusion  The pericardium was normal in appearance  AORTA: The root exhibited normal size  SYSTEMIC VEINS: IVC: The inferior vena cava was normal in size   Respirophasic changes were normal     MEASUREMENT TABLES    2D MEASUREMENTS  LVOT   (Reference normals)  Diam   19 mm   (--)    DOPPLER MEASUREMENTS  LVOT   (Reference normals)  Peak carmel   160 cm/s   (--)  Mean carmel   110 cm/s   (--)  VTI   31 4 cm   (--)  Peak gradient   7 mmHg   (--)  Mean gradient   5 15 mmHg   (--)  Stroke vol   89 03 ml   (--)  Aortic valve   (Reference normals)  Peak carmel   306 cm/s   (--)  Mean carmel   194 cm/s   (--)  VTI   60 cm   (--)  Peak gradient   33 mmHg   (--)  Mean gradient   21 6 mmHg   (--)  Obstr index, VTI   0 52    (--)  Area index, VTI   0 77 cmï¾²/mï¾²   (--)  Obstr index, Vmax   0 52    (--)  Valve area, Vmax   1 47 cmï¾²   (--)  Area index, Vmax   0 77 cmï¾²/mï¾²   (--)  Obstr index, Vmean   0 57    (--)  Valve area, Vmean   1 62 cmï¾²   (--)  Area index, Vmean   0 85 cmï¾²/mï¾²   (--)    SYSTEM MEASUREMENT TABLES    2D  %FS: 40 08 %  Ao Diam: 2 89 cm  Ao asc: 3 24 cm  EDV(Teich): 115 38 ml  EF(Teich): 70 53 %  ESV(Teich): 34 ml  IVSd: 1 07 cm  LA Diam: 3 56 cm  LAAs A4C: 15 92 cm2  LAESV A-L A4C: 42 92 ml  LAESV MOD A4C: 40 45 ml  LALs A4C: 5 01 cm  LVEDV MOD A4C: 52 89 ml  LVEF MOD A4C: 71 88 %  LVESV MOD A4C: 14 87 ml  LVIDd: 4 95 cm  LVIDs: 2 96 cm  LVLd A4C: 7 15 cm  LVLs A4C: 5 79 cm  LVOT Diam: 1 85 cm  LVPWd: 0 87 cm  RAEDV A-L: 24 89 ml  RAEDV MOD: 23 46 ml  RALd: 4 55 cm  RVIDd: 2 9 cm  SV MOD A4C: 38 02 ml  SV(Teich): 81 38 ml    CW  AR Dec [place]: 2 32 m/s2  AR Dec Time: 1615 38 ms  AR PHT: 468 46 ms  AR Vmax: 3 75 m/s  AR maxP 2 mmHg  AV Env  Ti: 301 3 ms  AV VTI: 65 34 cm  AV Vmax: 3 06 m/s  AV Vmean: 2 17 m/s  AV maxP 61 mmHg  AV meanP 6 mmHg  MV VTI: 53 15 cm  MV Vmax: 1 73 m/s  MV Vmean: 0 83 m/s  MV maxP 01 mmHg  MV meanPG: 3 58 mmHg  TR Vmax: 2 9 m/s  TR maxP 69 mmHg    MM  TAPSE: 2 28 cm    PW  ZHAO (VTI): 1 22 cm2  ZHAO Vmax: 1 17 cm2  AVAI (VTI): 0 cm2/m2  AVAI Vmax: 0 cm2/m2  E' Sept: 0 08 m/s  E/E' Sept: 18 55  LVOT Env  Ti: 300 67 ms  LVOT VTI: 29 72 cm  LVOT Vmax: 1 33 m/s  LVOT Vmean: 0 99 m/s  LVOT maxP 09 mmHg  LVOT meanP 39 mmHg  LVSI Dopp: 41 86 ml/m2  LVSV Dopp: 79 95 ml  MV A Jose Luis: 1 54 m/s  MV Dec [place]: 5 11 m/s2  MV DecT: 280 09 ms  MV E Jose Luis: 1 43 m/s  MV E/A Ratio: 0 93  MV PHT: 81 23 ms  MVA (VTI): 1 5 cm2  MVA By PHT: 2 71 cm2    [employer] Accredited Echocardiography Laboratory    Prepared and electronically signed by    Chavo Kohler MD  Signed 13-May-2021 10:26:33      Subjective/Objective   Subjective: today patient concerned with hypoxia on monitor in her room with ambulation  Does have some RUVALCABA       Vitals: BP 98/58   Pulse 84   Temp 97 8 °F (36 6 °C)   Resp 16   Ht 5' 4" (1 626 m)   Wt 81 2 kg (179 lb)   LMP  (LMP Unknown)   SpO2 93%   BMI 30 73 kg/m²   Vitals: 09/02/22 0708 09/02/22 0845   Weight: 81 2 kg (179 lb 0 2 oz) 81 2 kg (179 lb)     Orthostatic Blood Pressures    Flowsheet Row Most Recent Value   Blood Pressure 98/58 filed at 09/03/2022 1127   Patient Position - Orthostatic VS Sitting filed at 09/03/2022 0734            Intake/Output Summary (Last 24 hours) at 9/3/2022 1248  Last data filed at 9/3/2022 0900  Gross per 24 hour   Intake 360 ml   Output 575 ml   Net -215 ml       Invasive Devices  Report    Peripheral Intravenous Line  Duration           Peripheral IV 09/02/22 Proximal;Right;Ventral (anterior) Forearm 1 day                            Scheduled Meds:  Current Facility-Administered Medications   Medication Dose Route Frequency Provider Last Rate    acetaminophen  650 mg Oral Q6H PRN Daylin Monge PA-C      apixaban  5 mg Oral BID Daylin Monge PA-C      atorvastatin  20 mg Oral Daily Daylin Monge, [place]      diltiazem  120 mg Oral Daily Daylin Monge [place]      escitalopram  10 mg Oral Daily Daylin Monge [place]      fentaNYL  50 mcg Intravenous Q5 Min PRN Francisca Baldwin MD      furosemide  20 mg Intravenous BID (diuretic) Jose Daniel Smith PA-C      HYDROmorphone  0 4 mg Intravenous Q5 Min PRN Francisca Badlwin MD      loratadine  10 mg Oral Daily Daylin [place], [place]      losartan  25 mg Oral Daily Julia Lunsford      magnesium sulfate  2 g Intravenous Once Suzzane Riding, DO      pantoprazole  40 mg Oral Early Morning Daylin Monge PA-C      spironolactone  25 mg Oral Daily Daylin Monge PA-C       Continuous Infusions:   PRN Meds:   acetaminophen    fentaNYL    HYDROmorphone    Physical Exam:   GEN: NAD, alert and oriented, well appearing  SKIN: dry without significant lesions or rashes  HEENT: NCAT, PERRL, EOMs intact  NECK: No JVD or carotid bruits appreciated  CARDIOVASCULAR: RRR, normal S1, S2 without murmurs, rubs, or gallops appreciated  LUNGS: wheezing/rales at bases   ABDOMEN: Soft, nontender, nondistended  EXTREMITIES/VASCULAR: perfused without clubbing, cyanosis, or edema b/l  PSYCH: Normal mood and affect  NEURO: CN ll-Xll grossly intact                Lab Results: I have personally reviewed pertinent lab results      Results from last 7 days   Lab Units 09/03/22  0439 09/02/22  0647 08/29/22  0930   WBC Thousand/uL 7 77 7 07 8 00   HEMOGLOBIN g/dL 11 4* 13 1 12 3   HEMATOCRIT % 37 8 41 9 40 8   PLATELETS Thousands/uL 216 277 291     Results from last 7 days   Lab Units 09/03/22  0439 09/02/22  0647 08/29/22  0930   POTASSIUM mmol/L 4 7 4 5 4 5   CHLORIDE mmol/L 110* 112* 109*   CO2 mmol/L 22 26 26   BUN mg/dL 20 13 23   CREATININE mg/dL 1 22 1 04 1 24   CALCIUM mg/dL 8 4 10 0 9 7     Results from last 7 days   Lab Units 09/02/22  0647   INR  1 65*     Results from last 7 days   Lab Units 09/03/22  0439   MAGNESIUM mg/dL 1 4*

## 2022-09-04 VITALS
TEMPERATURE: 97.8 F | HEIGHT: 64 IN | DIASTOLIC BLOOD PRESSURE: 76 MMHG | WEIGHT: 182.2 LBS | OXYGEN SATURATION: 94 % | HEART RATE: 76 BPM | RESPIRATION RATE: 17 BRPM | BODY MASS INDEX: 31.1 KG/M2 | SYSTOLIC BLOOD PRESSURE: 130 MMHG

## 2022-09-04 LAB
ANION GAP SERPL CALCULATED.3IONS-SCNC: 6 MMOL/L (ref 4–13)
BUN SERPL-MCNC: 27 MG/DL (ref 5–25)
CALCIUM SERPL-MCNC: 9.1 MG/DL (ref 8.3–10.1)
CHLORIDE SERPL-SCNC: 106 MMOL/L (ref 96–108)
CO2 SERPL-SCNC: 27 MMOL/L (ref 21–32)
CREAT SERPL-MCNC: 1.23 MG/DL (ref 0.6–1.3)
GFR SERPL CREATININE-BSD FRML MDRD: 44 ML/MIN/1.73SQ M
GLUCOSE SERPL-MCNC: 120 MG/DL (ref 65–140)
POTASSIUM SERPL-SCNC: 4.3 MMOL/L (ref 3.5–5.3)
SODIUM SERPL-SCNC: 139 MMOL/L (ref 135–147)

## 2022-09-04 PROCEDURE — 80048 BASIC METABOLIC PNL TOTAL CA: CPT | Performed by: PHYSICIAN ASSISTANT

## 2022-09-04 RX ORDER — PANTOPRAZOLE SODIUM 40 MG/1
40 TABLET, DELAYED RELEASE ORAL
Qty: 30 TABLET | Refills: 0 | Status: SHIPPED | OUTPATIENT
Start: 2022-09-05 | End: 2022-10-05

## 2022-09-04 RX ORDER — FUROSEMIDE 40 MG/1
40 TABLET ORAL DAILY
Qty: 3 TABLET | Refills: 0 | Status: SHIPPED | OUTPATIENT
Start: 2022-09-04 | End: 2022-09-15 | Stop reason: ALTCHOICE

## 2022-09-04 RX ADMIN — FUROSEMIDE 20 MG: 10 INJECTION, SOLUTION INTRAMUSCULAR; INTRAVENOUS at 08:19

## 2022-09-04 RX ADMIN — DILTIAZEM HYDROCHLORIDE 120 MG: 120 CAPSULE, COATED, EXTENDED RELEASE ORAL at 08:18

## 2022-09-04 RX ADMIN — SPIRONOLACTONE 25 MG: 25 TABLET ORAL at 08:19

## 2022-09-04 RX ADMIN — LORATADINE 10 MG: 10 TABLET ORAL at 08:18

## 2022-09-04 RX ADMIN — PANTOPRAZOLE SODIUM 40 MG: 40 TABLET, DELAYED RELEASE ORAL at 04:29

## 2022-09-04 RX ADMIN — ATORVASTATIN CALCIUM 20 MG: 20 TABLET, FILM COATED ORAL at 08:19

## 2022-09-04 RX ADMIN — APIXABAN 5 MG: 5 TABLET, FILM COATED ORAL at 08:18

## 2022-09-04 RX ADMIN — LOSARTAN POTASSIUM 25 MG: 25 TABLET, FILM COATED ORAL at 08:18

## 2022-09-04 RX ADMIN — ESCITALOPRAM OXALATE 10 MG: 10 TABLET ORAL at 08:19

## 2022-09-04 NOTE — NURSING NOTE
Discharge information given to patient  All of patient questions answered  Patient states that she will  her medication on the way home  Patient left with belongings

## 2022-09-06 ENCOUNTER — RA CDI HCC (OUTPATIENT)
Dept: OTHER | Facility: HOSPITAL | Age: 72
End: 2022-09-06

## 2022-09-06 NOTE — PHYSICIAN ADVISOR
Current patient class: Inpatient  The patient is currently on Hospital Day: 3 at [address] [place] [address]      This patient was originally admitted to the hospital under outpatient surgical class  After admission the patient was reevaluated and determined to require further hospitalization  The patient was then documented to require at least a 2nd midnight in the hospital  As such the patient was then expected to satisfy the 2 midnight benchmark and was therefore eligible for inpatient admission  After review of the relevant documentation, labs, vital signs and test results, the patient is appropriate for INPATIENT ADMISSION  Admission to the hospital as an inpatient is a complex decision making process which requires the practitioner to consider the patients presenting complaint, history and physical examination and all relevant testing  With this in mind, in this case, the patient was deemed appropriate for INPATIENT ADMISSION  After review of the documentation and testing available at the time of the admission I concur with this clinical determination of medical necessity  Rationale is as follows: The patient was placed under outpatient surgical class to undergo afib ablation  She was not discharged after the first midnight for reasons including hypoxia  Oxygen saturations were in the 80s  This occurred with ambulating short distances  At one point her saturation dropped to 83%  She was not considered ready for discharge and was changed to an inpatient admission  Intravenous Lasix was ordered  The patient has underlying COPD  The patient improved with the treatment prescribed and stabilized for discharge after two midnights  It appears that she was able to be weaned off of supplemental oxygen      The patients vitals on arrival were   ED Triage Vitals   Temperature Pulse Respirations Blood Pressure SpO2   09/02/22 0708 09/02/22 0708 09/02/22 0708 09/02/22 3905 09/02/22 0708   (!) 97 4 °F (36 3 °C) (!) 119 18 113/75 96 %      Temp Source Heart Rate Source Patient Position - Orthostatic VS BP Location FiO2 (%)   09/02/22 0708 09/02/22 0708 09/02/22 1923 09/02/22 1923 --   Temporal Monitor Lying Left arm       Pain Score       09/02/22 0708       No Pain           Past Medical History:   Diagnosis Date    Atrial fibrillation (HCC)     Bursitis of hip     Last Assessed:6/5/2017    Herniated lumbar intervertebral disc     Pituitary adenoma ([address] )     Last Assessed:2/12/2015 ; Previously on bromocriptine    Spinal stenosis     Systolic murmur of aorta      Past Surgical History:   Procedure Laterality Date    BREAST SURGERY      Incisional breast biopsy    CARDIAC ELECTROPHYSIOLOGY PROCEDURE N/A 9/2/2022    Procedure: Cardiac eps/afib ablation;  Surgeon: Bee Treviño MD;  Location:  CARDIAC CATH LAB; Service: Cardiology    CARDIAC ELECTROPHYSIOLOGY STUDY AND ABLATION      CARPAL TUNNEL RELEASE Left     CHOLECYSTECTOMY      OTHER SURGICAL HISTORY      EXCISION OF PAROTID TUMOR/GLAND    PAROTIDECTOMY      NC COLONOSCOPY FLX DX W/COLLJ [address] WHEN PFRMD N/A 6/6/2017    Procedure: COLONOSCOPY;  Surgeon: Loy Sawyer DO;  Location: [facility];   Service: Gastroenterology    ROTATOR CUFF REPAIR      SALPINGOOPHORECTOMY Right        The patient was admitted to the hospital at  2:31 PM on 9/3/22 for the following diagnosis:  Paroxysmal atrial fibrillation ([facility] [address] ) [I48 0]    Consults have been placed to:   None    Vitals:    09/03/22 2240 09/04/22 0431 09/04/22 0703 09/04/22 0818   BP: 104/63  129/75 130/76   BP Location: Right arm      Pulse: 79  73 76   Resp: 18  17    Temp: 97 7 °F (36 5 °C)  97 8 °F (36 6 °C) 97 8 °F (36 6 °C)   TempSrc: Oral      SpO2: 93%  93% 94%   Weight:  82 6 kg (182 lb 3 2 oz)     Height:           Most recent labs:    Recent Labs     09/04/22  0430   K 4 3   CALCIUM 9 1   BUN 27*   CREATININE 1 23       Scheduled Meds:  Continuous Infusions:No current facility-administered medications for this encounter  PRN Meds:      Surgical procedures (if appropriate):  Procedure(s):  Cardiac eps/afib ablation

## 2022-09-06 NOTE — PROGRESS NOTES
I27 20  [facility]  coding opportunities          Chart Reviewed number of suggestions sent to Provider: 1     Patients Insurance     Medicare Insurance: [employer]

## 2022-09-07 ENCOUNTER — TRANSITIONAL CARE MANAGEMENT (OUTPATIENT)
Dept: FAMILY MEDICINE CLINIC | Facility: CLINIC | Age: 72
End: 2022-09-07

## 2022-09-08 ENCOUNTER — APPOINTMENT (OUTPATIENT)
Dept: LAB | Facility: IMAGING CENTER | Age: 72
End: 2022-09-08
Payer: MEDICARE

## 2022-09-08 DIAGNOSIS — I51.89 DIASTOLIC DYSFUNCTION: ICD-10-CM

## 2022-09-08 LAB
ANION GAP SERPL CALCULATED.3IONS-SCNC: 6 MMOL/L (ref 4–13)
BUN SERPL-MCNC: 28 MG/DL (ref 5–25)
CALCIUM SERPL-MCNC: 9.4 MG/DL (ref 8.3–10.1)
CHLORIDE SERPL-SCNC: 102 MMOL/L (ref 96–108)
CO2 SERPL-SCNC: 29 MMOL/L (ref 21–32)
CREAT SERPL-MCNC: 1.33 MG/DL (ref 0.6–1.3)
GFR SERPL CREATININE-BSD FRML MDRD: 40 ML/MIN/1.73SQ M
GLUCOSE P FAST SERPL-MCNC: 94 MG/DL (ref 65–99)
POTASSIUM SERPL-SCNC: 4 MMOL/L (ref 3.5–5.3)
SODIUM SERPL-SCNC: 137 MMOL/L (ref 135–147)

## 2022-09-08 PROCEDURE — 36415 COLL VENOUS BLD VENIPUNCTURE: CPT

## 2022-09-08 PROCEDURE — 80048 BASIC METABOLIC PNL TOTAL CA: CPT

## 2022-09-14 DIAGNOSIS — F41.8 DEPRESSION WITH ANXIETY: ICD-10-CM

## 2022-09-14 RX ORDER — ESCITALOPRAM OXALATE 10 MG/1
TABLET ORAL
Qty: 30 TABLET | Refills: 11 | Status: SHIPPED | OUTPATIENT
Start: 2022-09-14

## 2022-09-15 ENCOUNTER — APPOINTMENT (OUTPATIENT)
Dept: LAB | Facility: MEDICAL CENTER | Age: 72
End: 2022-09-15
Payer: MEDICARE

## 2022-09-15 ENCOUNTER — OFFICE VISIT (OUTPATIENT)
Dept: FAMILY MEDICINE CLINIC | Facility: CLINIC | Age: 72
End: 2022-09-15
Payer: MEDICARE

## 2022-09-15 VITALS
SYSTOLIC BLOOD PRESSURE: 90 MMHG | HEART RATE: 91 BPM | BODY MASS INDEX: 29.37 KG/M2 | HEIGHT: 64 IN | DIASTOLIC BLOOD PRESSURE: 62 MMHG | OXYGEN SATURATION: 97 % | WEIGHT: 172 LBS

## 2022-09-15 DIAGNOSIS — D64.9 ANEMIA, UNSPECIFIED TYPE: ICD-10-CM

## 2022-09-15 DIAGNOSIS — N18.32 STAGE 3B CHRONIC KIDNEY DISEASE (HCC): Primary | ICD-10-CM

## 2022-09-15 DIAGNOSIS — E83.42 HYPOMAGNESEMIA: ICD-10-CM

## 2022-09-15 DIAGNOSIS — E78.2 MIXED HYPERLIPIDEMIA: ICD-10-CM

## 2022-09-15 DIAGNOSIS — I10 ESSENTIAL HYPERTENSION: ICD-10-CM

## 2022-09-15 DIAGNOSIS — I48.0 PAROXYSMAL ATRIAL FIBRILLATION (HCC): ICD-10-CM

## 2022-09-15 DIAGNOSIS — N18.32 STAGE 3B CHRONIC KIDNEY DISEASE (HCC): ICD-10-CM

## 2022-09-15 LAB
ANION GAP SERPL CALCULATED.3IONS-SCNC: 0 MMOL/L (ref 4–13)
BASOPHILS # BLD AUTO: 0.08 THOUSANDS/ΜL (ref 0–0.1)
BASOPHILS NFR BLD AUTO: 1 % (ref 0–1)
BUN SERPL-MCNC: 26 MG/DL (ref 5–25)
CALCIUM SERPL-MCNC: 9.9 MG/DL (ref 8.3–10.1)
CHLORIDE SERPL-SCNC: 109 MMOL/L (ref 96–108)
CHOLEST SERPL-MCNC: 156 MG/DL
CO2 SERPL-SCNC: 27 MMOL/L (ref 21–32)
CREAT SERPL-MCNC: 1.35 MG/DL (ref 0.6–1.3)
EOSINOPHIL # BLD AUTO: 0.1 THOUSAND/ΜL (ref 0–0.61)
EOSINOPHIL NFR BLD AUTO: 1 % (ref 0–6)
ERYTHROCYTE [DISTWIDTH] IN BLOOD BY AUTOMATED COUNT: 12.4 % (ref 11.6–15.1)
GFR SERPL CREATININE-BSD FRML MDRD: 39 ML/MIN/1.73SQ M
GLUCOSE P FAST SERPL-MCNC: 98 MG/DL (ref 65–99)
HCT VFR BLD AUTO: 41.2 % (ref 34.8–46.1)
HDLC SERPL-MCNC: 36 MG/DL
HGB BLD-MCNC: 12.6 G/DL (ref 11.5–15.4)
IMM GRANULOCYTES # BLD AUTO: 0.03 THOUSAND/UL (ref 0–0.2)
IMM GRANULOCYTES NFR BLD AUTO: 0 % (ref 0–2)
LDLC SERPL CALC-MCNC: 85 MG/DL (ref 0–100)
LYMPHOCYTES # BLD AUTO: 2.08 THOUSANDS/ΜL (ref 0.6–4.47)
LYMPHOCYTES NFR BLD AUTO: 25 % (ref 14–44)
MAGNESIUM SERPL-MCNC: 1.7 MG/DL (ref 1.6–2.6)
MCH RBC QN AUTO: 31.6 PG (ref 26.8–34.3)
MCHC RBC AUTO-ENTMCNC: 30.6 G/DL (ref 31.4–37.4)
MCV RBC AUTO: 103 FL (ref 82–98)
MONOCYTES # BLD AUTO: 0.79 THOUSAND/ΜL (ref 0.17–1.22)
MONOCYTES NFR BLD AUTO: 10 % (ref 4–12)
NEUTROPHILS # BLD AUTO: 5.24 THOUSANDS/ΜL (ref 1.85–7.62)
NEUTS SEG NFR BLD AUTO: 63 % (ref 43–75)
NONHDLC SERPL-MCNC: 120 MG/DL
NRBC BLD AUTO-RTO: 0 /100 WBCS
PLATELET # BLD AUTO: 339 THOUSANDS/UL (ref 149–390)
PMV BLD AUTO: 10.7 FL (ref 8.9–12.7)
POTASSIUM SERPL-SCNC: 4.4 MMOL/L (ref 3.5–5.3)
RBC # BLD AUTO: 3.99 MILLION/UL (ref 3.81–5.12)
SODIUM SERPL-SCNC: 136 MMOL/L (ref 135–147)
TRIGL SERPL-MCNC: 173 MG/DL
WBC # BLD AUTO: 8.32 THOUSAND/UL (ref 4.31–10.16)

## 2022-09-15 PROCEDURE — 80048 BASIC METABOLIC PNL TOTAL CA: CPT

## 2022-09-15 PROCEDURE — 80061 LIPID PANEL: CPT

## 2022-09-15 PROCEDURE — 99214 OFFICE O/P EST MOD 30 MIN: CPT | Performed by: INTERNAL MEDICINE

## 2022-09-15 PROCEDURE — 85025 COMPLETE CBC W/AUTO DIFF WBC: CPT

## 2022-09-15 PROCEDURE — 36415 COLL VENOUS BLD VENIPUNCTURE: CPT

## 2022-09-15 PROCEDURE — 83735 ASSAY OF MAGNESIUM: CPT

## 2022-09-15 NOTE — ASSESSMENT & PLAN NOTE
Blood pressure is running slightly on the lower side, can be due to recent use of diuretics  Continue the same dose of losartan and spironolactone, she is also on Cardizem due to a fee  Follow-up with Cardiology  She is completely asymptomatic

## 2022-09-15 NOTE — ASSESSMENT & PLAN NOTE
Status post ablation, doing okay  She is still on Cardizem and Eliquis  Continue the same  Follow-up with Cardiology

## 2022-09-15 NOTE — PROGRESS NOTES
Assessment/Plan:    Stage 3 chronic kidney disease [facility])  Lab Results   Component Value Date    EGFR 40 09/08/2022    EGFR 44 09/04/2022    EGFR 44 09/03/2022    CREATININE 1 33 (H) 09/08/2022    CREATININE 1 23 09/04/2022    CREATININE 1 22 09/03/2022   Recent creatinine slightly above her baseline  Recheck BMP    Essential hypertension  Blood pressure is running slightly on the lower side, can be due to recent use of diuretics  Continue the same dose of losartan and spironolactone, she is also on Cardizem due to a fee  Follow-up with Cardiology  She is completely asymptomatic  Atrial fibrillation ([facility] [address] )  Status post ablation, doing okay  She is still on Cardizem and Eliquis  Continue the same  Follow-up with Cardiology  Diagnoses and all orders for this visit:    Stage 3b chronic kidney disease ([facility] [address] )  -     Basic metabolic panel; Future    Anemia, unspecified type  -     CBC and differential; Future    Hypomagnesemia  -     Magnesium; Future    Mixed hyperlipidemia  -     Lipid panel; Future    Essential hypertension    Paroxysmal atrial fibrillation (HCC)          Subjective:      Patient ID: Jenny Plata is a 81 y o  female  Patient came today for regular follow-up  She was recently diagnosed with atrial fibrillation and she had ablation few weeks ago  She developed mild anemia after the procedure, her magnesium was low and creatinine slightly above her baseline  She denies any exertional chest pain or shortness of breath right now  Blood pressure recheck by me 105/65  The following portions of the patient's history were reviewed and updated as appropriate: allergies, current medications, past family history, past medical history, past social history, past surgical history, and problem list     Review of Systems   Constitutional: Negative for activity change, appetite change, chills, fatigue and fever  HENT: Negative for congestion, ear pain, rhinorrhea and sore throat  Respiratory: Negative for cough, shortness of breath and wheezing  Cardiovascular: Negative for chest pain, palpitations and leg swelling  Gastrointestinal: Negative for abdominal distention, abdominal pain, diarrhea, nausea and vomiting  Genitourinary: Negative for difficulty urinating, frequency and pelvic pain  Musculoskeletal: Negative for arthralgias, back pain and neck pain  Skin: Negative for rash  Neurological: Negative for dizziness, tremors, weakness, numbness and headaches           Objective:      BP 90/62 (BP Location: Left arm, Patient Position: Sitting, Cuff Size: Adult)   Pulse 91   Ht 5' 4" (1 626 m)   Wt 78 kg (172 lb)   LMP  (LMP Unknown)   SpO2 97%   BMI 29 52 kg/m²     Allergies   Allergen Reactions    Amlodipine Rash    Ancef [Cefazolin]     Cephalosporins Other (See Comments)     c diff    Fruit Extracts      Annotation - [id number]: peaches and strawberries    Levofloxacin     Morphine GI Intolerance    Other      FRUIT    Penicillins Hives    Tetracycline Edema    Xarelto [Rivaroxaban] Rash          Current Outpatient Medications:     acetaminophen (TYLENOL) 650 mg CR tablet, Take 1 tablet (650 mg total) by mouth every 8 (eight) hours as needed for mild pain, Disp: 90 tablet, Rfl: 0    albuterol (PROVENTIL HFA) 90 mcg/act inhaler, Inhale 1 puff every 6 (six) hours as needed for wheezing, Disp: 6 7 g, Rfl: 11    apixaban (Eliquis) 5 mg, Take 1 tablet (5 mg total) by mouth 2 (two) times a day, Disp: 180 tablet, Rfl: 4    atorvastatin (LIPITOR) 20 mg tablet, TAKE 1 TABLET BY MOUTH EVERY DAY, Disp: 90 tablet, Rfl: 3    cetirizine (ZyrTEC) 10 mg tablet, Take 10 mg by mouth as needed , Disp: , Rfl:     diltiazem (CARDIZEM CD) 120 mg 24 hr capsule, Take 2 capsules (240 mg total) by mouth daily, Disp: 90 capsule, Rfl: 1    escitalopram (LEXAPRO) 10 mg tablet, TAKE 1 TABLET BY MOUTH EVERY DAY, Disp: 30 tablet, Rfl: 11    losartan (COZAAR) 25 mg tablet, TAKE 1 TABLET BY MOUTH EVERY DAY, Disp: 90 tablet, Rfl: 3    pantoprazole (PROTONIX) 40 mg tablet, Take 1 tablet (40 mg total) by mouth daily in the early morning, Disp: 30 tablet, Rfl: 0    spironolactone (ALDACTONE) 25 mg tablet, TAKE 1 TABLET BY MOUTH EVERY DAY, Disp: 90 tablet, Rfl: 3     There are no Patient Instructions on file for this visit  Physical Exam  Vitals reviewed  Constitutional:       General: She is not in acute distress  Appearance: She is well-developed  She is not diaphoretic  HENT:      Head: Normocephalic and atraumatic  Right Ear: External ear normal       Left Ear: External ear normal    Eyes:      General:         Right eye: No discharge  Left eye: No discharge  Cardiovascular:      Rate and Rhythm: Normal rate and regular rhythm  Pulses: Normal pulses  Heart sounds: Murmur heard  Pulmonary:      Effort: Pulmonary effort is normal  No respiratory distress  Breath sounds: Normal breath sounds  No wheezing  Abdominal:      General: Bowel sounds are normal       Palpations: Abdomen is soft  Tenderness: There is no abdominal tenderness  Musculoskeletal:         General: Normal range of motion  Cervical back: Normal range of motion and neck supple  Lymphadenopathy:      Cervical: No cervical adenopathy  Skin:     General: Skin is warm and dry  Neurological:      Mental Status: She is alert and oriented to person, place, and time  Cranial Nerves: No cranial nerve deficit  Psychiatric:         Speech: Speech normal          Behavior: Behavior normal          Thought Content:  Thought content normal          Judgment: Judgment normal

## 2022-09-15 NOTE — PROGRESS NOTES
Cardiology Follow Up    Rodrigo Galeas  1950  [id number]  [address] [facility] [address] [place] [id number] [phone]    1  Paroxysmal atrial fibrillation (HCC)  POCT ECG   2  Chronic heart failure with preserved ejection fraction (HFpEF) (HCC)  diltiazem (CARDIZEM CD) 120 mg 24 hr capsule    losartan (COZAAR) 25 mg tablet   3  Hypertension, unspecified type     4  Dyslipidemia  losartan (COZAAR) 25 mg tablet   5  Pulmonary hypertension (HCC)     6  Stage 3b chronic kidney disease ([address] )     7  Aortic valve stenosis, etiology of cardiac valve disease unspecified     8  Mitral valve stenosis, unspecified etiology         Interval History:   Ms Rodrigo Galeas was admitted to [facility] on 9/02 - 9/04/22 with  Persistent atrial fibrillation with RVR  Daiana Scott was seen in the office on 6/10/22 by Dr Katie Epps  She was found to be in atrial fibrillation with  BPM   She was referred to Dr Igor Reed  On 7/12/22 Daiana Scott was seen by Dr Igor Reed recommended atrial fibrillation ablation  Cardizem dose was doubled  On 9/02/22 Almaz underwent  cryoablation with pulmonary vein isolation posterior wall isolation  Almaz experienced acute HFpEF, oxygen saturation dropped to 83%  CXR consistent with CHF  She was treated with IV Lasix  Continues on Eliquis 5mg BID for stroke prevention  Ms Rodrigo Galeas presents to our office for a recent hospitalization follow up visit  She is feeling much better  Daiana Scott admits to dyspnea with climbing a flight of stairs  She   Denies chest pain palpitations lightheadedness dizziness or shortness of breath with minimal exertion  She is following a low sodium diet and weighing herself daily        Medical History   Primary cardiologist Dr Emil Moya   AVRT ablation via  Left lateral pathway and right SVC atrial tachycardia ablation 12/2018  Hypertension  Hyperlipidemia  Mod MS Mild to mod AS  Mild to mod TR  PHTN  COPD  CKD IIIb baseline creat  1 1 - 1 4 followed by Dr Imelda Valdez       7/19/22 TTE:   Left ventricle cavity size normal LVEF 60% wall motion is normal   Right ventricular cavity size mildly dilated systolic function normal   Left atrium mildly dilated  Right atrium normal in size  Aortic valve not well visualized mild reduced mobility mild regurgitation mild to moderate stenosis  Mitral with moderately reduced mobility mild MR moderate MS  Mod TR,  Pulmonic valve structure normal no evidence of regurgitation no evidence of stenosis  Unable to tolerate high dose of Cardizem CD due to LE edema   Patient Active Problem List   Diagnosis    Depression with anxiety    Dyslipidemia    Essential hypertension    Irritable bowel syndrome with diarrhea    Lumbar degenerative disc disease    Vitamin D deficiency    Greater trochanteric bursitis of right hip    Pulmonary emphysema (HCC)    SVT [facility]supraventricular tachycardia) ([facility]    Medicare annual wellness visit, subsequent    Diastolic dysfunction    Aortic valve insufficiency    Achilles tendinitis of left lower extremity    Posterior tibial tendinitis, left    Obesity (BMI 30-39  9)    Stage 3 chronic kidney disease ([address] )    Hypertensive kidney disease with stage 3 chronic kidney disease (HCC)    Depression, recurrent (HCC)    Non-rheumatic aortic stenosis    Nonrheumatic mitral valve regurgitation    Atrial fibrillation (HCC)     Past Medical History:   Diagnosis Date    Atrial fibrillation (HCC)     Bursitis of hip     Last Assessed:6/5/2017    Herniated lumbar intervertebral disc     Pituitary adenoma ([facility] )     Last Assessed:2/12/2015 ; Previously on bromocriptine    Spinal stenosis     Systolic murmur of aorta      Social History     Socioeconomic History    Marital status: /Civil Union     Spouse name: Not on file    Number of children: Not on file    Years of education: Not on file   [facility] Highest education level: Not on file   Occupational History    Not on file   Tobacco Use    Smoking status: Former Smoker     Packs/day: 1 00     Years: 30 00     Pack years: 30 00     Types: Cigarettes     Quit date: 11/23/2018     Years since quitting: 3 8    Smokeless tobacco: Never Used   Substance and Sexual Activity    Alcohol use: Yes     Alcohol/week: 1 0 standard drink     Types: 1 Shots of liquor per week     Comment: 1 Delbert Roxy a night/social    Drug use: No    Sexual activity: Not Currently     Birth control/protection: Post-menopausal   Other Topics Concern    Not on file   Social History Narrative    Not on file     Social Determinants of Health     Financial Resource Strain: Not on file   Food Insecurity: Not on file   Transportation Needs: Not on file   Physical Activity: Not on file   Stress: Not on file   Social Connections: Not on file   Intimate Partner Violence: Not on file   Housing Stability: Not on file      Family History   Problem Relation Age of Onset    Colon cancer Mother     Coronary artery disease Mother     Colonic polyp Mother     Liver cancer Mother     Multiple sclerosis Father     Hypertension Other     Hypertension Brother      Past Surgical History:   Procedure Laterality Date    BREAST SURGERY      Incisional breast biopsy    CARDIAC ELECTROPHYSIOLOGY PROCEDURE N/A 9/2/2022    Procedure: Cardiac eps/afib ablation;  Surgeon: Corby Flores MD;  Location:  CARDIAC CATH LAB; Service: Cardiology    CARDIAC ELECTROPHYSIOLOGY STUDY AND ABLATION      CARPAL TUNNEL RELEASE Left     CHOLECYSTECTOMY      OTHER SURGICAL HISTORY      EXCISION OF PAROTID TUMOR/GLAND    PAROTIDECTOMY      PA COLONOSCOPY FLX DX W/[facility]J [facility] INPATIENT [facility] WHEN PFRMD N/A 6/6/2017    Procedure: COLONOSCOPY;  Surgeon: Amarjit Payne DO;  Location: [facility] GI LAB;   Service: Gastroenterology    ROTATOR CUFF REPAIR      SALPINGOOPHORECTOMY Right        Current Outpatient Medications:    acetaminophen (TYLENOL) 650 mg CR tablet, Take 1 tablet (650 mg total) by mouth every 8 (eight) hours as needed for mild pain, Disp: 90 tablet, Rfl: 0    albuterol (PROVENTIL HFA) 90 mcg/act inhaler, Inhale 1 puff every 6 (six) hours as needed for wheezing, Disp: 6 7 g, Rfl: 11    apixaban (Eliquis) 5 mg, Take 1 tablet (5 mg total) by mouth 2 (two) times a day, Disp: 180 tablet, Rfl: 4    atorvastatin (LIPITOR) 20 mg tablet, TAKE 1 TABLET BY MOUTH EVERY DAY, Disp: 90 tablet, Rfl: 3    cetirizine (ZyrTEC) 10 mg tablet, Take 10 mg by mouth as needed , Disp: , Rfl:     diltiazem (CARDIZEM CD) 120 mg 24 hr capsule, Take 2 capsules (240 mg total) by mouth daily, Disp: 90 capsule, Rfl: 1    escitalopram (LEXAPRO) 10 mg tablet, TAKE 1 TABLET BY MOUTH EVERY DAY, Disp: 30 tablet, Rfl: 11    furosemide (LASIX) 40 mg tablet, Take 1 tablet (40 mg total) by mouth daily, Disp: 3 tablet, Rfl: 0    losartan (COZAAR) 25 mg tablet, TAKE 1 TABLET BY MOUTH EVERY DAY, Disp: 90 tablet, Rfl: 3    pantoprazole (PROTONIX) 40 mg tablet, Take 1 tablet (40 mg total) by mouth daily in the early morning, Disp: 30 tablet, Rfl: 0    spironolactone (ALDACTONE) 25 mg tablet, TAKE 1 TABLET BY MOUTH EVERY DAY, Disp: 90 tablet, Rfl: 3  Allergies   Allergen Reactions    Amlodipine Rash    Ancef [Cefazolin]     Cephalosporins Other (See Comments)     c diff    Fruit Extracts      Annotation - [id number]: peaches and strawberries    Levofloxacin     Morphine GI Intolerance    Other      FRUIT    Penicillins Hives    Tetracycline Edema    Xarelto [Rivaroxaban] Rash       Labs:  Appointment on 09/08/2022   Component Date Value    Sodium 09/08/2022 137     Potassium 09/08/2022 4 0     Chloride 09/08/2022 102     CO2 09/08/2022 29     ANION GAP 09/08/2022 6     BUN 09/08/2022 28 (A)    Creatinine 09/08/2022 1 33 (A)    Glucose, Fasting 09/08/2022 94     Calcium 09/08/2022 9 4     eGFR 09/08/2022 40    Admission on 09/02/2022, Discharged on 09/04/2022   Component Date Value    Sodium 09/02/2022 143     Potassium 09/02/2022 4 5     Chloride 09/02/2022 112 (A)    CO2 09/02/2022 26     ANION GAP 09/02/2022 5     BUN 09/02/2022 13     Creatinine 09/02/2022 1 04     Glucose 09/02/2022 100     Glucose, Fasting 09/02/2022 100 (A)    Calcium 09/02/2022 10 0     eGFR 09/02/2022 54     WBC 09/02/2022 7 07     RBC 09/02/2022 3 99     Hemoglobin 09/02/2022 13 1     Hematocrit 09/02/2022 41 9     MCV 09/02/2022 105 (A)    MCH 09/02/2022 32 8     MCHC 09/02/2022 31 3 (A)    RDW 09/02/2022 13 5     MPV 09/02/2022 10 5     Platelets 41/13/7268 277     nRBC 09/02/2022 0     Neutrophils Relative 09/02/2022 65     Immat GRANS % 09/02/2022 0     Lymphocytes Relative 09/02/2022 24     Monocytes Relative 09/02/2022 8     Eosinophils Relative 09/02/2022 2     Basophils Relative 09/02/2022 1     Neutrophils Absolute 09/02/2022 4 62     Immature Grans Absolute 09/02/2022 0 02     Lymphocytes Absolute 09/02/2022 1 67     Monocytes Absolute 09/02/2022 0 56     Eosinophils Absolute 09/02/2022 0 13     Basophils Absolute 09/02/2022 0 07     Protime 09/02/2022 19 7 (A)    INR 09/02/2022 1 65 (A)    Activated Clotting Time,* 09/02/2022 337 (A)    Specimen Type 09/02/2022 ARTERIAL     Activated Clotting Time,* 09/02/2022 329 (A)    Specimen Type 09/02/2022 VENOUS     Activated Clotting Time,* 09/02/2022 309 (A)    Specimen Type 09/02/2022 VENOUS     Ventricular Rate 09/02/2022 101     Atrial Rate 09/02/2022 117     QRSD Interval 09/02/2022 74     QT Interval 09/02/2022 358     QTC Interval 09/02/2022 464     QRS Axis 09/02/2022 -23     T Wave Axis 09/02/2022 29     Ventricular Rate 09/02/2022 105     Atrial Rate 09/02/2022 357     QRSD Interval 09/02/2022 74     QT Interval 09/02/2022 340     QTC Interval 09/02/2022 449     QRS Axis 09/02/2022 -18     T Wave Axis 09/02/2022 52     Activated Clotting Time,* 09/02/2022 433 (A)    Specimen Type 09/02/2022 VENOUS     Activated Clotting Time,* 09/02/2022 343 (A)    Specimen Type 09/02/2022 VENOUS     Ventricular Rate 09/02/2022 84     Atrial Rate 09/02/2022 84     FL Interval 09/02/2022 179     QRSD Interval 09/02/2022 83     QT Interval 09/02/2022 363     QTC Interval 09/02/2022 430     P Axis 09/02/2022 69     QRS Axis 09/02/2022 21     T Wave Axis 09/02/2022 77     Sodium 09/03/2022 140     Potassium 09/03/2022 4 7     Chloride 09/03/2022 110 (A)    CO2 09/03/2022 22     ANION GAP 09/03/2022 8     BUN 09/03/2022 20     Creatinine 09/03/2022 1 22     Glucose 09/03/2022 152 (A)    Calcium 09/03/2022 8 4     eGFR 09/03/2022 44     WBC 09/03/2022 7 77     RBC 09/03/2022 3 55 (A)    Hemoglobin 09/03/2022 11 4 (A)    Hematocrit 09/03/2022 37 8     MCV 09/03/2022 107 (A)    MCH 09/03/2022 32 1     MCHC 09/03/2022 30 2 (A)    RDW 09/03/2022 13 4     Platelets 21/05/6791 216     MPV 09/03/2022 11 5     Magnesium 09/03/2022 1 4 (A)    Sodium 09/04/2022 139     Potassium 09/04/2022 4 3     Chloride 09/04/2022 106     CO2 09/04/2022 27     ANION GAP 09/04/2022 6     BUN 09/04/2022 27 (A)    Creatinine 09/04/2022 1 23     Glucose 09/04/2022 120     Calcium 09/04/2022 9 1     eGFR 09/04/2022 44    Orders Only on 08/29/2022   Component Date Value    SARS-CoV-2 08/29/2022 Negative    Prep for Procedure on 08/05/2022   Component Date Value    LV EF 09/02/2022 55      Imaging: XR chest portable    Result Date: 9/7/2022  Narrative: CHEST INDICATION:   hypoxia post op  COMPARISON:  Chest radiograph 11/26/2018  EXAM PERFORMED/VIEWS:  XR CHEST PORTABLE FINDINGS: Cardiomediastinal silhouette appears unremarkable  Mild bibasilar atelectasis with minimal elevation of the right hemidiaphragm  No effusion or pneumothorax  Skeleton normal for age with suture anchor in right humeral head  Impression: Mild bibasilar atelectasis   Workstation performed: [id number]     Cardiac ep lab eps/ablations    Result Date: 2022  Narrative: [facility] [address] [facility], [address] PT NAME: Elly Willett MRN: [id number] : 1950 PERFORMING/ATTENDING PHY: Ben Herrera DATE OF PROCEDURE: 22 PREOPERATIVE DIAGNOSIS: Atrial fibrillation persistent H/o SVT ablation AVRT- Left lateral pathway and also Atrial tachycardia from SVC 2018 COPD PROCEDURES PERFORMED 1  Atrial fibrillation ablation using Cryoablation  2  Left atrial posterior wall isolation 3  3-D map with NAVX 4  Intracardiac Echocardiography ICE  5  Left atrial pacing and recording  6  DC Cardioversion- she was in afib at baseline 7  EP Study with drug infusion, on isoproterenol ANESTHESIA General anesthesia  PREOPERATIVE MEDICATIONS: None INFORMED CONSENT: Risks, benefits, and alternatives to atrial fibrillation ablation and associated procedures discussed  The patient understood risks include but not limited to death, esophageal injury, phrenic nerve injury (diaphragm), stroke, bleeding and vascular complication, and bleeding around the heart  DESCRIPTION OF PROCEDURE The patient was draped in normal sterile fashion  An 6 Belgian sheathwas placed in the right femoral vein, and a 7F sheath was placed in right femoral vein  An 8 [place] long sheath was placed in the left femoral vein, a 7 Belgian sheath was placed in the left femoral vein  A coronary sinus catheter was placed under  3-D mapping  3-D mapping and fluoroscopy to create right atrial map, bundle of His recording and RV recording and LA Recording   She was in afib at baseline, 200J SHock converted her to NSR, She went back into afib later during manipulation of left pulmonary veins and we did another 200J shock to convert to NSR We checked V pacing- normal concentric VA conduction without signs of recurrent accessory pathway, normal decrement  VA [place] 500ms  AV [place] 440ms  NO dual AV chandler physiology AVNERP 500/390ms  Single transseptal puncture was performed  The patient had been heparinized with a weight based heparin bolus before transeptal and repeat bolus to to maintain -350s while in the left atrium  A BRK1 was placed through an SLO and intracardiac ICE was used to guide transseptal   BRK1 was pushed through the fossa ovalis and we entered the left atrium without difficulty  A jaelyn pigtail wire was wired up to the left superior pulmonary vein and the Cryoablation 11F sheath was then brought into the left atrium, dilator removed  The Achieve Spiral Catheter and cryoballoon were placed in the Left Atrium  Cryoablation was performed by placing the Achieve catheter in a pulmonary vein, then the balloon was inflated and contrast venography showed occlusion and antral position of the balloon  Cryoablation was performed at 2-4 minutes  If inadequate temperatures were reached we stopped freeze in less than a minute  LEFT SUPERIOR PULMONARY VEIN (LSPV): 1) 180s; maximum neg 55 degrees Celsius  Thaw time:16s Time to effect (time to isolation): 48s LEFT INFERIOR PULMONARY VEIN (LIPV) 1) 180s; maximum neg 48 degrees Celsius  Thaw [id number]: Time to effect (time to isolation): n/a 2) 120s; maximum neg 45 degrees Celsius  Thaw time:11s Time to effect (time to isolation): n/a RIGHT INFERIOR PULMONARY VEIN (RIPV) 1) 180; maximum neg 59 degrees Celsius  Thaw time:10s Time to effect (time to isolation):20s RIGHT SUPERIOR PULMONARY VEIN (RSPV) 1) 150s; maximum neg 54 degrees Celsius  Thaw time:12s Time to effect (time to isolation): 20s RIGHT MIDDLE PULMONARY VEIN 1) 180s; maximum neg 44 degrees Celsius  Thaw time 8s  Time to effect 21  Esophageal probe was monitored, At no point in this case did esophageal temperature decrease less than 34 degrees or above 37   On the RIGHT sided veins care was taken to avoid phrenic injury by ensuring proximal position of balloon, and High output 20mA pacing at 1000ms from the SVC with diaphragm excursion measured throughout  At no point during this case did diaphragm excursion attenuate  Both right and left veins were isolated with bidirectional block achieved by pacing during sinus rhythm and confirming entrance and exit block  WE mapped the posterior wall during afib prior to second DCCV, it was very fractionated with scar, so likely nidus for afib, so we did Left atrial posterior wall isolation  POSTERIOR WALL ISOLATION: We used cryoballoon with achieve anchored in each pulmonary vein and balloon torqued toward posterior wall to create a lesion set connecting the RSPV and LSPV and then LIPV and RIPV  Balloon lesions were 2 min intervals -25 to -37 degrees  A total of 11 lesions were required, there were areas that still were connected with mapping so we did Tacticath 50 Pires <10s to LSI 4 5 to touch up and also ablated any areas of capture until non capture with high output pacing  We confirmed entrance block with mapping and then exit block with high output pacing  High esophageal temp was 37 2 degrees  WE then did EPS with Isoproterenol 20mcg/min to HR>100bpm and burst and extrastimulus pacing and no afib or SVT was induced  Post ablation map: COMPLICATIONS: None EBL: Minimal  CONTRAST: none  After the case heparin was reversed with weight based protamine dose and sheaths were removed with manual pressure and hemostasis achieved  POSTOPERATIVE DIAGNOSIS: Same as preop  Successful ablation of atrial fibrillation with Bidirectional Block in 4 pulmonary veins   Successful left atrial posterior wall isolation The patient tolerated the procedure well and had no neurological deficits upon awaking  Intracardiac ICE confirmed no effusion at the end of the case  PLAN Postoperative monitoring overnight  Resume oral anticoagulation   Follow-up in approximately 4 weeks for  adjustment of rhythm control medications  Recommend ant-acid treatment for one month  AVERY    Result Date: 9/3/2022  Narrative: Chica Martin  Left Ventricle: Left ventricular cavity size is normal  The left ventricular ejection fraction is 55%  Global longitudinal strain is normal    Left Atrium: There is no mass  There is no spontaneous echo contrast    Left Atrial Appendage: There is reduced function  There is no spontaneous echo contrast    Aortic Valve: The aortic valve is trileaflet  The leaflets are moderately thickened  There is moderately reduced mobility  There is mild regurgitation  There is moderate stenosis  Planimetry is 1 5cm during systole    Mitral Valve: There is mild regurgitation    Tricuspid Valve: There is mild regurgitation  Review of Systems:  Review of Systems   Respiratory: Positive for shortness of breath  Per HPI   All other systems reviewed and are negative  Physical Exam:  Physical Exam  Vitals reviewed  Constitutional:       Appearance: Normal appearance  HENT:      Head: Normocephalic  Cardiovascular:      Rate and Rhythm: Normal rate and regular rhythm  Pulses: Normal pulses  Heart sounds: Normal heart sounds  Pulmonary:      Effort: Pulmonary effort is normal       Breath sounds: Normal breath sounds  Abdominal:      General: Bowel sounds are normal       Palpations: Abdomen is soft  Musculoskeletal:      Cervical back: Normal range of motion and neck supple  Skin:     General: Skin is warm and dry  Capillary Refill: Capillary refill takes less than 2 seconds  Neurological:      General: No focal deficit present  Mental Status: She is alert and oriented to person, place, and time  Psychiatric:         Mood and Affect: Mood normal          Behavior: Behavior normal          Discussion/Summary:  1  Paroxysmal atrial fibrillation [id number]= 3 ( Age, female, HTN) 9/02/22 sp cryoablation with pulmonary vein isolation posterior wall isolation by Dr Tonio Powell  12/2018 hx of AVRT ablation via  Left lateral pathway and right SVC atrial tachycardia ablation  EKG normal sinus rhythm 93 beats per minute  Continue Eliquis 5 mg b i d  for stroke prevention, Cardizem  mg daily, follow up with Sheree VIDAL on 10/05/22  ( High dose of Cardizem CD 240mg daily causing natasha LE edema)   2  Chronic  HFpEF LVEF 60% NYHA class III stage C- On PE eu volemic and compensated  On PE with bibasilar rales which is most likely due to atelectasis per CXR done 9/03/22  Weight in the office is 171 pounds  BP and HR controlled, continue on Aldactone 25mg daily   3  Hypertension LUE sitting 90/50,  Decrease losartan from 25 mg daily to 12 5 mg daily to increase renal perfusion  4  Dyslipidemia 9/15/22 , , HDL 26, LDL 85-    Continue Lipitor 20 mg daily, instructed to limit concentrated sweets and carbohydrates increase daily exercise   5  PHTN  WHO Group III ( COPD)   6  CKD IIIb baseline creat  1 1 - 1 4 followed by Dr Jeff Orantes, 9/15/22 Creat 1 35, GFR 39  7  Mild to mod AS continue to monitor   8   Mod MS continue to monitor

## 2022-09-15 NOTE — ASSESSMENT & PLAN NOTE
Lab Results   Component Value Date    EGFR 40 09/08/2022    EGFR 44 09/04/2022    EGFR 44 09/03/2022    CREATININE 1 33 (H) 09/08/2022    CREATININE 1 23 09/04/2022    CREATININE 1 22 09/03/2022   Recent creatinine slightly above her baseline    Recheck BMP

## 2022-09-19 ENCOUNTER — OFFICE VISIT (OUTPATIENT)
Dept: CARDIOLOGY CLINIC | Facility: CLINIC | Age: 72
End: 2022-09-19
Payer: MEDICARE

## 2022-09-19 VITALS
HEIGHT: 64 IN | DIASTOLIC BLOOD PRESSURE: 66 MMHG | BODY MASS INDEX: 29.26 KG/M2 | OXYGEN SATURATION: 96 % | HEART RATE: 93 BPM | WEIGHT: 171.4 LBS | SYSTOLIC BLOOD PRESSURE: 106 MMHG

## 2022-09-19 DIAGNOSIS — I35.0 AORTIC VALVE STENOSIS, ETIOLOGY OF CARDIAC VALVE DISEASE UNSPECIFIED: ICD-10-CM

## 2022-09-19 DIAGNOSIS — I48.0 PAROXYSMAL ATRIAL FIBRILLATION (HCC): Primary | ICD-10-CM

## 2022-09-19 DIAGNOSIS — N18.32 STAGE 3B CHRONIC KIDNEY DISEASE (HCC): ICD-10-CM

## 2022-09-19 DIAGNOSIS — I05.0 MITRAL VALVE STENOSIS, UNSPECIFIED ETIOLOGY: ICD-10-CM

## 2022-09-19 DIAGNOSIS — I27.20 PULMONARY HYPERTENSION (HCC): ICD-10-CM

## 2022-09-19 DIAGNOSIS — I10 HYPERTENSION, UNSPECIFIED TYPE: ICD-10-CM

## 2022-09-19 DIAGNOSIS — I50.32 CHRONIC HEART FAILURE WITH PRESERVED EJECTION FRACTION (HFPEF) (HCC): ICD-10-CM

## 2022-09-19 DIAGNOSIS — E78.5 DYSLIPIDEMIA: ICD-10-CM

## 2022-09-19 PROCEDURE — 99215 OFFICE O/P EST HI 40 MIN: CPT | Performed by: INTERNAL MEDICINE

## 2022-09-19 PROCEDURE — 93000 ELECTROCARDIOGRAM COMPLETE: CPT | Performed by: INTERNAL MEDICINE

## 2022-09-19 RX ORDER — LOSARTAN POTASSIUM 25 MG/1
25 TABLET ORAL DAILY
Qty: 90 TABLET | Refills: 3 | Status: SHIPPED | OUTPATIENT
Start: 2022-09-19 | End: 2022-10-05

## 2022-09-19 RX ORDER — DILTIAZEM HYDROCHLORIDE 120 MG/1
120 CAPSULE, COATED, EXTENDED RELEASE ORAL DAILY
Qty: 90 CAPSULE | Refills: 3
Start: 2022-09-19

## 2022-09-19 NOTE — PATIENT INSTRUCTIONS
2gm sodium diet  Limited concentrated sweets and limited carbohydrates     Decrease Losartan to 12 5mg daily

## 2022-09-23 DIAGNOSIS — E78.00 HIGH CHOLESTEROL: ICD-10-CM

## 2022-09-26 RX ORDER — ATORVASTATIN CALCIUM 20 MG/1
TABLET, FILM COATED ORAL
Qty: 90 TABLET | Refills: 3 | Status: SHIPPED | OUTPATIENT
Start: 2022-09-26

## 2022-10-05 ENCOUNTER — OFFICE VISIT (OUTPATIENT)
Dept: CARDIOLOGY CLINIC | Facility: CLINIC | Age: 72
End: 2022-10-05
Payer: MEDICARE

## 2022-10-05 VITALS
WEIGHT: 170 LBS | BODY MASS INDEX: 29.02 KG/M2 | HEART RATE: 87 BPM | HEIGHT: 64 IN | SYSTOLIC BLOOD PRESSURE: 120 MMHG | DIASTOLIC BLOOD PRESSURE: 78 MMHG

## 2022-10-05 DIAGNOSIS — E78.5 DYSLIPIDEMIA: ICD-10-CM

## 2022-10-05 DIAGNOSIS — I50.32 CHRONIC HEART FAILURE WITH PRESERVED EJECTION FRACTION (HFPEF) (HCC): ICD-10-CM

## 2022-10-05 DIAGNOSIS — I48.0 PAROXYSMAL ATRIAL FIBRILLATION (HCC): Primary | ICD-10-CM

## 2022-10-05 PROCEDURE — 93000 ELECTROCARDIOGRAM COMPLETE: CPT | Performed by: PHYSICIAN ASSISTANT

## 2022-10-05 PROCEDURE — 99214 OFFICE O/P EST MOD 30 MIN: CPT | Performed by: PHYSICIAN ASSISTANT

## 2022-10-05 RX ORDER — LOSARTAN POTASSIUM 25 MG/1
12.5 TABLET ORAL DAILY
Qty: 1 TABLET | Refills: 0 | Status: SHIPPED | COMMUNITY
Start: 2022-10-05

## 2022-10-05 NOTE — PROGRESS NOTES
Electrophysiology Office Note    Debbie Randall  1950  [id number]  HEART & VASCULAR [phone]        Assessment/Plan     Primary diagnosis:   1  Persistent atrial fibrillation, symptomatic    * patient presents to hospitals EP office for post ablation follow up  Now s/p cryo PVI and PWI by Dr Bonnie Ayala    * major improvement in QOL since ablation    * ECG showing NSR today    * no major modifiable risk factors    * normal EF with normal LA size and mild MR on periop AVERY    * she is questioning if she could come off of eliquis, explained chronic nature of afib + her [id number] of 3  Will discuss ILR to guide [facility], [place], operated by [facility] w/ Dr Bonnie Ayala and let her know  Secondary diagnosis:   1  Mod AS    * II/VI mumur on exam, AVERY showing mod AS   * will need 1-3 year echocardiograms     2  Former tobacco abuse   3  COPD   4  Mild MR   5  Mod to severe pulm HTN   6  CKD   7  HTN  8  Hx SVT s/p AVRT RFA via left lateral pathway and right SVC AT -    9  Chronic HFpEF   10  HLD               Rhythm History:   Atrial fibrillation:   1  Diagnosed with afib by Dr Bucky Caro at office visit - 2022 - referred to EP   2  Evaluated by Dr Bonnie Ayala - afib ablation recommended - 2022   3  S/p cryo PVI and PWI by Dr Bonnie Ayala - 2022     Atrial flutter:     SVT:   1   Hx AVRT RFA via left lateral pathway and right SVC AT by Dr Bonnie Ayala -      VT/VF/PVC:     Device history:   Pacemaker:    Defibrillator:    BIV PPM:    BIV ICD:    ILR:      Cardiac Testing:     ECHO: Results for orders placed during the hospital encounter of 21    Echo complete with contrast if indicated    Narrative  [address]  [phone]    Transthoracic Echocardiogram  2D, M-mode, Doppler, and Color Doppler    Study date:  13-May-2021    Patient: Reinaldo Hodge  MR number: [id number]  Account number: [de-identified]  : 1950  Age: 73 years  Gender: Female  Status: Outpatient  Location: [address] [place] [address] and [facility] [place]  Height: 64 in  Weight: 187 7 lb  BP: 120/ 70 mmHg    Indications: SVT    Diagnoses: I47 1 - Supraventricular tachycardia    Sonographer:  Raghu Catherine RDCS  Primary Physician:  Bong Diaz  Referring Physician:  Nisa Benavidez MD  Group:  [facility]  Cardiology Fellow: Audrey Means MD  Interpreting Physician:  Raghu Catherine MD    SUMMARY    LEFT VENTRICLE:  Systolic function was vigorous  Ejection fraction was estimated to be 65 %  There were no regional wall motion abnormalities  Wall thickness was at the upper limits of normal     MITRAL VALVE:  There was mild annular calcification  There was mild to moderate regurgitation  AORTIC VALVE:  The valve was trileaflet  Leaflets exhibited moderately increased thickness, moderate calcification, and mild to moderately reduced cuspal separation  There was mild to moderate stenosis  There was mild to moderate regurgitation  The peak valve velocity was 306 cm/s  Valve mean gradient was 21 6 mmHg  TRICUSPID VALVE:  There was mild regurgitation  Pulmonary artery systolic pressure was mildly increased  Estimated peak PA pressure was 39 mmHg  HISTORY: PRIOR HISTORY: MV Disorder, AI, HTN, DLD, Obesity    PROCEDURE: The study was performed in the [address] [facility] [place]  This was a routine study  The transthoracic approach was used  The study included complete 2D imaging, M-mode, complete spectral Doppler, and color Doppler  The  heart rate was 88 bpm, at the start of the study  Images were obtained from the parasternal, apical, subcostal, and suprasternal notch acoustic windows  Echocardiographic views were limited due to lung interference  Image quality was  adequate  LEFT VENTRICLE: Size was normal  Systolic function was vigorous  Ejection fraction was estimated to be 65 %   There were no regional wall motion abnormalities  Wall thickness was normal  Wall thickness was at the upper limits of normal   DOPPLER: Left ventricular diastolic function parameters were normal     RIGHT VENTRICLE: The size was normal  Systolic function was normal  Wall thickness was normal     LEFT ATRIUM: Size was normal     RIGHT ATRIUM: Size was normal     MITRAL VALVE: There was mild annular calcification  Valve structure was normal  There was mild thickening  There was mild-moderate calcification of the anterior and posterior leaflets, with mild chordal involvement  There was normal  leaflet separation  DOPPLER: The transmitral velocity was within the normal range  There was no evidence for stenosis  There was mild to moderate regurgitation  AORTIC VALVE: The valve was trileaflet  Leaflets exhibited moderately increased thickness, moderate calcification, and mild to moderately reduced cuspal separation  DOPPLER: Transaortic velocity was increased due to valvular stenosis  There was mild to moderate stenosis  There was mild to moderate regurgitation  TRICUSPID VALVE: There was normal leaflet separation  DOPPLER: There was mild regurgitation  Pulmonary artery systolic pressure was mildly increased  Estimated peak PA pressure was 39 mmHg  PULMONIC VALVE: Leaflets exhibited normal thickness, no calcification, and normal cuspal separation  DOPPLER: The transpulmonic velocity was within the normal range  There was no significant regurgitation  PERICARDIUM: There was no pericardial effusion  The pericardium was normal in appearance  AORTA: The root exhibited normal size  SYSTEMIC VEINS: IVC: The inferior vena cava was normal in size   Respirophasic changes were normal     MEASUREMENT TABLES    2D MEASUREMENTS  LVOT   (Reference normals)  Diam   19 mm   (--)    DOPPLER MEASUREMENTS  LVOT   (Reference normals)  Peak carmel   160 cm/s   (--)  Mean carmel   110 cm/s   (--)  VTI   31 4 cm   (--)  Peak gradient   7 mmHg   (--)  Mean gradient 5 15 mmHg   (--)  Stroke vol   89 03 ml   (--)  Aortic valve   (Reference normals)  Peak jose luis   306 cm/s   (--)  Mean jose luis   194 cm/s   (--)  VTI   60 cm   (--)  Peak gradient   33 mmHg   (--)  Mean gradient   21 6 mmHg   (--)  Obstr index, VTI   0 52    (--)  Area index, VTI   0 77 cmï¾²/mï¾²   (--)  Obstr index, Vmax   0 52    (--)  Valve area, Vmax   1 47 cmï¾²   (--)  Area index, Vmax   0 77 cmï¾²/mï¾²   (--)  Obstr index, Vmean   0 57    (--)  Valve area, Vmean   1 62 cmï¾²   (--)  Area index, Vmean   0 85 cmï¾²/mï¾²   (--)    SYSTEM MEASUREMENT TABLES    2D  %FS: 40 08 %  Ao Diam: 2 89 cm  Ao asc: 3 24 cm  EDV(Teich): 115 38 ml  EF(Teich): 70 53 %  ESV(Teich): 34 ml  IVSd: 1 07 cm  LA Diam: 3 56 cm  LAAs A4C: 15 92 cm2  LAESV A-L A4C: 42 92 ml  LAESV MOD A4C: 40 45 ml  LALs A4C: 5 01 cm  LVEDV MOD A4C: 52 89 ml  LVEF MOD A4C: 71 88 %  LVESV MOD A4C: 14 87 ml  LVIDd: 4 95 cm  LVIDs: 2 96 cm  LVLd A4C: 7 15 cm  LVLs A4C: 5 79 cm  LVOT Diam: 1 85 cm  LVPWd: 0 87 cm  RAEDV A-L: 24 89 ml  RAEDV MOD: 23 46 ml  RALd: 4 55 cm  RVIDd: 2 9 cm  SV MOD A4C: 38 02 ml  SV(Teich): 81 38 ml    CW  AR Dec Sebastian: 2 32 m/s2  AR Dec Time: 1615 38 ms  AR PHT: 468 46 ms  AR Vmax: 3 75 m/s  AR maxP 2 mmHg  AV Env  Ti: 301 3 ms  AV VTI: 65 34 cm  AV Vmax: 3 06 m/s  AV Vmean: 2 17 m/s  AV maxP 61 mmHg  AV meanP 6 mmHg  MV VTI: 53 15 cm  MV Vmax: 1 73 m/s  MV Vmean: 0 83 m/s  MV maxP 01 mmHg  MV meanPG: 3 58 mmHg  TR Vmax: 2 9 m/s  TR maxP 69 mmHg    MM  TAPSE: 2 28 cm    PW  ZHAO (VTI): 1 22 cm2  ZHAO Vmax: 1 17 cm2  AVAI (VTI): 0 cm2/m2  AVAI Vmax: 0 cm2/m2  E' Sept: 0 08 m/s  E/E' Sept: 18 55  LVOT Env  Ti: 300 67 ms  LVOT VTI: 29 72 cm  LVOT Vmax: 1 33 m/s  LVOT Vmean: 0 99 m/s  LVOT maxP 09 mmHg  LVOT meanP 39 mmHg  LVSI Dopp: 41 86 ml/m2  LVSV Dopp: 79 95 ml  MV A Jose Luis: 1 54 m/s  MV Dec Sebastian: 5 11 m/s2  MV DecT: 280 09 ms  MV E Jose Luis: 1 43 m/s  MV E/A Ratio: 0 93  MV PHT: 81 23 ms  MVA (VTI): 1 5 cm2  MVA By PHT: 2 71 cm2    [employer] Accredited Echocardiography Laboratory    Prepared and electronically signed by    Codey Francis MD  Signed 13-May-2021 10:26:33        History of Present Illness     HPI/INTERVAL HISTORY:  Since returning home from ablation feels majorly improved in her QOL  She has no SOB and has much more energy  She does have mild RUVALCABA when carrying a laundry basket up the stairs but feels this is improving  She is questioning if she can come off of the eliquis due to cost        Review of Systems  ROS as noted above, otherwise 12 point review of systems was performed and is negative         Historical Information   Social History     Socioeconomic History    Marital status: /Civil Union     Spouse name: Not on file    Number of children: Not on file    Years of education: Not on file    Highest education level: Not on file   Occupational History    Not on file   Tobacco Use    Smoking status: Former Smoker     Packs/day: 1 00     Years: 30 00     Pack years: 30 00     Types: Cigarettes     Quit date: 11/23/2018     Years since quitting: 3 8    Smokeless tobacco: Never Used   Substance and Sexual Activity    Alcohol use: Not Currently     Alcohol/week: 1 0 standard drink     Types: 1 Shots of liquor per week    Drug use: No    Sexual activity: Not Currently     Birth control/protection: Post-menopausal   Other Topics Concern    Not on file   Social History Narrative    Not on file     Social Determinants of Health     Financial Resource Strain: Not on file   Food Insecurity: Not on file   Transportation Needs: Not on file   Physical Activity: Not on file   Stress: Not on file   Social Connections: Not on file   Intimate Partner Violence: Not on file   Housing Stability: Not on file     Past Medical History:   Diagnosis Date    Atrial fibrillation ([facility] [address] )     Bursitis of hip     Last Assessed:6/5/2017    Herniated lumbar intervertebral disc     [facility]     Last Assessed:2/12/2015 ; Previously on bromocriptine    Spinal stenosis     Systolic murmur of aorta      Past Surgical History:   Procedure Laterality Date    BREAST SURGERY      Incisional breast biopsy    CARDIAC ELECTROPHYSIOLOGY PROCEDURE N/A 9/2/2022    Procedure: Cardiac eps/afib ablation;  Surgeon: Jacky Epps MD;  Location:  CARDIAC CATH LAB; Service: Cardiology    CARDIAC ELECTROPHYSIOLOGY STUDY AND ABLATION      CARPAL TUNNEL RELEASE Left     CHOLECYSTECTOMY      OTHER SURGICAL HISTORY      EXCISION OF PAROTID TUMOR/GLAND    PAROTIDECTOMY      AK COLONOSCOPY FLX DX W/COLLJ [address] [place] [address] WHEN PFRMD N/A 6/6/2017    Procedure: COLONOSCOPY;  Surgeon: Lazaro Plaza DO;  Location: [facility] GI [facility];   Service: Gastroenterology    ROTATOR CUFF REPAIR      SALPINGOOPHORECTOMY Right      Social History     Substance and Sexual Activity   Alcohol Use Not Currently    Alcohol/week: 1 0 standard drink    Types: 1 Shots of liquor per week     Social History     Substance and Sexual Activity   Drug Use No     Social History     Tobacco Use   Smoking Status Former Smoker    Packs/day: 1 00    Years: 30 00    Pack years: 30 00    Types: Cigarettes    Quit date: 11/23/2018    Years since quitting: 3 8   Smokeless Tobacco Never Used     Family History   Problem Relation Age of Onset    Colon cancer Mother     Coronary artery disease Mother     Colonic polyp Mother     Liver cancer Mother     Multiple sclerosis Father     Hypertension Other     Hypertension Brother        Meds/Allergies       Current Outpatient Medications:     acetaminophen (TYLENOL) 650 mg CR tablet, Take 1 tablet (650 mg total) by mouth every 8 (eight) hours as needed for mild pain, Disp: 90 tablet, Rfl: 0    apixaban (Eliquis) 5 mg, Take 1 tablet (5 mg total) by mouth 2 (two) times a day, Disp: 180 tablet, Rfl: 4    atorvastatin (LIPITOR) 20 mg tablet, TAKE 1 TABLET BY MOUTH EVERY DAY, Disp: 90 tablet, Rfl: 3   cetirizine (ZyrTEC) 10 mg tablet, Take 10 mg by mouth as needed , Disp: , Rfl:     diltiazem (CARDIZEM CD) 120 mg 24 hr capsule, Take 1 capsule (120 mg total) by mouth daily, Disp: 90 capsule, Rfl: 3    escitalopram (LEXAPRO) 10 mg tablet, TAKE 1 TABLET BY MOUTH EVERY DAY, Disp: 30 tablet, Rfl: 11    losartan (COZAAR) 25 mg tablet, Take 0 5 tablets (12 5 mg total) by mouth daily 1/2 tab daily, Disp: 1 tablet, Rfl: 0    spironolactone (ALDACTONE) 25 mg tablet, TAKE 1 TABLET BY MOUTH EVERY DAY, Disp: 90 tablet, Rfl: 3    Allergies   Allergen Reactions    Amlodipine Rash    Ancef [Cefazolin]     Cephalosporins Other (See Comments)     c diff    Fruit Extracts      Annotation - [id number]: peaches and strawberries    Levofloxacin     Morphine GI Intolerance    Other      FRUIT    Penicillins Hives    Tetracycline Edema    Xarelto [Rivaroxaban] Rash       Objective   Vitals: Blood pressure 120/78, pulse 87, height 5' 4" (1 626 m), weight 77 1 kg (170 lb), not currently breastfeeding  Physical Exam  Constitutional:       Appearance: She is well-developed  HENT:      Head: Normocephalic and atraumatic  Eyes:      Pupils: Pupils are equal, round, and reactive to light  Cardiovascular:      Rate and Rhythm: Normal rate and regular rhythm  Pulmonary:      Effort: Pulmonary effort is normal       Breath sounds: Normal breath sounds  Abdominal:      General: Bowel sounds are normal       Palpations: Abdomen is soft  Musculoskeletal:         General: Normal range of motion  Cervical back: Normal range of motion and neck supple  Skin:     General: Skin is warm and dry  Neurological:      Mental Status: She is alert and oriented to person, place, and time             Labs:  Appointment on 09/15/2022   Component Date Value    Magnesium 09/15/2022 1 7     Sodium 09/15/2022 136     Potassium 09/15/2022 4 4     Chloride 09/15/2022 109 (A)    CO2 09/15/2022 27     ANION GAP 09/15/2022 0 (A)  BUN 09/15/2022 26 (A)    Creatinine 09/15/2022 1 35 (A)    Glucose, Fasting 09/15/2022 98     Calcium 09/15/2022 9 9     eGFR 09/15/2022 39     WBC 09/15/2022 8 32     RBC 09/15/2022 3 99     Hemoglobin 09/15/2022 12 6     Hematocrit 09/15/2022 41 2     MCV 09/15/2022 103 (A)    MCH 09/15/2022 31 6     MCHC 09/15/2022 30 6 (A)    RDW 09/15/2022 12 4     MPV 09/15/2022 10 7     Platelets 18/39/6654 339     nRBC 09/15/2022 0     Neutrophils Relative 09/15/2022 63     Immat GRANS % 09/15/2022 0     Lymphocytes Relative 09/15/2022 25     Monocytes Relative 09/15/2022 10     Eosinophils Relative 09/15/2022 1     Basophils Relative 09/15/2022 1     Neutrophils Absolute 09/15/2022 5 24     Immature Grans Absolute 09/15/2022 0 03     Lymphocytes Absolute 09/15/2022 2 08     Monocytes Absolute 09/15/2022 0 79     Eosinophils Absolute 09/15/2022 0 10     Basophils Absolute 09/15/2022 0 08     Cholesterol 09/15/2022 156     Triglycerides 09/15/2022 173 (A)    HDL, Direct 09/15/2022 36 (A)    LDL Calculated 09/15/2022 85     Non-HDL-Chol (CHOL-HDL) 09/15/2022 120    Appointment on 09/08/2022   Component Date Value    Sodium 09/08/2022 137     Potassium 09/08/2022 4 0     Chloride 09/08/2022 102     CO2 09/08/2022 29     ANION GAP 09/08/2022 6     BUN 09/08/2022 28 (A)    Creatinine 09/08/2022 1 33 (A)    Glucose, Fasting 09/08/2022 94     Calcium 09/08/2022 9 4     eGFR 09/08/2022 40    Admission on 09/02/2022, Discharged on 09/04/2022   Component Date Value    Sodium 09/02/2022 143     Potassium 09/02/2022 4 5     Chloride 09/02/2022 112 (A)    CO2 09/02/2022 26     ANION GAP 09/02/2022 5     BUN 09/02/2022 13     Creatinine 09/02/2022 1 04     Glucose 09/02/2022 100     Glucose, Fasting 09/02/2022 100 (A)    Calcium 09/02/2022 10 0     eGFR 09/02/2022 54     WBC 09/02/2022 7 07     RBC 09/02/2022 3 99     Hemoglobin 09/02/2022 13 1     Hematocrit 09/02/2022 41 9  MCV 09/02/2022 105 (A)    MCH 09/02/2022 32 8     MCHC 09/02/2022 31 3 (A)    RDW 09/02/2022 13 5     MPV 09/02/2022 10 5     Platelets 24/59/0282 277     nRBC 09/02/2022 0     Neutrophils Relative 09/02/2022 65     Immat GRANS % 09/02/2022 0     Lymphocytes Relative 09/02/2022 24     Monocytes Relative 09/02/2022 8     Eosinophils Relative 09/02/2022 2     Basophils Relative 09/02/2022 1     Neutrophils Absolute 09/02/2022 4 62     Immature Grans Absolute 09/02/2022 0 02     Lymphocytes Absolute 09/02/2022 1 67     Monocytes Absolute 09/02/2022 0 56     Eosinophils Absolute 09/02/2022 0 13     Basophils Absolute 09/02/2022 0 07     Protime 09/02/2022 19 7 (A)    INR 09/02/2022 1 65 (A)    Activated Clotting Time,* 09/02/2022 337 (A)    Specimen Type 09/02/2022 ARTERIAL     Activated Clotting Time,* 09/02/2022 329 (A)    Specimen Type 09/02/2022 VENOUS     Activated Clotting Time,* 09/02/2022 309 (A)    Specimen Type 09/02/2022 VENOUS     Ventricular Rate 09/02/2022 101     Atrial Rate 09/02/2022 117     QRSD Interval 09/02/2022 74     QT Interval 09/02/2022 358     QTC Interval 09/02/2022 464     QRS Axis 09/02/2022 -23     T Wave Axis 09/02/2022 29     Ventricular Rate 09/02/2022 105     Atrial Rate 09/02/2022 357     QRSD Interval 09/02/2022 74     QT Interval 09/02/2022 340     QTC Interval 09/02/2022 449     QRS Axis 09/02/2022 -18     T Wave Axis 09/02/2022 52     Activated Clotting Time,* 09/02/2022 433 (A)    Specimen Type 09/02/2022 VENOUS     Activated Clotting Time,* 09/02/2022 343 (A)    Specimen Type 09/02/2022 VENOUS     Ventricular Rate 09/02/2022 84     Atrial Rate 09/02/2022 84     IA Interval 09/02/2022 179     QRSD Interval 09/02/2022 83     QT Interval 09/02/2022 363     QTC Interval 09/02/2022 430     P Axis 09/02/2022 69     QRS Axis 09/02/2022 21     T Wave Axis 09/02/2022 77     Sodium 09/03/2022 140     Potassium 09/03/2022 4 7     Chloride 09/03/2022 110 (A)    CO2 09/03/2022 22     ANION GAP 09/03/2022 8     BUN 09/03/2022 20     Creatinine 09/03/2022 1 22     Glucose 09/03/2022 152 (A)    Calcium 09/03/2022 8 4     eGFR 09/03/2022 44     WBC 09/03/2022 7 77     RBC 09/03/2022 3 55 (A)    Hemoglobin 09/03/2022 11 4 (A)    Hematocrit 09/03/2022 37 8     MCV 09/03/2022 107 (A)    MCH 09/03/2022 32 1     MCHC 09/03/2022 30 2 (A)    RDW 09/03/2022 13 4     Platelets 30/78/8825 216     MPV 09/03/2022 11 5     Magnesium 09/03/2022 1 4 (A)    Sodium 09/04/2022 139     Potassium 09/04/2022 4 3     Chloride 09/04/2022 106     CO2 09/04/2022 27     ANION GAP 09/04/2022 6     BUN 09/04/2022 27 (A)    Creatinine 09/04/2022 1 23     Glucose 09/04/2022 120     Calcium 09/04/2022 9 1     eGFR 09/04/2022 44    Orders Only on 08/29/2022   Component Date Value    SARS-CoV-2 08/29/2022 Negative        Imaging: I have personally reviewed pertinent reports

## 2022-10-06 DIAGNOSIS — I10 ESSENTIAL HYPERTENSION: ICD-10-CM

## 2022-10-06 RX ORDER — SPIRONOLACTONE 25 MG/1
TABLET ORAL
Qty: 90 TABLET | Refills: 3 | Status: SHIPPED | OUTPATIENT
Start: 2022-10-06

## 2022-10-21 ENCOUNTER — TELEPHONE (OUTPATIENT)
Dept: CARDIOLOGY CLINIC | Facility: CLINIC | Age: 72
End: 2022-10-21

## 2022-10-21 NOTE — TELEPHONE ENCOUNTER
Gary/Dr slater recommended loop implant for this pt  She is calling today to inquire about timeline

## 2022-10-24 ENCOUNTER — TELEPHONE (OUTPATIENT)
Dept: CARDIOLOGY CLINIC | Facility: CLINIC | Age: 72
End: 2022-10-24

## 2022-10-24 NOTE — TELEPHONE ENCOUNTER
Patient calling to schedule LOOP recorder w/Gary Friend  Informed patient we do not have any openings for the [facility] and that I could put her on a waitlist for [facility] and that's there's a long list of patients waiting to be schedule  I offered her that she can schedule at [facility] or [place]  Patient decided to schedule at [place]  Informed patient that I will send a message to Peyton Saldana to call her to schedule  Peyton Saldana: Please call patient to schedule         Thanks,  [employer]

## 2022-10-24 NOTE — TELEPHONE ENCOUNTER
Patient called today stating she was told that  there is a waiting list for her to get a loop implant  Patient stated the wait is months away and would like to be placed in sooner   Please advise, thank you

## 2022-10-24 NOTE — TELEPHONE ENCOUNTER
----- Message from Yaa Atkins [place] sent at 10/5/2022 11:15 AM EDT -----  Can we add this patient for medtronic loop for me for first loop day in November please?

## 2022-10-31 NOTE — TELEPHONE ENCOUNTER
Patient scheduled for a loop implant at [facility] on 12/075/22 with Gary    Patient aware of general instructions    Eliquis to hold the am of  Patient cover under medicare

## 2022-11-17 ENCOUNTER — ANNUAL EXAM (OUTPATIENT)
Dept: OBGYN CLINIC | Facility: MEDICAL CENTER | Age: 72
End: 2022-11-17

## 2022-11-17 VITALS
BODY MASS INDEX: 27.62 KG/M2 | HEIGHT: 64 IN | WEIGHT: 161.8 LBS | DIASTOLIC BLOOD PRESSURE: 76 MMHG | SYSTOLIC BLOOD PRESSURE: 120 MMHG

## 2022-11-17 DIAGNOSIS — Z13.820 ENCOUNTER FOR OSTEOPOROSIS SCREENING IN ASYMPTOMATIC POSTMENOPAUSAL PATIENT: ICD-10-CM

## 2022-11-17 DIAGNOSIS — Z01.419 ENCOUNTER FOR GYNECOLOGICAL EXAMINATION: Primary | ICD-10-CM

## 2022-11-17 DIAGNOSIS — N81.6 RECTOCELE: ICD-10-CM

## 2022-11-17 DIAGNOSIS — Z78.0 ENCOUNTER FOR OSTEOPOROSIS SCREENING IN ASYMPTOMATIC POSTMENOPAUSAL PATIENT: ICD-10-CM

## 2022-11-17 NOTE — PROGRESS NOTES
ASSESSMENT & PLAN: Ward Doran is a 81 y o  [id number] with normal gynecologic exam     1   Routine well woman exam done today  2  Pap and HPV:  The patient's last pap and hpv was 2019  It was normal     Pap with cotesting was not done today  Current ASCCP Guidelines reviewed  3   Mammogram ordered  4  Colorectal cancer screening was notordered  5   The following were reviewed in today's visit: breast self exam, mammography screening ordered, menopause, osteoporosis, adequate intake of calcium and vitamin D and DEXA ordered  CC:  Annual Gynecologic Examination    HPI: Ward Doran is a 81 y o  [id number] who presents for annual gynecologic examination  She has the following concerns:  None GYN   States rectocele does not bother her since loosing weight      Health Maintenance:    She wears her seatbelt routinely  She does perform regular monthly self breast exams  She feels safe at home  Past Medical History:   Diagnosis Date   • Atrial fibrillation ([facility] [address] )    • Bursitis of hip     Last Assessed:6/5/2017   • Herniated lumbar intervertebral disc    • Pituitary adenoma ([facility] [address] )     Last Assessed:2/12/2015 ; Previously on bromocriptine   • Spinal stenosis    • Systolic murmur of aorta        Past Surgical History:   Procedure Laterality Date   • BREAST SURGERY      Incisional breast biopsy   • CARDIAC ELECTROPHYSIOLOGY PROCEDURE N/A 9/2/2022    Procedure: Cardiac eps/afib ablation;  Surgeon: Yany Dunne MD;  Location:  CARDIAC CATH LAB; Service: Cardiology   • CARDIAC ELECTROPHYSIOLOGY STUDY AND ABLATION     • CARPAL TUNNEL RELEASE Left    • CHOLECYSTECTOMY     • OTHER SURGICAL HISTORY      EXCISION OF PAROTID TUMOR/GLAND   • PAROTIDECTOMY     • MS COLONOSCOPY FLX DX W/COLLJ SPEC WHEN PFRMD N/A 6/6/2017    Procedure: COLONOSCOPY;  Surgeon: Duane Waters DO;  Location: [facility] GI LAB;   Service: Gastroenterology   • ROTATOR CUFF REPAIR     • SALPINGOOPHORECTOMY Right        Past OB/Gyn History:  OB History        3    Para   3    Term   3            AB        Living   3       SAB        IAB        Ectopic        Multiple        Live Births   3                       Family History   Problem Relation Age of Onset   • Colon cancer Mother    • Coronary artery disease Mother    • Colonic polyp Mother    • Liver cancer Mother    • Multiple sclerosis Father    • Hypertension Other    • Hypertension Brother        Social History:  Social History     Socioeconomic History   • Marital status: /Civil Union     Spouse name: Not on file   • Number of children: Not on file   • Years of education: Not on file   • Highest education level: Not on file   Occupational History   • Not on file   Tobacco Use   • Smoking status: Former     Packs/day: 1 00     Years: 30 00     Pack years: 30      Types: Cigarettes     Quit date: 2018     Years since quitting: 3 9   • Smokeless tobacco: Never   Vaping Use   • Vaping Use: Never used   Substance and Sexual Activity   • Alcohol use: Not Currently     Alcohol/week: 1 0 standard drink     Types: 1 Shots of liquor per week   • Drug use: No   • Sexual activity: Not Currently     Birth control/protection: Post-menopausal   Other Topics Concern   • Not on file   Social History Narrative   • Not on file     Social Determinants of Health     Financial Resource Strain: Not on file   Food Insecurity: Not on file   Transportation Needs: Not on file   Physical Activity: Not on file   Stress: Not on file   Social Connections: Not on file   Intimate Partner Violence: Not on file   Housing Stability: Not on file         Allergies   Allergen Reactions   • Vasotec [Enalapril] Hives   • Amlodipine Rash   • Ancef [Cefazolin]    • Cephalosporins Other (See Comments)     c diff   • Fruit Extracts      Annotation - [id number]: peaches and strawberries   • Levofloxacin    • Morphine GI Intolerance   • Other      FRUIT   • Penicillins Hives   • Tetracycline Edema   • Xarelto [Rivaroxaban] Rash         Current Outpatient Medications:   •  acetaminophen (TYLENOL) 650 mg CR tablet, Take 1 tablet (650 mg total) by mouth every 8 (eight) hours as needed for mild pain, Disp: 90 tablet, Rfl: 0  •  apixaban (Eliquis) 5 mg, Take 1 tablet (5 mg total) by mouth 2 (two) times a day, Disp: 180 tablet, Rfl: 4  •  atorvastatin (LIPITOR) 20 mg tablet, TAKE 1 TABLET BY MOUTH EVERY DAY, Disp: 90 tablet, Rfl: 3  •  cetirizine (ZyrTEC) 10 mg tablet, Take 10 mg by mouth as needed , Disp: , Rfl:   •  diltiazem (CARDIZEM CD) 120 mg 24 hr capsule, Take 1 capsule (120 mg total) by mouth daily, Disp: 90 capsule, Rfl: 3  •  escitalopram (LEXAPRO) 10 mg tablet, TAKE 1 TABLET BY MOUTH EVERY DAY, Disp: 30 tablet, Rfl: 11  •  losartan (COZAAR) 25 mg tablet, Take 0 5 tablets (12 5 mg total) by mouth daily 1/2 tab daily, Disp: 1 tablet, Rfl: 0  •  spironolactone (ALDACTONE) 25 mg tablet, TAKE 1 TABLET BY MOUTH EVERY DAY, Disp: 90 tablet, Rfl: 3    Review of Systems  Constitutional :no fever, feels well, no tiredness, no recent weight gain or loss  ENT: no ear ache, no loss of hearing, no nosebleeds or nasal discharge, no sore throat or hoarseness  Cardiovascular: no complaints of slow or fast heart beat, no chest pain, no palpitations, no leg claudication or lower extremity edema  Respiratory: no complaints of shortness of shortness of breath, no RUVALCABA  Breasts:no complaints of breast pain, breast lump, or nipple discharge  Gastrointestinal: no complaints of abdominal pain, constipation, nausea, vomiting, or diarrhea or bloody stools  Genitourinary : no complaints of dysuria, incontinence, pelvic pain, no dysmenorrhea, vaginal discharge or abnormal vaginal bleeding and as noted in HPI  Musculoskeletal: no complaints of arthralgia, no myalgia, no joint swelling or stiffness, no limb pain or swelling    Integumentary: no complaints of skin rash or lesion, itching or dry skin  Neurological: no complaints of headache, no confusion, no numbness or tingling, no dizziness or fainting    Objective      /76   Ht 5' 4" (1 626 m)   Wt 73 4 kg (161 lb 12 8 oz)   LMP  (LMP Unknown)   BMI 27 77 kg/m²   General:   appears stated age, cooperative, alert normal mood and affect   Lungs: Unlabored breathing    Breasts: normal appearance, no masses or tenderness   Abdomen: soft, non-tender, without masses or organomegaly   Vulva: normal   Vagina: normal vagina, no discharge, exudate, lesion, or erythema/ rectocele present     Urethra: normal   Cervix: Normal, no discharge  Nontender     Uterus: normal size, contour, position, consistency, mobility, non-tender   Adnexa: no mass, fullness, tenderness   Psychiatric orientation to person, place, and time: normal  mood and affect: normal

## 2022-11-29 ENCOUNTER — APPOINTMENT (OUTPATIENT)
Dept: LAB | Facility: IMAGING CENTER | Age: 72
End: 2022-11-29

## 2022-11-29 DIAGNOSIS — I12.9 HYPERTENSIVE KIDNEY DISEASE WITH STAGE 3B CHRONIC KIDNEY DISEASE (HCC): ICD-10-CM

## 2022-11-29 DIAGNOSIS — I49.9 IRREGULAR CARDIAC RHYTHM: ICD-10-CM

## 2022-11-29 DIAGNOSIS — N18.32 HYPERTENSIVE KIDNEY DISEASE WITH STAGE 3B CHRONIC KIDNEY DISEASE (HCC): ICD-10-CM

## 2022-11-29 DIAGNOSIS — N18.32 STAGE 3B CHRONIC KIDNEY DISEASE (HCC): ICD-10-CM

## 2022-11-29 LAB
ALBUMIN SERPL BCP-MCNC: 3.7 G/DL (ref 3.5–5)
ANION GAP SERPL CALCULATED.3IONS-SCNC: 3 MMOL/L (ref 4–13)
BUN SERPL-MCNC: 14 MG/DL (ref 5–25)
CALCIUM SERPL-MCNC: 10.4 MG/DL (ref 8.3–10.1)
CHLORIDE SERPL-SCNC: 110 MMOL/L (ref 96–108)
CO2 SERPL-SCNC: 25 MMOL/L (ref 21–32)
CREAT SERPL-MCNC: 1.03 MG/DL (ref 0.6–1.3)
CREAT UR-MCNC: 319 MG/DL
ERYTHROCYTE [DISTWIDTH] IN BLOOD BY AUTOMATED COUNT: 13.2 % (ref 11.6–15.1)
GFR SERPL CREATININE-BSD FRML MDRD: 55 ML/MIN/1.73SQ M
GLUCOSE P FAST SERPL-MCNC: 93 MG/DL (ref 65–99)
HCT VFR BLD AUTO: 42.8 % (ref 34.8–46.1)
HGB BLD-MCNC: 12.9 G/DL (ref 11.5–15.4)
MCH RBC QN AUTO: 30.1 PG (ref 26.8–34.3)
MCHC RBC AUTO-ENTMCNC: 30.1 G/DL (ref 31.4–37.4)
MCV RBC AUTO: 100 FL (ref 82–98)
MICROALBUMIN UR-MCNC: 14.1 MG/L (ref 0–20)
MICROALBUMIN/CREAT 24H UR: 4 MG/G CREATININE (ref 0–30)
PHOSPHATE SERPL-MCNC: 3.7 MG/DL (ref 2.3–4.1)
PLATELET # BLD AUTO: 261 THOUSANDS/UL (ref 149–390)
PMV BLD AUTO: 11.6 FL (ref 8.9–12.7)
POTASSIUM SERPL-SCNC: 4.4 MMOL/L (ref 3.5–5.3)
PTH-INTACT SERPL-MCNC: 51.6 PG/ML (ref 18.4–80.1)
RBC # BLD AUTO: 4.28 MILLION/UL (ref 3.81–5.12)
SODIUM SERPL-SCNC: 138 MMOL/L (ref 135–147)
URATE SERPL-MCNC: 5.8 MG/DL (ref 2–7.5)
WBC # BLD AUTO: 6.64 THOUSAND/UL (ref 4.31–10.16)

## 2022-12-07 ENCOUNTER — HOSPITAL ENCOUNTER (OUTPATIENT)
Facility: HOSPITAL | Age: 72
Setting detail: OUTPATIENT SURGERY
Discharge: HOME | End: 2022-12-07
Attending: INTERNAL MEDICINE | Admitting: INTERNAL MEDICINE

## 2022-12-07 DIAGNOSIS — I48.19 PERSISTENT ATRIAL FIBRILLATION (HCC): ICD-10-CM

## 2022-12-07 DEVICE — ICM LNQ22 LINQ II USA
Type: IMPLANTABLE DEVICE | Site: CHEST  WALL | Status: FUNCTIONAL
Brand: LINQ II™

## 2022-12-07 RX ORDER — LIDOCAINE HYDROCHLORIDE AND EPINEPHRINE 10; 10 MG/ML; UG/ML
INJECTION, SOLUTION INFILTRATION; PERINEURAL
Status: DISCONTINUED
Start: 2022-12-07 | End: 2022-12-07 | Stop reason: HOSPADM

## 2022-12-07 NOTE — H&P
H&P Exam - Cardiology   Adelaide Flight 81 y o  female MRN: [id number]  Unit/Bed#:  Encounter: [id number]    Assessment/Plan   1  Persistent atrial fibrillation    * plan for ILR to guide [facility]         Imaging: I have personally reviewed pertinent reports  Results for orders placed during the hospital encounter of 21    Echo complete with contrast if indicated    Narrative  [address]  [phone]    Transthoracic Echocardiogram  2D, M-mode, Doppler, and Color Doppler    Study date:  13-May-2021    Patient: Ann-Marie Romero  MR number: [id number]  Account number: [de-identified]  : 1950  Age: 73 years  Gender: Female  Status: Outpatient  Location: [address] [place] [address] [facility]  Height: 64 in  Weight: 187 7 lb  BP: 120/ 70 mmHg    Indications: SVT    Diagnoses: I47 1 - Supraventricular tachycardia    Sonographer:  Betty Carrizales RDCS  Primary Physician:  Renetta Daniels  Referring Physician:  Korin Craven MD  Group:  [id number]  Cardiology Fellow: Nora Rand MD  Interpreting Physician:  Betty Carrizales MD    SUMMARY    LEFT VENTRICLE:  Systolic function was vigorous  Ejection fraction was estimated to be 65 %  There were no regional wall motion abnormalities  Wall thickness was at the upper limits of normal     MITRAL VALVE:  There was mild annular calcification  There was mild to moderate regurgitation  AORTIC VALVE:  The valve was trileaflet  Leaflets exhibited moderately increased thickness, moderate calcification, and mild to moderately reduced cuspal separation  There was mild to moderate stenosis  There was mild to moderate regurgitation  The peak valve velocity was 306 cm/s  Valve mean gradient was 21 6 mmHg  TRICUSPID VALVE:  There was mild regurgitation  Pulmonary artery systolic pressure was mildly increased  Estimated peak PA pressure was 39 mmHg      HISTORY: PRIOR HISTORY: MV Disorder, AI, HTN, DLD, Obesity    PROCEDURE: The study was performed in the [address] [facility] [place]  This was a routine study  The transthoracic approach was used  The study included complete 2D imaging, M-mode, complete spectral Doppler, and color Doppler  The  heart rate was 88 bpm, at the start of the study  Images were obtained from the parasternal, apical, subcostal, and suprasternal notch acoustic windows  Echocardiographic views were limited due to lung interference  Image quality was  adequate  LEFT VENTRICLE: Size was normal  Systolic function was vigorous  Ejection fraction was estimated to be 65 %  There were no regional wall motion abnormalities  Wall thickness was normal  Wall thickness was at the upper limits of normal   DOPPLER: Left ventricular diastolic function parameters were normal     RIGHT VENTRICLE: The size was normal  Systolic function was normal  Wall thickness was normal     LEFT ATRIUM: Size was normal     RIGHT ATRIUM: Size was normal     MITRAL VALVE: There was mild annular calcification  Valve structure was normal  There was mild thickening  There was mild-moderate calcification of the anterior and posterior leaflets, with mild chordal involvement  There was normal  leaflet separation  DOPPLER: The transmitral velocity was within the normal range  There was no evidence for stenosis  There was mild to moderate regurgitation  AORTIC VALVE: The valve was trileaflet  Leaflets exhibited moderately increased thickness, moderate calcification, and mild to moderately reduced cuspal separation  DOPPLER: Transaortic velocity was increased due to valvular stenosis  There was mild to moderate stenosis  There was mild to moderate regurgitation  TRICUSPID VALVE: There was normal leaflet separation  DOPPLER: There was mild regurgitation  Pulmonary artery systolic pressure was mildly increased  Estimated peak PA pressure was 39 mmHg      PULMONIC VALVE: Leaflets exhibited normal thickness, no calcification, and normal cuspal separation  DOPPLER: The transpulmonic velocity was within the normal range  There was no significant regurgitation  PERICARDIUM: There was no pericardial effusion  The pericardium was normal in appearance  AORTA: The root exhibited normal size  SYSTEMIC VEINS: IVC: The inferior vena cava was normal in size  Respirophasic changes were normal     MEASUREMENT TABLES    2D MEASUREMENTS  LVOT   (Reference normals)  Diam   19 mm   (--)    DOPPLER MEASUREMENTS  LVOT   (Reference normals)  Peak carmel   160 cm/s   (--)  Mean carmel   110 cm/s   (--)  VTI   31 4 cm   (--)  Peak gradient   7 mmHg   (--)  Mean gradient   5 15 mmHg   (--)  Stroke vol   89 03 ml   (--)  Aortic valve   (Reference normals)  Peak carmel   306 cm/s   (--)  Mean carmel   194 cm/s   (--)  VTI   60 cm   (--)  Peak gradient   33 mmHg   (--)  Mean gradient   21 6 mmHg   (--)  Obstr index, VTI   0 52    (--)  Area index, VTI   0 77 cmï¾²/mï¾²   (--)  Obstr index, Vmax   0 52    (--)  Valve area, Vmax   1 47 cmï¾²   (--)  Area index, Vmax   0 77 cmï¾²/mï¾²   (--)  Obstr index, Vmean   0 57    (--)  Valve area, Vmean   1 62 cmï¾²   (--)  Area index, Vmean   0 85 cmï¾²/mï¾²   (--)    SYSTEM MEASUREMENT TABLES    2D  %FS: 40 08 %  Ao Diam: 2 89 cm  Ao asc: 3 24 cm  EDV(Teich): 115 38 ml  EF(Teich): 70 53 %  ESV(Teich): 34 ml  IVSd: 1 07 cm  LA Diam: 3 56 cm  LAAs A4C: 15 92 cm2  LAESV A-L A4C: 42 92 ml  LAESV MOD A4C: 40 45 ml  LALs A4C: 5 01 cm  LVEDV MOD A4C: 52 89 ml  LVEF MOD A4C: 71 88 %  LVESV MOD A4C: 14 87 ml  LVIDd: 4 95 cm  LVIDs: 2 96 cm  LVLd A4C: 7 15 cm  LVLs A4C: 5 79 cm  LVOT Diam: 1 85 cm  LVPWd: 0 87 cm  RAEDV A-L: 24 89 ml  RAEDV MOD: 23 46 ml  RALd: 4 55 cm  RVIDd: 2 9 cm  SV MOD A4C: 38 02 ml  SV(Teich): 81 38 ml    CW  AR Dec Bell: 2 32 m/s2  AR Dec Time: 1615 38 ms  AR PHT: 468 46 ms  AR Vmax: 3 75 m/s  AR maxP 2 mmHg  AV Env  Ti: 301 3 ms  AV VTI: 65 34 cm  AV Vmax: 3 06 m/s  AV Vmean: 2 17 m/s  AV maxP 61 mmHg  AV meanP 6 mmHg  MV VTI: 53 15 cm  MV Vmax: 1 73 m/s  MV Vmean: 0 83 m/s  MV maxP 01 mmHg  MV meanPG: 3 58 mmHg  TR Vmax: 2 9 m/s  TR maxP 69 mmHg    MM  TAPSE: 2 28 cm    PW  ZHAO (VTI): 1 22 cm2  ZHAO Vmax: 1 17 cm2  AVAI (VTI): 0 cm2/m2  AVAI Vmax: 0 cm2/m2  E' Sept: 0 08 m/s  E/E' Sept: 18 55  LVOT Env  Ti: 300 67 ms  LVOT VTI: 29 72 cm  LVOT Vmax: 1 33 m/s  LVOT Vmean: 0 99 m/s  LVOT maxP 09 mmHg  LVOT meanP 39 mmHg  LVSI Dopp: 41 86 ml/m2  LVSV Dopp: 79 95 ml  MV A Jose Luis: 1 54 m/s  MV Dec [place]: 5 11 m/s2  MV DecT: 280 09 ms  MV E Jose Luis: 1 43 m/s  MV E/A Ratio: 0 93  MV PHT: 81 23 ms  MVA (VTI): 1 5 cm2  MVA By PHT: 2 71 cm2    [facility] Accredited Echocardiography [facility]    Prepared and electronically signed by    Codey Francis MD  Signed 13-May-2021 10:26:33      EKG: none    History of Present Illness   HPI:  Daija Chavez is a 81y o  year old female with a history as above who presents to [facility] for ILR implant  Review of Systems  ROS as noted above, otherwise 12 point review of systems was performed and is negative  Historical Information   Past Medical History:   Diagnosis Date   • Atrial fibrillation ([address] )    • Bursitis of hip     Last Assessed:2017   • Herniated lumbar intervertebral disc    • Pituitary adenoma ([facility] [address] )     Last Assessed:2015 ; Previously on bromocriptine   • Spinal stenosis    • Systolic murmur of aorta      Past Surgical History:   Procedure Laterality Date   • BREAST SURGERY      Incisional breast biopsy   • CARDIAC ELECTROPHYSIOLOGY PROCEDURE N/A 2022    Procedure: Cardiac eps/afib ablation;  Surgeon: Isaiah Levin MD;  Location: [facility]ARD[facility];   Service: Cardiology   • CARDIAC ELECTROPHYSIOLOGY STUDY AND ABLATION     • CARPAL TUNNEL RELEASE Left    • CHOLECYSTECTOMY     • OTHER SURGICAL HISTORY      EXCISION OF PAROTID TUMOR/GLAND   • PAROTIDECTOMY     • NV COLONOSCOPY FLX DX W/COLLJ SPEC WHEN PFRMD N/A 2017    Procedure: COLONOSCOPY;  Surgeon: Daniel Caraballo DO;  Location: [facility] GI LAB; Service: Gastroenterology   • ROTATOR CUFF REPAIR     • SALPINGOOPHORECTOMY Right      Family History:   Family History   Problem Relation Age of Onset   • Colon cancer Mother    • Coronary artery disease Mother    • Colonic polyp Mother    • Liver cancer Mother    • Multiple sclerosis Father    • Hypertension Other    • Hypertension Brother        Social History   Social History     Substance and Sexual Activity   Alcohol Use Not Currently   • Alcohol/week: 1 0 standard drink   • Types: 1 Shots of liquor per week     Social History     Substance and Sexual Activity   Drug Use No     Social History     Tobacco Use   Smoking Status Former   • Packs/day: 1 00   • Years: 30    • Pack years: 30    • Types: Cigarettes   • Quit date: 2018   • Years since quittin 0   Smokeless Tobacco Never         Meds/Allergies   all medications and allergies reviewed  Home Medications:   No medications prior to admission  Allergies   Allergen Reactions   • Vasotec [Enalapril] Hives   • Amlodipine Rash   • Ancef [Cefazolin]    • Cephalosporins Other (See Comments)     c diff   • Fruit Extracts      Annotation - [id number]: peaches and strawberries   • Levofloxacin    • Morphine GI Intolerance   • Other      FRUIT   • Penicillins Hives   • Tetracycline Edema   • Xarelto [Rivaroxaban] Rash       Objective   Vitals: not currently breastfeeding  No intake or output data in the 24 hours ending 22 1344    Invasive Devices     None                 Physical Exam  Constitutional:       Appearance: She is well-developed  HENT:      Head: Normocephalic and atraumatic  Eyes:      Pupils: Pupils are equal, round, and reactive to light  Cardiovascular:      Rate and Rhythm: Normal rate and regular rhythm  Pulmonary:      Effort: Pulmonary effort is normal       Breath sounds: Normal breath sounds  Abdominal:      General: Bowel sounds are normal       Palpations: Abdomen is soft  Musculoskeletal:         General: Normal range of motion  Cervical back: Normal range of motion and neck supple  Skin:     General: Skin is warm and dry  Neurological:      Mental Status: She is alert and oriented to person, place, and time  Lab Results: I have personally reviewed pertinent lab results                Invalid input(s): LABGLOM                Code Status: Prior

## 2022-12-07 NOTE — DISCHARGE INSTRUCTIONS
OK to shower with with the glue, glue will fall off in 1 week on its own, do not scrub the area or swim during the next 14 days  not use lotions/powders/creams on incision  Remove outer bandage on for 24 hours after procedure  Please call the office [phone] if you notice redness, swelling, bleeding, or drainage from incision or if you develop fevers  Cardiac Loop Recorder Insertion      WHAT YOU SHOULD KNOW:    A cardiac loop recorder is a device used to diagnose heart rhythm problems, such as a fast or irregular heartbeat  It is implanted in your left chest, just under the skin  The device records a pattern of your heart's rhythm, called an EKG  Your device records automatic EKGs, depending on how your caregiver programs it  You may also receive a handheld controller  You press a button on the controller when you have symptoms, such as dizziness, lightheadedness, or palpitations  The device will record an EKG at that moment  The recording can help your caregiver see if your symptoms may be caused by heart rhythm problems  Your caregiver will remove the device after it has collected enough data  You may need the device for up to 5 years  The procedure to remove the device is similar to the procedure used to implant it  AFTER YOU LEAVE:    Follow up with our loop recorder clinic: You will need to return in 1 to 2 weeks to meet the staff in our loop recorder clinic  At this appointment they will check your incision and remove your stitches  We will discuss how we retrieve data from your loop recorder at this appointment  You will be able to transmit data from your device from home as well, this will also be explained by our loop recorder clinic staff  Ask for information about this process  Write down your questions so you remember to ask them during your visits        Wound care: the glue over your loop recorder is water proof, you can shower as your normally wound, please do not pick the glue off the wound  Do not use lotions/powders/creams on incision  Remove outer bandage 24 hours after procedure, you will notice a few stitches which will be removed at your two week follow up appointment  Please call the office if you notice redness, swelling, bleeding, or drainage from incision or if you develop fevers  Keep the loop recorder area clean until it heals  Return to activity: If you received anesthesia, you will not be able to drive for 24 hours  Otherwise, most people can return to normal activities soon after the procedure  Your cardiologist may want to know if your work involves electrical current or high-voltage equipment  Ask about other electrical items that could interfere with your cardiac loop recorder  Contact your cardiologist if:   You have a fever or chills  Your wound is red, swollen, or draining pus  You have questions or concerns about your condition or care  Seek care immediately or call 911 if: You feel weak, dizzy, or faint  You lose consciousness  © 2014 [employer] is for End User's use only and may not be sold, redistributed or otherwise used for commercial purposes  All illustrations and images included in CareNotes® are the copyrighted property of [employer]  or [employer]  The above information is an  only  It is not intended as medical advice for individual conditions or treatments  Talk to your doctor, nurse or pharmacist before following any medical regimen to see if it is safe and effective for you

## 2022-12-08 ENCOUNTER — OFFICE VISIT (OUTPATIENT)
Dept: NEPHROLOGY | Facility: CLINIC | Age: 72
End: 2022-12-08

## 2022-12-08 VITALS
BODY MASS INDEX: 27.14 KG/M2 | WEIGHT: 159 LBS | SYSTOLIC BLOOD PRESSURE: 110 MMHG | HEIGHT: 64 IN | DIASTOLIC BLOOD PRESSURE: 64 MMHG | HEART RATE: 89 BPM

## 2022-12-08 DIAGNOSIS — I12.9 HYPERTENSIVE KIDNEY DISEASE WITH STAGE 3B CHRONIC KIDNEY DISEASE (HCC): ICD-10-CM

## 2022-12-08 DIAGNOSIS — N18.32 STAGE 3B CHRONIC KIDNEY DISEASE (HCC): Primary | ICD-10-CM

## 2022-12-08 DIAGNOSIS — I51.89 DIASTOLIC DYSFUNCTION: ICD-10-CM

## 2022-12-08 DIAGNOSIS — E83.52 HYPERCALCEMIA: ICD-10-CM

## 2022-12-08 DIAGNOSIS — I48.0 PAROXYSMAL ATRIAL FIBRILLATION (HCC): ICD-10-CM

## 2022-12-08 DIAGNOSIS — N18.32 HYPERTENSIVE KIDNEY DISEASE WITH STAGE 3B CHRONIC KIDNEY DISEASE (HCC): ICD-10-CM

## 2022-12-08 NOTE — PROGRESS NOTES
NEPHROLOGY OUTPATIENT PROGRESS NOTE   Debbie Randall 81 y o  female MRN: [id number]  Reason for visit: Chronic kidney disease    ASSESSMENT and PLAN:  1  Chronic kidney disease, baseline creatinine 1 0-1 3 most recent creatinine 1 3, estimated GFR 55  2  Intermittent hypercalcemia currently 10 4, previous labs 5/UPEP negative for monoclonal gammopathy, PTH incompletely suppressed at 51 6, will continue monitor for now previous 24 urine for calcium 68 2  3  Hypertension, blood pressure appears stable in fact labile at times  Recently reduced losartan to 12 5 mg daily  Continue with spironolactone, Cardizem as per cardiology  4  Atrial fibrillation, previously on anticoagulation, status post loop recorder    Renal function overall remained stable  Blood pressure and volume status acceptable  We will continue monitor calcium level, repeat labs in 3 months, assuming stable recommend follow-up in 6 months with repeat labs at that time    SUBJECTIVE / INTERVAL HISTORY:  She has been doing reasonably well  Since her last visit she had a cardiac ablation this was complicated by some elements of congestive heart failure preablation  Fortunately since ablation she has felt significantly better without significant shortness of breath or dyspnea  Additionally has lost approximately 30 pounds  Denies any overt dizziness lightheadedness or falls  OBJECTIVE:  /64 (BP Location: Left arm, Patient Position: Sitting, Cuff Size: Standard)   Pulse 89   Ht 5' 4" (1 626 m)   Wt 72 1 kg (159 lb)   LMP  (LMP Unknown)   BMI 27 29 kg/m²   Vitals:    12/08/22 1045   Weight: 72 1 kg (159 lb)       Physical Exam  Constitutional:       Appearance: She is not ill-appearing  HENT:      Head: Normocephalic and atraumatic  Eyes:      General: No scleral icterus  Cardiovascular:      Rate and Rhythm: Normal rate and regular rhythm  Pulmonary:      Effort: Pulmonary effort is normal       Breath sounds: Normal breath sounds  Abdominal:      General: There is no distension  Palpations: Abdomen is soft  Musculoskeletal:      Right lower leg: No edema  Left lower leg: No edema  Skin:     General: Skin is warm and dry  Findings: No rash  Neurological:      Mental Status: She is alert and oriented to person, place, and time  Medications:    Current Outpatient Medications:   •  acetaminophen (TYLENOL) 650 mg CR tablet, Take 1 tablet (650 mg total) by mouth every 8 (eight) hours as needed for mild pain, Disp: 90 tablet, Rfl: 0  •  atorvastatin (LIPITOR) 20 mg tablet, TAKE 1 TABLET BY MOUTH EVERY DAY, Disp: 90 tablet, Rfl: 3  •  cetirizine (ZyrTEC) 10 mg tablet, Take 10 mg by mouth as needed , Disp: , Rfl:   •  diltiazem (CARDIZEM CD) 120 mg 24 hr capsule, Take 1 capsule (120 mg total) by mouth daily, Disp: 90 capsule, Rfl: 3  •  escitalopram (LEXAPRO) 10 mg tablet, TAKE 1 TABLET BY MOUTH EVERY DAY, Disp: 30 tablet, Rfl: 11  •  losartan (COZAAR) 25 mg tablet, Take 0 5 tablets (12 5 mg total) by mouth daily 1/2 tab daily, Disp: 1 tablet, Rfl: 0  •  spironolactone (ALDACTONE) 25 mg tablet, TAKE 1 TABLET BY MOUTH EVERY DAY, Disp: 90 tablet, Rfl: 3  No current facility-administered medications for this visit      Laboratory Results:  Results for orders placed or performed in visit on 11/29/22   CBC   Result Value Ref Range    WBC 6 64 4 31 - 10 16 Thousand/uL    RBC 4 28 3 81 - 5 12 Million/uL    Hemoglobin 12 9 11 5 - 15 4 g/dL    Hematocrit 42 8 34 8 - 46 1 %     (H) 82 - 98 fL    MCH 30 1 26 8 - 34 3 pg    MCHC 30 1 (L) 31 4 - 37 4 g/dL    RDW 13 2 11 6 - 15 1 %    Platelets 232 632 - 374 Thousands/uL    MPV 11 6 8 9 - 12 7 fL   Renal function panel   Result Value Ref Range    Albumin 3 7 3 5 - 5 0 g/dL    Calcium 10 4 (H) 8 3 - 10 1 mg/dL    Phosphorus 3 7 2 3 - 4 1 mg/dL    BUN 14 5 - 25 mg/dL    Creatinine 1 03 0 60 - 1 30 mg/dL    Sodium 138 135 - 147 mmol/L    Potassium 4 4 3 5 - 5 3 mmol/L    Chloride 110 (H) 96 - 108 mmol/L    CO2 25 21 - 32 mmol/L    ANION GAP 3 (L) 4 - 13 mmol/L    eGFR 55 ml/min/1 73sq m    Glucose, Fasting 93 65 - 99 mg/dL   PTH, intact   Result Value Ref Range    PTH 51 6 18 4 - 80 1 pg/mL   Uric acid   Result Value Ref Range    Uric Acid 5 8 2 0 - 7 5 mg/dL   Microalbumin / creatinine urine ratio   Result Value Ref Range    Creatinine, Ur 319 0 mg/dL    Microalbum  ,U,Random 14 1 0 0 - 20 0 mg/L    Microalb Creat Ratio 4 0 - 30 mg/g creatinine

## 2022-12-21 ENCOUNTER — IN-CLINIC DEVICE VISIT (OUTPATIENT)
Dept: CARDIOLOGY CLINIC | Facility: CLINIC | Age: 72
End: 2022-12-21

## 2022-12-21 DIAGNOSIS — Z95.818 PRESENCE OF OTHER CARDIAC IMPLANTS AND GRAFTS: Primary | ICD-10-CM

## 2022-12-21 NOTE — PROGRESS NOTES
Results for orders placed or performed in visit on 12/21/22   Cardiac EP device report    Narrative    MDT [address] [facility]  BATTERY STATUS "GOOD"  NO DEVICE DETECTED & NO PT ACTIVATED EPISODES  PRESENTING RHYTHM NSR  NORMAL DEVICE FUNCTION  WOUND CHECK: INCISION CLEAN AND DRY WITH EDGES APPROXIMATED; SUTURES REMOVED; WOUND CARE AND RESTRICTIONS REVIEWED WITH PATIENT   GV

## 2023-01-03 DIAGNOSIS — I50.32 CHRONIC HEART FAILURE WITH PRESERVED EJECTION FRACTION (HFPEF) (HCC): ICD-10-CM

## 2023-01-03 RX ORDER — DILTIAZEM HYDROCHLORIDE 120 MG/1
120 CAPSULE, COATED, EXTENDED RELEASE ORAL DAILY
Qty: 90 CAPSULE | Refills: 3 | Status: SHIPPED | OUTPATIENT
Start: 2023-01-03

## 2023-01-04 DIAGNOSIS — I50.32 CHRONIC HEART FAILURE WITH PRESERVED EJECTION FRACTION (HFPEF) (HCC): ICD-10-CM

## 2023-01-04 DIAGNOSIS — E78.5 DYSLIPIDEMIA: ICD-10-CM

## 2023-01-04 RX ORDER — LOSARTAN POTASSIUM 25 MG/1
12.5 TABLET ORAL DAILY
Qty: 36 TABLET | Refills: 8 | Status: SHIPPED | OUTPATIENT
Start: 2023-01-04

## 2023-02-09 ENCOUNTER — OFFICE VISIT (OUTPATIENT)
Dept: CARDIOLOGY CLINIC | Facility: CLINIC | Age: 73
End: 2023-02-09

## 2023-02-09 VITALS
DIASTOLIC BLOOD PRESSURE: 66 MMHG | SYSTOLIC BLOOD PRESSURE: 112 MMHG | HEIGHT: 64 IN | HEART RATE: 74 BPM | WEIGHT: 153 LBS | BODY MASS INDEX: 26.12 KG/M2

## 2023-02-09 DIAGNOSIS — I48.0 PAROXYSMAL ATRIAL FIBRILLATION (HCC): ICD-10-CM

## 2023-02-09 DIAGNOSIS — I27.20 MODERATE PULMONARY HYPERTENSION (HCC): ICD-10-CM

## 2023-02-09 DIAGNOSIS — I34.2 NONRHEUMATIC MITRAL VALVE STENOSIS: ICD-10-CM

## 2023-02-09 DIAGNOSIS — I50.32 CHRONIC HEART FAILURE WITH PRESERVED EJECTION FRACTION (HFPEF) (HCC): ICD-10-CM

## 2023-02-09 DIAGNOSIS — I35.0 NON-RHEUMATIC AORTIC STENOSIS: Primary | ICD-10-CM

## 2023-02-09 DIAGNOSIS — I50.32 CHRONIC DIASTOLIC CHF (CONGESTIVE HEART FAILURE) (HCC): ICD-10-CM

## 2023-02-09 DIAGNOSIS — I34.0 NONRHEUMATIC MITRAL VALVE REGURGITATION: ICD-10-CM

## 2023-02-09 RX ORDER — DILTIAZEM HYDROCHLORIDE 120 MG/1
120 CAPSULE, COATED, EXTENDED RELEASE ORAL DAILY
Start: 2023-02-09

## 2023-02-09 NOTE — PROGRESS NOTES
Cardiology Follow Up    Kwadwo Brown  1950  [id number]  [facility]  [address]  [address]  [phone]    1  Non-rheumatic aortic stenosis        2  Paroxysmal atrial fibrillation (HCC)  POCT ECG      3  Nonrheumatic mitral valve regurgitation        4  Nonrheumatic mitral valve stenosis        5  Moderate pulmonary hypertension ([address] )        6  Chronic diastolic CHF (congestive heart failure) ([address] )        7  Chronic heart failure with preserved ejection fraction (HFpEF) (HCC)  diltiazem (CARDIZEM CD) 120 mg 24 hr capsule          Discussion/Summary:    PAF: now s/p ablation  Had PSVT ablation int he past  Off a/c with loop in place  Eliquis was expensive  She knows about warfarin if needed, but not thrilled about it due to INRs, etc  Will monitor loop recorder  Follows with EP  Mixed valve disease: AS reported moderate  Also MS  On TTE was moderate  Discussed with her  Repeat echo likely after next visit  Mild rales but she feels great, no symptoms  Will likely need some lasix in future, but could use PRN  She will monitor symptoms  May need repeat AVERY in the future more tailored to the valves, but likely not for some time  Follow up with me in 6 months  Previous History:  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  She has a history of SVT  She has undergone ablation in 2018  Previously, she was on metoprolol, but said she had a very low heart rate with this  Because she also has COPD, she was changed to diltiazem  She has been tolerating this well  Shortly after her SVT ablation, she was on Xarelto as well as amlodipine  She had a rash to 1 of them, not sure which 1  She has not been on anything else for hypertension previously  She has also been identified to have mild-to-moderate aortic stenosis and moderate aortic regurgitation  In 2022, she developed afib   Saw EP, underwent ablation for this  Had some dCHF with valve disease and arrhythmia  Interval History:    Since last visit w/ me, she's found to have afib  She saw EP  Underwent ablation  Repeat echos were done around that time  MS reported, moderate on TTE  On the ablation AVERY, not evaluated completely, but not severe  She had some d CHF  Diuresed, and short course lasix, now d/c  She was on a/c, but expensive so wanted to stop  Loop placed, she's off a/c now  Feels great  Says she's walking, going up stairs, etc  No symptoms  No edema  Weight is down  No palpitations  No arrhythmias on loop recorder  Problem List     Depression with anxiety    Dyslipidemia    Essential hypertension    Irritable bowel syndrome with diarrhea    Lumbar degenerative disc disease    Vitamin D deficiency    Greater trochanteric bursitis of right hip    Pulmonary emphysema (HCC)    SVT (supraventricular tachycardia) ([address] )    Medicare annual wellness visit, subsequent    Diastolic dysfunction    Mitral valve disorder    Aortic valve insufficiency    Achilles tendinitis of left lower extremity    Posterior tibial tendinitis, left    Obesity (BMI 30-39  9)    Stage 3 chronic kidney disease    Lab Results   Component Value Date    EGFR 55 11/29/2022    EGFR 39 09/15/2022    EGFR 40 09/08/2022    CREATININE 1 03 11/29/2022    CREATININE 1 35 (H) 09/15/2022    CREATININE 1 33 (H) 09/08/2022         Hypertensive kidney disease with stage 3 chronic kidney disease    Lab Results   Component Value Date    EGFR 55 11/29/2022    EGFR 39 09/15/2022    EGFR 40 09/08/2022    CREATININE 1 03 11/29/2022    CREATININE 1 35 (H) 09/15/2022    CREATININE 1 33 (H) 09/08/2022             Past Medical History:   Diagnosis Date   • Atrial fibrillation (HCC)    • Bursitis of hip     Last Assessed:6/5/2017   • COPD (chronic obstructive pulmonary disease) (HCC) 5 years ago   • Coronary artery disease    • Herniated lumbar intervertebral disc    • Hyperlipidemia    • Hypertension 2 years   • Pituitary adenoma ([facility] [address] )     Last Assessed:2015 ; Previously on bromocriptine   • Spinal stenosis    • Systolic murmur of aorta      Social History     Tobacco Use   • Smoking status: Former     Packs/day: 1 00     Years: 30 00     Pack years: 30 00     Types: Cigarettes     Quit date: 2018     Years since quittin 2   • Smokeless tobacco: Never   Vaping Use   • Vaping Use: Never used   Substance Use Topics   • Alcohol use: Not Currently     Alcohol/week: 1 0 standard drink     Types: 1 Shots of liquor per week   • Drug use: No      Family History   Problem Relation Age of Onset   • Colon cancer Mother    • Coronary artery disease Mother    • Colonic polyp Mother    • Liver cancer Mother    • Multiple sclerosis Father    • Hypertension Other    • Hypertension Brother      Past Surgical History:   Procedure Laterality Date   • BREAST SURGERY      Incisional breast biopsy   • CARDIAC ELECTROPHYSIOLOGY PROCEDURE N/A 2022    Procedure: Cardiac eps/afib ablation;  Surgeon: Carla Henry MD;  Location: [facility]; Service: Cardiology   • CARDIAC ELECTROPHYSIOLOGY PROCEDURE N/A 2022    Procedure: Cardiac loop recorder implant;  Surgeon: Hershall Najjar, MD;  Location: [facility]; Service: Cardiology   • CARDIAC ELECTROPHYSIOLOGY STUDY AND ABLATION     • CARPAL TUNNEL RELEASE Left    • CHOLECYSTECTOMY     • OTHER SURGICAL HISTORY      EXCISION OF PAROTID TUMOR/GLAND   • PAROTIDECTOMY     • [place] COLONOSCOPY FLX DX W/COLLJ SPEC WHEN PFRMD N/A 2017    Procedure: COLONOSCOPY;  Surgeon: Shantal Madrid DO;  Location: [facility] GI [facility];   Service: Gastroenterology   • ROTATOR CUFF REPAIR     • SALPINGOOPHORECTOMY Right        Current Outpatient Medications:   •  acetaminophen (TYLENOL) 650 mg CR tablet, Take 1 tablet (650 mg total) by mouth every 8 (eight) hours as needed for mild pain, Disp: 90 tablet, Rfl: 0  •  atorvastatin (LIPITOR) 20 mg tablet, TAKE 1 TABLET BY MOUTH EVERY DAY, Disp: 90 tablet, Rfl: 3  •  cetirizine (ZyrTEC) 10 mg tablet, Take 10 mg by mouth as needed , Disp: , Rfl:   •  diltiazem (CARDIZEM CD) 120 mg 24 hr capsule, Take 1 capsule (120 mg total) by mouth daily, Disp: , Rfl:   •  escitalopram (LEXAPRO) 10 mg tablet, TAKE 1 TABLET BY MOUTH EVERY DAY, Disp: 30 tablet, Rfl: 11  •  losartan (COZAAR) 25 mg tablet, Take 0 5 tablets (12 5 mg total) by mouth daily 1/2 tab daily, Disp: 36 tablet, Rfl: 8  •  spironolactone (ALDACTONE) 25 mg tablet, TAKE 1 TABLET BY MOUTH EVERY DAY, Disp: 90 tablet, Rfl: 3  Allergies   Allergen Reactions   • Vasotec [Enalapril] Hives   • Amlodipine Rash   • Ancef [Cefazolin]    • Cephalosporins Other (See Comments)     c diff   • Fruit Extracts      [facility] - [id number]: peaches and strawberries   • Levofloxacin    • Morphine GI Intolerance   • Other      FRUIT   • Penicillins Hives   • Tetracycline Edema   • Xarelto [Rivaroxaban] Rash       Vitals:    02/09/23 0900   BP: 112/66   BP Location: Right arm   Patient Position: Sitting   Cuff Size: Standard   Pulse: 74   Weight: 69 4 kg (153 lb)   Height: 5' 4" (1 626 m)     Vitals:    02/09/23 0900   Weight: 69 4 kg (153 lb)      Height: 5' 4" (162 6 cm)   Body mass index is 26 26 kg/m²  Physical Exam:  GEN: Ector Otero appears well, alert and oriented x 3, pleasant and cooperative   HEENT: pupils equal, round, and reactive to light; extraocular muscles intact  NECK: supple, no carotid bruits   HEART: regular  2/6 LAKESHA  LUNGS: bibasilar rales  ABDOMEN: normal bowel sounds, soft, no tenderness, no distention  EXTREMITIES: peripheral pulses normal; no clubbing, cyanosis, or edema  NEURO: no focal findings   SKIN: normal without suspicious lesions on exposed skin    ROS:  Except as noted in HPI, is otherwise reviewed in detail and a 12 point review of systems is negative    ROS reviewed and is unchanged    Labs:  Lab Results   Component Value Date     05/04/2015    K 4 4 11/29/2022     (H) 11/29/2022    CREATININE 1 03 11/29/2022    BUN 14 11/29/2022    CO2 25 11/29/2022    ALT 31 08/29/2022    AST 21 08/29/2022    INR 1 65 (H) 09/02/2022    GLUF 93 11/29/2022    HGBA1C 5 6 03/23/2022    WBC 6 64 11/29/2022    HGB 12 9 11/29/2022    HCT 42 8 11/29/2022     11/29/2022       Lab Results   Component Value Date    CHOL 187 05/04/2015    CHOL 199 08/20/2014     Lab Results   Component Value Date    LDLCALC 85 09/15/2022    LDLCALC 103 (H) 03/23/2022    LDLCALC 88 03/31/2021     Lab Results   Component Value Date    HDL 36 (L) 09/15/2022    HDL 62 03/23/2022    HDL 65 03/31/2021     Lab Results   Component Value Date    TRIG 173 (H) 09/15/2022    TRIG 256 (H) 03/23/2022    TRIG 224 (H) 03/31/2021       Testing:  AVERY: 9/2022  Done pre ablation  AS probably mild-moderate  MV not fully evaluated, visually some restriction movement and calcification  EF preserved  TTE 7/2022:  Left Ventricle: Left ventricular cavity size is normal  Wall thickness is mildly increased  The left ventricular ejection fraction is 60%  Systolic function is normal  Wall motion is normal   •  Right Ventricle: Right ventricular cavity size is mildly dilated  •  Left Atrium: The atrium is moderately dilated  •  Aortic Valve: The aortic valve is trileaflet  The leaflets are not thickened  The leaflets are moderately calcified  There is mildly reduced mobility  There is mild regurgitation  There is mild to moderate stenosis  The aortic valve velocity is increased due to stenosis  •  Mitral Valve: There is moderate calcification  There is moderately reduced mobility  There is mild annular calcification  There is mild regurgitation  There is moderate stenosis  •  Tricuspid Valve: There is mild to moderate regurgitation  Echo 5/13/21:  LEFT VENTRICLE:  Systolic function was vigorous  Ejection fraction was estimated to be 65 %    There were no regional wall motion abnormalities  Wall thickness was at the upper limits of normal      MITRAL VALVE:  There was mild annular calcification  There was mild to moderate regurgitation      AORTIC VALVE:  The valve was trileaflet  Leaflets exhibited moderately increased thickness, moderate calcification, and mild to moderately reduced cuspal separation  There was mild to moderate stenosis  There was mild to moderate regurgitation  The peak valve velocity was 306 cm/s  Valve mean gradient was 21 6 mmHg      TRICUSPID VALVE:  There was mild regurgitation  Pulmonary artery systolic pressure was mildly increased  Estimated peak PA pressure was 39 mmHg  Echo 1/2020:  LEFT VENTRICLE:  Systolic function was normal by visual assessment  Ejection fraction was estimated to be 65 %  There were no regional wall motion abnormalities  Doppler parameters were consistent with abnormal left ventricular relaxation (grade 1 diastolic dysfunction)      LEFT ATRIUM:  The atrium was mildly dilated      MITRAL VALVE:  There was mild regurgitation      AORTIC VALVE:  The valve was trileaflet  Leaflets exhibited mild calcification, reduced mobility, and sclerosis  There was mild to moderate stenosis  There was moderate regurgitation  Valve mean gradient was 20 7 mmHg  EKG:  Sinus rhythm PAC  74 BPM  PRWP

## 2023-03-15 ENCOUNTER — APPOINTMENT (OUTPATIENT)
Dept: LAB | Facility: MEDICAL CENTER | Age: 73
End: 2023-03-15

## 2023-03-15 ENCOUNTER — OFFICE VISIT (OUTPATIENT)
Dept: FAMILY MEDICINE CLINIC | Facility: CLINIC | Age: 73
End: 2023-03-15

## 2023-03-15 VITALS
BODY MASS INDEX: 26.12 KG/M2 | SYSTOLIC BLOOD PRESSURE: 130 MMHG | WEIGHT: 153 LBS | DIASTOLIC BLOOD PRESSURE: 74 MMHG | HEIGHT: 64 IN | TEMPERATURE: 97.6 F | HEART RATE: 83 BPM | OXYGEN SATURATION: 96 %

## 2023-03-15 DIAGNOSIS — E55.9 VITAMIN D DEFICIENCY: ICD-10-CM

## 2023-03-15 DIAGNOSIS — F33.9 DEPRESSION, RECURRENT (HCC): ICD-10-CM

## 2023-03-15 DIAGNOSIS — Z12.31 BREAST CANCER SCREENING BY MAMMOGRAM: ICD-10-CM

## 2023-03-15 DIAGNOSIS — J43.9 PULMONARY EMPHYSEMA, UNSPECIFIED EMPHYSEMA TYPE (HCC): ICD-10-CM

## 2023-03-15 DIAGNOSIS — N18.32 HYPERTENSIVE KIDNEY DISEASE WITH STAGE 3B CHRONIC KIDNEY DISEASE (HCC): ICD-10-CM

## 2023-03-15 DIAGNOSIS — I10 ESSENTIAL HYPERTENSION: ICD-10-CM

## 2023-03-15 DIAGNOSIS — I12.9 HYPERTENSIVE KIDNEY DISEASE WITH STAGE 3B CHRONIC KIDNEY DISEASE (HCC): ICD-10-CM

## 2023-03-15 DIAGNOSIS — I48.0 PAROXYSMAL ATRIAL FIBRILLATION (HCC): Primary | ICD-10-CM

## 2023-03-15 DIAGNOSIS — M70.61 TROCHANTERIC BURSITIS OF RIGHT HIP: ICD-10-CM

## 2023-03-15 DIAGNOSIS — N18.32 STAGE 3B CHRONIC KIDNEY DISEASE (HCC): ICD-10-CM

## 2023-03-15 DIAGNOSIS — I51.89 DIASTOLIC DYSFUNCTION: ICD-10-CM

## 2023-03-15 DIAGNOSIS — E78.2 MIXED HYPERLIPIDEMIA: ICD-10-CM

## 2023-03-15 DIAGNOSIS — E83.52 HYPERCALCEMIA: ICD-10-CM

## 2023-03-15 DIAGNOSIS — I48.0 PAROXYSMAL ATRIAL FIBRILLATION (HCC): ICD-10-CM

## 2023-03-15 LAB
25(OH)D3 SERPL-MCNC: 20.4 NG/ML (ref 30–100)
ALBUMIN SERPL BCP-MCNC: 4.1 G/DL (ref 3.5–5)
ANION GAP SERPL CALCULATED.3IONS-SCNC: 3 MMOL/L (ref 4–13)
BUN SERPL-MCNC: 17 MG/DL (ref 5–25)
CALCIUM SERPL-MCNC: 10.3 MG/DL (ref 8.3–10.1)
CHLORIDE SERPL-SCNC: 109 MMOL/L (ref 96–108)
CHOLEST SERPL-MCNC: 193 MG/DL
CO2 SERPL-SCNC: 25 MMOL/L (ref 21–32)
CREAT SERPL-MCNC: 0.99 MG/DL (ref 0.6–1.3)
GFR SERPL CREATININE-BSD FRML MDRD: 57 ML/MIN/1.73SQ M
GLUCOSE P FAST SERPL-MCNC: 91 MG/DL (ref 65–99)
HDLC SERPL-MCNC: 77 MG/DL
LDLC SERPL CALC-MCNC: 84 MG/DL (ref 0–100)
NONHDLC SERPL-MCNC: 116 MG/DL
PHOSPHATE SERPL-MCNC: 3.3 MG/DL (ref 2.3–4.1)
POTASSIUM SERPL-SCNC: 4.1 MMOL/L (ref 3.5–5.3)
SODIUM SERPL-SCNC: 137 MMOL/L (ref 135–147)
TRIGL SERPL-MCNC: 162 MG/DL
TSH SERPL DL<=0.05 MIU/L-ACNC: 1.62 UIU/ML (ref 0.45–4.5)

## 2023-03-15 NOTE — ASSESSMENT & PLAN NOTE
Blood pressure is very well controlled  Continue current therapy with losartan, Cardizem and spironolactone  Renal panel ordered by nephrology

## 2023-03-15 NOTE — PROGRESS NOTES
Assessment/Plan:    Pulmonary emphysema (HCC)  No active complaints right now  She quit smoking in 2018  Essential hypertension  Blood pressure is very well controlled  Continue current therapy with losartan, Cardizem and spironolactone  Renal panel ordered by nephrology  Paroxysmal atrial fibrillation ([facility] [address] )  She is having loop recorder, follows up with cardiology  She is off Eliquis right now  Depression, recurrent ([facility] [address] )  Very well controlled  Diagnoses and all orders for this visit:    Paroxysmal atrial fibrillation (HCC)  -     TSH, 3rd generation with Free T4 reflex; Future    Breast cancer screening by mammogram    Essential hypertension    Mixed hyperlipidemia  -     Lipid panel; Future    Pulmonary emphysema, unspecified emphysema type (HCC)    Depression, recurrent (HCC)    Stage 3b chronic kidney disease (HCC)    Vitamin D deficiency  -     Vitamin D 25 hydroxy; Future    Trochanteric bursitis of right hip  -     Trigger Point Injection         Universal Protocol:  Procedure performed by:  Consent: Verbal consent obtained  Consent given by: patient  Timeout called at: 3/15/2023 10:00 AM   Patient understanding: patient states understanding of the procedure being performed  Patient identity confirmed: verbally with patient    Supporting Documentation  Indications: pain   Trigger Point Injections: single/multiple trigger point(s): 1-2 muscle groups    Injection site identified by: palpation    Procedure Details  Location(s):  Patient tolerance: patient tolerated the procedure well with no immediate complications  Additional procedure details: Procedure was performed with medical assistant  Initially discussed to draw 40 mg of triamcinolone acetonide and 3 cc of 2% lidocaine into 5 cc syringe  Instead 3 cc of triamcinolone and 1 cc of lidocaine was drawn by MA and injected by me into the area of maximal point tenderness at the projection of the right trochanteric bursa    The error was noticed after procedure was performed  Procedure was performed in aseptic technique  Area prepped with Betadine and alcohol  I discussed with the patient that due to miscommunication the dose of steroid that was injected was more than planned  Side effects of above procedure were discussed with the patient  If any chills, fevers, redness, worsening of the pain in the area she will let me know immediately  Subjective:      Patient ID: Georgie Abraham is a 81 y o  female  Patient came today for follow-up on her chronic problems  She also presented with the pain in her right hip that was recently getting worse  The following portions of the patient's history were reviewed and updated as appropriate: allergies, current medications, past family history, past medical history, past social history, past surgical history, and problem list     Review of Systems   Constitutional: Negative for activity change, appetite change, chills, fatigue and fever  HENT: Negative for congestion, ear pain, rhinorrhea and sore throat  Respiratory: Negative for cough, shortness of breath and wheezing  Cardiovascular: Negative for chest pain, palpitations and leg swelling  Gastrointestinal: Negative for abdominal distention, abdominal pain, diarrhea, nausea and vomiting  Genitourinary: Negative for difficulty urinating, frequency and pelvic pain  Musculoskeletal: Positive for arthralgias and back pain  Negative for neck pain  Skin: Negative for rash  Neurological: Negative for dizziness, tremors, weakness, numbness and headaches           Objective:      /74 (BP Location: Left arm, Patient Position: Sitting, Cuff Size: Standard)   Pulse 83   Temp 97 6 °F (36 4 °C)   Ht 5' 4" (1 626 m)   Wt 69 4 kg (153 lb)   LMP  (LMP Unknown)   SpO2 96%   BMI 26 26 kg/m²     Allergies   Allergen Reactions   • Vasotec [Enalapril] Hives   • Amlodipine Rash   • Ancef [Cefazolin]    • Cephalosporins Other (See Comments)     c diff   • Fruit Extracts      Annotation - [id number]: peaches and strawberries   • Levofloxacin    • Morphine GI Intolerance   • Other      FRUIT   • Penicillins Hives   • Tetracycline Edema   • Xarelto [Rivaroxaban] Rash          Current Outpatient Medications:   •  acetaminophen (TYLENOL) 650 mg CR tablet, Take 1 tablet (650 mg total) by mouth every 8 (eight) hours as needed for mild pain, Disp: 90 tablet, Rfl: 0  •  atorvastatin (LIPITOR) 20 mg tablet, TAKE 1 TABLET BY MOUTH EVERY DAY, Disp: 90 tablet, Rfl: 3  •  cetirizine (ZyrTEC) 10 mg tablet, Take 10 mg by mouth as needed , Disp: , Rfl:   •  diltiazem (CARDIZEM CD) 120 mg 24 hr capsule, Take 1 capsule (120 mg total) by mouth daily, Disp: , Rfl:   •  escitalopram (LEXAPRO) 10 mg tablet, TAKE 1 TABLET BY MOUTH EVERY DAY, Disp: 30 tablet, Rfl: 11  •  losartan (COZAAR) 25 mg tablet, Take 0 5 tablets (12 5 mg total) by mouth daily 1/2 tab daily, Disp: 36 tablet, Rfl: 8  •  spironolactone (ALDACTONE) 25 mg tablet, TAKE 1 TABLET BY MOUTH EVERY DAY, Disp: 90 tablet, Rfl: 3     There are no Patient Instructions on file for this visit  Physical Exam  Constitutional:       General: She is not in acute distress  Appearance: Normal appearance  She is not ill-appearing  HENT:      Nose: No rhinorrhea  Cardiovascular:      Rate and Rhythm: Normal rate and regular rhythm  Heart sounds: No murmur heard  No friction rub  No gallop  Pulmonary:      Effort: No respiratory distress  Breath sounds: No wheezing or rhonchi  Chest:      Chest wall: No tenderness  Abdominal:      General: There is no distension  Palpations: There is no mass  Tenderness: There is no abdominal tenderness  There is no guarding or rebound  Hernia: No hernia is present  Musculoskeletal:         General: Tenderness (Significant point tenderness at the area of the right trochanter ) present  No swelling     Lymphadenopathy:      Cervical: No cervical adenopathy  Skin:     Coloration: Skin is not jaundiced  Findings: No rash  Neurological:      Mental Status: She is alert and oriented to person, place, and time  Motor: No weakness        Gait: Gait normal    Psychiatric:         Mood and Affect: Mood normal          Behavior: Behavior normal

## 2023-03-23 ENCOUNTER — REMOTE DEVICE CLINIC VISIT (OUTPATIENT)
Dept: CARDIOLOGY CLINIC | Facility: CLINIC | Age: 73
End: 2023-03-23

## 2023-03-23 DIAGNOSIS — Z95.818 PRESENCE OF OTHER CARDIAC IMPLANTS AND GRAFTS: Primary | ICD-10-CM

## 2023-04-09 PROBLEM — R09.A2 GLOBUS SENSATION: Status: ACTIVE | Noted: 2023-04-09

## 2023-04-09 PROBLEM — R55 SYNCOPE: Status: ACTIVE | Noted: 2023-04-09

## 2023-04-09 PROBLEM — R09.89 GLOBUS SENSATION: Status: ACTIVE | Noted: 2023-04-09

## 2023-04-26 ENCOUNTER — HOSPITAL ENCOUNTER (OUTPATIENT)
Dept: RADIOLOGY | Age: 73
Discharge: HOME | End: 2023-04-26

## 2023-04-26 VITALS — HEIGHT: 63 IN | BODY MASS INDEX: 26.05 KG/M2 | WEIGHT: 147 LBS

## 2023-04-26 DIAGNOSIS — Z78.0 ENCOUNTER FOR OSTEOPOROSIS SCREENING IN ASYMPTOMATIC POSTMENOPAUSAL PATIENT: ICD-10-CM

## 2023-04-26 DIAGNOSIS — Z13.820 ENCOUNTER FOR OSTEOPOROSIS SCREENING IN ASYMPTOMATIC POSTMENOPAUSAL PATIENT: ICD-10-CM

## 2023-05-01 NOTE — PROGRESS NOTES
Assessment/Plan:    Essential hypertension  Very well controlled on current management  Continue the same  SVT (supraventricular tachycardia) ([facility] [address] )  She did not report any more episodes after started on Cardizem  She follows up with Cardiology  Stage 3 chronic kidney disease [facility])  Lab Results   Component Value Date    EGFR 49 03/23/2022    EGFR 50 01/05/2022    EGFR 36 10/27/2021    CREATININE 1 14 03/23/2022    CREATININE 1 11 01/05/2022    CREATININE 1 47 (H) 10/27/2021   Kidney function remains stable  Depression with anxiety  Well controlled on escitalopram 10 mg daily  Diagnoses and all orders for this visit:    Medicare annual wellness visit, subsequent    Chronic bilateral low back pain without sciatica  -     acetaminophen (TYLENOL) 650 mg CR tablet; Take 1 tablet (650 mg total) by mouth every 8 (eight) hours as needed for mild pain    Depression, recurrent (HCC)    SVT (supraventricular tachycardia) (HCC)    Stage 3b chronic kidney disease ([facility] )    Essential hypertension    Depression with anxiety          Subjective:      Patient ID: Elly Willett is a 73 y o  female  Patient came today for Medicare wellness visit and to follow-up on her chronic problems  She is up-to-date on her vaccines  Her mammogram scheduled  She said that she had colonoscopy in 2015 and recommendation was given to repeat in 10 years  Will try to get the records  The following portions of the patient's history were reviewed and updated as appropriate: allergies, current medications, past family history, past medical history, past social history, past surgical history, and problem list     Review of Systems   Constitutional: Negative for chills, fever and unexpected weight change  Respiratory: Negative for cough, chest tightness and shortness of breath  Cardiovascular: Negative for chest pain and leg swelling  Gastrointestinal: Negative for abdominal pain, diarrhea, nausea and vomiting  Schedule Mylicon prior to every bottle at home and school.  May need lactose free milk when she turns one, or no milk at all.   Genitourinary: Negative for difficulty urinating  Musculoskeletal: Negative for arthralgias and myalgias  Neurological: Negative for dizziness, numbness and headaches  Psychiatric/Behavioral: Negative for dysphoric mood and sleep disturbance  The patient is not nervous/anxious  Objective:      /74 (BP Location: Left arm, Patient Position: Sitting, Cuff Size: Standard)   Pulse 78   Resp 12   Ht 5' 4" (1 626 m)   Wt 84 4 kg (186 lb)   LMP  (LMP Unknown)   SpO2 97%   BMI 31 93 kg/m²     Allergies   Allergen Reactions    Amlodipine Rash    Ancef [Cefazolin]     Cephalosporins Other (See Comments)     c diff    Fruit Extracts      Annotation - [id number]: peaches and strawberries    Levofloxacin     Morphine GI Intolerance    Other      FRUIT    Penicillins Hives    Tetracycline Edema    Xarelto [Rivaroxaban] Rash          Current Outpatient Medications:     albuterol (PROVENTIL HFA) 90 mcg/act inhaler, Inhale 1 puff every 6 (six) hours as needed for wheezing, Disp: 6 7 g, Rfl: 11    atorvastatin (LIPITOR) 20 mg tablet, TAKE 1 TABLET BY MOUTH EVERY DAY, Disp: 90 tablet, Rfl: 3    cetirizine (ZyrTEC) 10 mg tablet, Take 10 mg by mouth as needed , Disp: , Rfl:     diltiazem (CARDIZEM CD) 120 mg 24 hr capsule, TAKE 1 CAPSULE BY MOUTH EVERY DAY, Disp: 90 capsule, Rfl: 1    escitalopram (LEXAPRO) 10 mg tablet, TAKE 1 TABLET BY MOUTH EVERY DAY, Disp: 30 tablet, Rfl: 11    losartan (COZAAR) 25 mg tablet, TAKE 1 TABLET BY MOUTH EVERY DAY, Disp: 90 tablet, Rfl: 3    spironolactone (ALDACTONE) 25 mg tablet, TAKE 1 TABLET BY MOUTH EVERY DAY, Disp: 90 tablet, Rfl: 3    acetaminophen (TYLENOL) 650 mg CR tablet, Take 1 tablet (650 mg total) by mouth every 8 (eight) hours as needed for mild pain, Disp: 90 tablet, Rfl: 0    clotrimazole-betamethasone (LOTRISONE) 1-0 05 % cream, Apply topically 2 (two) times a day Do not use more than 10 days   (Patient not taking: Reported on 12/9/2021 ), Disp: 30 g, Rfl: 0    Multiple Vitamin (MULTI VITAMIN DAILY PO), Take 1 tablet by mouth daily   (Patient not taking: Reported on 12/9/2021 ), Disp: , Rfl:      Patient Instructions       Medicare Preventive Visit Patient Instructions  Thank you for completing your Welcome to Medicare Visit or Medicare Annual Wellness Visit today  Your next wellness visit will be due in one year (3/31/2023)  The screening/preventive services that you may require over the next 5-10 years are detailed below  Some tests may not apply to you based off risk factors and/or age  Screening tests ordered at today's visit but not completed yet may show as past due  Also, please note that scanned in results may not display below  Preventive Screenings:  Service Recommendations Previous Testing/Comments   Colorectal Cancer Screening  * Colonoscopy    * Fecal Occult Blood Test (FOBT)/Fecal Immunochemical Test (FIT)  * Fecal DNA/Cologuard Test  * Flexible Sigmoidoscopy Age: 54-65 years old   Colonoscopy: every 10 years (may be performed more frequently if at higher risk)  OR  FOBT/FIT: every 1 year  OR  Cologuard: every 3 years  OR  Sigmoidoscopy: every 5 years  Screening may be recommended earlier than age 48 if at higher risk for colorectal cancer  Also, an individualized decision between you and your healthcare provider will decide whether screening between the ages of 74-80 would be appropriate  Colonoscopy: 06/06/2017  FOBT/FIT: Not on file  Cologuard: Not on file  Sigmoidoscopy: Not on file    Screening Current     Breast Cancer Screening Age: 36 years old  Frequency: every 1-2 years  Not required if history of left and right mastectomy Mammogram: 05/19/2020    Screening Current   Cervical Cancer Screening Between the ages of 21-29, pap smear recommended once every 3 years  Between the ages of 33-67, can perform pap smear with HPV co-testing every 5 years     Recommendations may differ for women with a history of total hysterectomy, cervical cancer, or abnormal pap smears in past  Pap Smear: 11/15/2021    Screening Not Indicated   Hepatitis C Screening Once for adults born between 1945 and 1965  More frequently in patients at high risk for Hepatitis C Hep C Antibody: 01/08/2019    Screening Current   Diabetes Screening 1-2 times per year if you're at risk for diabetes or have pre-diabetes Fasting glucose: 103 mg/dL   A1C: 5 6 %    Screening Current   Cholesterol Screening Once every 5 years if you don't have a lipid disorder  May order more often based on risk factors  Lipid panel: 03/23/2022    Screening Not Indicated  History Lipid Disorder     Other Preventive Screenings Covered by Medicare:  1  Abdominal Aortic Aneurysm (AAA) Screening: covered once if your at risk  You're considered to be at risk if you have a family history of AAA  2  Lung Cancer Screening: covers low dose CT scan once per year if you meet all of the following conditions: (1) Age 50-69; (2) No signs or symptoms of lung cancer; (3) Current smoker or have quit smoking within the last 15 years; (4) You have a tobacco smoking history of at least 30 pack years (packs per day multiplied by number of years you smoked); (5) You get a written order from a healthcare provider  3  Glaucoma Screening: covered annually if you're considered high risk: (1) You have diabetes OR (2) Family history of glaucoma OR (3)  aged 48 and older OR (3)  American aged 72 and older  3  Osteoporosis Screening: covered every 2 years if you meet one of the following conditions: (1) You're estrogen deficient and at risk for osteoporosis based off medical history and other findings; (2) Have a vertebral abnormality; (3) On glucocorticoid therapy for more than 3 months; (4) Have primary hyperparathyroidism; (5) On osteoporosis medications and need to assess response to drug therapy  · Last bone density test (DXA Scan): 01/06/2018    5  HIV Screening: covered annually if you're between the age of 12-76  Also covered annually if you are younger than 13 and older than 72 with risk factors for HIV infection  For pregnant patients, it is covered up to 3 times per pregnancy  Immunizations:  Immunization Recommendations   Influenza Vaccine Annual influenza vaccination during flu season is recommended for all persons aged >= 6 months who do not have contraindications   Pneumococcal Vaccine (Prevnar and Pneumovax)  * Prevnar = PCV13  * Pneumovax = PPSV23   Adults 25-60 years old: 1-3 doses may be recommended based on certain risk factors  Adults 72 years old: Prevnar (PCV13) vaccine recommended followed by Pneumovax (PPSV23) vaccine  If already received PPSV23 since turning 65, then PCV13 recommended at least one year after PPSV23 dose  Hepatitis B Vaccine 3 dose series if at intermediate or high risk (ex: diabetes, end stage renal disease, liver disease)   Tetanus (Td) Vaccine - COST NOT COVERED BY MEDICARE PART B Following completion of primary series, a booster dose should be given every 10 years to maintain immunity against tetanus  Td may also be given as tetanus wound prophylaxis  Tdap Vaccine - COST NOT COVERED BY MEDICARE PART B Recommended at least once for all adults  For pregnant patients, recommended with each pregnancy  Shingles Vaccine (Shingrix) - COST NOT COVERED BY MEDICARE PART B  2 shot series recommended in those aged 48 and above     Health Maintenance Due:      Topic Date Due    Lung Cancer Screening  Never done    Breast Cancer Screening: Mammogram  05/19/2021    Colorectal Cancer Screening  06/06/2027    Hepatitis C Screening  Completed     Immunizations Due:      Topic Date Due    DTaP,Tdap,and Td Vaccines (1 - Tdap) 11/19/2015     Advance Directives   What are advance directives? Advance directives are legal documents that state your wishes and plans for medical care   These plans are made ahead of time in case you lose your ability to make decisions for yourself  Advance directives can apply to any medical decision, such as the treatments you want, and if you want to donate organs  What are the types of advance directives? There are many types of advance directives, and each state has rules about how to use them  You may choose a combination of any of the following:  · Living will: This is a written record of the treatment you want  You can also choose which treatments you do not want, which to limit, and which to stop at a certain time  This includes surgery, medicine, IV fluid, and tube feedings  · Durable power of  for healthcare [facility]): This is a written record that states who you want to make healthcare choices for you when you are unable to make them for yourself  This person, called a proxy, is usually a family member or a friend  You may choose more than 1 proxy  · Do not resuscitate (DNR) order:  A DNR order is used in case your heart stops beating or you stop breathing  It is a request not to have certain forms of treatment, such as CPR  A DNR order may be included in other types of advance directives  · Medical directive: This covers the care that you want if you are in a coma, near death, or unable to make decisions for yourself  You can list the treatments you want for each condition  Treatment may include pain medicine, surgery, blood transfusions, dialysis, IV or tube feedings, and a ventilator (breathing machine)  · Values history: This document has questions about your views, beliefs, and how you feel and think about life  This information can help others choose the care that you would choose  Why are advance directives important? An advance directive helps you control your care  Although spoken wishes may be used, it is better to have your wishes written down  Spoken wishes can be misunderstood, or not followed  Treatments may be given even if you do not want them   An advance directive may make it easier for your family to make difficult choices about your care  Weight Management   Why it is important to manage your weight:  Being overweight increases your risk of health conditions such as heart disease, high blood pressure, type 2 diabetes, and certain types of cancer  It can also increase your risk for osteoarthritis, sleep apnea, and other respiratory problems  Aim for a slow, steady weight loss  Even a small amount of weight loss can lower your risk of health problems  How to lose weight safely:  A safe and healthy way to lose weight is to eat fewer calories and get regular exercise  You can lose up about 1 pound a week by decreasing the number of calories you eat by 500 calories each day  Healthy meal plan for weight management:  A healthy meal plan includes a variety of foods, contains fewer calories, and helps you stay healthy  A healthy meal plan includes the following:  · Eat whole-grain foods more often  A healthy meal plan should contain fiber  Fiber is the part of grains, fruits, and vegetables that is not broken down by your body  Whole-grain foods are healthy and provide extra fiber in your diet  Some examples of whole-grain foods are whole-wheat breads and pastas, oatmeal, brown rice, and bulgur  · Eat a variety of vegetables every day  Include dark, leafy greens such as spinach, kale, anderson greens, and mustard greens  Eat yellow and orange vegetables such as carrots, sweet potatoes, and winter squash  · Eat a variety of fruits every day  Choose fresh or canned fruit (canned in its own juice or light syrup) instead of juice  Fruit juice has very little or no fiber  · Eat low-fat dairy foods  Drink fat-free (skim) milk or 1% milk  Eat fat-free yogurt and low-fat cottage cheese  Try low-fat cheeses such as mozzarella and other reduced-fat cheeses  · Choose meat and other protein foods that are low in fat  Choose beans or other legumes such as split peas or lentils   Choose fish, skinless poultry (chicken or turkey), or lean cuts of red meat (beef or pork)  Before you cook meat or poultry, cut off any visible fat  · Use less fat and oil  Try baking foods instead of frying them  Add less fat, such as margarine, sour cream, regular salad dressing and mayonnaise to foods  Eat fewer high-fat foods  Some examples of high-fat foods include french fries, doughnuts, ice cream, and cakes  · Eat fewer sweets  Limit foods and drinks that are high in sugar  This includes candy, cookies, regular soda, and sweetened drinks  Exercise:  Exercise at least 30 minutes per day on most days of the week  Some examples of exercise include walking, biking, dancing, and swimming  You can also fit in more physical activity by taking the stairs instead of the elevator or parking farther away from stores  Ask your healthcare provider about the best exercise plan for you  © Copyright [employer] 2018 Information is for End User's use only and may not be sold, redistributed or otherwise used for commercial purposes  All illustrations and images included in CareNotes® are the copyrighted property of A D A M , Inc  or [address] [place] [address]            Physical Exam  Vitals reviewed  Constitutional:       General: She is not in acute distress  Appearance: She is well-developed  She is not diaphoretic  HENT:      Head: Normocephalic and atraumatic  Right Ear: External ear normal       Left Ear: External ear normal    Eyes:      General:         Right eye: No discharge  Left eye: No discharge  Cardiovascular:      Rate and Rhythm: Normal rate and regular rhythm  Pulses: Normal pulses  Heart sounds: Murmur heard  Pulmonary:      Effort: Pulmonary effort is normal  No respiratory distress  Breath sounds: Normal breath sounds  No wheezing  Abdominal:      General: Bowel sounds are normal       Palpations: Abdomen is soft  Tenderness: There is no abdominal tenderness     Musculoskeletal: General: Normal range of motion  Cervical back: Normal range of motion and neck supple  Lymphadenopathy:      Cervical: No cervical adenopathy  Skin:     General: Skin is warm and dry  Neurological:      Mental Status: She is alert and oriented to person, place, and time  Cranial Nerves: No cranial nerve deficit  Psychiatric:         Speech: Speech normal          Behavior: Behavior normal          Thought Content:  Thought content normal          Judgment: Judgment normal

## 2023-05-11 ENCOUNTER — HOSPITAL ENCOUNTER (OUTPATIENT)
Dept: NON INVASIVE DIAGNOSTICS | Facility: CLINIC | Age: 73
Discharge: HOME | End: 2023-05-11

## 2023-05-11 VITALS
BODY MASS INDEX: 27.05 KG/M2 | HEART RATE: 100 BPM | OXYGEN SATURATION: 94 % | DIASTOLIC BLOOD PRESSURE: 76 MMHG | WEIGHT: 147 LBS | HEIGHT: 62 IN | SYSTOLIC BLOOD PRESSURE: 132 MMHG

## 2023-05-11 VITALS
SYSTOLIC BLOOD PRESSURE: 138 MMHG | HEART RATE: 90 BPM | WEIGHT: 147 LBS | DIASTOLIC BLOOD PRESSURE: 78 MMHG | BODY MASS INDEX: 27.05 KG/M2 | HEIGHT: 62 IN

## 2023-05-11 DIAGNOSIS — I35.0 NON-RHEUMATIC AORTIC STENOSIS: ICD-10-CM

## 2023-05-11 DIAGNOSIS — R77.8 ELEVATED TROPONIN: ICD-10-CM

## 2023-05-11 LAB
AORTIC ROOT: 2.7 CM
AORTIC VALVE MEAN VELOCITY: 21.1 M/S
APICAL FOUR CHAMBER EJECTION FRACTION: 68 %
ARRHY DURING EX: NORMAL
ASCENDING AORTA: 3.5 CM
AV AREA BY CONTINUOUS VTI: 1.2 CM2
AV AREA PEAK VELOCITY: 1.1 CM2
AV LVOT MEAN GRADIENT: 4 MMHG
AV LVOT PEAK GRADIENT: 6 MMHG
AV MEAN GRADIENT: 20 MMHG
AV PEAK GRADIENT: 33 MMHG
AV REGURGITATION PRESSURE HALF TIME: 303 MS
AV VALVE AREA: 1.23 CM2
AV VELOCITY RATIO: 0.44
CHEST PAIN STATEMENT: NORMAL
DOP CALC AO PEAK VEL: 2.86 M/S
DOP CALC AO VTI: 59.81 CM
DOP CALC LVOT AREA: 2.54 CM2
DOP CALC LVOT DIAMETER: 1.8 CM
DOP CALC LVOT PEAK VEL VTI: 29.03 CM
DOP CALC LVOT PEAK VEL: 1.27 M/S
DOP CALC LVOT STROKE INDEX: 45.6 ML/M2
DOP CALC LVOT STROKE VOLUME: 73.83
DOP CALC MV VTI: 57.25 CM
E WAVE DECELERATION TIME: 293 MS
FRACTIONAL SHORTENING: 37 (ref 28–44)
INTERVENTRICULAR SEPTUM IN DIASTOLE (PARASTERNAL SHORT AXIS VIEW): 1.3 CM
INTERVENTRICULAR SEPTUM: 1.3 CM (ref 0.6–1.1)
LAAS-AP2: 23.2 CM2
LAAS-AP4: 20.8 CM2
LEFT ATRIUM SIZE: 3.6 CM
LEFT INTERNAL DIMENSION IN SYSTOLE: 2.4 CM (ref 2.1–4)
LEFT VENTRICULAR INTERNAL DIMENSION IN DIASTOLE: 3.8 CM (ref 3.5–6)
LEFT VENTRICULAR POSTERIOR WALL IN END DIASTOLE: 1.4 CM
LEFT VENTRICULAR STROKE VOLUME: 41 ML
LVSV (TEICH): 41 ML
MAX DIASTOLIC BP: 78 MMHG
MAX HEART RATE: 141 BPM
MAX HR PERCENT: 94 %
MAX HR: 141 BPM
MAX PREDICTED HEART RATE: 149 BPM
MAX. SYSTOLIC BP: 140 MMHG
MV E'TISSUE VEL-SEP: 7 CM/S
MV MEAN GRADIENT: 7 MMHG
MV PEAK A VEL: 1.59 M/S
MV PEAK E VEL: 166 CM/S
MV PEAK GRADIENT: 12 MMHG
MV STENOSIS PRESSURE HALF TIME: 85 MS
MV VALVE AREA BY CONTINUITY EQUATION: 1.29 CM2
MV VALVE AREA P 1/2 METHOD: 2.59
NUC STRESS EJECTION FRACTION: 88 %
PROTOCOL NAME: NORMAL
RA PRESSURE ESTIMATED: 3 MMHG
RATE PRESSURE PRODUCT: NORMAL
REASON FOR TERMINATION: NORMAL
RIGHT ATRIUM AREA SYSTOLE A4C: 15.9 CM2
RIGHT VENTRICLE ID DIMENSION: 3.1 CM
RV PSP: 48 MMHG
SL CV AV DECELERATION TIME RETROGRADE: 1044 MS
SL CV AV PEAK GRADIENT RETROGRADE: 62 MMHG
SL CV LEFT ATRIUM LENGTH A2C: 6 CM
SL CV PED ECHO LEFT VENTRICLE DIASTOLIC VOLUME (MOD BIPLANE) 2D: 61 ML
SL CV PED ECHO LEFT VENTRICLE SYSTOLIC VOLUME (MOD BIPLANE) 2D: 20 ML
SL CV REST NUCLEAR ISOTOPE DOSE: 10.22 MCI
SL CV STRESS NUCLEAR ISOTOPE DOSE: 30.9 MCI
SL CV STRESS RECOVERY BP: NORMAL MMHG
SL CV STRESS RECOVERY HR: 117 BPM
SL CV STRESS RECOVERY O2 SAT: 98 %
STRESS ANGINA INDEX: 0
STRESS BASELINE BP: NORMAL MMHG
STRESS BASELINE HR: 100 BPM
STRESS O2 SAT REST: 94 %
STRESS PEAK HR: 141 BPM
STRESS POST EXERCISE DUR MIN: 3 MIN
STRESS POST EXERCISE DUR SEC: 0 SEC
STRESS POST O2 SAT PEAK: 96 %
STRESS POST PEAK BP: 140 MMHG
STRESS/REST PERFUSION RATIO: 1
TARGET HR FORMULA: NORMAL
TEST INDICATION: NORMAL
TIME IN EXERCISE PHASE: NORMAL
TR MAX PG: 45 MMHG
TR PEAK VELOCITY: 3.3 M/S
TRICUSPID ANNULAR PLANE SYSTOLIC EXCURSION: 2 CM
TRICUSPID VALVE PEAK REGURGITATION VELOCITY: 3.34 M/S

## 2023-05-11 RX ORDER — REGADENOSON 0.08 MG/ML
0.4 INJECTION, SOLUTION INTRAVENOUS ONCE
Status: COMPLETED | OUTPATIENT
Start: 2023-05-11 | End: 2023-05-11

## 2023-05-11 RX ADMIN — REGADENOSON 0.4 MG: 0.08 INJECTION, SOLUTION INTRAVENOUS at 12:31

## 2023-05-16 ENCOUNTER — TELEPHONE (OUTPATIENT)
Dept: CARDIOLOGY CLINIC | Facility: CLINIC | Age: 73
End: 2023-05-16

## 2023-05-16 NOTE — TELEPHONE ENCOUNTER
----- Message from Justice Munroe sent at 5/15/2023  6:32 PM EDT -----    ----- Message -----  From: LIANG Ochoa  Sent: 5/15/2023   8:27 AM EDT  To: Abhijeet Brown MA    Please call Sheri Crow and inform her the stress test is normal, thank you     Spoke with pt re: normal ST rslts

## 2023-06-16 ENCOUNTER — OFFICE VISIT (OUTPATIENT)
Dept: URGENT CARE | Age: 73
End: 2023-06-16
Payer: MEDICARE

## 2023-06-16 VITALS
OXYGEN SATURATION: 95 % | HEART RATE: 102 BPM | SYSTOLIC BLOOD PRESSURE: 118 MMHG | RESPIRATION RATE: 20 BRPM | DIASTOLIC BLOOD PRESSURE: 74 MMHG | TEMPERATURE: 97.1 F

## 2023-06-16 DIAGNOSIS — J40 BRONCHITIS: Primary | ICD-10-CM

## 2023-06-16 PROCEDURE — G0463 HOSPITAL OUTPT CLINIC VISIT: HCPCS

## 2023-06-16 PROCEDURE — 99213 OFFICE O/P EST LOW 20 MIN: CPT

## 2023-06-16 RX ORDER — METHYLPREDNISOLONE 4 MG/1
TABLET ORAL
Qty: 21 TABLET | Refills: 0 | Status: SHIPPED | OUTPATIENT
Start: 2023-06-16

## 2023-06-16 RX ORDER — AZITHROMYCIN 250 MG/1
TABLET, FILM COATED ORAL
Qty: 6 TABLET | Refills: 0 | Status: SHIPPED | OUTPATIENT
Start: 2023-06-16 | End: 2023-06-20

## 2023-06-16 RX ORDER — BENZONATATE 200 MG/1
200 CAPSULE ORAL 3 TIMES DAILY PRN
Qty: 20 CAPSULE | Refills: 0 | Status: SHIPPED | OUTPATIENT
Start: 2023-06-16

## 2023-06-16 RX ORDER — ALBUTEROL SULFATE 90 UG/1
2 AEROSOL, METERED RESPIRATORY (INHALATION) EVERY 6 HOURS PRN
Qty: 8.5 G | Refills: 0 | Status: SHIPPED | OUTPATIENT
Start: 2023-06-16

## 2023-06-16 NOTE — PROGRESS NOTES
[address] [employer] [facility]        NAME: Sima Alicea is a 72 y o  female  : 1950    MRN: [id number]  DATE: 2023  TIME: 9:09 AM    Assessment and Plan   Bronchitis [J40]  1  Bronchitis  methylPREDNISolone 4 MG tablet therapy pack    azithromycin (ZITHROMAX) 250 mg tablet    albuterol (ProAir HFA) 90 mcg/act inhaler    benzonatate (TESSALON) 200 MG capsule            Patient Instructions       Follow up with PCP in 3-5 days  Proceed to  ER if symptoms worsen  Chief Complaint     Chief Complaint   Patient presents with   • Cough     Last Wednesday started cough, runny nose  Had sore throat, but has resolved  Cough is productive with white/yellow mucus         History of Present Illness       Patient with PMH including COPD and seasonal allergies presents for cough, PND, intermittent wheezing since last week  Denies fevers  Assessment notes scattered wheezing, constant cough, congestion and PND>       Review of Systems   Review of Systems   Constitutional: Negative for activity change, appetite change and fever  HENT: Positive for congestion and postnasal drip  Respiratory: Positive for cough and wheezing  Cardiovascular: Negative for chest pain and palpitations  Allergic/Immunologic: Positive for environmental allergies           Current Medications       Current Outpatient Medications:   •  albuterol (ProAir HFA) 90 mcg/act inhaler, Inhale 2 puffs every 6 (six) hours as needed for wheezing or shortness of breath, Disp: 8 5 g, Rfl: 0  •  atorvastatin (LIPITOR) 20 mg tablet, TAKE 1 TABLET BY MOUTH EVERY DAY, Disp: 90 tablet, Rfl: 3  •  azithromycin (ZITHROMAX) 250 mg tablet, Take 2 tablets today then 1 tablet daily x 4 days, Disp: 6 tablet, Rfl: 0  •  benzonatate (TESSALON) 200 MG capsule, Take 1 capsule (200 mg total) by mouth 3 (three) times a day as needed for cough, Disp: 20 capsule, Rfl: 0  •  cetirizine (ZyrTEC) 10 mg tablet, Take 10 mg by mouth as needed , Disp: , Rfl:   •  diltiazem (CARDIZEM CD) 120 mg 24 hr capsule, Take 1 capsule (120 mg total) by mouth daily, Disp: , Rfl:   •  escitalopram (LEXAPRO) 10 mg tablet, TAKE 1 TABLET BY MOUTH EVERY DAY, Disp: 30 tablet, Rfl: 11  •  losartan (COZAAR) 25 mg tablet, Take 0 5 tablets (12 5 mg total) by mouth daily 1/2 tab daily, Disp: 36 tablet, Rfl: 8  •  methylPREDNISolone 4 MG tablet therapy pack, Use as directed on package, Disp: 21 tablet, Rfl: 0  •  spironolactone (ALDACTONE) 25 mg tablet, TAKE 1 TABLET BY MOUTH EVERY DAY (Patient taking differently: 12 5 mg daily), Disp: 90 tablet, Rfl: 3  •  acetaminophen (TYLENOL) 650 mg CR tablet, Take 1 tablet (650 mg total) by mouth every 8 (eight) hours as needed for mild pain, Disp: 90 tablet, Rfl: 0    Current Allergies     Allergies as of 06/16/2023 - Reviewed 06/16/2023   Allergen Reaction Noted   • Vasotec [enalapril] Hives 11/17/2022   • Amlodipine Rash 01/08/2019   • Ancef [cefazolin]  08/27/2014   • Cephalosporins Other (See Comments)    • Fruit extracts  08/26/2013   • Levofloxacin  01/04/2018   • Morphine GI Intolerance    • Other  06/02/2017   • Penicillins Hives 06/02/2017   • Tetracycline Edema 06/02/2017   • Xarelto [rivaroxaban] Rash 01/08/2019            The following portions of the patient's history were reviewed and updated as appropriate: allergies, current medications, past family history, past medical history, past social history, past surgical history and problem list      Past Medical History:   Diagnosis Date   • [facility]    • Bursitis of hip     Last Assessed:6/5/2017   • COPD (chronic obstructive pulmonary disease) ([facility] [address] ) 5 years ago   • Coronary artery disease    • Herniated lumbar intervertebral disc    • Hyperlipidemia    • Hypertension 2 years   • [facility] )     Last Assessed:2/12/2015 ; Previously on bromocriptine   • Spinal stenosis    • Systolic murmur of aorta        Past Surgical History:   Procedure Laterality Date   • BREAST SURGERY Incisional breast biopsy   • CARDIAC ELECTROPHYSIOLOGY PROCEDURE N/A 9/2/2022    Procedure: Cardiac eps/afib ablation;  Surgeon: Cindy Jay MD;  Location: [facility]; Service: Cardiology   • CARDIAC ELECTROPHYSIOLOGY PROCEDURE N/A 12/7/2022    Procedure: Cardiac loop recorder implant;  Surgeon: Heriberto Scott MD;  Location: [facility]; Service: Cardiology   • CARDIAC ELECTROPHYSIOLOGY STUDY AND ABLATION     • CARPAL TUNNEL RELEASE Left    • CHOLECYSTECTOMY     • OTHER SURGICAL HISTORY      EXCISION OF PAROTID TUMOR/GLAND   • PAROTIDECTOMY     • WY COLONOSCOPY FLX DX W/COLLJ SPEC WHEN PFRMD N/A 6/6/2017    Procedure: COLONOSCOPY;  Surgeon: Vesna Stein DO;  Location: [facility] GI LAB; Service: Gastroenterology   • ROTATOR CUFF REPAIR     • SALPINGOOPHORECTOMY Right        Family History   Problem Relation Age of Onset   • Colon cancer Mother    • Coronary artery disease Mother    • Colonic polyp Mother    • Liver cancer Mother    • Multiple sclerosis Father    • Hypertension Other    • Hypertension Brother          Medications have been verified  Objective   /74   Pulse 102   Temp (!) 97 1 °F (36 2 °C)   Resp 20   LMP  (LMP Unknown)   SpO2 95%   No LMP recorded (lmp unknown)  Patient is postmenopausal        Physical Exam     Physical Exam  Vitals reviewed  Constitutional:       Appearance: Normal appearance  HENT:      Nose: Congestion and rhinorrhea present  Cardiovascular:      Rate and Rhythm: Normal rate and regular rhythm  Pulses: Normal pulses  Heart sounds: Normal heart sounds  Pulmonary:      Effort: No respiratory distress  Breath sounds: No stridor  Wheezing present  No rhonchi or rales  Lymphadenopathy:      Cervical: No cervical adenopathy  Skin:     General: Skin is warm and dry  Neurological:      Mental Status: She is alert

## 2023-06-22 ENCOUNTER — REMOTE DEVICE CLINIC VISIT (OUTPATIENT)
Dept: CARDIOLOGY CLINIC | Facility: CLINIC | Age: 73
End: 2023-06-22
Payer: MEDICARE

## 2023-06-22 DIAGNOSIS — Z95.818 PRESENCE OF OTHER CARDIAC IMPLANTS AND GRAFTS: Primary | ICD-10-CM

## 2023-06-22 PROCEDURE — 93298 REM INTERROG DEV EVAL SCRMS: CPT | Performed by: INTERNAL MEDICINE

## 2023-06-22 PROCEDURE — G2066 INTER DEVC REMOTE 30D: HCPCS | Performed by: INTERNAL MEDICINE

## 2023-06-22 NOTE — PROGRESS NOTES
"MDT LNQ22/ ACTIVE SYSTEM IS MRI CONDITIONAL   CARELINK TRANSMISSION: LOOP RECORDER  PRESENTING RHYTHM ST @ 114 BPM  BATTERY STATUS \"OK  \" NO PATIENT OR DEVICE ACTIVATED EPISODES  NORMAL DEVICE FUNCTION   DL   "

## 2023-06-27 ENCOUNTER — OFFICE VISIT (OUTPATIENT)
Dept: OBGYN CLINIC | Facility: CLINIC | Age: 73
End: 2023-06-27
Payer: MEDICARE

## 2023-06-27 VITALS
SYSTOLIC BLOOD PRESSURE: 120 MMHG | DIASTOLIC BLOOD PRESSURE: 78 MMHG | WEIGHT: 147 LBS | HEIGHT: 62 IN | BODY MASS INDEX: 27.05 KG/M2

## 2023-06-27 DIAGNOSIS — Z12.4 CERVICAL CANCER SCREENING: ICD-10-CM

## 2023-06-27 DIAGNOSIS — N89.8 VAGINAL DISCHARGE: Primary | ICD-10-CM

## 2023-06-27 DIAGNOSIS — N95.0 PMB (POSTMENOPAUSAL BLEEDING): ICD-10-CM

## 2023-06-27 PROCEDURE — 87510 GARDNER VAG DNA DIR PROBE: CPT | Performed by: NURSE PRACTITIONER

## 2023-06-27 PROCEDURE — 58100 BIOPSY OF UTERUS LINING: CPT | Performed by: NURSE PRACTITIONER

## 2023-06-27 PROCEDURE — 87660 TRICHOMONAS VAGIN DIR PROBE: CPT | Performed by: NURSE PRACTITIONER

## 2023-06-27 PROCEDURE — G0476 HPV COMBO ASSAY CA SCREEN: HCPCS | Performed by: NURSE PRACTITIONER

## 2023-06-27 PROCEDURE — 99213 OFFICE O/P EST LOW 20 MIN: CPT | Performed by: NURSE PRACTITIONER

## 2023-06-27 PROCEDURE — G0145 SCR C/V CYTO,THINLAYER,RESCR: HCPCS | Performed by: NURSE PRACTITIONER

## 2023-06-27 PROCEDURE — 88305 TISSUE EXAM BY PATHOLOGIST: CPT | Performed by: STUDENT IN AN ORGANIZED HEALTH CARE EDUCATION/TRAINING PROGRAM

## 2023-06-27 PROCEDURE — 87480 CANDIDA DNA DIR PROBE: CPT | Performed by: NURSE PRACTITIONER

## 2023-06-27 NOTE — PROGRESS NOTES
"Endometrial biopsy    Date/Time: 6/27/2023 10:30 AM    Performed by: LIANG Novak  Authorized by: LIANG Novak  Universal Protocol:  Consent: Verbal consent obtained  Written consent obtained  Risks and benefits: risks, benefits and alternatives were discussed  Consent given by: patient  Time out: Immediately prior to procedure a \"time out\" was called to verify the correct patient, procedure, equipment, support staff and site/side marked as required  Timeout called at: 6/27/2023 10:30 AM   Patient understanding: patient states understanding of the procedure being performed  Patient consent: the patient's understanding of the procedure matches consent given  Procedure consent: procedure consent does not match procedure scheduled (Procedure recommended at time of today's visit)  Relevant documents: relevant documents present and verified  Test results: test results available and properly labeled  Site marked: the operative site was not marked  Radiology Images displayed and confirmed  If images not available, report reviewed: imaging studies not available  Required items: required blood products, implants, devices, and special equipment available  Patient identity confirmed: verbally with patient      Indication:     Indications: Post-menopausal bleeding      Chronicity of post-menopausal bleeding:  New    Progression of post-menopausal bleeding:  Unchanged  Procedure:     Procedure: endometrial biopsy with Pipelle      A bivalve speculum was placed in the vagina: yes      Cervix cleaned and prepped: yes      A paracervical block was performed: no      Uterus sounded: yes      Uterus sound depth (cm):  7 5    Curettes used:  1    Specimen collected: specimen collected and sent to pathology      Unable to perform due to: pain    Findings:     Uterus size:  9-10 weeks    Cervix: normal      Adnexa: normal    Comments:     Procedure comments:  G3, P3 here with postmenopausal bleeding    Reviewed " recommendation for endometrial biopsy  Consent reviewed and signed  Patient verbalized understanding and desires procedure today  Endometrial biopsy completed patient tolerated fairly  Had some cramping  Reviewed with patient can try ibuprofen as needed for pain control    Reviewed importance of completing pelvic ultrasound  Signs and symptoms to report reviewed

## 2023-06-27 NOTE — PROGRESS NOTES
"Assessment/Plan:      Diagnoses and all orders for this visit:    Vaginal discharge    PMB (postmenopausal bleeding)           -PMB -reviewed recommendation for pelvic ultrasound, ordered at today's visit eviewed recommendation for Pap smear  Patient agreeable to visit  reviewed recommendation for endometrial biopsy agreeable to biopsy at today's visit  Please see procedure note  Signs and symptoms to report reviewed  Will call/[employer] message with results    RTO  for annual exam     Subjective:     Patient ID: Jose Cuellar is a 72 y o  female  HPI  here with complaints of PMB  LMP >20-30 years ago  Noticed vaginal spotting-brown on   Is described as small to moderate amount  Denies alleviating or aggravating factors  Associating factors includes cramping  Is not currently sexually active  Denies fever, chills, vaginal discharge/odor, bowel or bladder concerns    Last mammogram 22 birads 1  Last DEXA scan 23- WNL  CRC screening 17 colonoscopy     Review of Systems   Constitutional: Negative for chills, fatigue and fever  Respiratory: Negative  Cardiovascular: Negative  Genitourinary: Positive for pelvic pain and vaginal bleeding  Negative for decreased urine volume, difficulty urinating, dyspareunia, dysuria, enuresis, flank pain, frequency, genital sores, hematuria, menstrual problem, urgency, vaginal discharge and vaginal pain  Objective:  /78 (BP Location: Left arm)   Ht 5' 2\" (1 575 m)   Wt 66 7 kg (147 lb)   LMP  (LMP Unknown)   BMI 26 89 kg/m²      Physical Exam  Vitals reviewed  Constitutional:       Appearance: Normal appearance  Genitourinary:     General: Normal vulva  Labia:         Right: No rash, tenderness, lesion or injury  Left: No rash, tenderness, lesion or injury  Vagina: Bleeding present        Cervix: Normal       Comments: Small amount of brown blood noted on exam   Neurological:      Mental Status: She is alert " and oriented to person, place, and time     Psychiatric:         Mood and Affect: Mood normal          Behavior: Behavior normal

## 2023-06-28 LAB
CANDIDA RRNA VAG QL PROBE: NEGATIVE
G VAGINALIS RRNA GENITAL QL PROBE: NEGATIVE
T VAGINALIS RRNA GENITAL QL PROBE: NEGATIVE

## 2023-06-29 LAB
HPV HR 12 DNA CVX QL NAA+PROBE: NEGATIVE
HPV16 DNA CVX QL NAA+PROBE: NEGATIVE
HPV18 DNA CVX QL NAA+PROBE: NEGATIVE

## 2023-07-03 LAB
LAB AP GYN PRIMARY INTERPRETATION: NORMAL
Lab: NORMAL

## 2023-07-05 ENCOUNTER — TELEPHONE (OUTPATIENT)
Dept: OBGYN CLINIC | Facility: MEDICAL CENTER | Age: 73
End: 2023-07-05

## 2023-07-05 ENCOUNTER — HOSPITAL ENCOUNTER (OUTPATIENT)
Dept: RADIOLOGY | Facility: IMAGING CENTER | Age: 73
Discharge: HOME | End: 2023-07-05
Payer: MEDICARE

## 2023-07-05 DIAGNOSIS — Z12.4 CERVICAL CANCER SCREENING: ICD-10-CM

## 2023-07-05 DIAGNOSIS — R93.89 ABNORMAL PELVIC ULTRASOUND: Primary | ICD-10-CM

## 2023-07-05 DIAGNOSIS — N95.0 PMB (POSTMENOPAUSAL BLEEDING): ICD-10-CM

## 2023-07-05 PROCEDURE — 76856 US EXAM PELVIC COMPLETE: CPT

## 2023-07-05 PROCEDURE — 88305 TISSUE EXAM BY PATHOLOGIST: CPT | Performed by: STUDENT IN AN ORGANIZED HEALTH CARE EDUCATION/TRAINING PROGRAM

## 2023-07-05 PROCEDURE — 76830 TRANSVAGINAL US NON-OB: CPT

## 2023-07-05 NOTE — TELEPHONE ENCOUNTER
Radiology called into office to report significant findings on patient's ultrasound. Please review, thank you!

## 2023-07-11 ENCOUNTER — APPOINTMENT (OUTPATIENT)
Dept: LAB | Facility: IMAGING CENTER | Age: 73
End: 2023-07-11
Payer: MEDICARE

## 2023-07-11 DIAGNOSIS — E83.52 HYPERCALCEMIA: ICD-10-CM

## 2023-07-11 DIAGNOSIS — I48.0 PAROXYSMAL ATRIAL FIBRILLATION (HCC): ICD-10-CM

## 2023-07-11 DIAGNOSIS — I51.89 DIASTOLIC DYSFUNCTION: ICD-10-CM

## 2023-07-11 DIAGNOSIS — I12.9 HYPERTENSIVE KIDNEY DISEASE WITH STAGE 3B CHRONIC KIDNEY DISEASE (HCC): ICD-10-CM

## 2023-07-11 DIAGNOSIS — N18.32 HYPERTENSIVE KIDNEY DISEASE WITH STAGE 3B CHRONIC KIDNEY DISEASE (HCC): ICD-10-CM

## 2023-07-11 DIAGNOSIS — N18.32 STAGE 3B CHRONIC KIDNEY DISEASE (HCC): ICD-10-CM

## 2023-07-11 LAB
ALBUMIN SERPL BCP-MCNC: 3.3 G/DL (ref 3.5–5)
ALP SERPL-CCNC: 74 U/L (ref 46–116)
ALT SERPL W P-5'-P-CCNC: 24 U/L (ref 12–78)
ANION GAP SERPL CALCULATED.3IONS-SCNC: 1 MMOL/L
AST SERPL W P-5'-P-CCNC: 17 U/L (ref 5–45)
BILIRUB SERPL-MCNC: 0.73 MG/DL (ref 0.2–1)
BUN SERPL-MCNC: 14 MG/DL (ref 5–25)
CALCIUM ALBUM COR SERPL-MCNC: 10.3 MG/DL (ref 8.3–10.1)
CALCIUM SERPL-MCNC: 9.7 MG/DL (ref 8.3–10.1)
CHLORIDE SERPL-SCNC: 108 MMOL/L (ref 96–108)
CO2 SERPL-SCNC: 29 MMOL/L (ref 21–32)
CREAT SERPL-MCNC: 0.96 MG/DL (ref 0.6–1.3)
CREAT UR-MCNC: 152 MG/DL
ERYTHROCYTE [DISTWIDTH] IN BLOOD BY AUTOMATED COUNT: 13.2 % (ref 11.6–15.1)
GFR SERPL CREATININE-BSD FRML MDRD: 59 ML/MIN/1.73SQ M
GLUCOSE P FAST SERPL-MCNC: 94 MG/DL (ref 65–99)
HCT VFR BLD AUTO: 44.4 % (ref 34.8–46.1)
HGB BLD-MCNC: 13.8 G/DL (ref 11.5–15.4)
MCH RBC QN AUTO: 33.5 PG (ref 26.8–34.3)
MCHC RBC AUTO-ENTMCNC: 31.1 G/DL (ref 31.4–37.4)
MCV RBC AUTO: 108 FL (ref 82–98)
MICROALBUMIN UR-MCNC: 11.6 MG/L (ref 0–20)
MICROALBUMIN/CREAT 24H UR: 8 MG/G CREATININE (ref 0–30)
PLATELET # BLD AUTO: 252 THOUSANDS/UL (ref 149–390)
PMV BLD AUTO: 9.9 FL (ref 8.9–12.7)
POTASSIUM SERPL-SCNC: 4.3 MMOL/L (ref 3.5–5.3)
PROT SERPL-MCNC: 6.3 G/DL (ref 6.4–8.4)
PTH-INTACT SERPL-MCNC: 86.8 PG/ML (ref 12–88)
RBC # BLD AUTO: 4.12 MILLION/UL (ref 3.81–5.12)
SODIUM SERPL-SCNC: 138 MMOL/L (ref 135–147)
URATE SERPL-MCNC: 6.5 MG/DL (ref 2–7.5)
WBC # BLD AUTO: 8.74 THOUSAND/UL (ref 4.31–10.16)

## 2023-07-11 PROCEDURE — 82043 UR ALBUMIN QUANTITATIVE: CPT

## 2023-07-11 PROCEDURE — 80053 COMPREHEN METABOLIC PANEL: CPT

## 2023-07-11 PROCEDURE — 36415 COLL VENOUS BLD VENIPUNCTURE: CPT

## 2023-07-11 PROCEDURE — 82570 ASSAY OF URINE CREATININE: CPT

## 2023-07-11 PROCEDURE — 84550 ASSAY OF BLOOD/URIC ACID: CPT

## 2023-07-11 PROCEDURE — 83970 ASSAY OF PARATHORMONE: CPT

## 2023-07-11 PROCEDURE — 85027 COMPLETE CBC AUTOMATED: CPT

## 2023-07-20 ENCOUNTER — OFFICE VISIT (OUTPATIENT)
Dept: CARDIOLOGY CLINIC | Facility: CLINIC | Age: 73
End: 2023-07-20
Payer: MEDICARE

## 2023-07-20 ENCOUNTER — TELEPHONE (OUTPATIENT)
Dept: NEPHROLOGY | Facility: CLINIC | Age: 73
End: 2023-07-20

## 2023-07-20 VITALS
WEIGHT: 149.5 LBS | DIASTOLIC BLOOD PRESSURE: 68 MMHG | HEIGHT: 62 IN | BODY MASS INDEX: 27.51 KG/M2 | HEART RATE: 88 BPM | SYSTOLIC BLOOD PRESSURE: 124 MMHG | OXYGEN SATURATION: 95 %

## 2023-07-20 DIAGNOSIS — I35.0 NON-RHEUMATIC AORTIC STENOSIS: Primary | ICD-10-CM

## 2023-07-20 DIAGNOSIS — I34.2 NONRHEUMATIC MITRAL VALVE STENOSIS: ICD-10-CM

## 2023-07-20 DIAGNOSIS — I48.0 PAROXYSMAL ATRIAL FIBRILLATION (HCC): ICD-10-CM

## 2023-07-20 DIAGNOSIS — I34.0 NONRHEUMATIC MITRAL VALVE REGURGITATION: ICD-10-CM

## 2023-07-20 DIAGNOSIS — I35.1 NONRHEUMATIC AORTIC VALVE INSUFFICIENCY: ICD-10-CM

## 2023-07-20 DIAGNOSIS — I10 ESSENTIAL HYPERTENSION: ICD-10-CM

## 2023-07-20 DIAGNOSIS — I27.20 MODERATE PULMONARY HYPERTENSION (HCC): ICD-10-CM

## 2023-07-20 PROCEDURE — 93000 ELECTROCARDIOGRAM COMPLETE: CPT | Performed by: INTERNAL MEDICINE

## 2023-07-20 PROCEDURE — 99214 OFFICE O/P EST MOD 30 MIN: CPT | Performed by: INTERNAL MEDICINE

## 2023-07-20 NOTE — PROGRESS NOTES
Cardiology Follow Up    Brenda Ariza  1950  [id number]  [facility]  [address] [phone]    1. Non-rheumatic aortic stenosis        2. Nonrheumatic aortic valve insufficiency        3. Nonrheumatic mitral valve stenosis        4. Paroxysmal atrial fibrillation (HCC)  POCT ECG      5. Nonrheumatic mitral valve regurgitation        6. Moderate pulmonary hypertension ([address])        7. Essential hypertension            Discussion/Summary:    Shortness of breath: Seems more related to her respiratory status. She has not had a pulmonary evaluation with PFTs before. She questions whether it would be okay to use beta agonist given her arrhythmias in the past. I would be okay with this. She does have mixed valve disease, but the last echo was stable. If any change in symptoms, we can proceed with a AVERY. The mitral stenosis is moderate as is the aortic stenosis. Mild to moderate regurgitation of the aortic and mitral valves. PAF: Remains in sinus rhythm. She had ablation of the A-fib and had SVT ablation previously, also. She has a loop recorder in place. She is not on anticoagulation but if any A-fib is found on the device interrogation that I would initiate this. 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 She has a history of SVT. She has undergone ablation in 2018. Previously, she was on metoprolol, but said she had a very low heart rate with this. Because she also has COPD, she was changed to diltiazem. She has been tolerating this well. Shortly after her SVT ablation, she was on Xarelto as well as amlodipine. She had a rash to 1 of them, not sure which 1. She has not been on anything else for hypertension previously. In 2022, she developed afib. Saw EP, underwent ablation for this. Had some dCHF with valve disease and arrhythmia.     She has mixed valve disease - moderate AS, mild to moderate AI, moderate MS, mild to moderate MR. Interval History:    Loop recorder interrogations continue to show no evidence of atrial fibrillation. She is complained of shortness of breath. This has been with exertion as well as sometimes related to bronchospasm. Weather and the smoke have been significant triggers for her. She has gone to urgent care and she has received inhaler which does seem to help her symptoms at times. She is not gaining weight. She was orthostatic and got a little lightheaded her spironolactone was decreased and the symptoms have improved. There is no evidence of heart failure. She did see our nurse practitioner had testing with a pharmacologic nuclear stress test as well as an echocardiogram which were personally reviewed today. No evidence of ischemia on the stress test. The echo looks pretty stable with moderate mixed valve disease of the aortic and mitral valves. Problem List     Depression with anxiety    Dyslipidemia    Essential hypertension    Irritable bowel syndrome with diarrhea    Lumbar degenerative disc disease    Vitamin D deficiency    Greater trochanteric bursitis of right hip    Pulmonary emphysema (HCC)    SVT (supraventricular tachycardia) ([address])    Medicare annual wellness visit, subsequent    Diastolic dysfunction    Mitral valve disorder    Aortic valve insufficiency    Achilles tendinitis of left lower extremity    Posterior tibial tendinitis, left    Obesity (BMI 30-39. 9)    Stage 3 chronic kidney disease    Lab Results   Component Value Date    EGFR 59 07/11/2023    EGFR 60 04/09/2023    EGFR 51 04/08/2023    CREATININE 0.96 07/11/2023    CREATININE 0.95 04/09/2023    CREATININE 1.09 04/08/2023         Hypertensive kidney disease with stage 3 chronic kidney disease    Lab Results   Component Value Date    EGFR 59 07/11/2023    EGFR 60 04/09/2023    EGFR 51 04/08/2023    CREATININE 0.96 07/11/2023    CREATININE 0.95 2023    CREATININE 1.09 2023             Past Medical History:   Diagnosis Date   • Atrial fibrillation (HCC)    • Bursitis of hip     Last Assessed:2017   • COPD (chronic obstructive pulmonary disease) (HCC) 5 years ago   • Coronary artery disease    • Herniated lumbar intervertebral disc    • Hyperlipidemia    • Hypertension 2 years   • Pituitary adenoma ([address])     Last Assessed:2015 ; Previously on bromocriptine   • Spinal stenosis    • Systolic murmur of aorta      Social History     Tobacco Use   • Smoking status: Former     Packs/day: 1.00     Years: 30.00     Total pack years: 30.00     Types: Cigarettes     Quit date: 2018     Years since quittin.6   • Smokeless tobacco: Never   Vaping Use   • Vaping Use: Never used   Substance Use Topics   • Alcohol use: Not Currently     Alcohol/week: 1.0 standard drink of alcohol     Types: 1 Shots of liquor per week   • Drug use: No      Family History   Problem Relation Age of Onset   • Colon cancer Mother    • Coronary artery disease Mother    • Colonic polyp Mother    • Liver cancer Mother    • Multiple sclerosis Father    • Hypertension Other    • Hypertension Brother      Past Surgical History:   Procedure Laterality Date   • BREAST SURGERY      Incisional breast biopsy   • CARDIAC ELECTROPHYSIOLOGY PROCEDURE N/A 2022    Procedure: Cardiac eps/afib ablation;  Surgeon: San Jeans, MD;  Location: [facility]; Service: Cardiology   • CARDIAC ELECTROPHYSIOLOGY PROCEDURE N/A 2022    Procedure: Cardiac loop recorder implant;  Surgeon: Ravinder Jennings MD;  Location: [facility];   Service: Cardiology   • CARDIAC ELECTROPHYSIOLOGY STUDY AND ABLATION     • CARPAL TUNNEL RELEASE Left    • CHOLECYSTECTOMY     • OTHER SURGICAL HISTORY      EXCISION OF PAROTID TUMOR/GLAND   • PAROTIDECTOMY     • FL COLONOSCOPY FLX DX W/COLLJ SPEC WHEN PFRMD N/A 2017    Procedure: COLONOSCOPY;  Surgeon: Enedelia Jon DO; Location: [facility];   Service: Gastroenterology   • ROTATOR CUFF REPAIR     • SALPINGOOPHORECTOMY Right        Current Outpatient Medications:   •  acetaminophen (TYLENOL) 650 mg CR tablet, Take 1 tablet (650 mg total) by mouth every 8 (eight) hours as needed for mild pain, Disp: 90 tablet, Rfl: 0  •  albuterol (ProAir HFA) 90 mcg/act inhaler, Inhale 2 puffs every 6 (six) hours as needed for wheezing or shortness of breath, Disp: 8.5 g, Rfl: 0  •  atorvastatin (LIPITOR) 20 mg tablet, TAKE 1 TABLET BY MOUTH EVERY DAY, Disp: 90 tablet, Rfl: 3  •  benzonatate (TESSALON) 200 MG capsule, Take 1 capsule (200 mg total) by mouth 3 (three) times a day as needed for cough, Disp: 20 capsule, Rfl: 0  •  cetirizine (ZyrTEC) 10 mg tablet, Take 10 mg by mouth as needed , Disp: , Rfl:   •  diltiazem (CARDIZEM CD) 120 mg 24 hr capsule, Take 1 capsule (120 mg total) by mouth daily, Disp: , Rfl:   •  escitalopram (LEXAPRO) 10 mg tablet, TAKE 1 TABLET BY MOUTH EVERY DAY, Disp: 30 tablet, Rfl: 11  •  losartan (COZAAR) 25 mg tablet, Take 0.5 tablets (12.5 mg total) by mouth daily 1/2 tab daily, Disp: 36 tablet, Rfl: 8  •  spironolactone (ALDACTONE) 25 mg tablet, TAKE 1 TABLET BY MOUTH EVERY DAY (Patient taking differently: 12.5 mg daily), Disp: 90 tablet, Rfl: 3  Allergies   Allergen Reactions   • Vasotec [Enalapril] Hives   • Amlodipine Rash   • Ancef [Cefazolin]    • Cephalosporins Other (See Comments)     c.diff   • Fruit Extracts      Annotation - [id number]: peaches and strawberries   • Levofloxacin    • Morphine GI Intolerance   • Other      FRUIT   • Penicillins Hives   • Tetracycline Edema   • Xarelto [Rivaroxaban] Rash       Vitals:    07/20/23 0916   BP: 124/68   BP Location: Right arm   Patient Position: Sitting   Cuff Size: Standard   Pulse: 88   SpO2: 95%   Weight: 67.8 kg (149 lb 8 oz)   Height: 5' 2" (1.575 m)     Vitals:    07/20/23 0916   Weight: 67.8 kg (149 lb 8 oz)      Height: 5' 2" (157.5 cm)   Body mass index is 27.34 kg/m². Physical Exam:  GEN: Conrado Somers appears well, alert and oriented x 3, pleasant and cooperative   HEENT: pupils equal, round, and reactive to light; extraocular muscles intact  NECK: supple, no carotid bruits   HEART: regular. 2/6 ALKESHA  LUNGS: rhonchorous, no wheezing. No rales. ABDOMEN: normal bowel sounds, soft, no tenderness, no distention  EXTREMITIES: thin, no edema  NEURO: no focal findings   SKIN: normal without suspicious lesions on exposed skin    ROS:  Cough, SOB, wheezing  Except as noted in HPI, is otherwise reviewed in detail and a 12 point review of systems is negative. ROS reviewed and is unchanged    Labs:  Lab Results   Component Value Date     05/04/2015    K 4.3 07/11/2023     07/11/2023    CREATININE 0.96 07/11/2023    BUN 14 07/11/2023    CO2 29 07/11/2023    ALT 24 07/11/2023    AST 17 07/11/2023    INR 1.65 (H) 09/02/2022    GLUF 94 07/11/2023    HGBA1C 5.6 03/23/2022    WBC 8.74 07/11/2023    HGB 13.8 07/11/2023    HCT 44.4 07/11/2023     07/11/2023       Lab Results   Component Value Date    CHOL 187 05/04/2015    CHOL 199 08/20/2014     Lab Results   Component Value Date    LDLCALC 84 03/15/2023    LDLCALC 85 09/15/2022    LDLCALC 103 (H) 03/23/2022     Lab Results   Component Value Date    HDL 77 03/15/2023    HDL 36 (L) 09/15/2022    HDL 62 03/23/2022     Lab Results   Component Value Date    TRIG 162 (H) 03/15/2023    TRIG 173 (H) 09/15/2022    TRIG 256 (H) 03/23/2022       Testing:  Echo 5/11/23:  Left Ventricle: Left ventricular cavity size is normal. Wall thickness is mildly increased. There is concentric remodeling. Systolic function is normal. Wall motion is normal. Diastolic function is moderately abnormal, consistent with grade II (pseudonormal) relaxation. •  Right Ventricle: Right ventricular cavity size is normal. Systolic function is normal.  •  Left Atrium: The atrium is mildly dilated (35-41 mL/m2). •  Aortic Valve:  The aortic valve is trileaflet. The leaflets are moderately thickened. The leaflets are moderately calcified. There is moderately reduced mobility. There is mild to moderate regurgitation. There is moderate stenosis. The aortic valve mean gradient is 20 mmHg. The dimensionless velocity index is 0.44. The aortic valve area is 1.23 cm2. The aortic valve velocity is increased due to stenosis. •  Mitral Valve: The leaflets are not thickened. There is moderate focal calcification of the anterior leaflet and posterior leaflet limited to the leaflet margin. There is mild annular calcification. There is moderate subvalvular calcification. There is mild to moderate regurgitation with a centrally directed jet. There is moderate to severe stenosis (MVA 1.4 cm2/MG 8 mmHg). •  Tricuspid Valve: There is mild regurgitation. The right ventricular systolic pressure is mildly elevated. The estimated right ventricular systolic pressure is 09.84 mmHg. Stress 5/11/23:  Stress ECG: No ST deviation is noted. The ECG was not diagnostic due to pharmacological (vasodilator) stress. The stress ECG is equivocal for ischemia after pharmacologic vasodilation. •  Perfusion: There are no perfusion defects. •  Stress Function: Left ventricular function post-stress is normal. Post-stress ejection fraction is 88 %. •  Stress Combined Conclusion: Left ventricular perfusion is normal.      AVERY: 9/2022  Done pre ablation. AS probably mild-moderate. MV not fully evaluated, visually some restriction movement and calcification. EF preserved. TTE 7/2022:  Left Ventricle: Left ventricular cavity size is normal. Wall thickness is mildly increased. The left ventricular ejection fraction is 60%. Systolic function is normal. Wall motion is normal.  •  Right Ventricle: Right ventricular cavity size is mildly dilated. •  Left Atrium: The atrium is moderately dilated. •  Aortic Valve: The aortic valve is trileaflet. The leaflets are not thickened.  The leaflets are moderately calcified. There is mildly reduced mobility. There is mild regurgitation. There is mild to moderate stenosis. The aortic valve velocity is increased due to stenosis. •  Mitral Valve: There is moderate calcification. There is moderately reduced mobility. There is mild annular calcification. There is mild regurgitation. There is moderate stenosis. •  Tricuspid Valve: There is mild to moderate regurgitation. Echo 5/13/21:  LEFT VENTRICLE:  Systolic function was vigorous. Ejection fraction was estimated to be 65 %. There were no regional wall motion abnormalities. Wall thickness was at the upper limits of normal.     MITRAL VALVE:  There was mild annular calcification. There was mild to moderate regurgitation.     AORTIC VALVE:  The valve was trileaflet. Leaflets exhibited moderately increased thickness, moderate calcification, and mild to moderately reduced cuspal separation. There was mild to moderate stenosis. There was mild to moderate regurgitation. The peak valve velocity was 306 cm/s. Valve mean gradient was 21.6 mmHg.     TRICUSPID VALVE:  There was mild regurgitation. Pulmonary artery systolic pressure was mildly increased. Estimated peak PA pressure was 39 mmHg. Echo 1/2020:  LEFT VENTRICLE:  Systolic function was normal by visual assessment. Ejection fraction was estimated to be 65 %. There were no regional wall motion abnormalities. Doppler parameters were consistent with abnormal left ventricular relaxation (grade 1 diastolic dysfunction).     LEFT ATRIUM:  The atrium was mildly dilated.     MITRAL VALVE:  There was mild regurgitation.     AORTIC VALVE:  The valve was trileaflet. Leaflets exhibited mild calcification, reduced mobility, and sclerosis. There was mild to moderate stenosis. There was moderate regurgitation. Valve mean gradient was 20.7 mmHg. EKG:  Sinus rhythm PAC. 74 BPM. PRWP.

## 2023-07-20 NOTE — TELEPHONE ENCOUNTER
Reschedule Appointment   Person speaking to  Patient   Date of original appointment 7/25/23    New appointment date 9/20/23   [facility]    Provider Dr. Curt Valdivia    Additional Information

## 2023-08-28 DIAGNOSIS — F41.8 DEPRESSION WITH ANXIETY: ICD-10-CM

## 2023-08-28 RX ORDER — ESCITALOPRAM OXALATE 10 MG/1
TABLET ORAL
Qty: 30 TABLET | Refills: 11 | Status: SHIPPED | OUTPATIENT
Start: 2023-08-28

## 2023-09-10 DIAGNOSIS — E78.00 HIGH CHOLESTEROL: ICD-10-CM

## 2023-09-11 RX ORDER — ATORVASTATIN CALCIUM 20 MG/1
TABLET, FILM COATED ORAL
Qty: 90 TABLET | Refills: 3 | Status: SHIPPED | OUTPATIENT
Start: 2023-09-11

## 2023-09-12 ENCOUNTER — TELEPHONE (OUTPATIENT)
Dept: NEPHROLOGY | Facility: CLINIC | Age: 73
End: 2023-09-12

## 2023-09-12 DIAGNOSIS — I12.9 HYPERTENSIVE KIDNEY DISEASE WITH STAGE 3B CHRONIC KIDNEY DISEASE (HCC): ICD-10-CM

## 2023-09-12 DIAGNOSIS — E55.9 VITAMIN D DEFICIENCY: ICD-10-CM

## 2023-09-12 DIAGNOSIS — N18.32 STAGE 3B CHRONIC KIDNEY DISEASE (HCC): ICD-10-CM

## 2023-09-12 DIAGNOSIS — N18.32 HYPERTENSIVE KIDNEY DISEASE WITH STAGE 3B CHRONIC KIDNEY DISEASE (HCC): ICD-10-CM

## 2023-09-12 DIAGNOSIS — I10 ESSENTIAL HYPERTENSION: Primary | ICD-10-CM

## 2023-09-12 NOTE — TELEPHONE ENCOUNTER
----- Message from Yasmine Rai DO sent at 9/12/2023  4:06 PM EDT -----  Renal function panel, PTH and urine for microalbumin to creatinine. Thank you.  ----- Message -----  From: Gray Zee  Sent: 9/12/2023   1:33 PM EDT  To: Yasmine Rai, DO    Patient had labs drawn on 7/11 but has a follow up on 9/20 would you like more drawn?

## 2023-09-13 ENCOUNTER — APPOINTMENT (OUTPATIENT)
Dept: LAB | Facility: IMAGING CENTER | Age: 73
End: 2023-09-13
Payer: MEDICARE

## 2023-09-13 ENCOUNTER — RA CDI HCC (OUTPATIENT)
Dept: OTHER | Facility: HOSPITAL | Age: 73
End: 2023-09-13

## 2023-09-13 DIAGNOSIS — N18.32 STAGE 3B CHRONIC KIDNEY DISEASE (HCC): ICD-10-CM

## 2023-09-13 DIAGNOSIS — N18.32 HYPERTENSIVE KIDNEY DISEASE WITH STAGE 3B CHRONIC KIDNEY DISEASE (HCC): ICD-10-CM

## 2023-09-13 DIAGNOSIS — E55.9 VITAMIN D DEFICIENCY: ICD-10-CM

## 2023-09-13 DIAGNOSIS — I10 ESSENTIAL HYPERTENSION: ICD-10-CM

## 2023-09-13 DIAGNOSIS — I12.9 HYPERTENSIVE KIDNEY DISEASE WITH STAGE 3B CHRONIC KIDNEY DISEASE (HCC): ICD-10-CM

## 2023-09-13 LAB
ALBUMIN SERPL BCP-MCNC: 4 G/DL (ref 3.5–5)
ANION GAP SERPL CALCULATED.3IONS-SCNC: 12 MMOL/L
BUN SERPL-MCNC: 14 MG/DL (ref 5–25)
CALCIUM SERPL-MCNC: 9.8 MG/DL (ref 8.4–10.2)
CHLORIDE SERPL-SCNC: 109 MMOL/L (ref 96–108)
CO2 SERPL-SCNC: 20 MMOL/L (ref 21–32)
CREAT SERPL-MCNC: 0.98 MG/DL (ref 0.6–1.3)
GFR SERPL CREATININE-BSD FRML MDRD: 58 ML/MIN/1.73SQ M
GLUCOSE P FAST SERPL-MCNC: 94 MG/DL (ref 65–99)
PHOSPHATE SERPL-MCNC: 3.3 MG/DL (ref 2.3–4.1)
POTASSIUM SERPL-SCNC: 3.2 MMOL/L (ref 3.5–5.3)
PTH-INTACT SERPL-MCNC: 43.6 PG/ML (ref 12–88)
SODIUM SERPL-SCNC: 141 MMOL/L (ref 135–147)

## 2023-09-13 PROCEDURE — 36415 COLL VENOUS BLD VENIPUNCTURE: CPT

## 2023-09-13 PROCEDURE — 83970 ASSAY OF PARATHORMONE: CPT

## 2023-09-13 PROCEDURE — 80069 RENAL FUNCTION PANEL: CPT

## 2023-09-13 NOTE — PROGRESS NOTES
I13.0   [address] [facility] coding opportunities          Chart Reviewed number of suggestions sent to Provider: 1     Patients Insurance     Medicare Insurance: [facility]

## 2023-09-13 NOTE — TELEPHONE ENCOUNTER
Called patient and asked they have the blood work drawn before the follow up appointment. Labs have been added to the patients chart.

## 2023-09-14 ENCOUNTER — APPOINTMENT (OUTPATIENT)
Dept: LAB | Facility: IMAGING CENTER | Age: 73
End: 2023-09-14
Payer: MEDICARE

## 2023-09-14 LAB
CREAT UR-MCNC: 201 MG/DL
MICROALBUMIN UR-MCNC: 7.8 MG/L
MICROALBUMIN/CREAT 24H UR: 4 MG/G CREATININE (ref 0–30)

## 2023-09-14 PROCEDURE — 82043 UR ALBUMIN QUANTITATIVE: CPT

## 2023-09-14 PROCEDURE — 82570 ASSAY OF URINE CREATININE: CPT

## 2023-09-19 ENCOUNTER — OFFICE VISIT (OUTPATIENT)
Dept: FAMILY MEDICINE CLINIC | Facility: CLINIC | Age: 73
End: 2023-09-19
Payer: MEDICARE

## 2023-09-19 VITALS
BODY MASS INDEX: 27.36 KG/M2 | OXYGEN SATURATION: 96 % | HEART RATE: 74 BPM | SYSTOLIC BLOOD PRESSURE: 122 MMHG | DIASTOLIC BLOOD PRESSURE: 74 MMHG | WEIGHT: 149.6 LBS

## 2023-09-19 DIAGNOSIS — D64.9 ANEMIA, UNSPECIFIED TYPE: ICD-10-CM

## 2023-09-19 DIAGNOSIS — I48.0 PAROXYSMAL ATRIAL FIBRILLATION (HCC): ICD-10-CM

## 2023-09-19 DIAGNOSIS — Z00.00 MEDICARE ANNUAL WELLNESS VISIT, SUBSEQUENT: ICD-10-CM

## 2023-09-19 DIAGNOSIS — E55.9 VITAMIN D DEFICIENCY: ICD-10-CM

## 2023-09-19 DIAGNOSIS — I50.32 CHRONIC DIASTOLIC CHF (CONGESTIVE HEART FAILURE) (HCC): ICD-10-CM

## 2023-09-19 DIAGNOSIS — Z53.20 LUNG CANCER SCREENING DECLINED BY PATIENT: ICD-10-CM

## 2023-09-19 DIAGNOSIS — Z13.1 SCREENING FOR DIABETES MELLITUS: ICD-10-CM

## 2023-09-19 DIAGNOSIS — N18.32 STAGE 3B CHRONIC KIDNEY DISEASE (HCC): ICD-10-CM

## 2023-09-19 DIAGNOSIS — R79.9 ABNORMAL FINDING OF BLOOD CHEMISTRY, UNSPECIFIED: ICD-10-CM

## 2023-09-19 DIAGNOSIS — F41.8 DEPRESSION WITH ANXIETY: ICD-10-CM

## 2023-09-19 DIAGNOSIS — I10 ESSENTIAL HYPERTENSION: ICD-10-CM

## 2023-09-19 DIAGNOSIS — Z13.29 SCREENING FOR HYPOTHYROIDISM: ICD-10-CM

## 2023-09-19 DIAGNOSIS — Z23 ENCOUNTER FOR IMMUNIZATION: Primary | ICD-10-CM

## 2023-09-19 DIAGNOSIS — E66.9 OBESITY (BMI 30-39.9): ICD-10-CM

## 2023-09-19 DIAGNOSIS — E78.2 MIXED HYPERLIPIDEMIA: ICD-10-CM

## 2023-09-19 PROCEDURE — G0439 PPPS, SUBSEQ VISIT: HCPCS | Performed by: INTERNAL MEDICINE

## 2023-09-19 PROCEDURE — G0008 ADMIN INFLUENZA VIRUS VAC: HCPCS

## 2023-09-19 PROCEDURE — 90662 IIV NO PRSV INCREASED AG IM: CPT

## 2023-09-19 PROCEDURE — 99214 OFFICE O/P EST MOD 30 MIN: CPT | Performed by: INTERNAL MEDICINE

## 2023-09-19 NOTE — ASSESSMENT & PLAN NOTE
Her blood pressures were running on the lower side in the past so the dose of her spironolactone was decreased down to 12.5 mg daily. She will follow-up with nephrology, looks like her recent BMP revealed potassium of 3.2. Recommended to increase natural sources of potassium. Follow-up with nephrology.

## 2023-09-19 NOTE — ASSESSMENT & PLAN NOTE
She will double check what dose of vitamin D she takes, will recommend 2000 units every day if it will be okay with nephrology.

## 2023-09-19 NOTE — PROGRESS NOTES
Assessment and Plan:     Problem List Items Addressed This Visit        Cardiovascular and Mediastinum    Essential hypertension     Her blood pressures were running on the lower side in the past so the dose of her spironolactone was decreased down to 12.5 mg daily. She will follow-up with nephrology, looks like her recent BMP revealed potassium of 3.2. Recommended to increase natural sources of potassium. Follow-up with nephrology. Paroxysmal atrial fibrillation ([address])     She follows up with cardiology. No active complaints. Relevant Orders    TSH, 3rd generation with Free T4 reflex    Chronic diastolic CHF (congestive heart failure) (HCC)     Wt Readings from Last 3 Encounters:   09/19/23 67.9 kg (149 lb 9.6 oz)   07/20/23 67.8 kg (149 lb 8 oz)   06/27/23 66.7 kg (147 lb)     She looks euvolemic, weight is stable. Continue follow-up with cardiology. Genitourinary    Stage 3 chronic kidney disease [facility])     Lab Results   Component Value Date    EGFR 58 09/13/2023    EGFR 59 07/11/2023    EGFR 60 04/09/2023    CREATININE 0.98 09/13/2023    CREATININE 0.96 07/11/2023    CREATININE 0.95 04/09/2023   GFR is at the baseline. Avoid NSAIDs. Relevant Orders    Comprehensive metabolic panel       Other    Depression with anxiety     Looks like she is back to her track right now, she feels much better she is currently on Lexapro 10 mg daily. She has a new puppy that helps with her mood a lot. Vitamin D deficiency     She will double check what dose of vitamin D she takes, will recommend 2000 units every day if it will be okay with nephrology. Relevant Orders    Vitamin D 25 hydroxy    Medicare annual wellness visit, subsequent - Primary    RESOLVED: Obesity (BMI 30-39. 9)   Other Visit Diagnoses     Lung cancer screening declined by patient        Relevant Orders    CT chest wo contrast    Anemia, unspecified type        Relevant Orders    CBC and differential Mixed hyperlipidemia        Relevant Orders    Lipid panel    Screening for hypothyroidism        Relevant Orders    UA (URINE) with reflex to Scope    Body mass index (BMI) 31.0-31.9, adult        Screening for diabetes mellitus        Relevant Orders    HEMOGLOBIN A1C W/ EAG ESTIMATION    Abnormal finding of blood chemistry, unspecified        Relevant Orders    HEMOGLOBIN A1C W/ EAG ESTIMATION           Preventive health issues were discussed with patient, and age appropriate screening tests were ordered as noted in patient's After Visit Summary. Personalized health advice and appropriate referrals for health education or preventive services given if needed, as noted in patient's After Visit Summary. History of Present Illness:     Patient presents for a Medicare Wellness Visit    Patient came today for Medicare wellness visit and to follow-up on her chronic problems. She is up-to-date on her vaccines. Vital signs are very acceptable. Agreed for CT scan of the chest for lung cancer screening. Up-to-date on colonoscopy next 1 will be in 2027, up-to-date on mammogram.  She does not smoke, eats healthy and trying to be physically active. Patient Care Team:  Yanira Enciso MD as PCP - General (Internal Medicine)  LIANG Frias DO Jefferey Ali, MD  [facility] Cec[facility] as Endoscopist  Sury Gardner DO (Nephrology)     Review of Systems:     Review of Systems   Constitutional: Positive for fatigue. Negative for activity change, appetite change, chills and fever. HENT: Negative for congestion, ear pain, rhinorrhea and sore throat. Respiratory: Negative for cough, shortness of breath and wheezing. Cardiovascular: Negative for chest pain, palpitations and leg swelling. Gastrointestinal: Negative for abdominal distention, abdominal pain, diarrhea, nausea and vomiting.    Genitourinary: Negative for difficulty urinating, frequency and pelvic pain. Musculoskeletal: Negative for arthralgias, back pain and neck pain. Skin: Negative for rash. Neurological: Negative for dizziness, tremors, weakness, light-headedness, numbness and headaches.         Problem List:     Patient Active Problem List   Diagnosis   • Depression with anxiety   • Dyslipidemia   • Essential hypertension   • Irritable bowel syndrome with diarrhea   • Lumbar degenerative disc disease   • Vitamin D deficiency   • Greater trochanteric bursitis of right hip   • Pulmonary emphysema ([facility])   • SVT (supraventricular tachycardia) ([facility])   • Medicare annual wellness visit, subsequent   • Hypercalcemia   • Diastolic dysfunction   • Aortic valve insufficiency   • Achilles tendinitis of left lower extremity   • Posterior tibial tendinitis, left   • Stage 3 chronic kidney disease ([facility])   • Hypertensive kidney disease with stage 3 chronic kidney disease ([facility])   • Depression, recurrent ([facility])   • Non-rheumatic aortic stenosis   • Nonrheumatic mitral valve regurgitation   • Paroxysmal atrial fibrillation ([facility])   • Moderate pulmonary hypertension ([facility])   • Rectocele   • Nonrheumatic mitral valve stenosis   • Chronic diastolic CHF (congestive heart failure) ([facility])   • Syncope   • Globus sensation   • PMB (postmenopausal bleeding)      Past Medical and Surgical History:     Past Medical History:   Diagnosis Date   • Atrial fibrillation ([address])    • Bursitis of hip     Last Assessed:6/5/2017   • COPD (chronic obstructive pulmonary disease) ([address]) 5 years ago   • Coronary artery disease    • Herniated lumbar intervertebral disc    • Hyperlipidemia    • Hypertension 2 years   • Pituitary adenoma ([address])     Last Assessed:2/12/2015 ; Previously on bromocriptine   • Spinal stenosis    • Systolic murmur of aorta      Past Surgical History:   Procedure Laterality Date   • BREAST SURGERY      Incisional breast biopsy   • CARDIAC ELECTROPHYSIOLOGY PROCEDURE N/A 9/2/2022    Procedure: Cardiac eps/afib ablation;  Surgeon: Smita Tang MD;  Location: [facility]; Service: Cardiology   • CARDIAC ELECTROPHYSIOLOGY PROCEDURE N/A 2022    Procedure: Cardiac loop recorder implant;  Surgeon: Amira Tripathi MD;  Location:  CARDIAC CATH LAB; Service: Cardiology   • CARDIAC ELECTROPHYSIOLOGY STUDY AND ABLATION     • CARPAL TUNNEL RELEASE Left    • CHOLECYSTECTOMY     • OTHER SURGICAL HISTORY      EXCISION OF PAROTID TUMOR/GLAND   • PAROTIDECTOMY     • ID COLONOSCOPY FLX DX W/COLLJ SPEC WHEN PFRMD N/A 2017    Procedure: COLONOSCOPY;  Surgeon: Parish Burris DO;  Location: [facility];   Service: Gastroenterology   • ROTATOR CUFF REPAIR     • SALPINGOOPHORECTOMY Right       Family History:     Family History   Problem Relation Age of Onset   • Colon cancer Mother    • Coronary artery disease Mother    • Colonic polyp Mother    • Liver cancer Mother    • Multiple sclerosis Father    • Hypertension Other    • Hypertension Brother       Social History:     Social History     Socioeconomic History   • Marital status: /Civil Union     Spouse name: None   • Number of children: None   • Years of education: None   • Highest education level: None   Occupational History   • None   Tobacco Use   • Smoking status: Former     Packs/day: 1.00     Years: 30.00     Total pack years: 30.00     Types: Cigarettes     Quit date: 2018     Years since quittin.8   • Smokeless tobacco: Never   Vaping Use   • Vaping Use: Never used   Substance and Sexual Activity   • Alcohol use: Not Currently     Alcohol/week: 1.0 standard drink of alcohol     Types: 1 Shots of liquor per week   • Drug use: No   • Sexual activity: Not Currently     Birth control/protection: Post-menopausal   Other Topics Concern   • None   Social History Narrative   • None     Social Determinants of Health     Financial Resource Strain: Low Risk  (2023)    Overall Financial Resource Strain (CARDIA)    • Difficulty of Paying Living Expenses: Not hard at all   Food Insecurity: Not on file   Transportation Needs: No Transportation Needs (9/19/2023)    PRAPARE - Transportation    • Lack of Transportation (Medical): No    • Lack of Transportation (Non-Medical): No   Physical Activity: Not on file   Stress: Not on file   Social Connections: Not on file   Intimate Partner Violence: Not on file   Housing Stability: Not on file      Medications and Allergies:     Current Outpatient Medications   Medication Sig Dispense Refill   • acetaminophen (TYLENOL) 650 mg CR tablet Take 1 tablet (650 mg total) by mouth every 8 (eight) hours as needed for mild pain 90 tablet 0   • albuterol (ProAir HFA) 90 mcg/act inhaler Inhale 2 puffs every 6 (six) hours as needed for wheezing or shortness of breath 8.5 g 0   • atorvastatin (LIPITOR) 20 mg tablet TAKE 1 TABLET BY MOUTH EVERY DAY 90 tablet 3   • cetirizine (ZyrTEC) 10 mg tablet Take 10 mg by mouth as needed      • diltiazem (CARDIZEM CD) 120 mg 24 hr capsule Take 1 capsule (120 mg total) by mouth daily     • escitalopram (LEXAPRO) 10 mg tablet TAKE 1 TABLET BY MOUTH EVERY DAY 30 tablet 11   • losartan (COZAAR) 25 mg tablet Take 0.5 tablets (12.5 mg total) by mouth daily 1/2 tab daily 36 tablet 8   • spironolactone (ALDACTONE) 25 mg tablet TAKE 1 TABLET BY MOUTH EVERY DAY (Patient taking differently: 12.5 mg daily) 90 tablet 3     No current facility-administered medications for this visit.      Allergies   Allergen Reactions   • Vasotec [Enalapril] Hives   • Amlodipine Rash   • Ancef [Cefazolin]    • Cephalosporins Other (See Comments)     c.diff   • Fruit Extracts      Annotation - [id number]: peaches and strawberries   • Levofloxacin    • Morphine GI Intolerance   • Other      FRUIT   • Penicillins Hives   • Tetracycline Edema   • Xarelto [Rivaroxaban] Rash      Immunizations:     Immunization History   Administered Date(s) Administered   • COVID-19 MODERNA VACC 0.5 ML IM 03/31/2021, 04/28/2021, 11/01/2021   • COVID-19 PFIZER VACCINE 0.3 ML IM 12/18/2022   • H1N1, All Formulations 10/27/2009   • INFLUENZA 01/17/2013, 11/14/2013, 09/10/2018, 10/10/2022   • Influenza Quadrivalent, 6-35 Months IM 11/18/2015   • Influenza Split High Dose Preservative Free IM 11/30/2017, 09/10/2018   • Influenza, high dose seasonal 0.7 mL 10/28/2019, 10/21/2020, 09/30/2021   • Influenza, seasonal, injectable 10/15/2016   • Pneumococcal Conjugate 13-Valent 06/20/2017   • Pneumococcal Polysaccharide PPV23 07/05/2018   • TD (adult) Preservative Free 11/18/2015   • Td (adult), Unspecified 05/14/2005   • Tdap 11/18/2015   • Zoster 10/21/2021   • Zoster Vaccine Recombinant 01/22/2022      Health Maintenance:         Topic Date Due   • Lung Cancer Screening  Never done   • Breast Cancer Screening: Mammogram  06/29/2024   • Colorectal Cancer Screening  06/06/2027   • Hepatitis C Screening  Completed         Topic Date Due   • COVID-19 Vaccine (5 - Moderna series) 02/12/2023   • Influenza Vaccine (1) 09/01/2023      Medicare Screening Tests and Risk Assessments:         Health Risk Assessment:   Patient rates overall health as good. Patient feels that their physical health rating is slightly worse. Patient is satisfied with their life. Eyesight was rated as same. Hearing was rated as same. Patient feels that their emotional and mental health rating is same. Patients states they are never, rarely angry. Patient states they are sometimes unusually tired/fatigued. Pain experienced in the last 7 days has been some. Patient's pain rating has been 5/10. Patient states that she has experienced no weight loss or gain in last 6 months. Fall Risk Screening:    In the past year, patient has experienced: history of falling in past year    Number of falls: 1  Injured during fall?: No    Feels unsteady when standing or walking?: No    Worried about falling?: Yes      Urinary Incontinence Screening:   Patient has not leaked urine accidently in the last six months. Home Safety:  Patient has trouble with stairs inside or outside of their home. Patient has working smoke alarms and has working carbon monoxide detector. Home safety hazards include: none. Nutrition:   Current diet is Regular. Medications:   Patient is not currently taking any over-the-counter supplements. Patient is able to manage medications. Activities of Daily Living (ADLs)/Instrumental Activities of Daily Living (IADLs):   Walk and transfer into and out of bed and chair?: Yes  Dress and groom yourself?: Yes    Bathe or shower yourself?: Yes    Feed yourself? Yes  Do your laundry/housekeeping?: Yes  Manage your money, pay your bills and track your expenses?: Yes  Make your own meals?: Yes    Do your own shopping?: Yes    Previous Hospitalizations:   Any hospitalizations or ED visits within the last 12 months?: No      Advance Care Planning:   Living will: Yes    Durable POA for healthcare: Yes    Advanced directive: Yes      PREVENTIVE SCREENINGS      Cardiovascular Screening:    General: Screening Not Indicated and History Lipid Disorder      Diabetes Screening:     General: Screening Current      Colorectal Cancer Screening:     General: Screening Current      Breast Cancer Screening:     General: Screening Current      Cervical Cancer Screening:    General: Screening Not Indicated      Hepatitis C Screening:    General: Screening Current    Screening, Brief Intervention, and Referral to Treatment (SBIRT)    Screening  Typical number of drinks in a day: 0  Typical number of drinks in a week: 4  Interpretation: Low risk drinking behavior. Single Item Drug Screening:  How often have you used an illegal drug (including marijuana) or a prescription medication for non-medical reasons in the past year? never    Single Item Drug Screen Score: 0  Interpretation: Negative screen for possible drug use disorder    No results found.      Physical Exam:     /74 (BP Location: Left arm, Patient Position: Sitting, Cuff Size: Standard)   Pulse 74   Wt 67.9 kg (149 lb 9.6 oz)   LMP  (LMP Unknown)   SpO2 96%   BMI 27.36 kg/m²     Physical Exam  Cardiovascular:      Rate and Rhythm: Normal rate and regular rhythm. Pulses: Normal pulses. Heart sounds: Murmur heard. No gallop. Pulmonary:      Effort: Pulmonary effort is normal. No respiratory distress. Breath sounds: No wheezing or rales. Abdominal:      General: Abdomen is flat. Musculoskeletal:         General: Tenderness (Over the left trochanter) present. Neurological:      Mental Status: She is alert.    Psychiatric:         Mood and Affect: Mood normal.          Dori Libman, MD

## 2023-09-19 NOTE — ASSESSMENT & PLAN NOTE
Looks like she is back to her track right now, she feels much better she is currently on Lexapro 10 mg daily. She has a new puppy that helps with her mood a lot.

## 2023-09-19 NOTE — ASSESSMENT & PLAN NOTE
Wt Readings from Last 3 Encounters:   09/19/23 67.9 kg (149 lb 9.6 oz)   07/20/23 67.8 kg (149 lb 8 oz)   06/27/23 66.7 kg (147 lb)     She looks euvolemic, weight is stable. Continue follow-up with cardiology.

## 2023-09-19 NOTE — ASSESSMENT & PLAN NOTE
Lab Results   Component Value Date    EGFR 58 09/13/2023    EGFR 59 07/11/2023    EGFR 60 04/09/2023    CREATININE 0.98 09/13/2023    CREATININE 0.96 07/11/2023    CREATININE 0.95 04/09/2023   GFR is at the baseline. Avoid NSAIDs.

## 2023-09-20 ENCOUNTER — HOSPITAL ENCOUNTER (OUTPATIENT)
Dept: RADIOLOGY | Facility: IMAGING CENTER | Age: 73
Discharge: HOME | End: 2023-09-20
Payer: MEDICARE

## 2023-09-20 ENCOUNTER — OFFICE VISIT (OUTPATIENT)
Dept: NEPHROLOGY | Facility: CLINIC | Age: 73
End: 2023-09-20
Payer: MEDICARE

## 2023-09-20 VITALS
BODY MASS INDEX: 27.6 KG/M2 | HEIGHT: 62 IN | WEIGHT: 150 LBS | DIASTOLIC BLOOD PRESSURE: 74 MMHG | SYSTOLIC BLOOD PRESSURE: 114 MMHG

## 2023-09-20 DIAGNOSIS — N18.31 STAGE 3A CHRONIC KIDNEY DISEASE (HCC): Primary | ICD-10-CM

## 2023-09-20 DIAGNOSIS — E55.9 VITAMIN D DEFICIENCY: ICD-10-CM

## 2023-09-20 DIAGNOSIS — E83.52 HYPERCALCEMIA: ICD-10-CM

## 2023-09-20 DIAGNOSIS — I12.9 HYPERTENSIVE KIDNEY DISEASE WITH STAGE 3B CHRONIC KIDNEY DISEASE (HCC): ICD-10-CM

## 2023-09-20 DIAGNOSIS — N18.32 HYPERTENSIVE KIDNEY DISEASE WITH STAGE 3B CHRONIC KIDNEY DISEASE (HCC): ICD-10-CM

## 2023-09-20 DIAGNOSIS — R93.89 ABNORMAL PELVIC ULTRASOUND: ICD-10-CM

## 2023-09-20 PROCEDURE — 76830 TRANSVAGINAL US NON-OB: CPT

## 2023-09-20 PROCEDURE — 76856 US EXAM PELVIC COMPLETE: CPT

## 2023-09-20 PROCEDURE — 99214 OFFICE O/P EST MOD 30 MIN: CPT | Performed by: INTERNAL MEDICINE

## 2023-09-20 NOTE — PROGRESS NOTES
NEPHROLOGY OUTPATIENT PROGRESS NOTE   Sheldon Schirmer 72 y.o. female MRN: [id number]  Reason for visit: CKD    ASSESSMENT and PLAN:  1. Chronic kidney disease, stage IIIa, baseline creatinine near 1.0 most recent creatinine 0.98 estimated GFR 58. No significant proteinuria. 2. Hypokalemia,? Lab error given concomitant metabolic acidosis. Recommend repeating BMP. 3. History of hypercalcemia, recent calcium levels have been stable 29.7 and 9.8. Recommend repeating 25-hydroxy vitamin D level for further guide for potential supplementation  4. Hypertension blood pressure overall appears stable  5. Cardiomyopathy, noted diastolic dysfunction, grade 2. Moderate aortic stenosis with moderate to severe mitral valve stenosis. Overall renal function remains stable  Recommend repeating laboratory studies given hypokalemia and metabolic acidosis? Lab error  Repeat vitamin D level prior to starting supplementation given history of hypercalcemia  No other changes in her current regimen will await labs for further recommendations. Otherwise assuming labs are stable follow-up in 1 year. SUBJECTIVE / INTERVAL HISTORY:  She has been doing reasonably well. Denies any recent hospitalizations. Denies any chest pain, significant shortness of breath or swelling. Her appetites been stable. No further dizziness or lightheadedness. OBJECTIVE:  /74 (BP Location: Left arm, Patient Position: Sitting, Cuff Size: Adult)   Ht 5' 2" (1.575 m)   Wt 68 kg (150 lb)   LMP  (LMP Unknown)   BMI 27.44 kg/m²   Vitals:    09/20/23 1422   Weight: 68 kg (150 lb)       Physical Exam  Constitutional:       Appearance: She is not ill-appearing. Eyes:      General: No scleral icterus. Cardiovascular:      Rate and Rhythm: Normal rate and regular rhythm. Pulmonary:      Effort: Pulmonary effort is normal.      Breath sounds: Normal breath sounds. Abdominal:      General: There is no distension.       Palpations: Abdomen is soft.      Tenderness: There is no abdominal tenderness. Musculoskeletal:      Right lower leg: No edema. Left lower leg: No edema. Skin:     General: Skin is warm and dry. Neurological:      Mental Status: She is alert and oriented to person, place, and time.            Medications:    Current Outpatient Medications:   •  albuterol (ProAir HFA) 90 mcg/act inhaler, Inhale 2 puffs every 6 (six) hours as needed for wheezing or shortness of breath, Disp: 8.5 g, Rfl: 0  •  atorvastatin (LIPITOR) 20 mg tablet, TAKE 1 TABLET BY MOUTH EVERY DAY, Disp: 90 tablet, Rfl: 3  •  cetirizine (ZyrTEC) 10 mg tablet, Take 10 mg by mouth as needed , Disp: , Rfl:   •  diltiazem (CARDIZEM CD) 120 mg 24 hr capsule, Take 1 capsule (120 mg total) by mouth daily, Disp: , Rfl:   •  escitalopram (LEXAPRO) 10 mg tablet, TAKE 1 TABLET BY MOUTH EVERY DAY, Disp: 30 tablet, Rfl: 11  •  losartan (COZAAR) 25 mg tablet, Take 0.5 tablets (12.5 mg total) by mouth daily 1/2 tab daily, Disp: 36 tablet, Rfl: 8  •  spironolactone (ALDACTONE) 25 mg tablet, TAKE 1 TABLET BY MOUTH EVERY DAY (Patient taking differently: 12.5 mg daily), Disp: 90 tablet, Rfl: 3  •  acetaminophen (TYLENOL) 650 mg CR tablet, Take 1 tablet (650 mg total) by mouth every 8 (eight) hours as needed for mild pain (Patient not taking: Reported on 9/20/2023), Disp: 90 tablet, Rfl: 0    Laboratory Results:  Results for orders placed or performed in visit on 09/13/23   Renal function panel   Result Value Ref Range    Albumin 4.0 3.5 - 5.0 g/dL    Calcium 9.8 8.4 - 10.2 mg/dL    Phosphorus 3.3 2.3 - 4.1 mg/dL    BUN 14 5 - 25 mg/dL    Creatinine 0.98 0.60 - 1.30 mg/dL    Sodium 141 135 - 147 mmol/L    Potassium 3.2 (L) 3.5 - 5.3 mmol/L    Chloride 109 (H) 96 - 108 mmol/L    CO2 20 (L) 21 - 32 mmol/L    ANION GAP 12 mmol/L    eGFR 58 ml/min/1.73sq m    Glucose, Fasting 94 65 - 99 mg/dL   PTH, intact   Result Value Ref Range    PTH 43.6 12.0 - 88.0 pg/mL

## 2023-09-21 ENCOUNTER — REMOTE DEVICE CLINIC VISIT (OUTPATIENT)
Dept: CARDIOLOGY CLINIC | Facility: CLINIC | Age: 73
End: 2023-09-21
Payer: MEDICARE

## 2023-09-21 DIAGNOSIS — Z95.818 PRESENCE OF OTHER CARDIAC IMPLANTS AND GRAFTS: Primary | ICD-10-CM

## 2023-09-21 PROCEDURE — G2066 INTER DEVC REMOTE 30D: HCPCS | Performed by: INTERNAL MEDICINE

## 2023-09-21 PROCEDURE — 93298 REM INTERROG DEV EVAL SCRMS: CPT | Performed by: INTERNAL MEDICINE

## 2023-09-21 NOTE — PROGRESS NOTES
MDT LNQ22/ ACTIVE SYSTEM IS MRI CONDITIONAL   CARELINK TRANSMISSION: LOOP RECORDER. PRESENTING RHYTHM NSR W/ PACs @ 72 BPM. BATTERY STATUS "OK." NO PATIENT OR DEVICE ACTIVATED EPISODES. NORMAL DEVICE FUNCTION.  DL

## 2023-09-28 ENCOUNTER — APPOINTMENT (OUTPATIENT)
Dept: LAB | Facility: IMAGING CENTER | Age: 73
End: 2023-09-28
Payer: MEDICARE

## 2023-09-28 ENCOUNTER — HOSPITAL ENCOUNTER (OUTPATIENT)
Dept: RADIOLOGY | Facility: IMAGING CENTER | Age: 73
End: 2023-09-28
Payer: MEDICARE

## 2023-09-28 DIAGNOSIS — E83.52 HYPERCALCEMIA: ICD-10-CM

## 2023-09-28 DIAGNOSIS — N18.31 STAGE 3A CHRONIC KIDNEY DISEASE (HCC): ICD-10-CM

## 2023-09-28 DIAGNOSIS — I12.9 HYPERTENSIVE KIDNEY DISEASE WITH STAGE 3B CHRONIC KIDNEY DISEASE (HCC): ICD-10-CM

## 2023-09-28 DIAGNOSIS — E55.9 VITAMIN D DEFICIENCY: ICD-10-CM

## 2023-09-28 DIAGNOSIS — Z53.20 LUNG CANCER SCREENING DECLINED BY PATIENT: ICD-10-CM

## 2023-09-28 DIAGNOSIS — N18.32 HYPERTENSIVE KIDNEY DISEASE WITH STAGE 3B CHRONIC KIDNEY DISEASE (HCC): ICD-10-CM

## 2023-09-28 LAB
25(OH)D3 SERPL-MCNC: 45.2 NG/ML (ref 30–100)
ANION GAP SERPL CALCULATED.3IONS-SCNC: 9 MMOL/L
BUN SERPL-MCNC: 17 MG/DL (ref 5–25)
CALCIUM SERPL-MCNC: 10.3 MG/DL (ref 8.4–10.2)
CHLORIDE SERPL-SCNC: 111 MMOL/L (ref 96–108)
CO2 SERPL-SCNC: 23 MMOL/L (ref 21–32)
CREAT SERPL-MCNC: 0.97 MG/DL (ref 0.6–1.3)
GFR SERPL CREATININE-BSD FRML MDRD: 58 ML/MIN/1.73SQ M
GLUCOSE P FAST SERPL-MCNC: 98 MG/DL (ref 65–99)
POTASSIUM SERPL-SCNC: 4 MMOL/L (ref 3.5–5.3)
SODIUM SERPL-SCNC: 143 MMOL/L (ref 135–147)

## 2023-09-28 PROCEDURE — G1004 CDSM NDSC: HCPCS

## 2023-09-28 PROCEDURE — 36415 COLL VENOUS BLD VENIPUNCTURE: CPT

## 2023-09-28 PROCEDURE — 80048 BASIC METABOLIC PNL TOTAL CA: CPT

## 2023-09-28 PROCEDURE — 71250 CT THORAX DX C-: CPT

## 2023-09-28 PROCEDURE — 82306 VITAMIN D 25 HYDROXY: CPT

## 2023-10-10 ENCOUNTER — TELEPHONE (OUTPATIENT)
Dept: OTHER | Facility: HOSPITAL | Age: 73
End: 2023-10-10

## 2023-10-10 NOTE — TELEPHONE ENCOUNTER
Spoke with patient - potassium omproved. Calcium slightly high. Hold calcium supplementation.  Vitamin D normal

## 2023-10-30 ENCOUNTER — OFFICE VISIT (OUTPATIENT)
Dept: OBGYN CLINIC | Facility: MEDICAL CENTER | Age: 73
End: 2023-10-30
Payer: MEDICARE

## 2023-10-30 VITALS
WEIGHT: 150 LBS | HEIGHT: 62 IN | DIASTOLIC BLOOD PRESSURE: 64 MMHG | BODY MASS INDEX: 27.6 KG/M2 | SYSTOLIC BLOOD PRESSURE: 128 MMHG

## 2023-10-30 DIAGNOSIS — N95.0 PMB (POSTMENOPAUSAL BLEEDING): Primary | ICD-10-CM

## 2023-10-30 PROCEDURE — 99214 OFFICE O/P EST MOD 30 MIN: CPT | Performed by: OBSTETRICS & GYNECOLOGY

## 2023-10-30 NOTE — PROGRESS NOTES
Ramonita Doyle was seen today for consult. Diagnoses and all orders for this visit:    PMB (postmenopausal bleeding)         Plan  Patient is interested in definitive management in the form of a hysteroscopy D&C polypectomy. We discussed the risk of surgery including but not limited to infection, bleeding, uterine perforation with possible results taking injury to surrounding organs such as the bowel, bladder, major blood vessels among others needing further surgery . Berto Garcia is a 72 y.o. female here for a problem visit. Patient is complaining of having menstrual like cramps mostly on her left lower quadrant . She has had 2 episodes of PMB. One was red bleeding and other was spotting. It has not occurred since . States had EMB but is aware is scant amount . Most interested in [facility]     Patient Active Problem List   Diagnosis    Depression with anxiety    Dyslipidemia    Essential hypertension    Irritable bowel syndrome with diarrhea    Lumbar degenerative disc disease    Vitamin D deficiency    Greater trochanteric bursitis of right hip    Pulmonary emphysema (HCC)    SVT (supraventricular tachycardia)    Medicare annual wellness visit, subsequent    Hypercalcemia    Diastolic dysfunction    Aortic valve insufficiency    Achilles tendinitis of left lower extremity    Posterior tibial tendinitis, left    Stage 3 chronic kidney disease (HCC)    Hypertensive kidney disease with stage 3 chronic kidney disease (HCC)    Depression, recurrent (HCC)    Non-rheumatic aortic stenosis    Nonrheumatic mitral valve regurgitation    Paroxysmal atrial fibrillation (HCC)    Moderate pulmonary hypertension (HCC)    Rectocele    Nonrheumatic mitral valve stenosis    Chronic diastolic CHF (congestive heart failure) (HCC)    Syncope    Globus sensation    PMB (postmenopausal bleeding)       Gynecologic History  No LMP recorded (lmp unknown).  Patient is postmenopausal.  The current method of family planning is post menopausal status. Past Medical History:   Diagnosis Date    Atrial fibrillation ([address])     Bursitis of hip     Last Assessed:6/5/2017    COPD (chronic obstructive pulmonary disease) (HCC) 5 years ago    Coronary artery disease     Herniated lumbar intervertebral disc     Hyperlipidemia     Hypertension 2 years    Pituitary adenoma ([address])     Last Assessed:2/12/2015 ; Previously on bromocriptine    Spinal stenosis     Systolic murmur of aorta      Past Surgical History:   Procedure Laterality Date    BREAST SURGERY      Incisional breast biopsy    CARDIAC ELECTROPHYSIOLOGY PROCEDURE N/A 9/2/2022    Procedure: Cardiac eps/afib ablation;  Surgeon: Lillard Schlatter, MD;  Location: [facility]; Service: Cardiology    CARDIAC ELECTROPHYSIOLOGY PROCEDURE N/A 12/7/2022    Procedure: Cardiac loop recorder implant;  Surgeon: Ignacio Sicard, MD;  Location: [facility]; Service: Cardiology    CARDIAC ELECTROPHYSIOLOGY STUDY AND ABLATION      CARPAL TUNNEL RELEASE Left     CHOLECYSTECTOMY      OTHER SURGICAL HISTORY      EXCISION OF PAROTID TUMOR/GLAND    PAROTIDECTOMY      TN COLONOSCOPY FLX DX W/COLLJ SPEC WHEN PFRMD N/A 6/6/2017    Procedure: COLONOSCOPY;  Surgeon: Mirella Lezama DO;  Location: [facility];   Service: Gastroenterology    ROTATOR CUFF REPAIR      SALPINGOOPHORECTOMY Right      Family History   Problem Relation Age of Onset    Colon cancer Mother     Coronary artery disease Mother     Colonic polyp Mother     Liver cancer Mother     Multiple sclerosis Father     Hypertension Brother     Hypertension Other     Breast cancer Neg Hx     Cancer Neg Hx      Social History     Socioeconomic History    Marital status: /Civil Union     Spouse name: Not on file    Number of children: Not on file    Years of education: Not on file    Highest education level: Not on file   Occupational History    Not on file   Tobacco Use    Smoking status: Former     Packs/day: 1.00     Years: 30.00 Total pack years: 30.00     Types: Cigarettes     Quit date: 2018     Years since quittin.9    Smokeless tobacco: Never   Vaping Use    Vaping Use: Never used   Substance and Sexual Activity    Alcohol use: Yes     Alcohol/week: 1.0 standard drink of alcohol     Types: 1 Shots of liquor per week     Comment: social    Drug use: No    Sexual activity: Not Currently     Birth control/protection: Post-menopausal   Other Topics Concern    Not on file   Social History Narrative    Not on file     Social Determinants of Health     Financial Resource Strain: Low Risk  (2023)    Overall Financial Resource Strain (CARDIA)     Difficulty of Paying Living Expenses: Not hard at all   Food Insecurity: Not on file   Transportation Needs: No Transportation Needs (2023)    PRAPARE - Transportation     Lack of Transportation (Medical): No     Lack of Transportation (Non-Medical):  No   Physical Activity: Not on file   Stress: Not on file   Social Connections: Not on file   Intimate Partner Violence: Not on file   Housing Stability: Not on file     Allergies   Allergen Reactions    Vasotec [Enalapril] Hives    Amlodipine Rash    Ancef [Cefazolin]     Cephalosporins Other (See Comments)     c.diff    Fruit Extracts      Annotation - [id number]: peaches and strawberries    Levofloxacin     Morphine GI Intolerance    Other      FRUIT    Penicillins Hives    Tetracycline Edema    Xarelto [Rivaroxaban] Rash       Current Outpatient Medications:     albuterol (ProAir HFA) 90 mcg/act inhaler, Inhale 2 puffs every 6 (six) hours as needed for wheezing or shortness of breath, Disp: 8.5 g, Rfl: 0    atorvastatin (LIPITOR) 20 mg tablet, TAKE 1 TABLET BY MOUTH EVERY DAY, Disp: 90 tablet, Rfl: 3    cetirizine (ZyrTEC) 10 mg tablet, Take 10 mg by mouth as needed , Disp: , Rfl:     diltiazem (CARDIZEM CD) 120 mg 24 hr capsule, Take 1 capsule (120 mg total) by mouth daily, Disp: , Rfl:     escitalopram (LEXAPRO) 10 mg tablet, TAKE 1 TABLET BY MOUTH EVERY DAY, Disp: 30 tablet, Rfl: 11    losartan (COZAAR) 25 mg tablet, Take 0.5 tablets (12.5 mg total) by mouth daily 1/2 tab daily, Disp: 36 tablet, Rfl: 8    spironolactone (ALDACTONE) 25 mg tablet, TAKE 1 TABLET BY MOUTH EVERY DAY (Patient taking differently: 12.5 mg daily), Disp: 90 tablet, Rfl: 3    acetaminophen (TYLENOL) 650 mg CR tablet, Take 1 tablet (650 mg total) by mouth every 8 (eight) hours as needed for mild pain (Patient not taking: Reported on 9/20/2023), Disp: 90 tablet, Rfl: 0    Review of Systems  Constitutional :no fever, feels well, no tiredness, no recent weight gain or loss  ENT: no ear ache, no loss of hearing, no nosebleeds or nasal discharge, no sore throat or hoarseness. Cardiovascular: no complaints of slow or fast heart beat, no chest pain, no palpitations, no leg claudication or lower extremity edema. Respiratory: no complaints of shortness of shortness of breath, no RUVALCABA  Breasts:no complaints of breast pain, breast lump, or nipple discharge  Gastrointestinal: no complaints of abdominal pain, constipation, nausea, vomiting, or diarrhea or bloody stools  Genitourinary : no complaints of dysuria, incontinence, pelvic pain, no dysmenorrhea, vaginal discharge or abnormal vaginal bleeding and as noted in HPI. Musculoskeletal: no complaints of arthralgia, no myalgia, no joint swelling or stiffness, no limb pain or swelling.   Integumentary: no complaints of skin rash or lesion, itching or dry skin  Neurological: no complaints of headache, no confusion, no numbness or tingling, no dizziness or fainting     Objective     /64   Ht 5' 2" (1.575 m)   Wt 68 kg (150 lb)   LMP  (LMP Unknown)   BMI 27.44 kg/m²     General:   appears stated age, cooperative, alert normal mood and affect   Heart: regular rate and rhythm, S1, S2 normal, no murmur, click, rub or gallop   Lungs: clear to auscultation bilaterally   Abdomen: soft, non-tender, without masses or organomegaly pelvic: Not repeated on todays exam / most recent exam in  6/2023 documented essentially normal    Skin normal skin turgor and no rashes. Psychiatric orientation to person, place, and time: normal. mood and affect: normal    . Endometrium; biopsy:       - Scant, rare strips of detached atrophic endometrium in the background of mucin, see note    UTERUS:  The uterus is retroverted in position, measuring 6.8 x 2.5 x 3.0 cm. The uterus has a normal contour and echotexture. 10 mm fibroid near the fundus and heterogeneous 9 mm fibroid along the body. Nabothian cyst(s) present, cervix otherwise within normal limits. ENDOMETRIUM:  The endometrial echo complex has an AP caliber of 4.0 mm. Similar appearing 5 x 4 x 6 mm oval focus of heterogeneous echogenic tissue along the fundus without internal vascularity. OVARIES/ADNEXA:  Right ovary:  1.8 x 1.0 x 1.1 cm. 1.1 mL. Left ovary: Not seen. Ovarian Doppler flow is within normal limits. No suspicious ovarian or adnexal abnormality. OTHER:  No free fluid or loculated fluid collections. IMPRESSION:     Similar-appearing indeterminant oval focus of heterogeneous echogenic endometrial tissue along the fundus. Fibroids. Nonvisualized left ovary.

## 2023-11-16 ENCOUNTER — APPOINTMENT (OUTPATIENT)
Dept: LAB | Facility: MEDICAL CENTER | Age: 73
End: 2023-11-16
Payer: MEDICARE

## 2023-11-16 ENCOUNTER — PREP FOR PROCEDURE (OUTPATIENT)
Dept: OBGYN CLINIC | Facility: MEDICAL CENTER | Age: 73
End: 2023-11-16

## 2023-11-16 ENCOUNTER — TELEPHONE (OUTPATIENT)
Dept: OBGYN CLINIC | Facility: MEDICAL CENTER | Age: 73
End: 2023-11-16

## 2023-11-16 ENCOUNTER — CONSULT (OUTPATIENT)
Dept: FAMILY MEDICINE CLINIC | Facility: CLINIC | Age: 73
End: 2023-11-16
Payer: MEDICARE

## 2023-11-16 VITALS
OXYGEN SATURATION: 99 % | HEART RATE: 78 BPM | DIASTOLIC BLOOD PRESSURE: 80 MMHG | RESPIRATION RATE: 14 BRPM | SYSTOLIC BLOOD PRESSURE: 120 MMHG | HEIGHT: 62 IN | BODY MASS INDEX: 27.79 KG/M2 | TEMPERATURE: 97.8 F | WEIGHT: 151 LBS

## 2023-11-16 DIAGNOSIS — N95.0 PMB (POSTMENOPAUSAL BLEEDING): Primary | ICD-10-CM

## 2023-11-16 DIAGNOSIS — Z01.818 PREOP EXAMINATION: ICD-10-CM

## 2023-11-16 DIAGNOSIS — E83.52 HYPERCALCEMIA: ICD-10-CM

## 2023-11-16 DIAGNOSIS — Z01.811 PRE-OP CHEST EXAM: Primary | ICD-10-CM

## 2023-11-16 LAB
ANION GAP SERPL CALCULATED.3IONS-SCNC: 10 MMOL/L
BUN SERPL-MCNC: 16 MG/DL (ref 5–25)
CALCIUM SERPL-MCNC: 9.5 MG/DL (ref 8.4–10.2)
CHLORIDE SERPL-SCNC: 107 MMOL/L (ref 96–108)
CO2 SERPL-SCNC: 23 MMOL/L (ref 21–32)
CREAT SERPL-MCNC: 1.02 MG/DL (ref 0.6–1.3)
GFR SERPL CREATININE-BSD FRML MDRD: 55 ML/MIN/1.73SQ M
GLUCOSE SERPL-MCNC: 79 MG/DL (ref 65–140)
POTASSIUM SERPL-SCNC: 4 MMOL/L (ref 3.5–5.3)
SODIUM SERPL-SCNC: 140 MMOL/L (ref 135–147)

## 2023-11-16 PROCEDURE — 80048 BASIC METABOLIC PNL TOTAL CA: CPT

## 2023-11-16 PROCEDURE — 99214 OFFICE O/P EST MOD 30 MIN: CPT | Performed by: INTERNAL MEDICINE

## 2023-11-16 PROCEDURE — 36415 COLL VENOUS BLD VENIPUNCTURE: CPT

## 2023-11-16 RX ORDER — SODIUM CHLORIDE, SODIUM LACTATE, POTASSIUM CHLORIDE, CALCIUM CHLORIDE 600; 310; 30; 20 MG/100ML; MG/100ML; MG/100ML; MG/100ML
125 INJECTION, SOLUTION INTRAVENOUS CONTINUOUS
Status: CANCELLED | OUTPATIENT
Start: 2023-11-22

## 2023-11-16 NOTE — PRE-PROCEDURE INSTRUCTIONS
Pre-Surgery Instructions:   Medication Instructions    albuterol (ProAir HFA) 90 mcg/act inhaler Take day of surgery. Bring dos    atorvastatin (LIPITOR) 20 mg tablet Take day of surgery. cetirizine (ZyrTEC) 10 mg tablet Take day of surgery. diltiazem (CARDIZEM CD) 120 mg 24 hr capsule Take night before surgery    escitalopram (LEXAPRO) 10 mg tablet Take day of surgery. losartan (COZAAR) 25 mg tablet Take night before surgery    spironolactone (ALDACTONE) 25 mg tablet Hold day of surgery. Medication instructions for day surgery reviewed. Please use only a sip of water to take your instructed medications. Avoid all over the counter vitamins, supplements and NSAIDS for one week prior to surgery per anesthesia guidelines. Tylenol is ok to take as needed. You will receive a call one business day prior to surgery with an arrival time and hospital directions. If your surgery is scheduled on a Monday, the hospital will be calling you on the Friday prior to your surgery. If you have not heard from anyone by 8pm, please call the hospital supervisor through the hospital  at [phone]. Vilma Warren [phone]). Do not eat or drink anything after midnight the night before your surgery, including candy, mints, lifesavers, or chewing gum. Do not drink alcohol 24hrs before your surgery. Try not to smoke at least 24hrs before your surgery. Follow the pre surgery showering instructions as listed in the [facility] Surgical Experience Booklet” or otherwise provided by your surgeon's office. Do not use a blade to shave the surgical area 1 week before surgery. It is okay to use a clean electric clippers up to 24 hours before surgery. Do not apply any lotions, creams, including makeup, cologne, deodorant, or perfumes after showering on the day of your surgery. Do not use dry shampoo, hair spray, hair gel, or any type of hair products. No contact lenses, eye make-up, or artificial eyelashes.  Remove nail polish, including gel polish, and any artificial, gel, or acrylic nails if possible. Remove all jewelry including rings and body piercing jewelry. Wear causal clothing that is easy to take on and off. Consider your type of surgery. Keep any valuables, jewelry, piercings at home. Please bring any specially ordered equipment (sling, braces) if indicated. Arrange for a responsible person to drive you to and from the hospital on the day of your surgery. Visitor Guidelines discussed. Call the surgeon's office with any new illnesses, exposures, or additional questions prior to surgery. Please reference your [facility] Surgical Experience Booklet” for additional information to prepare for your upcoming surgery.

## 2023-11-20 PROBLEM — Z01.818 PREOP EXAMINATION: Status: ACTIVE | Noted: 2023-11-20

## 2023-11-20 PROCEDURE — 93000 ELECTROCARDIOGRAM COMPLETE: CPT | Performed by: INTERNAL MEDICINE

## 2023-11-20 NOTE — ASSESSMENT & PLAN NOTE
Patient came today for preop exam for upcoming D&C with hysteroscopy. Denies any exertional symptoms, she can perform more than 4 METS of exertion with no chest pain, palpitation or shortness of breath. Her EKG today in the office was normal.  Recent blood work revealed normal electrolytes and baseline kidney function. Her vital signs today are very acceptable. According to revised cardiac risk index calculator she has a low risk of MI, cardiac arrest or death during non high risk surgery. She may proceed with surgical intervention.   She will continue all her medications as is unless changed by the surgical team.

## 2023-11-20 NOTE — PROGRESS NOTES
Assessment/Plan:    Preop examination  Patient came today for preop exam for upcoming D&C with hysteroscopy. Denies any exertional symptoms, she can perform more than 4 METS of exertion with no chest pain, palpitation or shortness of breath. Her EKG today in the office was normal.  Recent blood work revealed normal electrolytes and baseline kidney function. Her vital signs today are very acceptable. According to revised cardiac risk index calculator she has a low risk of MI, cardiac arrest or death during non high risk surgery. She may proceed with surgical intervention. She will continue all her medications as is unless changed by the surgical team.       Diagnoses and all orders for this visit:    Pre-op chest exam  -     POCT ECG    Hypercalcemia  -     Basic metabolic panel; Future    Preop examination          Subjective:      Patient ID: Haile Mckee is a 72 y.o. female. Patient came today for preop clearance for her upcoming D&C with hysteroscopy. The following portions of the patient's history were reviewed and updated as appropriate: allergies, current medications, past family history, past medical history, past social history, past surgical history, and problem list.    Review of Systems   Constitutional:  Negative for activity change, appetite change, chills and fever. HENT:  Negative for congestion, ear pain, rhinorrhea and sore throat. Respiratory:  Negative for cough, shortness of breath and wheezing. Cardiovascular:  Negative for chest pain, palpitations and leg swelling. Gastrointestinal:  Negative for abdominal distention, abdominal pain, diarrhea, nausea and vomiting. Genitourinary:  Negative for difficulty urinating, frequency and pelvic pain. Musculoskeletal:  Negative for arthralgias, back pain and neck pain. Skin:  Negative for rash. Neurological:  Negative for dizziness, tremors, weakness, light-headedness, numbness and headaches.          Objective:      BP 120/80 (BP Location: Left arm, Patient Position: Sitting, Cuff Size: Standard)   Pulse 78   Temp 97.8 °F (36.6 °C) (Temporal)   Resp 14   Ht 5' 2" (1.575 m)   Wt 68.5 kg (151 lb)   LMP  (LMP Unknown)   SpO2 99%   BMI 27.62 kg/m²     Allergies   Allergen Reactions    Bystolic [Nebivolol Hcl] Bradycardia     Hear rate to 20's    Levofloxacin Hives    Vasotec [Enalapril] Hives    Amlodipine Rash    Ancef [Cefazolin] GI Intolerance     Cdiff    Cephalosporins Other (See Comments)     c.diff    Fruit Extracts      Annotation - [id number]: peaches and strawberries    Morphine GI Intolerance     Extreme vomiting      Other      FRUIT    Penicillins Hives    Tetracycline Edema    Xarelto [Rivaroxaban] Rash          Current Outpatient Medications:     albuterol (ProAir HFA) 90 mcg/act inhaler, Inhale 2 puffs every 6 (six) hours as needed for wheezing or shortness of breath, Disp: 8.5 g, Rfl: 0    atorvastatin (LIPITOR) 20 mg tablet, TAKE 1 TABLET BY MOUTH EVERY DAY, Disp: 90 tablet, Rfl: 3    cetirizine (ZyrTEC) 10 mg tablet, Take 10 mg by mouth as needed , Disp: , Rfl:     diltiazem (CARDIZEM CD) 120 mg 24 hr capsule, Take 1 capsule (120 mg total) by mouth daily (Patient taking differently: Take 120 mg by mouth daily at bedtime), Disp: , Rfl:     escitalopram (LEXAPRO) 10 mg tablet, TAKE 1 TABLET BY MOUTH EVERY DAY, Disp: 30 tablet, Rfl: 11    losartan (COZAAR) 25 mg tablet, Take 0.5 tablets (12.5 mg total) by mouth daily 1/2 tab daily (Patient taking differently: Take 12.5 mg by mouth daily at bedtime 1/2 tab daily), Disp: 36 tablet, Rfl: 8    spironolactone (ALDACTONE) 25 mg tablet, TAKE 1 TABLET BY MOUTH EVERY DAY (Patient taking differently: 12.5 mg daily), Disp: 90 tablet, Rfl: 3     There are no Patient Instructions on file for this visit. Physical Exam  Vitals (Orthostatic vital signs are positive, checked by me.) reviewed. Constitutional:       General: She is not in acute distress.      Appearance: Normal appearance. She is not ill-appearing. HENT:      Nose: No rhinorrhea. Cardiovascular:      Rate and Rhythm: Normal rate and regular rhythm. Heart sounds: Murmur heard. No friction rub. No gallop. Pulmonary:      Effort: No respiratory distress. Breath sounds: No wheezing or rhonchi. Chest:      Chest wall: No tenderness. Abdominal:      General: There is no distension. Palpations: There is no mass. Tenderness: There is no abdominal tenderness. There is no guarding or rebound. Hernia: No hernia is present. Musculoskeletal:         General: No swelling or tenderness. Lymphadenopathy:      Cervical: No cervical adenopathy. Skin:     Coloration: Skin is not jaundiced. Findings: No rash. Neurological:      Mental Status: She is alert and oriented to person, place, and time. Motor: No weakness.       Gait: Gait normal.   Psychiatric:         Mood and Affect: Mood normal.         Behavior: Behavior normal.

## 2023-11-21 ENCOUNTER — ANESTHESIA EVENT (OUTPATIENT)
Dept: PERIOP | Facility: HOSPITAL | Age: 73
End: 2023-11-21
Payer: MEDICARE

## 2023-11-22 ENCOUNTER — HOSPITAL ENCOUNTER (OUTPATIENT)
Facility: HOSPITAL | Age: 73
Setting detail: OUTPATIENT SURGERY
Discharge: HOME | End: 2023-11-22
Attending: OBSTETRICS & GYNECOLOGY | Admitting: OBSTETRICS & GYNECOLOGY
Payer: MEDICARE

## 2023-11-22 ENCOUNTER — ANESTHESIA (OUTPATIENT)
Dept: PERIOP | Facility: HOSPITAL | Age: 73
End: 2023-11-22
Payer: MEDICARE

## 2023-11-22 VITALS
DIASTOLIC BLOOD PRESSURE: 62 MMHG | BODY MASS INDEX: 27.83 KG/M2 | HEART RATE: 88 BPM | TEMPERATURE: 97.6 F | WEIGHT: 151.24 LBS | OXYGEN SATURATION: 93 % | SYSTOLIC BLOOD PRESSURE: 125 MMHG | HEIGHT: 62 IN | RESPIRATION RATE: 17 BRPM

## 2023-11-22 DIAGNOSIS — N95.0 PMB (POSTMENOPAUSAL BLEEDING): ICD-10-CM

## 2023-11-22 PROBLEM — Z98.890 S/P DILATION AND CURETTAGE: Status: ACTIVE | Noted: 2023-11-22

## 2023-11-22 LAB
BASOPHILS # BLD AUTO: 0.07 THOUSANDS/ÂΜL (ref 0–0.1)
BASOPHILS NFR BLD AUTO: 1 % (ref 0–1)
EOSINOPHIL # BLD AUTO: 0.13 THOUSAND/ÂΜL (ref 0–0.61)
EOSINOPHIL NFR BLD AUTO: 1 % (ref 0–6)
ERYTHROCYTE [DISTWIDTH] IN BLOOD BY AUTOMATED COUNT: 13 % (ref 11.6–15.1)
HCT VFR BLD AUTO: 39.1 % (ref 34.8–46.1)
HGB BLD-MCNC: 12.7 G/DL (ref 11.5–15.4)
IMM GRANULOCYTES # BLD AUTO: 0.03 THOUSAND/UL (ref 0–0.2)
IMM GRANULOCYTES NFR BLD AUTO: 0 % (ref 0–2)
LYMPHOCYTES # BLD AUTO: 2.95 THOUSANDS/ÂΜL (ref 0.6–4.47)
LYMPHOCYTES NFR BLD AUTO: 31 % (ref 14–44)
MCH RBC QN AUTO: 33.2 PG (ref 26.8–34.3)
MCHC RBC AUTO-ENTMCNC: 32.5 G/DL (ref 31.4–37.4)
MCV RBC AUTO: 102 FL (ref 82–98)
MONOCYTES # BLD AUTO: 0.73 THOUSAND/ÂΜL (ref 0.17–1.22)
MONOCYTES NFR BLD AUTO: 8 % (ref 4–12)
NEUTROPHILS # BLD AUTO: 5.67 THOUSANDS/ÂΜL (ref 1.85–7.62)
NEUTS SEG NFR BLD AUTO: 59 % (ref 43–75)
NRBC BLD AUTO-RTO: 0 /100 WBCS
PLATELET # BLD AUTO: 260 THOUSANDS/UL (ref 149–390)
PMV BLD AUTO: 9.9 FL (ref 8.9–12.7)
RBC # BLD AUTO: 3.83 MILLION/UL (ref 3.81–5.12)
WBC # BLD AUTO: 9.58 THOUSAND/UL (ref 4.31–10.16)

## 2023-11-22 PROCEDURE — 85025 COMPLETE CBC W/AUTO DIFF WBC: CPT | Performed by: OBSTETRICS & GYNECOLOGY

## 2023-11-22 PROCEDURE — 58558 HYSTEROSCOPY BIOPSY: CPT | Performed by: OBSTETRICS & GYNECOLOGY

## 2023-11-22 PROCEDURE — 88305 TISSUE EXAM BY PATHOLOGIST: CPT | Performed by: STUDENT IN AN ORGANIZED HEALTH CARE EDUCATION/TRAINING PROGRAM

## 2023-11-22 RX ORDER — SODIUM CHLORIDE, SODIUM LACTATE, POTASSIUM CHLORIDE, CALCIUM CHLORIDE 600; 310; 30; 20 MG/100ML; MG/100ML; MG/100ML; MG/100ML
125 INJECTION, SOLUTION INTRAVENOUS CONTINUOUS
Status: DISCONTINUED | OUTPATIENT
Start: 2023-11-22 | End: 2023-11-22 | Stop reason: HOSPADM

## 2023-11-22 RX ORDER — FENTANYL CITRATE/PF 50 MCG/ML
25 SYRINGE (ML) INJECTION
Status: DISCONTINUED | OUTPATIENT
Start: 2023-11-22 | End: 2023-11-22 | Stop reason: HOSPADM

## 2023-11-22 RX ORDER — PROPOFOL 10 MG/ML
INJECTION, EMULSION INTRAVENOUS AS NEEDED
Status: DISCONTINUED | OUTPATIENT
Start: 2023-11-22 | End: 2023-11-22

## 2023-11-22 RX ORDER — LIDOCAINE HYDROCHLORIDE 20 MG/ML
INJECTION, SOLUTION EPIDURAL; INFILTRATION; INTRACAUDAL; PERINEURAL AS NEEDED
Status: DISCONTINUED | OUTPATIENT
Start: 2023-11-22 | End: 2023-11-22

## 2023-11-22 RX ORDER — ACETAMINOPHEN 325 MG/1
975 TABLET ORAL EVERY 6 HOURS PRN
Status: DISCONTINUED | OUTPATIENT
Start: 2023-11-22 | End: 2023-11-22 | Stop reason: HOSPADM

## 2023-11-22 RX ORDER — FENTANYL CITRATE 50 UG/ML
INJECTION, SOLUTION INTRAMUSCULAR; INTRAVENOUS AS NEEDED
Status: DISCONTINUED | OUTPATIENT
Start: 2023-11-22 | End: 2023-11-22

## 2023-11-22 RX ORDER — ONDANSETRON 2 MG/ML
4 INJECTION INTRAMUSCULAR; INTRAVENOUS ONCE AS NEEDED
Status: DISCONTINUED | OUTPATIENT
Start: 2023-11-22 | End: 2023-11-22 | Stop reason: HOSPADM

## 2023-11-22 RX ORDER — DEXAMETHASONE SODIUM PHOSPHATE 10 MG/ML
INJECTION, SOLUTION INTRAMUSCULAR; INTRAVENOUS AS NEEDED
Status: DISCONTINUED | OUTPATIENT
Start: 2023-11-22 | End: 2023-11-22

## 2023-11-22 RX ORDER — ONDANSETRON 2 MG/ML
INJECTION INTRAMUSCULAR; INTRAVENOUS AS NEEDED
Status: DISCONTINUED | OUTPATIENT
Start: 2023-11-22 | End: 2023-11-22

## 2023-11-22 RX ORDER — SODIUM CHLORIDE 9 MG/ML
INJECTION, SOLUTION INTRAVENOUS AS NEEDED
Status: DISCONTINUED | OUTPATIENT
Start: 2023-11-22 | End: 2023-11-22 | Stop reason: HOSPADM

## 2023-11-22 RX ORDER — SODIUM CHLORIDE 9 MG/ML
125 INJECTION, SOLUTION INTRAVENOUS CONTINUOUS
Status: DISCONTINUED | OUTPATIENT
Start: 2023-11-22 | End: 2023-11-22 | Stop reason: HOSPADM

## 2023-11-22 RX ORDER — MIDAZOLAM HYDROCHLORIDE 2 MG/2ML
INJECTION, SOLUTION INTRAMUSCULAR; INTRAVENOUS AS NEEDED
Status: DISCONTINUED | OUTPATIENT
Start: 2023-11-22 | End: 2023-11-22

## 2023-11-22 RX ADMIN — FENTANYL CITRATE 50 MCG: 50 INJECTION INTRAMUSCULAR; INTRAVENOUS at 15:59

## 2023-11-22 RX ADMIN — FENTANYL CITRATE 50 MCG: 50 INJECTION INTRAMUSCULAR; INTRAVENOUS at 15:51

## 2023-11-22 RX ADMIN — ONDANSETRON 4 MG: 2 INJECTION INTRAMUSCULAR; INTRAVENOUS at 16:14

## 2023-11-22 RX ADMIN — MIDAZOLAM 2 MG: 1 INJECTION INTRAMUSCULAR; INTRAVENOUS at 15:50

## 2023-11-22 RX ADMIN — PROPOFOL 150 MG: 10 INJECTION, EMULSION INTRAVENOUS at 15:51

## 2023-11-22 RX ADMIN — SODIUM CHLORIDE: 0.9 INJECTION, SOLUTION INTRAVENOUS at 16:15

## 2023-11-22 RX ADMIN — LIDOCAINE HYDROCHLORIDE 100 MG: 20 INJECTION, SOLUTION EPIDURAL; INFILTRATION; INTRACAUDAL at 15:51

## 2023-11-22 RX ADMIN — SODIUM CHLORIDE 125 ML/HR: 0.9 INJECTION, SOLUTION INTRAVENOUS at 13:47

## 2023-11-22 RX ADMIN — DEXAMETHASONE SODIUM PHOSPHATE 5 MG: 10 INJECTION INTRAMUSCULAR; INTRAVENOUS at 15:54

## 2023-11-22 NOTE — OP NOTE
OPERATIVE REPORT  PATIENT NAME: Gianni Dash    :  1950  MRN: [id number]  Pt Location: [facility] OR ROOM 04    SURGERY DATE: 2023    Surgeon(s) and Role:     * Corin Anderson MD - Primary     * Mendoza Jean-Baptiste MD - Assisting    Preop Diagnosis:  PMB (postmenopausal bleeding) [N95.0]    Post-Op Diagnosis Codes:     * PMB (postmenopausal bleeding) [N95.0]    Procedure(s):  (D&C) W/ HYSTEROSCOPY    Specimen(s):  ID Type Source Tests Collected by Time Destination   1 : Endometrial curettings Tissue Endometrium TISSUE EXAM Corin Anderson MD 2023 1611        Estimated Blood Loss:   Minimal    Drains:  None    Anesthesia Type:   General    Operative Indications:  PMB (postmenopausal bleeding) [N95.0]    Operative Findings:   1. External genitalia grossly normal in appearance. No ulcerations, no lacerations, no lesions. Bimanual exam revealed retroverted uterus with normal contours and freely mobile. No adnexal masses palpated bilaterally. 2.  Vagina and cervix were grossly normal in appearance without any lacerations or lesions. 3. Mild rectocele noted. 3. Uterus sounded to 6 cm.  4. Hysteroscopic examination revealed atrophic endometrial lining. No polyps or fibroids noted. Bilateral ostia were visualized. Complications:   None    Procedure and Technique: In the operating room the correct patient and procedures were identified. General anesthesia with LMA was administered and the patient was positioned on the OR table in the dorsal lithotomy position. All pressure points were padded and a srinivas hugger was placed to maintain control of core body temperature. A bimanual exam was performed and the uterus was noted to be retroverted, normal in size and consistency with no palpable adnexal masses or fullness. The vagina and perineum were prepped with chlorhexidine and she was draped in the usual sterile fashion. Surgical timeout was performed with all in agreement.     A straight catheter was introduced into the bladder, which was drained of 50 mL of clear yellow urine. The anterior lip of the cervix was visualized and grasped with a single tooth tenaculum. The uterus was sounded to 6 cm. The cervix was serially dilated to I accommodate introduction of the hysteroscope. Hysteroscope was introduced under direct visualization using normal saline solution as the distention media. Hysteroscope was advanced to the uterine fundus and the entire uterine cavity was inspected in a systematic manner. Findings were noted as above. Bilateral tubal ostia were visualized. Hysteroscope was withdrawn and sharp curetting was performed, starting at the 12 o'clock position and rotating a total of 360 degrees to cover all surfaces. Scant endometrial tissue was obtained and sent for pathology. The single toothed tenaculum was removed from the anterior lip of the cervix. Good hemostasis was confirmed at the tenaculum puncture sites. All instruments were removed from the vagina. Fluid deficit at the end of the procedure was 200 mL. At the conclusion of the procedure, all needle, sponge, and instrument counts were noted to be correct x2. Dr. Omari Parikh was present and participated in all key portions of the case.      Patient Disposition:  PACU     SIGNATURE: Holly New MD  DATE: November 22, 2023  TIME: 4:16 PM

## 2023-11-22 NOTE — ANESTHESIA PREPROCEDURE EVALUATION
Procedure:  (D&C) W/ HYSTEROSCOPY (Uterus)    Relevant Problems   ANESTHESIA (within normal limits)      CARDIO   (+) Aortic valve insufficiency   (+) Essential hypertension   (+) Non-rheumatic aortic stenosis   (+) Nonrheumatic mitral valve regurgitation   (+) Paroxysmal atrial fibrillation (HCC)   (+) SVT (supraventricular tachycardia)      GI/HEPATIC (within normal limits)      /RENAL   (+) Hypertensive kidney disease with stage 3 chronic kidney disease (HCC)   (+) Stage 3 chronic kidney disease (HCC)      MUSCULOSKELETAL   (+) Lumbar degenerative disc disease      NEURO/PSYCH   (+) Depression with anxiety   (+) Depression, recurrent (HCC)      PULMONARY   (+) Pulmonary emphysema (HCC)        Physical Exam    Airway    Mallampati score: I  TM Distance: >3 FB  Neck ROM: full     Dental       Cardiovascular  Cardiovascular exam normal    Pulmonary  Pulmonary exam normal     Other Findings  post-pubertal.      Anesthesia Plan  ASA Score- 3     Anesthesia Type- general with ASA Monitors. Additional Monitors:     Airway Plan: LMA. Plan Factors-    Chart reviewed. Patient summary reviewed. Induction- intravenous. Postoperative Plan-     Informed Consent- Anesthetic plan and risks discussed with patient.

## 2023-11-22 NOTE — ANESTHESIA POSTPROCEDURE EVALUATION
Post-Op Assessment Note    CV Status:  Stable    Pain management: adequate       Mental Status:  Alert and awake   Hydration Status:  Euvolemic   PONV Controlled:  Controlled   Airway Patency:  Patent     Post Op Vitals Reviewed: Yes    No anethesia notable event occurred.     Staff: Anesthesiologist               /56 (11/22/23 1708)    Temp (!) 97.4 °F (36.3 °C) (11/22/23 1708)    Pulse 81 (11/22/23 1708)   Resp 16 (11/22/23 1708)    SpO2 93 % (11/22/23 1708)

## 2023-11-30 PROCEDURE — 88305 TISSUE EXAM BY PATHOLOGIST: CPT | Performed by: STUDENT IN AN ORGANIZED HEALTH CARE EDUCATION/TRAINING PROGRAM

## 2023-12-05 ENCOUNTER — OFFICE VISIT (OUTPATIENT)
Dept: OBGYN CLINIC | Facility: MEDICAL CENTER | Age: 73
End: 2023-12-05

## 2023-12-05 VITALS
SYSTOLIC BLOOD PRESSURE: 132 MMHG | DIASTOLIC BLOOD PRESSURE: 82 MMHG | BODY MASS INDEX: 27.79 KG/M2 | WEIGHT: 151 LBS | HEIGHT: 62 IN

## 2023-12-05 DIAGNOSIS — Z98.890 S/P DILATION AND CURETTAGE: Primary | ICD-10-CM

## 2023-12-05 PROCEDURE — 99024 POSTOP FOLLOW-UP VISIT: CPT | Performed by: OBSTETRICS & GYNECOLOGY

## 2023-12-05 NOTE — PROGRESS NOTES
Subjective     Avery Flores is a 72 y.o. female who presents to the clinic 10 days  status post  hysteroscopy D&C   for  PMB . Eating a regular diet without difficulty. Bowel movements are normal. The patient is not having any pain. Scant pink tinged spotting when wipes      The following portions of the patient's history were reviewed and updated as appropriate: allergies, current medications, past family history, past medical history, past social history, past surgical history, and problem list.    Review of Systems  Pertinent items are noted in HPI. Objective     /82   Ht 5' 2" (1.575 m)   Wt 68.5 kg (151 lb)   LMP  (LMP Unknown)   BMI 27.62 kg/m²   General:  alert and oriented, in no acute distress   Abdomen: soft, bowel sounds active, non-tender         Assessment      Doing well postoperatively. Operative findings again reviewed. Pathology report discussed. Plan     1. Continue any current medications. 2. Wound care discussed. 3. Activity restrictions: none  4. Anticipated return to work: not applicable.   5. Follow up: 1  year  for annual   We discussed scant atrophuc endometrium on pathology / aware if recurrent PMB might need further intervention
2 = A lot of assistance

## 2023-12-06 ENCOUNTER — REMOTE DEVICE CLINIC VISIT (OUTPATIENT)
Dept: CARDIOLOGY CLINIC | Facility: CLINIC | Age: 73
End: 2023-12-06
Payer: MEDICARE

## 2023-12-06 DIAGNOSIS — Z95.818 PRESENCE OF OTHER CARDIAC IMPLANTS AND GRAFTS: Primary | ICD-10-CM

## 2023-12-06 PROCEDURE — G2066 INTER DEVC REMOTE 30D: HCPCS | Performed by: INTERNAL MEDICINE

## 2023-12-06 PROCEDURE — 93298 REM INTERROG DEV EVAL SCRMS: CPT | Performed by: INTERNAL MEDICINE

## 2023-12-06 NOTE — PROGRESS NOTES
MDT LNQ22/ ACTIVE SYSTEM IS MRI CONDITIONAL   CARELINK TRANSMISSION:LOOP RECORDER. PRESENTING RHYTHM NSR @ 66 BPM. BATTERY STATUS "OK." NO PATIENT OR DEVICE ACTIVATED EPISODES. NORMAL DEVICE FUNCTION.  DL

## 2023-12-21 DIAGNOSIS — I50.32 CHRONIC HEART FAILURE WITH PRESERVED EJECTION FRACTION (HFPEF) (HCC): ICD-10-CM

## 2023-12-21 RX ORDER — DILTIAZEM HYDROCHLORIDE 120 MG/1
120 CAPSULE, EXTENDED RELEASE ORAL DAILY
Qty: 90 CAPSULE | Refills: 3 | Status: SHIPPED | OUTPATIENT
Start: 2023-12-21

## 2024-01-22 ENCOUNTER — OFFICE VISIT (OUTPATIENT)
Dept: CARDIOLOGY CLINIC | Facility: CLINIC | Age: 74
End: 2024-01-22
Payer: MEDICARE

## 2024-01-22 VITALS
HEART RATE: 68 BPM | WEIGHT: 143 LBS | BODY MASS INDEX: 26.31 KG/M2 | DIASTOLIC BLOOD PRESSURE: 58 MMHG | HEIGHT: 62 IN | SYSTOLIC BLOOD PRESSURE: 102 MMHG

## 2024-01-22 DIAGNOSIS — I48.0 PAROXYSMAL ATRIAL FIBRILLATION (HCC): ICD-10-CM

## 2024-01-22 DIAGNOSIS — N18.31 STAGE 3A CHRONIC KIDNEY DISEASE (HCC): ICD-10-CM

## 2024-01-22 DIAGNOSIS — I35.0 NON-RHEUMATIC AORTIC STENOSIS: ICD-10-CM

## 2024-01-22 DIAGNOSIS — I34.2 NONRHEUMATIC MITRAL VALVE STENOSIS: Primary | ICD-10-CM

## 2024-01-22 DIAGNOSIS — I50.32 CHRONIC DIASTOLIC CHF (CONGESTIVE HEART FAILURE) (HCC): ICD-10-CM

## 2024-01-22 PROCEDURE — 99214 OFFICE O/P EST MOD 30 MIN: CPT | Performed by: INTERNAL MEDICINE

## 2024-01-22 NOTE — PROGRESS NOTES
Cardiology Follow Up    Almaz Hall  1950  [id number]  [facility] [place]  [address]  [address]  [place] [id number] [phone]    1. Nonrheumatic mitral valve stenosis  Echo complete w/ contrast if indicated      2. Chronic diastolic CHF (congestive heart failure) (HCC)  Echo complete w/ contrast if indicated      3. Paroxysmal atrial fibrillation (HCC)  Echo complete w/ contrast if indicated      4. Stage 3a chronic kidney disease (HCC)        5. Non-rheumatic aortic stenosis  Echo complete w/ contrast if indicated          Discussion/Summary:    Mixed valve disease. Moderate aortic stenosis and moderate to severe mitral stenosis. Repeat echocardiogram in 6 months prior to visit with me. Volume status currently stable. She is just on spironolactone.    Diastolic congestive heart failure, just needing spironolactone. Blood pressure controlled with Cardizem and losartan.    CKD. 3A. GFR just below 60. She follows with Dr. Lujan yearly.    Paroxysmal atrial fibrillation. A-fib ablation previously also with SVT. Device interrogations without any significant arrhythmias. Continue Cardizem.    Low blood pressure: I advised her to check this more regularly at home. She is asymptomatic this may be an outlier.        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  She has a history of SVT.  She has undergone ablation in 2018.  Previously, she was on metoprolol, but said she had a very low heart rate with this.  Because she also has COPD, she was changed to diltiazem.  She has been tolerating this well.    Shortly after her SVT ablation, she was on Xarelto as well as amlodipine.  She had a rash to 1 of them, not sure which 1.  She has not been on anything else for hypertension previously.    In 2022, she developed afib. Saw EP, underwent ablation for this. Had some dCHF with valve disease and arrhythmia.    She has mixed  valve disease - moderate AS, mild to moderate AI, moderate MS, mild to moderate MR.    Interval History:    No significant changes from a cardiac standpoint.  She underwent a D&C in November which was uncomplicated from a cardiac standpoint. She is losing weight, but says she has no appetite. Lots of stress at home because her daughter is undergoing a divorce.    No chest pain, shortness of breath. Blood pressure is low, but she denies any lightheadedness. She is not taking this regularly at home.    No PND, orthopnea, edema. Regularly active and cold weather, but denies any symptoms or limitations.    Device interrogations over loop recorder have been unremarkable. No arrhythmias identified    Problem List       Depression with anxiety    Dyslipidemia    Essential hypertension    Irritable bowel syndrome with diarrhea    Lumbar degenerative disc disease    Vitamin D deficiency    Greater trochanteric bursitis of right hip    Pulmonary emphysema (HCC)    SVT (supraventricular tachycardia) (HCC)    Medicare annual wellness visit, subsequent    Diastolic dysfunction    Mitral valve disorder    Aortic valve insufficiency    Achilles tendinitis of left lower extremity    Posterior tibial tendinitis, left    Obesity (BMI 30-39.9)    Stage 3 chronic kidney disease    Lab Results   Component Value Date    EGFR 55 11/16/2023    EGFR 58 09/28/2023    EGFR 58 09/13/2023    CREATININE 1.02 11/16/2023    CREATININE 0.97 09/28/2023    CREATININE 0.98 09/13/2023         Hypertensive kidney disease with stage 3 chronic kidney disease    Lab Results   Component Value Date    EGFR 55 11/16/2023    EGFR 58 09/28/2023    EGFR 58 09/13/2023    CREATININE 1.02 11/16/2023    CREATININE 0.97 09/28/2023    CREATININE 0.98 09/13/2023               Past Medical History:   Diagnosis Date   • Asthma    • Atrial fibrillation (HCC)     with ablation   • Bursitis of hip     Last Assessed:6/5/2017   • COPD (chronic obstructive pulmonary disease)  (HCC) 5 years ago   • Coronary artery disease    • Herniated lumbar intervertebral disc    • Hyperlipidemia    • Hypertension 2 years   • Pituitary adenoma (HCC)     Last Assessed:2015 ; Previously on bromocriptine   • Spinal stenosis    • SVT (supraventricular tachycardia)     with ablation   • Systolic murmur of aorta      Social History     Tobacco Use   • Smoking status: Former     Current packs/day: 0.00     Average packs/day: 1 pack/day for 30.0 years (30.0 ttl pk-yrs)     Types: Cigarettes     Start date: 1988     Quit date: 2018     Years since quittin.1   • Smokeless tobacco: Never   Vaping Use   • Vaping status: Never Used   Substance Use Topics   • Alcohol use: Yes     Alcohol/week: 1.0 standard drink of alcohol     Types: 1 Shots of liquor per week     Comment: social   • Drug use: No      Family History   Problem Relation Age of Onset   • Colon cancer Mother    • Coronary artery disease Mother    • Colonic polyp Mother    • Liver cancer Mother    • Multiple sclerosis Father    • Hypertension Brother    • Hypertension Other    • Breast cancer Neg Hx    • Cancer Neg Hx      Past Surgical History:   Procedure Laterality Date   • BREAST SURGERY      Incisional breast biopsy   • CARDIAC ELECTROPHYSIOLOGY PROCEDURE N/A 2022    Procedure: Cardiac eps/afib ablation;  Surgeon: Fadi Goss MD;  Location: [facility];  Service: Cardiology   • CARDIAC ELECTROPHYSIOLOGY PROCEDURE N/A 2022    Procedure: Cardiac loop recorder implant;  Surgeon: Bao Kovacs MD;  Location: [facility];  Service: Cardiology   • CARDIAC ELECTROPHYSIOLOGY STUDY AND ABLATION     • CARDIAC LOOP RECORDER     • CARPAL TUNNEL RELEASE Left    • CHOLECYSTECTOMY     • OTHER SURGICAL HISTORY      EXCISION OF PAROTID TUMOR/GLAND   • PAROTIDECTOMY     • TN COLONOSCOPY FLX DX W/COLLJ SPEC WHEN PFRMD N/A 2017    Procedure: COLONOSCOPY;  Surgeon: Elizabeth Leyva DO;  Location: [facility]  "LAB;  Service: Gastroenterology   • IL HYSTEROSCOPY BX ENDOMETRIUM&/POLYPC W/WO D&C N/A 11/22/2023    Procedure: (D&C) W/ HYSTEROSCOPY;  Surgeon: Harini Kinney MD;  Location: [facility] OR;  Service: Gynecology   • ROTATOR CUFF REPAIR     • SALPINGOOPHORECTOMY Right        Current Outpatient Medications:   •  albuterol (ProAir HFA) 90 mcg/act inhaler, Inhale 2 puffs every 6 (six) hours as needed for wheezing or shortness of breath, Disp: 8.5 g, Rfl: 0  •  atorvastatin (LIPITOR) 20 mg tablet, TAKE 1 TABLET BY MOUTH EVERY DAY, Disp: 90 tablet, Rfl: 3  •  cetirizine (ZyrTEC) 10 mg tablet, Take 10 mg by mouth as needed , Disp: , Rfl:   •  diltiazem (TIAZAC) 120 MG 24 hr capsule, TAKE 1 CAPSULE BY MOUTH EVERY DAY, Disp: 90 capsule, Rfl: 3  •  escitalopram (LEXAPRO) 10 mg tablet, TAKE 1 TABLET BY MOUTH EVERY DAY, Disp: 30 tablet, Rfl: 11  •  losartan (COZAAR) 25 mg tablet, Take 0.5 tablets (12.5 mg total) by mouth daily 1/2 tab daily (Patient taking differently: Take 12.5 mg by mouth daily at bedtime 1/2 tab daily), Disp: 36 tablet, Rfl: 8  •  spironolactone (ALDACTONE) 25 mg tablet, TAKE 1 TABLET BY MOUTH EVERY DAY (Patient taking differently: 12.5 mg daily), Disp: 90 tablet, Rfl: 3  Allergies   Allergen Reactions   • Bystolic [Nebivolol Hcl] Bradycardia     Hear rate to 20's   • Levofloxacin Hives   • Vasotec [Enalapril] Hives   • Amlodipine Rash   • Ancef [Cefazolin] GI Intolerance     Cdiff   • Cephalosporins Other (See Comments)     c.diff   • Fruit Extracts      Annotation - [id number]: peaches and strawberries   • Morphine GI Intolerance     Extreme vomiting     • Other      FRUIT   • Penicillins Hives   • Tetracycline Edema   • Xarelto [Rivaroxaban] Rash       Vitals:    01/22/24 0840   BP: 102/58   Pulse: 68   Weight: 64.9 kg (143 lb)   Height: 5' 2\" (1.575 m)     Vitals:    01/22/24 0840   Weight: 64.9 kg (143 lb)      Height: 5' 2\" (157.5 cm)   Body mass index is 26.16 kg/m².    Physical Exam:  GEN: Almaz " Rafael appears well, alert and oriented x 3, pleasant and cooperative   HEENT: pupils equal, round, and reactive to light; extraocular muscles intact  NECK: supple, no carotid bruits   HEART: regular. 2/6 LAKESHA  LUNGS: rhonchorous, no wheezing. No rales.  ABDOMEN: normal bowel sounds, soft, no tenderness, no distention  EXTREMITIES: thin, no edema  NEURO: no focal findings   SKIN: normal without suspicious lesions on exposed skin    ROS:  Cough, SOB, wheezing  Except as noted in HPI, is otherwise reviewed in detail and a 12 point review of systems is negative.  ROS reviewed and is unchanged    Labs:  Lab Results   Component Value Date     05/04/2015    K 4.0 11/16/2023     11/16/2023    CREATININE 1.02 11/16/2023    BUN 16 11/16/2023    CO2 23 11/16/2023    ALT 24 07/11/2023    AST 17 07/11/2023    INR 1.65 (H) 09/02/2022    GLUF 98 09/28/2023    HGBA1C 5.6 03/23/2022    WBC 9.58 11/22/2023    HGB 12.7 11/22/2023    HCT 39.1 11/22/2023     11/22/2023       Lab Results   Component Value Date    CHOL 187 05/04/2015    CHOL 199 08/20/2014     Lab Results   Component Value Date    LDLCALC 84 03/15/2023    LDLCALC 85 09/15/2022    LDLCALC 103 (H) 03/23/2022     Lab Results   Component Value Date    HDL 77 03/15/2023    HDL 36 (L) 09/15/2022    HDL 62 03/23/2022     Lab Results   Component Value Date    TRIG 162 (H) 03/15/2023    TRIG 173 (H) 09/15/2022    TRIG 256 (H) 03/23/2022       Testing:  Echo 5/11/23:  Left Ventricle: Left ventricular cavity size is normal. Wall thickness is mildly increased. There is concentric remodeling. Systolic function is normal. Wall motion is normal. Diastolic function is moderately abnormal, consistent with grade II (pseudonormal) relaxation.  •  Right Ventricle: Right ventricular cavity size is normal. Systolic function is normal.  •  Left Atrium: The atrium is mildly dilated (35-41 mL/m2).  •  Aortic Valve: The aortic valve is trileaflet. The leaflets are moderately  thickened. The leaflets are moderately calcified. There is moderately reduced mobility. There is mild to moderate regurgitation. There is moderate stenosis. The aortic valve mean gradient is 20 mmHg. The dimensionless velocity index is 0.44. The aortic valve area is 1.23 cm2. The aortic valve velocity is increased due to stenosis.  •  Mitral Valve: The leaflets are not thickened. There is moderate focal calcification of the anterior leaflet and posterior leaflet limited to the leaflet margin. There is mild annular calcification. There is moderate subvalvular calcification. There is mild to moderate regurgitation with a centrally directed jet. There is moderate to severe stenosis (MVA 1.4 cm2/MG 8 mmHg).  •  Tricuspid Valve: There is mild regurgitation. The right ventricular systolic pressure is mildly elevated. The estimated right ventricular systolic pressure is 48.00 mmHg.    Stress 5/11/23:  Stress ECG: No ST deviation is noted. The ECG was not diagnostic due to pharmacological (vasodilator) stress. The stress ECG is equivocal for ischemia after pharmacologic vasodilation.  •  Perfusion: There are no perfusion defects.  •  Stress Function: Left ventricular function post-stress is normal. Post-stress ejection fraction is 88 %.  •  Stress Combined Conclusion: Left ventricular perfusion is normal.      AVERY: 9/2022  Done pre ablation. AS probably mild-moderate.  MV not fully evaluated, visually some restriction movement and calcification.  EF preserved.    TTE 7/2022:  Left Ventricle: Left ventricular cavity size is normal. Wall thickness is mildly increased. The left ventricular ejection fraction is 60%. Systolic function is normal. Wall motion is normal.  •  Right Ventricle: Right ventricular cavity size is mildly dilated.  •  Left Atrium: The atrium is moderately dilated.  •  Aortic Valve: The aortic valve is trileaflet. The leaflets are not thickened. The leaflets are moderately calcified. There is mildly reduced  mobility. There is mild regurgitation. There is mild to moderate stenosis. The aortic valve velocity is increased due to stenosis.  •  Mitral Valve: There is moderate calcification. There is moderately reduced mobility. There is mild annular calcification. There is mild regurgitation. There is moderate stenosis.  •  Tricuspid Valve: There is mild to moderate regurgitation.      Echo 5/13/21:  LEFT VENTRICLE:  Systolic function was vigorous. Ejection fraction was estimated to be 65 %.  There were no regional wall motion abnormalities.  Wall thickness was at the upper limits of normal.     MITRAL VALVE:  There was mild annular calcification.  There was mild to moderate regurgitation.     AORTIC VALVE:  The valve was trileaflet. Leaflets exhibited moderately increased thickness, moderate calcification, and mild to moderately reduced cuspal separation.  There was mild to moderate stenosis.  There was mild to moderate regurgitation.  The peak valve velocity was 306 cm/s.  Valve mean gradient was 21.6 mmHg.     TRICUSPID VALVE:  There was mild regurgitation.  Pulmonary artery systolic pressure was mildly increased.  Estimated peak PA pressure was 39 mmHg.      Echo 1/2020:  LEFT VENTRICLE:  Systolic function was normal by visual assessment. Ejection fraction was estimated to be 65 %.  There were no regional wall motion abnormalities.  Doppler parameters were consistent with abnormal left ventricular relaxation (grade 1 diastolic dysfunction).     LEFT ATRIUM:  The atrium was mildly dilated.     MITRAL VALVE:  There was mild regurgitation.     AORTIC VALVE:  The valve was trileaflet. Leaflets exhibited mild calcification, reduced mobility, and sclerosis.  There was mild to moderate stenosis.  There was moderate regurgitation.  Valve mean gradient was 20.7 mmHg.      Sinus rhythm PAC. 74 BPM. PRWP.

## 2024-02-10 ENCOUNTER — OFFICE VISIT (OUTPATIENT)
Dept: URGENT CARE | Age: 74
End: 2024-02-10
Payer: MEDICARE

## 2024-02-10 ENCOUNTER — APPOINTMENT (OUTPATIENT)
Dept: URGENT CARE | Age: 74
End: 2024-02-10
Payer: MEDICARE

## 2024-02-10 VITALS
OXYGEN SATURATION: 96 % | HEART RATE: 85 BPM | HEIGHT: 62 IN | DIASTOLIC BLOOD PRESSURE: 68 MMHG | SYSTOLIC BLOOD PRESSURE: 130 MMHG | TEMPERATURE: 98.8 F | WEIGHT: 147 LBS | BODY MASS INDEX: 27.05 KG/M2 | RESPIRATION RATE: 18 BRPM

## 2024-02-10 DIAGNOSIS — B96.89 BACTERIAL URI: Primary | ICD-10-CM

## 2024-02-10 DIAGNOSIS — R06.2 WHEEZING: ICD-10-CM

## 2024-02-10 DIAGNOSIS — J06.9 BACTERIAL URI: Primary | ICD-10-CM

## 2024-02-10 PROCEDURE — 99213 OFFICE O/P EST LOW 20 MIN: CPT | Performed by: NURSE PRACTITIONER

## 2024-02-10 PROCEDURE — G0463 HOSPITAL OUTPT CLINIC VISIT: HCPCS | Performed by: NURSE PRACTITIONER

## 2024-02-10 RX ORDER — LEVALBUTEROL TARTRATE 45 UG/1
1-2 AEROSOL, METERED ORAL EVERY 4 HOURS PRN
Qty: 15 G | Refills: 0 | Status: SHIPPED | OUTPATIENT
Start: 2024-02-10

## 2024-02-10 RX ORDER — CLINDAMYCIN HYDROCHLORIDE 300 MG/1
300 CAPSULE ORAL 4 TIMES DAILY
Qty: 28 CAPSULE | Refills: 0 | Status: SHIPPED | OUTPATIENT
Start: 2024-02-10 | End: 2024-02-17

## 2024-02-10 NOTE — PROGRESS NOTES
[facility]        NAME: Almaz Hall is a 73 y.o. female  : 1950    MRN: [id number]  DATE: February 10, 2024  TIME: 10:34 AM    Assessment and Plan   Bacterial URI [J06.9, B96.89]  1. Bacterial URI  clindamycin (CLEOCIN) 300 MG capsule    levalbuterol (XOPENEX HFA) 45 mcg/act inhaler      2. Wheezing  levalbuterol (XOPENEX HFA) 45 mcg/act inhaler        Will start course of cleocin   Albuterol rescue inhaler for wheezing   Continue otc medications for symptom management   Pt in agreement with plan of care    Patient Instructions     Follow up with PCP in 3-5 days.  Proceed to  ER if symptoms worsen.    Chief Complaint     Chief Complaint   Patient presents with    Cough     Cough, wheezing, chest/nasal congestion x 2 weeks         History of Present Illness   Almaz Hall presents to the clinic c/o    Cough (Cough, wheezing, chest/nasal congestion x 2 weeks)  Pt states  had something similar first - was treated with amoxil for a sinusitis and is feeling better  She is taking delsym, flonase, and using inhaler which has been helping.   She has multiple antibiotic allergies.        Review of Systems   Review of Systems   All other systems reviewed and are negative.        Current Medications     Long-Term Medications   Medication Sig Dispense Refill    atorvastatin (LIPITOR) 20 mg tablet TAKE 1 TABLET BY MOUTH EVERY DAY 90 tablet 3    diltiazem (TIAZAC) 120 MG 24 hr capsule TAKE 1 CAPSULE BY MOUTH EVERY DAY 90 capsule 3    escitalopram (LEXAPRO) 10 mg tablet TAKE 1 TABLET BY MOUTH EVERY DAY 30 tablet 11    losartan (COZAAR) 25 mg tablet Take 0.5 tablets (12.5 mg total) by mouth daily 1/2 tab daily (Patient taking differently: Take 12.5 mg by mouth daily at bedtime 1/2 tab daily) 36 tablet 8    spironolactone (ALDACTONE) 25 mg tablet TAKE 1 TABLET BY MOUTH EVERY DAY (Patient taking differently: 12.5 mg daily) 90 tablet 3       Current Allergies     Allergies as of 02/10/2024 - Reviewed  "02/10/2024   Allergen Reaction Noted    Bystolic [nebivolol hcl] Bradycardia 11/16/2023    Levofloxacin Hives 01/04/2018    Vasotec [enalapril] Hives 11/17/2022    Amlodipine Rash 01/08/2019    Ancef [cefazolin] GI Intolerance 08/27/2014    Cephalosporins Other (See Comments)     Fruit extracts  08/26/2013    Morphine GI Intolerance     Other  06/02/2017    Penicillins Hives 06/02/2017    Tetracycline Edema 06/02/2017    Xarelto [rivaroxaban] Rash 01/08/2019            The following portions of the patient's history were reviewed and updated as appropriate: allergies, current medications, past family history, past medical history, past social history, past surgical history and problem list.    Objective   /68   Pulse 85   Temp 98.8 °F (37.1 °C)   Resp 18   Ht 5' 2\" (1.575 m)   Wt 66.7 kg (147 lb)   LMP  (LMP Unknown)   SpO2 96%   BMI 26.89 kg/m²        Physical Exam     Physical Exam  Vitals and nursing note reviewed.   Constitutional:       Appearance: Normal appearance. She is well-developed.   HENT:      Head: Normocephalic and atraumatic.      Right Ear: Hearing, tympanic membrane, ear canal and external ear normal.      Left Ear: Hearing, tympanic membrane, ear canal and external ear normal.      Nose: Mucosal edema and congestion present.      Right Sinus: Maxillary sinus tenderness and frontal sinus tenderness present.      Left Sinus: Maxillary sinus tenderness and frontal sinus tenderness present.      Mouth/Throat:      Lips: Pink.      Mouth: Mucous membranes are moist.      Pharynx: Oropharynx is clear.   Eyes:      General: Lids are normal.      Conjunctiva/sclera: Conjunctivae normal.      Pupils: Pupils are equal, round, and reactive to light.   Cardiovascular:      Rate and Rhythm: Normal rate and regular rhythm.      Heart sounds: Normal heart sounds, S1 normal and S2 normal.   Pulmonary:      Effort: Pulmonary effort is normal.      Breath sounds: Wheezing present. No decreased breath " sounds, rhonchi or rales.   Abdominal:      General: Abdomen is flat. Bowel sounds are normal.      Palpations: Abdomen is soft.   Musculoskeletal:         General: Normal range of motion.      Cervical back: Full passive range of motion without pain, normal range of motion and neck supple.   Skin:     General: Skin is warm and dry.   Neurological:      General: No focal deficit present.      Mental Status: She is alert and oriented to person, place, and time.   Psychiatric:         Mood and Affect: Mood normal.         Speech: Speech normal.         Behavior: Behavior normal. Behavior is cooperative.         Thought Content: Thought content normal.         Judgment: Judgment normal.

## 2024-02-21 PROBLEM — Z00.00 MEDICARE ANNUAL WELLNESS VISIT, SUBSEQUENT: Status: RESOLVED | Noted: 2019-01-08 | Resolved: 2024-02-21

## 2024-02-25 DIAGNOSIS — I50.32 CHRONIC HEART FAILURE WITH PRESERVED EJECTION FRACTION (HFPEF) (HCC): ICD-10-CM

## 2024-02-25 DIAGNOSIS — E78.5 DYSLIPIDEMIA: ICD-10-CM

## 2024-02-26 RX ORDER — LOSARTAN POTASSIUM 25 MG/1
TABLET ORAL
Qty: 45 TABLET | Refills: 3 | Status: SHIPPED | OUTPATIENT
Start: 2024-02-26

## 2024-03-06 ENCOUNTER — REMOTE DEVICE CLINIC VISIT (OUTPATIENT)
Dept: CARDIOLOGY CLINIC | Facility: CLINIC | Age: 74
End: 2024-03-06

## 2024-03-06 DIAGNOSIS — Z95.818 PRESENCE OF OTHER CARDIAC IMPLANTS AND GRAFTS: Primary | ICD-10-CM

## 2024-03-06 NOTE — PROGRESS NOTES
"MDT LNQ22/ ACTIVE SYSTEM IS MRI CONDITIONAL   CARELINK TRANSMISSION: LOOP RECORDER. PRESENTING RHYTHM NSR W/ PAC @ 66 BPM. BATTERY STATUS \"OK.\" NO PATIENT OR DEVICE ACTIVATED EPISODES. NORMAL DEVICE FUNCTION. DL   "

## 2024-03-13 ENCOUNTER — APPOINTMENT (OUTPATIENT)
Dept: LAB | Facility: IMAGING CENTER | Age: 74
End: 2024-03-13
Payer: MEDICARE

## 2024-03-13 DIAGNOSIS — N18.32 STAGE 3B CHRONIC KIDNEY DISEASE (HCC): ICD-10-CM

## 2024-03-13 DIAGNOSIS — Z13.1 SCREENING FOR DIABETES MELLITUS: ICD-10-CM

## 2024-03-13 DIAGNOSIS — E78.2 MIXED HYPERLIPIDEMIA: ICD-10-CM

## 2024-03-13 DIAGNOSIS — E55.9 VITAMIN D DEFICIENCY: ICD-10-CM

## 2024-03-13 DIAGNOSIS — R79.9 ABNORMAL FINDING OF BLOOD CHEMISTRY, UNSPECIFIED: ICD-10-CM

## 2024-03-13 DIAGNOSIS — D64.9 ANEMIA, UNSPECIFIED TYPE: ICD-10-CM

## 2024-03-13 DIAGNOSIS — I48.0 PAROXYSMAL ATRIAL FIBRILLATION (HCC): ICD-10-CM

## 2024-03-13 LAB
25(OH)D3 SERPL-MCNC: 31.7 NG/ML (ref 30–100)
ALBUMIN SERPL BCP-MCNC: 4.4 G/DL (ref 3.5–5)
ALP SERPL-CCNC: 66 U/L (ref 34–104)
ALT SERPL W P-5'-P-CCNC: 11 U/L (ref 7–52)
ANION GAP SERPL CALCULATED.3IONS-SCNC: 7 MMOL/L (ref 4–13)
AST SERPL W P-5'-P-CCNC: 15 U/L (ref 13–39)
BACTERIA UR QL AUTO: ABNORMAL /HPF
BASOPHILS # BLD AUTO: 0.08 THOUSANDS/ÂΜL (ref 0–0.1)
BASOPHILS NFR BLD AUTO: 1 % (ref 0–1)
BILIRUB SERPL-MCNC: 0.68 MG/DL (ref 0.2–1)
BILIRUB UR QL STRIP: NEGATIVE
BUN SERPL-MCNC: 23 MG/DL (ref 5–25)
CALCIUM SERPL-MCNC: 10 MG/DL (ref 8.4–10.2)
CHLORIDE SERPL-SCNC: 109 MMOL/L (ref 96–108)
CHOLEST SERPL-MCNC: 193 MG/DL
CLARITY UR: CLEAR
CO2 SERPL-SCNC: 23 MMOL/L (ref 21–32)
COLOR UR: YELLOW
CREAT SERPL-MCNC: 1.14 MG/DL (ref 0.6–1.3)
EOSINOPHIL # BLD AUTO: 0.11 THOUSAND/ÂΜL (ref 0–0.61)
EOSINOPHIL NFR BLD AUTO: 1 % (ref 0–6)
ERYTHROCYTE [DISTWIDTH] IN BLOOD BY AUTOMATED COUNT: 12.7 % (ref 11.6–15.1)
EST. AVERAGE GLUCOSE BLD GHB EST-MCNC: 123 MG/DL
GFR SERPL CREATININE-BSD FRML MDRD: 48 ML/MIN/1.73SQ M
GLUCOSE P FAST SERPL-MCNC: 73 MG/DL (ref 65–99)
GLUCOSE UR STRIP-MCNC: NEGATIVE MG/DL
HBA1C MFR BLD: 5.9 %
HCT VFR BLD AUTO: 42.7 % (ref 34.8–46.1)
HDLC SERPL-MCNC: 53 MG/DL
HGB BLD-MCNC: 12.8 G/DL (ref 11.5–15.4)
HGB UR QL STRIP.AUTO: NEGATIVE
HYALINE CASTS #/AREA URNS LPF: ABNORMAL /LPF
IMM GRANULOCYTES # BLD AUTO: 0.03 THOUSAND/UL (ref 0–0.2)
IMM GRANULOCYTES NFR BLD AUTO: 0 % (ref 0–2)
KETONES UR STRIP-MCNC: NEGATIVE MG/DL
LDLC SERPL CALC-MCNC: 100 MG/DL (ref 0–100)
LEUKOCYTE ESTERASE UR QL STRIP: ABNORMAL
LYMPHOCYTES # BLD AUTO: 2.13 THOUSANDS/ÂΜL (ref 0.6–4.47)
LYMPHOCYTES NFR BLD AUTO: 27 % (ref 14–44)
MCH RBC QN AUTO: 30.8 PG (ref 26.8–34.3)
MCHC RBC AUTO-ENTMCNC: 30 G/DL (ref 31.4–37.4)
MCV RBC AUTO: 103 FL (ref 82–98)
MONOCYTES # BLD AUTO: 0.64 THOUSAND/ÂΜL (ref 0.17–1.22)
MONOCYTES NFR BLD AUTO: 8 % (ref 4–12)
MUCOUS THREADS UR QL AUTO: ABNORMAL
NEUTROPHILS # BLD AUTO: 4.97 THOUSANDS/ÂΜL (ref 1.85–7.62)
NEUTS SEG NFR BLD AUTO: 63 % (ref 43–75)
NITRITE UR QL STRIP: NEGATIVE
NON-SQ EPI CELLS URNS QL MICRO: ABNORMAL /HPF
NONHDLC SERPL-MCNC: 140 MG/DL
NRBC BLD AUTO-RTO: 0 /100 WBCS
PH UR STRIP.AUTO: 6 [PH]
PLATELET # BLD AUTO: 260 THOUSANDS/UL (ref 149–390)
PMV BLD AUTO: 10.9 FL (ref 8.9–12.7)
POTASSIUM SERPL-SCNC: 3.7 MMOL/L (ref 3.5–5.3)
PROT SERPL-MCNC: 6.9 G/DL (ref 6.4–8.4)
PROT UR STRIP-MCNC: ABNORMAL MG/DL
RBC # BLD AUTO: 4.16 MILLION/UL (ref 3.81–5.12)
RBC #/AREA URNS AUTO: ABNORMAL /HPF
SODIUM SERPL-SCNC: 139 MMOL/L (ref 135–147)
SP GR UR STRIP.AUTO: 1.02 (ref 1–1.03)
TRIGL SERPL-MCNC: 199 MG/DL
TSH SERPL DL<=0.05 MIU/L-ACNC: 2.52 UIU/ML (ref 0.45–4.5)
UROBILINOGEN UR STRIP-ACNC: <2 MG/DL
WBC # BLD AUTO: 7.96 THOUSAND/UL (ref 4.31–10.16)
WBC #/AREA URNS AUTO: ABNORMAL /HPF

## 2024-03-13 PROCEDURE — 36415 COLL VENOUS BLD VENIPUNCTURE: CPT

## 2024-03-13 PROCEDURE — 84443 ASSAY THYROID STIM HORMONE: CPT

## 2024-03-13 PROCEDURE — 80053 COMPREHEN METABOLIC PANEL: CPT

## 2024-03-13 PROCEDURE — 83036 HEMOGLOBIN GLYCOSYLATED A1C: CPT

## 2024-03-13 PROCEDURE — 80061 LIPID PANEL: CPT

## 2024-03-13 PROCEDURE — 85025 COMPLETE CBC W/AUTO DIFF WBC: CPT

## 2024-03-13 PROCEDURE — 82306 VITAMIN D 25 HYDROXY: CPT

## 2024-03-14 ENCOUNTER — RA CDI HCC (OUTPATIENT)
Dept: OTHER | Facility: HOSPITAL | Age: 74
End: 2024-03-14

## 2024-03-19 ENCOUNTER — OFFICE VISIT (OUTPATIENT)
Dept: FAMILY MEDICINE CLINIC | Facility: CLINIC | Age: 74
End: 2024-03-19
Payer: MEDICARE

## 2024-03-19 ENCOUNTER — APPOINTMENT (OUTPATIENT)
Dept: LAB | Facility: MEDICAL CENTER | Age: 74
End: 2024-03-19
Payer: MEDICARE

## 2024-03-19 VITALS
HEART RATE: 69 BPM | DIASTOLIC BLOOD PRESSURE: 70 MMHG | SYSTOLIC BLOOD PRESSURE: 116 MMHG | WEIGHT: 137.8 LBS | HEIGHT: 63 IN | BODY MASS INDEX: 24.41 KG/M2

## 2024-03-19 DIAGNOSIS — F33.9 DEPRESSION, RECURRENT (HCC): ICD-10-CM

## 2024-03-19 DIAGNOSIS — R10.11 RUQ PAIN: ICD-10-CM

## 2024-03-19 DIAGNOSIS — J43.9 PULMONARY EMPHYSEMA, UNSPECIFIED EMPHYSEMA TYPE (HCC): ICD-10-CM

## 2024-03-19 DIAGNOSIS — R19.7 DIARRHEA, UNSPECIFIED TYPE: ICD-10-CM

## 2024-03-19 DIAGNOSIS — R19.7 DIARRHEA, UNSPECIFIED TYPE: Primary | ICD-10-CM

## 2024-03-19 LAB
BACTERIA UR QL AUTO: ABNORMAL /HPF
BILIRUB UR QL STRIP: NEGATIVE
CLARITY UR: CLEAR
COLOR UR: YELLOW
GLUCOSE UR STRIP-MCNC: NEGATIVE MG/DL
HGB UR QL STRIP.AUTO: NEGATIVE
HYALINE CASTS #/AREA URNS LPF: ABNORMAL /LPF
KETONES UR STRIP-MCNC: ABNORMAL MG/DL
LEUKOCYTE ESTERASE UR QL STRIP: NEGATIVE
NITRITE UR QL STRIP: NEGATIVE
NON-SQ EPI CELLS URNS QL MICRO: ABNORMAL /HPF
PH UR STRIP.AUTO: 5.5 [PH]
PROT UR STRIP-MCNC: ABNORMAL MG/DL
RBC #/AREA URNS AUTO: ABNORMAL /HPF
SP GR UR STRIP.AUTO: 1.02 (ref 1–1.03)
UROBILINOGEN UR STRIP-ACNC: 2 MG/DL
WBC #/AREA URNS AUTO: ABNORMAL /HPF

## 2024-03-19 PROCEDURE — 82784 ASSAY IGA/IGD/IGG/IGM EACH: CPT

## 2024-03-19 PROCEDURE — 81001 URINALYSIS AUTO W/SCOPE: CPT

## 2024-03-19 PROCEDURE — 86364 TISS TRNSGLTMNASE EA IG CLAS: CPT

## 2024-03-19 PROCEDURE — 86231 EMA EACH IG CLASS: CPT

## 2024-03-19 PROCEDURE — G2211 COMPLEX E/M VISIT ADD ON: HCPCS | Performed by: INTERNAL MEDICINE

## 2024-03-19 PROCEDURE — 36415 COLL VENOUS BLD VENIPUNCTURE: CPT

## 2024-03-19 PROCEDURE — 99214 OFFICE O/P EST MOD 30 MIN: CPT | Performed by: INTERNAL MEDICINE

## 2024-03-19 PROCEDURE — 86258 DGP ANTIBODY EACH IG CLASS: CPT

## 2024-03-19 NOTE — PROGRESS NOTES
Assessment/Plan:    Diarrhea  Patient reports chronic diarrhea for 3 months associated with right upper quadrant abdominal pain.  She had blood work few days ago that showed normal CBC, liver enzymes and electrolytes.  Will order stool sample for comprehensive examination.  In the meantime she will continue with Imodium, she will start oral electrolyte solution.  Further management after results of her test.    RUQ pain  She had cholecystectomy more than 10 years ago, on physical exam Prince sign is positive.  Will do right upper quadrant abdominal ultrasound.       Diagnoses and all orders for this visit:    Diarrhea, unspecified type  -     Stool Enteric Bacterial Panel by PCR; Future  -     Calprotectin,Fecal; Future  -     Clostridium difficile toxin by PCR with EIA; Future  -     Pancreatic elastase, fecal; Future  -     Giardia antigen; Future  -     UA (URINE) with reflex to Scope; Future  -     Celiac Disease Antibody Profile; Future  -     Ova and parasite examination; Future    Pulmonary emphysema, unspecified emphysema type (HCC)    Depression, recurrent (HCC)    RUQ pain  -     US right upper quadrant; Future          Subjective:      Patient ID: Almaz Hall is a 73 y.o. female.    Patient came today with complains of 3 months of diarrhea associated with right upper quadrant abdominal discomfort.    Diarrhea   Associated symptoms include abdominal pain. Pertinent negatives include no chills or vomiting.       The following portions of the patient's history were reviewed and updated as appropriate: allergies, current medications, past family history, past medical history, past social history, past surgical history, and problem list.    Review of Systems   Constitutional:  Positive for unexpected weight change. Negative for chills and fatigue.   Gastrointestinal:  Positive for abdominal pain and diarrhea. Negative for abdominal distention, anal bleeding, blood in stool, constipation, nausea, rectal pain  "and vomiting.         Objective:      /70 (BP Location: Left arm, Patient Position: Sitting, Cuff Size: Standard)   Pulse 69   Ht 5' 2.75\" (1.594 m)   Wt 62.5 kg (137 lb 12.8 oz)   LMP  (LMP Unknown)   BMI 24.61 kg/m²     Allergies   Allergen Reactions    Bystolic [Nebivolol Hcl] Bradycardia     Hear rate to 20's    Levofloxacin Hives    Vasotec [Enalapril] Hives    Amlodipine Rash    Ancef [Cefazolin] GI Intolerance     Cdiff    Cephalosporins Other (See Comments)     c.diff    Fruit Extracts      Annotation - [id number]: peaches and strawberries    Morphine GI Intolerance     Extreme vomiting      Other      FRUIT    Penicillins Hives    Tetracycline Edema    Xarelto [Rivaroxaban] Rash          Current Outpatient Medications:     atorvastatin (LIPITOR) 20 mg tablet, TAKE 1 TABLET BY MOUTH EVERY DAY, Disp: 90 tablet, Rfl: 3    cetirizine (ZyrTEC) 10 mg tablet, Take 10 mg by mouth as needed , Disp: , Rfl:     diltiazem (TIAZAC) 120 MG 24 hr capsule, TAKE 1 CAPSULE BY MOUTH EVERY DAY, Disp: 90 capsule, Rfl: 3    escitalopram (LEXAPRO) 10 mg tablet, TAKE 1 TABLET BY MOUTH EVERY DAY, Disp: 30 tablet, Rfl: 11    levalbuterol (XOPENEX HFA) 45 mcg/act inhaler, Inhale 1-2 puffs every 4 (four) hours as needed for wheezing, Disp: 15 g, Rfl: 0    losartan (COZAAR) 25 mg tablet, TALE 1/2 TABLET BY MOUTH DAILY, Disp: 45 tablet, Rfl: 3    spironolactone (ALDACTONE) 25 mg tablet, TAKE 1 TABLET BY MOUTH EVERY DAY (Patient taking differently: 12.5 mg daily), Disp: 90 tablet, Rfl: 3    albuterol (ProAir HFA) 90 mcg/act inhaler, Inhale 2 puffs every 6 (six) hours as needed for wheezing or shortness of breath, Disp: 8.5 g, Rfl: 0     There are no Patient Instructions on file for this visit.        Physical Exam  Constitutional:       General: She is not in acute distress.     Appearance: She is not ill-appearing or toxic-appearing.   Abdominal:      General: There is no distension.      Palpations: There is no mass.      " Tenderness: There is abdominal tenderness. There is no guarding or rebound.      Hernia: No hernia is present.      Comments: Positive Prince sign.

## 2024-03-19 NOTE — ASSESSMENT & PLAN NOTE
Patient reports chronic diarrhea for 3 months associated with right upper quadrant abdominal pain.  She had blood work few days ago that showed normal CBC, liver enzymes and electrolytes.  Will order stool sample for comprehensive examination.  In the meantime she will continue with Imodium, she will start oral electrolyte solution.  Further management after results of her test.

## 2024-03-19 NOTE — ASSESSMENT & PLAN NOTE
She had cholecystectomy more than 10 years ago, on physical exam Prince sign is positive.  Will do right upper quadrant abdominal ultrasound.

## 2024-03-20 ENCOUNTER — APPOINTMENT (OUTPATIENT)
Dept: LAB | Facility: IMAGING CENTER | Age: 74
End: 2024-03-20
Payer: MEDICARE

## 2024-03-20 DIAGNOSIS — E83.52 HYPERCALCEMIA: ICD-10-CM

## 2024-03-20 DIAGNOSIS — N18.31 STAGE 3A CHRONIC KIDNEY DISEASE (HCC): ICD-10-CM

## 2024-03-20 DIAGNOSIS — R19.7 DIARRHEA, UNSPECIFIED TYPE: ICD-10-CM

## 2024-03-20 DIAGNOSIS — N18.32 HYPERTENSIVE KIDNEY DISEASE WITH STAGE 3B CHRONIC KIDNEY DISEASE (HCC): ICD-10-CM

## 2024-03-20 DIAGNOSIS — E55.9 VITAMIN D DEFICIENCY: ICD-10-CM

## 2024-03-20 DIAGNOSIS — I12.9 HYPERTENSIVE KIDNEY DISEASE WITH STAGE 3B CHRONIC KIDNEY DISEASE (HCC): ICD-10-CM

## 2024-03-20 LAB
ENDOMYSIUM IGA SER QL: NEGATIVE
GLIADIN PEPTIDE IGA SER-ACNC: 2 UNITS (ref 0–19)
GLIADIN PEPTIDE IGG SER-ACNC: 1 UNITS (ref 0–19)
IGA SERPL-MCNC: 101 MG/DL (ref 64–422)
TTG IGA SER-ACNC: <2 U/ML (ref 0–3)
TTG IGG SER-ACNC: <2 U/ML (ref 0–5)

## 2024-03-20 PROCEDURE — 87505 NFCT AGENT DETECTION GI: CPT

## 2024-03-20 PROCEDURE — 87209 SMEAR COMPLEX STAIN: CPT

## 2024-03-20 PROCEDURE — 87177 OVA AND PARASITES SMEARS: CPT

## 2024-03-20 PROCEDURE — 83993 ASSAY FOR CALPROTECTIN FECAL: CPT

## 2024-03-20 PROCEDURE — 82653 EL-1 FECAL QUANTITATIVE: CPT

## 2024-03-21 LAB
C COLI+JEJUNI TUF STL QL NAA+PROBE: NEGATIVE
C DIFF TOX GENS STL QL NAA+PROBE: NEGATIVE
EC STX1+STX2 GENES STL QL NAA+PROBE: NEGATIVE
G LAMBLIA AG STL QL IA: NEGATIVE
SALMONELLA SP SPAO STL QL NAA+PROBE: NEGATIVE
SHIGELLA SP+EIEC IPAH STL QL NAA+PROBE: NEGATIVE

## 2024-03-24 LAB — ELASTASE PANC STL-MCNT: 206 UG ELAST./G

## 2024-03-25 LAB — CALPROTECTIN STL-MCNT: 444 UG/G (ref 0–120)

## 2024-03-27 ENCOUNTER — HOSPITAL ENCOUNTER (OUTPATIENT)
Dept: RADIOLOGY | Facility: IMAGING CENTER | Age: 74
Discharge: HOME | End: 2024-03-27
Payer: MEDICARE

## 2024-03-27 DIAGNOSIS — R10.11 RUQ PAIN: ICD-10-CM

## 2024-03-27 PROCEDURE — 76705 ECHO EXAM OF ABDOMEN: CPT

## 2024-03-28 ENCOUNTER — TELEPHONE (OUTPATIENT)
Age: 74
End: 2024-03-28

## 2024-03-28 NOTE — TELEPHONE ENCOUNTER
Patient requesting lab results done 3/20/24 and US done 3/27/2024.    Patient stated she is not feeling any better and her symptoms has worsen.    Patient has been scheduled to be seen for 3/29/2024.      Please review and advice  Thank you

## 2024-03-29 ENCOUNTER — PREP FOR PROCEDURE (OUTPATIENT)
Dept: GASTROENTEROLOGY | Facility: MEDICAL CENTER | Age: 74
End: 2024-03-29

## 2024-03-29 ENCOUNTER — PATIENT MESSAGE (OUTPATIENT)
Dept: FAMILY MEDICINE CLINIC | Facility: CLINIC | Age: 74
End: 2024-03-29

## 2024-03-29 ENCOUNTER — TELEPHONE (OUTPATIENT)
Dept: GASTROENTEROLOGY | Facility: MEDICAL CENTER | Age: 74
End: 2024-03-29

## 2024-03-29 ENCOUNTER — APPOINTMENT (OUTPATIENT)
Dept: LAB | Facility: MEDICAL CENTER | Age: 74
DRG: 982 | End: 2024-03-29
Payer: MEDICARE

## 2024-03-29 ENCOUNTER — OFFICE VISIT (OUTPATIENT)
Dept: FAMILY MEDICINE CLINIC | Facility: CLINIC | Age: 74
End: 2024-03-29
Payer: MEDICARE

## 2024-03-29 ENCOUNTER — OFFICE VISIT (OUTPATIENT)
Dept: GASTROENTEROLOGY | Facility: MEDICAL CENTER | Age: 74
End: 2024-03-29
Payer: MEDICARE

## 2024-03-29 VITALS
BODY MASS INDEX: 24.1 KG/M2 | DIASTOLIC BLOOD PRESSURE: 50 MMHG | SYSTOLIC BLOOD PRESSURE: 98 MMHG | TEMPERATURE: 97 F | HEIGHT: 63 IN | WEIGHT: 136 LBS

## 2024-03-29 VITALS
HEIGHT: 63 IN | WEIGHT: 135.4 LBS | TEMPERATURE: 98.4 F | DIASTOLIC BLOOD PRESSURE: 66 MMHG | HEART RATE: 63 BPM | BODY MASS INDEX: 23.99 KG/M2 | SYSTOLIC BLOOD PRESSURE: 102 MMHG

## 2024-03-29 DIAGNOSIS — L97.529 ULCER OF LEFT FOOT, UNSPECIFIED ULCER STAGE (HCC): ICD-10-CM

## 2024-03-29 DIAGNOSIS — R63.4 WEIGHT LOSS, ABNORMAL: Primary | ICD-10-CM

## 2024-03-29 DIAGNOSIS — R19.5 ELEVATED FECAL CALPROTECTIN: ICD-10-CM

## 2024-03-29 DIAGNOSIS — R19.7 DIARRHEA, UNSPECIFIED TYPE: Primary | ICD-10-CM

## 2024-03-29 DIAGNOSIS — R19.7 DIARRHEA, UNSPECIFIED TYPE: ICD-10-CM

## 2024-03-29 LAB
ALBUMIN SERPL BCP-MCNC: 4.3 G/DL (ref 3.5–5)
ALP SERPL-CCNC: 80 U/L (ref 34–104)
ALT SERPL W P-5'-P-CCNC: 14 U/L (ref 7–52)
ANION GAP SERPL CALCULATED.3IONS-SCNC: 13 MMOL/L (ref 4–13)
AST SERPL W P-5'-P-CCNC: 17 U/L (ref 13–39)
BASOPHILS # BLD AUTO: 0.06 THOUSANDS/ÂΜL (ref 0–0.1)
BASOPHILS NFR BLD AUTO: 0 % (ref 0–1)
BILIRUB SERPL-MCNC: 0.41 MG/DL (ref 0.2–1)
BUN SERPL-MCNC: 60 MG/DL (ref 5–25)
CALCIUM SERPL-MCNC: 9.9 MG/DL (ref 8.4–10.2)
CHLORIDE SERPL-SCNC: 104 MMOL/L (ref 96–108)
CO2 SERPL-SCNC: 20 MMOL/L (ref 21–32)
CREAT SERPL-MCNC: 1.89 MG/DL (ref 0.6–1.3)
EOSINOPHIL # BLD AUTO: 0.04 THOUSAND/ÂΜL (ref 0–0.61)
EOSINOPHIL NFR BLD AUTO: 0 % (ref 0–6)
ERYTHROCYTE [DISTWIDTH] IN BLOOD BY AUTOMATED COUNT: 12.7 % (ref 11.6–15.1)
GFR SERPL CREATININE-BSD FRML MDRD: 26 ML/MIN/1.73SQ M
GLUCOSE P FAST SERPL-MCNC: 93 MG/DL (ref 65–99)
HCT VFR BLD AUTO: 41.1 % (ref 34.8–46.1)
HGB BLD-MCNC: 12.9 G/DL (ref 11.5–15.4)
IMM GRANULOCYTES # BLD AUTO: 0.07 THOUSAND/UL (ref 0–0.2)
IMM GRANULOCYTES NFR BLD AUTO: 1 % (ref 0–2)
LYMPHOCYTES # BLD AUTO: 2.49 THOUSANDS/ÂΜL (ref 0.6–4.47)
LYMPHOCYTES NFR BLD AUTO: 18 % (ref 14–44)
MAGNESIUM SERPL-MCNC: 1.7 MG/DL (ref 1.9–2.7)
MCH RBC QN AUTO: 30.1 PG (ref 26.8–34.3)
MCHC RBC AUTO-ENTMCNC: 31.4 G/DL (ref 31.4–37.4)
MCV RBC AUTO: 96 FL (ref 82–98)
MONOCYTES # BLD AUTO: 0.85 THOUSAND/ÂΜL (ref 0.17–1.22)
MONOCYTES NFR BLD AUTO: 6 % (ref 4–12)
NEUTROPHILS # BLD AUTO: 10.2 THOUSANDS/ÂΜL (ref 1.85–7.62)
NEUTS SEG NFR BLD AUTO: 75 % (ref 43–75)
NRBC BLD AUTO-RTO: 0 /100 WBCS
PLATELET # BLD AUTO: 409 THOUSANDS/UL (ref 149–390)
PMV BLD AUTO: 10.3 FL (ref 8.9–12.7)
POTASSIUM SERPL-SCNC: 3 MMOL/L (ref 3.5–5.3)
PROT SERPL-MCNC: 7.3 G/DL (ref 6.4–8.4)
RBC # BLD AUTO: 4.29 MILLION/UL (ref 3.81–5.12)
SODIUM SERPL-SCNC: 137 MMOL/L (ref 135–147)
WBC # BLD AUTO: 13.71 THOUSAND/UL (ref 4.31–10.16)

## 2024-03-29 PROCEDURE — 99214 OFFICE O/P EST MOD 30 MIN: CPT | Performed by: INTERNAL MEDICINE

## 2024-03-29 PROCEDURE — 99214 OFFICE O/P EST MOD 30 MIN: CPT | Performed by: NURSE PRACTITIONER

## 2024-03-29 PROCEDURE — 80053 COMPREHEN METABOLIC PANEL: CPT

## 2024-03-29 PROCEDURE — 85025 COMPLETE CBC W/AUTO DIFF WBC: CPT

## 2024-03-29 PROCEDURE — 83735 ASSAY OF MAGNESIUM: CPT

## 2024-03-29 PROCEDURE — 36415 COLL VENOUS BLD VENIPUNCTURE: CPT

## 2024-03-29 PROCEDURE — G2211 COMPLEX E/M VISIT ADD ON: HCPCS | Performed by: INTERNAL MEDICINE

## 2024-03-29 RX ORDER — DIPHENOXYLATE HYDROCHLORIDE AND ATROPINE SULFATE 2.5; .025 MG/1; MG/1
1 TABLET ORAL 4 TIMES DAILY PRN
Qty: 60 TABLET | Refills: 0 | Status: SHIPPED | OUTPATIENT
Start: 2024-03-29

## 2024-03-29 RX ORDER — POLYETHYLENE GLYCOL 3350 17 G/17G
238 POWDER, FOR SOLUTION ORAL ONCE
Qty: 238 G | Refills: 0 | Status: SHIPPED | OUTPATIENT
Start: 2024-03-29 | End: 2024-03-29

## 2024-03-29 NOTE — ASSESSMENT & PLAN NOTE
Patient with continuation of her diarrhea, she has 10-15 episodes a day, noticed some mucus in it.  Denies any blood.  Her stool studies were positive for elevated calprotectin.  Will refer to GI for further workup.  Report on right upper quadrant ultrasound is still pending.  Will order some blood work to follow-up on her electrolytes and kidney function due to persistent diarrhea.  Imodium was somewhat effective but not completely resolving the problem, will try to use Lomotil sparingly.

## 2024-03-29 NOTE — ASSESSMENT & PLAN NOTE
Referred to podiatry, cannot exclude developing cellulitis in the area.  She is allergic to multiple antibiotics and was able to tolerate clindamycin in the past but due to current uncontrolled diarrhea I will hold off on oral antibiotics as for now.  She will follow-up with us next week, continue with topical mupirocin.

## 2024-03-29 NOTE — PROGRESS NOTES
[facility] - Outpatient Consultation  Almaz Hall 73 y.o. female MRN: [id number]  Encounter: [id number]          ASSESSMENT AND PLAN:    Almaz Hall is a 73 y.o. female who presents with complaint of diarrhea and weight loss.    1. Diarrhea, unspecified type  2. Elevated fecal calprotectin  3. FH of colon cancer- mother age 70's  4. Weight loss    Longstanding history of loose stools approximately 1-2 times per day but in December 2023 loose stools significantly worsened.  She has BMs 10-15 times per day that are [place] stool scale 6-7.  Denies any melena or hematochezia.  She has lost 25 pounds in this period of time.  No new medications, travel.  She did take antibiotics 1/24.  Stool studies for infection and C. difficile were negative.  Stool elastase was negative.  Celiac panel was negative.  TSH was normal.  CBC and CMP normal recent fecal calprotectin was 444.  Last colonoscopy was 2017 and she had 1 small AVM in the sigmoid colon. Mother had Colon Cancer age 70's.  No abdominal pain.  Will rule out IBD, colon polyps and neoplasm.    -CT abdomen pelvis  -EGD and colonoscopy with MiraLAX/Dulcolax prep  -Follow-up in office after testing      I reviewed with patient/family potential risks of endoscopic evaluation including possible infection, bleeding or perforation.  Patient/family verbalized understanding of potential risks and agreed to procedure(s).  Electronic consent form was obtained today.         ______________________________________________________________________    HPI:    Almaz Hall is a 73 y.o. female who presents with complaint of diarrhea and weight loss. She has past medical history of HTN, SVT, aortic valve insufficiency, aortic stenosis, mitral valve regurgitation, paroxysmal atrial fibrillation, moderate pulmonary hypertension, chronic distal CHF, pulmonary emphysema, IBS with diarrhea, stage III chronic kidney disease.    Reports she has had a longstanding  history of loose stools approximately 1-2 times per day but in December 2023 loose stools significantly worsened.  She has BMs 10-15 times per day that are [place] stool scale 6-7.  Denies any melena or hematochezia.  She has lost 25 pounds in this period of time.  No new medications, travel.  She did take antibiotics 1/24.  Stool studies for infection and C. difficile were negative.  Stool elastase was negative.  Celiac panel was negative.  TSH was normal.  Recent fecal calprotectin was 444.  Last colonoscopy was 2017 and she had 1 small AVM in the sigmoid colon. Mother had Colon Cancer age 70's.  No abdominal pain.  Appetite is good.    CT of chest 9/23-probable benign 1 mm juxtapleural lateral right upper lobe pulmonary nodule.  Nodular scarring in the middle lung apices.  Linear scarring in the right upper lobe.  Questionable 2 mm tracheal polyp.  Reevaluation of these probably benign findings at lung cancer screening in 1 year is recommended.  Calcification of aortic valve and mitral valve suggesting some element of valvular dysfunction, better evaluated on previous echocardiography.    Ultrasound right upper quadrant 3/27/2024-pending      Labs 3/24-CMP normal other than chloride 109, celiac panel negative, CBC normal other than  hemoglobin A1c 5.9, TSH 2.5, C. difficile negative, Giardia negative, fecal calprotectin 444, stool elastase 206 stool for O&P negative, stool for enteric pathogens negative    Previous EGD/Colonoscopy    Colon 2017- 1 small AVM in sigmoid colon but otherwise normal exam    REVIEW OF SYSTEMS:    CONSTITUTIONAL: Denies any fever, chills, rigors, and weight loss.  HEENT: No earache or tinnitus. Denies hearing loss or visual disturbances.  CARDIOVASCULAR: No chest pain or palpitations.   RESPIRATORY: Denies any cough, hemoptysis, shortness of breath or dyspnea on exertion.  GASTROINTESTINAL: As noted in the History of Present Illness.   GENITOURINARY: No problems with urination.  Denies any hematuria or dysuria.  NEUROLOGIC: No dizziness or vertigo, denies headaches.   MUSCULOSKELETAL: Denies any muscle or joint pain.   SKIN: Denies skin rashes or itching.   ENDOCRINE: Denies excessive thirst. Denies intolerance to heat or cold.  PSYCHOSOCIAL: Denies depression or anxiety. Denies any recent memory loss.       Historical Information   Past Medical History:   Diagnosis Date    Asthma     Atrial fibrillation (HCC)     with ablation    Bursitis of hip     Last Assessed:6/5/2017    COPD (chronic obstructive pulmonary disease) (HCC) 5 years ago    Coronary artery disease     Herniated lumbar intervertebral disc     Hyperlipidemia     Hypertension 2 years    Pituitary adenoma (HCC)     Last Assessed:2/12/2015 ; Previously on bromocriptine    Spinal stenosis     SVT (supraventricular tachycardia)     with ablation    Systolic murmur of aorta      Past Surgical History:   Procedure Laterality Date    BREAST SURGERY      Incisional breast biopsy    CARDIAC ELECTROPHYSIOLOGY PROCEDURE N/A 09/02/2022    Procedure: Cardiac eps/afib ablation;  Surgeon: Fadi Goss MD;  Location: [facility];  Service: Cardiology    CARDIAC ELECTROPHYSIOLOGY PROCEDURE N/A 12/07/2022    Procedure: Cardiac loop recorder implant;  Surgeon: Bao Kovacs MD;  Location: [facility];  Service: Cardiology    CARDIAC ELECTROPHYSIOLOGY STUDY AND ABLATION      CARDIAC LOOP RECORDER      CARPAL TUNNEL RELEASE Left     CHOLECYSTECTOMY      OTHER SURGICAL HISTORY      EXCISION OF PAROTID TUMOR/GLAND    PAROTIDECTOMY      RI COLONOSCOPY FLX DX W/COLLJ SPEC WHEN PFRMD N/A 06/06/2017    Procedure: COLONOSCOPY;  Surgeon: Elizabeth Leyva DO;  Location: [facility];  Service: Gastroenterology    RI HYSTEROSCOPY BX ENDOMETRIUM&/POLYPC W/WO D&C N/A 11/22/2023    Procedure: (D&C) W/ HYSTEROSCOPY;  Surgeon: Harini Kinney MD;  Location: [facility] OR;  Service: Gynecology    ROTATOR CUFF REPAIR       SALPINGOOPHORECTOMY Right      Social History   Social History     Substance and Sexual Activity   Alcohol Use Not Currently    Alcohol/week: 1.0 standard drink of alcohol    Types: 1 Shots of liquor per week    Comment: social     Social History     Substance and Sexual Activity   Drug Use No     Social History     Tobacco Use   Smoking Status Former    Current packs/day: 0.00    Average packs/day: 1 pack/day for 30.0 years (30.0 ttl pk-yrs)    Types: Cigarettes    Start date: 1988    Quit date: 2018    Years since quittin.3   Smokeless Tobacco Never     Family History   Problem Relation Age of Onset    Colon cancer Mother     Coronary artery disease Mother     Colonic polyp Mother     Liver cancer Mother     Multiple sclerosis Father     Hypertension Brother     Hypertension Other     Breast cancer Neg Hx     Cancer Neg Hx        Meds/Allergies       Current Outpatient Medications:     atorvastatin (LIPITOR) 20 mg tablet    cetirizine (ZyrTEC) 10 mg tablet    diltiazem (TIAZAC) 120 MG 24 hr capsule    diphenoxylate-atropine (LOMOTIL) 2.5-0.025 mg per tablet    escitalopram (LEXAPRO) 10 mg tablet    levalbuterol (XOPENEX HFA) 45 mcg/act inhaler    losartan (COZAAR) 25 mg tablet    mupirocin (BACTROBAN) 2 % ointment    polyethylene glycol (MiraLax) 17 GM/SCOOP powder    spironolactone (ALDACTONE) 25 mg tablet    albuterol (ProAir HFA) 90 mcg/act inhaler    Allergies   Allergen Reactions    Bystolic [Nebivolol Hcl] Bradycardia     Hear rate to 20's    Levofloxacin Hives    Vasotec [Enalapril] Hives    Amlodipine Rash    Ancef [Cefazolin] GI Intolerance     Cdiff    Cephalosporins Other (See Comments)     c.diff    Fruit Extracts      Annotation - [id number]: peaches and strawberries    Morphine GI Intolerance     Extreme vomiting      Other      FRUIT    Penicillins Hives    Tetracycline Edema    Xarelto [Rivaroxaban] Rash           Objective     Blood pressure 102/66, pulse 63, temperature 98.4 °F  "(36.9 °C), temperature source Tympanic, height 5' 2.75\" (1.594 m), weight 61.4 kg (135 lb 6.4 oz), not currently breastfeeding. Body mass index is 24.18 kg/m².        PHYSICAL EXAM:      General Appearance:   Alert, cooperative, no distress   HEENT:   Normocephalic, atraumatic, anicteric.     Neck:  Supple, symmetrical, trachea midline   Lungs:   Clear to auscultation bilaterally; no rales, rhonchi or wheezing; respirations unlabored    Heart::   Regular rate and rhythm; no murmur, rub, or gallop.   Abdomen:   Soft, non-tender, non-distended; normal bowel sounds; no masses, no organomegaly    Genitalia:   Deferred    Rectal:   Deferred    Extremities:  No cyanosis, clubbing or edema    Pulses:  2+ and symmetric    Skin:  No jaundice, rashes, or lesions    Lymph nodes:  No palpable cervical lymphadenopathy        Lab Results:   No visits with results within 1 Day(s) from this visit.   Latest known visit with results is:   Appointment on 03/20/2024   Component Date Value    Salmonella sp PCR 03/20/2024 Negative     Shigella sp/Enteroinvasi* 03/20/2024 Negative     Campylobacter sp (jejuni* 03/20/2024 Negative     Shiga toxin 1/Shiga toxi* 03/20/2024 Negative     Calprotectin 03/20/2024 444 (H)     C.difficile toxin by PCR 03/20/2024 Negative     Pancreatic Elastase-1 03/20/2024 206     Giardia Ag, Stl 03/20/2024 Negative        Lab Results   Component Value Date    WBC 7.96 03/13/2024    HGB 12.8 03/13/2024    HCT 42.7 03/13/2024     (H) 03/13/2024     03/13/2024       Lab Results   Component Value Date     05/04/2015    SODIUM 139 03/13/2024    K 3.7 03/13/2024     (H) 03/13/2024    CO2 23 03/13/2024    ANIONGAP 9 05/04/2015    AGAP 7 03/13/2024    BUN 23 03/13/2024    CREATININE 1.14 03/13/2024    GLUC 79 11/16/2023    GLUF 73 03/13/2024    CALCIUM 10.0 03/13/2024    AST 15 03/13/2024    ALT 11 03/13/2024    ALKPHOS 66 03/13/2024    PROT 7.1 05/04/2015    TP 6.9 03/13/2024    BILITOT 0.72 " "05/04/2015    TBILI 0.68 03/13/2024    EGFR 48 03/13/2024       No results found for: \"CRP\"    Lab Results   Component Value Date    FGF9LUDASIHP 2.517 03/13/2024       No results found for: \"IRON\", \"TIBC\", \"FERRITIN\"    Radiology Results:   Cardiac EP device report    Result Date: 3/6/2024  Narrative: MDT LNQ22/ ACTIVE SYSTEM IS MRI CONDITIONAL CARELINK TRANSMISSION: LOOP RECORDER. PRESENTING RHYTHM NSR W/ PAC @ 66 BPM. BATTERY STATUS \"OK.\" NO PATIENT OR DEVICE ACTIVATED EPISODES. NORMAL DEVICE FUNCTION. DL   "

## 2024-03-29 NOTE — TELEPHONE ENCOUNTER
Scheduled date of Colonoscopy (as of today) May 10  Physician performing: Dr. Gongora  Location of procedure:  [facility]  Instructions given to patient: Miralax  Clearances: Cardiac?

## 2024-03-29 NOTE — PROGRESS NOTES
Assessment/Plan:    Diarrhea  Patient with continuation of her diarrhea, she has 10-15 episodes a day, noticed some mucus in it.  Denies any blood.  Her stool studies were positive for elevated calprotectin.  Will refer to GI for further workup.  Report on right upper quadrant ultrasound is still pending.  Will order some blood work to follow-up on her electrolytes and kidney function due to persistent diarrhea.  Imodium was somewhat effective but not completely resolving the problem, will try to use Lomotil sparingly.    Ulcer of left foot (HCC)  Referred to podiatry, cannot exclude developing cellulitis in the area.  She is allergic to multiple antibiotics and was able to tolerate clindamycin in the past but due to current uncontrolled diarrhea I will hold off on oral antibiotics as for now.  She will follow-up with us next week, continue with topical mupirocin.       Diagnoses and all orders for this visit:    Diarrhea, unspecified type  -     Cancel: Ambulatory Referral to Gastroenterology; Future  -     Cancel: Basic metabolic panel; Future  -     Magnesium; Future  -     diphenoxylate-atropine (LOMOTIL) 2.5-0.025 mg per tablet; Take 1 tablet by mouth 4 (four) times a day as needed for diarrhea  -     Ambulatory Referral to Gastroenterology; Future  -     CBC and differential; Future  -     Comprehensive metabolic panel; Future    Elevated fecal calprotectin  -     Cancel: Ambulatory Referral to Gastroenterology; Future  -     Ambulatory Referral to Gastroenterology; Future    Ulcer of left foot, unspecified ulcer stage (HCC)  -     Ambulatory Referral to Podiatry; Future  -     mupirocin (BACTROBAN) 2 % ointment; Apply topically daily          Subjective:      Patient ID: Almaz Hall is a 73 y.o. female.    HPI    The following portions of the patient's history were reviewed and updated as appropriate: allergies, current medications, past family history, past medical history, past social history, past  "surgical history, and problem list.    Review of Systems   Constitutional:  Positive for unexpected weight change. Negative for chills and fatigue.   Gastrointestinal:  Positive for abdominal pain and diarrhea. Negative for abdominal distention, anal bleeding, blood in stool, constipation, nausea, rectal pain and vomiting.   Skin:  Positive for color change.         Objective:      BP 98/50 (BP Location: Left arm, Patient Position: Sitting, Cuff Size: Standard)   Temp (!) 97 °F (36.1 °C) (Tympanic)   Ht 5' 2.75\" (1.594 m)   Wt 61.7 kg (136 lb)   LMP  (LMP Unknown)   BMI 24.28 kg/m²     Allergies   Allergen Reactions    Bystolic [Nebivolol Hcl] Bradycardia     Hear rate to 20's    Levofloxacin Hives    Vasotec [Enalapril] Hives    Amlodipine Rash    Ancef [Cefazolin] GI Intolerance     Cdiff    Cephalosporins Other (See Comments)     c.diff    Fruit Extracts      Annotation - [id number]: peaches and strawberries    Morphine GI Intolerance     Extreme vomiting      Other      FRUIT    Penicillins Hives    Tetracycline Edema    Xarelto [Rivaroxaban] Rash          Current Outpatient Medications:     atorvastatin (LIPITOR) 20 mg tablet, TAKE 1 TABLET BY MOUTH EVERY DAY, Disp: 90 tablet, Rfl: 3    cetirizine (ZyrTEC) 10 mg tablet, Take 10 mg by mouth as needed , Disp: , Rfl:     diltiazem (TIAZAC) 120 MG 24 hr capsule, TAKE 1 CAPSULE BY MOUTH EVERY DAY, Disp: 90 capsule, Rfl: 3    diphenoxylate-atropine (LOMOTIL) 2.5-0.025 mg per tablet, Take 1 tablet by mouth 4 (four) times a day as needed for diarrhea, Disp: 60 tablet, Rfl: 0    escitalopram (LEXAPRO) 10 mg tablet, TAKE 1 TABLET BY MOUTH EVERY DAY, Disp: 30 tablet, Rfl: 11    levalbuterol (XOPENEX HFA) 45 mcg/act inhaler, Inhale 1-2 puffs every 4 (four) hours as needed for wheezing, Disp: 15 g, Rfl: 0    losartan (COZAAR) 25 mg tablet, TALE 1/2 TABLET BY MOUTH DAILY, Disp: 45 tablet, Rfl: 3    mupirocin (BACTROBAN) 2 % ointment, Apply topically daily, Disp: 30 g, " Rfl: 0    spironolactone (ALDACTONE) 25 mg tablet, TAKE 1 TABLET BY MOUTH EVERY DAY (Patient taking differently: 12.5 mg daily), Disp: 90 tablet, Rfl: 3    albuterol (ProAir HFA) 90 mcg/act inhaler, Inhale 2 puffs every 6 (six) hours as needed for wheezing or shortness of breath, Disp: 8.5 g, Rfl: 0    polyethylene glycol (MiraLax) 17 GM/SCOOP powder, Take 238 g by mouth once for 1 dose Take 238 g my mouth. Use as directed, Disp: 238 g, Rfl: 0     There are no Patient Instructions on file for this visit.        Physical Exam  Constitutional:       General: She is not in acute distress.     Appearance: She is not ill-appearing or toxic-appearing.   Abdominal:      General: There is no distension.      Palpations: There is no mass.      Tenderness: There is abdominal tenderness. There is no guarding or rebound.      Hernia: No hernia is present.      Comments: Positive Prince sign.   Skin:     Findings: Erythema (Small area of erythema on the dorsal aspect of the left foot close to the fifth and fourth toes, ulcerated lesion between the toes at this level.) present.

## 2024-04-01 ENCOUNTER — APPOINTMENT (EMERGENCY)
Dept: MRI IMAGING | Facility: HOSPITAL | Age: 74
DRG: 982 | End: 2024-04-01
Payer: MEDICARE

## 2024-04-01 ENCOUNTER — TELEPHONE (OUTPATIENT)
Age: 74
End: 2024-04-01

## 2024-04-01 ENCOUNTER — APPOINTMENT (EMERGENCY)
Dept: CT IMAGING | Facility: HOSPITAL | Age: 74
DRG: 982 | End: 2024-04-01
Payer: MEDICARE

## 2024-04-01 ENCOUNTER — HOSPITAL ENCOUNTER (INPATIENT)
Facility: HOSPITAL | Age: 74
LOS: 7 days | Discharge: HOME | DRG: 982 | End: 2024-04-08
Attending: EMERGENCY MEDICINE | Admitting: INTERNAL MEDICINE
Payer: MEDICARE

## 2024-04-01 DIAGNOSIS — N17.9 AKI (ACUTE KIDNEY INJURY) (HCC): Primary | ICD-10-CM

## 2024-04-01 DIAGNOSIS — E83.42 HYPOMAGNESEMIA: ICD-10-CM

## 2024-04-01 DIAGNOSIS — R19.7 DIARRHEA: ICD-10-CM

## 2024-04-01 DIAGNOSIS — K21.9 GERD (GASTROESOPHAGEAL REFLUX DISEASE): ICD-10-CM

## 2024-04-01 DIAGNOSIS — E87.6 HYPOKALEMIA: ICD-10-CM

## 2024-04-01 DIAGNOSIS — I48.0 PAROXYSMAL ATRIAL FIBRILLATION (HCC): ICD-10-CM

## 2024-04-01 DIAGNOSIS — I50.32 CHRONIC HEART FAILURE WITH PRESERVED EJECTION FRACTION (HFPEF) (HCC): ICD-10-CM

## 2024-04-01 DIAGNOSIS — R47.01 APHASIA: ICD-10-CM

## 2024-04-01 DIAGNOSIS — R79.0 LOW MAGNESIUM LEVEL: ICD-10-CM

## 2024-04-01 LAB
ALBUMIN SERPL BCP-MCNC: 4.2 G/DL (ref 3.5–5)
ALP SERPL-CCNC: 73 U/L (ref 34–104)
ALT SERPL W P-5'-P-CCNC: 12 U/L (ref 7–52)
ANION GAP SERPL CALCULATED.3IONS-SCNC: 12 MMOL/L (ref 4–13)
AST SERPL W P-5'-P-CCNC: 14 U/L (ref 13–39)
ATRIAL RATE: 300 BPM
BACTERIA UR QL AUTO: ABNORMAL /HPF
BASOPHILS # BLD AUTO: 0.06 THOUSANDS/ÂΜL (ref 0–0.1)
BASOPHILS NFR BLD AUTO: 1 % (ref 0–1)
BILIRUB SERPL-MCNC: 0.49 MG/DL (ref 0.2–1)
BILIRUB UR QL STRIP: NEGATIVE
BUN SERPL-MCNC: 54 MG/DL (ref 5–25)
CALCIUM SERPL-MCNC: 10.2 MG/DL (ref 8.4–10.2)
CHLORIDE SERPL-SCNC: 106 MMOL/L (ref 96–108)
CLARITY UR: CLEAR
CO2 SERPL-SCNC: 20 MMOL/L (ref 21–32)
COLOR UR: ABNORMAL
CREAT SERPL-MCNC: 1.81 MG/DL (ref 0.6–1.3)
CRP SERPL QL: 24.4 MG/L
EOSINOPHIL # BLD AUTO: 0.02 THOUSAND/ÂΜL (ref 0–0.61)
EOSINOPHIL NFR BLD AUTO: 0 % (ref 0–6)
ERYTHROCYTE [DISTWIDTH] IN BLOOD BY AUTOMATED COUNT: 12.6 % (ref 11.6–15.1)
GFR SERPL CREATININE-BSD FRML MDRD: 27 ML/MIN/1.73SQ M
GLUCOSE SERPL-MCNC: 100 MG/DL (ref 65–140)
GLUCOSE SERPL-MCNC: 85 MG/DL (ref 65–140)
GLUCOSE UR STRIP-MCNC: NEGATIVE MG/DL
HCT VFR BLD AUTO: 36.8 % (ref 34.8–46.1)
HCYS SERPL-SCNC: 25.9 UMOL/L (ref 5–20)
HGB BLD-MCNC: 12.2 G/DL (ref 11.5–15.4)
HGB UR QL STRIP.AUTO: NEGATIVE
HYALINE CASTS #/AREA URNS LPF: ABNORMAL /LPF
IMM GRANULOCYTES # BLD AUTO: 0.07 THOUSAND/UL (ref 0–0.2)
IMM GRANULOCYTES NFR BLD AUTO: 1 % (ref 0–2)
KETONES UR STRIP-MCNC: NEGATIVE MG/DL
LEUKOCYTE ESTERASE UR QL STRIP: NEGATIVE
LIPASE SERPL-CCNC: 73 U/L (ref 11–82)
LYMPHOCYTES # BLD AUTO: 1.7 THOUSANDS/ÂΜL (ref 0.6–4.47)
LYMPHOCYTES NFR BLD AUTO: 14 % (ref 14–44)
MAGNESIUM SERPL-MCNC: 1.7 MG/DL (ref 1.9–2.7)
MCH RBC QN AUTO: 30.3 PG (ref 26.8–34.3)
MCHC RBC AUTO-ENTMCNC: 33.2 G/DL (ref 31.4–37.4)
MCV RBC AUTO: 92 FL (ref 82–98)
MONOCYTES # BLD AUTO: 0.54 THOUSAND/ÂΜL (ref 0.17–1.22)
MONOCYTES NFR BLD AUTO: 5 % (ref 4–12)
NEUTROPHILS # BLD AUTO: 9.54 THOUSANDS/ÂΜL (ref 1.85–7.62)
NEUTS SEG NFR BLD AUTO: 79 % (ref 43–75)
NITRITE UR QL STRIP: NEGATIVE
NON-SQ EPI CELLS URNS QL MICRO: ABNORMAL /HPF
NRBC BLD AUTO-RTO: 0 /100 WBCS
PH UR STRIP.AUTO: 6 [PH]
PLATELET # BLD AUTO: 328 THOUSANDS/UL (ref 149–390)
PMV BLD AUTO: 9.5 FL (ref 8.9–12.7)
POTASSIUM SERPL-SCNC: 3 MMOL/L (ref 3.5–5.3)
PROT SERPL-MCNC: 7.2 G/DL (ref 6.4–8.4)
PROT UR STRIP-MCNC: NEGATIVE MG/DL
QRS AXIS: 31 DEGREES
QRSD INTERVAL: 78 MS
QT INTERVAL: 408 MS
QTC INTERVAL: 414 MS
RBC # BLD AUTO: 4.02 MILLION/UL (ref 3.81–5.12)
RBC #/AREA URNS AUTO: ABNORMAL /HPF
SODIUM SERPL-SCNC: 138 MMOL/L (ref 135–147)
SP GR UR STRIP.AUTO: 1.01 (ref 1–1.03)
T WAVE AXIS: 64 DEGREES
UROBILINOGEN UR STRIP-ACNC: <2 MG/DL
VENTRICULAR RATE: 62 BPM
WBC # BLD AUTO: 11.93 THOUSAND/UL (ref 4.31–10.16)
WBC #/AREA URNS AUTO: ABNORMAL /HPF

## 2024-04-01 PROCEDURE — 81001 URINALYSIS AUTO W/SCOPE: CPT | Performed by: NURSE PRACTITIONER

## 2024-04-01 PROCEDURE — 83690 ASSAY OF LIPASE: CPT | Performed by: EMERGENCY MEDICINE

## 2024-04-01 PROCEDURE — 86140 C-REACTIVE PROTEIN: CPT | Performed by: INTERNAL MEDICINE

## 2024-04-01 PROCEDURE — 83735 ASSAY OF MAGNESIUM: CPT | Performed by: EMERGENCY MEDICINE

## 2024-04-01 PROCEDURE — 70544 MR ANGIOGRAPHY HEAD W/O DYE: CPT

## 2024-04-01 PROCEDURE — 80053 COMPREHEN METABOLIC PANEL: CPT | Performed by: EMERGENCY MEDICINE

## 2024-04-01 PROCEDURE — 96366 THER/PROPH/DIAG IV INF ADDON: CPT

## 2024-04-01 PROCEDURE — 96368 THER/DIAG CONCURRENT INF: CPT

## 2024-04-01 PROCEDURE — 74176 CT ABD & PELVIS W/O CONTRAST: CPT

## 2024-04-01 PROCEDURE — 85025 COMPLETE CBC W/AUTO DIFF WBC: CPT | Performed by: EMERGENCY MEDICINE

## 2024-04-01 PROCEDURE — 99291 CRITICAL CARE FIRST HOUR: CPT | Performed by: EMERGENCY MEDICINE

## 2024-04-01 PROCEDURE — 36415 COLL VENOUS BLD VENIPUNCTURE: CPT | Performed by: EMERGENCY MEDICINE

## 2024-04-01 PROCEDURE — 99284 EMERGENCY DEPT VISIT MOD MDM: CPT

## 2024-04-01 PROCEDURE — 99291 CRITICAL CARE FIRST HOUR: CPT | Performed by: PSYCHIATRY & NEUROLOGY

## 2024-04-01 PROCEDURE — 93005 ELECTROCARDIOGRAM TRACING: CPT

## 2024-04-01 PROCEDURE — 96375 TX/PRO/DX INJ NEW DRUG ADDON: CPT

## 2024-04-01 PROCEDURE — 70551 MRI BRAIN STEM W/O DYE: CPT

## 2024-04-01 PROCEDURE — 82948 REAGENT STRIP/BLOOD GLUCOSE: CPT

## 2024-04-01 PROCEDURE — 93010 ELECTROCARDIOGRAM REPORT: CPT | Performed by: INTERNAL MEDICINE

## 2024-04-01 PROCEDURE — 99223 1ST HOSP IP/OBS HIGH 75: CPT | Performed by: INTERNAL MEDICINE

## 2024-04-01 PROCEDURE — 83090 ASSAY OF HOMOCYSTEINE: CPT | Performed by: INTERNAL MEDICINE

## 2024-04-01 PROCEDURE — 96361 HYDRATE IV INFUSION ADD-ON: CPT

## 2024-04-01 PROCEDURE — 96365 THER/PROPH/DIAG IV INF INIT: CPT

## 2024-04-01 PROCEDURE — 70547 MR ANGIOGRAPHY NECK W/O DYE: CPT

## 2024-04-01 RX ORDER — DIPHENOXYLATE HYDROCHLORIDE AND ATROPINE SULFATE 2.5; .025 MG/1; MG/1
1 TABLET ORAL 4 TIMES DAILY PRN
Status: DISCONTINUED | OUTPATIENT
Start: 2024-04-01 | End: 2024-04-08 | Stop reason: HOSPADM

## 2024-04-01 RX ORDER — DILTIAZEM HYDROCHLORIDE 120 MG/1
120 CAPSULE, COATED, EXTENDED RELEASE ORAL
Status: DISCONTINUED | OUTPATIENT
Start: 2024-04-01 | End: 2024-04-08 | Stop reason: HOSPADM

## 2024-04-01 RX ORDER — LANOLIN ALCOHOL/MO/W.PET/CERES
100 CREAM (GRAM) TOPICAL DAILY
Status: DISCONTINUED | OUTPATIENT
Start: 2024-04-02 | End: 2024-04-08 | Stop reason: HOSPADM

## 2024-04-01 RX ORDER — POTASSIUM CHLORIDE 14.9 MG/ML
20 INJECTION INTRAVENOUS ONCE
Status: COMPLETED | OUTPATIENT
Start: 2024-04-01 | End: 2024-04-01

## 2024-04-01 RX ORDER — CHOLESTYRAMINE LIGHT 4 G/5.7G
4 POWDER, FOR SUSPENSION ORAL 2 TIMES DAILY
Status: DISCONTINUED | OUTPATIENT
Start: 2024-04-01 | End: 2024-04-08 | Stop reason: HOSPADM

## 2024-04-01 RX ORDER — CLOPIDOGREL BISULFATE 75 MG/1
300 TABLET ORAL ONCE
Status: COMPLETED | OUTPATIENT
Start: 2024-04-01 | End: 2024-04-01

## 2024-04-01 RX ORDER — CLOPIDOGREL BISULFATE 75 MG/1
75 TABLET ORAL DAILY
Status: DISCONTINUED | OUTPATIENT
Start: 2024-04-02 | End: 2024-04-02

## 2024-04-01 RX ORDER — MAGNESIUM SULFATE HEPTAHYDRATE 40 MG/ML
2 INJECTION, SOLUTION INTRAVENOUS ONCE
Status: COMPLETED | OUTPATIENT
Start: 2024-04-01 | End: 2024-04-01

## 2024-04-01 RX ORDER — POTASSIUM CHLORIDE 20 MEQ/1
40 TABLET, EXTENDED RELEASE ORAL 2 TIMES DAILY WITH MEALS
Status: COMPLETED | OUTPATIENT
Start: 2024-04-01 | End: 2024-04-01

## 2024-04-01 RX ORDER — ACETAMINOPHEN 325 MG/1
650 TABLET ORAL EVERY 4 HOURS PRN
Status: DISCONTINUED | OUTPATIENT
Start: 2024-04-01 | End: 2024-04-08 | Stop reason: HOSPADM

## 2024-04-01 RX ORDER — ONDANSETRON 2 MG/ML
4 INJECTION INTRAMUSCULAR; INTRAVENOUS EVERY 4 HOURS PRN
Status: DISCONTINUED | OUTPATIENT
Start: 2024-04-01 | End: 2024-04-08 | Stop reason: HOSPADM

## 2024-04-01 RX ORDER — ASPIRIN 81 MG/1
81 TABLET, CHEWABLE ORAL DAILY
Status: DISCONTINUED | OUTPATIENT
Start: 2024-04-02 | End: 2024-04-08 | Stop reason: HOSPADM

## 2024-04-01 RX ORDER — ASPIRIN 325 MG
325 TABLET ORAL ONCE
Status: COMPLETED | OUTPATIENT
Start: 2024-04-01 | End: 2024-04-01

## 2024-04-01 RX ORDER — CLOTRIMAZOLE 1 %
CREAM (GRAM) TOPICAL 2 TIMES DAILY
Status: DISCONTINUED | OUTPATIENT
Start: 2024-04-01 | End: 2024-04-08 | Stop reason: HOSPADM

## 2024-04-01 RX ORDER — POTASSIUM CHLORIDE 20 MEQ/1
40 TABLET, EXTENDED RELEASE ORAL ONCE
Status: DISCONTINUED | OUTPATIENT
Start: 2024-04-01 | End: 2024-04-01

## 2024-04-01 RX ORDER — HEPARIN SODIUM 5000 [USP'U]/ML
5000 INJECTION, SOLUTION INTRAVENOUS; SUBCUTANEOUS EVERY 8 HOURS SCHEDULED
Status: DISCONTINUED | OUTPATIENT
Start: 2024-04-01 | End: 2024-04-04 | Stop reason: ALTCHOICE

## 2024-04-01 RX ORDER — LORAZEPAM 2 MG/ML
0.5 INJECTION INTRAMUSCULAR ONCE AS NEEDED
Status: COMPLETED | OUTPATIENT
Start: 2024-04-01 | End: 2024-04-01

## 2024-04-01 RX ORDER — ATORVASTATIN CALCIUM 40 MG/1
40 TABLET, FILM COATED ORAL EVERY EVENING
Status: DISCONTINUED | OUTPATIENT
Start: 2024-04-01 | End: 2024-04-02

## 2024-04-01 RX ORDER — ESCITALOPRAM OXALATE 10 MG/1
10 TABLET ORAL DAILY
Status: DISCONTINUED | OUTPATIENT
Start: 2024-04-02 | End: 2024-04-08 | Stop reason: HOSPADM

## 2024-04-01 RX ADMIN — MAGNESIUM SULFATE HEPTAHYDRATE 2 G: 40 INJECTION, SOLUTION INTRAVENOUS at 14:00

## 2024-04-01 RX ADMIN — THIAMINE HYDROCHLORIDE 100 MG: 100 INJECTION, SOLUTION INTRAMUSCULAR; INTRAVENOUS at 22:14

## 2024-04-01 RX ADMIN — SODIUM BICARBONATE 100 ML/HR: 84 INJECTION, SOLUTION INTRAVENOUS at 14:10

## 2024-04-01 RX ADMIN — DILTIAZEM HYDROCHLORIDE 120 MG: 120 CAPSULE, COATED, EXTENDED RELEASE ORAL at 21:47

## 2024-04-01 RX ADMIN — HEPARIN SODIUM 5000 UNITS: 5000 INJECTION INTRAVENOUS; SUBCUTANEOUS at 21:47

## 2024-04-01 RX ADMIN — POTASSIUM CHLORIDE 40 MEQ: 1500 TABLET, EXTENDED RELEASE ORAL at 14:40

## 2024-04-01 RX ADMIN — CLOPIDOGREL BISULFATE 300 MG: 75 TABLET ORAL at 14:41

## 2024-04-01 RX ADMIN — POTASSIUM CHLORIDE 20 MEQ: 14.9 INJECTION, SOLUTION INTRAVENOUS at 14:42

## 2024-04-01 RX ADMIN — MAGNESIUM SULFATE HEPTAHYDRATE 2 G: 40 INJECTION, SOLUTION INTRAVENOUS at 18:45

## 2024-04-01 RX ADMIN — CHOLESTYRAMINE 4 G: 4 POWDER, FOR SUSPENSION ORAL at 18:46

## 2024-04-01 RX ADMIN — SODIUM CHLORIDE 1000 ML: 0.9 INJECTION, SOLUTION INTRAVENOUS at 11:54

## 2024-04-01 RX ADMIN — LORAZEPAM 0.5 MG: 2 INJECTION INTRAMUSCULAR; INTRAVENOUS at 15:39

## 2024-04-01 RX ADMIN — POTASSIUM CHLORIDE 40 MEQ: 1500 TABLET, EXTENDED RELEASE ORAL at 16:54

## 2024-04-01 RX ADMIN — ASPIRIN 325 MG ORAL TABLET 325 MG: 325 PILL ORAL at 14:40

## 2024-04-01 RX ADMIN — IOHEXOL 50 ML: 240 INJECTION, SOLUTION INTRATHECAL; INTRAVASCULAR; INTRAVENOUS; ORAL at 13:58

## 2024-04-01 NOTE — ASSESSMENT & PLAN NOTE
Chronic hypotension given kidney injury.  Patient on diltiazem CD for atrial fibrillation.    Holding losartan.

## 2024-04-01 NOTE — PATIENT COMMUNICATION
Called pt and L/M with instruction see below. Also sent to [facility].    Please call if having continued symptoms would recommend going to the ER, due to a kidney injury.

## 2024-04-01 NOTE — ASSESSMENT & PLAN NOTE
73-year-old female with atrial fibrillation status post ablation not on AC, CAD, HTN, HLD, SVT s/p ablation, and JANELLE on CKD stage IIIa with recent weight loss and diarrhea admitted 4/1 for ongoing JANELLE management.    Stroke alert activated 4/1 afternoon for acute onset speech difficulty;described as intermittent garbled speech/word finding difficulty. NIHSS 0 in ED. BP 96/50 with . CTH showed right frontal hypodensity; CTA deferred. Deemed not a TNK candidate. Loaded with asa and plavix and admitted on the stroke pathway     Neuroimaging/work-up:  -CT head: Anterior right frontal lobe hypodensity, suggesting late subacute/chronic infarct, no acute hemorrhage nor other intracranial pathology  -CTA head/neck was deferred given low NIHSS (minimal suspicion for LVO and ongoing JANELLE)  -MRI brain: No acute infarction, edema, or mass effect.  Chronic lacunar infarcts within the right anterior centrum semiovale and right posterior cerebellum.  Mild chronic microangiopathic ischemic changes.  -MRA head:Cerebral MRA demonstrates no large branch vessel occlusion or focal hemodynamically significant stenosis. Fetal left PCA supply.  -MRA carotids:Motion degraded examination. Focal artifacts are noted along the bilateral carotid bulbs. There is no suspected extracranial carotid stenosis.The cervical vertebral arteries are patent.  -LDL 44  -A1c 5.6  Recommendations:  MRI negative for acute infarct; chronic lacunar infarcts noted. Unclear etiology of now resolved transient/intermittent episodes of mild word finding difficulty  -ECHO complete; report pending  -D/C plavix, continue asa 81mg daily monotherapy  -Continue Lipitor 20 mg daily  -interrogate loop monitor  -goal normotension; avoid hypotension  -no additional inpatient neurology recommendations; please reach out with any questions/concerns or change in exam  -Medical management and correction of any metabolic or infectious disturbances per primary service.   -no  neurological contraindication for colonoscopy

## 2024-04-01 NOTE — ASSESSMENT & PLAN NOTE
Wt Readings from Last 3 Encounters:   03/29/24 61.4 kg (135 lb 6.4 oz)   03/29/24 61.7 kg (136 lb)   03/19/24 62.5 kg (137 lb 12.8 oz)     Prior to admission on spironolactone.  Will hold given kidney injury

## 2024-04-01 NOTE — H&P
[facility]  H&P  Name: Almaz Hall 73 y.o. female I MRN: [id number]  Unit/Bed#: E4 -01 I Date of Admission: 4/1/2024   Date of Service: 4/1/2024 I Hospital Day: 0      Assessment/Plan   * JANELLE (acute kidney injury) (HCC)  Assessment & Plan  History of atrial fibrillation hypertension and CKD 3 who presents for abnormal labs  Has had several weeks of diarrhea.  Seen GI outpatient workup negative with plans for EGD/colonoscopy in May  Creatinine closer to 1.0.  IV fluids with bicarbonate infusion per nephrology.  Continue lomotil as needed.  Will not repeat any stool studies at this time.  Add banana flakes and cholestyramine.  Consult GI for evaluation as diarrhea has led to kidney injury prompting admission    Results from last 7 days   Lab Units 04/01/24  1155 03/29/24  1417   BUN mg/dL 54* 60*   CREATININE mg/dL 1.81* 1.89*   EGFR ml/min/1.73sq m 27 26       Aphasia  Assessment & Plan  Aphasia was a stroke alert in the ED.  May be secondary to hypotension.  Seen by neurology.  Continue DAPT.  MRI brain negative but ?TIA    Chronic diastolic CHF (congestive heart failure) ([facility])  Assessment & Plan  Wt Readings from Last 3 Encounters:   03/29/24 61.4 kg (135 lb 6.4 oz)   03/29/24 61.7 kg (136 lb)   03/19/24 62.5 kg (137 lb 12.8 oz)     Prior to admission on spironolactone.  Will hold given kidney injury    Paroxysmal atrial fibrillation (HCC)  Assessment & Plan  Paroxysmal atrial fibrillation status post ablation.  Not anticoagulation.  Continue diltiazem CD at bedtime    Hypokalemia  Assessment & Plan  Replace and recheck tomorrow along with magnesium and phosphorus    Results from last 7 days   Lab Units 04/01/24  1155 03/29/24  1417   POTASSIUM mmol/L 3.0* 3.0*       Essential hypertension  Assessment & Plan  Chronic hypotension given kidney injury.  Patient on diltiazem CD for atrial fibrillation.    Holding losartan.    Depression with anxiety  Assessment & Plan  Continue  escitalopram    VTE Pharmacologic Prophylaxis:   Moderate Risk (Score 3-4) - Pharmacological DVT Prophylaxis Ordered: heparin.  Code Status: Level 1 - Full Code  Discussion with family:     Anticipated Length of Stay: Patient will be admitted on an inpatient basis with an anticipated length of stay of greater than 2 midnights secondary to kidney injury and strokelike symptoms.    Total Time Spent on Date of Encounter in care of patient: This time was spent on one or more of the following: performing physical exam; counseling and coordination of care; obtaining or reviewing history; documenting in the medical record; reviewing/ordering tests, medications or procedures; communicating with other healthcare professionals and discussing with patient's family/caregivers.    Chief Complaint:     Abnormal Lab (Abnormal labs via PCP on Thursday. Severe diarrhea for several weeks. Reports she had an US last week.)    History of Present Illness:    Almaz Hall is a 73 y.o. female with a past medical history of atrial fibrillation diastolic CHF CKD 3 who presents with abnormal labs.  The patient has had severe diarrhea for several weeks.  Diarrhea is nonbloody sometimes with stool, sometimes with mucus.  She did see GI as an outpatient stool studies were preliminary negative for infection.  She was placed on Lomotil as needed.  She is scheduled for EGD and colonoscopy later in May.  She had so much diarrhea that she developed kidney injury with hypokalemia and came to the hospital where while in the department she had word finding difficulties prompting stroke alert.  Patient has been seen by nephrology and neurology in the ED.  On exam her speech is back to baseline.  She denies any new complaints.  She is hungry.  She denies any fevers or chills.    Review of Systems:  Review of Systems   Constitutional:  Negative for fever.   HENT:  Negative for facial swelling and trouble swallowing.    Eyes:  Negative for visual  disturbance.   Respiratory:  Negative for shortness of breath.    Cardiovascular:  Negative for chest pain.   Gastrointestinal:  Positive for diarrhea. Negative for abdominal pain, nausea and vomiting.   Genitourinary:  Negative for hematuria and urgency.   Musculoskeletal:  Negative for myalgias.   Skin:  Negative for rash.   Neurological:  Positive for speech difficulty. Negative for seizures and numbness.   Psychiatric/Behavioral:  Negative for agitation. The patient is not nervous/anxious.    All other systems reviewed and are negative.        Past Medical and Surgical History:   Past Medical History:   Diagnosis Date    Asthma     Atrial fibrillation (HCC)     with ablation    Bursitis of hip     Last Assessed:6/5/2017    COPD (chronic obstructive pulmonary disease) (HCC) 5 years ago    Coronary artery disease     Herniated lumbar intervertebral disc     Hyperlipidemia     Hypertension 2 years    Pituitary adenoma (HCC)     Last Assessed:2/12/2015 ; Previously on bromocriptine    Spinal stenosis     SVT (supraventricular tachycardia)     with ablation    Systolic murmur of aorta      Past Surgical History:   Procedure Laterality Date    BREAST SURGERY      Incisional breast biopsy    CARDIAC ELECTROPHYSIOLOGY PROCEDURE N/A 09/02/2022    Procedure: Cardiac eps/afib ablation;  Surgeon: Fadi Goss MD;  Location: [facility];  Service: Cardiology    CARDIAC ELECTROPHYSIOLOGY PROCEDURE N/A 12/07/2022    Procedure: Cardiac loop recorder implant;  Surgeon: Bao Kovacs MD;  Location: [facility];  Service: Cardiology    CARDIAC ELECTROPHYSIOLOGY STUDY AND ABLATION      CARDIAC LOOP RECORDER      CARPAL TUNNEL RELEASE Left     CHOLECYSTECTOMY      OTHER SURGICAL HISTORY      EXCISION OF PAROTID TUMOR/GLAND    PAROTIDECTOMY      NC COLONOSCOPY FLX DX W/COLLJ SPEC WHEN PFRMD N/A 06/06/2017    Procedure: COLONOSCOPY;  Surgeon: Elizabeth Leyva DO;  Location: [facility];  Service:  Gastroenterology    MS HYSTEROSCOPY BX ENDOMETRIUM&/POLYPC W/WO D&C N/A 11/22/2023    Procedure: (D&C) W/ HYSTEROSCOPY;  Surgeon: Harini Kinney MD;  Location: [facility];  Service: Gynecology    ROTATOR CUFF REPAIR      SALPINGOOPHORECTOMY Right      Meds/Allergies:  Allergies:   Allergies   Allergen Reactions    Bystolic [Nebivolol Hcl] Bradycardia     Hear rate to 20's    Levofloxacin Hives    Vasotec [Enalapril] Hives    Amlodipine Rash    Ancef [Cefazolin] GI Intolerance     Cdiff    Cephalosporins Other (See Comments)     c.diff    Fruit Extracts      Annotation - [id number]: peaches and strawberries    Morphine GI Intolerance     Extreme vomiting      Other      FRUIT    Penicillins Hives    Tetracycline Edema    Xarelto [Rivaroxaban] Rash     Prior to Admission Medications   Prescriptions Last Dose Informant Patient Reported? Taking?   albuterol (ProAir HFA) 90 mcg/act inhaler  Self No No   Sig: Inhale 2 puffs every 6 (six) hours as needed for wheezing or shortness of breath   atorvastatin (LIPITOR) 20 mg tablet 4/1/2024 Self No Yes   Sig: TAKE 1 TABLET BY MOUTH EVERY DAY   cetirizine (ZyrTEC) 10 mg tablet 3/31/2024 Self Yes Yes   Sig: Take 10 mg by mouth as needed    diltiazem (TIAZAC) 120 MG 24 hr capsule 3/31/2024 Self No Yes   Sig: TAKE 1 CAPSULE BY MOUTH EVERY DAY   diphenoxylate-atropine (LOMOTIL) 2.5-0.025 mg per tablet 4/1/2024  No Yes   Sig: Take 1 tablet by mouth 4 (four) times a day as needed for diarrhea   escitalopram (LEXAPRO) 10 mg tablet 4/1/2024 Self No Yes   Sig: TAKE 1 TABLET BY MOUTH EVERY DAY   levalbuterol (XOPENEX HFA) 45 mcg/act inhaler Unknown  No No   Sig: Inhale 1-2 puffs every 4 (four) hours as needed for wheezing   losartan (COZAAR) 25 mg tablet 3/31/2024  No Yes   Sig: TALE 1/2 TABLET BY MOUTH DAILY   mupirocin (BACTROBAN) 2 % ointment Past Week  No Yes   Sig: Apply topically daily   polyethylene glycol (MiraLax) 17 GM/SCOOP powder   No No   Sig: Take 238 g by mouth once  for 1 dose Take 238 g my mouth. Use as directed   spironolactone (ALDACTONE) 25 mg tablet 2024 Self No Yes   Sig: TAKE 1 TABLET BY MOUTH EVERY DAY   Patient taking differently: 12.5 mg daily      Facility-Administered Medications: None     Social History:     Social History     Socioeconomic History    Marital status: /Civil Union     Spouse name: Not on file    Number of children: Not on file    Years of education: Not on file    Highest education level: Not on file   Occupational History    Not on file   Tobacco Use    Smoking status: Former     Current packs/day: 0.00     Average packs/day: 1 pack/day for 30.0 years (30.0 ttl pk-yrs)     Types: Cigarettes     Start date: 1988     Quit date: 2018     Years since quittin.3    Smokeless tobacco: Never   Vaping Use    Vaping status: Never Used   Substance and Sexual Activity    Alcohol use: Not Currently     Alcohol/week: 1.0 standard drink of alcohol     Types: 1 Shots of liquor per week     Comment: social    Drug use: No    Sexual activity: Not Currently     Birth control/protection: Post-menopausal   Other Topics Concern    Not on file   Social History Narrative    Not on file     Social Determinants of Health     Financial Resource Strain: Low Risk  (2023)    Overall Financial Resource Strain (CARDIA)     Difficulty of Paying Living Expenses: Not hard at all   Food Insecurity: Not on file   Transportation Needs: No Transportation Needs (2023)    PRAPARE - Transportation     Lack of Transportation (Medical): No     Lack of Transportation (Non-Medical): No   Physical Activity: Not on file   Stress: Not on file   Social Connections: Not on file   Intimate Partner Violence: Not on file   Housing Stability: Not on file     Patient Pre-hospital Living Situation:   Patient Pre-hospital Level of Mobility:   Patient Pre-hospital Diet Restrictions:     Family History:  Family History   Problem Relation Age of Onset    Colon cancer  "Mother     Coronary artery disease Mother     Colonic polyp Mother     Liver cancer Mother     Multiple sclerosis Father     Hypertension Brother     Hypertension Other     Breast cancer Neg Hx     Cancer Neg Hx      Physical Exam:   Vitals:   Blood Pressure: 126/58 (Simultaneous filing. User may not have seen previous data.) (04/01/24 1730)  Pulse: 63 (04/01/24 1730)  Temperature: (!) 97.4 °F (36.3 °C) (04/01/24 1730)  Temp Source: Temporal (04/01/24 1730)  Respirations: 18 (04/01/24 1730)  Height: 5' 2\" (157.5 cm) (04/01/24 1700)  SpO2: 97 % (04/01/24 1730)    Physical Exam  Vitals reviewed.   Constitutional:       General: She is not in acute distress.     Appearance: Normal appearance.   HENT:      Head: Atraumatic.      Mouth/Throat:      Mouth: Mucous membranes are moist.   Eyes:      General: No scleral icterus.     Extraocular Movements: Extraocular movements intact.   Cardiovascular:      Rate and Rhythm: Regular rhythm.      Heart sounds: Normal heart sounds.   Pulmonary:      Breath sounds: Normal breath sounds. No wheezing.   Abdominal:      General: Bowel sounds are normal.      Palpations: Abdomen is soft.      Tenderness: There is no abdominal tenderness. There is no guarding.   Musculoskeletal:         General: No swelling.      Cervical back: Normal range of motion and neck supple.   Skin:     General: Skin is warm.   Neurological:      General: No focal deficit present.      Mental Status: She is alert.      Motor: No weakness.   Psychiatric:         Mood and Affect: Mood normal.       Lab Results: I have personally reviewed pertinent reports.    Results from last 7 days   Lab Units 04/01/24  1155   WBC Thousand/uL 11.93*   HEMOGLOBIN g/dL 12.2   HEMATOCRIT % 36.8   PLATELETS Thousands/uL 328   NEUTROS PCT % 79*   LYMPHS PCT % 14   MONOS PCT % 5   EOS PCT % 0     Results from last 7 days   Lab Units 04/01/24  1155 03/29/24  1417   SODIUM mmol/L 138 137   POTASSIUM mmol/L 3.0* 3.0*   CHLORIDE mmol/L " 106 104   CO2 mmol/L 20* 20*   ANION GAP mmol/L 12 13   BUN mg/dL 54* 60*   CREATININE mg/dL 1.81* 1.89*   CALCIUM mg/dL 10.2 9.9   ALBUMIN g/dL 4.2 4.3   TOTAL BILIRUBIN mg/dL 0.49 0.41   ALK PHOS U/L 73 80   ALT U/L 12 14   AST U/L 14 17   EGFR ml/min/1.73sq m 27 26   GLUCOSE RANDOM mg/dL 100  --                               Results from last 7 days   Lab Units 04/01/24  1501   COLOR UA  Light Yellow   CLARITY UA  Clear   SPEC GRAV UA  1.009   PH UA  6.0   LEUKOCYTES UA  Negative   NITRITE UA  Negative   GLUCOSE UA mg/dl Negative   KETONES UA mg/dl Negative   BILIRUBIN UA  Negative   BLOOD UA  Negative      Results from last 7 days   Lab Units 04/01/24  1501   RBC UA /hpf None Seen   WBC UA /hpf 1-2   EPITHELIAL CELLS WET PREP /hpf None Seen   BACTERIA UA /hpf Occasional            Lines/Drains  Invasive Devices       Peripheral Intravenous Line  Duration             Peripheral IV 04/01/24 Left Antecubital <1 day    Peripheral IV 04/01/24 Right Antecubital <1 day                    Imaging: I have personally reviewed pertinent films in PACS  MRA carotids wo contrast    Result Date: 4/1/2024  Impression: Motion degraded examination. Focal artifacts are noted along the bilateral carotid bulbs. There is no suspected extracranial carotid stenosis. The cervical vertebral arteries are patent. Workstation performed: [id number]     MRA head wo contrast    Result Date: 4/1/2024  Impression: Cerebral MRA demonstrates no large branch vessel occlusion or focal hemodynamically significant stenosis. Fetal left PCA supply. Workstation performed: [id number]     MRI brain wo contrast    Result Date: 4/1/2024  Impression: No acute infarction, edema, or mass effect. Chronic lacunar infarcts within the right anterior centrum semiovale and right posterior cerebellum. Mild chronic microangiopathic ischemic changes. Workstation performed: [id number]     CT abdomen pelvis wo contrast    Result Date: 4/1/2024  Impression: Fluid seen  throughout the colon in keeping with history of diarrheal illness. No focal inflammatory changes. Minimal sigmoid diverticulosis. Limited evaluation of the solid organs without IV contrast. Subcentimeter right hepatic lobe hypodensity too small to characterize. Limited study without IV contrast. Workstation performed: [id number]     CT stroke alert brain    Result Date: 4/1/2024  Impression: 1.  New area of hypodensity involving subcortical and deep white matter in the anterior right frontal lobe most likely late subacute to chronic infarct. Superimposed acute infarct in the less hypodense regions are not excluded. There is no mass effect or  hemorrhage. 2.  Stable old right cerebellar lacunar infarct. Findings were directly discussed with Isaac Rucker at approximately 2:09 p.m. Workstation performed: [id number]       EKG, Pathology, and Other Studies Reviewed on Admission:   EKG  Result Date: 04/01/24  Personally reviewed strips with impression of: Sinus rhythm 62 bpm    ** Please Note: This note has been constructed using a voice recognition system. **

## 2024-04-01 NOTE — ED NOTES
Pt transported to [facility]     Shahnaz Avila RN  04/01/24 [id number]

## 2024-04-01 NOTE — ASSESSMENT & PLAN NOTE
Aphasia was a stroke alert in the ED.  May be secondary to hypotension.  Seen by neurology.  Continue DAPT.  MRI brain negative but ?TIA

## 2024-04-01 NOTE — ASSESSMENT & PLAN NOTE
Replace and recheck tomorrow along with magnesium and phosphorus    Results from last 7 days   Lab Units 04/01/24  1155 03/29/24  1417   POTASSIUM mmol/L 3.0* 3.0*

## 2024-04-01 NOTE — CONSULTS
Consultation - Stroke   Almaz Hall 73 y.o. female MRN: [id number]  Unit/Bed#: ED-41 Encounter: [id number]      Assessment/Plan     Aphasia  Assessment & Plan  73-year-old female with history of atrial fibrillation status post ablation not on AC, CHF, SVT with ablation, and ongoing management for JANELLE on CKD stage IIIa (with ongoing weight loss and frequent diarrhea).     Initially admitted today, 4/1 for ongoing JANELLE management.    Stroke alert activated in the afternoon for acute onset speech difficulty;garbled/word finding difficulty (patient with intermittent garbled speech/word finding difficulty since approximately 1 PM in ED).     NIHSS 0 in ED, no clear aphasia when asked to repeat phrases, names objects, converse.    Thrombolytic Decision: Patient not a candidate. Symptoms resolved/clearly non disabling.     Neuroimaging  -CT head: Anterior right frontal lobe hypodensity, suggesting late subacute/chronic infarct, no acute hemorrhage nor other intracranial pathology  -CTA head/neck was deferred given low NIHSS (minimal suspicion for LVO and ongoing JANELLE    Plan:  -Initiate acute ischemic stroke pathway:  -MRI brain and MRA head/neck (for now obtain noncontrast, however if patient's renal function improves, would recommend gadolinium if okay with nephrology standpoint)  -If renal function improves, will consider CTA head/neck; for now will hold off  -Loaded with DAPT in ED (aspirin 325 mg/Plavix 300 mg x 1), continue DAPT beginning tomorrow  -Lipitor 40 mg daily  -Check hemoglobin A1c and lipid panel  -Neurochecks  -Telemetry monitoring; if able interrogate loop monitor  -Stroke education to be provided  -Therapy evaluations  -Ongoing JANELLE management per nephrology: IV fluids, trending BMP, avoid nephrotoxins      Discussed plan of care with attending neurologist, present throughout duration of stroke alert.     Recommendations for outpatient neurological follow up have yet to be determined.    History of  Present Illness     Reason for Consult / Principal Problem: Stroke alert, garbled speech/word finding difficulty  Hx and PE limited by:   Patient last known well: Approximately 20 minutes prior to alert activation  Stroke alert called: 1:41 PM  Neurology time of arrival: 1:43 PM  HPI: Almaz Hall is a 73 y.o. female with history as mentioned above in assessment who neurology is asked to evaluate as stroke alert this afternoon for the above.    In reviewing notes and discussing with patient this afternoon, she has history of CKD, however has had persistent diarrhea over the past several months with multiple bowel movements daily.  This is resulted in approximately 25 pounds of weight loss since December.  Recent renal lab work revealed notable JANELLE, thus patient was advised to come to the ED today for evaluation and management of JANELLE/diarrhea.    Stroke alert was activated this afternoon as patient began experiencing intermittent garbled speech and word finding difficulty, beginning at approximately 1 PM and being evaluated by nephrology.  In further discussing with patient, that initial episode at 1 PM lasted several minutes before resolving.  However then later in the afternoon prior to stroke alert activation, she then had a second episode of faculty.  Described as garbled, and a delay in getting out the correct words that patient wanted to say no issues with comprehension, no headache, no vision disturbances, no focal weakness or numbness in the extremities. See NIHSS below and neuroimaging above.  At time of completion of stroke alert, patient noted her speech was back to normal, however given the episodic nature of her speech troubles and new hypodensity on CT head, stroke pathway was initiated.     In reviewing patient's CT head results with her, she denies any history of stroke nor any strokelike symptoms in the past.  Last neuroimaging was in April 2023 in which she sustained a fall with questionable  syncope.    In reviewing notes and discussing with patient, she previously was on anticoagulation with her history of atrial fibrillation, however she underwent A-fib ablation in September 2022 and since then has not had any recurrence of PAF on loop monitor, placed in December 2022 (thus has been off anticoagulation).  Does actively follow with cardiology.    Consults    Review of Systems   Unable to perform ROS: Acuity of condition       Historical Information   Past Medical History:   Diagnosis Date    Asthma     Atrial fibrillation (HCC)     with ablation    Bursitis of hip     Last Assessed:6/5/2017    COPD (chronic obstructive pulmonary disease) (HCC) 5 years ago    Coronary artery disease     Herniated lumbar intervertebral disc     Hyperlipidemia     Hypertension 2 years    Pituitary adenoma (HCC)     Last Assessed:2/12/2015 ; Previously on bromocriptine    Spinal stenosis     SVT (supraventricular tachycardia)     with ablation    Systolic murmur of aorta      Past Surgical History:   Procedure Laterality Date    BREAST SURGERY      Incisional breast biopsy    CARDIAC ELECTROPHYSIOLOGY PROCEDURE N/A 09/02/2022    Procedure: Cardiac eps/afib ablation;  Surgeon: Fadi Goss MD;  Location: [facility];  Service: Cardiology    CARDIAC ELECTROPHYSIOLOGY PROCEDURE N/A 12/07/2022    Procedure: Cardiac loop recorder implant;  Surgeon: Bao Kovacs MD;  Location:  [facility] CATH [facility];  Service: Cardiology    CARDIAC ELECTROPHYSIOLOGY STUDY AND ABLATION      CARDIAC LOOP RECORDER      CARPAL TUNNEL RELEASE Left     CHOLECYSTECTOMY      OTHER SURGICAL HISTORY      EXCISION OF PAROTID TUMOR/GLAND    PAROTIDECTOMY      NM COLONOSCOPY FLX DX W/COLLJ SPEC WHEN PFRMD N/A 06/06/2017    Procedure: COLONOSCOPY;  Surgeon: Elizabeth Leyva DO;  Location: [facility];  Service: Gastroenterology    NM HYSTEROSCOPY BX ENDOMETRIUM&/POLYPC W/WO D&C N/A 11/22/2023    Procedure: (D&C) W/ HYSTEROSCOPY;  Surgeon:  Harini Kinney MD;  Location: [facility];  Service: Gynecology    ROTATOR CUFF REPAIR      SALPINGOOPHORECTOMY Right      Social History   Social History     Substance and Sexual Activity   Alcohol Use Not Currently    Alcohol/week: 1.0 standard drink of alcohol    Types: 1 Shots of liquor per week    Comment: social     Social History     Substance and Sexual Activity   Drug Use No     E-Cigarette/Vaping    E-Cigarette Use Never User      E-Cigarette/Vaping Substances    Nicotine No     THC No     CBD No     Flavoring No     Other No     Unknown No      Social History     Tobacco Use   Smoking Status Former    Current packs/day: 0.00    Average packs/day: 1 pack/day for 30.0 years (30.0 ttl pk-yrs)    Types: Cigarettes    Start date: 1988    Quit date: 2018    Years since quittin.3   Smokeless Tobacco Never     Family History:   Family History   Problem Relation Age of Onset    Colon cancer Mother     Coronary artery disease Mother     Colonic polyp Mother     Liver cancer Mother     Multiple sclerosis Father     Hypertension Brother     Hypertension Other     Breast cancer Neg Hx     Cancer Neg Hx        Review of previous medical records was completed.    Meds/Allergies   current meds:   Current Facility-Administered Medications   Medication Dose Route Frequency    magnesium sulfate 2 g/50 mL IVPB (premix) 2 g  2 g Intravenous Once    potassium chloride (Klor-Con M20) CR tablet 40 mEq  40 mEq Oral BID With Meals    potassium chloride 20 mEq IVPB (premix)  20 mEq Intravenous Once    sodium bicarbonate 150 mEq in dextrose 5 % 1,000 mL infusion  100 mL/hr Intravenous Continuous    and PTA meds:   Prior to Admission Medications   Prescriptions Last Dose Informant Patient Reported? Taking?   albuterol (ProAir HFA) 90 mcg/act inhaler  Self No No   Sig: Inhale 2 puffs every 6 (six) hours as needed for wheezing or shortness of breath   atorvastatin (LIPITOR) 20 mg tablet  Self No Yes   Sig: TAKE 1  TABLET BY MOUTH EVERY DAY   cetirizine (ZyrTEC) 10 mg tablet  Self Yes Yes   Sig: Take 10 mg by mouth as needed    diltiazem (TIAZAC) 120 MG 24 hr capsule  Self No Yes   Sig: TAKE 1 CAPSULE BY MOUTH EVERY DAY   diphenoxylate-atropine (LOMOTIL) 2.5-0.025 mg per tablet   No Yes   Sig: Take 1 tablet by mouth 4 (four) times a day as needed for diarrhea   escitalopram (LEXAPRO) 10 mg tablet  Self No Yes   Sig: TAKE 1 TABLET BY MOUTH EVERY DAY   levalbuterol (XOPENEX HFA) 45 mcg/act inhaler   No Yes   Sig: Inhale 1-2 puffs every 4 (four) hours as needed for wheezing   losartan (COZAAR) 25 mg tablet   No Yes   Sig: TALE 1/2 TABLET BY MOUTH DAILY   mupirocin (BACTROBAN) 2 % ointment   No Yes   Sig: Apply topically daily   polyethylene glycol (MiraLax) 17 GM/SCOOP powder   No No   Sig: Take 238 g by mouth once for 1 dose Take 238 g my mouth. Use as directed   spironolactone (ALDACTONE) 25 mg tablet  Self No Yes   Sig: TAKE 1 TABLET BY MOUTH EVERY DAY   Patient taking differently: 12.5 mg daily      Facility-Administered Medications: None       Allergies   Allergen Reactions    Bystolic [Nebivolol Hcl] Bradycardia     Hear rate to 20's    Levofloxacin Hives    Vasotec [Enalapril] Hives    Amlodipine Rash    Ancef [Cefazolin] GI Intolerance     Cdiff    Cephalosporins Other (See Comments)     c.diff    Fruit Extracts      Annotation - [id number]: peaches and strawberries    Morphine GI Intolerance     Extreme vomiting      Other      FRUIT    Penicillins Hives    Tetracycline Edema    Xarelto [Rivaroxaban] Rash       Objective   Vitals:Blood pressure 112/59, pulse 66, temperature (!) 97.3 °F (36.3 °C), temperature source Oral, resp. rate 18, SpO2 98%, not currently breastfeeding.,There is no height or weight on file to calculate BMI.  No intake or output data in the 24 hours ending 04/01/24 3190    Invasive Devices:   Invasive Devices       Peripheral Intravenous Line  Duration             Peripheral IV 04/01/24 Left  "Antecubital <1 day    Peripheral IV 04/01/24 Right Antecubital <1 day                    Physical Exam  Constitutional:       Appearance: Normal appearance.   HENT:      Head: Normocephalic and atraumatic.   Eyes:      Extraocular Movements: EOM normal.      Pupils: Pupils are equal, round, and reactive to light.   Cardiovascular:      Rate and Rhythm: Normal rate.   Pulmonary:      Effort: Pulmonary effort is normal.   Abdominal:      General: There is no distension.   Musculoskeletal:         General: Normal range of motion.      Cervical back: Normal range of motion and neck supple.   Skin:     General: Skin is warm and dry.   Neurological:      Mental Status: She is alert and oriented to person, place, and time.      Motor: Motor strength is normal.     Coordination: Finger-Nose-Finger Test and Heel to Shin Test normal.   Psychiatric:         Speech: Speech normal.       Neurologic Exam     Mental Status   Oriented to person, place, and time.   Follows 2 step commands.   Attention: normal. Concentration: normal.   Speech: speech is normal   Normal comprehension.   Awake and alert, fully oriented.  Is able to name objects and repeat phrases without word finding error, does has subtle trouble pronouncing the word \"\".  Reads NIH card sentences and names objects correctly.  There is a slight delay in response to questioning by examiners, but no overt dysarthria nor aphasia.  Following commands.     Cranial Nerves     CN II   Visual fields full to confrontation.     CN III, IV, VI   Pupils are equal, round, and reactive to light.  Extraocular motions are normal.     CN V   Facial sensation intact.     CN VII   Facial expression full, symmetric.     CN VIII   CN VIII normal.     CN IX, X   CN IX normal.   CN X normal.     CN XI   CN XI normal.     CN XII   CN XII normal.     Motor Exam   Muscle bulk: normal  Overall muscle tone: normal  Right arm pronator drift: absent  Left arm pronator drift: " absent    Strength   Strength 5/5 throughout.     Sensory Exam   Light touch normal.     No hemineglect nor extinction.     Gait, Coordination, and Reflexes     Coordination   Finger to nose coordination: normal  Heel to shin coordination: normal    Tremor   Resting tremor: absent  Intention tremor: absent  Gait deferred given acuity of condition.       NIHSS:  1a.Level of Consciousness: 0 = Alert   1b. LOC Questions: 0 = Answers both correctly   1c. LOC Commands: 0 = Obeys both correctly   2. Best Gaze: 0 = Normal   3. Visual: 0 = No visual field loss   4. Facial Palsy: 0=Normal symmetric movement   5a. Motor Right Arm: 0=No drift, limb holds 90 (or 45) degrees for full 10 seconds   5b. Motor Left Arm: 0=No drift, limb holds 90 (or 45) degrees for full 10 seconds   6a. Motor Right Le=No drift, limb holds 90 (or 45) degrees for full 10 seconds   6b. Motor Left Le=No drift, limb holds 90 (or 45) degrees for full 10 seconds   7. Limb Ataxia:  0=Absent   8. Sensory: 0=Normal; no sensory loss   9. Best Language:  0=No aphasia, normal   10. Dysarthria: 0=Normal articulation   11. Extinction and Inattention (formerly Neglect): 0=No abnormality   Total Score: 0     Time NIHSS was completed: 1:50 PM    Modified Amador Score:  1 (No significant disability. Able to carry out all usual activities, despite some symptoms)    Lab Results: CBC:   Results from last 7 days   Lab Units 24  1155 24  1417   WBC Thousand/uL 11.93* 13.71*   RBC Million/uL 4.02 4.29   HEMOGLOBIN g/dL 12.2 12.9   HEMATOCRIT % 36.8 41.1   MCV fL 92 96   PLATELETS Thousands/uL 328 409*   , BMP/CMP:   Results from last 7 days   Lab Units 24  1155 24  1417   SODIUM mmol/L 138 137   POTASSIUM mmol/L 3.0* 3.0*   CHLORIDE mmol/L 106 104   CO2 mmol/L 20* 20*   BUN mg/dL 54* 60*   CREATININE mg/dL 1.81* 1.89*   CALCIUM mg/dL 10.2 9.9   AST U/L 14 17   ALT U/L 12 14   ALK PHOS U/L 73 80   EGFR ml/min/1.73sq m 27 26   , Coagulation:      Imaging Studies: I have personally reviewed pertinent films in PACS  CT stroke alert brain   Final Result by Shanita Marques MD (04/01 7195)      1.  New area of hypodensity involving subcortical and deep white matter in the anterior right frontal lobe most likely late subacute to chronic infarct. Superimposed acute infarct in the less hypodense regions are not excluded. There is no mass effect or    hemorrhage.   2.  Stable old right cerebellar lacunar infarct.                  Findings were directly discussed with Isaac Rucker at approximately 2:09 p.m.      Workstation performed: [id number]         CT abdomen pelvis wo contrast    (Results Pending)   \  EKG, Pathology, and Other Studies: I have personally reviewed pertinent reports.      VTE Prophylaxis: None yet, in ED    Code Status: Prior    Please see attendings attestation for critical care time spent/billing.  Discussed plan of care with patient and ED provider: admit under CVA pathway, MRI brain, echo, DAPT/statin, neuro checks, ongoing JANELLE management via nephrology.     Please note dictation software was used in the formulation of this note.  Please keep that in mind in light of any grammatical errors.

## 2024-04-01 NOTE — ED NOTES
Per neurology, if changes of speech are noted again, please video on tigertext to send to neurology. Pt in agreement.     Shahnaz Avila RN  04/01/24 7060

## 2024-04-01 NOTE — PROGRESS NOTES
Follow up Consultation    Nephrology   Almaz Hall 73 y.o. female MRN: [id number]  Unit/Bed#: E4 -01 Encounter: [id number]      Physician Requesting Consult: Jasper Coughlin Pe*        ASSESSMENT:  73-year-old female with multiple comorbidities including hypertension, A-fib, CHF and with ongoing diarrhea from December 2023 with weight loss and CKD stage III presents with worsening diarrhea abnormal outpatient lab work.  Nephrology consulted for JANELLE management.      Acute kidney injury  (POA) on CKD stage III:   JANELLE most likely secondary to prerenal azotemia secondary to GI losses as well as decreased p.o. intake plus failure to autoregulate presence of losartan and now at risk for JANELLE secondary to contrast exposure on 4/1/2024.  After review of records it appears that the patient has a baseline Creatinine of 0.9-1.1mg/dL.  Admission creatinine of 1.81 mg/dL on 4/1/2024.  Peak creatinine this for this hospitalization 1.81 mg/dL on 4/1/2024  Creatinine today is at 1.22 mg/dL stable and improving towards baseline.  Would hold further IV fluids for today and reevaluate in a.m. for further fluid needs as did have some mild crackles on exam.  CT abdomen/1/24 no hydronephrosis  check BMP, magnesium, phosphorus in a.m.  Await renal recovery.  Place on a renal diet when allowed diet order.   Strict I/O.  Daily weights  Urinary retention protocol  Avoid nephrotoxins, adjust meds to appropriate GFR.  Likely has underlying CKD secondary to age related nephron loss plus hypertensive nephrosclerosis  Outpatient for nephrology follows up with with     H&H/anemia:  most recent hemoglobin at 10.6g/dL  Maintain hemoglobin greater than 8 grams/deciliter    Acid-base electrolytes:  Hypokalemia: Most recent potassium 2.8 mEq supplemented recheck in a.m.  Metabolic acidosis: Most recent bicarb of 22 improving.  Hold off on further bicarb drip for now   hypomagnesemia: Most recent magnesium 0.3 resolved    Blood  pressure/hypertension:   Optimize hemodynamic status to avoid delay in renal recovery.  Maintain MAP > 65mmHg  Avoid BP fluctuations.  Home medications: Losartan 12.5 mg p.o. daily, Aldactone 12.5 mg p.o. daily, Cardizem 120 mg p.o. daily  Current medications: None  Hold home losartan and Aldactone for now    Other medical problems:  Diarrhea:  Management per primary team.  Follow-up with the GI consider inpatient workup in light of recurrent issues.  Plan for possible EGD colonoscopy later this week.  CT abdomen no hydronephrosis.  CHF:  Management per primary team.  Hold home Aldactone for now give 1 L IV fluids  Concern for speech changes/strokelike symptoms: Management per primary team discussed.  Status post stroke alert/1/24 status post CT with IV contrast and MRI.  Follow-up with neurology.  CT stroke from 4/1/2024 new area of hypodensity involving subcortical and deep white matter in the anterior right frontal lobe most likely late subacute or chronic infarct superimposed acute infarct in the less hypodense regions are not excluded.  Stable old right cerebellar lacunar infarct.  MRI brain no acute infarction edema or mass effect.  Chronic lacunar infarcts.  Mild chronic microangiopathic ischemic changes.    Plan below is what was discussed with the primary team that they agree with:  check BMP, magnesium, phosphorus in a.m.  Supplement potassium   Hold further IV fluids for now   Hold home losartan for now  Hold home Aldactone for now  If blood pressure is elevated may place on Norvasc 5 mg p.o. daily hold for SBP less than 130    Thanks for the consult  Will continue to follow  Please call with questions/ concerns.      Morenita Tillman MD, FASN, 4/2/2024, 11:05 AM                Objective :   Patient seen and examined in her room earlier this a.m. blood pressure remains low systolic remains afebrile urine output not adequately documented still having multiple bowel movements loose.  Concern for stroke had  "CT with IV contrast yesterday as well as MRI.      PHYSICAL EXAM  /50   Pulse 64   Temp (!) 97 °F (36.1 °C) (Temporal)   Resp 18   Ht 5' 2\" (1.575 m)   Wt 61.2 kg (135 lb)   LMP  (LMP Unknown)   SpO2 100%   BMI 24.69 kg/m²   Temp (24hrs), Av.4 °F (36.3 °C), Min:97 °F (36.1 °C), Max:97.6 °F (36.4 °C)        Intake/Output Summary (Last 24 hours) at 2024 1105  Last data filed at 2024 0924  Gross per 24 hour   Intake 1470 ml   Output 302 ml   Net 1168 ml       I/O last 24 hours:  In: 1470 [P.O.:420; IV Piggyback:1050]  Out: 302 [Urine:302]      Current Weight: Weight - Scale: 61.2 kg (135 lb)  First Weight: Weight - Scale: 61.2 kg (135 lb)  Physical Exam  Vitals and nursing note reviewed.   Constitutional:       General: She is not in acute distress.     Appearance: Normal appearance. She is normal weight. She is not ill-appearing or toxic-appearing.   HENT:      Head: Normocephalic and atraumatic.      Mouth/Throat:      Mouth: Mucous membranes are moist.      Pharynx: No oropharyngeal exudate.   Eyes:      General: No scleral icterus.  Cardiovascular:      Rate and Rhythm: Normal rate.   Pulmonary:      Breath sounds: No stridor. No wheezing.      Comments: Basal crackles  Abdominal:      General: There is no distension.      Palpations: Abdomen is soft.      Tenderness: There is no abdominal tenderness.   Musculoskeletal:         General: No swelling.      Cervical back: Normal range of motion. No rigidity.   Skin:     Coloration: Skin is not jaundiced.   Neurological:      General: No focal deficit present.      Mental Status: She is alert and oriented to person, place, and time.   Psychiatric:         Mood and Affect: Mood normal.         Behavior: Behavior normal.             Review of Systems   Constitutional:  Negative for appetite change, chills and fatigue.   HENT:  Negative for congestion.    Respiratory:  Negative for cough, shortness of breath and wheezing.    Cardiovascular:  " Negative for leg swelling.   Gastrointestinal:  Positive for diarrhea.   Genitourinary:  Negative for dysuria.   Musculoskeletal:  Negative for back pain.   Neurological:  Negative for headaches.   Psychiatric/Behavioral:  Negative for agitation and confusion.    All other systems reviewed and are negative.      Scheduled Meds:  Current Facility-Administered Medications   Medication Dose Route Frequency Provider Last Rate    acetaminophen  650 mg Oral Q4H PRN Aleks Abhinav, DO      aspirin  81 mg Oral Daily Aleks La, DO      atorvastatin  20 mg Oral QPM LIANG Watkins      cholestyramine sugar free  4 g Oral BID Aleks Abhinav, DO      clotrimazole   Topical BID Aleks Abhinav, DO      diltiazem  120 mg Oral HS Aleks Abhinav, DO      diphenoxylate-atropine  1 tablet Oral 4x Daily PRN Aleks Abhinav, DO      escitalopram  10 mg Oral Daily Aleks Abhinav, DO      heparin (porcine)  5,000 Units Subcutaneous Q8H [facility] Aleks La, DO      ondansetron  4 mg Intravenous Q4H PRN Aleks La, DO      potassium chloride  20 mEq Intravenous Q2H Abraham Beck PA-C 20 mEq (04/02/24 0928)    thiamine  100 mg Oral Daily Ap Reardon PA-C         PRN Meds:.  acetaminophen    diphenoxylate-atropine    ondansetron    Continuous Infusions:       Invasive Devices:     Invasive Devices       Peripheral Intravenous Line  Duration             Peripheral IV 04/01/24 Left Antecubital <1 day    Peripheral IV 04/01/24 Right Antecubital <1 day                      LABORATORY:    Results from last 7 days   Lab Units 04/02/24  0446 04/01/24  1155 03/29/24  1417   WBC Thousand/uL 7.81 11.93* 13.71*   HEMOGLOBIN g/dL 10.6* 12.2 12.9   HEMATOCRIT % 31.4* 36.8 41.1   PLATELETS Thousands/uL 283 328 409*   POTASSIUM mmol/L 2.8* 3.0* 3.0*   CHLORIDE mmol/L 110* 106 104   CO2 mmol/L 22 20* 20*   BUN mg/dL 39* 54* 60*   CREATININE mg/dL 1.22 1.81* 1.89*   CALCIUM mg/dL 9.3 10.2 9.9   MAGNESIUM mg/dL 2.3 1.7* 1.7*   PHOSPHORUS mg/dL 2.4  --    --       rest all reviewed    RADIOLOGY:  MRA carotids wo contrast   Final Result by Miki Posada MD (04/01 1702)      Motion degraded examination. Focal artifacts are noted along the bilateral carotid bulbs. There is no suspected extracranial carotid stenosis.      The cervical vertebral arteries are patent.            Workstation performed: [id number]         MRA head wo contrast   Final Result by Miki Posada MD (04/01 1658)      Cerebral MRA demonstrates no large branch vessel occlusion or focal hemodynamically significant stenosis. Fetal left PCA supply.                     Workstation performed: [id number]         MRI brain wo contrast   Final Result by Miki Posada MD (04/01 1653)      No acute infarction, edema, or mass effect.      Chronic lacunar infarcts within the right anterior centrum semiovale and right posterior cerebellum.      Mild chronic microangiopathic ischemic changes.      Workstation performed: [id number]         CT abdomen pelvis wo contrast   Final Result by Rogerio Redman DO (04/01 1515)      Fluid seen throughout the colon in keeping with history of diarrheal illness. No focal inflammatory changes.      Minimal sigmoid diverticulosis.      Limited evaluation of the solid organs without IV contrast. Subcentimeter right hepatic lobe hypodensity too small to characterize.      Limited study without IV contrast.      Workstation performed: [id number]         CT stroke alert brain   Final Result by Shanita Marques MD (04/01 1415)      1.  New area of hypodensity involving subcortical and deep white matter in the anterior right frontal lobe most likely late subacute to chronic infarct. Superimposed acute infarct in the less hypodense regions are not excluded. There is no mass effect or    hemorrhage.   2.  Stable old right cerebellar lacunar infarct.                  Findings were directly discussed with Isaac Rucker at approximately 2:09 p.m.      Workstation  "performed: [id number]           Rest all reviewed    Portions of the record may have been created with voice recognition software. Occasional wrong word or \"sound a like\" substitutions may have occurred due to the inherent limitations of voice recognition software. Read the chart carefully and recognize, using context, where substitutions have occurred.If you have any questions, please contact the dictating provider.    "

## 2024-04-01 NOTE — CONSULTS
Consultation - Nephrology   Almaz Hall 73 y.o. female MRN: [id number]  Unit/Bed#: ED-41 Encounter: [id number]    ASSESSMENT/PLAN:  Acute kidney injury (POA): Suspect prerenal due to excessive diarrhea with use of ARB and I will DrJose Workman.  -Baseline creatinine: Around 1.0.  -Creatinine is currently: 1.81.  -Follows with Dr. Lujan.  -Will provide IV hydration with bicarbonate.  -Recommend holding losartan and Aldactone at this time.  -Check urinalysis.  -Check bladder scan with retention protocol.  -Recommend avoiding nephrotoxins, hypotension, IV contrast.  -I/O.    Hypokalemia/hypomagnesemia: Reports ongoing diarrhea.  -Will provide IV and oral replacement.  -Monitor potassium closely with receiving IV bicarbonate.  -On home Aldactone 12.5 mg daily.  Holding for now due to elevated renal function.    Metabolic acidosis: In the setting of increased GI loss and acute kidney injury.  -Will give 1 L of bicarbonate.    Diarrhea/weight loss: Reports proximately 25 pound weight loss since December.  -Following with GI as an outpatient.  Planning on colonoscopy next week.  -Checking CT of abdomen and pelvis.    CHF: Previous echo with EF of 68%, grade 2 pseudo normal relaxation.  -Monitor volume status closely.    Other: Atrial fibrillation, moderate aortic stenosis.    HISTORY OF PRESENT ILLNESS:  Requesting Physician: Duc Steve MD  Reason for Consult: JANELLE (POA)    Almaz Hall is a 73 y.o. year old female with history of hypertension, atrial fibrillation, CHF, diarrhea since December with reported 25 pounds of weight loss, who was admitted to [facility] after presenting with elevated creatinine in the setting of diarrhea. A renal consultation is requested today for assistance in the management of acute kidney injury (POA).  Patient reports having continued diarrhea since December with approximately 10-15 bowel movements per day more recently.  She denies any fevers or chills.  She denies chest discomfort or shortness of  breath.  She denies nausea, vomiting, abdominal pain, or changes with her urination.  She reports being on the brat diet since Friday.  She continues to hydrate with fluids.  She has been compliant with her medication including her losartan and diuretic.  She denies any NSAID use.    PAST MEDICAL HISTORY:  Past Medical History:   Diagnosis Date    Asthma     Atrial fibrillation (HCC)     with ablation    Bursitis of hip     Last Assessed:6/5/2017    COPD (chronic obstructive pulmonary disease) (HCC) 5 years ago    Coronary artery disease     Herniated lumbar intervertebral disc     Hyperlipidemia     Hypertension 2 years    Pituitary adenoma (HCC)     Last Assessed:2/12/2015 ; Previously on bromocriptine    Spinal stenosis     SVT (supraventricular tachycardia)     with ablation    Systolic murmur of aorta        PAST SURGICAL HISTORY:  Past Surgical History:   Procedure Laterality Date    BREAST SURGERY      Incisional breast biopsy    CARDIAC ELECTROPHYSIOLOGY PROCEDURE N/A 09/02/2022    Procedure: Cardiac eps/afib ablation;  Surgeon: Fadi Goss MD;  Location: [facility];  Service: Cardiology    CARDIAC ELECTROPHYSIOLOGY PROCEDURE N/A 12/07/2022    Procedure: Cardiac loop recorder implant;  Surgeon: Bao Kovacs MD;  Location:  CARDIAC CATH LAB;  Service: Cardiology    CARDIAC ELECTROPHYSIOLOGY STUDY AND ABLATION      CARDIAC LOOP RECORDER      CARPAL TUNNEL RELEASE Left     CHOLECYSTECTOMY      OTHER SURGICAL HISTORY      EXCISION OF PAROTID TUMOR/GLAND    PAROTIDECTOMY      AR COLONOSCOPY FLX DX W/COLLJ SPEC WHEN PFRMD N/A 06/06/2017    Procedure: COLONOSCOPY;  Surgeon: Elizabeth Leyva DO;  Location: [facility] GI LAB;  Service: Gastroenterology    AR HYSTEROSCOPY BX ENDOMETRIUM&/POLYPC W/WO D&C N/A 11/22/2023    Procedure: (D&C) W/ HYSTEROSCOPY;  Surgeon: Harini Kinney MD;  Location: [facility] OR;  Service: Gynecology    ROTATOR CUFF REPAIR      SALPINGOOPHORECTOMY Right         ALLERGIES:  Allergies   Allergen Reactions    Bystolic [Nebivolol Hcl] Bradycardia     Hear rate to 20's    Levofloxacin Hives    Vasotec [Enalapril] Hives    Amlodipine Rash    Ancef [Cefazolin] GI Intolerance     Cdiff    Cephalosporins Other (See Comments)     c.diff    Fruit Extracts      Annotation - [id number]: peaches and strawberries    Morphine GI Intolerance     Extreme vomiting      Other      FRUIT    Penicillins Hives    Tetracycline Edema    Xarelto [Rivaroxaban] Rash       SOCIAL HISTORY:  Social History     Substance and Sexual Activity   Alcohol Use Not Currently    Alcohol/week: 1.0 standard drink of alcohol    Types: 1 Shots of liquor per week    Comment: social     Social History     Substance and Sexual Activity   Drug Use No     Social History     Tobacco Use   Smoking Status Former    Current packs/day: 0.00    Average packs/day: 1 pack/day for 30.0 years (30.0 ttl pk-yrs)    Types: Cigarettes    Start date: 1988    Quit date: 2018    Years since quittin.3   Smokeless Tobacco Never       FAMILY HISTORY:  Family History   Problem Relation Age of Onset    Colon cancer Mother     Coronary artery disease Mother     Colonic polyp Mother     Liver cancer Mother     Multiple sclerosis Father     Hypertension Brother     Hypertension Other     Breast cancer Neg Hx     Cancer Neg Hx        MEDICATIONS:  No current facility-administered medications for this encounter.    Current Outpatient Medications:     albuterol (ProAir HFA) 90 mcg/act inhaler, Inhale 2 puffs every 6 (six) hours as needed for wheezing or shortness of breath, Disp: 8.5 g, Rfl: 0    atorvastatin (LIPITOR) 20 mg tablet, TAKE 1 TABLET BY MOUTH EVERY DAY, Disp: 90 tablet, Rfl: 3    cetirizine (ZyrTEC) 10 mg tablet, Take 10 mg by mouth as needed , Disp: , Rfl:     diltiazem (TIAZAC) 120 MG 24 hr capsule, TAKE 1 CAPSULE BY MOUTH EVERY DAY, Disp: 90 capsule, Rfl: 3    diphenoxylate-atropine (LOMOTIL) 2.5-0.025 mg per  tablet, Take 1 tablet by mouth 4 (four) times a day as needed for diarrhea, Disp: 60 tablet, Rfl: 0    escitalopram (LEXAPRO) 10 mg tablet, TAKE 1 TABLET BY MOUTH EVERY DAY, Disp: 30 tablet, Rfl: 11    levalbuterol (XOPENEX HFA) 45 mcg/act inhaler, Inhale 1-2 puffs every 4 (four) hours as needed for wheezing, Disp: 15 g, Rfl: 0    losartan (COZAAR) 25 mg tablet, TALE 1/2 TABLET BY MOUTH DAILY, Disp: 45 tablet, Rfl: 3    mupirocin (BACTROBAN) 2 % ointment, Apply topically daily, Disp: 30 g, Rfl: 0    polyethylene glycol (MiraLax) 17 GM/SCOOP powder, Take 238 g by mouth once for 1 dose Take 238 g my mouth. Use as directed, Disp: 238 g, Rfl: 0    spironolactone (ALDACTONE) 25 mg tablet, TAKE 1 TABLET BY MOUTH EVERY DAY (Patient taking differently: 12.5 mg daily), Disp: 90 tablet, Rfl: 3    REVIEW OF SYSTEMS:  A complete review of systems was performed and found to be negative unless otherwise noted in the history of present illness.  General: No fevers, chills.   Cardiovascular:  No chest pain, No leg edema.  Respiratory: No cough, sputum production,  No shortness of breath.  Gastrointestinal:  No nausea/vomiting,  + diarrhea,  No abdominal pain.  Genitourinary: No hematuria.  No foamy urine.  No dysuria    PHYSICAL EXAM:  Current Weight:    First Weight:    Vitals:    04/01/24 1110 04/01/24 1200 04/01/24 1230   BP: 96/50 99/54 112/59   BP Location: Right arm Right arm Right arm   Pulse: 60 56 66   Resp: 18 18 18   Temp: (!) 97.3 °F (36.3 °C)     TempSrc: Oral     SpO2: 92% 98% 98%     No intake or output data in the 24 hours ending 04/01/24 1257  General: NAD  Skin: warm, dry, intact, no rash, poor turgor   HEENT: Dry mucous membranes, sclera anicteric, normocephalic, atraumatic  Neck: No apparent JVD appreciated  Chest:lung sounds clear B/L, on RA   CVS:Regular rate and rhythm, no murmer   Abdomen: Soft, round, non-tender, +BS  Extremities: No B/L LE edema present  Neuro: alert and oriented, speech garbled at  times  Psych: appropriate mood and affect     Invasive Devices:      Lab Results:   Results from last 7 days   Lab Units 04/01/24  1155 03/29/24  1417   WBC Thousand/uL 11.93* 13.71*   HEMOGLOBIN g/dL 12.2 12.9   HEMATOCRIT % 36.8 41.1   PLATELETS Thousands/uL 328 409*   SODIUM mmol/L 138 137   POTASSIUM mmol/L 3.0* 3.0*   CHLORIDE mmol/L 106 104   CO2 mmol/L 20* 20*   BUN mg/dL 54* 60*   CREATININE mg/dL 1.81* 1.89*   CALCIUM mg/dL 10.2 9.9   MAGNESIUM mg/dL 1.7* 1.7*   ALK PHOS U/L 73 80   ALT U/L 12 14   AST U/L 14 17

## 2024-04-01 NOTE — ASSESSMENT & PLAN NOTE
History of atrial fibrillation hypertension and CKD 3 who presents for abnormal labs  Has had several weeks of diarrhea.  Seen GI outpatient workup negative with plans for EGD/colonoscopy in May  Creatinine closer to 1.0.  IV fluids with bicarbonate infusion per nephrology.  Continue lomotil as needed.  Will not repeat any stool studies at this time.  Add banana flakes and cholestyramine.  Consult GI for evaluation as diarrhea has led to kidney injury prompting admission    Results from last 7 days   Lab Units 04/01/24  1155 03/29/24  1417   BUN mg/dL 54* 60*   CREATININE mg/dL 1.81* 1.89*   EGFR ml/min/1.73sq m 27 26

## 2024-04-02 ENCOUNTER — TELEPHONE (OUTPATIENT)
Dept: GASTROENTEROLOGY | Facility: MEDICAL CENTER | Age: 74
End: 2024-04-02

## 2024-04-02 ENCOUNTER — APPOINTMENT (INPATIENT)
Dept: NON INVASIVE DIAGNOSTICS | Facility: HOSPITAL | Age: 74
DRG: 982 | End: 2024-04-02
Payer: MEDICARE

## 2024-04-02 PROBLEM — E44.0 MODERATE PROTEIN-CALORIE MALNUTRITION (HCC): Status: ACTIVE | Noted: 2024-04-02

## 2024-04-02 LAB
ANION GAP SERPL CALCULATED.3IONS-SCNC: 7 MMOL/L (ref 4–13)
AORTIC ROOT: 2.9 CM
AORTIC VALVE MEAN VELOCITY: 23.8 M/S
APICAL FOUR CHAMBER EJECTION FRACTION: 70 %
ASCENDING AORTA: 3.3 CM
AV AREA BY CONTINUOUS VTI: 0.9 CM2
AV AREA PEAK VELOCITY: 1 CM2
AV LVOT MEAN GRADIENT: 3 MMHG
AV LVOT PEAK GRADIENT: 6 MMHG
AV MEAN GRADIENT: 25 MMHG
AV PEAK GRADIENT: 44 MMHG
AV VALVE AREA: 0.93 CM2
AV VELOCITY RATIO: 0.38
BSA FOR ECHO PROCEDURE: 1.62 M2
BUN SERPL-MCNC: 39 MG/DL (ref 5–25)
CALCIUM SERPL-MCNC: 9.3 MG/DL (ref 8.4–10.2)
CHLORIDE SERPL-SCNC: 110 MMOL/L (ref 96–108)
CHOLEST SERPL-MCNC: 102 MG/DL
CO2 SERPL-SCNC: 22 MMOL/L (ref 21–32)
CREAT SERPL-MCNC: 1.22 MG/DL (ref 0.6–1.3)
DOP CALC AO PEAK VEL: 3.3 M/S
DOP CALC AO VTI: 77.57 CM
DOP CALC LVOT AREA: 2.54 CM2
DOP CALC LVOT CARDIAC INDEX: 2.82 L/MIN/M2
DOP CALC LVOT CARDIAC OUTPUT: 4.57 L/MIN
DOP CALC LVOT DIAMETER: 1.8 CM
DOP CALC LVOT PEAK VEL VTI: 28.45 CM
DOP CALC LVOT PEAK VEL: 1.26 M/S
DOP CALC LVOT STROKE INDEX: 46.9 ML/M2
DOP CALC LVOT STROKE VOLUME: 72.36
DOP CALC MV VTI: 85.37 CM
E WAVE DECELERATION TIME: 543 MS
E/A RATIO: 1.31
ERYTHROCYTE [DISTWIDTH] IN BLOOD BY AUTOMATED COUNT: 12.2 % (ref 11.6–15.1)
EST. AVERAGE GLUCOSE BLD GHB EST-MCNC: 114 MG/DL
FERRITIN SERPL-MCNC: 68 NG/ML (ref 11–307)
FRACTIONAL SHORTENING: 34 (ref 28–44)
GFR SERPL CREATININE-BSD FRML MDRD: 44 ML/MIN/1.73SQ M
GLUCOSE SERPL-MCNC: 84 MG/DL (ref 65–140)
HBA1C MFR BLD: 5.6 %
HCT VFR BLD AUTO: 31.4 % (ref 34.8–46.1)
HDLC SERPL-MCNC: 28 MG/DL
HGB BLD-MCNC: 10.6 G/DL (ref 11.5–15.4)
INTERVENTRICULAR SEPTUM IN DIASTOLE (PARASTERNAL SHORT AXIS VIEW): 1.1 CM
INTERVENTRICULAR SEPTUM: 1.1 CM (ref 0.6–1.1)
IRON SATN MFR SERPL: 23 % (ref 15–50)
IRON SERPL-MCNC: 52 UG/DL (ref 50–212)
IVC: 18 MM
LAAS-AP2: 17.8 CM2
LAAS-AP4: 24.6 CM2
LDLC SERPL CALC-MCNC: 44 MG/DL (ref 0–100)
LEFT ATRIUM SIZE: 3.8 CM
LEFT ATRIUM VOLUME (MOD BIPLANE): 65 ML
LEFT ATRIUM VOLUME INDEX (MOD BIPLANE): 40.1 ML/M2
LEFT INTERNAL DIMENSION IN SYSTOLE: 2.7 CM (ref 2.1–4)
LEFT VENTRICULAR INTERNAL DIMENSION IN DIASTOLE: 4.1 CM (ref 3.5–6)
LEFT VENTRICULAR POSTERIOR WALL IN END DIASTOLE: 1.2 CM
LEFT VENTRICULAR STROKE VOLUME: 47 ML
LVSV (TEICH): 47 ML
MAGNESIUM SERPL-MCNC: 2.3 MG/DL (ref 1.9–2.7)
MCH RBC QN AUTO: 30.8 PG (ref 26.8–34.3)
MCHC RBC AUTO-ENTMCNC: 33.8 G/DL (ref 31.4–37.4)
MCV RBC AUTO: 91 FL (ref 82–98)
MV E'TISSUE VEL-LAT: 8 CM/S
MV E'TISSUE VEL-SEP: 7 CM/S
MV MEAN GRADIENT: 9 MMHG
MV PEAK A VEL: 1.39 M/S
MV PEAK E VEL: 182 CM/S
MV PEAK GRADIENT: 21 MMHG
MV STENOSIS PRESSURE HALF TIME: 157 MS
MV VALVE AREA BY CONTINUITY EQUATION: 0.85 CM2
MV VALVE AREA P 1/2 METHOD: 1.4
PHOSPHATE SERPL-MCNC: 2.4 MG/DL (ref 2.3–4.1)
PLATELET # BLD AUTO: 283 THOUSANDS/UL (ref 149–390)
PMV BLD AUTO: 9.7 FL (ref 8.9–12.7)
POTASSIUM SERPL-SCNC: 2.8 MMOL/L (ref 3.5–5.3)
RA PRESSURE ESTIMATED: 3 MMHG
RBC # BLD AUTO: 3.44 MILLION/UL (ref 3.81–5.12)
RIGHT ATRIUM AREA SYSTOLE A4C: 16.3 CM2
RIGHT VENTRICLE ID DIMENSION: 3.5 CM
RV PSP: 46 MMHG
SL CV LEFT ATRIUM LENGTH A2C: 5.3 CM
SL CV LV EF: 70
SL CV PED ECHO LEFT VENTRICLE DIASTOLIC VOLUME (MOD BIPLANE) 2D: 75 ML
SL CV PED ECHO LEFT VENTRICLE SYSTOLIC VOLUME (MOD BIPLANE) 2D: 27 ML
SODIUM SERPL-SCNC: 139 MMOL/L (ref 135–147)
TIBC SERPL-MCNC: 230 UG/DL (ref 250–450)
TR MAX PG: 43 MMHG
TR PEAK VELOCITY: 3.3 M/S
TRICUSPID ANNULAR PLANE SYSTOLIC EXCURSION: 2.2 CM
TRICUSPID VALVE PEAK REGURGITATION VELOCITY: 3.29 M/S
TRIGL SERPL-MCNC: 152 MG/DL
UIBC SERPL-MCNC: 178 UG/DL (ref 155–355)
VIT B12 SERPL-MCNC: 863 PG/ML (ref 180–914)
WBC # BLD AUTO: 7.81 THOUSAND/UL (ref 4.31–10.16)

## 2024-04-02 PROCEDURE — 99232 SBSQ HOSP IP/OBS MODERATE 35: CPT | Performed by: PHYSICIAN ASSISTANT

## 2024-04-02 PROCEDURE — 93306 TTE W/DOPPLER COMPLETE: CPT | Performed by: INTERNAL MEDICINE

## 2024-04-02 PROCEDURE — 84425 ASSAY OF VITAMIN B-1: CPT | Performed by: PHYSICIAN ASSISTANT

## 2024-04-02 PROCEDURE — 83735 ASSAY OF MAGNESIUM: CPT | Performed by: INTERNAL MEDICINE

## 2024-04-02 PROCEDURE — 93306 TTE W/DOPPLER COMPLETE: CPT

## 2024-04-02 PROCEDURE — 99232 SBSQ HOSP IP/OBS MODERATE 35: CPT | Performed by: INTERNAL MEDICINE

## 2024-04-02 PROCEDURE — 80048 BASIC METABOLIC PNL TOTAL CA: CPT | Performed by: INTERNAL MEDICINE

## 2024-04-02 PROCEDURE — 82728 ASSAY OF FERRITIN: CPT | Performed by: STUDENT IN AN ORGANIZED HEALTH CARE EDUCATION/TRAINING PROGRAM

## 2024-04-02 PROCEDURE — 99222 1ST HOSP IP/OBS MODERATE 55: CPT | Performed by: STUDENT IN AN ORGANIZED HEALTH CARE EDUCATION/TRAINING PROGRAM

## 2024-04-02 PROCEDURE — 83540 ASSAY OF IRON: CPT | Performed by: STUDENT IN AN ORGANIZED HEALTH CARE EDUCATION/TRAINING PROGRAM

## 2024-04-02 PROCEDURE — 97162 PT EVAL MOD COMPLEX 30 MIN: CPT

## 2024-04-02 PROCEDURE — 84100 ASSAY OF PHOSPHORUS: CPT | Performed by: INTERNAL MEDICINE

## 2024-04-02 PROCEDURE — 84207 ASSAY OF VITAMIN B-6: CPT | Performed by: PHYSICIAN ASSISTANT

## 2024-04-02 PROCEDURE — 82607 VITAMIN B-12: CPT | Performed by: PHYSICIAN ASSISTANT

## 2024-04-02 PROCEDURE — 83550 IRON BINDING TEST: CPT | Performed by: STUDENT IN AN ORGANIZED HEALTH CARE EDUCATION/TRAINING PROGRAM

## 2024-04-02 PROCEDURE — 80061 LIPID PANEL: CPT | Performed by: INTERNAL MEDICINE

## 2024-04-02 PROCEDURE — 97165 OT EVAL LOW COMPLEX 30 MIN: CPT

## 2024-04-02 PROCEDURE — 85027 COMPLETE CBC AUTOMATED: CPT | Performed by: INTERNAL MEDICINE

## 2024-04-02 PROCEDURE — 83036 HEMOGLOBIN GLYCOSYLATED A1C: CPT | Performed by: INTERNAL MEDICINE

## 2024-04-02 PROCEDURE — 99233 SBSQ HOSP IP/OBS HIGH 50: CPT | Performed by: PSYCHIATRY & NEUROLOGY

## 2024-04-02 RX ORDER — POTASSIUM CHLORIDE 14.9 MG/ML
20 INJECTION INTRAVENOUS
Status: COMPLETED | OUTPATIENT
Start: 2024-04-02 | End: 2024-04-02

## 2024-04-02 RX ORDER — POTASSIUM CHLORIDE 20 MEQ/1
40 TABLET, EXTENDED RELEASE ORAL ONCE
Status: COMPLETED | OUTPATIENT
Start: 2024-04-02 | End: 2024-04-02

## 2024-04-02 RX ORDER — POTASSIUM CHLORIDE 29.8 MG/ML
40 INJECTION INTRAVENOUS ONCE
Status: DISCONTINUED | OUTPATIENT
Start: 2024-04-02 | End: 2024-04-02

## 2024-04-02 RX ORDER — ATORVASTATIN CALCIUM 20 MG/1
20 TABLET, FILM COATED ORAL EVERY EVENING
Status: DISCONTINUED | OUTPATIENT
Start: 2024-04-02 | End: 2024-04-08 | Stop reason: HOSPADM

## 2024-04-02 RX ADMIN — HEPARIN SODIUM 5000 UNITS: 5000 INJECTION INTRAVENOUS; SUBCUTANEOUS at 15:40

## 2024-04-02 RX ADMIN — POTASSIUM CHLORIDE 20 MEQ: 14.9 INJECTION, SOLUTION INTRAVENOUS at 11:38

## 2024-04-02 RX ADMIN — CHOLESTYRAMINE 4 G: 4 POWDER, FOR SUSPENSION ORAL at 09:29

## 2024-04-02 RX ADMIN — ATORVASTATIN CALCIUM 20 MG: 20 TABLET, FILM COATED ORAL at 17:31

## 2024-04-02 RX ADMIN — POTASSIUM CHLORIDE 40 MEQ: 1500 TABLET, EXTENDED RELEASE ORAL at 09:42

## 2024-04-02 RX ADMIN — ESCITALOPRAM OXALATE 10 MG: 10 TABLET ORAL at 09:29

## 2024-04-02 RX ADMIN — POTASSIUM CHLORIDE 20 MEQ: 14.9 INJECTION, SOLUTION INTRAVENOUS at 09:28

## 2024-04-02 RX ADMIN — HEPARIN SODIUM 5000 UNITS: 5000 INJECTION INTRAVENOUS; SUBCUTANEOUS at 05:00

## 2024-04-02 RX ADMIN — CHOLESTYRAMINE 4 G: 4 POWDER, FOR SUSPENSION ORAL at 17:31

## 2024-04-02 RX ADMIN — DILTIAZEM HYDROCHLORIDE 120 MG: 120 CAPSULE, COATED, EXTENDED RELEASE ORAL at 21:39

## 2024-04-02 RX ADMIN — HEPARIN SODIUM 5000 UNITS: 5000 INJECTION INTRAVENOUS; SUBCUTANEOUS at 21:39

## 2024-04-02 RX ADMIN — ASPIRIN 81 MG CHEWABLE TABLET 81 MG: 81 TABLET CHEWABLE at 09:29

## 2024-04-02 RX ADMIN — THIAMINE HCL TAB 100 MG 100 MG: 100 TAB at 09:29

## 2024-04-02 NOTE — ASSESSMENT & PLAN NOTE
Wt Readings from Last 3 Encounters:   04/02/24 61.2 kg (135 lb)   03/29/24 61.4 kg (135 lb 6.4 oz)   03/29/24 61.7 kg (136 lb)     Prior to admission on spironolactone.    Will hold given kidney injury

## 2024-04-02 NOTE — PROGRESS NOTES
[facility]  Progress Note  Name: Almaz Hall I  MRN: [id number]  Unit/Bed#: E4 -01 I Date of Admission: 4/1/2024   Date of Service: 4/2/2024 I Hospital Day: 1    Assessment/Plan   * JANELLE (acute kidney injury) (HCC)  Assessment & Plan  History of atrial fibrillation hypertension and CKD 3 who presents for abnormal labs  Has had several weeks of diarrhea.  Seen GI outpatient workup negative with plans for EGD/colonoscopy in May  Creatinine closer to 1.0.    IV fluids with bicarbonate infusion per nephrology, now discontinued  Continue lomotil as needed.    Will not repeat any stool studies at this time.    Add banana flakes and cholestyramine.  Consulted GI for evaluation as diarrhea has led to kidney injury prompting admission  Unfortunately received loading dose of ASA/Plavix given concern for stroke, unable to scope until later this week  EGD/Colonoscopy to be performed while IP    Results from last 7 days   Lab Units 04/02/24  0446 04/01/24  1155 03/29/24  1417   BUN mg/dL 39* 54* 60*   CREATININE mg/dL 1.22 1.81* 1.89*   EGFR ml/min/1.73sq m 44 27 26       Aphasia  Assessment & Plan  Aphasia was a stroke alert in the ED, possibly due to hypotension.  Neurology evaluated  S/P DAPT load in the ED  MRI with late subacute/chronic infarct, no acute findings  Neurology recommending continuing ASA, dc plavix    Chronic diastolic CHF (congestive heart failure) (HCC)  Assessment & Plan  Wt Readings from Last 3 Encounters:   04/02/24 61.2 kg (135 lb)   03/29/24 61.4 kg (135 lb 6.4 oz)   03/29/24 61.7 kg (136 lb)     Prior to admission on spironolactone.    Will hold given kidney injury    Paroxysmal atrial fibrillation (HCC)  Assessment & Plan  Paroxysmal atrial fibrillation status post ablation.   Not anticoagulation.  Continue diltiazem CD at bedtime    Hypokalemia  Assessment & Plan  Replace and recheck tomorrow along with magnesium and phosphorus    Results from last 7 days   Lab Units  04/02/24  0446 04/01/24  1155 03/29/24  1417   POTASSIUM mmol/L 2.8* 3.0* 3.0*         Essential hypertension  Assessment & Plan  Maintained on spironolactone 12.5 mg daily, diltizem 120 mg daily, and losartan 12.5 mg daily  Holding spironolactone and losartan given JANELLE  Continue diltiazem    Depression with anxiety  Assessment & Plan  Continue escitalopram            VTE Pharmacologic Prophylaxis:   Moderate Risk (Score 3-4) - Pharmacological DVT Prophylaxis Ordered: heparin.    Mobility:   Basic Mobility Inpatient Raw Score: 22  JH-HLM Goal: 7: Walk 25 feet or more  JH-HLM Achieved: 7: Walk 25 feet or more  JH-HLM Goal achieved. Continue to encourage appropriate mobility.    Patient Centered Rounds: I performed bedside rounds with nursing staff today.   Discussions with Specialists or Other Care Team Provider: neuro    Education and Discussions with Family / Patient: Updated  (son) at bedside.    Total Time Spent on Date of Encounter in care of patient: 35 mins. This time was spent on one or more of the following: performing physical exam; counseling and coordination of care; obtaining or reviewing history; documenting in the medical record; reviewing/ordering tests, medications or procedures; communicating with other healthcare professionals and discussing with patient's family/caregivers.    Current Length of Stay: 1 day(s)  Current Patient Status: Inpatient   Certification Statement: The patient will continue to require additional inpatient hospital stay due to GI  Discharge Plan: Anticipate discharge in 48 hrs to home.    Code Status: Level 3 - DNAR and DNI    Subjective:   The patient is seen resting comfortably in bed.  She is getting her echocardiogram.  She states she is feeling much better today, continues to experience profuse diarrhea which has been ongoing for months.  She would like to have EGD/colonoscopy performed inpatient if that is possible.  She denies any chest pain or shortness  of breath.  Denies any hematochezia, melena.    Objective:     Vitals:   Temp (24hrs), Av.4 °F (36.3 °C), Min:97 °F (36.1 °C), Max:97.6 °F (36.4 °C)    Temp:  [97 °F (36.1 °C)-97.6 °F (36.4 °C)] 97 °F (36.1 °C)  HR:  [56-74] 64  Resp:  [18-24] 18  BP: ()/(50-61) 103/50  SpO2:  [92 %-100 %] 100 %  Body mass index is 24.69 kg/m².     Input and Output Summary (last 24 hours):     Intake/Output Summary (Last 24 hours) at 2024 1051  Last data filed at 2024 0924  Gross per 24 hour   Intake 1470 ml   Output 302 ml   Net 1168 ml       Physical Exam:   Physical Exam  Vitals and nursing note reviewed.   Constitutional:       General: She is not in acute distress.     Appearance: Normal appearance. She is obese. She is not ill-appearing, toxic-appearing or diaphoretic.   HENT:      Head: Normocephalic and atraumatic.   Cardiovascular:      Rate and Rhythm: Normal rate and regular rhythm.      Heart sounds: No murmur heard.     No friction rub. No gallop.   Pulmonary:      Effort: Pulmonary effort is normal. No respiratory distress.      Breath sounds: Normal breath sounds. No wheezing, rhonchi or rales.   Abdominal:      General: Abdomen is flat. Bowel sounds are normal. There is no distension.      Palpations: Abdomen is soft.      Tenderness: There is no abdominal tenderness.   Musculoskeletal:      Right lower leg: No edema.      Left lower leg: No edema.   Skin:     General: Skin is warm and dry.      Coloration: Skin is not jaundiced or pale.   Neurological:      General: No focal deficit present.      Mental Status: She is alert. Mental status is at baseline.          Additional Data:     Labs:  Results from last 7 days   Lab Units 24  0446 24  1155   WBC Thousand/uL 7.81 11.93*   HEMOGLOBIN g/dL 10.6* 12.2   HEMATOCRIT % 31.4* 36.8   PLATELETS Thousands/uL 283 328   NEUTROS PCT %  --  79*   LYMPHS PCT %  --  14   MONOS PCT %  --  5   EOS PCT %  --  0     Results from last 7 days   Lab Units  04/02/24  0446 04/01/24  1155   SODIUM mmol/L 139 138   POTASSIUM mmol/L 2.8* 3.0*   CHLORIDE mmol/L 110* 106   CO2 mmol/L 22 20*   BUN mg/dL 39* 54*   CREATININE mg/dL 1.22 1.81*   ANION GAP mmol/L 7 12   CALCIUM mg/dL 9.3 10.2   ALBUMIN g/dL  --  4.2   TOTAL BILIRUBIN mg/dL  --  0.49   ALK PHOS U/L  --  73   ALT U/L  --  12   AST U/L  --  14   GLUCOSE RANDOM mg/dL 84 100         Results from last 7 days   Lab Units 04/01/24  1346   POC GLUCOSE mg/dl 85               Lines/Drains:  Invasive Devices       Peripheral Intravenous Line  Duration             Peripheral IV 04/01/24 Left Antecubital <1 day    Peripheral IV 04/01/24 Right Antecubital <1 day                      Telemetry:  Telemetry Orders (From admission, onward)               24 Hour Telemetry Monitoring  Continuous x 24 Hours (Telem)        Expiring   Question:  Reason for 24 Hour Telemetry  Answer:  TIA/Suspected CVA/ Confirmed CVA                     Telemetry Reviewed: Normal Sinus Rhythm  Indication for Continued Telemetry Use: Acute CVAwill dc as cva ruled out             Imaging: No pertinent imaging reviewed.    Recent Cultures (last 7 days):         Last 24 Hours Medication List:   Current Facility-Administered Medications   Medication Dose Route Frequency Provider Last Rate    acetaminophen  650 mg Oral Q4H PRN Aleks La, DO      aspirin  81 mg Oral Daily Aleks La, DO      atorvastatin  20 mg Oral QPM LIANG Watkins      cholestyramine sugar free  4 g Oral BID Aleks La, DO      clotrimazole   Topical BID Aleks La, DO      diltiazem  120 mg Oral HS Aleks La, DO      diphenoxylate-atropine  1 tablet Oral 4x Daily PRN Aleks La, DO      escitalopram  10 mg Oral Daily Aleks La, DO      heparin (porcine)  5,000 Units Subcutaneous Q8H [facility] Aleks La, DO      ondansetron  4 mg Intravenous Q4H PRN Aleks La, DO      potassium chloride  20 mEq Intravenous Q2H Abraham Beck PA-C 20 mEq (04/02/24 0941)     thiamine  100 mg Oral Daily Ap Reardon PA-C          Today, Patient Was Seen By: Abraham Beck PA-C    **Please Note: This note may have been constructed using a voice recognition system.**

## 2024-04-02 NOTE — CASE MANAGEMENT
Case Management Assessment & Discharge Planning Note    Patient name Almaz Hall  Location [facility] 4 /E4 -* MRN [id number]  : 1950 Date 2024       Current Admission Date: 2024  Current Admission Diagnosis:JANELLE (acute kidney injury) (HCC)   Patient Active Problem List    Diagnosis Date Noted    Moderate protein-calorie malnutrition (HCC) 2024    transient episode of aphasia 2024    Elevated fecal calprotectin 2024    Ulcer of left foot ([facility]) 2024    Diarrhea 2024    RUQ pain 2024    S/P dilation and curettage 2023    Preop examination 2023    PMB (postmenopausal bleeding) 2023    Syncope 2023    Globus sensation 2023    Nonrheumatic mitral valve stenosis 2023    Chronic diastolic CHF (congestive heart failure) ([facility]) 2023    Rectocele 2022    Moderate pulmonary hypertension ([facility]) 2022    Paroxysmal atrial fibrillation ([facility]) 2022    Nonrheumatic mitral valve regurgitation 06/10/2022    Non-rheumatic aortic stenosis 2021    Depression, recurrent ([facility]) 2021    Stage 3 chronic kidney disease ([facility]) 2020    Hypertensive kidney disease with stage 3 chronic kidney disease ([facility]) 2020    Achilles tendinitis of left lower extremity 2019    Posterior tibial tendinitis, left 2019    Aortic valve insufficiency 2019    Diastolic dysfunction 2019    Hypercalcemia 2019    Pulmonary emphysema ([facility]) 2018    Hypokalemia 2018    JANELLE (acute kidney injury) ([facility]) 2018    SVT (supraventricular tachycardia) 2018    Greater trochanteric bursitis of right hip     Irritable bowel syndrome with diarrhea 2017    Lumbar degenerative disc disease 2017    Vitamin D deficiency 2015    Essential hypertension 03/10/2015    Depression with anxiety 2013    Dyslipidemia 2013      LOS (days): 1  Geometric Mean LOS (GMLOS)  (days): 3.1  Days to GMLOS:2.1     OBJECTIVE:    Risk of Unplanned Readmission Score: 14.66         Current admission status: Inpatient       Preferred Pharmacy:   [id number] - [place], [place] - [address]  [address]  [place] [id number]  Phone: [phone] Fax: [phone]    Primary Care Provider: Jacob Lorenzana MD    Primary Insurance: MEDICARE  Secondary Insurance: BLUE CROSS    ASSESSMENT:  Active Health Care Proxies    There are no active Health Care Proxies on file.       Advance Directives  Does patient have a Health Care POA?: Yes  Does patient have Advance Directives?: Yes  Advance Directives: Living will, Power of  for health care  Primary Contact: Shad Hall         Readmission Root Cause  30 Day Readmission: No    Patient Information  Admitted from:: Home  Mental Status: Alert  During Assessment patient was accompanied by: Not accompanied during assessment  Assessment information provided by:: Patient  Primary Caregiver: Self  Support Systems: Self, Spouse/significant other  County of Residence: [place]  What [facility] do you live in?: [place]  Home entry access options. Select all that apply.: Stairs  Number of steps to enter home.: 1  Do the steps have railings?: No  Type of Current Residence: [facility]  Living Arrangements: Lives w/ Spouse/significant other  Is patient a ?: No    Activities of Daily Living Prior to Admission  Functional Status: Independent  Completes ADLs independently?: Yes  Ambulates independently?: Yes  Does patient use assisted devices?: No  Does patient currently own DME?: No  Does patient have a history of Outpatient Therapy (PT/OT)?: No  Does the patient have a history of Short-Term Rehab?: No  Does patient have a history of HHC?: No  Does patient currently have HHC?: No         Patient Information Continued  Income Source: Pension/senior care  Does patient have prescription coverage?: Yes  Does patient  receive dialysis treatments?: No  Does patient have a history of substance abuse?: No  Does patient have a history of Mental Health Diagnosis?: Yes  Is patient receiving treatment for mental health?: Yes (Anxiety uses lexapro)  Has patient received inpatient treatment related to mental health in the last 2 years?: No                Social Determinants of Health (SDOH)      Flowsheet Row Most Recent Value   Housing Stability    In the last 12 months, was there a time when you were not able to pay the mortgage or rent on time? N   In the last 12 months, how many places have you lived? 1   In the last 12 months, was there a time when you did not have a steady place to sleep or slept in a shelter (including now)? N   Transportation Needs    In the past 12 months, has lack of transportation kept you from medical appointments or from getting medications? no   In the past 12 months, has lack of transportation kept you from meetings, work, or from getting things needed for daily living? No   Food Insecurity    Within the past 12 months, the food you bought just didn't last and you didn't have money to get more. Never true   Utilities    In the past 12 months has the electric, gas, oil, or water company threatened to shut off services in your home? No            DISCHARGE DETAILS:    Discharge planning discussed with:: pt        CM contacted family/caregiver?: No- see comments (alert and able to answer for herself)  Were Treatment Team discharge recommendations reviewed with patient/caregiver?: Yes  Did patient/caregiver verbalize understanding of patient care needs?: Yes  Were patient/caregiver advised of the risks associated with not following Treatment Team discharge recommendations?: Yes         Requested Home Health Care         Is the patient interested in HHC at discharge?: No    DME Referral Provided  Referral made for DME?: No         Would you like to participate in our [facility] service program?  : No -  Declined    Treatment Team Recommendation: Home  Discharge Destination Plan:: Home                                         Additional Comments: CM met with the pt and asessment completed.  Pt is a retired RN.  Lives with spouse in ranch home.  Hx of Dirrhea since 12/23 and was going to have colonscopy in July.  Was called by MD to come in due to JANELLE and abnormal labs.  Pt states feeling beetre now and may have test outpt.  Must hold Plavix for 5 days prior to test.  Waiting for GI final input.  CM will follow.  No identified needs at this time.

## 2024-04-02 NOTE — ASSESSMENT & PLAN NOTE
Paroxysmal atrial fibrillation status post ablation.   Not anticoagulation.  Continue diltiazem CD at bedtime

## 2024-04-02 NOTE — SPEECH THERAPY NOTE
Speech Language/Pathology  Speech/Language Pathology  Assessment    Patient Name: Almaz Hall  Today's Date: 4/2/2024     Problem List  Principal Problem:    JANELLE (acute kidney injury) (HCC)  Active Problems:    Depression with anxiety    Essential hypertension    Hypokalemia    Paroxysmal atrial fibrillation (HCC)    Chronic diastolic CHF (congestive heart failure) (HCC)    transient episode of aphasia    Moderate protein-calorie malnutrition (HCC)    Past Medical History  Past Medical History:   Diagnosis Date    Asthma     Atrial fibrillation (HCC)     with ablation    Bursitis of hip     Last Assessed:6/5/2017    COPD (chronic obstructive pulmonary disease) (HCC) 5 years ago    Coronary artery disease     Herniated lumbar intervertebral disc     Hyperlipidemia     Hypertension 2 years    Pituitary adenoma (HCC)     Last Assessed:2/12/2015 ; Previously on bromocriptine    Spinal stenosis     SVT (supraventricular tachycardia)     with ablation    Systolic murmur of aorta      Past Surgical History  Past Surgical History:   Procedure Laterality Date    BREAST SURGERY      Incisional breast biopsy    CARDIAC ELECTROPHYSIOLOGY PROCEDURE N/A 09/02/2022    Procedure: Cardiac eps/afib ablation;  Surgeon: Fadi Goss MD;  Location: [facility];  Service: Cardiology    CARDIAC ELECTROPHYSIOLOGY PROCEDURE N/A 12/07/2022    Procedure: Cardiac loop recorder implant;  Surgeon: Bao Kovcas MD;  Location:  CARDIAC CATH LAB;  Service: Cardiology    CARDIAC ELECTROPHYSIOLOGY STUDY AND ABLATION      CARDIAC LOOP RECORDER      CARPAL TUNNEL RELEASE Left     CHOLECYSTECTOMY      OTHER SURGICAL HISTORY      EXCISION OF PAROTID TUMOR/GLAND    PAROTIDECTOMY      NE COLONOSCOPY FLX DX W/COLLJ SPEC WHEN PFRMD N/A 06/06/2017    Procedure: COLONOSCOPY;  Surgeon: Elizabeth Leyva DO;  Location: [facility] GI LAB;  Service: Gastroenterology    NE HYSTEROSCOPY BX ENDOMETRIUM&/POLYPC W/WO D&C N/A 11/22/2023    Procedure:  (D&C) W/ HYSTEROSCOPY;  Surgeon: Harini Kinney MD;  Location: [facility];  Service: Gynecology    ROTATOR CUFF REPAIR      SALPINGOOPHORECTOMY Right        ST note: Chart reviewed, pt screened. Speech clear and fluent, tolerating clear liquid diet. NO facial asymmetry. Pt/spouse denied any changes in speech or swallow. Screen only. WIll d/c order. Please reconsult if specific ST needs arise.

## 2024-04-02 NOTE — MALNUTRITION/BMI
This medical record reflects one or more clinical indicators suggestive of malnutrition and/or morbid obesity.    Malnutrition Findings:   Adult Malnutrition type: Chronic illness  Adult Degree of Malnutrition: Malnutrition of moderate degree  Malnutrition Characteristics: Inadequate energy, Weight loss                  360 Statement: Moderate malnutrition r/t chronic diarrhea as evidenced by 10% unplanned wt loss since 10/30/23, consuming < 75% energy intake compared to estimated energy needs > 1 month. Treated with Banatrol bid.        See Nutrition note dated 4/2/24 for additional details.  Completed nutrition assessment is viewable in the nutrition documentation.

## 2024-04-02 NOTE — ASSESSMENT & PLAN NOTE
Replace and recheck tomorrow along with magnesium and phosphorus    Results from last 7 days   Lab Units 04/02/24  0446 04/01/24  1155 03/29/24  1417   POTASSIUM mmol/L 2.8* 3.0* 3.0*

## 2024-04-02 NOTE — PLAN OF CARE
Problem: Potential for Falls  Goal: Patient will remain free of falls  Description: INTERVENTIONS:  - Educate patient/family on patient safety including physical limitations  - Instruct patient to call for assistance with activity   - Consult OT/PT to assist with strengthening/mobility   - Keep Call bell within reach  - Keep bed low and locked with side rails adjusted as appropriate  - Keep care items and personal belongings within reach  - Initiate and maintain comfort rounds  - Make Fall Risk Sign visible to staff  - Apply yellow socks and bracelet for high fall risk patients  - Consider moving patient to room near nurses station  Outcome: Progressing     Problem: DISCHARGE PLANNING  Goal: Discharge to home or other facility with appropriate resources  Description: INTERVENTIONS:  - Identify barriers to discharge w/patient and caregiver  - Arrange for needed discharge resources and transportation as appropriate  - Identify discharge learning needs (meds, wound care, etc.)  - Arrange for interpretive services to assist at discharge as needed  - Refer to Case Management Department for coordinating discharge planning if the patient needs post-hospital services based on physician/advanced practitioner order or complex needs related to functional status, cognitive ability, or social support system  Outcome: Progressing     Problem: Knowledge Deficit  Goal: Patient/family/caregiver demonstrates understanding of disease process, treatment plan, medications, and discharge instructions  Description: Complete learning assessment and assess knowledge base.  Interventions:  - Provide teaching at level of understanding  - Provide teaching via preferred learning methods  Outcome: Progressing     Problem: NEUROSENSORY - ADULT  Goal: Achieves stable or improved neurological status  Description: INTERVENTIONS  - Monitor and report changes in neurological status  - Monitor vital signs such as temperature, blood pressure, glucose,  and any other labs ordered   - Initiate measures to prevent increased intracranial pressure  - Monitor for seizure activity and implement precautions if appropriate      Outcome: Progressing

## 2024-04-02 NOTE — ASSESSMENT & PLAN NOTE
History of atrial fibrillation hypertension and CKD 3 who presents for abnormal labs  Has had several weeks of diarrhea.  Seen GI outpatient workup negative with plans for EGD/colonoscopy in May  Creatinine closer to 1.0.    IV fluids with bicarbonate infusion per nephrology, now discontinued  Continue lomotil as needed.    Will not repeat any stool studies at this time.    Add banana flakes and cholestyramine.  Consulted GI for evaluation as diarrhea has led to kidney injury prompting admission  Unfortunately received loading dose of ASA/Plavix given concern for stroke, unable to scope until later this week  EGD/Colonoscopy to be performed while IP    Results from last 7 days   Lab Units 04/02/24  0446 04/01/24  1155 03/29/24  1417   BUN mg/dL 39* 54* 60*   CREATININE mg/dL 1.22 1.81* 1.89*   EGFR ml/min/1.73sq m 44 27 26

## 2024-04-02 NOTE — PLAN OF CARE
Problem: DISCHARGE PLANNING  Goal: Discharge to home or other facility with appropriate resources  Description: INTERVENTIONS:  - Identify barriers to discharge w/patient and caregiver  - Arrange for needed discharge resources and transportation as appropriate  - Identify discharge learning needs (meds, wound care, etc.)  - Arrange for interpretive services to assist at discharge as needed  - Refer to Case Management Department for coordinating discharge planning if the patient needs post-hospital services based on physician/advanced practitioner order or complex needs related to functional status, cognitive ability, or social support system  Outcome: Progressing     Problem: Knowledge Deficit  Goal: Patient/family/caregiver demonstrates understanding of disease process, treatment plan, medications, and discharge instructions  Description: Complete learning assessment and assess knowledge base.  Interventions:  - Provide teaching at level of understanding  - Provide teaching via preferred learning methods  Outcome: Progressing     Problem: NEUROSENSORY - ADULT  Goal: Achieves stable or improved neurological status  Description: INTERVENTIONS  - Monitor and report changes in neurological status  - Monitor vital signs such as temperature, blood pressure, glucose, and any other labs ordered   - Initiate measures to prevent increased intracranial pressure  - Monitor for seizure activity and implement precautions if appropriate      Outcome: Progressing     Problem: Nutrition/Hydration-ADULT  Goal: Nutrient/Hydration intake appropriate for improving, restoring or maintaining nutritional needs  Description: Monitor and assess patient's nutrition/hydration status for malnutrition. Collaborate with interdisciplinary team and initiate plan and interventions as ordered.  Monitor patient's weight and dietary intake as ordered or per policy. Utilize nutrition screening tool and intervene as necessary. Determine patient's food  preferences and provide high-protein, high-caloric foods as appropriate.     INTERVENTIONS:  - Monitor oral intake, urinary output, labs, and treatment plans  - Assess nutrition and hydration status and recommend course of action  - Evaluate amount of meals eaten  - Assist patient with eating if necessary   - Allow adequate time for meals  - Recommend/ encourage appropriate diets, oral nutritional supplements, and vitamin/mineral supplements  - Order, calculate, and assess calorie counts as needed  - Recommend, monitor, and adjust tube feedings and TPN/PPN based on assessed needs  - Assess need for intravenous fluids  - Provide specific nutrition/hydration education as appropriate  - Include patient/family/caregiver in decisions related to nutrition  Outcome: Progressing

## 2024-04-02 NOTE — PHYSICAL THERAPY NOTE
PHYSICAL THERAPY EVALUATION          Patient Name: Almaz Hall  Today's Date: 4/2/2024 04/02/24 0924   PT Last Visit   PT Visit Date 04/02/24   Note Type   Note type Evaluation   Pain Assessment   Pain Assessment Tool 0-10   Pain Score No Pain   Restrictions/Precautions   Other Precautions Cognitive;Bed Alarm;Chair Alarm;Telemetry;Fall Risk   Home Living   Type of Home House   Home Layout Two level;Laundry in basement;Able to live on main level with bedroom/bathroom;Stairs to enter without rails   Bathroom Shower/Tub Tub/shower unit   Bathroom Toilet Standard   Bathroom Equipment Shower chair   Home Equipment Walker  (standard walker)   Additional Comments 1 HENRY   Prior Function   Level of [place] Independent with ADLs;Independent with functional mobility;Independent with IADLS   Lives With Spouse   Receives Help From Family   IADLs Independent with driving;Independent with meal prep;Independent with medication management  (spouse has been doing laundry)   Falls in the last 6 months 1 to 4   Comments indep at baseline   General   Additional Pertinent History pt admitted 4/1/24 for AI. up w assist orders. PMHx significant for depression, anxiety, afib, CHF, CKD   Family/Caregiver Present Yes   Cognition   Overall Cognitive Status WFL   Arousal/Participation Cooperative   Orientation Level Oriented to person;Oriented to place;Oriented to time   Following Commands Follows one step commands without difficulty   Comments slow to respond at times   Five Times Sit To Stand   Time (Seconds) 20.45 Seconds   Transfers   Stand to Sit 5  Supervision   Toilet transfer 6  Modified independent   Additional items Standard toilet   Ambulation/Elevation   Gait pattern WNL;Excessively slow;Improper Weight shift   Gait Assistance 5  Supervision   Additional items Assist x 1   Assistive Device None   Distance 130'   Balance   Static Standing Fair   Dynamic Standing Fair -   Ambulatory Fair -    Endurance Deficit   Endurance Deficit No   Activity Tolerance   Activity Tolerance Patient tolerated treatment well   Medical Staff Made Aware Bridgett OT   Nurse Made Aware yes   Assessment   Prognosis Good   Assessment Almaz Hall is a 73 y.o. female admitted to [facility] on 4/1/2024 for JANELLE (acute kidney injury) (HCC). PT was consulted and pt was seen on 4/2/2024 for mobility assessment and d/c planning. Pt presents w medium fall risk, telemetry monitoring. At baseline is indep. Pt is currently functioning at a S-mod I for transfers, S for ambulation. Pt demonstrated ability to ambulate limited community distances. Mild gait deviations but no gross LOB. Appears to be functioning at baseline w no acute deficits impacting functional mobility. Would benefit from continued mobilization via nsg/restorative. The patient's AM-PAC Basic Mobility Inpatient Short Form Raw Score is 22. A Raw score of greater than 16 suggests the patient may benefit from discharge to home.   Barriers to Discharge None   Plan   PT Frequency   (d/c PT: maintain on restorative)   Discharge Recommendation   Rehab Resource Intensity Level, PT No post-acute rehabilitation needs   AM-PAC Basic Mobility Inpatient   Turning in Flat Bed Without Bedrails 4   Lying on Back to Sitting on Edge of Flat Bed Without Bedrails 4   Moving Bed to Chair 4   Standing Up From Chair Using Arms 4   Walk in Room 3   Climb 3-5 Stairs With Railing 3   Basic Mobility Inpatient Raw Score 22   Basic Mobility Standardized Score 47.4   [facility] Highest Level Of Mobility   -HLM Goal 7: Walk 25 feet or more   -HLM Achieved 7: Walk 25 feet or more   End of Consult   Patient Position at End of Consult Seated edge of bed;All needs within reach;Other (comment);Bed/Chair alarm activated  (RN present)     History: co - morbidities including age, current experience including fall risk  Exam: impairments in systems including multiple body structures involved; neuromuscular (balance,  gait, transfers), cognition; am-pac  Clinical: stable/unpredictable  Complexity: moderate    Lia Ivey, PT

## 2024-04-02 NOTE — ASSESSMENT & PLAN NOTE
Aphasia was a stroke alert in the ED, possibly due to hypotension.  Neurology evaluated  S/P DAPT load in the ED  MRI with late subacute/chronic infarct, no acute findings  Neurology recommending continuing ASA, dc plavix

## 2024-04-02 NOTE — ASSESSMENT & PLAN NOTE
Maintained on spironolactone 12.5 mg daily, diltizem 120 mg daily, and losartan 12.5 mg daily  Holding spironolactone and losartan given JANELLE  Continue diltiazem

## 2024-04-02 NOTE — PLAN OF CARE
Problem: Potential for Falls  Goal: Patient will remain free of falls  Description: INTERVENTIONS:  - Educate patient/family on patient safety including physical limitations  - Instruct patient to call for assistance with activity   - Consult OT/PT to assist with strengthening/mobility   - Keep Call bell within reach  - Keep bed low and locked with side rails adjusted as appropriate  - Keep care items and personal belongings within reach  - Initiate and maintain comfort rounds  - Make Fall Risk Sign visible to staff  - Offer Toileting every  Hours, in advance of need  - Initiate/Maintain alarm  - Obtain necessary fall risk management equipment  - Apply yellow socks and bracelet for high fall risk patients  - Consider moving patient to room near nurses station  4/2/2024 [id number] by Edelmira Will RN  Outcome: Progressing  4/1/2024 [id number] by Edelmira Will RN  Outcome: Progressing     Problem: DISCHARGE PLANNING  Goal: Discharge to home or other facility with appropriate resources  Description: INTERVENTIONS:  - Identify barriers to discharge w/patient and caregiver  - Arrange for needed discharge resources and transportation as appropriate  - Identify discharge learning needs (meds, wound care, etc.)  - Arrange for interpretive services to assist at discharge as needed  - Refer to Case Management Department for coordinating discharge planning if the patient needs post-hospital services based on physician/advanced practitioner order or complex needs related to functional status, cognitive ability, or social support system  Outcome: Progressing     Problem: Knowledge Deficit  Goal: Patient/family/caregiver demonstrates understanding of disease process, treatment plan, medications, and discharge instructions  Description: Complete learning assessment and assess knowledge base.  Interventions:  - Provide teaching at level of understanding  - Provide teaching via preferred learning methods  Outcome: Progressing      Problem: NEUROSENSORY - ADULT  Goal: Achieves stable or improved neurological status  Description: INTERVENTIONS  - Monitor and report changes in neurological status  - Monitor vital signs such as temperature, blood pressure, glucose, and any other labs ordered   - Initiate measures to prevent increased intracranial pressure  - Monitor for seizure activity and implement precautions if appropriate      Outcome: Progressing

## 2024-04-02 NOTE — TELEPHONE ENCOUNTER
----- Message from Suresh Yan DO sent at 4/2/2024  1:22 PM EDT -----  Regarding: Follow-up EGD/colonoscopy expedited  Can we expedite this patient's EGD and colonoscopy for next week.  She is having severe diarrhea and requires more urgent endoscopy.  Will need to be done in hospital setting given her comorbidities.

## 2024-04-02 NOTE — PROGRESS NOTES
Pastoral Care Progress Note    2024  Patient: Almaz Hall : 1950  Admission Date & Time: 2024 1109  MRN: [id number] [facility]: [id number]     24 1100   Clinical Encounter Type   Visited With Patient      visited patient and encouraged her.   prayed blessing for her recovery and will be available upon request.

## 2024-04-02 NOTE — PROGRESS NOTES
Progress Note - Neurology   Almaz Hall 73 y.o. female [id number]  Unit/Bed#: East 4 /E4 -*    Assessment/Plan:  Almaz Hall is a 73 y.o. female    transient episode of aphasia  Assessment & Plan  73-year-old female with atrial fibrillation status post ablation not on AC, CAD, HTN, HLD, SVT s/p ablation, and JANELLE on CKD stage IIIa with recent weight loss and diarrhea admitted 4/1 for ongoing JANELLE management.    Stroke alert activated 4/1 afternoon for acute onset speech difficulty;described as intermittent garbled speech/word finding difficulty. NIHSS 0 in ED. BP 96/50 with . CTH showed right frontal hypodensity; CTA deferred. Deemed not a TNK candidate. Loaded with asa and plavix and admitted on the stroke pathway     Neuroimaging/work-up:  -CT head: Anterior right frontal lobe hypodensity, suggesting late subacute/chronic infarct, no acute hemorrhage nor other intracranial pathology  -CTA head/neck was deferred given low NIHSS (minimal suspicion for LVO and ongoing JANELLE)  -MRI brain: No acute infarction, edema, or mass effect.  Chronic lacunar infarcts within the right anterior centrum semiovale and right posterior cerebellum.  Mild chronic microangiopathic ischemic changes.  -MRA head:Cerebral MRA demonstrates no large branch vessel occlusion or focal hemodynamically significant stenosis. Fetal left PCA supply.  -MRA carotids:Motion degraded examination. Focal artifacts are noted along the bilateral carotid bulbs. There is no suspected extracranial carotid stenosis.The cervical vertebral arteries are patent.  -LDL 44  -A1c 5.6  Recommendations:  MRI negative for acute infarct; chronic lacunar infarcts noted. Unclear etiology of now resolved transient/intermittent episodes of mild word finding difficulty  -ECHO complete; report pending  -D/C plavix, continue asa 81mg daily monotherapy  -Continue Lipitor 20 mg daily  -interrogate loop monitor  -goal normotension; avoid hypotension  -no additional  inpatient neurology recommendations; please reach out with any questions/concerns or change in exam  -Medical management and correction of any metabolic or infectious disturbances per primary service.   -no neurological contraindication for colonoscopy        Almaz Hall will need follow up in in 6 weeks with neurovascular attending, AP, or resident .  She will not require outpatient neurological testing.     Subjective:   Patient reports that she is back to her baseline without recurrent events/symptoms    Past Medical History:   Diagnosis Date    Asthma     Atrial fibrillation (HCC)     with ablation    Bursitis of hip     Last Assessed:6/5/2017    COPD (chronic obstructive pulmonary disease) (HCC) 5 years ago    Coronary artery disease     Herniated lumbar intervertebral disc     Hyperlipidemia     Hypertension 2 years    Pituitary adenoma (HCC)     Last Assessed:2/12/2015 ; Previously on bromocriptine    Spinal stenosis     SVT (supraventricular tachycardia)     with ablation    Systolic murmur of aorta      Past Surgical History:   Procedure Laterality Date    BREAST SURGERY      Incisional breast biopsy    CARDIAC ELECTROPHYSIOLOGY PROCEDURE N/A 09/02/2022    Procedure: Cardiac eps/afib ablation;  Surgeon: Fadi Goss MD;  Location: [facility];  Service: Cardiology    CARDIAC ELECTROPHYSIOLOGY PROCEDURE N/A 12/07/2022    Procedure: Cardiac loop recorder implant;  Surgeon: Bao Kovacs MD;  Location:  CARDIAC CATH LAB;  Service: Cardiology    CARDIAC ELECTROPHYSIOLOGY STUDY AND ABLATION      CARDIAC LOOP RECORDER      CARPAL TUNNEL RELEASE Left     CHOLECYSTECTOMY      OTHER SURGICAL HISTORY      EXCISION OF PAROTID TUMOR/GLAND    PAROTIDECTOMY      OR COLONOSCOPY FLX DX W/COLLJ SPEC WHEN PFRMD N/A 06/06/2017    Procedure: COLONOSCOPY;  Surgeon: Elizabeth Leyva DO;  Location: [facility];  Service: Gastroenterology    OR HYSTEROSCOPY BX ENDOMETRIUM&/POLYPC W/WO D&C N/A 11/22/2023     Procedure: (D&C) W/ HYSTEROSCOPY;  Surgeon: Harini Kinney MD;  Location: [facility];  Service: Gynecology    ROTATOR CUFF REPAIR      SALPINGOOPHORECTOMY Right      Family History   Problem Relation Age of Onset    Colon cancer Mother     Coronary artery disease Mother     Colonic polyp Mother     Liver cancer Mother     Multiple sclerosis Father     Hypertension Brother     Hypertension Other     Breast cancer Neg Hx     Cancer Neg Hx      Social History     Socioeconomic History    Marital status: /Civil Union     Spouse name: None    Number of children: None    Years of education: None    Highest education level: None   Occupational History    None   Tobacco Use    Smoking status: Former     Current packs/day: 0.00     Average packs/day: 1 pack/day for 30.0 years (30.0 ttl pk-yrs)     Types: Cigarettes     Start date: 1988     Quit date: 2018     Years since quittin.3    Smokeless tobacco: Never   Vaping Use    Vaping status: Never Used   Substance and Sexual Activity    Alcohol use: Not Currently     Alcohol/week: 1.0 standard drink of alcohol     Types: 1 Shots of liquor per week     Comment: social    Drug use: No    Sexual activity: Not Currently     Birth control/protection: Post-menopausal   Other Topics Concern    None   Social History Narrative    None     Social Determinants of Health     Financial Resource Strain: Low Risk  (2023)    Overall Financial Resource Strain (CARDIA)     Difficulty of Paying Living Expenses: Not hard at all   Food Insecurity: Not on file   Transportation Needs: No Transportation Needs (2023)    PRAPARE - Transportation     Lack of Transportation (Medical): No     Lack of Transportation (Non-Medical): No   Physical Activity: Not on file   Stress: Not on file   Social Connections: Not on file   Intimate Partner Violence: Not on file   Housing Stability: Not on file     E-Cigarette/Vaping    E-Cigarette Use Never User   "    E-Cigarette/Vaping Substances    Nicotine No     THC No     CBD No     Flavoring No     Other No     Unknown No          Medications:  All current active meds have been reviewed and current meds:  Scheduled Meds:  Current Facility-Administered Medications   Medication Dose Route Frequency Provider Last Rate    acetaminophen  650 mg Oral Q4H PRN Aleks Abhinav, DO      aspirin  81 mg Oral Daily Aleks Abhinav, DO      atorvastatin  20 mg Oral QPM LIANG Watkins      cholestyramine sugar free  4 g Oral BID Aleks Abhinav, DO      clotrimazole   Topical BID Aleks Abhinav, DO      diltiazem  120 mg Oral HS Aleks Abhinav, DO      diphenoxylate-atropine  1 tablet Oral 4x Daily PRN Aleks Abhinav, DO      escitalopram  10 mg Oral Daily Aleks Abhinav, DO      heparin (porcine)  5,000 Units Subcutaneous Q8H [facility] Aleks Abhinav, DO      ondansetron  4 mg Intravenous Q4H PRN Aleks Abhinav, DO      potassium chloride  20 mEq Intravenous Q2H Abraham Beck PA-C 20 mEq (04/02/24 1138)    thiamine  100 mg Oral Daily Ap Reardon PA-C       Continuous Infusions:   PRN Meds:.  acetaminophen    diphenoxylate-atropine    ondansetron       ROS:   Review of Systems  Refer to subjective    Vitals:   /53 (BP Location: Left arm)   Pulse 58   Temp 97.9 °F (36.6 °C) (Temporal)   Resp 18   Ht 5' 2\" (1.575 m)   Wt 61.2 kg (135 lb)   LMP  (LMP Unknown)   SpO2 99%   BMI 24.69 kg/m²     Physical Exam:   Physical Exam  Vitals reviewed.   Constitutional:       General: She is not in acute distress.  HENT:      Head: Normocephalic and atraumatic.   Pulmonary:      Effort: Pulmonary effort is normal.   Skin:     General: Skin is warm and dry.   Neurological:      Mental Status: She is alert and oriented to person, place, and time.      Cranial Nerves: Cranial nerves 2-12 are intact.      Motor: Motor strength is normal.     Coordination: Finger-Nose-Finger Test normal.      Deep Tendon Reflexes:      Reflex Scores:       Bicep " reflexes are 2+ on the right side and 2+ on the left side.       Brachioradialis reflexes are 2+ on the right side and 2+ on the left side.       Patellar reflexes are 2+ on the right side and 2+ on the left side.  Psychiatric:         Speech: Speech normal.       Neurologic Exam     Mental Status   Oriented to person, place, and time.   Follows 3 step commands.   Attention: normal. Concentration: normal.   Speech: speech is normal   Level of consciousness: alert  Able to name object. Able to repeat.     Cranial Nerves   Cranial nerves II through XII intact.     Motor Exam     Strength   Strength 5/5 throughout.     Sensory Exam   Light touch normal.   Pinprick normal.     Gait, Coordination, and Reflexes     Coordination   Finger to nose coordination: normal    Tremor   Resting tremor: absent    Reflexes   Right brachioradialis: 2+  Left brachioradialis: 2+  Right biceps: 2+  Left biceps: 2+  Right patellar: 2+  Left patellar: 2+  Right plantar: normal  Left plantar: normal      Labs: I have personally reviewed pertinent reports.   Recent Results (from the past 24 hour(s))   Fingerstick Glucose (POCT)    Collection Time: 04/01/24  1:46 PM   Result Value Ref Range    POC Glucose 85 65 - 140 mg/dl   ECG 12 lead    Collection Time: 04/01/24  2:04 PM   Result Value Ref Range    Ventricular Rate 62 BPM    Atrial Rate 300 BPM    CA Interval  ms    QRSD Interval 78 ms    QT Interval 408 ms    QTC Interval 414 ms    P Axis  degrees    QRS Axis 31 degrees    T Wave Axis 64 degrees   Urinalysis with microscopic    Collection Time: 04/01/24  3:01 PM   Result Value Ref Range    Color, UA Light Yellow     Clarity, UA Clear     Specific Gravity, UA 1.009 1.003 - 1.030    pH, UA 6.0 4.5, 5.0, 5.5, 6.0, 6.5, 7.0, 7.5, 8.0    Leukocytes, UA Negative Negative    Nitrite, UA Negative Negative    Protein, UA Negative Negative mg/dl    Glucose, UA Negative Negative mg/dl    Ketones, UA Negative Negative mg/dl    Urobilinogen, UA <2.0  <2.0 mg/dl mg/dl    Bilirubin, UA Negative Negative    Occult Blood, UA Negative Negative    RBC, UA None Seen None Seen, 1-2 /hpf    WBC, UA 1-2 None Seen, 1-2 /hpf    Epithelial Cells None Seen None Seen, Occasional /hpf    Bacteria, UA Occasional None Seen, Occasional /hpf    Hyaline Casts, UA 3-5 (A) None Seen /lpf   Basic metabolic panel    Collection Time: 04/02/24  4:46 AM   Result Value Ref Range    Sodium 139 135 - 147 mmol/L    Potassium 2.8 (L) 3.5 - 5.3 mmol/L    Chloride 110 (H) 96 - 108 mmol/L    CO2 22 21 - 32 mmol/L    ANION GAP 7 4 - 13 mmol/L    BUN 39 (H) 5 - 25 mg/dL    Creatinine 1.22 0.60 - 1.30 mg/dL    Glucose 84 65 - 140 mg/dL    Calcium 9.3 8.4 - 10.2 mg/dL    eGFR 44 ml/min/1.73sq m   Magnesium    Collection Time: 04/02/24  4:46 AM   Result Value Ref Range    Magnesium 2.3 1.9 - 2.7 mg/dL   Phosphorus    Collection Time: 04/02/24  4:46 AM   Result Value Ref Range    Phosphorus 2.4 2.3 - 4.1 mg/dL   Vitamin B12    Collection Time: 04/02/24  4:46 AM   Result Value Ref Range    Vitamin B-12 863 180 - 914 pg/mL   CBC (With Platelets)    Collection Time: 04/02/24  4:46 AM   Result Value Ref Range    WBC 7.81 4.31 - 10.16 Thousand/uL    RBC 3.44 (L) 3.81 - 5.12 Million/uL    Hemoglobin 10.6 (L) 11.5 - 15.4 g/dL    Hematocrit 31.4 (L) 34.8 - 46.1 %    MCV 91 82 - 98 fL    MCH 30.8 26.8 - 34.3 pg    MCHC 33.8 31.4 - 37.4 g/dL    RDW 12.2 11.6 - 15.1 %    Platelets 283 149 - 390 Thousands/uL    MPV 9.7 8.9 - 12.7 fL   Lipid Panel with Direct LDL reflex    Collection Time: 04/02/24  4:46 AM   Result Value Ref Range    Cholesterol 102 See Comment mg/dL    Triglycerides 152 (H) See Comment mg/dL    HDL, Direct 28 (L) >=50 mg/dL    LDL Calculated 44 0 - 100 mg/dL   Hemoglobin A1c w/EAG Estimation    Collection Time: 04/02/24  4:46 AM   Result Value Ref Range    Hemoglobin A1C 5.6 Normal 4.0-5.6%; PreDiabetic 5.7-6.4%; Diabetic >=6.5%; Glycemic control for adults with diabetes <7.0% %     mg/dl    Echo complete w/ contrast if indicated    Collection Time: 04/02/24  8:50 AM   Result Value Ref Range    Triscuspid Valve Regurgitation Peak Gradient 43.0 mmHg    RAA A4C 16.3 cm2    LA Volume Index (BP) 40.1 mL/m2    MV Peak A Jose Luis 1.39 m/s    MV stenosis pressure 1/2 time 157 ms    MV VTI 85.37 cm    MV Peak E Jose Luis 182 cm/s    MV peak gradient antegrade 21 mmHg    AV peak gradient 44 mmHg    LVOT stroke volume 76.00     Ao VTI 77.57 cm    Aortic valve peak velocity 3.3 m/s    LVOT peak VTI 28.45 cm    LVOT peak jose luis 1.26 m/s    LVOT diameter 1.8 cm    E wave deceleration time 543 ms    E/A ratio 1.31     MV valve area p 1/2 method 1.40     MV mean gradient antegrade 9 mmHg    AV LVOT peak gradient 6 mmHg    AV mean gradient 25 mmHg    TR Peak Jose Luis 3.3 m/s    AV area peak jose luis 1.0 cm2    AV area by cont VTI 0.9 cm2    LVOT mn grad 3.0 mmHg    RVID d 3.5 cm    A4C EF 70 %    Aortic valve mean velocity 23.80 m/s    Tricuspid valve peak regurgitation velocity 3.29 m/s    Left ventricular stroke volume (2D) 47.00 mL    IVSd 1.10 cm    Tricuspid annular plane systolic excursion 2.20 cm    Ao root 2.90 cm    LVPWd 1.20 cm    LA size 3.8 cm    Asc Ao 3.3 cm    LA volume (BP) 65 mL    FS 34 28 - 44    LVIDS 2.70 cm    IVS 1.1 cm    LVIDd 4.10 cm    LA length (A2C) 5.30 cm    LEFT VENTRICLE SYSTOLIC VOLUME (MOD BIPLANE) 2D 27 mL    LV DIASTOLIC VOLUME (MOD BIPLANE) 2D 75 mL    LVOT Cardiac Index 2.82 l/min/m2    LVOT stroke volume index 46.90 ml/m2    LVOT Cardiac Output 4.57 l/min    Left Atrium Area-systolic Four Chamber 24.6 cm2    Left Atrium Area-systolic Apical Two Chamber 17.8 cm2    MV E' Tissue Velocity Lateral 8 cm/s    MV E' Tissue Velocity Septal 7 cm/s    LVSV, 2D 47 mL    LVOT area 2.54 cm2    MV valve area by continuity eq 0.85 cm2    DVI 0.38     AV valve area 0.93 cm2    BSA 1.62 m2       Imaging: I have personally reviewed pertinent imaging in PACS and I have personally reviewed PACS reports.     EKG,  Pathology, and Other Studies: I have personally reviewed pertinent reports.     Counseling / Coordination of Care  Assessment, images, and plan reviewed with . Please refer to attending attestation for additional recommendations. Plan discussed with patient, son at bedside and primary service

## 2024-04-02 NOTE — OCCUPATIONAL THERAPY NOTE
Occupational Therapy Evaluation     Patient Name: Almaz Hall  Today's Date: 4/2/2024  Problem List  Principal Problem:    JANELLE (acute kidney injury) (HCC)  Active Problems:    Depression with anxiety    Essential hypertension    Hypokalemia    Paroxysmal atrial fibrillation (HCC)    Chronic diastolic CHF (congestive heart failure) (HCC)    transient episode of aphasia    Moderate protein-calorie malnutrition (HCC)    Past Medical History  Past Medical History:   Diagnosis Date    Asthma     Atrial fibrillation (HCC)     with ablation    Bursitis of hip     Last Assessed:6/5/2017    COPD (chronic obstructive pulmonary disease) (HCC) 5 years ago    Coronary artery disease     Herniated lumbar intervertebral disc     Hyperlipidemia     Hypertension 2 years    Pituitary adenoma (HCC)     Last Assessed:2/12/2015 ; Previously on bromocriptine    Spinal stenosis     SVT (supraventricular tachycardia)     with ablation    Systolic murmur of aorta      Past Surgical History  Past Surgical History:   Procedure Laterality Date    BREAST SURGERY      Incisional breast biopsy    CARDIAC ELECTROPHYSIOLOGY PROCEDURE N/A 09/02/2022    Procedure: Cardiac eps/afib ablation;  Surgeon: Fadi Goss MD;  Location: [facility];  Service: Cardiology    CARDIAC ELECTROPHYSIOLOGY PROCEDURE N/A 12/07/2022    Procedure: Cardiac loop recorder implant;  Surgeon: Bao Kovacs MD;  Location:  [facility];  Service: Cardiology    CARDIAC ELECTROPHYSIOLOGY STUDY AND ABLATION      CARDIAC LOOP RECORDER      CARPAL TUNNEL RELEASE Left     CHOLECYSTECTOMY      OTHER SURGICAL HISTORY      EXCISION OF PAROTID TUMOR/GLAND    PAROTIDECTOMY      NC COLONOSCOPY FLX DX W/COLLJ SPEC WHEN PFRMD N/A 06/06/2017    Procedure: COLONOSCOPY;  Surgeon: Elizabeth Leyva DO;  Location: [facility];  Service: Gastroenterology    NC HYSTEROSCOPY BX ENDOMETRIUM&/POLYPC W/WO D&C N/A 11/22/2023    Procedure: (D&C) W/ HYSTEROSCOPY;  Surgeon:  Harini Kinney MD;  Location: [facility];  Service: Gynecology    ROTATOR CUFF REPAIR      SALPINGOOPHORECTOMY Right            04/02/24 0926   OT Last Visit   OT Visit Date 04/02/24   Note Type   Note type Evaluation   Pain Assessment   Pain Assessment Tool 0-10   Pain Score No Pain   Restrictions/Precautions   Weight Bearing Precautions Per Order No   Other Precautions Chair Alarm;Bed Alarm;Telemetry;Fall Risk   Home Living   Type of Home House   Home Layout One level;Laundry in basement;Stairs to enter without rails   Bathroom Shower/Tub Tub/shower unit   Bathroom Toilet Standard   Bathroom Equipment Shower chair   Bathroom Accessibility Accessible   Home Equipment Walker  (standard walker)   Additional Comments Pt lives with spouse in a one level house with 1 HENRY and FOS to laundry. Spouse completes laundry.   Prior Function   Level of [place] Independent with ADLs;Independent with functional mobility;Independent with IADLS  (assist w/ laundry)   Lives With Spouse   Receives Help From Family   IADLs Independent with driving;Independent with meal prep;Independent with medication management   Falls in the last 6 months 1 to 4   Vocational Retired   Comments At baseline, pt was I w/ ADLs, IADLs, and functional transfers/mobility w/o use of AD. (+) . (+) fall PTA.   Lifestyle   Autonomy At baseline, pt was I w/ ADLs, IADLs, and functional transfers/mobility w/o use of AD. (+) . (+) fall PTA.   Reciprocal Relationships Spouse   Service to Others Retired   ADL   Where Assessed Edge of bed   Eating Assistance 7  Independent   Grooming Assistance 6  Modified Independent   UB Bathing Assistance 6  Modified Independent   LB Bathing Assistance 5  Supervision/Setup   UB Dressing Assistance 6  Modified independent   LB Dressing Assistance 5  Supervision/Setup   Toileting Assistance  5  Supervision/Setup   Functional Assistance 5  Supervision/Setup   Bed Mobility   Supine to Sit Unable to assess   Sit  to Supine Unable to assess   Additional Comments Pt seated EOB with bed alarm activated at end of session. Call bell and phone within reach. All needs met and pt reports no further questions for OT at this time.   Transfers   Sit to Stand 5  Supervision   Additional items Increased time required;Verbal cues   Stand to Sit 5  Supervision   Additional items Verbal cues   Toilet transfer 6  Modified independent   Additional items Standard toilet   Functional Mobility   Functional Mobility 5  Supervision   Additional Comments Assist x1 w/o use of AD   Balance   Static Sitting Fair +   Dynamic Sitting Fair   Static Standing Fair   Dynamic Standing Fair -   Ambulatory Fair -   Activity Tolerance   Activity Tolerance Patient tolerated treatment well   Medical Staff Made Aware Lia, PT   Nurse Made Aware yes; KANNAN Hickey   RUE Assessment   RUE Assessment WFL  (4/5 throughout)   LUE Assessment   LUE Assessment WFL  (4/5 throughout)   Hand Function   Gross Motor Coordination Functional   Fine Motor Coordination Functional   Sensation   Light Touch No apparent deficits   Proprioception   Proprioception No apparent deficits   Vision-Basic Assessment   Current Vision Wears glasses all the time   Vision - Complex Assessment   Ocular Range of Motion Intact   Acuity Able to read clock/calendar on wall without difficulty;Able to read employee name badge without difficulty   Psychosocial   Psychosocial (WDL) WDL   Perception   Inattention/Neglect Appears intact   Cognition   Overall Cognitive Status WFL   Arousal/Participation Alert;Cooperative   Attention Within functional limits   Orientation Level Oriented to person;Oriented to place;Oriented to time   Memory Within functional limits   Following Commands Follows one step commands without difficulty   Comments Delayed responses at times   Assessment   Prognosis Good   Assessment Pt is a 73 y.o. female seen for OT evaluation s/p adm to [facility] on 4/1/2024 w/ JANELLE. Pt  w/ aphasia in ED- stroke alert activated. MRI: late subacute/chronic infarct, no acute findings. Comorbidities affecting pt’s functional performance include a significant PMH of Asthma, A-Fib, COPD, CAD, HTN, Spinal stenosis, SVT. Pt with active OT orders and activity orders for Up with assistance. Pt lives with spouse in a one level house with 1 HENRY and FOS to laundry. Spouse completes laundry. At baseline, pt was I w/ ADLs, IADLs, and functional transfers/mobility w/o use of AD. (+) . (+) fall PTA. Upon evaluation, pt currently functioning at a Supervision-Mod I level, Supervision-Mod I for transfers, and Supervision for functional mobility 2* the following deficits impacting occupational performance: decreased balance and decreased activity tolerance. Pt with the following personal factors of: HENRY home environment and fall risk . Despite above mentioned deficits and personal factors, pt is functioning near baseline level of performance. Limited ADL deficits. No further acute OT needs identified at this time. Recommend continued mobilization with hospital staff and restorative program while in the hospital to increase pt’s endurance and strength upon D/C. The patient's raw score on the AM-PAC Daily Activity Inpatient Short Form is 21. A raw score of greater than or equal to 19 suggests the patient may benefit from discharge to home. Please refer to the recommendation of the Occupational Therapist for safe discharge planning. D/C pt from OT caseload at this time.   Plan   OT Frequency Eval only  (D/C OT)   Discharge Recommendation   Rehab Resource Intensity Level, OT No post-acute rehabilitation needs   AM-PAC Daily Activity Inpatient   Lower Body Dressing 3   Bathing 3   Toileting 3   Upper Body Dressing 4   Grooming 4   Eating 4   Daily Activity Raw Score 21   Daily Activity Standardized Score (Calc for Raw Score >=11) 44.27   AM-PAC Applied Cognition Inpatient   Following a Speech/Presentation 4    Understanding Ordinary Conversation 4   Taking Medications 4   Remembering Where Things Are Placed or Put Away 4   Remembering List of 4-5 Errands 4   Taking Care of Complicated Tasks 3   Applied Cognition Raw Score 23   Applied Cognition Standardized Score 53.08        Bridgett Portillo, OTR/L

## 2024-04-02 NOTE — TELEPHONE ENCOUNTER
Continue stressed importance to patient to take the appointment at [facility] otherwise she will have to be put on a wait list that may take several weeks.  Thanks.

## 2024-04-02 NOTE — ED PROVIDER NOTES
History  Chief Complaint   Patient presents with    Abnormal Lab     Abnormal labs via PCP on Thursday. Severe diarrhea for several weeks. Reports she had an US last week.       History provided by:  Patient   used: No    Evaluation of Abnormal Diagnostic Test  Time since result:  4 days back  Patient referred by:  PCP  Resulting agency:  [facility]  Result type: chemistry    Chemistry:     Potassium:  Low    BUN:  High    Creatinine:  High    Other abnormal chemistry result:  None      Prior to Admission Medications   Prescriptions Last Dose Informant Patient Reported? Taking?   albuterol (ProAir HFA) 90 mcg/act inhaler  Self No No   Sig: Inhale 2 puffs every 6 (six) hours as needed for wheezing or shortness of breath   atorvastatin (LIPITOR) 20 mg tablet 4/1/2024 Self No Yes   Sig: TAKE 1 TABLET BY MOUTH EVERY DAY   cetirizine (ZyrTEC) 10 mg tablet 3/31/2024 Self Yes Yes   Sig: Take 10 mg by mouth as needed    diltiazem (TIAZAC) 120 MG 24 hr capsule 3/31/2024 Self No Yes   Sig: TAKE 1 CAPSULE BY MOUTH EVERY DAY   diphenoxylate-atropine (LOMOTIL) 2.5-0.025 mg per tablet 4/1/2024  No Yes   Sig: Take 1 tablet by mouth 4 (four) times a day as needed for diarrhea   escitalopram (LEXAPRO) 10 mg tablet 4/1/2024 Self No Yes   Sig: TAKE 1 TABLET BY MOUTH EVERY DAY   levalbuterol (XOPENEX HFA) 45 mcg/act inhaler Unknown  No No   Sig: Inhale 1-2 puffs every 4 (four) hours as needed for wheezing   losartan (COZAAR) 25 mg tablet 3/31/2024  No Yes   Sig: TALE 1/2 TABLET BY MOUTH DAILY   mupirocin (BACTROBAN) 2 % ointment Past Week  No Yes   Sig: Apply topically daily   polyethylene glycol (MiraLax) 17 GM/SCOOP powder   No No   Sig: Take 238 g by mouth once for 1 dose Take 238 g my mouth. Use as directed   spironolactone (ALDACTONE) 25 mg tablet 4/1/2024 Self No Yes   Sig: TAKE 1 TABLET BY MOUTH EVERY DAY   Patient taking differently: 12.5 mg daily      Facility-Administered Medications: None       Past Medical  History:   Diagnosis Date    Asthma     Atrial fibrillation (HCC)     with ablation    Bursitis of hip     Last Assessed:6/5/2017    COPD (chronic obstructive pulmonary disease) (HCC) 5 years ago    Coronary artery disease     Herniated lumbar intervertebral disc     Hyperlipidemia     Hypertension 2 years    Pituitary adenoma (HCC)     Last Assessed:2/12/2015 ; Previously on bromocriptine    Spinal stenosis     SVT (supraventricular tachycardia)     with ablation    Systolic murmur of aorta        Past Surgical History:   Procedure Laterality Date    BREAST SURGERY      Incisional breast biopsy    CARDIAC ELECTROPHYSIOLOGY PROCEDURE N/A 09/02/2022    Procedure: Cardiac eps/afib ablation;  Surgeon: Fadi Goss MD;  Location: [facility];  Service: Cardiology    CARDIAC ELECTROPHYSIOLOGY PROCEDURE N/A 12/07/2022    Procedure: Cardiac loop recorder implant;  Surgeon: Bao Kovacs MD;  Location:  C[facility] CATH LAB;  Service: Cardiology    CARDIAC ELECTROPHYSIOLOGY STUDY AND ABLATION      CARDIAC LOOP RECORDER      CARPAL TUNNEL RELEASE Left     CHOLECYSTECTOMY      OTHER SURGICAL HISTORY      EXCISION OF PAROTID TUMOR/GLAND    PAROTIDECTOMY      SD COLONOSCOPY FLX DX W/COLLJ SPEC WHEN PFRMD N/A 06/06/2017    Procedure: COLONOSCOPY;  Surgeon: Elizabeth Leyva DO;  Location: [facility] GI LAB;  Service: Gastroenterology    SD HYSTEROSCOPY BX ENDOMETRIUM&/POLYPC W/WO D&C N/A 11/22/2023    Procedure: (D&C) W/ HYSTEROSCOPY;  Surgeon: Harini Kinney MD;  Location: [facility] OR;  Service: Gynecology    ROTATOR CUFF REPAIR      SALPINGOOPHORECTOMY Right        Family History   Problem Relation Age of Onset    Colon cancer Mother     Coronary artery disease Mother     Colonic polyp Mother     Liver cancer Mother     Multiple sclerosis Father     Hypertension Brother     Hypertension Other     Breast cancer Neg Hx     Cancer Neg Hx      I have reviewed and agree with the history as  documented.    E-Cigarette/Vaping    E-Cigarette Use Never User      E-Cigarette/Vaping Substances    Nicotine No     THC No     CBD No     Flavoring No     Other No     Unknown No      Social History     Tobacco Use    Smoking status: Former     Current packs/day: 0.00     Average packs/day: 1 pack/day for 30.0 years (30.0 ttl pk-yrs)     Types: Cigarettes     Start date: 1988     Quit date: 2018     Years since quittin.3    Smokeless tobacco: Never   Vaping Use    Vaping status: Never Used   Substance Use Topics    Alcohol use: Not Currently     Alcohol/week: 1.0 standard drink of alcohol     Types: 1 Shots of liquor per week     Comment: social    Drug use: No       Review of Systems   Constitutional:  Negative for chills, diaphoresis and fever.   HENT:  Negative for ear pain, facial swelling, sore throat, trouble swallowing and voice change.    Eyes:  Negative for pain and visual disturbance.   Respiratory:  Negative for cough, chest tightness and shortness of breath.    Cardiovascular:  Negative for chest pain, palpitations and leg swelling.   Gastrointestinal:  Negative for abdominal pain, constipation, diarrhea, nausea and vomiting.   Endocrine: Negative.    Genitourinary:  Negative for difficulty urinating, dysuria, flank pain and frequency.   Musculoskeletal:  Negative for back pain, neck pain and neck stiffness.   Skin:  Negative for color change, pallor and rash.   Allergic/Immunologic: Negative for environmental allergies and immunocompromised state.   Neurological:  Positive for speech difficulty. Negative for dizziness, weakness and headaches.   Hematological:  Negative for adenopathy. Does not bruise/bleed easily.   Psychiatric/Behavioral: Negative.  Negative for agitation and behavioral problems.    All other systems reviewed and are negative.      Physical Exam  Physical Exam  Vitals and nursing note reviewed.   Constitutional:       General: She is not in acute distress.      Appearance: She is well-developed.   HENT:      Head: Normocephalic and atraumatic.   Eyes:      Extraocular Movements: Extraocular movements intact.   Cardiovascular:      Rate and Rhythm: Normal rate and regular rhythm.      Heart sounds: Normal heart sounds.   Pulmonary:      Effort: Pulmonary effort is normal.      Breath sounds: Normal breath sounds.   Abdominal:      Palpations: Abdomen is soft.      Tenderness: There is no abdominal tenderness. There is no guarding or rebound.   Musculoskeletal:         General: Normal range of motion.      Cervical back: Normal range of motion and neck supple.   Skin:     General: Skin is warm and dry.   Neurological:      General: No focal deficit present.      Mental Status: She is alert and oriented to person, place, and time.      Comments: Subsequent Neuro exam: +Aphasia, no facial droop, no arm or leg weakness, normal finger to nose, NIH 1   Psychiatric:         Mood and Affect: Mood normal.         Behavior: Behavior normal.         Vital Signs  ED Triage Vitals [04/01/24 1110]   Temperature Pulse Respirations Blood Pressure SpO2   (!) 97.3 °F (36.3 °C) 60 18 96/50 92 %      Temp Source Heart Rate Source Patient Position - Orthostatic VS BP Location FiO2 (%)   Oral Monitor Sitting Right arm --      Pain Score       No Pain           Vitals:    04/02/24 0030 04/02/24 0230 04/02/24 0430 04/02/24 0752   BP: 97/54 108/54 109/53 103/50   Pulse: 59 71 67 64   Patient Position - Orthostatic VS: Lying Lying Lying Lying         Visual Acuity  Visual Acuity      Flowsheet Row Most Recent Value   L Pupil Size (mm) 3   R Pupil Size (mm) 3   L Pupil Shape Round   R Pupil Shape Round            ED Medications  Medications   sodium bicarbonate 150 mEq in dextrose 5 % 1,000 mL infusion (0 mL/hr Intravenous Stopped 4/2/24 0000)   thiamine tablet 100 mg (100 mg Oral Given 4/2/24 0929)   aspirin chewable tablet 81 mg (81 mg Oral Given 4/2/24 0929)   heparin (porcine) subcutaneous  injection 5,000 Units (5,000 Units Subcutaneous Given 4/2/24 0500)   diltiazem (CARDIZEM CD) 24 hr capsule 120 mg (120 mg Oral Given 4/1/24 2147)   escitalopram (LEXAPRO) tablet 10 mg (10 mg Oral Given 4/2/24 0929)   diphenoxylate-atropine (LOMOTIL) 2.5-0.025 mg per tablet 1 tablet (has no administration in time range)   acetaminophen (TYLENOL) tablet 650 mg (has no administration in time range)   ondansetron (ZOFRAN) injection 4 mg (has no administration in time range)   cholestyramine sugar free (QUESTRAN LIGHT) packet 4 g (4 g Oral Given 4/2/24 0929)   clotrimazole (LOTRIMIN) 1 % cream ( Topical Not Given 4/2/24 0937)   potassium chloride 20 mEq IVPB (premix) (20 mEq Intravenous New Bag 4/2/24 0928)   atorvastatin (LIPITOR) tablet 20 mg (has no administration in time range)   sodium chloride 0.9 % bolus 1,000 mL (0 mL Intravenous Stopped 4/1/24 1658)   magnesium sulfate 2 g/50 mL IVPB (premix) 2 g (0 g Intravenous Stopped 4/1/24 1658)   potassium chloride 20 mEq IVPB (premix) ( Intravenous Restarted 4/1/24 1640)   potassium chloride (Klor-Con M20) CR tablet 40 mEq (40 mEq Oral Given 4/1/24 1654)   iohexol (OMNIPAQUE) 240 MG/ML solution 50 mL (50 mL Oral Given 4/1/24 1358)   aspirin tablet 325 mg (325 mg Oral Given 4/1/24 1440)   clopidogrel (PLAVIX) tablet 300 mg (300 mg Oral Given 4/1/24 1441)   LORazepam (ATIVAN) injection 0.5 mg (0.5 mg Intravenous Given 4/1/24 1539)   thiamine (VITAMIN B1) 100 mg in sodium chloride 0.9 % 50 mL IVPB (100 mg Intravenous New Bag 4/1/24 2214)   magnesium sulfate 2 g/50 mL IVPB (premix) 2 g (2 g Intravenous New Bag 4/1/24 1845)   potassium chloride (Klor-Con M20) CR tablet 40 mEq (40 mEq Oral Given 4/2/24 0942)       Diagnostic Studies  Results Reviewed       Procedure Component Value Units Date/Time    Basic metabolic panel [[id number]]  (Abnormal) Collected: 04/02/24 0446    Lab Status: Final result Specimen: Blood from Arm, Left Updated: 04/02/24 0523     Sodium 139 mmol/L       Potassium 2.8 mmol/L      Chloride 110 mmol/L      CO2 22 mmol/L      ANION GAP 7 mmol/L      BUN 39 mg/dL      Creatinine 1.22 mg/dL      Glucose 84 mg/dL      Calcium 9.3 mg/dL      eGFR 44 ml/min/1.73sq m     Narrative:      National Kidney Disease Foundation guidelines for Chronic Kidney Disease (CKD):     Stage 1 with normal or high GFR (GFR > 90 mL/min/1.73 square meters)    Stage 2 Mild CKD (GFR = 60-89 mL/min/1.73 square meters)    Stage 3A Moderate CKD (GFR = 45-59 mL/min/1.73 square meters)    Stage 3B Moderate CKD (GFR = 30-44 mL/min/1.73 square meters)    Stage 4 Severe CKD (GFR = 15-29 mL/min/1.73 square meters)    Stage 5 End Stage CKD (GFR <15 mL/min/1.73 square meters)  Note: GFR calculation is accurate only with a steady state creatinine    Magnesium [[id number]]  (Normal) Collected: 04/02/24 0446    Lab Status: Final result Specimen: Blood from Arm, Left Updated: 04/02/24 0523     Magnesium 2.3 mg/dL     Phosphorus [[id number]]  (Normal) Collected: 04/02/24 0446    Lab Status: Final result Specimen: Blood from Arm, Left Updated: 04/02/24 0523     Phosphorus 2.4 mg/dL     Vitamin B1, whole blood [[id number]] Collected: 04/02/24 0446    Lab Status: In process Specimen: Blood from Arm, Left Updated: 04/02/24 0458    Vitamin B6 [[id number]] Collected: 04/02/24 0446    Lab Status: In process Specimen: Blood from Arm, Left Updated: 04/02/24 0458    Vitamin B12 [[id number]] Collected: 04/02/24 0446    Lab Status: In process Specimen: Blood from Arm, Left Updated: 04/02/24 0457    Homocysteine, serum [[id number]]  (Abnormal) Collected: 04/01/24 1155    Lab Status: Final result Specimen: Blood from Arm, Left Updated: 04/01/24 2243     Homocyst(e)ine, P/S 25.9 umol/L     C-reactive protein [[id number]]  (Abnormal) Collected: 04/01/24 1155    Lab Status: Final result Specimen: Blood from Arm, Left Updated: 04/01/24 2114     CRP 24.4 mg/L     Urinalysis with microscopic [[id number]]  (Abnormal) Collected: 04/01/24  1501    Lab Status: Final result Specimen: Urine, Clean Catch Updated: 04/01/24 1531     Color, UA Light Yellow     Clarity, UA Clear     Specific Gravity, UA 1.009     pH, UA 6.0     Leukocytes, UA Negative     Nitrite, UA Negative     Protein, UA Negative mg/dl      Glucose, UA Negative mg/dl      Ketones, UA Negative mg/dl      Urobilinogen, UA <2.0 mg/dl      Bilirubin, UA Negative     Occult Blood, UA Negative     RBC, UA None Seen /hpf      WBC, UA 1-2 /hpf      Epithelial Cells None Seen /hpf      Bacteria, UA Occasional /hpf      Hyaline Casts, UA 3-5 /lpf     Fingerstick Glucose (POCT) [[id number]]  (Normal) Collected: 04/01/24 1346    Lab Status: Final result Specimen: Blood Updated: 04/01/24 1348     POC Glucose 85 mg/dl     Comprehensive metabolic panel [[id number]]  (Abnormal) Collected: 04/01/24 1155    Lab Status: Final result Specimen: Blood from Arm, Left Updated: 04/01/24 1236     Sodium 138 mmol/L      Potassium 3.0 mmol/L      Chloride 106 mmol/L      CO2 20 mmol/L      ANION GAP 12 mmol/L      BUN 54 mg/dL      Creatinine 1.81 mg/dL      Glucose 100 mg/dL      Calcium 10.2 mg/dL      AST 14 U/L      ALT 12 U/L      Alkaline Phosphatase 73 U/L      Total Protein 7.2 g/dL      Albumin 4.2 g/dL      Total Bilirubin 0.49 mg/dL      eGFR 27 ml/min/1.73sq m     Narrative:      National Kidney Disease Foundation guidelines for Chronic Kidney Disease (CKD):     Stage 1 with normal or high GFR (GFR > 90 mL/min/1.73 square meters)    Stage 2 Mild CKD (GFR = 60-89 mL/min/1.73 square meters)    Stage 3A Moderate CKD (GFR = 45-59 mL/min/1.73 square meters)    Stage 3B Moderate CKD (GFR = 30-44 mL/min/1.73 square meters)    Stage 4 Severe CKD (GFR = 15-29 mL/min/1.73 square meters)    Stage 5 End Stage CKD (GFR <15 mL/min/1.73 square meters)  Note: GFR calculation is accurate only with a steady state creatinine    Lipase [[id number]]  (Normal) Collected: 04/01/24 1155    Lab Status: Final result Specimen: Blood  from Arm, Left Updated: 04/01/24 1236     Lipase 73 u/L     Magnesium [[id number]]  (Abnormal) Collected: 04/01/24 1155    Lab Status: Final result Specimen: Blood from Arm, Left Updated: 04/01/24 1235     Magnesium 1.7 mg/dL     CBC and differential [[id number]]  (Abnormal) Collected: 04/01/24 1155    Lab Status: Final result Specimen: Blood from Arm, Left Updated: 04/01/24 1205     WBC 11.93 Thousand/uL      RBC 4.02 Million/uL      Hemoglobin 12.2 g/dL      Hematocrit 36.8 %      MCV 92 fL      MCH 30.3 pg      MCHC 33.2 g/dL      RDW 12.6 %      MPV 9.5 fL      Platelets 328 Thousands/uL      nRBC 0 /100 WBCs      Neutrophils Relative 79 %      Immature Grans % 1 %      Lymphocytes Relative 14 %      Monocytes Relative 5 %      Eosinophils Relative 0 %      Basophils Relative 1 %      Neutrophils Absolute 9.54 Thousands/µL      Absolute Immature Grans 0.07 Thousand/uL      Absolute Lymphocytes 1.70 Thousands/µL      Absolute Monocytes 0.54 Thousand/µL      Eosinophils Absolute 0.02 Thousand/µL      Basophils Absolute 0.06 Thousands/µL                    MRA carotids wo contrast   Final Result by Miki Posada MD (04/01 1702)      Motion degraded examination. Focal artifacts are noted along the bilateral carotid bulbs. There is no suspected extracranial carotid stenosis.      The cervical vertebral arteries are patent.            Workstation performed: [id number]         MRA head wo contrast   Final Result by Miki Posada MD (04/01 1658)      Cerebral MRA demonstrates no large branch vessel occlusion or focal hemodynamically significant stenosis. Fetal left PCA supply.                     Workstation performed: [id number]         MRI brain wo contrast   Final Result by Miki Posada MD (04/01 1653)      No acute infarction, edema, or mass effect.      Chronic lacunar infarcts within the right anterior centrum semiovale and right posterior cerebellum.      Mild chronic microangiopathic ischemic  changes.      Workstation performed: [id number]         CT abdomen pelvis wo contrast   Final Result by Rogerio Redman DO (04/01 3225)      Fluid seen throughout the colon in keeping with history of diarrheal illness. No focal inflammatory changes.      Minimal sigmoid diverticulosis.      Limited evaluation of the solid organs without IV contrast. Subcentimeter right hepatic lobe hypodensity too small to characterize.      Limited study without IV contrast.      Workstation performed: [id number]         CT stroke alert brain   Final Result by Shanita Marques MD (04/01 1415)      1.  New area of hypodensity involving subcortical and deep white matter in the anterior right frontal lobe most likely late subacute to chronic infarct. Superimposed acute infarct in the less hypodense regions are not excluded. There is no mass effect or    hemorrhage.   2.  Stable old right cerebellar lacunar infarct.                  Findings were directly discussed with Isaac Rucker at approximately 2:09 p.m.      Workstation performed: [id number]                    Procedures  ECG 12 Lead Documentation Only    Date/Time: 4/1/2024 2:04 PM    Performed by: Duc Steve MD  Authorized by: Duc Steve MD    Indications / Diagnosis:  JANELLE, Stroke like symptoms  ECG reviewed by me, the ED Provider: yes    Patient location:  ED  Interpretation:     Interpretation: normal    Rate:     ECG rate assessment: normal    Rhythm:     Rhythm: sinus rhythm    Ectopy:     Ectopy: none    QRS:     QRS axis:  Normal  Conduction:     Conduction: normal    ST segments:     ST segments:  Normal  T waves:     T waves: normal    CriticalCare Time    Date/Time: 4/2/2024 9:45 AM    Performed by: Duc Steve MD  Authorized by: Duc Steve MD    Critical care provider statement:     Critical care time (minutes):  30    Critical care time was exclusive of:  Separately billable procedures and treating other patients and teaching time    Critical care was  necessary to treat or prevent imminent or life-threatening deterioration of the following conditions:  CNS failure or compromise and renal failure (JANELLE, Hypokalemia required IVF, IV Potassium correction; Stroke like symptoms, Stroke Alert, d/w Nephrology and Neurology, admit to Stroke Pathway)    Critical care was time spent personally by me on the following activities:  Blood draw for specimens, obtaining history from patient or surrogate, development of treatment plan with patient or surrogate, discussions with consultants, evaluation of patient's response to treatment, examination of patient, interpretation of cardiac output measurements, ordering and performing treatments and interventions, ordering and review of laboratory studies, ordering and review of radiographic studies, re-evaluation of patient's condition and review of old charts    I assumed direction of critical care for this patient from another provider in my specialty: no             ED Course  ED Course as of 04/02/24 0949   Mon Apr 01, 2024   1240 WBC(!): 11.93   1240 Hemoglobin: 12.2   1240 Platelet Count: 328   1240 Sodium: 138   1240 Potassium(!): 3.0   1240 BUN(!): 54   1240 Creatinine(!): 1.81   1241 MAGNESIUM(!): 1.7  Labs reviewed.   1344 Patient developed garbled speech while talking to Nephrology, lasted for a minute, resolved, then happened again just now. Stroke Alert was called. CT/CTA head and neck ordered. Discussed with Dr. Tillman, Nephrology, okay to give IV Contrast as needs Stroke eval.   1355 Patient evaluated by Dr. Conn, Neuro, advised to hold  off on CTA due to JANELLE.    1524 CT stroke alert brain  IMPRESSION:     1.  New area of hypodensity involving subcortical and deep white matter in the anterior right frontal lobe most likely late subacute to chronic infarct. Superimposed acute infarct in the less hypodense regions are not excluded. There is no mass effect or   hemorrhage.  2.  Stable old right cerebellar lacunar  infarct.     1525 CT abdomen pelvis wo contrast  IMPRESSION:     Fluid seen throughout the colon in keeping with history of diarrheal illness. No focal inflammatory changes.     Minimal sigmoid diverticulosis.                    Stroke Assessment       Row Name 04/01/24 1344             NIH Stroke Scale    Interval Baseline      Level of Consciousness (1a.) 0      LOC Questions (1b.) 0      LOC Commands (1c.) 0      Best Gaze (2.) 0      Visual (3.) 0      Facial Palsy (4.) 0      Motor Arm, Left (5a.) 0      Motor Arm, Right (5b.) 0      Motor Leg, Left (6a.) 0      Motor Leg, Right (6b.) 0      Limb Ataxia (7.) 0      Sensory (8.) 0      Best Language (9.) 1      Dysarthria (10.) 0      Extinction and Inattention (11.) (Formerly Neglect) 0      Total 1                                SBIRT 20yo+      Flowsheet Row Most Recent Value   Initial Alcohol Screen: US AUDIT-C     1. How often do you have a drink containing alcohol? 0 Filed at: 04/01/2024 1204   2. How many drinks containing alcohol do you have on a typical day you are drinking?  0 Filed at: 04/01/2024 1204   3a. Male UNDER 65: How often do you have five or more drinks on one occasion? 0 Filed at: 04/01/2024 1204   3b. FEMALE Any Age, or MALE 65+: How often do you have 4 or more drinks on one occassion? 0 Filed at: 04/01/2024 1204   Audit-C Score 0 Filed at: 04/01/2024 1204   NELSON: How many times in the past year have you...    Used an illegal drug or used a prescription medication for non-medical reasons? Never Filed at: 04/01/2024 1204                      Medical Decision Making  Patient is a 73-year-old female, history of asthma, atrial fibrillation, COPD, hypertension, advised to come to the ER after lab results from 4 days ago showed worsening kidney function, also noted to have low potassium, patient has been having diarrhea since past December which has been intermittent, several times a day, loose watery stool, denies fever, abdominal pain, patient  has had stool studies done as outpatient which were negative for C. difficile and bacterial panel.  On exam, patient is conscious, alert, vital signs stable, no acute distress, abdomen is soft, nontender, nondistended.  Differential diagnosis: Nonspecific diarrhea, IBS, colitis, enteritis, JANELLE, hypokalemia, electrolyte derangement, will recheck labs, get CT scan abdomen pelvis with oral contrast due to JANELLE.  Note: Patient developed transient intermittent aphasia while in ED, stroke alert was called, patient was evaluated by nephrology and neurology, CTA was held as per neurology due to JANELLE; CT scan done that showed chronic/subacute stroke, patient admitted to stroke pathway.  Case discussed with [facility] for admission.    Problems Addressed:  JANELLE (acute kidney injury) (HCC): acute illness or injury  Aphasia: acute illness or injury  Hypokalemia: acute illness or injury  Low magnesium level: acute illness or injury    Amount and/or Complexity of Data Reviewed  Labs: ordered. Decision-making details documented in ED Course.  Radiology: ordered. Decision-making details documented in ED Course.  ECG/medicine tests: ordered and independent interpretation performed. Decision-making details documented in ED Course.    Risk  Prescription drug management.  Decision regarding hospitalization.             Disposition  Final diagnoses:   JANELLE (acute kidney injury) (HCC)   Hypokalemia   Low magnesium level   Aphasia     Time reflects when diagnosis was documented in both MDM as applicable and the Disposition within this note       Time User Action Codes Description Comment    4/1/2024 12:56 PM Tatyana Stevefraz Add [N17.9] JANELLE (acute kidney injury) (HCC)     4/1/2024 12:56 PM Alam, Duc Add [E87.6] Hypokalemia     4/1/2024 12:56 PM AlamTatyanaDuc Add [R79.0] Low magnesium level     4/1/2024  6:01 PM Aleks La Add [R47.01] Aphasia     4/1/2024  6:03 PM Aleks La Add [R19.7] Diarrhea     4/2/2024  8:17 AM AlaTatyana oliverosDuc Modify  [R47.01] Aphasia           ED Disposition       ED Disposition   Admit    Condition   Stable    Date/Time   Mon Apr 1, 2024 7013    Comment   Case was discussed with Dr. La and the patient's admission status was agreed to be Admission Status: inpatient status to the service of Dr. La.               Follow-up Information    None         Current Discharge Medication List        CONTINUE these medications which have NOT CHANGED    Details   atorvastatin (LIPITOR) 20 mg tablet TAKE 1 TABLET BY MOUTH EVERY DAY  Qty: 90 tablet, Refills: 3    Associated Diagnoses: High cholesterol      cetirizine (ZyrTEC) 10 mg tablet Take 10 mg by mouth as needed       diltiazem (TIAZAC) 120 MG 24 hr capsule TAKE 1 CAPSULE BY MOUTH EVERY DAY  Qty: 90 capsule, Refills: 3    Associated Diagnoses: Chronic heart failure with preserved ejection fraction (HFpEF) ([facility])      diphenoxylate-atropine (LOMOTIL) 2.5-0.025 mg per tablet Take 1 tablet by mouth 4 (four) times a day as needed for diarrhea  Qty: 60 tablet, Refills: 0    Associated Diagnoses: Diarrhea, unspecified type      escitalopram (LEXAPRO) 10 mg tablet TAKE 1 TABLET BY MOUTH EVERY DAY  Qty: 30 tablet, Refills: 11    Associated Diagnoses: Depression with anxiety      losartan (COZAAR) 25 mg tablet TALE 1/2 TABLET BY MOUTH DAILY  Qty: 45 tablet, Refills: 3    Associated Diagnoses: Chronic heart failure with preserved ejection fraction (HFpEF) ([facility]); Dyslipidemia      mupirocin (BACTROBAN) 2 % ointment Apply topically daily  Qty: 30 g, Refills: 0    Associated Diagnoses: Ulcer of left foot, unspecified ulcer stage ([facility])      spironolactone (ALDACTONE) 25 mg tablet TAKE 1 TABLET BY MOUTH EVERY DAY  Qty: 90 tablet, Refills: 3    Associated Diagnoses: Essential hypertension      albuterol (ProAir HFA) 90 mcg/act inhaler Inhale 2 puffs every 6 (six) hours as needed for wheezing or shortness of breath  Qty: 8.5 g, Refills: 0    Comments: Substitution to a formulary equivalent within  the same pharmaceutical class is authorized.  Associated Diagnoses: Bronchitis      levalbuterol (XOPENEX HFA) 45 mcg/act inhaler Inhale 1-2 puffs every 4 (four) hours as needed for wheezing  Qty: 15 g, Refills: 0    Associated Diagnoses: Bacterial URI; Wheezing      polyethylene glycol (MiraLax) 17 GM/SCOOP powder Take 238 g by mouth once for 1 dose Take 238 g my mouth. Use as directed  Qty: 238 g, Refills: 0    Associated Diagnoses: Diarrhea, unspecified type; Elevated fecal calprotectin; Weight loss, abnormal             No discharge procedures on file.    PDMP Review         Value Time User    PDMP Reviewed  Yes 4/9/2023  1:04 AM Simon Capellan DO            ED Provider  Electronically Signed by             Duc Steve MD  04/02/24 0949

## 2024-04-02 NOTE — CONSULTS
Consultation -  [facility]  Almaz Hall 73 y.o. female MRN: [id number]  Unit/Bed#: E4 -01 Encounter: [id number]            Inpatient consult to gastroenterology     Date/Time  4/2/2024 9:34 AM     Performed by  Suresh Yan DO   Authorized by  Aleks La DO             Reason for Consult / Principal Problem:     Chronic diarrhea      ASSESSMENT AND PLAN:      73-year-old female with past medical history of A-fib status post ablation not on anticoagulation, CKD stage III, diastolic heart failure who originally presented with strokelike symptoms, aphasia.  GI is consulted for management of chronic diarrhea.    Chronic diarrhea  Weight loss  Anemia  Strokelike symptoms  Workup as an outpatient was negative for celiac panel, C. difficile, ova parasites, elastase.  Does have elevated fecal calprotectin of 400s.  Etiology is broad but includes microscopic colitis versus medication induced that she is on losartan, escitalopram versus IBD vs IBS.  Hemoglobin 10.6, baseline 12-13.  Likely dilutional.  Fortunately no evidence of acute stroke on workup at this time.  Per neurology do not believe that this is TIA.  Patient was scheduled for outpatient EGD and colonoscopy.  She request to have this done as an inpatient.  Unfortunately she was given a loading dose of Plavix.  Per neurology only requires aspirin 81 mg at this time.  After discussion with neurology no contraindication to endoscopy.  Will plan for expedited EGD and colonoscopy next week in order to allow for Plavix to be filter now and minimize bleeding risk for nonurgent procedure.  Patient is agreeable with this plan.  Check iron panel.  Supplement if necessary.  Monitor bowel movements.  Monitor symptoms.    Gastroenterology to sign off at this time.  Please call with any questions.  Rest of care per primary team.  Thank you for this consultation.   ______________________________________________________________________    HPI:   73-year-old female with past medical history of A-fib status post ablation not on anticoagulation, CKD stage III, diastolic heart failure who originally presented with strokelike symptoms, aphasia.  GI is consulted for management of chronic diarrhea.  She is initially been having worsening diarrhea since December 2023.  Usually has 1-2 loose stools prior to that.  However has been having up to 10 bowel movements in a day.  Has some abdominal discomfort intermittently.  Also noted 30 pound weight loss over the past 3 to 4 months.  Denies any blood in stool, nausea, vomiting.  Denies smoking.  Has former alcohol use.  No recent changes to medication.  Colonoscopy 2017 showed single AVM in the sigmoid colon, otherwise unremarkable.  No prior EGD.    REVIEW OF SYSTEMS:    Review of Systems   Constitutional:  Positive for unexpected weight change. Negative for chills and fever.   HENT:  Negative for congestion and sinus pressure.    Respiratory:  Negative for cough and shortness of breath.    Cardiovascular:  Negative for chest pain, palpitations and leg swelling.   Gastrointestinal:  Positive for diarrhea. Negative for abdominal pain, nausea and vomiting.   Genitourinary:  Negative for dysuria and hematuria.   Musculoskeletal:  Negative for arthralgias and back pain.   Skin:  Negative for color change and rash.   Neurological:  Negative for dizziness and headaches.   Psychiatric/Behavioral:  Negative for agitation and confusion.    All other systems reviewed and are negative.         Historical Information   Past Medical History:   Diagnosis Date    Asthma     Atrial fibrillation (HCC)     with ablation    Bursitis of hip     Last Assessed:6/5/2017    COPD (chronic obstructive pulmonary disease) (HCC) 5 years ago    Coronary artery disease     Herniated lumbar intervertebral disc     Hyperlipidemia     Hypertension 2 years    Pituitary adenoma (HCC)     Last Assessed:2/12/2015 ; Previously on bromocriptine    Spinal  stenosis     SVT (supraventricular tachycardia)     with ablation    Systolic murmur of aorta      Past Surgical History:   Procedure Laterality Date    BREAST SURGERY      Incisional breast biopsy    CARDIAC ELECTROPHYSIOLOGY PROCEDURE N/A 2022    Procedure: Cardiac eps/afib ablation;  Surgeon: Fadi Goss MD;  Location: [facility];  Service: Cardiology    CARDIAC ELECTROPHYSIOLOGY PROCEDURE N/A 2022    Procedure: Cardiac loop recorder implant;  Surgeon: Bao Kovacs MD;  Location: [facility];  Service: Cardiology    CARDIAC ELECTROPHYSIOLOGY STUDY AND ABLATION      CARDIAC LOOP RECORDER      CARPAL TUNNEL RELEASE Left     CHOLECYSTECTOMY      OTHER SURGICAL HISTORY      EXCISION OF PAROTID TUMOR/GLAND    PAROTIDECTOMY      PA COLONOSCOPY FLX DX W/COLLJ SPEC WHEN PFRMD N/A 2017    Procedure: COLONOSCOPY;  Surgeon: Elizabeth Leyva DO;  Location: [facility];  Service: Gastroenterology    PA HYSTEROSCOPY BX ENDOMETRIUM&/POLYPC W/WO D&C N/A 2023    Procedure: (D&C) W/ HYSTEROSCOPY;  Surgeon: Harini Kinney MD;  Location: [facility] OR;  Service: Gynecology    ROTATOR CUFF REPAIR      SALPINGOOPHORECTOMY Right      Social History   Social History     Substance and Sexual Activity   Alcohol Use Not Currently    Alcohol/week: 1.0 standard drink of alcohol    Types: 1 Shots of liquor per week    Comment: social     Social History     Substance and Sexual Activity   Drug Use No     Social History     Tobacco Use   Smoking Status Former    Current packs/day: 0.00    Average packs/day: 1 pack/day for 30.0 years (30.0 ttl pk-yrs)    Types: Cigarettes    Start date: 1988    Quit date: 2018    Years since quittin.3   Smokeless Tobacco Never     Family History   Problem Relation Age of Onset    Colon cancer Mother     Coronary artery disease Mother     Colonic polyp Mother     Liver cancer Mother     Multiple sclerosis Father     Hypertension Brother      Hypertension Other     Breast cancer Neg Hx     Cancer Neg Hx        Meds/Allergies     Medications Prior to Admission   Medication    atorvastatin (LIPITOR) 20 mg tablet    cetirizine (ZyrTEC) 10 mg tablet    diltiazem (TIAZAC) 120 MG 24 hr capsule    diphenoxylate-atropine (LOMOTIL) 2.5-0.025 mg per tablet    escitalopram (LEXAPRO) 10 mg tablet    losartan (COZAAR) 25 mg tablet    mupirocin (BACTROBAN) 2 % ointment    spironolactone (ALDACTONE) 25 mg tablet    albuterol (ProAir HFA) 90 mcg/act inhaler    levalbuterol (XOPENEX HFA) 45 mcg/act inhaler    polyethylene glycol (MiraLax) 17 GM/SCOOP powder     Current Facility-Administered Medications   Medication Dose Route Frequency    acetaminophen (TYLENOL) tablet 650 mg  650 mg Oral Q4H PRN    aspirin chewable tablet 81 mg  81 mg Oral Daily    atorvastatin (LIPITOR) tablet 40 mg  40 mg Oral QPM    cholestyramine sugar free (QUESTRAN LIGHT) packet 4 g  4 g Oral BID    clopidogrel (PLAVIX) tablet 75 mg  75 mg Oral Daily    clotrimazole (LOTRIMIN) 1 % cream   Topical BID    diltiazem (CARDIZEM CD) 24 hr capsule 120 mg  120 mg Oral HS    diphenoxylate-atropine (LOMOTIL) 2.5-0.025 mg per tablet 1 tablet  1 tablet Oral 4x Daily PRN    escitalopram (LEXAPRO) tablet 10 mg  10 mg Oral Daily    heparin (porcine) subcutaneous injection 5,000 Units  5,000 Units Subcutaneous Q8H COMFORT    ondansetron (ZOFRAN) injection 4 mg  4 mg Intravenous Q4H PRN    thiamine tablet 100 mg  100 mg Oral Daily       Allergies   Allergen Reactions    Bystolic [Nebivolol Hcl] Bradycardia     Hear rate to 20's    Levofloxacin Hives    Vasotec [Enalapril] Hives    Amlodipine Rash    Ancef [Cefazolin] GI Intolerance     Cdiff    Cephalosporins Other (See Comments)     c.diff    Fruit Extracts      Annotation - [id number]: peaches and strawberries    Morphine GI Intolerance     Extreme vomiting      Other      FRUIT    Penicillins Hives    Tetracycline Edema    Xarelto [Rivaroxaban] Rash  "          Objective     Blood pressure 109/53, pulse 67, temperature 97.6 °F (36.4 °C), temperature source Temporal, resp. rate 18, height 5' 2\" (1.575 m), SpO2 99%, not currently breastfeeding. Body mass index is 24.76 kg/m².      Intake/Output Summary (Last 24 hours) at 4/2/2024 0716  Last data filed at 4/2/2024 0449  Gross per 24 hour   Intake 1230 ml   Output 2 ml   Net 1228 ml         PHYSICAL EXAM:      Physical Exam  Vitals and nursing note reviewed.   Constitutional:       General: She is not in acute distress.     Appearance: Normal appearance. She is not ill-appearing.   HENT:      Head: Normocephalic and atraumatic.      Mouth/Throat:      Mouth: Mucous membranes are moist.   Eyes:      Extraocular Movements: Extraocular movements intact.      Conjunctiva/sclera: Conjunctivae normal.   Cardiovascular:      Pulses: Normal pulses.   Pulmonary:      Effort: Pulmonary effort is normal.   Abdominal:      General: Abdomen is flat. Bowel sounds are normal. There is no distension.      Palpations: Abdomen is soft.      Tenderness: There is no abdominal tenderness. There is no guarding.   Skin:     General: Skin is warm and dry.   Neurological:      General: No focal deficit present.      Mental Status: She is alert and oriented to person, place, and time.   Psychiatric:         Mood and Affect: Mood normal.         Behavior: Behavior normal.          Lab Results:   Admission on 04/01/2024   Component Date Value    WBC 04/01/2024 11.93 (H)     RBC 04/01/2024 4.02     Hemoglobin 04/01/2024 12.2     Hematocrit 04/01/2024 36.8     MCV 04/01/2024 92     MCH 04/01/2024 30.3     MCHC 04/01/2024 33.2     RDW 04/01/2024 12.6     MPV 04/01/2024 9.5     Platelets 04/01/2024 328     nRBC 04/01/2024 0     Neutrophils Relative 04/01/2024 79 (H)     Immature Grans % 04/01/2024 1     Lymphocytes Relative 04/01/2024 14     Monocytes Relative 04/01/2024 5     Eosinophils Relative 04/01/2024 0     Basophils Relative 04/01/2024 1  "    Neutrophils Absolute 04/01/2024 9.54 (H)     Absolute Immature Grans 04/01/2024 0.07     Absolute Lymphocytes 04/01/2024 1.70     Absolute Monocytes 04/01/2024 0.54     Eosinophils Absolute 04/01/2024 0.02     Basophils Absolute 04/01/2024 0.06     Sodium 04/01/2024 138     Potassium 04/01/2024 3.0 (L)     Chloride 04/01/2024 106     CO2 04/01/2024 20 (L)     ANION GAP 04/01/2024 12     BUN 04/01/2024 54 (H)     Creatinine 04/01/2024 1.81 (H)     Glucose 04/01/2024 100     Calcium 04/01/2024 10.2     AST 04/01/2024 14     ALT 04/01/2024 12     Alkaline Phosphatase 04/01/2024 73     Total Protein 04/01/2024 7.2     Albumin 04/01/2024 4.2     Total Bilirubin 04/01/2024 0.49     eGFR 04/01/2024 27     Lipase 04/01/2024 73     Magnesium 04/01/2024 1.7 (L)     Color, UA 04/01/2024 Light Yellow     Clarity, UA 04/01/2024 Clear     Specific Gravity, UA 04/01/2024 1.009     pH, UA 04/01/2024 6.0     Leukocytes, UA 04/01/2024 Negative     Nitrite, UA 04/01/2024 Negative     Protein, UA 04/01/2024 Negative     Glucose, UA 04/01/2024 Negative     Ketones, UA 04/01/2024 Negative     Urobilinogen, UA 04/01/2024 <2.0     Bilirubin, UA 04/01/2024 Negative     Occult Blood, UA 04/01/2024 Negative     RBC, UA 04/01/2024 None Seen     WBC, UA 04/01/2024 1-2     Epithelial Cells 04/01/2024 None Seen     Bacteria, UA 04/01/2024 Occasional     Hyaline Casts, UA 04/01/2024 3-5 (A)     POC Glucose 04/01/2024 85     Ventricular Rate 04/01/2024 62     Atrial Rate 04/01/2024 300     QRSD Interval 04/01/2024 78     QT Interval 04/01/2024 408     QTC Interval 04/01/2024 414     QRS [place] 04/01/2024 31     T Wave [place] 04/01/2024 64     CRP 04/01/2024 24.4 (H)     Homocyst(e)ine, P/S 04/01/2024 25.9 (H)     Sodium 04/02/2024 139     Potassium 04/02/2024 2.8 (L)     Chloride 04/02/2024 110 (H)     CO2 04/02/2024 22     ANION GAP 04/02/2024 7     BUN 04/02/2024 39 (H)     Creatinine 04/02/2024 1.22     Glucose 04/02/2024 84     Calcium  04/02/2024 9.3     eGFR 04/02/2024 44     Magnesium 04/02/2024 2.3     Phosphorus 04/02/2024 2.4     WBC 04/02/2024 7.81     RBC 04/02/2024 3.44 (L)     Hemoglobin 04/02/2024 10.6 (L)     Hematocrit 04/02/2024 31.4 (L)     MCV 04/02/2024 91     MCH 04/02/2024 30.8     MCHC 04/02/2024 33.8     RDW 04/02/2024 12.2     Platelets 04/02/2024 283     MPV 04/02/2024 9.7     Cholesterol 04/02/2024 102     Triglycerides 04/02/2024 152 (H)     HDL, Direct 04/02/2024 28 (L)     LDL Calculated 04/02/2024 44        Imaging Studies: I have personally reviewed pertinent imaging studies.    Suresh Yan D.O.  Gastroenterology Fellow  PGY-[id number]  Available via [employer]  4/2/2024 7:16 AM

## 2024-04-02 NOTE — PROGRESS NOTES
Follow up Consultation    Nephrology   Almaz Hall 73 y.o. female MRN: [id number]  Unit/Bed#: E4 -01 Encounter: [id number]      Physician Requesting Consult: Jasper Coughlin Pe*        ASSESSMENT:  73-year-old female with multiple comorbidities including hypertension, A-fib, CHF and with ongoing diarrhea from December 2023 with weight loss and CKD stage III presents with worsening diarrhea abnormal outpatient lab work.  Nephrology consulted for JANELLE management.      Acute kidney injury  (POA) on CKD stage III:   JANELLE most likely secondary to prerenal azotemia secondary to GI losses as well as decreased p.o. intake plus failure to autoregulate presence of losartan and now at risk for JANELLE secondary to contrast exposure on 4/1/2024.  After review of records it appears that the patient has a baseline Creatinine of 0.9-1.1mg/dL.  Admission creatinine of 1.81 mg/dL on 4/1/2024.  Peak creatinine this for this hospitalization 1.81 mg/dL on 4/1/2024  Creatinine today is at 1.06mg/dL stable and back at baseline  Hold off on further IV fluids for now  CT abdomen 4/1/24 no hydronephrosis  check BMP, magnesium, phosphorus in a.m.  Place on a renal diet when allowed diet order.   Strict I/O.  Daily weights  Urinary retention protocol  Avoid nephrotoxins, adjust meds to appropriate GFR.  Likely has underlying CKD secondary to age related nephron loss plus hypertensive nephrosclerosis  Outpatient for nephrology follows up with with     H&H/anemia:  most recent hemoglobin at 10.7 g/dL  Maintain hemoglobin greater than 8 grams/deciliter    Acid-base electrolytes:  Hypokalemia: Most recent potassium 3.2 mEq supplemented with K-Dur 40 mill equivalents x 3 p.o. recheck in a.m.  Metabolic acidosis: Most recent bicarb of 21.  Hold off on further bicarb drip for now   hypomagnesemia: Most recent magnesium 1.7.  Ordered for magnesium sulfate check level in a.m.    Blood pressure/hypertension:   Optimize hemodynamic status  to avoid delay in renal recovery.  Maintain MAP > 65mmHg  Avoid BP fluctuations.  Home medications: Losartan 12.5 mg p.o. daily, Aldactone 12.5 mg p.o. daily, Cardizem 120 mg p.o. daily  Current medications: None  Hold home losartan and Aldactone for now as blood pressures continue to run low    Other medical problems:  Diarrhea:  Management per primary team.  Follow-up with the GI consider inpatient workup in light of recurrent issues.  Plan for possible EGD colonoscopy later this week.  CT abdomen no hydronephrosis.  CHF:  Management per primary team.  Hold home Aldactone for now give 1 L IV fluids  Concern for speech changes/strokelike symptoms: Management per primary team discussed.  Status post stroke alert/1/24 status post CT with IV contrast and MRI.  Follow-up with neurology.  CT stroke from 4/1/2024 new area of hypodensity involving subcortical and deep white matter in the anterior right frontal lobe most likely late subacute or chronic infarct superimposed acute infarct in the less hypodense regions are not excluded.  Stable old right cerebellar lacunar infarct.  MRI brain no acute infarction edema or mass effect.  Chronic lacunar infarcts.  Mild chronic microangiopathic ischemic changes.    Plan below is what was discussed with the primary team that they agree with:  check BMP, magnesium, phosphorus in a.m.  Supplement potassium   Hold further IV fluids for now   Hold home losartan for now  Hold home Aldactone for now  If blood pressure is elevated may place on Norvasc 5 mg p.o. daily hold for SBP less than 130  Ordered for K-Dur 40 mill equivalents p.o. x 3  Order for magnesium sulfate x 1    Thanks for the consult  Will continue to follow  Please call with questions/ concerns.      Morenita Tillman MD, FASN, 4/3/2024, 10:45 AM                Objective :   Patient seen and examined in her room no overnight events hemodynamically stable still blood pressure low 100s remains afebrile urine output 300 cc  "plus still ongoing GI losses.  Awaiting to hear back from GI with regards to date of endoscopy/colonoscopy      PHYSICAL EXAM  /61 (BP Location: Left arm)   Pulse 67   Temp (!) 97.2 °F (36.2 °C) (Temporal)   Resp 18   Ht 5' 2\" (1.575 m)   Wt 61.2 kg (135 lb)   LMP  (LMP Unknown)   SpO2 97%   BMI 24.69 kg/m²   Temp (24hrs), Av.7 °F (36.5 °C), Min:97.2 °F (36.2 °C), Max:98 °F (36.7 °C)        Intake/Output Summary (Last 24 hours) at 4/3/2024 1045  Last data filed at 4/3/2024 0501  Gross per 24 hour   Intake 360 ml   Output --   Net 360 ml       I/O last 24 hours:  In: 600 [P.O.:600]  Out: 300 [Urine:300]      Current Weight: Weight - Scale: 61.2 kg (135 lb)  First Weight: Weight - Scale: 61.2 kg (135 lb)  Physical Exam  Vitals and nursing note reviewed.   Constitutional:       General: She is not in acute distress.     Appearance: Normal appearance. She is normal weight. She is not ill-appearing, toxic-appearing or diaphoretic.   HENT:      Head: Normocephalic and atraumatic.      Mouth/Throat:      Pharynx: No oropharyngeal exudate.   Eyes:      General: No scleral icterus.  Cardiovascular:      Rate and Rhythm: Normal rate.   Pulmonary:      Effort: No respiratory distress.      Breath sounds: Normal breath sounds. No stridor. No wheezing.   Abdominal:      Palpations: Abdomen is soft.      Tenderness: There is no abdominal tenderness.   Musculoskeletal:         General: No swelling.      Cervical back: Normal range of motion. No rigidity.   Skin:     Coloration: Skin is not jaundiced.   Neurological:      General: No focal deficit present.      Mental Status: She is alert and oriented to person, place, and time.   Psychiatric:         Mood and Affect: Mood normal.         Behavior: Behavior normal.             Review of Systems   Constitutional:  Negative for chills and fever.   HENT:  Negative for congestion.    Respiratory:  Negative for shortness of breath and wheezing.    Cardiovascular:  " Negative for leg swelling.   Gastrointestinal:  Positive for diarrhea. Negative for abdominal pain.   Genitourinary:  Negative for dysuria.   Musculoskeletal:  Negative for back pain.   Neurological:  Negative for headaches.   Psychiatric/Behavioral:  Negative for agitation and confusion.    All other systems reviewed and are negative.      Scheduled Meds:  Current Facility-Administered Medications   Medication Dose Route Frequency Provider Last Rate    acetaminophen  650 mg Oral Q4H PRN Aleks Abhinav, DO      aspirin  81 mg Oral Daily Aleks Abhinav, DO      atorvastatin  20 mg Oral QPM LIANG Watkins      cholestyramine sugar free  4 g Oral BID Aleks Abhinav, DO      clotrimazole   Topical BID Aleks Abhinav, DO      diltiazem  120 mg Oral HS Aleks Abhinav, DO      diphenoxylate-atropine  1 tablet Oral 4x Daily PRN Aleks Abhinav, DO      escitalopram  10 mg Oral Daily Aleks Abhinav, DO      heparin (porcine)  5,000 Units Subcutaneous Q8H [facility] Aleks Abhinav, DO      magnesium sulfate  2 g Intravenous Once Morenita Tillman MD 2 g (04/03/24 0824)    ondansetron  4 mg Intravenous Q4H PRN Aleks Belleng, DO      potassium chloride  40 mEq Oral TID With Meals Morenita Tillman MD      potassium chloride  40 mEq Oral Once Elva Villegas PA-C      thiamine  100 mg Oral Daily Ap Reardon PA-C         PRN Meds:.  acetaminophen    diphenoxylate-atropine    ondansetron    Continuous Infusions:       Invasive Devices:     Invasive Devices       Peripheral Intravenous Line  Duration             Peripheral IV 04/01/24 Right Antecubital 1 day                      LABORATORY:    Results from last 7 days   Lab Units 04/03/24  0554 04/02/24  0446 04/01/24  1155 03/29/24  1417   WBC Thousand/uL 7.68 7.81 11.93* 13.71*   HEMOGLOBIN g/dL 10.7* 10.6* 12.2 12.9   HEMATOCRIT % 32.6* 31.4* 36.8 41.1   PLATELETS Thousands/uL 302 283 328 409*   POTASSIUM mmol/L 3.2* 2.8* 3.0* 3.0*   CHLORIDE mmol/L 114* 110* 106 104   CO2 mmol/L 21 22  20* 20*   BUN mg/dL 23 39* 54* 60*   CREATININE mg/dL 1.06 1.22 1.81* 1.89*   CALCIUM mg/dL 9.6 9.3 10.2 9.9   MAGNESIUM mg/dL 1.7* 2.3 1.7* 1.7*   PHOSPHORUS mg/dL 2.5 2.4  --   --       rest all reviewed    RADIOLOGY:  MRA carotids wo contrast   Final Result by Miki Posada MD (04/01 1702)      Motion degraded examination. Focal artifacts are noted along the bilateral carotid bulbs. There is no suspected extracranial carotid stenosis.      The cervical vertebral arteries are patent.            Workstation performed: [id number]         MRA head wo contrast   Final Result by Miki Posada MD (04/01 1658)      Cerebral MRA demonstrates no large branch vessel occlusion or focal hemodynamically significant stenosis. Fetal left PCA supply.                     Workstation performed: [id number]         MRI brain wo contrast   Final Result by Miki Posada MD (04/01 1653)      No acute infarction, edema, or mass effect.      Chronic lacunar infarcts within the right anterior centrum semiovale and right posterior cerebellum.      Mild chronic microangiopathic ischemic changes.      Workstation performed: [id number]         CT abdomen pelvis wo contrast   Final Result by Rogerio Redman DO (04/01 1515)      Fluid seen throughout the colon in keeping with history of diarrheal illness. No focal inflammatory changes.      Minimal sigmoid diverticulosis.      Limited evaluation of the solid organs without IV contrast. Subcentimeter right hepatic lobe hypodensity too small to characterize.      Limited study without IV contrast.      Workstation performed: [id number]         CT stroke alert brain   Final Result by Shanita Marques MD (04/01 1415)      1.  New area of hypodensity involving subcortical and deep white matter in the anterior right frontal lobe most likely late subacute to chronic infarct. Superimposed acute infarct in the less hypodense regions are not excluded. There is no mass effect or     "hemorrhage.   2.  Stable old right cerebellar lacunar infarct.                  Findings were directly discussed with Isaac Rucker at approximately 2:09 p.m.      Workstation performed: [id number]           Rest all reviewed    Portions of the record may have been created with voice recognition software. Occasional wrong word or \"sound a like\" substitutions may have occurred due to the inherent limitations of voice recognition software. Read the chart carefully and recognize, using context, where substitutions have occurred.If you have any questions, please contact the dictating provider.    "

## 2024-04-03 LAB
ANION GAP SERPL CALCULATED.3IONS-SCNC: 6 MMOL/L (ref 4–13)
BUN SERPL-MCNC: 23 MG/DL (ref 5–25)
CALCIUM SERPL-MCNC: 9.6 MG/DL (ref 8.4–10.2)
CHLORIDE SERPL-SCNC: 114 MMOL/L (ref 96–108)
CO2 SERPL-SCNC: 21 MMOL/L (ref 21–32)
CREAT SERPL-MCNC: 1.06 MG/DL (ref 0.6–1.3)
ERYTHROCYTE [DISTWIDTH] IN BLOOD BY AUTOMATED COUNT: 12.5 % (ref 11.6–15.1)
GFR SERPL CREATININE-BSD FRML MDRD: 52 ML/MIN/1.73SQ M
GLUCOSE SERPL-MCNC: 86 MG/DL (ref 65–140)
HCT VFR BLD AUTO: 32.6 % (ref 34.8–46.1)
HGB BLD-MCNC: 10.7 G/DL (ref 11.5–15.4)
MAGNESIUM SERPL-MCNC: 1.7 MG/DL (ref 1.9–2.7)
MCH RBC QN AUTO: 30.7 PG (ref 26.8–34.3)
MCHC RBC AUTO-ENTMCNC: 32.8 G/DL (ref 31.4–37.4)
MCV RBC AUTO: 94 FL (ref 82–98)
PHOSPHATE SERPL-MCNC: 2.5 MG/DL (ref 2.3–4.1)
PLATELET # BLD AUTO: 302 THOUSANDS/UL (ref 149–390)
PMV BLD AUTO: 9.7 FL (ref 8.9–12.7)
POTASSIUM SERPL-SCNC: 3.2 MMOL/L (ref 3.5–5.3)
RBC # BLD AUTO: 3.48 MILLION/UL (ref 3.81–5.12)
SODIUM SERPL-SCNC: 141 MMOL/L (ref 135–147)
WBC # BLD AUTO: 7.68 THOUSAND/UL (ref 4.31–10.16)

## 2024-04-03 PROCEDURE — 85027 COMPLETE CBC AUTOMATED: CPT | Performed by: PHYSICIAN ASSISTANT

## 2024-04-03 PROCEDURE — 80048 BASIC METABOLIC PNL TOTAL CA: CPT | Performed by: INTERNAL MEDICINE

## 2024-04-03 PROCEDURE — 99232 SBSQ HOSP IP/OBS MODERATE 35: CPT | Performed by: INTERNAL MEDICINE

## 2024-04-03 PROCEDURE — 83735 ASSAY OF MAGNESIUM: CPT | Performed by: INTERNAL MEDICINE

## 2024-04-03 PROCEDURE — 84100 ASSAY OF PHOSPHORUS: CPT | Performed by: INTERNAL MEDICINE

## 2024-04-03 PROCEDURE — 99232 SBSQ HOSP IP/OBS MODERATE 35: CPT | Performed by: PHYSICIAN ASSISTANT

## 2024-04-03 RX ORDER — MAGNESIUM SULFATE HEPTAHYDRATE 40 MG/ML
2 INJECTION, SOLUTION INTRAVENOUS ONCE
Status: COMPLETED | OUTPATIENT
Start: 2024-04-03 | End: 2024-04-03

## 2024-04-03 RX ORDER — POTASSIUM CHLORIDE 20 MEQ/1
40 TABLET, EXTENDED RELEASE ORAL ONCE
Status: DISCONTINUED | OUTPATIENT
Start: 2024-04-03 | End: 2024-04-06

## 2024-04-03 RX ORDER — POTASSIUM CHLORIDE 20 MEQ/1
40 TABLET, EXTENDED RELEASE ORAL
Status: COMPLETED | OUTPATIENT
Start: 2024-04-03 | End: 2024-04-03

## 2024-04-03 RX ORDER — LORATADINE 10 MG/1
5 TABLET ORAL DAILY
Status: DISCONTINUED | OUTPATIENT
Start: 2024-04-03 | End: 2024-04-08 | Stop reason: HOSPADM

## 2024-04-03 RX ADMIN — HEPARIN SODIUM 5000 UNITS: 5000 INJECTION INTRAVENOUS; SUBCUTANEOUS at 05:51

## 2024-04-03 RX ADMIN — DILTIAZEM HYDROCHLORIDE 120 MG: 120 CAPSULE, COATED, EXTENDED RELEASE ORAL at 22:01

## 2024-04-03 RX ADMIN — LORATADINE 5 MG: 10 TABLET ORAL at 23:18

## 2024-04-03 RX ADMIN — ESCITALOPRAM OXALATE 10 MG: 10 TABLET ORAL at 08:23

## 2024-04-03 RX ADMIN — CHOLESTYRAMINE 4 G: 4 POWDER, FOR SUSPENSION ORAL at 17:17

## 2024-04-03 RX ADMIN — MAGNESIUM SULFATE HEPTAHYDRATE 2 G: 40 INJECTION, SOLUTION INTRAVENOUS at 08:24

## 2024-04-03 RX ADMIN — THIAMINE HCL TAB 100 MG 100 MG: 100 TAB at 08:23

## 2024-04-03 RX ADMIN — ASPIRIN 81 MG CHEWABLE TABLET 81 MG: 81 TABLET CHEWABLE at 08:23

## 2024-04-03 RX ADMIN — POTASSIUM CHLORIDE 40 MEQ: 1500 TABLET, EXTENDED RELEASE ORAL at 08:23

## 2024-04-03 RX ADMIN — CHOLESTYRAMINE 4 G: 4 POWDER, FOR SUSPENSION ORAL at 08:23

## 2024-04-03 RX ADMIN — POTASSIUM CHLORIDE 40 MEQ: 1500 TABLET, EXTENDED RELEASE ORAL at 12:32

## 2024-04-03 RX ADMIN — POTASSIUM CHLORIDE 40 MEQ: 1500 TABLET, EXTENDED RELEASE ORAL at 17:17

## 2024-04-03 RX ADMIN — ATORVASTATIN CALCIUM 20 MG: 20 TABLET, FILM COATED ORAL at 17:17

## 2024-04-03 NOTE — PROGRESS NOTES
Follow up Consultation    Nephrology   Almaz Hall 73 y.o. female MRN: [id number]  Unit/Bed#: E4 -01 Encounter: [id number]      Physician Requesting Consult: Alesk La DO        ASSESSMENT:  73-year-old female with multiple comorbidities including hypertension, A-fib, CHF and with ongoing diarrhea from December 2023 with weight loss and CKD stage III presents with worsening diarrhea abnormal outpatient lab work.  Nephrology consulted for JANELLE management.      Acute kidney injury  (POA) on CKD stage III:   JANELLE most likely secondary to prerenal azotemia secondary to GI losses as well as decreased p.o. intake plus failure to autoregulate presence of losartan and now at risk for JANELLE secondary to contrast exposure on 4/1/2024.  After review of records it appears that the patient has a baseline Creatinine of 0.9-1.1mg/dL.  Admission creatinine of 1.81 mg/dL on 4/1/2024.  Peak creatinine this for this hospitalization 1.81 mg/dL on 4/1/2024  Creatinine today is at 1.01mg/dL stable and back at baseline  Hold off on further IV fluids for now  CT abdomen 4/1/24 no hydronephrosis  check BMP, magnesium, phosphorus in a.m. if still in the hospital  Place on a renal diet when allowed diet order.   Strict I/O.  Daily weights  Urinary retention protocol  Avoid nephrotoxins, adjust meds to appropriate GFR.  Likely has underlying CKD secondary to age related nephron loss plus hypertensive nephrosclerosis  Outpatient for nephrology follows up with with Dr.Dunn Jackson for discharge from renal standpoint medically cleared  Ordered for lab work for every 2 weeks x 2 starting 4/9/2024 discussed with patient  Message sent to the office arrange for outpatient follow-up    H&H/anemia:  most recent hemoglobin at 10.7 g/dL  Maintain hemoglobin greater than 8 grams/deciliter    Acid-base electrolytes:  Hypokalemia: Most recent potassium 4.8 mEq resolved.  Encourage patient increase potassium foods upon discharge   metabolic acidosis:  Most recent bicarb of 18 start on sodium bicarb 650 mg p.o. twice daily and to continue upon discharge  hypomagnesemia: Most recent magnesium 1.7.  Repeat level from today not drawn will recheck in a.m. if still in the hospital.  Was supplemented yesterday    Blood pressure/hypertension:   Optimize hemodynamic status to avoid delay in renal recovery.  Maintain MAP > 65mmHg  Avoid BP fluctuations.  Home medications: Losartan 12.5 mg p.o. daily, Aldactone 12.5 mg p.o. daily, Cardizem 120 mg p.o. daily  Current medications: None  Hold home losartan and Aldactone for now as blood pressures continue to run low and upon discharge.  Advised patient if SBP consistently greater than 140 then she may resume her losartan    Other medical problems:  Diarrhea:  Management per primary team.  Follow-up with the GI consider inpatient workup in light of recurrent issues.  Plan for possible EGD colonoscopy on 4/16/2024 as an outpatient.  CT abdomen no hydronephrosis.  CHF:  Management per primary team.  Hold home Aldactone for now give 1 L IV fluids  Concern for speech changes/strokelike symptoms: Management per primary team discussed.  Status post stroke alert/1/24 status post CT with IV contrast and MRI.  Follow-up with neurology.  CT stroke from 4/1/2024 new area of hypodensity involving subcortical and deep white matter in the anterior right frontal lobe most likely late subacute or chronic infarct superimposed acute infarct in the less hypodense regions are not excluded.  Stable old right cerebellar lacunar infarct.  MRI brain no acute infarction edema or mass effect.  Chronic lacunar infarcts.  Mild chronic microangiopathic ischemic changes.    Plan below is what was discussed with the primary team that they agree with:  check BMP, magnesium, phosphorus in a.m. if still in the hospital  Start sodium bicarb 650 mg p.o. twice daily  Hold further IV fluids for now   Hold home losartan for now on discharge only resume if SBP  "greater than 140 consistently  Hold home Aldactone for now and upon discharge  Encourage increased potassium diet upon discharge  Message sent to the office arrange for outpatient follow-up  Order for lab work every 2 weeks starting 4/9/24 x 2  Stable for discharge from renal standpoint medically cleared  Will sign off    Thanks for the consult  Will sign off  Please call with questions/ concerns.      Morenita Tillman MD, FASN, 2024, 9:55 AM                Objective :   Patient seen and examined in room no overnight events blood pressure still systolics in the low 100s remains afebrile still ongoing diarrhea happy renal parameters are stable has an appointment for EGD colonoscopy on  as an outpatient.  Hopeful for discharge later today.      PHYSICAL EXAM  /62 (BP Location: Left arm)   Pulse 63   Temp 98 °F (36.7 °C) (Temporal)   Resp 16   Ht 5' 2\" (1.575 m)   Wt 61.2 kg (135 lb)   LMP  (LMP Unknown)   SpO2 97%   BMI 24.69 kg/m²   Temp (24hrs), Av.2 °F (36.8 °C), Min:98 °F (36.7 °C), Max:98.4 °F (36.9 °C)        Intake/Output Summary (Last 24 hours) at 2024 0955  Last data filed at 2024 0601  Gross per 24 hour   Intake 480 ml   Output --   Net 480 ml       I/O last 24 hours:  In: 480 [P.O.:480]  Out: -       Current Weight: Weight - Scale: 61.2 kg (135 lb)  First Weight: Weight - Scale: 61.2 kg (135 lb)  Physical Exam  Vitals and nursing note reviewed.   Constitutional:       General: She is not in acute distress.     Appearance: Normal appearance. She is normal weight. She is not ill-appearing, toxic-appearing or diaphoretic.   HENT:      Head: Normocephalic and atraumatic.      Mouth/Throat:      Mouth: Mucous membranes are moist.      Pharynx: No oropharyngeal exudate.   Eyes:      General: No scleral icterus.  Cardiovascular:      Rate and Rhythm: Normal rate.   Pulmonary:      Effort: No respiratory distress.      Breath sounds: Normal breath sounds. No stridor. No " wheezing.   Abdominal:      General: There is no distension.      Palpations: Abdomen is soft.      Tenderness: There is no abdominal tenderness.   Musculoskeletal:         General: No swelling.      Cervical back: Normal range of motion. No rigidity.   Skin:     Coloration: Skin is not jaundiced.   Neurological:      General: No focal deficit present.      Mental Status: She is alert and oriented to person, place, and time.   Psychiatric:         Mood and Affect: Mood normal.         Behavior: Behavior normal.             Review of Systems   Constitutional:  Negative for chills and fatigue.   HENT:  Negative for congestion.    Respiratory:  Negative for cough, shortness of breath and wheezing.    Cardiovascular:  Negative for leg swelling.   Gastrointestinal:  Positive for diarrhea.   Genitourinary:  Negative for dysuria.   Musculoskeletal:  Negative for back pain.   Neurological:  Negative for headaches.   Psychiatric/Behavioral:  Negative for agitation and confusion.    All other systems reviewed and are negative.      Scheduled Meds:  Current Facility-Administered Medications   Medication Dose Route Frequency Provider Last Rate    acetaminophen  650 mg Oral Q4H PRN Aleks La, DO      aspirin  81 mg Oral Daily Aleks La, DO      atorvastatin  20 mg Oral QPM LIANG Watkins      cholestyramine sugar free  4 g Oral BID Aleks Abhinav, DO      clotrimazole   Topical BID Aleks La, DO      diltiazem  120 mg Oral HS Aleks La, DO      diphenoxylate-atropine  1 tablet Oral 4x Daily PRN Aleks La, DO      escitalopram  10 mg Oral Daily Aleks Abhinav, DO      heparin (porcine)  5,000 Units Subcutaneous Q8H [facility] Aleks La, DO      loratadine  5 mg Oral Daily Avery Powers PA-C      ondansetron  4 mg Intravenous Q4H PRN Aleks La, DO      potassium chloride  40 mEq Oral Once Elva Villegas PA-C      sodium bicarbonate  650 mg Oral BID after meals Morenita Tillman MD      thiamine  100 mg Oral  Daily Ap Reardon PA-C         PRN Meds:.  acetaminophen    diphenoxylate-atropine    ondansetron    Continuous Infusions:       Invasive Devices:     Invasive Devices       Peripheral Intravenous Line  Duration             Peripheral IV 04/01/24 Right Antecubital 2 days                      LABORATORY:    Results from last 7 days   Lab Units 04/04/24  0609 04/03/24  0554 04/02/24  0446 04/01/24  1155 03/29/24  1417   WBC Thousand/uL 6.84 7.68 7.81 11.93* 13.71*   HEMOGLOBIN g/dL 11.0* 10.7* 10.6* 12.2 12.9   HEMATOCRIT % 34.0* 32.6* 31.4* 36.8 41.1   PLATELETS Thousands/uL 289 302 283 328 409*   POTASSIUM mmol/L 4.8 3.2* 2.8* 3.0* 3.0*   CHLORIDE mmol/L 116* 114* 110* 106 104   CO2 mmol/L 18* 21 22 20* 20*   BUN mg/dL 14 23 39* 54* 60*   CREATININE mg/dL 1.01 1.06 1.22 1.81* 1.89*   CALCIUM mg/dL 10.3* 9.6 9.3 10.2 9.9   MAGNESIUM mg/dL  --  1.7* 2.3 1.7* 1.7*   PHOSPHORUS mg/dL  --  2.5 2.4  --   --       rest all reviewed    RADIOLOGY:  MRA carotids wo contrast   Final Result by Miki Posada MD (04/01 1702)      Motion degraded examination. Focal artifacts are noted along the bilateral carotid bulbs. There is no suspected extracranial carotid stenosis.      The cervical vertebral arteries are patent.            Workstation performed: [id number]         MRA head wo contrast   Final Result by Miki Posada MD (04/01 1658)      Cerebral MRA demonstrates no large branch vessel occlusion or focal hemodynamically significant stenosis. Fetal left PCA supply.                     Workstation performed: [id number]         MRI brain wo contrast   Final Result by Miki Posada MD (04/01 1653)      No acute infarction, edema, or mass effect.      Chronic lacunar infarcts within the right anterior centrum semiovale and right posterior cerebellum.      Mild chronic microangiopathic ischemic changes.      Workstation performed: [id number]         CT abdomen pelvis wo contrast   Final Result by Rogerio  "Austin Redman DO (04/01 1515)      Fluid seen throughout the colon in keeping with history of diarrheal illness. No focal inflammatory changes.      Minimal sigmoid diverticulosis.      Limited evaluation of the solid organs without IV contrast. Subcentimeter right hepatic lobe hypodensity too small to characterize.      Limited study without IV contrast.      Workstation performed: [id number]         CT stroke alert brain   Final Result by Shanita Marques MD (04/01 1415)      1.  New area of hypodensity involving subcortical and deep white matter in the anterior right frontal lobe most likely late subacute to chronic infarct. Superimposed acute infarct in the less hypodense regions are not excluded. There is no mass effect or    hemorrhage.   2.  Stable old right cerebellar lacunar infarct.                  Findings were directly discussed with Isaac Rucker at approximately 2:09 p.m.      Workstation performed: [id number]           Rest all reviewed    Portions of the record may have been created with voice recognition software. Occasional wrong word or \"sound a like\" substitutions may have occurred due to the inherent limitations of voice recognition software. Read the chart carefully and recognize, using context, where substitutions have occurred.If you have any questions, please contact the dictating provider.    "

## 2024-04-03 NOTE — ASSESSMENT & PLAN NOTE
With history of diarrhea and alarm features, elevated fecal calprotectin, elevated CRP  Seen in consultation by gastroenterology here  Coordinating expedited EGD/colonoscopy next week- timing TBD

## 2024-04-03 NOTE — ASSESSMENT & PLAN NOTE
Malnutrition Findings:   Adult Malnutrition type: Chronic illness  Adult Degree of Malnutrition: Malnutrition of moderate degree  Malnutrition Characteristics: Inadequate energy, Weight loss  360 Statement: Moderate malnutrition r/t chronic diarrhea as evidenced by 10% unplanned wt loss since 10/30/23, consuming < 75% energy intake compared to estimated energy needs > 1 month. Treated with Banatrol bid.  BMI Findings:  Body mass index is 24.69 kg/m².

## 2024-04-03 NOTE — TELEPHONE ENCOUNTER
Called patient to update her that I found an opening at [facility] for her EGD and Colonoscopy on 4/18

## 2024-04-03 NOTE — PROGRESS NOTES
[facility]  [facility]  Name: Almaz Hall I  MRN: [id number]  Unit/Bed#: E4 -01 I Date of Admission: 4/1/2024   Date of Service: 4/3/2024 I Hospital Day: 2    Assessment/Plan   * JANELLE (acute kidney injury) (HCC)  Assessment & Plan  Results from last 7 days   Lab Units 04/03/24  0554 04/02/24  0446 04/01/24  1155 03/29/24  1417   CREATININE mg/dL 1.06 1.22 1.81* 1.89*   History of atrial fibrillation hypertension and CKD 3 who presents for abnormal labs  Has had several weeks of diarrhea.    Seen GI outpatient workup negative with plans for EGD/colonoscopy in May  Creatinine baseline around 1.0   IV fluids with bicarbonate infusion per nephrology, now discontinued  Continue lomotil as needed, banana flakes and cholestyramine.  Consulted GI for evaluation as diarrhea has led to kidney injury prompting admission  Unfortunately received loading dose of ASA/Plavix given concern for stroke, unable to scope until later this week  EGD/Colonoscopy to be performed while IP- coordinating with GI    Transient episode of aphasia  Assessment & Plan  Presented with aphasia and was a stroke alert in the ED, possibly due to hypotension.  Neurology evaluated  S/P DAPT load in the ED  MRI with late subacute/chronic infarct, no acute findings  Neurology recommending continuing ASA, dc plavix    Diarrhea  Assessment & Plan  With history of diarrhea and alarm features, elevated fecal calprotectin, elevated CRP  Seen in consultation by gastroenterology here  Coordinating expedited EGD/colonoscopy next week- timing TBD    Hypokalemia  Assessment & Plan  Repleting K and mag  Results from last 7 days   Lab Units 04/03/24  0554 04/02/24  0446 04/01/24  1155 03/29/24  1417   POTASSIUM mmol/L 3.2* 2.8* 3.0* 3.0*   Mag low at 1.7 - given 2 g IV mag today   Recheck in am    Moderate protein-calorie malnutrition (HCC)  Assessment & Plan  Malnutrition Findings:   Adult Malnutrition  type: Chronic illness  Adult Degree of Malnutrition: Malnutrition of moderate degree  Malnutrition Characteristics: Inadequate energy, Weight loss  360 Statement: Moderate malnutrition r/t chronic diarrhea as evidenced by 10% unplanned wt loss since 10/30/23, consuming < 75% energy intake compared to estimated energy needs > 1 month. Treated with Banatrol bid.  BMI Findings:  Body mass index is 24.69 kg/m².     Chronic diastolic CHF (congestive heart failure) ([facility])  Assessment & Plan  Wt Readings from Last 3 Encounters:   04/02/24 61.2 kg (135 lb)   03/29/24 61.4 kg (135 lb 6.4 oz)   03/29/24 61.7 kg (136 lb)   Echo performed here revealing EF 70%, systolic function vigorous, wall motion normal, diastolic function moderately abnormal consistent with grade 2 pseudo normal relaxation.  Left atrial filling pressure elevated.  Mild regurg of aortic valve, moderate to severe aortic stenosis, mild mitral valve regurgitation with moderate stenosis, mild tricuspid regurg with right ventricular systolic pressure mildly to moderately elevated  Prior to admission on spironolactone-currently on hold    Paroxysmal atrial fibrillation ([facility])  Assessment & Plan  Paroxysmal atrial fibrillation status post ablation.   Not on anticoagulation.  Continue diltiazem CD at bedtime    Essential hypertension  Assessment & Plan  Maintained on spironolactone 12.5 mg daily, diltizem 120 mg daily, and losartan 12.5 mg daily  Holding spironolactone and losartan given JANELLE  Continue diltiazem    Depression with anxiety  Assessment & Plan  Continue escitalopram 10 mg daily        VTE Pharmacologic Prophylaxis:   Pharmacologic: Enoxaparin (Lovenox)  Mechanical VTE Prophylaxis in Place: Yes    AM-PAC Basic Mobility:  Basic Mobility Inpatient Raw Score: 22  JH-HLM Achieved: 6: Walk 10 steps or more  JH-HLM Goal: 7: Walk 25 feet or more    Discharge Plan: with need for continued inpatient stay for electrolyte repletion    Discussions with Specialists or  Other Care Team Provider: nursing, nephrology, coordinating with GI    Education and Discussions with Family / Patient: patient    Time Spent for Care: This time was spent on one or more of the following: performing physical exam; counseling and coordination of care; obtaining or reviewing history; documenting in the medical record; reviewing/ordering tests, medications, or procedures; communicating with other healthcare professionals and discussing with patient's family/caregivers.    Current Length of Stay: 2 day(s)  Current Patient Status: Inpatient   Code Status: Level 3 - DNAR and DNI    Subjective:   Resting in bed. Discussed no further episodes of aphasia. She feels better but now repleting electrolytes. Kidney function back to baseline. Ongoing discussion in regards to timing of colonoscopy.     Objective:     Vitals:   Temp (24hrs), Av.4 °F (36.3 °C), Min:97.2 °F (36.2 °C), Max:97.6 °F (36.4 °C)    Temp:  [97.2 °F (36.2 °C)-97.6 °F (36.4 °C)] 97.2 °F (36.2 °C)  HR:  [67-68] 67  Resp:  [18] 18  BP: (103-126)/(52-61) 103/61  SpO2:  [97 %-98 %] 97 %  Body mass index is 24.69 kg/m².     Input and Output Summary (last 24 hours):       Intake/Output Summary (Last 24 hours) at 4/3/2024 1539  Last data filed at 4/3/2024 0501  Gross per 24 hour   Intake 240 ml   Output --   Net 240 ml       Physical Exam:     Physical Exam  Vitals and nursing note reviewed.   Constitutional:       General: She is not in acute distress.     Appearance: Normal appearance. She is normal weight. She is not toxic-appearing.   HENT:      Head: Normocephalic and atraumatic.   Eyes:      General: No scleral icterus.  Cardiovascular:      Rate and Rhythm: Normal rate and regular rhythm.   Pulmonary:      Effort: Pulmonary effort is normal. No respiratory distress.      Breath sounds: Normal breath sounds. No stridor. No wheezing or rhonchi.   Abdominal:      General: Bowel sounds are normal. There is no distension.      Palpations:  Abdomen is soft. There is no mass.      Tenderness: There is no abdominal tenderness.      Hernia: No hernia is present.   Musculoskeletal:         General: No swelling.      Cervical back: Neck supple.   Skin:     General: Skin is warm and dry.   Neurological:      General: No focal deficit present.      Mental Status: She is alert and oriented to person, place, and time. Mental status is at baseline.      Sensory: No sensory deficit.      Motor: No weakness.   Psychiatric:         Mood and Affect: Mood normal.         Behavior: Behavior normal.         Additional Data:     Labs:    Results from last 7 days   Lab Units 04/03/24  0554 04/02/24  0446 04/01/24  1155   WBC Thousand/uL 7.68   < > 11.93*   HEMOGLOBIN g/dL 10.7*   < > 12.2   HEMATOCRIT % 32.6*   < > 36.8   PLATELETS Thousands/uL 302   < > 328   NEUTROS PCT %  --   --  79*   LYMPHS PCT %  --   --  14   MONOS PCT %  --   --  5   EOS PCT %  --   --  0    < > = values in this interval not displayed.     Results from last 7 days   Lab Units 04/03/24  0554 04/02/24  0446 04/01/24  1155   POTASSIUM mmol/L 3.2*   < > 3.0*   CHLORIDE mmol/L 114*   < > 106   CO2 mmol/L 21   < > 20*   BUN mg/dL 23   < > 54*   CREATININE mg/dL 1.06   < > 1.81*   CALCIUM mg/dL 9.6   < > 10.2   ALK PHOS U/L  --   --  73   ALT U/L  --   --  12   AST U/L  --   --  14    < > = values in this interval not displayed.           * I Have Reviewed All Lab Data Listed Above.  * Additional Pertinent Lab Tests Reviewed: All Labs For Current Hospital Admission Reviewed    Imaging:    Imaging Reports Reviewed Today Include:   Imaging Personally Reviewed by Myself Includes:      Recent Cultures (last 7 days):           Lines/Drains:  Invasive Devices       Peripheral Intravenous Line  Duration             Peripheral IV 04/01/24 Right Antecubital 2 days                    Last 24 Hours Medication List:   Current Facility-Administered Medications   Medication Dose Route Frequency Provider Last Rate     acetaminophen  650 mg Oral Q4H PRN Aleks La, DO      aspirin  81 mg Oral Daily Aleks La, DO      atorvastatin  20 mg Oral QPM LIANG Watkins      cholestyramine sugar free  4 g Oral BID Aleks La, DO      clotrimazole   Topical BID Aleks Belleng, DO      diltiazem  120 mg Oral HS Aleks La, DO      diphenoxylate-atropine  1 tablet Oral 4x Daily PRN Aleks La, DO      escitalopram  10 mg Oral Daily Aleks La, DO      heparin (porcine)  5,000 Units Subcutaneous Q8H COFMORT Aleks La, DO      ondansetron  4 mg Intravenous Q4H PRN Aleks La, DO      potassium chloride  40 mEq Oral TID With Meals Morenita Tillman MD      potassium chloride  40 mEq Oral Once Elva Villegas PA-C      thiamine  100 mg Oral Daily Ap Reardon PA-C          Today, Patient Was Seen By: Elva Villegas PA-C    ** Please Note: This note has been constructed using a voice recognition system. **

## 2024-04-03 NOTE — TELEPHONE ENCOUNTER
Patient currently admitted in the hospital. States that she saw she missed a call from us.   Patient is ok to have her colonoscopy at [place] on April 18th.  Verbalized understanding .

## 2024-04-03 NOTE — ASSESSMENT & PLAN NOTE
Repleting K and mag  Results from last 7 days   Lab Units 04/03/24  0554 04/02/24  0446 04/01/24  1155 03/29/24  1417   POTASSIUM mmol/L 3.2* 2.8* 3.0* 3.0*   Mag low at 1.7 - given 2 g IV mag today   Recheck in am

## 2024-04-03 NOTE — ASSESSMENT & PLAN NOTE
Wt Readings from Last 3 Encounters:   04/02/24 61.2 kg (135 lb)   03/29/24 61.4 kg (135 lb 6.4 oz)   03/29/24 61.7 kg (136 lb)   Echo performed here revealing EF 70%, systolic function vigorous, wall motion normal, diastolic function moderately abnormal consistent with grade 2 pseudo normal relaxation.  Left atrial filling pressure elevated.  Mild regurg of aortic valve, moderate to severe aortic stenosis, mild mitral valve regurgitation with moderate stenosis, mild tricuspid regurg with right ventricular systolic pressure mildly to moderately elevated  Prior to admission on spironolactone-currently on hold

## 2024-04-03 NOTE — ASSESSMENT & PLAN NOTE
Presented with aphasia and was a stroke alert in the ED, possibly due to hypotension.  Neurology evaluated  S/P DAPT load in the ED  MRI with late subacute/chronic infarct, no acute findings  Neurology recommending continuing ASA, dc plavix

## 2024-04-03 NOTE — ASSESSMENT & PLAN NOTE
Paroxysmal atrial fibrillation status post ablation.   Not on anticoagulation.  Continue diltiazem CD at bedtime

## 2024-04-03 NOTE — ASSESSMENT & PLAN NOTE
Results from last 7 days   Lab Units 04/03/24  0554 04/02/24  0446 04/01/24  1155 03/29/24  1417   CREATININE mg/dL 1.06 1.22 1.81* 1.89*   History of atrial fibrillation hypertension and CKD 3 who presents for abnormal labs  Has had several weeks of diarrhea.    Seen GI outpatient workup negative with plans for EGD/colonoscopy in May  Creatinine baseline around 1.0   IV fluids with bicarbonate infusion per nephrology, now discontinued  Continue lomotil as needed, banana flakes and cholestyramine.  Consulted GI for evaluation as diarrhea has led to kidney injury prompting admission  Unfortunately received loading dose of ASA/Plavix given concern for stroke, unable to scope until later this week  EGD/Colonoscopy to be performed while IP- coordinating with GI

## 2024-04-03 NOTE — PLAN OF CARE
Problem: Potential for Falls  Goal: Patient will remain free of falls  Description: INTERVENTIONS:  - Educate patient/family on patient safety including physical limitations  - Instruct patient to call for assistance with activity   - Consult OT/PT to assist with strengthening/mobility   - Keep Call bell within reach  - Keep bed low and locked with side rails adjusted as appropriate  - Keep care items and personal belongings within reach  - Initiate and maintain comfort rounds  - Make Fall Risk Sign visible to staff  - Offer Toileting every  Hours, in advance of need  - Initiate/Maintain alarm  - Obtain necessary fall risk management equipment  - Apply yellow socks and bracelet for high fall risk patients  - Consider moving patient to room near nurses station  Outcome: Progressing     Problem: DISCHARGE PLANNING  Goal: Discharge to home or other facility with appropriate resources  Description: INTERVENTIONS:  - Identify barriers to discharge w/patient and caregiver  - Arrange for needed discharge resources and transportation as appropriate  - Identify discharge learning needs (meds, wound care, etc.)  - Arrange for interpretive services to assist at discharge as needed  - Refer to Case Management Department for coordinating discharge planning if the patient needs post-hospital services based on physician/advanced practitioner order or complex needs related to functional status, cognitive ability, or social support system  Outcome: Progressing     Problem: Knowledge Deficit  Goal: Patient/family/caregiver demonstrates understanding of disease process, treatment plan, medications, and discharge instructions  Description: Complete learning assessment and assess knowledge base.  Interventions:  - Provide teaching at level of understanding  - Provide teaching via preferred learning methods  Outcome: Progressing     Problem: NEUROSENSORY - ADULT  Goal: Achieves stable or improved neurological status  Description:  INTERVENTIONS  - Monitor and report changes in neurological status  - Monitor vital signs such as temperature, blood pressure, glucose, and any other labs ordered   - Initiate measures to prevent increased intracranial pressure  - Monitor for seizure activity and implement precautions if appropriate      Outcome: Progressing     Problem: Nutrition/Hydration-ADULT  Goal: Nutrient/Hydration intake appropriate for improving, restoring or maintaining nutritional needs  Description: Monitor and assess patient's nutrition/hydration status for malnutrition. Collaborate with interdisciplinary team and initiate plan and interventions as ordered.  Monitor patient's weight and dietary intake as ordered or per policy. Utilize nutrition screening tool and intervene as necessary. Determine patient's food preferences and provide high-protein, high-caloric foods as appropriate.     INTERVENTIONS:  - Monitor oral intake, urinary output, labs, and treatment plans  - Assess nutrition and hydration status and recommend course of action  - Evaluate amount of meals eaten  - Assist patient with eating if necessary   - Allow adequate time for meals  - Recommend/ encourage appropriate diets, oral nutritional supplements, and vitamin/mineral supplements  - Order, calculate, and assess calorie counts as needed  - Recommend, monitor, and adjust tube feedings and TPN/PPN based on assessed needs  - Assess need for intravenous fluids  - Provide specific nutrition/hydration education as appropriate  - Include patient/family/caregiver in decisions related to nutrition  Outcome: Progressing

## 2024-04-04 ENCOUNTER — TELEPHONE (OUTPATIENT)
Age: 74
End: 2024-04-04

## 2024-04-04 ENCOUNTER — TELEPHONE (OUTPATIENT)
Dept: NEPHROLOGY | Facility: CLINIC | Age: 74
End: 2024-04-04

## 2024-04-04 DIAGNOSIS — E83.52 HYPERCALCEMIA: ICD-10-CM

## 2024-04-04 DIAGNOSIS — N18.31 STAGE 3A CHRONIC KIDNEY DISEASE (HCC): Primary | ICD-10-CM

## 2024-04-04 LAB
ANION GAP SERPL CALCULATED.3IONS-SCNC: 5 MMOL/L (ref 4–13)
APTT PPP: 31 SECONDS (ref 23–37)
BUN SERPL-MCNC: 14 MG/DL (ref 5–25)
CALCIUM SERPL-MCNC: 10.3 MG/DL (ref 8.4–10.2)
CHLORIDE SERPL-SCNC: 116 MMOL/L (ref 96–108)
CO2 SERPL-SCNC: 18 MMOL/L (ref 21–32)
CREAT SERPL-MCNC: 1.01 MG/DL (ref 0.6–1.3)
ERYTHROCYTE [DISTWIDTH] IN BLOOD BY AUTOMATED COUNT: 12.7 % (ref 11.6–15.1)
GFR SERPL CREATININE-BSD FRML MDRD: 56 ML/MIN/1.73SQ M
GLUCOSE SERPL-MCNC: 94 MG/DL (ref 65–140)
HCT VFR BLD AUTO: 34 % (ref 34.8–46.1)
HGB BLD-MCNC: 11 G/DL (ref 11.5–15.4)
INR PPP: 1.08 (ref 0.84–1.19)
MAGNESIUM SERPL-MCNC: 1.7 MG/DL (ref 1.9–2.7)
MCH RBC QN AUTO: 31 PG (ref 26.8–34.3)
MCHC RBC AUTO-ENTMCNC: 32.4 G/DL (ref 31.4–37.4)
MCV RBC AUTO: 96 FL (ref 82–98)
PLATELET # BLD AUTO: 289 THOUSANDS/UL (ref 149–390)
PMV BLD AUTO: 9.6 FL (ref 8.9–12.7)
POTASSIUM SERPL-SCNC: 4.8 MMOL/L (ref 3.5–5.3)
PROTHROMBIN TIME: 14.3 SECONDS (ref 11.6–14.5)
RBC # BLD AUTO: 3.55 MILLION/UL (ref 3.81–5.12)
SODIUM SERPL-SCNC: 139 MMOL/L (ref 135–147)
WBC # BLD AUTO: 6.84 THOUSAND/UL (ref 4.31–10.16)

## 2024-04-04 PROCEDURE — 83735 ASSAY OF MAGNESIUM: CPT | Performed by: PHYSICIAN ASSISTANT

## 2024-04-04 PROCEDURE — 85730 THROMBOPLASTIN TIME PARTIAL: CPT | Performed by: INTERNAL MEDICINE

## 2024-04-04 PROCEDURE — 99232 SBSQ HOSP IP/OBS MODERATE 35: CPT | Performed by: PHYSICIAN ASSISTANT

## 2024-04-04 PROCEDURE — 80048 BASIC METABOLIC PNL TOTAL CA: CPT | Performed by: PHYSICIAN ASSISTANT

## 2024-04-04 PROCEDURE — 85027 COMPLETE CBC AUTOMATED: CPT | Performed by: PHYSICIAN ASSISTANT

## 2024-04-04 PROCEDURE — 85610 PROTHROMBIN TIME: CPT | Performed by: PHYSICIAN ASSISTANT

## 2024-04-04 PROCEDURE — 99232 SBSQ HOSP IP/OBS MODERATE 35: CPT | Performed by: INTERNAL MEDICINE

## 2024-04-04 PROCEDURE — 85730 THROMBOPLASTIN TIME PARTIAL: CPT | Performed by: PHYSICIAN ASSISTANT

## 2024-04-04 PROCEDURE — 93291 INTERROG DEV EVAL SCRMS IP: CPT | Performed by: INTERNAL MEDICINE

## 2024-04-04 PROCEDURE — NC001 PR NO CHARGE: Performed by: INTERNAL MEDICINE

## 2024-04-04 RX ORDER — HEPARIN SODIUM 10000 [USP'U]/100ML
3-20 INJECTION, SOLUTION INTRAVENOUS
Status: DISCONTINUED | OUTPATIENT
Start: 2024-04-04 | End: 2024-04-08 | Stop reason: HOSPADM

## 2024-04-04 RX ORDER — MAGNESIUM SULFATE HEPTAHYDRATE 40 MG/ML
2 INJECTION, SOLUTION INTRAVENOUS ONCE
Status: COMPLETED | OUTPATIENT
Start: 2024-04-04 | End: 2024-04-05

## 2024-04-04 RX ORDER — CHOLESTYRAMINE LIGHT 4 G/5.7G
4 POWDER, FOR SUSPENSION ORAL 2 TIMES DAILY
Qty: 20 PACKET | Refills: 0 | Status: CANCELLED | OUTPATIENT
Start: 2024-04-04 | End: 2024-04-14

## 2024-04-04 RX ORDER — HEPARIN SODIUM 1000 [USP'U]/ML
3600 INJECTION, SOLUTION INTRAVENOUS; SUBCUTANEOUS ONCE
Status: COMPLETED | OUTPATIENT
Start: 2024-04-04 | End: 2024-04-04

## 2024-04-04 RX ORDER — SODIUM BICARBONATE 650 MG/1
650 TABLET ORAL
Status: DISCONTINUED | OUTPATIENT
Start: 2024-04-04 | End: 2024-04-08 | Stop reason: HOSPADM

## 2024-04-04 RX ORDER — SODIUM BICARBONATE 650 MG/1
650 TABLET ORAL
Qty: 60 TABLET | Refills: 0 | Status: CANCELLED | OUTPATIENT
Start: 2024-04-04

## 2024-04-04 RX ORDER — ASPIRIN 81 MG/1
81 TABLET, CHEWABLE ORAL DAILY
Qty: 30 TABLET | Refills: 0 | Status: CANCELLED | OUTPATIENT
Start: 2024-04-05

## 2024-04-04 RX ORDER — DILTIAZEM HYDROCHLORIDE 120 MG/1
120 CAPSULE, EXTENDED RELEASE ORAL DAILY
Qty: 90 CAPSULE | Refills: 0 | Status: CANCELLED | OUTPATIENT
Start: 2024-04-04

## 2024-04-04 RX ORDER — HEPARIN SODIUM 1000 [USP'U]/ML
1800 INJECTION, SOLUTION INTRAVENOUS; SUBCUTANEOUS EVERY 6 HOURS PRN
Status: DISCONTINUED | OUTPATIENT
Start: 2024-04-04 | End: 2024-04-08 | Stop reason: HOSPADM

## 2024-04-04 RX ORDER — HEPARIN SODIUM 1000 [USP'U]/ML
3600 INJECTION, SOLUTION INTRAVENOUS; SUBCUTANEOUS EVERY 6 HOURS PRN
Status: DISCONTINUED | OUTPATIENT
Start: 2024-04-04 | End: 2024-04-08 | Stop reason: HOSPADM

## 2024-04-04 RX ADMIN — SODIUM BICARBONATE 650 MG TABLET 650 MG: at 17:13

## 2024-04-04 RX ADMIN — CLOTRIMAZOLE: 1 CREAM TOPICAL at 17:15

## 2024-04-04 RX ADMIN — CHOLESTYRAMINE 4 G: 4 POWDER, FOR SUSPENSION ORAL at 17:13

## 2024-04-04 RX ADMIN — ASPIRIN 81 MG CHEWABLE TABLET 81 MG: 81 TABLET CHEWABLE at 08:50

## 2024-04-04 RX ADMIN — LORATADINE 5 MG: 10 TABLET ORAL at 08:50

## 2024-04-04 RX ADMIN — SODIUM BICARBONATE 650 MG TABLET 650 MG: at 08:50

## 2024-04-04 RX ADMIN — ACETAMINOPHEN 325MG 650 MG: 325 TABLET ORAL at 05:42

## 2024-04-04 RX ADMIN — ATORVASTATIN CALCIUM 20 MG: 20 TABLET, FILM COATED ORAL at 17:15

## 2024-04-04 RX ADMIN — MAGNESIUM SULFATE HEPTAHYDRATE 2 G: 40 INJECTION, SOLUTION INTRAVENOUS at 13:40

## 2024-04-04 RX ADMIN — CLOTRIMAZOLE: 1 CREAM TOPICAL at 08:54

## 2024-04-04 RX ADMIN — CHOLESTYRAMINE 4 G: 4 POWDER, FOR SUSPENSION ORAL at 08:50

## 2024-04-04 RX ADMIN — HEPARIN SODIUM 12 UNITS/KG/HR: 10000 INJECTION, SOLUTION INTRAVENOUS at 17:25

## 2024-04-04 RX ADMIN — DILTIAZEM HYDROCHLORIDE 120 MG: 120 CAPSULE, COATED, EXTENDED RELEASE ORAL at 22:25

## 2024-04-04 RX ADMIN — THIAMINE HCL TAB 100 MG 100 MG: 100 TAB at 08:50

## 2024-04-04 RX ADMIN — ESCITALOPRAM OXALATE 10 MG: 10 TABLET ORAL at 08:50

## 2024-04-04 RX ADMIN — HEPARIN SODIUM 3600 UNITS: 1000 INJECTION INTRAVENOUS; SUBCUTANEOUS at 17:28

## 2024-04-04 NOTE — ASSESSMENT & PLAN NOTE
Maintained on spironolactone 12.5 mg daily, diltizem 120 mg daily, and losartan 12.5 mg daily  Held spironolactone and losartan given JANELLE   Creatinine is now improved  Given she presented with hypotension and blood pressures have remained on the softer side here, may need decrease in home regimen  Spironolactone and losartan remain on hold

## 2024-04-04 NOTE — PROGRESS NOTES
[facility]  Progress Note  Name: Almaz Hall I  MRN: [id number]  Unit/Bed#: E4 -01 I Date of Admission: 4/1/2024   Date of Service: 4/4/2024 I Hospital Day: 3    Assessment/Plan   * JANELLE (acute kidney injury) (HCC)  Assessment & Plan  Results from last 7 days   Lab Units 04/04/24  0609 04/03/24  0554 04/02/24  0446 04/01/24  1155   CREATININE mg/dL 1.01 1.06 1.22 1.81*   History of atrial fibrillation hypertension and CKD 3 who presents for abnormal labs  Has had several weeks of ongoing diarrhea.    Seen GI outpatient workup negative with plans for EGD/colonoscopy in May 2024  Creatinine baseline around 1.0   Received IV fluids with bicarbonate infusion per nephrology   Started on lomotil as needed, banana flakes and cholestyramine.  Seen by gastroenterology in consultation  Unfortunately received loading dose of ASA/Plavix given concern for stroke 4/1/24, unable to scope for at least 5 days until after Plavix load was administered  EGD/Colonoscopy was to be performed in the outpatient setting  But will now require further coordination between neurology and GI given new finding     Transient episode of aphasia  Assessment & Plan  Presented with aphasia and was a stroke alert in the ED  Neurology evaluated  S/P DAPT load in the ED on 4/1/24 (aspirin 325 mg x 1, Plavix 300 mg x 1)  Started on 81 mg daily here  CT head with late subacute/chronic infarct, no acute findings  MRI brain: No acute infarction, edema, or mass effect. Chronic lacunar infarcts within the right anterior centrum semiovale and right posterior cerebellum. Mild chronic microangiopathic ischemic changes  Loop recorder and irrigated-showing sinus rhythm with episodic A-fib -patient's [id number] score is 5 requiring anticoagulation.  Will start heparin drip.  Discussed with neurology.  Neurology will reevaluate tomorrow.  Await additional neurology recommendations  Place on telemetry     Diarrhea  Assessment &  Plan  With history of diarrhea and alarm features, elevated fecal calprotectin, elevated CRP  She was scheduled for outpatient EGD/colonoscopy in May 2024  Seen in consultation by gastroenterology here  Coordinating expedited outpatient EGD/colonoscopy -was to be performed on 4/18/2024  However now with episodic A-fib and will require anticoagulation  Coordinating with GI to perform inpatient EGD/colonoscopy  Ongoing 6-8 BM per day- messaged GI to inform    Paroxysmal atrial fibrillation (HCC)  Assessment & Plan  Paroxysmal atrial fibrillation status post ablation.   Has remained in sinus rhythm and with loop recorder in place  Not on anticoagulation.  Continue diltiazem CD at bedtime  Loop recorder was interrogated with episodic afib   Discussed with neurology-will start heparin drip and await further GI reevaluation for inpatient EGD/colonoscopy prior to starting on oral anticoagulation- ongoing coordination of care  Place on telemetry    Hypokalemia  Assessment & Plan  Results from last 7 days   Lab Units 04/04/24  0609 04/03/24  0554 04/02/24  0446 04/01/24  1155   POTASSIUM mmol/L 4.8 3.2* 2.8* 3.0*   MAGNESIUM mg/dL 1.7* 1.7* 2.3 1.7*   Hypomagnesemia and hypokalemia secondary to ongoing diarrhea  She was repleted with IV and oral supplementation here  Ongoing repletion and am lab checks    Moderate protein-calorie malnutrition (HCC)  Assessment & Plan  Malnutrition Findings:   Adult Malnutrition type: Chronic illness  Adult Degree of Malnutrition: Malnutrition of moderate degree  Malnutrition Characteristics: Inadequate energy, Weight loss  360 Statement: Moderate malnutrition r/t chronic diarrhea as evidenced by 10% unplanned wt loss since 10/30/23, consuming < 75% energy intake compared to estimated energy needs > 1 month. Treated with Banatrol bid.  BMI Findings:  Body mass index is 24.69 kg/m².     Chronic diastolic CHF (congestive heart failure) ([facility])  Assessment & Plan  Wt Readings from Last 3  Encounters:   04/02/24 61.2 kg (135 lb)   03/29/24 61.4 kg (135 lb 6.4 oz)   03/29/24 61.7 kg (136 lb)   Echo performed here revealing EF 70%, systolic function vigorous, wall motion normal, diastolic function moderately abnormal consistent with grade 2 pseudo normal relaxation.  Left atrial filling pressure elevated.  Mild regurg of aortic valve, moderate to severe aortic stenosis, mild mitral valve regurgitation with moderate stenosis, mild tricuspid regurg with right ventricular systolic pressure mildly to moderately elevated  Prior to admission on spironolactone-currently on hold     Essential hypertension  Assessment & Plan  Maintained on spironolactone 12.5 mg daily, diltizem 120 mg daily, and losartan 12.5 mg daily  Held spironolactone and losartan given JANELLE   Creatinine is now improved  Given she presented with hypotension and blood pressures have remained on the softer side here, may need decrease in home regimen  Spironolactone and losartan remain on hold    Depression with anxiety  Assessment & Plan  Continue escitalopram 10 mg daily           VTE Pharmacologic Prophylaxis:   Pharmacologic: Heparin Drip  Mechanical VTE Prophylaxis in Place: Yes    AM-PAC Basic Mobility:  Basic Mobility Inpatient Raw Score: 22  JH-HLM Achieved: 7: Walk 25 feet or more  JH-HLM Goal: 7: Walk 25 feet or more    Discharge Plan: with need for continued inpatient stay for ongoing diarrhea, electrolyte repletion, heparin drip, neurology reevaluation and GI reevaluation    Discussions with Specialists or Other Care Team Provider: Nursing, gastroenterology, neurology, Dr. La    Education and Discussions with Family / Patient: patient    Time Spent for Care: This time was spent on one or more of the following: performing physical exam; counseling and coordination of care; obtaining or reviewing history; documenting in the medical record; reviewing/ordering tests, medications, or procedures; communicating with other healthcare  professionals and discussing with patient's family/caregivers.    Current Length of Stay: 3 day(s)  Current Patient Status: Inpatient   Code Status: Level 3 - DNAR and DNI    Subjective:   Ongoing diarrhea with 6-8 episodes today. Feels weak overall. Discussed loop recorder interrogation and need for anticoagulation. Ongoing coordination of care.    Objective:     Vitals:   Temp (24hrs), Av.4 °F (36.9 °C), Min:98 °F (36.7 °C), Max:98.8 °F (37.1 °C)    Temp:  [98 °F (36.7 °C)-98.8 °F (37.1 °C)] 98.8 °F (37.1 °C)  HR:  [63-66] 63  Resp:  [16-18] 16  BP: ()/(53-67) 112/53  SpO2:  [97 %-99 %] 99 %  Body mass index is 24.69 kg/m².     Input and Output Summary (last 24 hours):       Intake/Output Summary (Last 24 hours) at 2024 1701  Last data filed at 2024 0601  Gross per 24 hour   Intake 480 ml   Output --   Net 480 ml       Physical Exam:     Physical Exam  Vitals and nursing note reviewed.   Constitutional:       General: She is not in acute distress.     Appearance: She is normal weight. She is not ill-appearing, toxic-appearing or diaphoretic.   HENT:      Head: Normocephalic and atraumatic.   Eyes:      General: No scleral icterus.  Cardiovascular:      Rate and Rhythm: Normal rate. Rhythm irregular.   Pulmonary:      Effort: Pulmonary effort is normal. No respiratory distress.      Breath sounds: Normal breath sounds. No stridor. No wheezing or rhonchi.   Abdominal:      General: Bowel sounds are normal. There is no distension.      Palpations: Abdomen is soft. There is no mass.      Tenderness: There is no abdominal tenderness.      Hernia: No hernia is present.   Musculoskeletal:         General: No swelling.      Cervical back: Neck supple.   Skin:     General: Skin is warm and dry.   Neurological:      Mental Status: She is alert and oriented to person, place, and time. Mental status is at baseline.   Psychiatric:         Mood and Affect: Mood normal.         Behavior: Behavior normal.          Additional Data:     Labs:    Results from last 7 days   Lab Units 04/04/24  0609 04/02/24  0446 04/01/24  1155   WBC Thousand/uL 6.84   < > 11.93*   HEMOGLOBIN g/dL 11.0*   < > 12.2   HEMATOCRIT % 34.0*   < > 36.8   PLATELETS Thousands/uL 289   < > 328   NEUTROS PCT %  --   --  79*   LYMPHS PCT %  --   --  14   MONOS PCT %  --   --  5   EOS PCT %  --   --  0    < > = values in this interval not displayed.     Results from last 7 days   Lab Units 04/04/24  0609 04/02/24  0446 04/01/24  1155   POTASSIUM mmol/L 4.8   < > 3.0*   CHLORIDE mmol/L 116*   < > 106   CO2 mmol/L 18*   < > 20*   BUN mg/dL 14   < > 54*   CREATININE mg/dL 1.01   < > 1.81*   CALCIUM mg/dL 10.3*   < > 10.2   ALK PHOS U/L  --   --  73   ALT U/L  --   --  12   AST U/L  --   --  14    < > = values in this interval not displayed.           * I Have Reviewed All Lab Data Listed Above.  * Additional Pertinent Lab Tests Reviewed: All Labs For Current Hospital Admission Reviewed    Imaging:    Imaging Reports Reviewed Today Include:   Imaging Personally Reviewed by Myself Includes:      Recent Cultures (last 7 days):           Lines/Drains:  Invasive Devices       Peripheral Intravenous Line  Duration             Peripheral IV 04/01/24 Right Antecubital 3 days                    Last 24 Hours Medication List:   Current Facility-Administered Medications   Medication Dose Route Frequency Provider Last Rate    acetaminophen  650 mg Oral Q4H PRN Aleks Abhinav, DO      aspirin  81 mg Oral Daily Aleks La, DO      atorvastatin  20 mg Oral QPM LIANG Watkins      cholestyramine sugar free  4 g Oral BID Aleks Abhinav, DO      clotrimazole   Topical BID Aleks Abhinav, DO      diltiazem  120 mg Oral HS Aleks Abhinav, DO      diphenoxylate-atropine  1 tablet Oral 4x Daily PRN Aleks Abhinav, DO      escitalopram  10 mg Oral Daily Aleks Abhinav, DO      heparin (porcine)  3-20 Units/kg/hr (Order-Specific) Intravenous Titrated Elva Villegas PA-C       heparin (porcine)  1,800 Units Intravenous Q6H PRN Elva Villegas PA-C      heparin (porcine)  3,600 Units Intravenous Once Elva Villegas PA-C      heparin (porcine)  3,600 Units Intravenous Q6H PRN Elva Villegas PA-C      loratadine  5 mg Oral Daily Avery Powers PA-C      magnesium sulfate  2 g Intravenous Once Elva Villegas PA-C 2 g (04/04/24 1340)    ondansetron  4 mg Intravenous Q4H PRN Aleks La DO      potassium chloride  40 mEq Oral Once Elva Villegas PA-C      sodium bicarbonate  650 mg Oral BID after meals Morenita Tillman MD      thiamine  100 mg Oral Daily Ap Reardon PA-C          Today, Patient Was Seen By: Elva Villegas PA-C    ** Please Note: This note has been constructed using a voice recognition system. **

## 2024-04-04 NOTE — PLAN OF CARE
Problem: Potential for Falls  Goal: Patient will remain free of falls  Description: INTERVENTIONS:  - Educate patient/family on patient safety including physical limitations  - Instruct patient to call for assistance with activity   - Consult OT/PT to assist with strengthening/mobility   - Keep Call bell within reach  - Keep bed low and locked with side rails adjusted as appropriate  - Keep care items and personal belongings within reach  - Initiate and maintain comfort rounds  - Make Fall Risk Sign visible to staff  - Offer Toileting every 2 Hours, in advance of need  - Apply yellow socks and bracelet for high fall risk patients  - Consider moving patient to room near nurses station  Outcome: Progressing     Problem: DISCHARGE PLANNING  Goal: Discharge to home or other facility with appropriate resources  Description: INTERVENTIONS:  - Identify barriers to discharge w/patient and caregiver  - Arrange for needed discharge resources and transportation as appropriate  - Identify discharge learning needs (meds, wound care, etc.)  - Arrange for interpretive services to assist at discharge as needed  - Refer to Case Management Department for coordinating discharge planning if the patient needs post-hospital services based on physician/advanced practitioner order or complex needs related to functional status, cognitive ability, or social support system  Outcome: Progressing     Problem: Knowledge Deficit  Goal: Patient/family/caregiver demonstrates understanding of disease process, treatment plan, medications, and discharge instructions  Description: Complete learning assessment and assess knowledge base.  Interventions:  - Provide teaching at level of understanding  - Provide teaching via preferred learning methods  Outcome: Progressing     Problem: NEUROSENSORY - ADULT  Goal: Achieves stable or improved neurological status  Description: INTERVENTIONS  - Monitor and report changes in neurological status  - Monitor  vital signs such as temperature, blood pressure, glucose, and any other labs ordered   - Initiate measures to prevent increased intracranial pressure  - Monitor for seizure activity and implement precautions if appropriate      Outcome: Progressing     Problem: Nutrition/Hydration-ADULT  Goal: Nutrient/Hydration intake appropriate for improving, restoring or maintaining nutritional needs  Description: Monitor and assess patient's nutrition/hydration status for malnutrition. Collaborate with interdisciplinary team and initiate plan and interventions as ordered.  Monitor patient's weight and dietary intake as ordered or per policy. Utilize nutrition screening tool and intervene as necessary. Determine patient's food preferences and provide high-protein, high-caloric foods as appropriate.     INTERVENTIONS:  - Monitor oral intake, urinary output, labs, and treatment plans  - Assess nutrition and hydration status and recommend course of action  - Evaluate amount of meals eaten  - Assist patient with eating if necessary   - Allow adequate time for meals  - Recommend/ encourage appropriate diets, oral nutritional supplements, and vitamin/mineral supplements  - Order, calculate, and assess calorie counts as needed  - Recommend, monitor, and adjust tube feedings and TPN/PPN based on assessed needs  - Assess need for intravenous fluids  - Provide specific nutrition/hydration education as appropriate  - Include patient/family/caregiver in decisions related to nutrition  Outcome: Progressing

## 2024-04-04 NOTE — PROCEDURES
Loop recorder data suggests predominant sinus rhythm with episodic atrial fibrillation rhythm.  Very low A-fib burden if at all (0.7%) however review of atrial fibrillation events suggest that these have possibly been frequent premature atrial contractions.    Clinical correlation is recommended.  If there is clinical evidence of embolic CVAtherapeutic anticoagulation can be considered if no contraindication.    MD Radhames Novoa MD

## 2024-04-04 NOTE — ASSESSMENT & PLAN NOTE
With history of diarrhea and alarm features, elevated fecal calprotectin, elevated CRP  She was scheduled for outpatient EGD/colonoscopy in May 2024  Seen in consultation by gastroenterology here  Coordinating expedited outpatient EGD/colonoscopy -was to be performed on 4/18/2024  However now with episodic A-fib and will require anticoagulation  Coordinating with GI to perform inpatient EGD/colonoscopy  Ongoing 6-8 BM per day- messaged GI to inform

## 2024-04-04 NOTE — ASSESSMENT & PLAN NOTE
Paroxysmal atrial fibrillation status post ablation.   Has remained in sinus rhythm and with loop recorder in place  Not on anticoagulation.  Continue diltiazem CD at bedtime  Loop recorder was interrogated with episodic afib   Discussed with neurology-will start heparin drip and await further GI reevaluation for inpatient EGD/colonoscopy prior to starting on oral anticoagulation- ongoing coordination of care  Place on telemetry

## 2024-04-04 NOTE — DISCHARGE INSTR - AVS FIRST PAGE
Nephrology discharge recommendations:  - To follow up on your kidney function please get blood work upon discharge on or around 4/9/24 every 2 weeks x 2  - You will receive a copy of the order form for the blood work in the mail from the office.  - Nephrology office will contact you to set up a follow-up appointment in a few days/weeks.   - In the interim if any issues from a kidney standpoint please contact the office at [phone].    Acute Kidney Injury (JANELLE)  You have been diagnosed with Acute Kidney Injury (JANELLE). The following information has been developed to provide you with information about JANELLE and treatment.    What is Acute Kidney Injury (JANELLE)?   JANELLE occurs when kidney function decreases over a short period of time. This condition causes a buildup of waste products in the blood and can cause fluid to build up causing swelling in the legs and shortness of breath.  Sometimes called Acute Kidney Failure or Acute Renal Failure, JANELLE is often reversible if it is found and treated quickly.    How do you know if you have JANELLE?  JANELLE is diagnosed by assessing kidney function. This is done by obtaining a blood test to measure the blood level of creatinine. Decreased urine output can sometimes also indicate JANELLE.    Who is at risk for JANELLE?  JANELLE can happen to anyone but usually happens to people who are already sick and may be in the hospital. People are at higher risk for JANELLE if they have any of the following:  age 65 years or older  high or low blood pressure  underlying kidney disease (e.g., Chronic Kidney Disease (CKD)  peripheral vascular disease (hardening of arteries)  chronic diseases such as liver disease, heart disease and diabetes  a single kidney    What are the symptoms of JANELLE?   You may or may not have the symptoms to suggest you have kidney injury until the JANELLE has progressed. Some of the symptoms are listed below:  Not making enough urine  Increased swelling in legs   Feeling tired  Trouble breathing  or shortness of breath  Nausea  New or worsening confusion    What causes JANELLE?  The causes are divided into three categories:  Not enough blood flowing to the kidneys (e.g., low blood pressure, bleeding, diarrhea, dehydration)  Injury directly to the kidneys (e.g., blood clots, severe infections such as sepsis, medicine toxicity, IV contrast dye used for cardiac catheterization or CT scans)  Blockage to the tubes (ureters) that drain the urine from the kidneys (e.g., enlarged prostate, kidney stones, blood clots)    What is the treatment for JANELLE?  The treatment for JANELLE depends on correcting what caused it. Treatment usually involves removing the cause and measures to prevent further injury to the kidneys. This may require the use of intravenous fluids or medications and/or temporary dialysis.    Dialysis is a process using a machine that does the job of the kidneys to remove waste and help correct the electrolyte and fluid balance while the kidneys are recovering.  If dialysis is needed to treat JANELLE, the doctor will assess daily to see if the kidneys are showing signs of recovery. The daily assessments determine how long dialysis needs to continue.    Depending on the cause and the extent of damage, an episode of JANELLE may resolve in a few days to several weeks to several months.      What are the long term effects of having an episode of JANELLE?  People who have one episode of JANELLE are at an increased risk of having another episode of JNAELLE as well as other health problems such as kidney disease, stroke, and heart disease.  In a small number of people who had unrecognized kidney disease, an JANELLE episode may result in Chronic Kidney Disease (CKD) which requires lifelong monitoring and treatment.    How do you prevent future episodes of JANELLE?  Make sure to follow up with the kidney doctor after hospital discharge and obtain blood work to reassess and monitor kidney function.  If you have diabetes, keep your blood sugar in goal  range and keep appointments with your diabetes specialist.   If you have high blood pressure, have your blood pressure checked regularly to make sure it is in target range  If you take blood pressure medicine called ACEIs or ARBs (e.g., Lisinopril, Enalapril, Diovan, Losartan), your doctor may tell you to skip a dose or two if you have severe dehydration and your blood pressure is running low.  Avoid using medicines such as NSAIDs (Nonsteroidal Anti-inflammatory Drugs) and Gomez-2 Inhibitors (a type of NSAID) that may be harmful to kidney function. These may include the medicines listed in the table that follows.        Examples of NSAIDS and Gomez-2 Inhibitors   Talk to your doctor or healthcare provider before stopping any medicine ordered for you.   Celecoxib (CELEBREX) Ketoprofen (ORUDIS, ORUVAIL)   Diclofenac (VOLTAREN, CATAFLAM) Ketorolac (TORADOL)   Diflunisal (DOLOBID) Meloxicam (MOBIC)   Etodolac (LODINE) Nabumetone (RELAFEN)   Fenoprofen (NALFON) Naproxen (ALEVE, NAPROSYN,    NAPRELAN, ANAPROX)   Flurbiprofen (ANSAID) Oxaprozin (DAYPRO)   Ibuprofen (MOTRIN, ADVIL) Piroxicam (FELDENE)   Indomethacin (INDOCIN) Sulindac (CLINORIL)    Tolmetin (TOLETIN)     Is there a special diet for people with JANELLE?   People with JANELLE and/or other kidney disease often have high potassium and phosphorus levels in their blood. To protect the kidneys from further injury and to avoid complications, most doctors recommend following a healthy diet choosing foods low potassium and low phosphorus.   Limiting dietary potassium to 2.5 grams/day and phosphorus to 800 milligrams/day is recommended.   A dietitian can help you with learning more about this type of diet.   The tables on the following page may help you to choose lower potassium and phosphorus foods.    The following websites are also good sources of information:  https://www.kidney.org/nutrition/Kidney-Disease-Stages-1-4    https://www.[employer].com/nutrition/foods-to-avoid-with-kidney-disease  https://www.niddk.nih.gov/health-information/kidney-disease/chronic-kidney-disease-ckd/eating-nutrition  https://[employer].[facility]/health/articles/38290-qajpr-nddx-dxowvp  https://www.[employer]er/i-un-n-guides/diet-and-chronic-kidney-disease    If you have any questions or concerns about your condition, please contact your doctor or healthcare provider.  These tables may help you to choose lower potassium and phosphorus foods.  AVOID these higher phosphorus* foods: CHOOSE these lower phosphorus* foods:   Milk, pudding , yogurt made from animals and from many soy varieties Rice milk (unfortified), nondairy creamer   Hard cheeses, ricotta, cottage cheese, fat-free cream cheese Regular and low-fat cream cheese   Ice cream, frozen yogurt Sherbet, frozen fruit pops, sorbet   Soups made with milk, dried peas, beans, lentils or other high phosphorus ingredients Soups made with broth, are water-based, or other lower phosphorus ingredients   Whole grains, including whole-grain breads, crackers, cereal, rice and pasta, corn tortillas Refined grains, including white bread, crackers, cereals, rice and pasta   Quick breads, biscuits, cornbread, muffins, pancakes, waffles, granola, wheat germ Homemade refined (white) dinner rolls, bagels, English muffins, sugar cookies, shortbread cookies, zeb food cake   Dried peas (split, black-eyed), beans (black, garbanzo, lima, kidney, navy, durand), lentils Green peas (canned, frozen), green beans, wax beans   Organ meats, walleye, [place], sardines Lean beef, pork, lamb, poultry, other fish   Nuts and seeds Popcorn   Peanut butter, other nut butters; tofu, veggie or soy burgers Jam, jelly, honey   Chocolate, including chocolate drinks Carob (chocolate-flavored) candy, hard candy,  gumdrops   Siomara, pepper-type sodas, flavored salas, bottled teas, beer Lemon-lime soda, ginger ale or root beer, plain water, cream  "soda, grape soda   *Always read labels and avoid foods with ingredients containing \"phos\"  AVOID these higher potassium foods: CHOOSE these lower potassium foods:      Milk (fat free, low fat, whole, buttermilk, Soy), yogurt    Regular and low-fat cream cheese      Beans (white, [place]), Samson sprouts, spinach Swiss       chard, broccoli, avocado, artichoke, potatoes, sweet      potatoes, tomatoes/tomato sauce, beet greens      Green beans, alfalfa sprouts, bamboo shoots (canned),       cabbage, carrots, cauliflower, corn, cucumber, eggplant,      endive, lettuce, mushrooms, onions, radishes,  watercress,       water chestnuts (canned), rice, peas      Halibut, tuna, cod, snapper, tuna fish, turkey    Egg, lean beef, pork, lamb, shellfish, chicken       Banana, papaya, orange, cantaloupe, dates, raisins and      other dried fruit, pomegranate, avocado      Apple, applesauce, blackberries, raspberries, pears,     watermelon, cucumbers, blueberries, cranberries,       peaches      Almonds, peanuts, hazelnuts, [place], cashew, mixed,      seeds (sunflower, pumpkin)     Homemade refined (white) dinner rolls, bagels,      English muffins, flour tortilla, crackers, maria antonia      crackers, popcorn, pretzels, spaghetti or macaroni,       hummus      Tomato or vegetable juice, prune juice    Papaya, brinda, or pear nectar, cranberry juice cocktail      Molasses                                                               ==========================================================    Discharge instructions    1-restart Eliquis 5 mg twice a day    2-continue enteric-coated aspirin 81 mg daily: Started by the neurology team    3-do NOT take spironolactone or losartan due to low blood pressure    4-continue your diltiazem 120 mg daily    5-the GI doctor recommends Protonix 40 mg once daily, as well as Carafate 4 times a day.  Please continue these medications until you see the GI team in the office    6-as you will be on a blood " thinner, please monitor for any bleeding, especially any red or black blood in your bowel movement or vomit: Please return to the ER immediately if you see any    7-please stay well-hydrated and try to drink at least 64 ounces (6-8 large glasses of water) daily    8-please continue the B vitamin as well as magnesium vitamin    9-see your family doctor for 1 week for a checkup:            !!!When you see them you will need to have repeat blood work with a BMP, and magnesium level to recheck your potassium, magnesium, and calcium!!!    Please return to the ER with any problems at all, especially any chest pain, difficulty breathing, dizziness, weakness, lightheadedness, or any other problems at all  ==============================================================================

## 2024-04-04 NOTE — ASSESSMENT & PLAN NOTE
Results from last 7 days   Lab Units 04/04/24  0609 04/03/24  0554 04/02/24  0446 04/01/24  1155   CREATININE mg/dL 1.01 1.06 1.22 1.81*   History of atrial fibrillation hypertension and CKD 3 who presents for abnormal labs  Has had several weeks of ongoing diarrhea.    Seen GI outpatient workup negative with plans for EGD/colonoscopy in May 2024  Creatinine baseline around 1.0   Received IV fluids with bicarbonate infusion per nephrology   Started on lomotil as needed, banana flakes and cholestyramine.  Seen by gastroenterology in consultation  Unfortunately received loading dose of ASA/Plavix given concern for stroke 4/1/24, unable to scope for at least 5 days until after Plavix load was administered  EGD/Colonoscopy was to be performed in the outpatient setting  But will now require further coordination between neurology and GI given new finding

## 2024-04-04 NOTE — TELEPHONE ENCOUNTER
----- Message from Leoncio Tillman MD sent at 4/4/2024  9:57 AM EDT -----  Regarding: sarah beth  Please schedule the patient for hospital discharge follow-up for acute kidney injury to be seen in 4 weeks.  Please send the patient orders for renal function panel, magnesium, phosphorus every 2 weeks x 2 starting 4/9/2024 prior to the visit.  Patient usually follows up with  as an outpatient.    Thanks  leoncio

## 2024-04-04 NOTE — ASSESSMENT & PLAN NOTE
Presented with aphasia and was a stroke alert in the ED  Neurology evaluated  S/P DAPT load in the ED on 4/1/24 (aspirin 325 mg x 1, Plavix 300 mg x 1)  Started on 81 mg daily here  CT head with late subacute/chronic infarct, no acute findings  MRI brain: No acute infarction, edema, or mass effect. Chronic lacunar infarcts within the right anterior centrum semiovale and right posterior cerebellum. Mild chronic microangiopathic ischemic changes  Loop recorder and irrigated-showing sinus rhythm with episodic A-fib -patient's [id number] score is 5 requiring anticoagulation.  Will start heparin drip.  Discussed with neurology.  Neurology will reevaluate tomorrow.  Await additional neurology recommendations  Place on telemetry

## 2024-04-04 NOTE — ASSESSMENT & PLAN NOTE
Results from last 7 days   Lab Units 04/04/24  0609 04/03/24  0554 04/02/24  0446 04/01/24  1155   POTASSIUM mmol/L 4.8 3.2* 2.8* 3.0*   MAGNESIUM mg/dL 1.7* 1.7* 2.3 1.7*   Hypomagnesemia and hypokalemia secondary to ongoing diarrhea  She was repleted with IV and oral supplementation here  Ongoing repletion and am lab checks

## 2024-04-04 NOTE — DISCHARGE SUMMARY
Critical access hospital  Discharge- Almaz Hall 1950, 73 y.o. female MRN: [id number]  Unit/Bed#: E4 -01 Encounter: [id number]  Primary Care Provider: Jacob Lorenzana MD   Date and time admitted to hospital: 4/1/2024 11:09 AM    * JANELLE (acute kidney injury) (HCC)  Assessment & Plan  Results from last 7 days   Lab Units 04/04/24  0609 04/03/24  0554 04/02/24  0446 04/01/24  1155   CREATININE mg/dL 1.01 1.06 1.22 1.81*   History of atrial fibrillation hypertension and CKD 3 who presents for abnormal labs  Has had several weeks of ongoing diarrhea.    Seen GI outpatient workup negative with plans for EGD/colonoscopy in May 2024  Creatinine baseline around 1.0   Received IV fluids with bicarbonate infusion per nephrology   Started on lomotil as needed, banana flakes and cholestyramine.  Seen by gastroenterology in consultation  Unfortunately received loading dose of ASA/Plavix given concern for stroke, unable to scope for at least 5 days until after Plavix load was administered  EGD/Colonoscopy to be performed in the outpatient setting-confirmed with gastroenterology.  Patient has outpatient EGD/colonoscopy which has been of DAPT and is now scheduled for 4/18/2024.  She is okay to continue on aspirin 81 mg daily but will no longer be on any doses of Plavix.    Transient episode of aphasia  Assessment & Plan  Presented with aphasia and was a stroke alert in the ED, possibly due to hypotension.  Neurology evaluated  S/P DAPT load in the ED on 4/1/24 (aspirin 325 mg x 1, Plavix 300 mg x 1)  MRI with late subacute/chronic infarct, no acute findings  Neurology recommending continuing ASA 81 mg daily  Plavix no longer needed    Diarrhea  Assessment & Plan  With history of diarrhea and alarm features, elevated fecal calprotectin, elevated CRP  She was scheduled for outpatient EGD/colonoscopy in May 24  Seen in consultation by gastroenterology here  Coordinating expedited outpatient  EGD/colonoscopy -to be performed on 4/18/2024    Hypokalemia  Assessment & Plan  Results from last 7 days   Lab Units 04/04/24  0609 04/03/24  0554 04/02/24  0446 04/01/24  1155   POTASSIUM mmol/L 4.8 3.2* 2.8* 3.0*   MAGNESIUM mg/dL 1.7* 1.7* 2.3 1.7*   Hypomagnesemia and hypokalemia secondary to ongoing diarrhea  She was repleted with IV and oral supplementation here  She will be discharged on    Moderate protein-calorie malnutrition (HCC)  Assessment & Plan  Malnutrition Findings:   Adult Malnutrition type: Chronic illness  Adult Degree of Malnutrition: Malnutrition of moderate degree  Malnutrition Characteristics: Inadequate energy, Weight loss  360 Statement: Moderate malnutrition r/t chronic diarrhea as evidenced by 10% unplanned wt loss since 10/30/23, consuming < 75% energy intake compared to estimated energy needs > 1 month. Treated with Banatrol bid.  BMI Findings:  Body mass index is 24.69 kg/m².     Chronic diastolic CHF (congestive heart failure) (Formerly [facility])  Assessment & Plan  Wt Readings from Last 3 Encounters:   04/02/24 61.2 kg (135 lb)   03/29/24 61.4 kg (135 lb 6.4 oz)   03/29/24 61.7 kg (136 lb)   Echo performed here revealing EF 70%, systolic function vigorous, wall motion normal, diastolic function moderately abnormal consistent with grade 2 pseudo normal relaxation.  Left atrial filling pressure elevated.  Mild regurg of aortic valve, moderate to severe aortic stenosis, mild mitral valve regurgitation with moderate stenosis, mild tricuspid regurg with right ventricular systolic pressure mildly to moderately elevated  Prior to admission on spironolactone-currently on hold and will remain on hold upon discharge  With PCP in 1 week's time for volume status check along with electrolyte check    Paroxysmal atrial fibrillation (HCC)  Assessment & Plan  Paroxysmal atrial fibrillation status post ablation.   Not on anticoagulation.  Continue diltiazem CD at bedtime    Essential hypertension  Assessment &  Plan  Maintained on spironolactone 12.5 mg daily, diltizem 120 mg daily, and losartan 12.5 mg daily  Held spironolactone and losartan given JANELLE  Creatinine is now improved  Given she presented with hypotension and blood pressures have remained on the softer side, recommend decreasing home regimen in the setting of ongoing diarrhea  Continue holding losartan and spironolactone upon discharge  Decrease diltiazem to 60 mg daily  Should see PCP  in 1 week's time for posthospital BP/volume status check    Depression with anxiety  Assessment & Plan  Continue escitalopram 10 mg daily      Consultations During Hospital Stay:  Nephrology  Neurology  Gastroenterology    Procedures Performed:   MRA carotids wo contrast  Result Date: 4/1/2024  Impression: Motion degraded examination. Focal artifacts are noted along the bilateral carotid bulbs. There is no suspected extracranial carotid stenosis. The cervical vertebral arteries are patent. Workstation performed: [id number]     MRA head wo contrast  Result Date: 4/1/2024  Impression: Cerebral MRA demonstrates no large branch vessel occlusion or focal hemodynamically significant stenosis. Fetal left PCA supply. Workstation performed: [id number]     MRI brain wo contrast  Result Date: 4/1/2024  Impression: No acute infarction, edema, or mass effect. Chronic lacunar infarcts within the right anterior centrum semiovale and right posterior cerebellum. Mild chronic microangiopathic ischemic changes. Workstation performed: [id number]     CT abdomen pelvis wo contrast  Result Date: 4/1/2024  Impression: Fluid seen throughout the colon in keeping with history of diarrheal illness. No focal inflammatory changes. Minimal sigmoid diverticulosis. Limited evaluation of the solid organs without IV contrast. Subcentimeter right hepatic lobe hypodensity too small to characterize. Limited study without IV contrast. Workstation performed: [id number]     CT stroke alert brain  Result Date:  4/1/2024  Impression: 1.  New area of hypodensity involving subcortical and deep white matter in the anterior right frontal lobe most likely late subacute to chronic infarct. Superimposed acute infarct in the less hypodense regions are not excluded. There is no mass effect or  hemorrhage. 2.  Stable old right cerebellar lacunar infarct. Findings were directly discussed with Isaac Rucker at approximately 2:09 p.m. Workstation performed: [id number]        Significant Findings / Test Results:   Transient episode of aphasia  Acute kidney  Diarrhea  Hypokalemia  Soft blood pressures    Incidental Findings:   None    Test Results Pending at Discharge (will require follow up):   None     Outpatient Tests/Follow-up requested:  EGD colonoscopy-already set up for 4/18/2024  Patient should see nephrology for repeat lab work starting 4/9/2024 every 2 weeks.    Complications: None    Hospital Course:     Almaz Hall is a 73 y.o. female patient who originally presented to the hospital on 4/1/2024 due to abnormal outpatient lab work that was obtained by PCP.  Patient has had ongoing diarrhea for the past several weeks. Upon presentation, there was also an episode of aphasia and a stroke alert was called in the ED. started dual antiplatelet therapy bolus which consisted of aspirin 325 x 1 and Plavix 300 x 1.  Consultation by neurology and underwent MRI of brain.  This ruled out any acute infarct but did reveal chronic infarcts.  She was continued on aspirin monotherapy and continued on home Lipitor 20 mg daily.    She was seen in consultation by gastroenterology here.  She underwent stool studies which remained negative.  She was started on Imodium as needed along with banana flakes and cholestyramine.  Her electrolytes were repleted including magnesium and potassium.  Her creatinine which was initially elevated at up to 1.81 significantly improved while on fluids which were guided by nephrology team.  Patient will need ongoing  "monitoring of outpatient lab work and prescription have been provided.    Gastroenterology considering inpatient EGD/colonoscopy however given the patient's recent Plavix load, this will be deferred to the outpatient setting.  She is not set up for scope on 4/18/2024.    Give the patient's blood pressures remain on the soft side, her blood pressure regimen was reduced.  Her Cardizem will be decreased to 120 mg daily to 60 mg daily.  She will continue to hold her losartan and spironolactone on discharge.  She was advised that if her blood pressure were to elevate greater than 140 consistently, she may resume her home losartan dose.    In conclusion patient is stable for discharge at this time.  Recommended follow-up as listed above.      Condition at Discharge: stable     Discharge Day Visit / Exam:     Subjective: Seen and during rounds.  Patient resting comfortably in chair.  She reports feeling better.  Diarrhea has somewhat slowed but still occurs.  Discussed current regimen to help slow diarrhea.  Encouraged K in diet.  She is already set up for outpatient colonoscopy/EDG.  She should follow-up with Nephrology in 1 week's time for close followup-discussed with patient.    Vitals: Blood Pressure: 103/62 (04/04/24 0903)  Pulse: 63 (04/04/24 0903)  Temperature: 98 °F (36.7 °C) (04/04/24 0903)  Temp Source: Temporal (04/04/24 0903)  Respirations: 16 (04/04/24 0903)  Height: 5' 2\" (157.5 cm) (04/02/24 0830)  Weight - Scale: 61.2 kg (135 lb) (04/02/24 0830)  SpO2: 97 % (04/04/24 0903)    Exam:   Physical Exam  Vitals and nursing note reviewed.   Constitutional:       General: She is not in acute distress.     Appearance: Normal appearance. She is normal weight. She is not ill-appearing, toxic-appearing or diaphoretic.   HENT:      Head: Normocephalic and atraumatic.   Eyes:      General: No scleral icterus.  Cardiovascular:      Rate and Rhythm: Normal rate and regular rhythm.   Pulmonary:      Effort: Pulmonary " effort is normal. No respiratory distress.      Breath sounds: Normal breath sounds. No stridor. No wheezing or rhonchi.   Abdominal:      General: Bowel sounds are normal. There is no distension.      Palpations: Abdomen is soft. There is no mass.      Tenderness: There is no abdominal tenderness.      Hernia: No hernia is present.   Musculoskeletal:         General: No swelling.      Cervical back: Neck supple.   Skin:     General: Skin is warm and dry.   Neurological:      Mental Status: She is alert and oriented to person, place, and time. Mental status is at baseline.   Psychiatric:         Mood and Affect: Mood normal.         Behavior: Behavior normal.         Discharge instructions/Information to patient and family:   See after visit summary for information provided to patient and family.      Provisions for Follow-Up Care:  See after visit summary for information related to follow-up care and any pertinent home health orders.      Planned Readmission: no     Discharge Statement:  This time was spent on the day of discharge. I had direct contact with the patient on the day of discharge. Greater than 50% of the total time was spent examining patient, answering all patient questions, arranging and discussing plan of care with patient as well as directly providing post-discharge instructions.  Additional time then spent on discharge activities.    Discharge Medications:  See after visit summary for reconciled discharge medications provided to patient and family.      ** Please Note: This note has been constructed using a voice recognition system **

## 2024-04-05 LAB
ANION GAP SERPL CALCULATED.3IONS-SCNC: 6 MMOL/L (ref 4–13)
APTT PPP: 47 SECONDS (ref 23–37)
APTT PPP: 64 SECONDS (ref 23–37)
APTT PPP: 65 SECONDS (ref 23–37)
BUN SERPL-MCNC: 16 MG/DL (ref 5–25)
CALCIUM SERPL-MCNC: 10.1 MG/DL (ref 8.4–10.2)
CHLORIDE SERPL-SCNC: 114 MMOL/L (ref 96–108)
CO2 SERPL-SCNC: 17 MMOL/L (ref 21–32)
CREAT SERPL-MCNC: 1.14 MG/DL (ref 0.6–1.3)
ERYTHROCYTE [DISTWIDTH] IN BLOOD BY AUTOMATED COUNT: 12.9 % (ref 11.6–15.1)
GFR SERPL CREATININE-BSD FRML MDRD: 48 ML/MIN/1.73SQ M
GLUCOSE SERPL-MCNC: 91 MG/DL (ref 65–140)
HCT VFR BLD AUTO: 34.4 % (ref 34.8–46.1)
HGB BLD-MCNC: 10.9 G/DL (ref 11.5–15.4)
MAGNESIUM SERPL-MCNC: 1.8 MG/DL (ref 1.9–2.7)
MCH RBC QN AUTO: 30.9 PG (ref 26.8–34.3)
MCHC RBC AUTO-ENTMCNC: 31.7 G/DL (ref 31.4–37.4)
MCV RBC AUTO: 98 FL (ref 82–98)
PLATELET # BLD AUTO: 292 THOUSANDS/UL (ref 149–390)
PMV BLD AUTO: 9.6 FL (ref 8.9–12.7)
POTASSIUM SERPL-SCNC: 3.8 MMOL/L (ref 3.5–5.3)
RBC # BLD AUTO: 3.53 MILLION/UL (ref 3.81–5.12)
SODIUM SERPL-SCNC: 137 MMOL/L (ref 135–147)
WBC # BLD AUTO: 12.7 THOUSAND/UL (ref 4.31–10.16)

## 2024-04-05 PROCEDURE — 83735 ASSAY OF MAGNESIUM: CPT | Performed by: PHYSICIAN ASSISTANT

## 2024-04-05 PROCEDURE — 85730 THROMBOPLASTIN TIME PARTIAL: CPT | Performed by: INTERNAL MEDICINE

## 2024-04-05 PROCEDURE — 85027 COMPLETE CBC AUTOMATED: CPT | Performed by: PHYSICIAN ASSISTANT

## 2024-04-05 PROCEDURE — 99232 SBSQ HOSP IP/OBS MODERATE 35: CPT | Performed by: STUDENT IN AN ORGANIZED HEALTH CARE EDUCATION/TRAINING PROGRAM

## 2024-04-05 PROCEDURE — 99232 SBSQ HOSP IP/OBS MODERATE 35: CPT | Performed by: INTERNAL MEDICINE

## 2024-04-05 PROCEDURE — 80048 BASIC METABOLIC PNL TOTAL CA: CPT | Performed by: PHYSICIAN ASSISTANT

## 2024-04-05 RX ORDER — CALCIUM CARBONATE 500 MG/1
500 TABLET, CHEWABLE ORAL DAILY PRN
Status: DISCONTINUED | OUTPATIENT
Start: 2024-04-05 | End: 2024-04-08 | Stop reason: HOSPADM

## 2024-04-05 RX ORDER — PANTOPRAZOLE SODIUM 40 MG/1
40 TABLET, DELAYED RELEASE ORAL
Status: DISCONTINUED | OUTPATIENT
Start: 2024-04-06 | End: 2024-04-08 | Stop reason: HOSPADM

## 2024-04-05 RX ORDER — FAMOTIDINE 20 MG/1
20 TABLET, FILM COATED ORAL 2 TIMES DAILY
Status: DISCONTINUED | OUTPATIENT
Start: 2024-04-05 | End: 2024-04-08 | Stop reason: HOSPADM

## 2024-04-05 RX ORDER — BISACODYL 5 MG/1
10 TABLET, DELAYED RELEASE ORAL SEE ADMIN INSTRUCTIONS
Status: DISCONTINUED | OUTPATIENT
Start: 2024-04-07 | End: 2024-04-08

## 2024-04-05 RX ADMIN — SODIUM BICARBONATE 650 MG TABLET 650 MG: at 09:01

## 2024-04-05 RX ADMIN — ONDANSETRON 4 MG: 2 INJECTION INTRAMUSCULAR; INTRAVENOUS at 01:04

## 2024-04-05 RX ADMIN — CHOLESTYRAMINE 4 G: 4 POWDER, FOR SUSPENSION ORAL at 09:02

## 2024-04-05 RX ADMIN — THIAMINE HCL TAB 100 MG 100 MG: 100 TAB at 09:01

## 2024-04-05 RX ADMIN — CALCIUM CARBONATE (ANTACID) CHEW TAB 500 MG 500 MG: 500 CHEW TAB at 09:01

## 2024-04-05 RX ADMIN — CHOLESTYRAMINE 4 G: 4 POWDER, FOR SUSPENSION ORAL at 17:26

## 2024-04-05 RX ADMIN — DIPHENOXYLATE HYDROCHLORIDE AND ATROPINE SULFATE 1 TABLET: .025; 2.5 TABLET ORAL at 01:17

## 2024-04-05 RX ADMIN — ASPIRIN 81 MG CHEWABLE TABLET 81 MG: 81 TABLET CHEWABLE at 09:02

## 2024-04-05 RX ADMIN — FAMOTIDINE 20 MG: 20 TABLET ORAL at 22:18

## 2024-04-05 RX ADMIN — LORATADINE 5 MG: 10 TABLET ORAL at 09:01

## 2024-04-05 RX ADMIN — ATORVASTATIN CALCIUM 20 MG: 20 TABLET, FILM COATED ORAL at 17:26

## 2024-04-05 RX ADMIN — ACETAMINOPHEN 325MG 650 MG: 325 TABLET ORAL at 17:26

## 2024-04-05 RX ADMIN — DILTIAZEM HYDROCHLORIDE 120 MG: 120 CAPSULE, COATED, EXTENDED RELEASE ORAL at 22:18

## 2024-04-05 RX ADMIN — SODIUM BICARBONATE 650 MG TABLET 650 MG: at 17:26

## 2024-04-05 RX ADMIN — ESCITALOPRAM OXALATE 10 MG: 10 TABLET ORAL at 09:01

## 2024-04-05 RX ADMIN — HEPARIN SODIUM 1800 UNITS: 1000 INJECTION INTRAVENOUS; SUBCUTANEOUS at 01:04

## 2024-04-05 RX ADMIN — CLOTRIMAZOLE: 1 CREAM TOPICAL at 09:02

## 2024-04-05 RX ADMIN — FAMOTIDINE 20 MG: 20 TABLET ORAL at 15:26

## 2024-04-05 NOTE — ASSESSMENT & PLAN NOTE
Transient aphasia was a stroke alert in the ED.  May be secondary to hypotension.  Seen by neurology and loaded DAPT.  MRI brain negative but ?TIA  Clopidogrel discontinued.  Currently on aspirin and atorvastatin.  Interrogation of loop recorder demonstrates episodes of atrial fibrillation.  Currently on heparin infusion until completed GI workup and plan on discharge with oral anticoagulant

## 2024-04-05 NOTE — ASSESSMENT & PLAN NOTE
On admission had hypotension given kidney injury.  Patient on diltiazem CD for atrial fibrillation.    Holding losartan spironolactone

## 2024-04-05 NOTE — PLAN OF CARE
Problem: Potential for Falls  Goal: Patient will remain free of falls  Description: INTERVENTIONS:  - Educate patient/family on patient safety including physical limitations  - Instruct patient to call for assistance with activity   - Consult OT/PT to assist with strengthening/mobility   - Keep Call bell within reach  - Keep bed low and locked with side rails adjusted as appropriate  - Keep care items and personal belongings within reach  - Initiate and maintain comfort rounds  - Make Fall Risk Sign visible to staff  - Consider moving patient to room near nurses station  Outcome: Progressing     Problem: DISCHARGE PLANNING  Goal: Discharge to home or other facility with appropriate resources  Description: INTERVENTIONS:  - Identify barriers to discharge w/patient and caregiver  - Arrange for needed discharge resources and transportation as appropriate  - Identify discharge learning needs (meds, wound care, etc.)  - Arrange for interpretive services to assist at discharge as needed  - Refer to Case Management Department for coordinating discharge planning if the patient needs post-hospital services based on physician/advanced practitioner order or complex needs related to functional status, cognitive ability, or social support system  Outcome: Progressing     Problem: Knowledge Deficit  Goal: Patient/family/caregiver demonstrates understanding of disease process, treatment plan, medications, and discharge instructions  Description: Complete learning assessment and assess knowledge base.  Interventions:  - Provide teaching at level of understanding  - Provide teaching via preferred learning methods  Outcome: Progressing     Problem: NEUROSENSORY - ADULT  Goal: Achieves stable or improved neurological status  Description: INTERVENTIONS  - Monitor and report changes in neurological status  - Monitor vital signs such as temperature, blood pressure, glucose, and any other labs ordered   - Initiate measures to prevent  increased intracranial pressure  - Monitor for seizure activity and implement precautions if appropriate      Outcome: Progressing     Problem: Nutrition/Hydration-ADULT  Goal: Nutrient/Hydration intake appropriate for improving, restoring or maintaining nutritional needs  Description: Monitor and assess patient's nutrition/hydration status for malnutrition. Collaborate with interdisciplinary team and initiate plan and interventions as ordered.  Monitor patient's weight and dietary intake as ordered or per policy. Utilize nutrition screening tool and intervene as necessary. Determine patient's food preferences and provide high-protein, high-caloric foods as appropriate.     INTERVENTIONS:  - Monitor oral intake, urinary output, labs, and treatment plans  - Assess nutrition and hydration status and recommend course of action  - Evaluate amount of meals eaten  - Assist patient with eating if necessary   - Allow adequate time for meals  - Recommend/ encourage appropriate diets, oral nutritional supplements, and vitamin/mineral supplements  - Order, calculate, and assess calorie counts as needed  - Recommend, monitor, and adjust tube feedings and TPN/PPN based on assessed needs  - Assess need for intravenous fluids  - Provide specific nutrition/hydration education as appropriate  - Include patient/family/caregiver in decisions related to nutrition  Outcome: Progressing     Problem: Prexisting or High Potential for Compromised Skin Integrity  Goal: Skin integrity is maintained or improved  Description: INTERVENTIONS:  - Identify patients at risk for skin breakdown  - Assess and monitor skin integrity  - Assess and monitor nutrition and hydration status  - Monitor labs   - Assess for incontinence   - Turn and reposition patient  - Assist with mobility/ambulation  - Relieve pressure over bony prominences  - Avoid friction and shearing  - Provide appropriate hygiene as needed including keeping skin clean and dry  - Evaluate  need for skin moisturizer/barrier cream  - Collaborate with interdisciplinary team   - Patient/family teaching  - Consider wound care consult   Outcome: Progressing

## 2024-04-05 NOTE — PROGRESS NOTES
Formerly [facility]  Progress Note  Name: Almaz Hall I  MRN: [id number]  Unit/Bed#: E4 -01 I Date of Admission: 4/1/2024   Date of Service: 4/5/2024 I Hospital Day: 4    Assessment/Plan   * JANELLE (acute kidney injury) (HCC)  Assessment & Plan  History of atrial fibrillation hypertension and CKD 3 who presents for abnormal labs  Has had several weeks of diarrhea.  Seen GI outpatient workup negative with plans for EGD/colonoscopy in May  Baseline creatinine 1.0.  Was briefly on IV fluids with bicarbonate infusion per nephrology.  Continue banana flakes and cholestyramine.  History of GI evaluation.  Planning EGD/colonoscopy Monday after washout of clopidogrel    Results from last 7 days   Lab Units 04/05/24  0750 04/04/24  0609 04/03/24  0554 04/02/24  0446 04/01/24  1155   BUN mg/dL 16 14 23 39* 54*   CREATININE mg/dL 1.14 1.01 1.06 1.22 1.81*   EGFR ml/min/1.73sq m 48 56 52 44 27       Transient episode of aphasia  Assessment & Plan  Transient aphasia was a stroke alert in the ED.  May be secondary to hypotension.  Seen by neurology and loaded DAPT.  MRI brain negative but ?TIA  Clopidogrel discontinued.  Currently on aspirin and atorvastatin.  Interrogation of loop recorder demonstrates episodes of atrial fibrillation.  Currently on heparin infusion until completed GI workup and plan on discharge with oral anticoagulant    Moderate protein-calorie malnutrition (HCC)  Assessment & Plan  Malnutrition Findings:   Adult Malnutrition type: Chronic illness  Adult Degree of Malnutrition: Malnutrition of moderate degree  Malnutrition Characteristics: Inadequate energy, Weight loss  360 Statement: Moderate malnutrition r/t chronic diarrhea as evidenced by 10% unplanned wt loss since 10/30/23, consuming < 75% energy intake compared to estimated energy needs > 1 month. Treated with Banatrol bid.  BMI Findings:  Body mass index is 24.69 kg/m².     Chronic diastolic CHF (congestive heart failure)  ([facility])  Assessment & Plan  Wt Readings from Last 3 Encounters:   04/02/24 61.2 kg (135 lb)   03/29/24 61.4 kg (135 lb 6.4 oz)   03/29/24 61.7 kg (136 lb)     Prior to admission on spironolactone.  Has been on hold due to kidney injury    Paroxysmal atrial fibrillation (HCC)  Assessment & Plan  Paroxysmal atrial fibrillation status post ablation.   Has remained in sinus rhythm and with loop recorder in place  Not on anticoagulation remains on diltiazem CD at bedtime  Unfortunately imaging loop recorder interrogation demonstrated episodic atrial fibrillation.  Anticoagulation as outlined above      Hypokalemia  Assessment & Plan  Hypokalemia and hypomagnesemia has been replaced.  Secondary to diarrhea.    Results from last 7 days   Lab Units 04/05/24  0750 04/04/24  0609 04/03/24  0554 04/02/24  0446   POTASSIUM mmol/L 3.8 4.8 3.2* 2.8*   MAGNESIUM mg/dL 1.8* 1.7* 1.7* 2.3       Essential hypertension  Assessment & Plan  On admission had hypotension given kidney injury.  Patient on diltiazem CD for atrial fibrillation.    Holding losartan spironolactone    Depression with anxiety  Assessment & Plan  Continue escitalopram    VTE Pharmacologic Prophylaxis:   Moderate Risk (Score 3-4) - Pharmacological DVT Prophylaxis Ordered: heparin drip.    Mobility:  Basic Mobility Inpatient Raw Score: 22  JH-HLM Goal: 7: Walk 25 feet or more  JH-HLM Achieved: 8: Walk 250 feet ot more  JH-HLM Goal achieved. Continue to encourage appropriate mobility.    Patient Centered Rounds: I have performed bedside rounds with nursing staff today.  Discussions with Specialists or Other Care Team Provider: Case management GI    Education and Discussions with Family / Patient:     Time Spent for Care:   This time was spent on one or more of the following: performing physical exam; counseling and coordination of care; obtaining or reviewing history; documenting in the medical record; reviewing/ordering tests, medications or procedures; communicating  "with other healthcare professionals and discussing with patient's family/caregivers.    Current Length of Stay: 4 day(s)  Current Patient Status: Inpatient   Certification Statement: The patient will continue to require additional inpatient hospital stay due to GI workup  Discharge Plan: Anticipate discharge in >72 hrs to home.    Code Status: Level 3 - DNAR and DNI      Subjective:   Patient seen and examined.  No new complaints.  Still having a lot of diarrhea    Objective:   Vitals: Blood pressure 107/54, pulse 71, temperature 97.7 °F (36.5 °C), temperature source Temporal, resp. rate 18, height 5' 2\" (1.575 m), weight 61.2 kg (135 lb), SpO2 97%, not currently breastfeeding.    Intake/Output Summary (Last 24 hours) at 4/5/2024 1849  Last data filed at 4/4/2024 2100  Gross per 24 hour   Intake 240 ml   Output --   Net 240 ml       Physical Exam  Vitals reviewed.   Constitutional:       General: She is not in acute distress.     Appearance: Normal appearance.   HENT:      Head: Atraumatic.   Eyes:      Extraocular Movements: Extraocular movements intact.   Cardiovascular:      Rate and Rhythm: Regular rhythm.      Heart sounds: Murmur heard.   Pulmonary:      Breath sounds: Decreased breath sounds present. No wheezing.   Abdominal:      General: Bowel sounds are normal.      Palpations: Abdomen is soft.      Tenderness: There is no guarding.   Musculoskeletal:         General: No swelling.   Skin:     General: Skin is warm.   Neurological:      General: No focal deficit present.      Mental Status: She is alert.   Psychiatric:         Mood and Affect: Mood normal.       Additional Data:   Labs:  Results from last 7 days   Lab Units 04/05/24  0750 04/04/24  1711 04/04/24  0609 04/03/24  0554   WBC Thousand/uL 12.70*  --  6.84 7.68   HEMOGLOBIN g/dL 10.9*  --  11.0* 10.7*   PLATELETS Thousands/uL 292  --  289 302   MCV fL 98  --  96 94   INR   --  1.08  --   --      Results from last 7 days   Lab Units 04/05/24  0750 " 04/04/24  0609 04/03/24  0554 04/02/24  0446 04/01/24  1155   SODIUM mmol/L 137 139 141   < > 138   POTASSIUM mmol/L 3.8 4.8 3.2*   < > 3.0*   CHLORIDE mmol/L 114* 116* 114*   < > 106   CO2 mmol/L 17* 18* 21   < > 20*   ANION GAP mmol/L 6 5 6   < > 12   BUN mg/dL 16 14 23   < > 54*   CREATININE mg/dL 1.14 1.01 1.06   < > 1.81*   CALCIUM mg/dL 10.1 10.3* 9.6   < > 10.2   ALBUMIN g/dL  --   --   --   --  4.2   TOTAL BILIRUBIN mg/dL  --   --   --   --  0.49   ALK PHOS U/L  --   --   --   --  73   ALT U/L  --   --   --   --  12   AST U/L  --   --   --   --  14   EGFR ml/min/1.73sq m 48 56 52   < > 27   GLUCOSE RANDOM mg/dL 91 94 86   < > 100    < > = values in this interval not displayed.     Results from last 7 days   Lab Units 04/05/24  0750 04/04/24  0609 04/03/24  0554 04/02/24  0446 04/01/24  1155   MAGNESIUM mg/dL 1.8* 1.7* 1.7* 2.3 1.7*   PHOSPHORUS mg/dL  --   --  2.5 2.4  --                       Results from last 7 days   Lab Units 04/01/24  1346   POC GLUCOSE mg/dl 85     Results from last 7 days   Lab Units 04/02/24  0446   HEMOGLOBIN A1C % 5.6         * I Have Reviewed All Lab Data Listed Above.    Recent cultures:                   Lines/Drains:  Invasive Devices       Peripheral Intravenous Line  Duration             Peripheral IV 04/04/24 Dorsal (posterior);Right Forearm <1 day                      Telemetry:  Telemetry Orders (From admission, onward)               24 Hour Telemetry Monitoring  Continuous x 24 Hours (Telem)        Question:  Reason for 24 Hour Telemetry  Answer:  TIA/Suspected CVA/ Confirmed CVA                     Telemetry Reviewed: Normal Sinus Rhythm and Sinus Bradycardia  Indication for Continued Telemetry Use:              Telemetry:   Telemetry Orders (From admission, onward)               24 Hour Telemetry Monitoring  Continuous x 24 Hours (Telem)        Question:  Reason for 24 Hour Telemetry  Answer:  TIA/Suspected CVA/ Confirmed CVA                      Imaging:  Imaging  Reports Reviewed Today Include:   MRA carotids wo contrast    Result Date: 4/1/2024  Impression: Motion degraded examination. Focal artifacts are noted along the bilateral carotid bulbs. There is no suspected extracranial carotid stenosis. The cervical vertebral arteries are patent. Workstation performed: [id number]     MRA head wo contrast    Result Date: 4/1/2024  Impression: Cerebral MRA demonstrates no large branch vessel occlusion or focal hemodynamically significant stenosis. Fetal left PCA supply. Workstation performed: [id number]     MRI brain wo contrast    Result Date: 4/1/2024  Impression: No acute infarction, edema, or mass effect. Chronic lacunar infarcts within the right anterior centrum semiovale and right posterior cerebellum. Mild chronic microangiopathic ischemic changes. Workstation performed: [id number]     CT abdomen pelvis wo contrast    Result Date: 4/1/2024  Impression: Fluid seen throughout the colon in keeping with history of diarrheal illness. No focal inflammatory changes. Minimal sigmoid diverticulosis. Limited evaluation of the solid organs without IV contrast. Subcentimeter right hepatic lobe hypodensity too small to characterize. Limited study without IV contrast. Workstation performed: [id number]     CT stroke alert brain    Result Date: 4/1/2024  Impression: 1.  New area of hypodensity involving subcortical and deep white matter in the anterior right frontal lobe most likely late subacute to chronic infarct. Superimposed acute infarct in the less hypodense regions are not excluded. There is no mass effect or  hemorrhage. 2.  Stable old right cerebellar lacunar infarct. Findings were directly discussed with Isaac Rucker at approximately 2:09 p.m. Workstation performed: [id number]       Scheduled Meds:  Current Facility-Administered Medications   Medication Dose Route Frequency Provider Last Rate    acetaminophen  650 mg Oral Q4H PRN Aleks La DO      aspirin  81 mg Oral Daily  Aleks La, DO      atorvastatin  20 mg Oral QPM LIANG Watkins      [START ON 4/7/2024] bisacodyl  10 mg Oral See Admin Instructions Suresh G Chirayath, DO      calcium carbonate  500 mg Oral Daily PRN Aleks La, DO      cholestyramine sugar free  4 g Oral BID Aleks La, DO      clotrimazole   Topical BID Aleks La, DO      diltiazem  120 mg Oral HS Aleks aL, DO      diphenoxylate-atropine  1 tablet Oral 4x Daily PRN Aleks La, DO      escitalopram  10 mg Oral Daily Aleks La, DO      famotidine  20 mg Oral BID Aleks La, DO      heparin (porcine)  3-20 Units/kg/hr (Order-Specific) Intravenous Titrated Elva Villegas PA-C 14 Units/kg/hr (04/05/24 1553)    heparin (porcine)  1,800 Units Intravenous Q6H PRN Elva Villegas PA-C      heparin (porcine)  3,600 Units Intravenous Q6H PRN Elva Villegas PA-C      loratadine  5 mg Oral Daily Avery Powers PA-C      ondansetron  4 mg Intravenous Q4H PRN Aleks La DO      [START ON 4/6/2024] pantoprazole  40 mg Oral Early Morning Suresh G DO Farrah      [START ON 4/7/2024] polyethylene glycol  4,000 mL Oral See Admin Instructions Suresh G Farrah,       potassium chloride  40 mEq Oral Once Elva Villegas PA-C      sodium bicarbonate  650 mg Oral BID after meals Morenita Tillman MD      thiamine  100 mg Oral Daily Ap Reardon PA-C         Today, Patient Was Seen By: Aleks La DO    ** Please Note: Dictation voice to text software may have been used in the creation of this document. **

## 2024-04-05 NOTE — ASSESSMENT & PLAN NOTE
History of atrial fibrillation hypertension and CKD 3 who presents for abnormal labs  Has had several weeks of diarrhea.  Seen GI outpatient workup negative with plans for EGD/colonoscopy in May  Baseline creatinine 1.0.  Was briefly on IV fluids with bicarbonate infusion per nephrology.  Continue banana flakes and cholestyramine.  History of GI evaluation.  Planning EGD/colonoscopy Monday after washout of clopidogrel    Results from last 7 days   Lab Units 04/05/24  0750 04/04/24  0609 04/03/24  0554 04/02/24  0446 04/01/24  1155   BUN mg/dL 16 14 23 39* 54*   CREATININE mg/dL 1.14 1.01 1.06 1.22 1.81*   EGFR ml/min/1.73sq m 48 56 52 44 27

## 2024-04-05 NOTE — ASSESSMENT & PLAN NOTE
Paroxysmal atrial fibrillation status post ablation.   Has remained in sinus rhythm and with loop recorder in place  Not on anticoagulation remains on diltiazem CD at bedtime  Unfortunately imaging loop recorder interrogation demonstrated episodic atrial fibrillation.  Anticoagulation as outlined above

## 2024-04-05 NOTE — ASSESSMENT & PLAN NOTE
Hypokalemia and hypomagnesemia has been replaced.  Secondary to diarrhea.    Results from last 7 days   Lab Units 04/05/24  0750 04/04/24  0609 04/03/24  0554 04/02/24  0446   POTASSIUM mmol/L 3.8 4.8 3.2* 2.8*   MAGNESIUM mg/dL 1.8* 1.7* 1.7* 2.3

## 2024-04-05 NOTE — ASSESSMENT & PLAN NOTE
Wt Readings from Last 3 Encounters:   04/02/24 61.2 kg (135 lb)   03/29/24 61.4 kg (135 lb 6.4 oz)   03/29/24 61.7 kg (136 lb)     Prior to admission on spironolactone.  Has been on hold due to kidney injury

## 2024-04-05 NOTE — PROGRESS NOTES
Progress Note -  [facility]  Almaz Hall 73 y.o. female MRN: [id number]  Unit/Bed#: E4 -01 Encounter: [id number]      ASSESSMENT AND PLAN:      73-year-old female with past medical history of A-fib status post ablation not on anticoagulation, CKD stage III, diastolic heart failure who originally presented with strokelike symptoms, aphasia.  GI is consulted for management of chronic diarrhea.     Chronic diarrhea  Weight loss  Anemia  Strokelike symptoms  A-fib  GERD  Workup as an outpatient was negative for celiac panel, C. difficile, ova parasites, elastase.  Does have elevated fecal calprotectin of 400s.  Etiology is broad but includes microscopic colitis versus medication induced that she is on losartan, escitalopram versus IBD vs IBS.  Hemoglobin 10.9, baseline 12-13.  Likely dilutional.   Iron panel consistent with anemia of chronic disease. No signs of overt bleeding.  Fortunately no evidence of acute stroke on workup at this time.  Per neurology do not believe that this is TIA.  Her loop recorder also shows evidence of small amount of A-fib so she was recommended to be on anticoagulation.  Patient was scheduled for outpatient EGD and colonoscopy.  This would be acceptable and there is no contraindication from GI perspective to start anticoagulation.  She continues to request to have this done as an inpatient.    Will plan for EGD and colonoscopy on 4/8/2024.  Hold heparin GTT at 6 AM on 4/8/2024.  Clear liquid diet on 4/7/2024.  GoLytely/Dulcolax prep on 4/7/2024.  Continue iron supplementation.  Start Protonix 40 mg daily for reflux.  Monitor bowel movements.  Monitor symptoms.    GI will follow-up on day of procedure.  Rest of care per primary team.    ______________________________________________________________________    Subjective: Seen and examined.  Continues to have diarrhea though this is chronic and stable.  She also complains of indigestion/reflux.  Denies any nausea,  vomiting.  Tolerating diet.  Respiratory was negative.    REVIEW OF SYSTEMS:    Review of Systems   Constitutional:  Negative for chills and fever.   HENT:  Negative for congestion and sinus pressure.    Respiratory:  Negative for cough and shortness of breath.    Cardiovascular:  Negative for chest pain, palpitations and leg swelling.   Gastrointestinal:  Positive for abdominal pain and diarrhea. Negative for nausea and vomiting.   Genitourinary:  Negative for dysuria and hematuria.   Musculoskeletal:  Negative for arthralgias and back pain.   Skin:  Negative for color change and rash.   Neurological:  Negative for dizziness and headaches.   Psychiatric/Behavioral:  Negative for agitation and confusion.    All other systems reviewed and are negative.         Historical Information   Past Medical History:   Diagnosis Date    Asthma     Atrial fibrillation (HCC)     with ablation    Bursitis of hip     Last Assessed:6/5/2017    COPD (chronic obstructive pulmonary disease) (HCC) 5 years ago    Coronary artery disease     Herniated lumbar intervertebral disc     Hyperlipidemia     Hypertension 2 years    Pituitary adenoma (HCC)     Last Assessed:2/12/2015 ; Previously on bromocriptine    Spinal stenosis     SVT (supraventricular tachycardia)     with ablation    Systolic murmur of aorta      Past Surgical History:   Procedure Laterality Date    BREAST SURGERY      Incisional breast biopsy    CARDIAC ELECTROPHYSIOLOGY PROCEDURE N/A 09/02/2022    Procedure: Cardiac eps/afib ablation;  Surgeon: Fadi Goss MD;  Location: [facility];  Service: Cardiology    CARDIAC ELECTROPHYSIOLOGY PROCEDURE N/A 12/07/2022    Procedure: Cardiac loop recorder implant;  Surgeon: Bao Kovacs MD;  Location: [facility];  Service: Cardiology    CARDIAC ELECTROPHYSIOLOGY STUDY AND ABLATION      CARDIAC LOOP RECORDER      CARPAL TUNNEL RELEASE Left     CHOLECYSTECTOMY      OTHER SURGICAL HISTORY      EXCISION OF  PAROTID TUMOR/GLAND    PAROTIDECTOMY      SD COLONOSCOPY FLX DX W/COLLJ SPEC WHEN PFRMD N/A 2017    Procedure: COLONOSCOPY;  Surgeon: Elizabeth Leyva DO;  Location: [facility] GI [facility];  Service: Gastroenterology    SD HYSTEROSCOPY BX ENDOMETRIUM&/POLYPC W/WO D&C N/A 2023    Procedure: (D&C) W/ HYSTEROSCOPY;  Surgeon: Harini Kinney MD;  Location: [facility] OR;  Service: Gynecology    ROTATOR CUFF REPAIR      SALPINGOOPHORECTOMY Right      Social History   Social History     Substance and Sexual Activity   Alcohol Use Not Currently    Alcohol/week: 1.0 standard drink of alcohol    Types: 1 Shots of liquor per week    Comment: social     Social History     Substance and Sexual Activity   Drug Use No     Social History     Tobacco Use   Smoking Status Former    Current packs/day: 0.00    Average packs/day: 1 pack/day for 30.0 years (30.0 ttl pk-yrs)    Types: Cigarettes    Start date: 1988    Quit date: 2018    Years since quittin.3   Smokeless Tobacco Never     Family History   Problem Relation Age of Onset    Colon cancer Mother     Coronary artery disease Mother     Colonic polyp Mother     Liver cancer Mother     Multiple sclerosis Father     Hypertension Brother     Hypertension Other     Breast cancer Neg Hx     Cancer Neg Hx        Meds/Allergies     Medications Prior to Admission   Medication    atorvastatin (LIPITOR) 20 mg tablet    cetirizine (ZyrTEC) 10 mg tablet    diltiazem (TIAZAC) 120 MG 24 hr capsule    diphenoxylate-atropine (LOMOTIL) 2.5-0.025 mg per tablet    escitalopram (LEXAPRO) 10 mg tablet    losartan (COZAAR) 25 mg tablet    mupirocin (BACTROBAN) 2 % ointment    spironolactone (ALDACTONE) 25 mg tablet    albuterol (ProAir HFA) 90 mcg/act inhaler    levalbuterol (XOPENEX HFA) 45 mcg/act inhaler    polyethylene glycol (MiraLax) 17 GM/SCOOP powder     Current Facility-Administered Medications   Medication Dose Route Frequency    acetaminophen (TYLENOL) tablet 650 mg   "650 mg Oral Q4H PRN    aspirin chewable tablet 81 mg  81 mg Oral Daily    atorvastatin (LIPITOR) tablet 20 mg  20 mg Oral QPM    cholestyramine sugar free (QUESTRAN LIGHT) packet 4 g  4 g Oral BID    clotrimazole (LOTRIMIN) 1 % cream   Topical BID    diltiazem (CARDIZEM CD) 24 hr capsule 120 mg  120 mg Oral HS    diphenoxylate-atropine (LOMOTIL) 2.5-0.025 mg per tablet 1 tablet  1 tablet Oral 4x Daily PRN    escitalopram (LEXAPRO) tablet 10 mg  10 mg Oral Daily    heparin (porcine) 25,000 units in 0.45% NaCl 250 mL infusion (premix)  3-20 Units/kg/hr (Order-Specific) Intravenous Titrated    heparin (porcine) injection 1,800 Units  1,800 Units Intravenous Q6H PRN    heparin (porcine) injection 3,600 Units  3,600 Units Intravenous Q6H PRN    loratadine (CLARITIN) tablet 5 mg  5 mg Oral Daily    ondansetron (ZOFRAN) injection 4 mg  4 mg Intravenous Q4H PRN    potassium chloride (Klor-Con M20) CR tablet 40 mEq  40 mEq Oral Once    sodium bicarbonate tablet 650 mg  650 mg Oral BID after meals    thiamine tablet 100 mg  100 mg Oral Daily       Allergies   Allergen Reactions    Bystolic [Nebivolol Hcl] Bradycardia     Hear rate to 20's    Levofloxacin Hives    Vasotec [Enalapril] Hives    Amlodipine Rash    Ancef [Cefazolin] GI Intolerance     Cdiff    Cephalosporins Other (See Comments)     c.diff    Fruit Extracts      Annotation - [id number]: peaches and strawberries    Morphine GI Intolerance     Extreme vomiting      Other      FRUIT    Penicillins Hives    Tetracycline Edema    Xarelto [Rivaroxaban] Rash           Objective     Blood pressure (P) 114/62, pulse (P) 78, temperature (P) 98.1 °F (36.7 °C), temperature source (P) Temporal, resp. rate (P) 18, height 5' 2\" (1.575 m), weight 61.2 kg (135 lb), SpO2 (P) 99%, not currently breastfeeding. Body mass index is 24.69 kg/m².      Intake/Output Summary (Last 24 hours) at 4/5/2024 0724  Last data filed at 4/4/2024 2100  Gross per 24 hour   Intake 240 ml   Output -- "   Net 240 ml         PHYSICAL EXAM:      Physical Exam  Vitals and nursing note reviewed.   Constitutional:       General: She is not in acute distress.     Appearance: Normal appearance. She is not ill-appearing.   HENT:      Head: Normocephalic and atraumatic.      Mouth/Throat:      Mouth: Mucous membranes are moist.   Eyes:      Extraocular Movements: Extraocular movements intact.      Conjunctiva/sclera: Conjunctivae normal.   Cardiovascular:      Pulses: Normal pulses.   Pulmonary:      Effort: Pulmonary effort is normal.   Abdominal:      General: Abdomen is flat. Bowel sounds are normal. There is no distension.      Palpations: Abdomen is soft.      Tenderness: There is no abdominal tenderness. There is no guarding.   Skin:     General: Skin is warm and dry.   Neurological:      General: No focal deficit present.      Mental Status: She is alert and oriented to person, place, and time.   Psychiatric:         Mood and Affect: Mood normal.         Behavior: Behavior normal.          Lab Results:   No results displayed because visit has over 200 results.          Imaging Studies: I have personally reviewed pertinent imaging studies.    Suresh Yan D.O.  Gastroenterology Fellow  PGY-5  Available via [employer]  4/5/2024 7:24 AM

## 2024-04-06 PROBLEM — K21.9 GERD (GASTROESOPHAGEAL REFLUX DISEASE): Status: ACTIVE | Noted: 2024-04-06

## 2024-04-06 LAB
ANION GAP SERPL CALCULATED.3IONS-SCNC: 5 MMOL/L (ref 4–13)
APTT PPP: 64 SECONDS (ref 23–37)
BASOPHILS # BLD AUTO: 0.06 THOUSANDS/ÂΜL (ref 0–0.1)
BASOPHILS NFR BLD AUTO: 1 % (ref 0–1)
BUN SERPL-MCNC: 15 MG/DL (ref 5–25)
CALCIUM SERPL-MCNC: 9.8 MG/DL (ref 8.4–10.2)
CHLORIDE SERPL-SCNC: 115 MMOL/L (ref 96–108)
CO2 SERPL-SCNC: 20 MMOL/L (ref 21–32)
CREAT SERPL-MCNC: 1.24 MG/DL (ref 0.6–1.3)
EOSINOPHIL # BLD AUTO: 0.08 THOUSAND/ÂΜL (ref 0–0.61)
EOSINOPHIL NFR BLD AUTO: 1 % (ref 0–6)
ERYTHROCYTE [DISTWIDTH] IN BLOOD BY AUTOMATED COUNT: 12.7 % (ref 11.6–15.1)
GFR SERPL CREATININE-BSD FRML MDRD: 43 ML/MIN/1.73SQ M
GLUCOSE SERPL-MCNC: 79 MG/DL (ref 65–140)
HCT VFR BLD AUTO: 32.4 % (ref 34.8–46.1)
HGB BLD-MCNC: 10.4 G/DL (ref 11.5–15.4)
IMM GRANULOCYTES # BLD AUTO: 0.03 THOUSAND/UL (ref 0–0.2)
IMM GRANULOCYTES NFR BLD AUTO: 0 % (ref 0–2)
LYMPHOCYTES # BLD AUTO: 2.43 THOUSANDS/ÂΜL (ref 0.6–4.47)
LYMPHOCYTES NFR BLD AUTO: 25 % (ref 14–44)
MAGNESIUM SERPL-MCNC: 1.4 MG/DL (ref 1.9–2.7)
MCH RBC QN AUTO: 31 PG (ref 26.8–34.3)
MCHC RBC AUTO-ENTMCNC: 32.1 G/DL (ref 31.4–37.4)
MCV RBC AUTO: 96 FL (ref 82–98)
MONOCYTES # BLD AUTO: 0.54 THOUSAND/ÂΜL (ref 0.17–1.22)
MONOCYTES NFR BLD AUTO: 6 % (ref 4–12)
NEUTROPHILS # BLD AUTO: 6.48 THOUSANDS/ÂΜL (ref 1.85–7.62)
NEUTS SEG NFR BLD AUTO: 67 % (ref 43–75)
NRBC BLD AUTO-RTO: 0 /100 WBCS
PLATELET # BLD AUTO: 243 THOUSANDS/UL (ref 149–390)
PMV BLD AUTO: 9.8 FL (ref 8.9–12.7)
POTASSIUM SERPL-SCNC: 3.8 MMOL/L (ref 3.5–5.3)
RBC # BLD AUTO: 3.36 MILLION/UL (ref 3.81–5.12)
SODIUM SERPL-SCNC: 140 MMOL/L (ref 135–147)
WBC # BLD AUTO: 9.62 THOUSAND/UL (ref 4.31–10.16)

## 2024-04-06 PROCEDURE — 85025 COMPLETE CBC W/AUTO DIFF WBC: CPT

## 2024-04-06 PROCEDURE — 99232 SBSQ HOSP IP/OBS MODERATE 35: CPT | Performed by: INTERNAL MEDICINE

## 2024-04-06 PROCEDURE — 80048 BASIC METABOLIC PNL TOTAL CA: CPT

## 2024-04-06 PROCEDURE — 83735 ASSAY OF MAGNESIUM: CPT

## 2024-04-06 PROCEDURE — 85730 THROMBOPLASTIN TIME PARTIAL: CPT | Performed by: INTERNAL MEDICINE

## 2024-04-06 RX ORDER — FLUTICASONE PROPIONATE 50 MCG
2 SPRAY, SUSPENSION (ML) NASAL DAILY PRN
Status: DISCONTINUED | OUTPATIENT
Start: 2024-04-06 | End: 2024-04-08 | Stop reason: HOSPADM

## 2024-04-06 RX ORDER — LANOLIN ALCOHOL/MO/W.PET/CERES
6 CREAM (GRAM) TOPICAL
Status: DISCONTINUED | OUTPATIENT
Start: 2024-04-06 | End: 2024-04-08 | Stop reason: HOSPADM

## 2024-04-06 RX ORDER — MAGNESIUM SULFATE HEPTAHYDRATE 40 MG/ML
2 INJECTION, SOLUTION INTRAVENOUS ONCE
Status: COMPLETED | OUTPATIENT
Start: 2024-04-06 | End: 2024-04-06

## 2024-04-06 RX ADMIN — FAMOTIDINE 20 MG: 20 TABLET ORAL at 17:06

## 2024-04-06 RX ADMIN — ESCITALOPRAM OXALATE 10 MG: 10 TABLET ORAL at 08:06

## 2024-04-06 RX ADMIN — HEPARIN SODIUM 14 UNITS/KG/HR: 10000 INJECTION, SOLUTION INTRAVENOUS at 01:40

## 2024-04-06 RX ADMIN — FAMOTIDINE 20 MG: 20 TABLET ORAL at 08:05

## 2024-04-06 RX ADMIN — CALCIUM CARBONATE (ANTACID) CHEW TAB 500 MG 500 MG: 500 CHEW TAB at 21:34

## 2024-04-06 RX ADMIN — ACETAMINOPHEN 325MG 650 MG: 325 TABLET ORAL at 08:05

## 2024-04-06 RX ADMIN — CALCIUM CARBONATE (ANTACID) CHEW TAB 500 MG 500 MG: 500 CHEW TAB at 01:54

## 2024-04-06 RX ADMIN — SODIUM BICARBONATE 650 MG TABLET 650 MG: at 17:05

## 2024-04-06 RX ADMIN — ASPIRIN 81 MG CHEWABLE TABLET 81 MG: 81 TABLET CHEWABLE at 08:05

## 2024-04-06 RX ADMIN — THIAMINE HCL TAB 100 MG 100 MG: 100 TAB at 08:06

## 2024-04-06 RX ADMIN — SODIUM BICARBONATE 650 MG TABLET 650 MG: at 08:06

## 2024-04-06 RX ADMIN — MAGNESIUM SULFATE HEPTAHYDRATE 2 G: 40 INJECTION, SOLUTION INTRAVENOUS at 17:09

## 2024-04-06 RX ADMIN — DILTIAZEM HYDROCHLORIDE 120 MG: 120 CAPSULE, COATED, EXTENDED RELEASE ORAL at 21:28

## 2024-04-06 RX ADMIN — ATORVASTATIN CALCIUM 20 MG: 20 TABLET, FILM COATED ORAL at 17:06

## 2024-04-06 RX ADMIN — CHOLESTYRAMINE 4 G: 4 POWDER, FOR SUSPENSION ORAL at 08:06

## 2024-04-06 RX ADMIN — CLOTRIMAZOLE: 1 CREAM TOPICAL at 17:16

## 2024-04-06 RX ADMIN — LORATADINE 5 MG: 10 TABLET ORAL at 08:06

## 2024-04-06 RX ADMIN — MELATONIN 6 MG: 3 TAB ORAL at 21:28

## 2024-04-06 NOTE — PLAN OF CARE
Problem: Potential for Falls  Goal: Patient will remain free of falls  Description: INTERVENTIONS:  - Educate patient/family on patient safety including physical limitations  - Instruct patient to call for assistance with activity   - Consult OT/PT to assist with strengthening/mobility   - Keep Call bell within reach  - Keep bed low and locked with side rails adjusted as appropriate  - Keep care items and personal belongings within reach  - Initiate and maintain comfort rounds  - Make Fall Risk Sign visible to staff  - Offer Toileting every 1 Hours, in advance of need  - Initiate/Maintain alarm  - Obtain necessary fall risk management equipment: alarms  - Apply yellow socks and bracelet for high fall risk patients  - Consider moving patient to room near nurses station  Outcome: Progressing     Problem: DISCHARGE PLANNING  Goal: Discharge to home or other facility with appropriate resources  Description: INTERVENTIONS:  - Identify barriers to discharge w/patient and caregiver  - Arrange for needed discharge resources and transportation as appropriate  - Identify discharge learning needs (meds, wound care, etc.)  - Arrange for interpretive services to assist at discharge as needed  - Refer to Case Management Department for coordinating discharge planning if the patient needs post-hospital services based on physician/advanced practitioner order or complex needs related to functional status, cognitive ability, or social support system  Outcome: Progressing     Problem: Knowledge Deficit  Goal: Patient/family/caregiver demonstrates understanding of disease process, treatment plan, medications, and discharge instructions  Description: Complete learning assessment and assess knowledge base.  Interventions:  - Provide teaching at level of understanding  - Provide teaching via preferred learning methods  Outcome: Progressing     Problem: NEUROSENSORY - ADULT  Goal: Achieves stable or improved neurological  status  Description: INTERVENTIONS  - Monitor and report changes in neurological status  - Monitor vital signs such as temperature, blood pressure, glucose, and any other labs ordered   - Initiate measures to prevent increased intracranial pressure  - Monitor for seizure activity and implement precautions if appropriate      Outcome: Progressing     Problem: Nutrition/Hydration-ADULT  Goal: Nutrient/Hydration intake appropriate for improving, restoring or maintaining nutritional needs  Description: Monitor and assess patient's nutrition/hydration status for malnutrition. Collaborate with interdisciplinary team and initiate plan and interventions as ordered.  Monitor patient's weight and dietary intake as ordered or per policy. Utilize nutrition screening tool and intervene as necessary. Determine patient's food preferences and provide high-protein, high-caloric foods as appropriate.     INTERVENTIONS:  - Monitor oral intake, urinary output, labs, and treatment plans  - Assess nutrition and hydration status and recommend course of action  - Evaluate amount of meals eaten  - Assist patient with eating if necessary   - Allow adequate time for meals  - Recommend/ encourage appropriate diets, oral nutritional supplements, and vitamin/mineral supplements  - Order, calculate, and assess calorie counts as needed  - Recommend, monitor, and adjust tube feedings and TPN/PPN based on assessed needs  - Assess need for intravenous fluids  - Provide specific nutrition/hydration education as appropriate  - Include patient/family/caregiver in decisions related to nutrition  Outcome: Progressing     Problem: Prexisting or High Potential for Compromised Skin Integrity  Goal: Skin integrity is maintained or improved  Description: INTERVENTIONS:  - Identify patients at risk for skin breakdown  - Assess and monitor skin integrity  - Assess and monitor nutrition and hydration status  - Monitor labs   - Assess for incontinence   - Turn and  reposition patient  - Assist with mobility/ambulation  - Relieve pressure over bony prominences  - Avoid friction and shearing  - Provide appropriate hygiene as needed including keeping skin clean and dry  - Evaluate need for skin moisturizer/barrier cream  - Collaborate with interdisciplinary team   - Patient/family teaching  - Consider wound care consult   Outcome: Progressing

## 2024-04-06 NOTE — ASSESSMENT & PLAN NOTE
History of atrial fibrillation hypertension and CKD 3 who presents for abnormal labs  Has had several weeks of diarrhea.  Seen GI outpatient workup negative with plans for EGD/colonoscopy in May  Baseline creatinine 1.0.  Was briefly on IV fluids with bicarbonate infusion per nephrology.  Continue banana flakes and cholestyramine.  Appreciate GI evaluation.  Planning EGD/colonoscopy Monday after washout of clopidogrel    Results from last 7 days   Lab Units 04/06/24  0527 04/05/24  0750 04/04/24  0609 04/03/24  0554 04/02/24  0446 04/01/24  1155   BUN mg/dL 15 16 14 23 39* 54*   CREATININE mg/dL 1.24 1.14 1.01 1.06 1.22 1.81*   EGFR ml/min/1.73sq m 43 48 56 52 44 27

## 2024-04-06 NOTE — ASSESSMENT & PLAN NOTE
Transient aphasia was a stroke alert in the ED.  May be secondary to hypotension.  Seen by neurology and loaded DAPT.  MRI brain negative but ?TIA  Clopidogrel discontinued.  Currently on aspirin and atorvastatin.  Interrogation of loop recorder demonstrates episodes of atrial fibrillation.  Currently on heparin infusion until completed GI workup and plan on discharge with oral anticoagulant   40w5d

## 2024-04-06 NOTE — PROGRESS NOTES
[facility]  Progress Note  Name: Almaz Hall I  MRN: [id number]  Unit/Bed#: E4 -01 I Date of Admission: 4/1/2024   Date of Service: 4/6/2024 I Hospital Day: 5    Assessment/Plan   * JANELLE (acute kidney injury) (HCC)  Assessment & Plan  History of atrial fibrillation hypertension and CKD 3 who presents for abnormal labs  Has had several weeks of diarrhea.  Seen GI outpatient workup negative with plans for EGD/colonoscopy in May  Baseline creatinine 1.0.  Was briefly on IV fluids with bicarbonate infusion per nephrology.  Continue banana flakes and cholestyramine.  Appreciate GI evaluation.  Planning EGD/colonoscopy Monday after washout of clopidogrel    Results from last 7 days   Lab Units 04/06/24  0527 04/05/24  0750 04/04/24  0609 04/03/24  0554 04/02/24  0446 04/01/24  1155   BUN mg/dL 15 16 14 23 39* 54*   CREATININE mg/dL 1.24 1.14 1.01 1.06 1.22 1.81*   EGFR ml/min/1.73sq m 43 48 56 52 44 27       Transient episode of aphasia  Assessment & Plan  Transient aphasia was a stroke alert in the ED.  May be secondary to hypotension.  Seen by neurology and loaded DAPT.  MRI brain negative but ?TIA  Clopidogrel discontinued.  Currently on aspirin and atorvastatin.  Interrogation of loop recorder demonstrates episodes of atrial fibrillation.  Currently on heparin infusion until completed GI workup and plan on discharge with oral anticoagulant    Moderate protein-calorie malnutrition (HCC)  Assessment & Plan  Malnutrition Findings:   Adult Malnutrition type: Chronic illness  Adult Degree of Malnutrition: Malnutrition of moderate degree  Malnutrition Characteristics: Inadequate energy, Weight loss  360 Statement: Moderate malnutrition r/t chronic diarrhea as evidenced by 10% unplanned wt loss since 10/30/23, consuming < 75% energy intake compared to estimated energy needs > 1 month. Treated with Banatrol bid.  BMI Findings:  Body mass index is 24.69 kg/m².     Chronic diastolic CHF  (congestive heart failure) ([facility])  Assessment & Plan  Wt Readings from Last 3 Encounters:   04/02/24 61.2 kg (135 lb)   03/29/24 61.4 kg (135 lb 6.4 oz)   03/29/24 61.7 kg (136 lb)     Prior to admission on spironolactone.  Has been on hold due to kidney injury and low blood pressure.    Paroxysmal atrial fibrillation ([facility])  Assessment & Plan  Paroxysmal atrial fibrillation status post ablation.   Has remained in sinus rhythm and with loop recorder in place which was interrogated demonstrating episodic atrial fibrillation.  Remains on diltiazem CD at bedtime  Anticoagulation as outlined above      Hypokalemia  Assessment & Plan  Hypokalemia has been replaced.  Secondary to diarrhea.  Will give magnesium sulfate and recheck labs tomorrow    Results from last 7 days   Lab Units 04/06/24  0527 04/05/24  0750 04/04/24  0609 04/03/24  0554   POTASSIUM mmol/L 3.8 3.8 4.8 3.2*   MAGNESIUM mg/dL 1.4* 1.8* 1.7* 1.7*         Essential hypertension  Assessment & Plan  On admission had hypotension and kidney injury.  Patient on diltiazem CD for atrial fibrillation.    Holding losartan and spironolactone due to low blood pressures    Depression with anxiety  Assessment & Plan  Continue escitalopram    VTE Pharmacologic Prophylaxis:   Moderate Risk (Score 3-4) - Pharmacological DVT Prophylaxis Ordered: heparin drip.    Mobility:  Basic Mobility Inpatient Raw Score: 24  JH-HLM Goal: 8: Walk 250 feet or more  JH-HLM Achieved: 7: Walk 25 feet or more  JH-HLM Goal NOT achieved. Continue with multidisciplinary rounding and encourage appropriate mobility to improve upon JH-HLM goals.    Patient Centered Rounds: I have performed bedside rounds with nursing staff today.  Discussions with Specialists or Other Care Team Provider: GI    Education and Discussions with Family / Patient:     Time Spent for Care:   This time was spent on one or more of the following: performing physical exam; counseling and coordination of care; obtaining or  "reviewing history; documenting in the medical record; reviewing/ordering tests, medications or procedures; communicating with other healthcare professionals and discussing with patient's family/caregivers.    Current Length of Stay: 5 day(s)  Current Patient Status: Inpatient   Certification Statement: The patient will continue to require additional inpatient hospital stay due to endoscopically Monday  Discharge Plan: Anticipate discharge in 48-72 hrs to home.    Code Status: Level 3 - DNAR and DNI      Subjective:   Patient seen and examined.  No new complaints.  Asymptomatic in regards to low blood pressure    Objective:   Vitals: Blood pressure 97/53, pulse 66, temperature 98.3 °F (36.8 °C), temperature source Temporal, resp. rate 18, height 5' 2\" (1.575 m), weight 61.2 kg (135 lb), SpO2 100%, not currently breastfeeding.  No intake or output data in the 24 hours ending 04/06/24 1611    Physical Exam  Vitals reviewed.   Constitutional:       General: She is not in acute distress.     Appearance: Normal appearance.   HENT:      Head: Atraumatic.   Eyes:      Extraocular Movements: Extraocular movements intact.      Conjunctiva/sclera: Conjunctivae normal.   Cardiovascular:      Rate and Rhythm: Regular rhythm.      Heart sounds: Murmur heard.   Pulmonary:      Breath sounds: No stridor.   Abdominal:      General: Bowel sounds are normal.      Palpations: Abdomen is soft.      Tenderness: There is no guarding or rebound.   Skin:     General: Skin is warm.   Neurological:      General: No focal deficit present.      Mental Status: She is alert.      Motor: No weakness.       Additional Data:   Labs:  Results from last 7 days   Lab Units 04/06/24  0527 04/05/24  0750 04/04/24  1711 04/04/24  0609   WBC Thousand/uL 9.62 12.70*  --  6.84   HEMOGLOBIN g/dL 10.4* 10.9*  --  11.0*   PLATELETS Thousands/uL 243 292  --  289   MCV fL 96 98  --  96   INR   --   --  1.08  --      Results from last 7 days   Lab Units " 04/06/24  0527 04/05/24  0750 04/04/24  0609 04/02/24  0446 04/01/24  1155   SODIUM mmol/L 140 137 139   < > 138   POTASSIUM mmol/L 3.8 3.8 4.8   < > 3.0*   CHLORIDE mmol/L 115* 114* 116*   < > 106   CO2 mmol/L 20* 17* 18*   < > 20*   ANION GAP mmol/L 5 6 5   < > 12   BUN mg/dL 15 16 14   < > 54*   CREATININE mg/dL 1.24 1.14 1.01   < > 1.81*   CALCIUM mg/dL 9.8 10.1 10.3*   < > 10.2   ALBUMIN g/dL  --   --   --   --  4.2   TOTAL BILIRUBIN mg/dL  --   --   --   --  0.49   ALK PHOS U/L  --   --   --   --  73   ALT U/L  --   --   --   --  12   AST U/L  --   --   --   --  14   EGFR ml/min/1.73sq m 43 48 56   < > 27   GLUCOSE RANDOM mg/dL 79 91 94   < > 100    < > = values in this interval not displayed.     Results from last 7 days   Lab Units 04/06/24  0527 04/05/24  0750 04/04/24  0609 04/03/24  0554 04/02/24  0446   MAGNESIUM mg/dL 1.4* 1.8* 1.7* 1.7* 2.3   PHOSPHORUS mg/dL  --   --   --  2.5 2.4                      Results from last 7 days   Lab Units 04/01/24  1346   POC GLUCOSE mg/dl 85     Results from last 7 days   Lab Units 04/02/24  0446   HEMOGLOBIN A1C % 5.6         * I Have Reviewed All Lab Data Listed Above.    Recent cultures:                   Lines/Drains:  Invasive Devices       Peripheral Intravenous Line  Duration             Peripheral IV 04/04/24 Dorsal (posterior);Right Forearm 1 day                      Telemetry:  Telemetry Orders (From admission, onward)               24 Hour Telemetry Monitoring  Continuous x 24 Hours (Telem)        Question:  Reason for 24 Hour Telemetry  Answer:  TIA/Suspected CVA/ Confirmed CVA                     Telemetry Reviewed: Normal Sinus Rhythm  Indication for Continued Telemetry Use:              Telemetry:   Telemetry Orders (From admission, onward)               24 Hour Telemetry Monitoring  Continuous x 24 Hours (Telem)        Question:  Reason for 24 Hour Telemetry  Answer:  TIA/Suspected CVA/ Confirmed CVA                      Imaging:  Imaging Reports  Reviewed Today Include:   MRA carotids wo contrast    Result Date: 4/1/2024  Impression: Motion degraded examination. Focal artifacts are noted along the bilateral carotid bulbs. There is no suspected extracranial carotid stenosis. The cervical vertebral arteries are patent. Workstation performed: [id number]     MRA head wo contrast    Result Date: 4/1/2024  Impression: Cerebral MRA demonstrates no large branch vessel occlusion or focal hemodynamically significant stenosis. Fetal left PCA supply. Workstation performed: [id number]     MRI brain wo contrast    Result Date: 4/1/2024  Impression: No acute infarction, edema, or mass effect. Chronic lacunar infarcts within the right anterior centrum semiovale and right posterior cerebellum. Mild chronic microangiopathic ischemic changes. Workstation performed: [id number]     CT abdomen pelvis wo contrast    Result Date: 4/1/2024  Impression: Fluid seen throughout the colon in keeping with history of diarrheal illness. No focal inflammatory changes. Minimal sigmoid diverticulosis. Limited evaluation of the solid organs without IV contrast. Subcentimeter right hepatic lobe hypodensity too small to characterize. Limited study without IV contrast. Workstation performed: [id number]     CT stroke alert brain    Result Date: 4/1/2024  Impression: 1.  New area of hypodensity involving subcortical and deep white matter in the anterior right frontal lobe most likely late subacute to chronic infarct. Superimposed acute infarct in the less hypodense regions are not excluded. There is no mass effect or  hemorrhage. 2.  Stable old right cerebellar lacunar infarct. Findings were directly discussed with Isaac Rucker at approximately 2:09 p.m. Workstation performed: [id number]       Scheduled Meds:  Current Facility-Administered Medications   Medication Dose Route Frequency Provider Last Rate    acetaminophen  650 mg Oral Q4H PRN Aleks La DO      aspirin  81 mg Oral Daily Aleks  Abhinav, DO      atorvastatin  20 mg Oral QPM LIANG Watkins      [START ON 4/7/2024] bisacodyl  10 mg Oral See Admin Instructions Suresh G Allisonayath, DO      calcium carbonate  500 mg Oral Daily PRN Aleks La, DO      cholestyramine sugar free  4 g Oral BID Aleks La, DO      clotrimazole   Topical BID Aleks La, DO      diltiazem  120 mg Oral HS Aleks La, DO      diphenoxylate-atropine  1 tablet Oral 4x Daily PRN Aleks La, DO      escitalopram  10 mg Oral Daily Aleks La, DO      famotidine  20 mg Oral BID Aleks La, DO      heparin (porcine)  3-20 Units/kg/hr (Order-Specific) Intravenous Titrated Elva Villegas PA-C 14 Units/kg/hr (04/06/24 0140)    heparin (porcine)  1,800 Units Intravenous Q6H PRN Elva Villegas PA-C      heparin (porcine)  3,600 Units Intravenous Q6H PRN Elva Villegas PA-C      loratadine  5 mg Oral Daily Avery Powers PA-C      magnesium sulfate  2 g Intravenous Once Aleks La DO      melatonin  6 mg Oral HS PRN Miguel Angel Galindo PA-C      ondansetron  4 mg Intravenous Q4H PRN Aleks La,       pantoprazole  40 mg Oral Early Morning Suresh G DO Farrah      [START ON 4/7/2024] polyethylene glycol  4,000 mL Oral See Admin Instructions Suresh G Farrah, DO      sodium bicarbonate  650 mg Oral BID after meals Morenita Tillman MD      thiamine  100 mg Oral Daily Ap Reardon PA-C         Today, Patient Was Seen By: Aleks La DO    ** Please Note: Dictation voice to text software may have been used in the creation of this document. **

## 2024-04-06 NOTE — ASSESSMENT & PLAN NOTE
Wt Readings from Last 3 Encounters:   04/02/24 61.2 kg (135 lb)   03/29/24 61.4 kg (135 lb 6.4 oz)   03/29/24 61.7 kg (136 lb)     Prior to admission on spironolactone.  Has been on hold due to kidney injury and low blood pressure.

## 2024-04-06 NOTE — ASSESSMENT & PLAN NOTE
Paroxysmal atrial fibrillation status post ablation.   Has remained in sinus rhythm and with loop recorder in place which was interrogated demonstrating episodic atrial fibrillation.  Remains on diltiazem CD at bedtime  Anticoagulation as outlined above

## 2024-04-06 NOTE — ASSESSMENT & PLAN NOTE
Hypokalemia has been replaced.  Secondary to diarrhea.  Will give magnesium sulfate and recheck labs tomorrow    Results from last 7 days   Lab Units 04/06/24  0527 04/05/24  0750 04/04/24  0609 04/03/24  0554   POTASSIUM mmol/L 3.8 3.8 4.8 3.2*   MAGNESIUM mg/dL 1.4* 1.8* 1.7* 1.7*

## 2024-04-06 NOTE — ASSESSMENT & PLAN NOTE
On admission had hypotension and kidney injury.  Patient on diltiazem CD for atrial fibrillation.    Holding losartan and spironolactone due to low blood pressures

## 2024-04-07 LAB
ALBUMIN SERPL BCP-MCNC: 3.7 G/DL (ref 3.5–5)
ALP SERPL-CCNC: 60 U/L (ref 34–104)
ALT SERPL W P-5'-P-CCNC: 9 U/L (ref 7–52)
ANION GAP SERPL CALCULATED.3IONS-SCNC: 6 MMOL/L (ref 4–13)
APTT PPP: 64 SECONDS (ref 23–37)
AST SERPL W P-5'-P-CCNC: 11 U/L (ref 13–39)
BILIRUB SERPL-MCNC: 0.25 MG/DL (ref 0.2–1)
BUN SERPL-MCNC: 17 MG/DL (ref 5–25)
CALCIUM SERPL-MCNC: 10 MG/DL (ref 8.4–10.2)
CHLORIDE SERPL-SCNC: 112 MMOL/L (ref 96–108)
CO2 SERPL-SCNC: 20 MMOL/L (ref 21–32)
CREAT SERPL-MCNC: 1.16 MG/DL (ref 0.6–1.3)
ERYTHROCYTE [DISTWIDTH] IN BLOOD BY AUTOMATED COUNT: 12.8 % (ref 11.6–15.1)
GFR SERPL CREATININE-BSD FRML MDRD: 47 ML/MIN/1.73SQ M
GLUCOSE SERPL-MCNC: 81 MG/DL (ref 65–140)
HCT VFR BLD AUTO: 31.7 % (ref 34.8–46.1)
HGB BLD-MCNC: 10.2 G/DL (ref 11.5–15.4)
MAGNESIUM SERPL-MCNC: 1.8 MG/DL (ref 1.9–2.7)
MCH RBC QN AUTO: 30.5 PG (ref 26.8–34.3)
MCHC RBC AUTO-ENTMCNC: 32.2 G/DL (ref 31.4–37.4)
MCV RBC AUTO: 95 FL (ref 82–98)
PHOSPHATE SERPL-MCNC: 3.7 MG/DL (ref 2.3–4.1)
PLATELET # BLD AUTO: 255 THOUSANDS/UL (ref 149–390)
PMV BLD AUTO: 10.2 FL (ref 8.9–12.7)
POTASSIUM SERPL-SCNC: 3.8 MMOL/L (ref 3.5–5.3)
PROT SERPL-MCNC: 6 G/DL (ref 6.4–8.4)
RBC # BLD AUTO: 3.34 MILLION/UL (ref 3.81–5.12)
SODIUM SERPL-SCNC: 138 MMOL/L (ref 135–147)
VIT B6 SERPL-MCNC: 4 UG/L (ref 3.4–65.2)
WBC # BLD AUTO: 7.45 THOUSAND/UL (ref 4.31–10.16)

## 2024-04-07 PROCEDURE — 99232 SBSQ HOSP IP/OBS MODERATE 35: CPT | Performed by: INTERNAL MEDICINE

## 2024-04-07 PROCEDURE — 84100 ASSAY OF PHOSPHORUS: CPT | Performed by: INTERNAL MEDICINE

## 2024-04-07 PROCEDURE — 83735 ASSAY OF MAGNESIUM: CPT | Performed by: INTERNAL MEDICINE

## 2024-04-07 PROCEDURE — 80053 COMPREHEN METABOLIC PANEL: CPT | Performed by: INTERNAL MEDICINE

## 2024-04-07 PROCEDURE — 85027 COMPLETE CBC AUTOMATED: CPT | Performed by: INTERNAL MEDICINE

## 2024-04-07 PROCEDURE — 85730 THROMBOPLASTIN TIME PARTIAL: CPT | Performed by: INTERNAL MEDICINE

## 2024-04-07 RX ORDER — MAGNESIUM SULFATE HEPTAHYDRATE 40 MG/ML
2 INJECTION, SOLUTION INTRAVENOUS ONCE
Status: COMPLETED | OUTPATIENT
Start: 2024-04-07 | End: 2024-04-07

## 2024-04-07 RX ADMIN — ACETAMINOPHEN 325MG 650 MG: 325 TABLET ORAL at 15:19

## 2024-04-07 RX ADMIN — THIAMINE HCL TAB 100 MG 100 MG: 100 TAB at 09:26

## 2024-04-07 RX ADMIN — ESCITALOPRAM OXALATE 10 MG: 10 TABLET ORAL at 09:26

## 2024-04-07 RX ADMIN — ATORVASTATIN CALCIUM 20 MG: 20 TABLET, FILM COATED ORAL at 17:06

## 2024-04-07 RX ADMIN — FAMOTIDINE 20 MG: 20 TABLET ORAL at 09:26

## 2024-04-07 RX ADMIN — PANTOPRAZOLE SODIUM 40 MG: 40 TABLET, DELAYED RELEASE ORAL at 05:57

## 2024-04-07 RX ADMIN — DILTIAZEM HYDROCHLORIDE 120 MG: 120 CAPSULE, COATED, EXTENDED RELEASE ORAL at 22:03

## 2024-04-07 RX ADMIN — SODIUM BICARBONATE 650 MG TABLET 650 MG: at 17:06

## 2024-04-07 RX ADMIN — MAGNESIUM SULFATE HEPTAHYDRATE 2 G: 40 INJECTION, SOLUTION INTRAVENOUS at 09:34

## 2024-04-07 RX ADMIN — LORATADINE 5 MG: 10 TABLET ORAL at 09:26

## 2024-04-07 RX ADMIN — BISACODYL 10 MG: 5 TABLET, COATED ORAL at 17:06

## 2024-04-07 RX ADMIN — POLYETHYLENE GLYCOL 3350, SODIUM SULFATE ANHYDROUS, SODIUM BICARBONATE, SODIUM CHLORIDE, POTASSIUM CHLORIDE 4000 ML: 236; 22.74; 6.74; 5.86; 2.97 POWDER, FOR SOLUTION ORAL at 18:09

## 2024-04-07 RX ADMIN — ASPIRIN 81 MG CHEWABLE TABLET 81 MG: 81 TABLET CHEWABLE at 09:26

## 2024-04-07 RX ADMIN — HEPARIN SODIUM 14 UNITS/KG/HR: 10000 INJECTION, SOLUTION INTRAVENOUS at 01:44

## 2024-04-07 RX ADMIN — SODIUM BICARBONATE 650 MG TABLET 650 MG: at 09:26

## 2024-04-07 RX ADMIN — FLUTICASONE PROPIONATE 2 SPRAY: 50 SPRAY, METERED NASAL at 09:29

## 2024-04-07 RX ADMIN — FAMOTIDINE 20 MG: 20 TABLET ORAL at 17:06

## 2024-04-07 RX ADMIN — MELATONIN 6 MG: 3 TAB ORAL at 22:05

## 2024-04-07 NOTE — ASSESSMENT & PLAN NOTE
History of atrial fibrillation hypertension and CKD 3 who presents for abnormal labs  Has had several weeks of diarrhea.  Seen GI outpatient workup negative with plans for EGD/colonoscopy in May  Baseline creatinine 1.0.  Was briefly on IV fluids with bicarbonate infusion per nephrology.  Continue banana flakes and cholestyramine.  Appreciate GI evaluation.  Planning EGD/colonoscopy tomorrow after washout of clopidogrel    Results from last 7 days   Lab Units 04/07/24  0556 04/06/24  0527 04/05/24  0750 04/04/24  0609 04/03/24  0554 04/02/24  0446 04/01/24  1155   BUN mg/dL 17 15 16 14 23 39* 54*   CREATININE mg/dL 1.16 1.24 1.14 1.01 1.06 1.22 1.81*   EGFR ml/min/1.73sq m 47 43 48 56 52 44 27

## 2024-04-07 NOTE — PROGRESS NOTES
[facility]  Progress Note  Name: Almaz Hall I  MRN: [id number]  Unit/Bed#: E4 -01 I Date of Admission: 4/1/2024   Date of Service: 4/7/2024 I Hospital Day: 6    Assessment/Plan   * JANELLE (acute kidney injury) (HCC)  Assessment & Plan  History of atrial fibrillation hypertension and CKD 3 who presents for abnormal labs  Has had several weeks of diarrhea.  Seen GI outpatient workup negative with plans for EGD/colonoscopy in May  Baseline creatinine 1.0.  Was briefly on IV fluids with bicarbonate infusion per nephrology.  Continue banana flakes and cholestyramine.  Appreciate GI evaluation.  Planning EGD/colonoscopy tomorrow after washout of clopidogrel    Results from last 7 days   Lab Units 04/07/24  0556 04/06/24  0527 04/05/24  0750 04/04/24  0609 04/03/24  0554 04/02/24  0446 04/01/24  1155   BUN mg/dL 17 15 16 14 23 39* 54*   CREATININE mg/dL 1.16 1.24 1.14 1.01 1.06 1.22 1.81*   EGFR ml/min/1.73sq m 47 43 48 56 52 44 27       Transient episode of aphasia  Assessment & Plan  Transient aphasia was a stroke alert in the ED.  May be secondary to hypotension.  Seen by neurology and loaded DAPT.  MRI brain negative but ?TIA  Clopidogrel discontinued.  Currently on aspirin and atorvastatin.  Interrogation of loop recorder demonstrates episodes of atrial fibrillation.  Currently on heparin infusion until completed GI workup and plan on discharge with oral anticoagulant    GERD (gastroesophageal reflux disease)  Assessment & Plan  Started on famotidine and pantoprazole with much improvement    Moderate protein-calorie malnutrition (HCC)  Assessment & Plan  Malnutrition Findings:   Adult Malnutrition type: Chronic illness  Adult Degree of Malnutrition: Malnutrition of moderate degree  Malnutrition Characteristics: Inadequate energy, Weight loss  360 Statement: Moderate malnutrition r/t chronic diarrhea as evidenced by 10% unplanned wt loss since 10/30/23, consuming < 75% energy intake  compared to estimated energy needs > 1 month. Treated with Banatrol bid.  BMI Findings:  Body mass index is 24.69 kg/m².     Chronic diastolic CHF (congestive heart failure) (HCC)  Assessment & Plan  Wt Readings from Last 3 Encounters:   04/02/24 61.2 kg (135 lb)   03/29/24 61.4 kg (135 lb 6.4 oz)   03/29/24 61.7 kg (136 lb)     Prior to admission on spironolactone.  Has been on hold due to kidney injury and low blood pressure.    Paroxysmal atrial fibrillation (HCC)  Assessment & Plan  Paroxysmal atrial fibrillation status post ablation.   Has remained in sinus rhythm and with loop recorder in place which was interrogated demonstrating episodic atrial fibrillation.  Remains on diltiazem CD at bedtime  Anticoagulation as outlined above    Hypokalemia  Assessment & Plan  Hypokalemia has been replaced.  Secondary to diarrhea.  Will give magnesium sulfate again and recheck labs tomorrow    Results from last 7 days   Lab Units 04/07/24  0556 04/06/24  0527 04/05/24  0750 04/04/24  0609   POTASSIUM mmol/L 3.8 3.8 3.8 4.8   MAGNESIUM mg/dL 1.8* 1.4* 1.8* 1.7*       Essential hypertension  Assessment & Plan  On admission had hypotension and kidney injury.  Patient on diltiazem CD at bedtime for atrial fibrillation.    Holding losartan and spironolactone due to low blood pressures    Depression with anxiety  Assessment & Plan  Continue escitalopram    VTE Pharmacologic Prophylaxis:   Moderate Risk (Score 3-4) - Pharmacological DVT Prophylaxis Ordered: heparin drip.    Mobility:  Basic Mobility Inpatient Raw Score: 24  JH-HLM Goal: 8: Walk 250 feet or more  JH-HLM Achieved: 7: Walk 25 feet or more  JH-HLM Goal achieved. Continue to encourage appropriate mobility.    Patient Centered Rounds: I have performed bedside rounds with nursing staff today.  Discussions with Specialists or Other Care Team Provider: Case management    Education and Discussions with Family / Patient: Updated  () via phone.    Time  "Spent for Care:   This time was spent on one or more of the following: performing physical exam; counseling and coordination of care; obtaining or reviewing history; documenting in the medical record; reviewing/ordering tests, medications or procedures; communicating with other healthcare professionals and discussing with patient's family/caregivers.    Current Length of Stay: 6 day(s)  Current Patient Status: Inpatient   Certification Statement: The patient will continue to require additional inpatient hospital stay due to endoscopy tomorrow  Discharge Plan: Anticipate discharge in 24-48 hrs to home.    Code Status: Level 3 - DNAR and DNI      Subjective:   Patient seen and examined.  No new complaints.  No further heartburn.    Objective:   Vitals: Blood pressure 104/54, pulse 97, temperature 98 °F (36.7 °C), temperature source Temporal, resp. rate 18, height 5' 2\" (1.575 m), weight 61.2 kg (135 lb), SpO2 100%, not currently breastfeeding.  No intake or output data in the 24 hours ending 04/07/24 1037    Physical Exam  Vitals reviewed.   Constitutional:       General: She is not in acute distress.     Appearance: Normal appearance.   HENT:      Head: Atraumatic.   Eyes:      General: No scleral icterus.     Extraocular Movements: Extraocular movements intact.   Cardiovascular:      Rate and Rhythm: Regular rhythm.      Heart sounds: Murmur heard.   Pulmonary:      Breath sounds: Normal breath sounds. No wheezing.   Abdominal:      General: Bowel sounds are normal.      Palpations: Abdomen is soft.      Tenderness: There is no guarding or rebound.   Musculoskeletal:         General: No swelling.   Skin:     General: Skin is warm.   Neurological:      General: No focal deficit present.      Mental Status: She is alert. Mental status is at baseline.      Motor: No weakness.   Psychiatric:         Mood and Affect: Mood normal.       Additional Data:   Labs:  Results from last 7 days   Lab Units 04/07/24  0556 " 04/06/24 0527 04/05/24  0750 04/04/24  1711   WBC Thousand/uL 7.45 9.62 12.70*  --    HEMOGLOBIN g/dL 10.2* 10.4* 10.9*  --    PLATELETS Thousands/uL 255 243 292  --    MCV fL 95 96 98  --    INR   --   --   --  1.08     Results from last 7 days   Lab Units 04/07/24  0556 04/06/24  0527 04/05/24  0750 04/02/24  0446 04/01/24  1155   SODIUM mmol/L 138 140 137   < > 138   POTASSIUM mmol/L 3.8 3.8 3.8   < > 3.0*   CHLORIDE mmol/L 112* 115* 114*   < > 106   CO2 mmol/L 20* 20* 17*   < > 20*   ANION GAP mmol/L 6 5 6   < > 12   BUN mg/dL 17 15 16   < > 54*   CREATININE mg/dL 1.16 1.24 1.14   < > 1.81*   CALCIUM mg/dL 10.0 9.8 10.1   < > 10.2   ALBUMIN g/dL 3.7  --   --   --  4.2   TOTAL BILIRUBIN mg/dL 0.25  --   --   --  0.49   ALK PHOS U/L 60  --   --   --  73   ALT U/L 9  --   --   --  12   AST U/L 11*  --   --   --  14   EGFR ml/min/1.73sq m 47 43 48   < > 27   GLUCOSE RANDOM mg/dL 81 79 91   < > 100    < > = values in this interval not displayed.     Results from last 7 days   Lab Units 04/07/24  0556 04/06/24  0527 04/05/24  0750 04/04/24  0609 04/03/24  0554 04/02/24  0446   MAGNESIUM mg/dL 1.8* 1.4* 1.8* 1.7* 1.7* 2.3   PHOSPHORUS mg/dL 3.7  --   --   --  2.5 2.4                      Results from last 7 days   Lab Units 04/01/24  1346   POC GLUCOSE mg/dl 85     Results from last 7 days   Lab Units 04/02/24  0446   HEMOGLOBIN A1C % 5.6         * I Have Reviewed All Lab Data Listed Above.    Recent cultures:                   Lines/Drains:  Invasive Devices       Peripheral Intravenous Line  Duration             Peripheral IV 04/04/24 Dorsal (posterior);Right Forearm 2 days                      Telemetry:  Telemetry Orders (From admission, onward)               24 Hour Telemetry Monitoring  Continuous x 24 Hours (Telem)        Question:  Reason for 24 Hour Telemetry  Answer:  TIA/Suspected CVA/ Confirmed CVA                     Telemetry Reviewed: Normal Sinus Rhythm  Indication for Continued Telemetry Use: Acute  CVA             Telemetry:   Telemetry Orders (From admission, onward)               24 Hour Telemetry Monitoring  Continuous x 24 Hours (Telem)        Question:  Reason for 24 Hour Telemetry  Answer:  TIA/Suspected CVA/ Confirmed CVA                      Imaging:  Imaging Reports Reviewed Today Include:   MRA carotids wo contrast    Result Date: 4/1/2024  Impression: Motion degraded examination. Focal artifacts are noted along the bilateral carotid bulbs. There is no suspected extracranial carotid stenosis. The cervical vertebral arteries are patent. Workstation performed: [id number]     MRA head wo contrast    Result Date: 4/1/2024  Impression: Cerebral MRA demonstrates no large branch vessel occlusion or focal hemodynamically significant stenosis. Fetal left PCA supply. Workstation performed: [id number]     MRI brain wo contrast    Result Date: 4/1/2024  Impression: No acute infarction, edema, or mass effect. Chronic lacunar infarcts within the right anterior centrum semiovale and right posterior cerebellum. Mild chronic microangiopathic ischemic changes. Workstation performed: [id number]     CT abdomen pelvis wo contrast    Result Date: 4/1/2024  Impression: Fluid seen throughout the colon in keeping with history of diarrheal illness. No focal inflammatory changes. Minimal sigmoid diverticulosis. Limited evaluation of the solid organs without IV contrast. Subcentimeter right hepatic lobe hypodensity too small to characterize. Limited study without IV contrast. Workstation performed: [id number]     CT stroke alert brain    Result Date: 4/1/2024  Impression: 1.  New area of hypodensity involving subcortical and deep white matter in the anterior right frontal lobe most likely late subacute to chronic infarct. Superimposed acute infarct in the less hypodense regions are not excluded. There is no mass effect or  hemorrhage. 2.  Stable old right cerebellar lacunar infarct. Findings were directly discussed with Isaac  Alka at approximately 2:09 p.m. Workstation performed: [id number]       Scheduled Meds:  Current Facility-Administered Medications   Medication Dose Route Frequency Provider Last Rate    acetaminophen  650 mg Oral Q4H PRN Aleks La, DO      aspirin  81 mg Oral Daily Aleks La, DO      atorvastatin  20 mg Oral QPM LIANG Watkins      bisacodyl  10 mg Oral See Admin Instructions Suresh G Chirayath, DO      calcium carbonate  500 mg Oral Daily PRN Aleks La, DO      cholestyramine sugar free  4 g Oral BID Aleks La, DO      clotrimazole   Topical BID Aleks La, DO      diltiazem  120 mg Oral HS Aleks La, DO      diphenoxylate-atropine  1 tablet Oral 4x Daily PRN Aleks La, DO      escitalopram  10 mg Oral Daily Aleks La, DO      famotidine  20 mg Oral BID Aleks La, DO      fluticasone  2 spray Each Nare Daily PRN Miguel Angel Galindo PA-C      heparin (porcine)  3-20 Units/kg/hr (Order-Specific) Intravenous Titrated Elva Villegas PA-C 14 Units/kg/hr (04/07/24 0144)    heparin (porcine)  1,800 Units Intravenous Q6H PRN Elva Villegas PA-C      heparin (porcine)  3,600 Units Intravenous Q6H PRN Elva Villegas PA-C      loratadine  5 mg Oral Daily Avery Powers PA-C      magnesium sulfate  2 g Intravenous Once Aleks La DO 2 g (04/07/24 0934)    melatonin  6 mg Oral HS PRN Miguel Angel Galindo PA-C      ondansetron  4 mg Intravenous Q4H PRN Aleks La,       pantoprazole  40 mg Oral Early Morning Suresh G Farrah, DO      polyethylene glycol  4,000 mL Oral See Admin Instructions Suresh G Allisonayath, DO      sodium bicarbonate  650 mg Oral BID after meals Morenita Tillman MD      thiamine  100 mg Oral Daily Ap Reardon PA-C         Today, Patient Was Seen By: Aleks La DO    ** Please Note: Dictation voice to text software may have been used in the creation of this document. **

## 2024-04-07 NOTE — ASSESSMENT & PLAN NOTE
On admission had hypotension and kidney injury.  Patient on diltiazem CD at bedtime for atrial fibrillation.    Holding losartan and spironolactone due to low blood pressures

## 2024-04-07 NOTE — PROGRESS NOTES
Progress Note- Almaz Hall 73 y.o. female MRN: [id number]    Unit/Bed#: E4 -01 Encounter: [id number]      Assessment and Plan:    73-year-old with atrial fibrillation status post ablation, initially admitted with acute kidney injury, chronic diarrhea for the last 3 months with more than 10 bowel movements per day along with acute aphasia.  Patient has been evaluated extensively with neurology and cardiology.  She was loaded with Plavix.  Continue to be on aspirin and started on anticoagulation for transient aphasia symptoms and history of A-fib.  Patient continued to have multiple bowel movements per day.  JANELLE has resolved with supportive treatment.  Fecal calprotectin 400 other stool infectious workup and celiac serology negative.  Colonoscopy in 2017 showed 1 AVM.    1.  Chronic abdominal pain  2.  Chronic diarrhea  3.  Transient aphasia  4.  Atrial fibrillation    Patient cleared by neurology and cardiology for the endoscopic evaluation.  Plan to do EGD and colonoscopy tomorrow.  Clear liquid diet today.  GoLytely prep.  N.p.o. after midnight except for meds.  Further management plan based on investigation results.  Will stop heparin 4 to 6 hours prior to procedures.    We will follow.    Will Traylor MD  Gastroenterology  [facility]  Date: April 7, 2024    ______________________________________________________________________    Subjective:     Patient continues to report 10 bowel movements overnight.  No blood in stools.  Patient reports that she has been having right upper quadrant/epigastric pain intermittently for the last 3 months as well.  She is tolerating clear liquid diet.  Agreeable to do procedures tomorrow.    Medication Administration - last 24 hours from 04/06/2024 1219 to 04/07/2024 1219         Date/Time Order Dose Route Action Action by     04/07/2024 0926 EDT thiamine tablet 100 mg 100 mg Oral Given Marquis Ross RN     04/07/2024 0926 EDT aspirin chewable tablet  81 mg 81 mg Oral Given Marquis Ross RN     04/06/2024 2128 EDT diltiazem (CARDIZEM CD) 24 hr capsule 120 mg 120 mg Oral Given Elmer Morales RN     04/07/2024 0926 EDT escitalopram (LEXAPRO) tablet 10 mg 10 mg Oral Given Marquis Ross RN     04/07/2024 0928 EDT cholestyramine sugar free (QUESTRAN LIGHT) packet 4 g 4 g Oral Not Given Marquis Ross RN     04/06/2024 1706 EDT cholestyramine sugar free (QUESTRAN LIGHT) packet 4 g 4 g Oral Not Given Nida Leon RN     04/07/2024 0929 EDT clotrimazole (LOTRIMIN) 1 % cream -- Topical Not Given Marquis Ross RN     04/06/2024 1716 EDT clotrimazole (LOTRIMIN) 1 % cream -- Topical Given Nida Leon RN     04/06/2024 1706 EDT atorvastatin (LIPITOR) tablet 20 mg 20 mg Oral Given Nida Leon RN     04/07/2024 0926 EDT loratadine (CLARITIN) tablet 5 mg 5 mg Oral Given Marquis Ross RN     04/07/2024 0926 EDT sodium bicarbonate tablet 650 mg 650 mg Oral Given Marquis Ross RN     04/06/2024 1705 EDT sodium bicarbonate tablet 650 mg 650 mg Oral Given Nida Leon RN     04/07/2024 0144 EDT heparin (porcine) 25,000 units in 0.45% NaCl 250 mL infusion (premix) 14 Units/kg/hr Intravenous New Bag Elmer Morales RN     04/06/2024 2134 EDT calcium carbonate (TUMS) chewable tablet 500 mg 500 mg Oral Given Elmer Morales RN     04/07/2024 0926 EDT famotidine (PEPCID) tablet 20 mg 20 mg Oral Given Marquis Ross RN     04/06/2024 1706 EDT famotidine (PEPCID) tablet 20 mg 20 mg Oral Given Nida Leon RN     04/07/2024 0557 EDT pantoprazole (PROTONIX) EC tablet 40 mg 40 mg Oral Given Elmer Morales RN     04/06/2024 2128 EDT melatonin tablet 6 mg 6 mg Oral Given Elmer Morales RN     04/06/2024 2136 EDT magnesium sulfate 2 g/50 mL IVPB (premix) 2 g 0 g Intravenous Stopped Elmer Morales RN     04/06/2024 1709 EDT magnesium sulfate 2 g/50 mL IVPB (premix) 2 g 2 g Intravenous New Jackie Leon RN     04/07/2024 0929 EDT fluticasone (FLONASE) 50 mcg/act  "nasal spray 2 spray 2 spray Each Nare Given Marquis Ross RN     04/07/2024 0934 EDT magnesium sulfate 2 g/50 mL IVPB (premix) 2 g 2 g Intravenous New Bag Marquis Ross RN            Objective:     Vitals: Blood pressure 108/57, pulse 65, temperature 98.2 °F (36.8 °C), temperature source Temporal, resp. rate 18, height 5' 2\" (1.575 m), weight 61.2 kg (135 lb), SpO2 100%, not currently breastfeeding.,Body mass index is 24.69 kg/m².    No intake or output data in the 24 hours ending 04/07/24 1219    Physical Exam:   General Appearance:   Alert, cooperative, no distress   HEENT:   Normocephalic, atraumatic, anicteric.     Neck:  Supple, symmetrical, trachea midline   Lungs:   Clear to auscultation bilaterally; no rales, rhonchi or wheezing; respirations unlabored    Heart::   Regular rate and rhythm; no murmur, rub, or gallop.   Abdomen:   Soft, non-tender, non-distended; normal bowel sounds; no masses, no organomegaly    Genitalia:   Deferred    Rectal:   Deferred    Extremities:  No cyanosis, clubbing or edema    Pulses:  2+ and symmetric all extremities    Skin:  No jaundice, rashes, or lesions    Lymph nodes:  No palpable cervical lymphadenopathy          Invasive Devices       Peripheral Intravenous Line  Duration             Peripheral IV 04/04/24 Dorsal (posterior);Right Forearm 2 days                    Lab Results:  No results displayed because visit has over 200 results.          Imaging Studies: I have personally reviewed pertinent imaging studies.     "

## 2024-04-07 NOTE — ASSESSMENT & PLAN NOTE
Hypokalemia has been replaced.  Secondary to diarrhea.  Will give magnesium sulfate again and recheck labs tomorrow    Results from last 7 days   Lab Units 04/07/24  0556 04/06/24  0527 04/05/24  0750 04/04/24  0609   POTASSIUM mmol/L 3.8 3.8 3.8 4.8   MAGNESIUM mg/dL 1.8* 1.4* 1.8* 1.7*

## 2024-04-08 ENCOUNTER — ANESTHESIA EVENT (INPATIENT)
Dept: GASTROENTEROLOGY | Facility: HOSPITAL | Age: 74
DRG: 982 | End: 2024-04-08
Payer: MEDICARE

## 2024-04-08 ENCOUNTER — ANESTHESIA (INPATIENT)
Dept: GASTROENTEROLOGY | Facility: HOSPITAL | Age: 74
DRG: 982 | End: 2024-04-08
Payer: MEDICARE

## 2024-04-08 ENCOUNTER — APPOINTMENT (INPATIENT)
Dept: GASTROENTEROLOGY | Facility: HOSPITAL | Age: 74
DRG: 982 | End: 2024-04-08
Payer: MEDICARE

## 2024-04-08 VITALS
RESPIRATION RATE: 18 BRPM | SYSTOLIC BLOOD PRESSURE: 120 MMHG | TEMPERATURE: 98 F | HEART RATE: 58 BPM | OXYGEN SATURATION: 100 % | WEIGHT: 135 LBS | HEIGHT: 62 IN | BODY MASS INDEX: 24.84 KG/M2 | DIASTOLIC BLOOD PRESSURE: 59 MMHG

## 2024-04-08 PROBLEM — N17.9 AKI (ACUTE KIDNEY INJURY) (HCC): Status: RESOLVED | Noted: 2018-11-23 | Resolved: 2024-04-08

## 2024-04-08 PROBLEM — E87.6 HYPOKALEMIA: Status: RESOLVED | Noted: 2018-11-23 | Resolved: 2024-04-08

## 2024-04-08 PROBLEM — R47.01 APHASIA: Status: RESOLVED | Noted: 2024-04-01 | Resolved: 2024-04-08

## 2024-04-08 LAB
ALBUMIN SERPL BCP-MCNC: 3.9 G/DL (ref 3.5–5)
ALP SERPL-CCNC: 67 U/L (ref 34–104)
ALT SERPL W P-5'-P-CCNC: 15 U/L (ref 7–52)
ANION GAP SERPL CALCULATED.3IONS-SCNC: 6 MMOL/L (ref 4–13)
AST SERPL W P-5'-P-CCNC: 17 U/L (ref 13–39)
BILIRUB SERPL-MCNC: 0.6 MG/DL (ref 0.2–1)
BUN SERPL-MCNC: 13 MG/DL (ref 5–25)
CALCIUM SERPL-MCNC: 10.6 MG/DL (ref 8.4–10.2)
CHLORIDE SERPL-SCNC: 110 MMOL/L (ref 96–108)
CO2 SERPL-SCNC: 25 MMOL/L (ref 21–32)
CREAT SERPL-MCNC: 1.11 MG/DL (ref 0.6–1.3)
ERYTHROCYTE [DISTWIDTH] IN BLOOD BY AUTOMATED COUNT: 12.8 % (ref 11.6–15.1)
GFR SERPL CREATININE-BSD FRML MDRD: 50 ML/MIN/1.73SQ M
GLUCOSE SERPL-MCNC: 81 MG/DL (ref 65–140)
HCT VFR BLD AUTO: 32.7 % (ref 34.8–46.1)
HGB BLD-MCNC: 10.6 G/DL (ref 11.5–15.4)
MAGNESIUM SERPL-MCNC: 1.8 MG/DL (ref 1.9–2.7)
MCH RBC QN AUTO: 30.8 PG (ref 26.8–34.3)
MCHC RBC AUTO-ENTMCNC: 32.4 G/DL (ref 31.4–37.4)
MCV RBC AUTO: 95 FL (ref 82–98)
PHOSPHATE SERPL-MCNC: 4 MG/DL (ref 2.3–4.1)
PLATELET # BLD AUTO: 243 THOUSANDS/UL (ref 149–390)
PMV BLD AUTO: 10.1 FL (ref 8.9–12.7)
POTASSIUM SERPL-SCNC: 3.9 MMOL/L (ref 3.5–5.3)
PROT SERPL-MCNC: 6.4 G/DL (ref 6.4–8.4)
RBC # BLD AUTO: 3.44 MILLION/UL (ref 3.81–5.12)
SODIUM SERPL-SCNC: 141 MMOL/L (ref 135–147)
WBC # BLD AUTO: 5.84 THOUSAND/UL (ref 4.31–10.16)

## 2024-04-08 PROCEDURE — 0DB68ZX EXCISION OF STOMACH, VIA NATURAL OR ARTIFICIAL OPENING ENDOSCOPIC, DIAGNOSTIC: ICD-10-PCS | Performed by: INTERNAL MEDICINE

## 2024-04-08 PROCEDURE — 0DJD8ZZ INSPECTION OF LOWER INTESTINAL TRACT, VIA NATURAL OR ARTIFICIAL OPENING ENDOSCOPIC: ICD-10-PCS | Performed by: INTERNAL MEDICINE

## 2024-04-08 PROCEDURE — 45380 COLONOSCOPY AND BIOPSY: CPT | Performed by: INTERNAL MEDICINE

## 2024-04-08 PROCEDURE — 80053 COMPREHEN METABOLIC PANEL: CPT | Performed by: INTERNAL MEDICINE

## 2024-04-08 PROCEDURE — 0DBE8ZZ EXCISION OF LARGE INTESTINE, VIA NATURAL OR ARTIFICIAL OPENING ENDOSCOPIC: ICD-10-PCS | Performed by: INTERNAL MEDICINE

## 2024-04-08 PROCEDURE — 99239 HOSP IP/OBS DSCHRG MGMT >30: CPT | Performed by: INTERNAL MEDICINE

## 2024-04-08 PROCEDURE — NC001 PR NO CHARGE: Performed by: INTERNAL MEDICINE

## 2024-04-08 PROCEDURE — 45385 COLONOSCOPY W/LESION REMOVAL: CPT | Performed by: INTERNAL MEDICINE

## 2024-04-08 PROCEDURE — 85027 COMPLETE CBC AUTOMATED: CPT | Performed by: INTERNAL MEDICINE

## 2024-04-08 PROCEDURE — 43239 EGD BIOPSY SINGLE/MULTIPLE: CPT | Performed by: INTERNAL MEDICINE

## 2024-04-08 PROCEDURE — 0DBK4ZZ EXCISION OF ASCENDING COLON, PERCUTANEOUS ENDOSCOPIC APPROACH: ICD-10-PCS | Performed by: INTERNAL MEDICINE

## 2024-04-08 PROCEDURE — 0DB98ZX EXCISION OF DUODENUM, VIA NATURAL OR ARTIFICIAL OPENING ENDOSCOPIC, DIAGNOSTIC: ICD-10-PCS | Performed by: INTERNAL MEDICINE

## 2024-04-08 PROCEDURE — 84100 ASSAY OF PHOSPHORUS: CPT | Performed by: INTERNAL MEDICINE

## 2024-04-08 PROCEDURE — B246ZZZ ULTRASONOGRAPHY OF RIGHT AND LEFT HEART: ICD-10-PCS | Performed by: INTERNAL MEDICINE

## 2024-04-08 PROCEDURE — 83735 ASSAY OF MAGNESIUM: CPT | Performed by: INTERNAL MEDICINE

## 2024-04-08 PROCEDURE — 88305 TISSUE EXAM BY PATHOLOGIST: CPT | Performed by: PATHOLOGY

## 2024-04-08 RX ORDER — PANTOPRAZOLE SODIUM 40 MG/1
40 TABLET, DELAYED RELEASE ORAL
Qty: 30 TABLET | Refills: 0 | Status: SHIPPED | OUTPATIENT
Start: 2024-04-09

## 2024-04-08 RX ORDER — SUCRALFATE 1 G/1
1 TABLET ORAL 4 TIMES DAILY
Qty: 120 TABLET | Refills: 0 | Status: SHIPPED | OUTPATIENT
Start: 2024-04-08 | End: 2024-04-10

## 2024-04-08 RX ORDER — SODIUM CHLORIDE 9 MG/ML
125 INJECTION, SOLUTION INTRAVENOUS CONTINUOUS
Status: DISCONTINUED | OUTPATIENT
Start: 2024-04-08 | End: 2024-04-08 | Stop reason: HOSPADM

## 2024-04-08 RX ORDER — LIDOCAINE HCL/PF 100 MG/5ML
SYRINGE (ML) INJECTION AS NEEDED
Status: DISCONTINUED | OUTPATIENT
Start: 2024-04-08 | End: 2024-04-08

## 2024-04-08 RX ORDER — ASPIRIN 81 MG/1
81 TABLET, CHEWABLE ORAL DAILY
Qty: 30 TABLET | Refills: 0 | Status: SHIPPED | OUTPATIENT
Start: 2024-04-09

## 2024-04-08 RX ORDER — VITAMIN B COMPLEX
1 CAPSULE ORAL DAILY
Qty: 30 CAPSULE | Refills: 0 | Status: SHIPPED | OUTPATIENT
Start: 2024-04-08

## 2024-04-08 RX ORDER — LANOLIN ALCOHOL/MO/W.PET/CERES
400 CREAM (GRAM) TOPICAL 2 TIMES DAILY
Qty: 60 TABLET | Refills: 0 | Status: SHIPPED | OUTPATIENT
Start: 2024-04-08

## 2024-04-08 RX ORDER — SUCRALFATE 1 G/1
1 TABLET ORAL
Status: DISCONTINUED | OUTPATIENT
Start: 2024-04-08 | End: 2024-04-08 | Stop reason: HOSPADM

## 2024-04-08 RX ORDER — PROPOFOL 10 MG/ML
INJECTION, EMULSION INTRAVENOUS AS NEEDED
Status: DISCONTINUED | OUTPATIENT
Start: 2024-04-08 | End: 2024-04-08

## 2024-04-08 RX ORDER — LANOLIN ALCOHOL/MO/W.PET/CERES
400 CREAM (GRAM) TOPICAL 2 TIMES DAILY
Status: DISCONTINUED | OUTPATIENT
Start: 2024-04-08 | End: 2024-04-08 | Stop reason: HOSPADM

## 2024-04-08 RX ORDER — MAGNESIUM SULFATE HEPTAHYDRATE 40 MG/ML
2 INJECTION, SOLUTION INTRAVENOUS ONCE
Status: DISCONTINUED | OUTPATIENT
Start: 2024-04-08 | End: 2024-04-08 | Stop reason: HOSPADM

## 2024-04-08 RX ADMIN — SODIUM BICARBONATE 650 MG TABLET 650 MG: at 16:49

## 2024-04-08 RX ADMIN — SODIUM CHLORIDE 125 ML/HR: 0.9 INJECTION, SOLUTION INTRAVENOUS at 12:34

## 2024-04-08 RX ADMIN — PROPOFOL 50 MG: 10 INJECTION, EMULSION INTRAVENOUS at 12:48

## 2024-04-08 RX ADMIN — PROPOFOL 50 MG: 10 INJECTION, EMULSION INTRAVENOUS at 12:54

## 2024-04-08 RX ADMIN — PROPOFOL 50 MG: 10 INJECTION, EMULSION INTRAVENOUS at 12:57

## 2024-04-08 RX ADMIN — PROPOFOL 50 MG: 10 INJECTION, EMULSION INTRAVENOUS at 13:01

## 2024-04-08 RX ADMIN — FAMOTIDINE 20 MG: 20 TABLET ORAL at 09:00

## 2024-04-08 RX ADMIN — THIAMINE HCL TAB 100 MG 100 MG: 100 TAB at 09:00

## 2024-04-08 RX ADMIN — PROPOFOL 50 MG: 10 INJECTION, EMULSION INTRAVENOUS at 13:11

## 2024-04-08 RX ADMIN — ASPIRIN 81 MG CHEWABLE TABLET 81 MG: 81 TABLET CHEWABLE at 09:00

## 2024-04-08 RX ADMIN — PROPOFOL 50 MG: 10 INJECTION, EMULSION INTRAVENOUS at 13:06

## 2024-04-08 RX ADMIN — PANTOPRAZOLE SODIUM 40 MG: 40 TABLET, DELAYED RELEASE ORAL at 06:27

## 2024-04-08 RX ADMIN — APIXABAN 5 MG: 5 TABLET, FILM COATED ORAL at 16:49

## 2024-04-08 RX ADMIN — Medication 40 MG: at 12:46

## 2024-04-08 RX ADMIN — PROPOFOL 150 MG: 10 INJECTION, EMULSION INTRAVENOUS at 12:45

## 2024-04-08 RX ADMIN — LORATADINE 5 MG: 10 TABLET ORAL at 09:00

## 2024-04-08 RX ADMIN — PROPOFOL 50 MG: 10 INJECTION, EMULSION INTRAVENOUS at 12:51

## 2024-04-08 RX ADMIN — ESCITALOPRAM OXALATE 10 MG: 10 TABLET ORAL at 09:00

## 2024-04-08 RX ADMIN — LIDOCAINE HYDROCHLORIDE 100 MG: 20 INJECTION INTRAVENOUS at 12:45

## 2024-04-08 RX ADMIN — Medication 400 MG: at 17:02

## 2024-04-08 RX ADMIN — FAMOTIDINE 20 MG: 20 TABLET ORAL at 17:02

## 2024-04-08 RX ADMIN — ATORVASTATIN CALCIUM 20 MG: 20 TABLET, FILM COATED ORAL at 17:03

## 2024-04-08 RX ADMIN — SODIUM BICARBONATE 650 MG TABLET 650 MG: at 09:00

## 2024-04-08 RX ADMIN — SUCRALFATE 1 G: 1 TABLET ORAL at 16:49

## 2024-04-08 NOTE — ASSESSMENT & PLAN NOTE
Patient has a history of paroxysmal atrial fibrillation status post ablation.   Follows with a cardiologist Dr. Montalvo  Is maintained on diltiazem as an outpatient, and not on anticoagulation  Has loop recorder in place which was interrogated during admission demonstrating episodic atrial fibrillation.  Cardiology recommends anticoagulation: Patient was placed on anticoagulation with heparin infusion while undergoing GI work up.  Per GI: ok for anticoagulation  Patient be discharged on NOAC:  notes she had been on xarelto but developed rash.  Had tolerated eliquis in the past and still has eliquis prescription at home:  will restart eliquis 5mg bid and fu with dr montalvo in office

## 2024-04-08 NOTE — ASSESSMENT & PLAN NOTE
Pt is a 74yo female with PMH significant for atrial fibrillation, hypertension and CKD 3 who presented to the ER for evaluation of abnormal labs, and was diagnosed with acute kidney injury    Has had several weeks of diarrhea prior to admission  Patient was diagnosed with acute kidney injury  Baseline creatinine appears to range 0.9-1.1, admission creatinine of 1.8  Patient was seen by the nephrology team: Acute kidney injury was felt to be most likely prerenal azotemia secondary to GI losses with recent diarrhea, as well as decreased p.o. intake, plus failure of autoregulation in the setting of ARB use  Patient was given IV fluids, initially with bicarbonate, with resolution of the acute kidney injury  Creatinine 1.11 at the time of discharge  Will follow up with her primary outpt nephrologist, Dr. Lujan

## 2024-04-08 NOTE — ASSESSMENT & PLAN NOTE
"Pt was evaluated by GI team due to h/o diarrhea and alarm features, elevated fecal calprotectin, elevated CRP  CT of abdomen/pelvis revealed, \"fluid seen throughout the colon in keeping with history of diarrheal illness. No focal inflammatory changes.\"   Minimal sigmoid diverticulosis  4/8: Colonoscopy revealed 1 polyp in the ascending colon, removed, and scattered diverticulosis in the sigmoid colon.  Medium hemorrhoids  4/8 EGD: Hiatal hernia.  Erythematous and edematous mucosa with erosion in the antrum and forcep biopsies taken  Per GI: Continue Lomotil, PPI 40 mg daily, Carafate, and patient may receive anticoagulation  Will follow-up with the GI team in the office for biopsy results  "

## 2024-04-08 NOTE — ASSESSMENT & PLAN NOTE
On admission had hypotension and kidney injury, which have since improved  Cont diltiazem CD 120mg at bedtime   Cont to hold losartan and spironolactone due borderline blood pressures

## 2024-04-08 NOTE — ANESTHESIA POSTPROCEDURE EVALUATION
Post-Op Assessment Note    CV Status:  Stable  Pain Score: 0    Pain management: adequate       Mental Status:  Alert and awake   Hydration Status:  Euvolemic and stable   PONV Controlled:  None   Airway Patency:  Patent     Post Op Vitals Reviewed: Yes    No anethesia notable event occurred.    Staff: CRNA               BP 97/53 (04/08/24 1318)    Temp      Pulse 61 (04/08/24 1318)   Resp 19 (04/08/24 1318)    SpO2 99 % (04/08/24 1318)

## 2024-04-08 NOTE — DISCHARGE SUMMARY
"[facility]  Discharge- Almaz Hall 1950, 73 y.o. female MRN: [id number]  Unit/Bed#: E4 -01 Encounter: [id number]  Primary Care Provider: Jacob Lorenzana MD   Date and time admitted to hospital: 4/1/2024 11:09 AM          Admission Date: 4/1/2024       Discharge Date: 4/8/2024    Primary Diagnoses  Principal Problem:    JANELLE (acute kidney injury) (HCC)  Active Problems:    Paroxysmal atrial fibrillation (HCC)    Diarrhea    Transient episode of aphasia    Depression with anxiety    Essential hypertension    Hypokalemia    Chronic diastolic CHF (congestive heart failure) (HCC)    Moderate protein-calorie malnutrition (HCC)    GERD (gastroesophageal reflux disease)  Resolved Problems:    * No resolved hospital problems. *      Hospital course by problem:   * JANELLE (acute kidney injury) (HCC)  Assessment & Plan  Pt is a 72yo female with PMH significant for atrial fibrillation, hypertension and CKD 3 who presented to the ER for evaluation of abnormal labs, and was diagnosed with acute kidney injury    Has had several weeks of diarrhea prior to admission  Patient was diagnosed with acute kidney injury  Baseline creatinine appears to range 0.9-1.1, admission creatinine of 1.8  Patient was seen by the nephrology team: Acute kidney injury was felt to be most likely prerenal azotemia secondary to GI losses with recent diarrhea, as well as decreased p.o. intake, plus failure of autoregulation in the setting of ARB use  Patient was given IV fluids, initially with bicarbonate, with resolution of the acute kidney injury  Creatinine 1.11 at the time of discharge  Will follow up with her primary outpt nephrologist, Dr. Lujan    Transient episode of aphasia  Assessment & Plan  Patient presented with transient aphasia: was a stroke alert in the ED.    Seen by neurology and loaded with DAPT initially  CTA brain: \"New area of hypodensity involving the subcortical and deep white matter in " "the anterior right frontal lobe most likely late subacute to chronic infarct.  Superimposed acute infarct in the left hypodense regions are not excluded.  \"\"Stable old right cerebellar lacunar infarct \"  MRA carotids: Motion degraded.  No extracranial carotid stenosis.  MRA head, \"cerebral MRA demonstrates no large branch vessel occlusion or focal hemodynamically significant stenosis.\"  MRI brain \"no acute infarction, edema, or mass effect.  Chronic lacunar infarcts within the right anterior centrum semiovale and right posterior cerebellum.  Mild chronic microangiopathic ischemic changes.  \"  Interrogation of loop recorder demonstrates episodes of atrial fibrillation, so patient was placed on a heparin drip while she underwent additional GI workup  Per neurology: Low suspicion for TIA  Neurology team recommends continuing aspirin, and statin: As her loop noted atrial fibrillation anticoagulation was recommended    Diarrhea  Assessment & Plan  Pt was evaluated by GI team due to h/o diarrhea and alarm features, elevated fecal calprotectin, elevated CRP  CT of abdomen/pelvis revealed, \"fluid seen throughout the colon in keeping with history of diarrheal illness. No focal inflammatory changes.\"   Minimal sigmoid diverticulosis  4/8: Colonoscopy revealed 1 polyp in the ascending colon, removed, and scattered diverticulosis in the sigmoid colon.  Medium hemorrhoids  4/8 EGD: Hiatal hernia.  Erythematous and edematous mucosa with erosion in the antrum and forcep biopsies taken  Per GI: Continue Lomotil, PPI 40 mg daily, Carafate, and patient may receive anticoagulation  Will follow-up with the GI team in the office for biopsy results    Paroxysmal atrial fibrillation (HCC)  Assessment & Plan  Patient has a history of paroxysmal atrial fibrillation status post ablation.   Follows with a cardiologist Dr. Montalvo  Is maintained on diltiazem as an outpatient, and not on anticoagulation  Has loop recorder in place which was " interrogated during admission demonstrating episodic atrial fibrillation.  Cardiology recommends anticoagulation: Patient was placed on anticoagulation with heparin infusion while undergoing GI work up.  Per GI: ok for anticoagulation  Patient be discharged on NOAC:  notes she had been on xarelto but developed rash.  Had tolerated eliquis in the past and still has eliquis prescription at home:  will restart eliquis 5mg bid and fu with dr montalvo in office    GERD (gastroesophageal reflux disease)  Assessment & Plan  Started on famotidine and pantoprazole with much improvement    Moderate protein-calorie malnutrition (HCC)  Assessment & Plan  Malnutrition Findings:   Adult Malnutrition type: Chronic illness  Adult Degree of Malnutrition: Malnutrition of moderate degree  Malnutrition Characteristics: Inadequate energy, Weight loss  360 Statement: Moderate malnutrition r/t chronic diarrhea as evidenced by 10% unplanned wt loss since 10/30/23, consuming < 75% energy intake compared to estimated energy needs > 1 month. Treated with Banatrol bid.  BMI Findings:  Body mass index is 24.69 kg/m².     Chronic diastolic CHF (congestive heart failure) ([facility])  Assessment & Plan  Wt Readings from Last 3 Encounters:   04/08/24 61.2 kg (135 lb)   03/29/24 61.4 kg (135 lb 6.4 oz)   03/29/24 61.7 kg (136 lb)     Patient has a history of chronic diastolic congestive heart failure, followed by [facility] cardiology  Prior to admission on spironolactone.  Has been on hold due to kidney injury and low blood pressure.  4/4: 2D echocardiogram: Left ventricular ejection fraction 70%.  Normal wall motion.  Grade 2 diastolic dysfunction.  Left atrium mildly dilated.  Mild aortic regurgitation.  Moderate-severe aortic stenosis.  Moderate mitral stenosis.  Patient is clinically euvolemic.  Does not require daily diuretics  Continue outpatient follow-up with Dr. Montalvo    Hypokalemia  Assessment & Plan  Hypokalemia has been replaced.  Secondary to  diarrhea.  Replete hypomagnesemia: Be discharged on magnesium supplements  Calcium slightly elevated at discharge: Likely increased secondary to volume depletion from bowel prep  Recommend follow-up with PCP in 1 week with a repeat BMP    Results from last 7 days   Lab Units 04/08/24  0941 04/07/24  0556 04/06/24  0527 04/05/24  0750   POTASSIUM mmol/L 3.9 3.8 3.8 3.8   MAGNESIUM mg/dL 1.8* 1.8* 1.4* 1.8*         Essential hypertension  Assessment & Plan  On admission had hypotension and kidney injury, which have since improved  Cont diltiazem CD 120mg at bedtime   Cont to hold losartan and spironolactone due borderline blood pressures    Depression with anxiety  Assessment & Plan  Continue escitalopram      Service:  [facility] Internal Medicine, Dr[facility].    Consulting Providers   GI: Dr. Gongora and associates  Neurology: Dr. Rucker  Nephrology:     Procedures Performed   4/8 EGD:  3 cm sliding hiatal hernia (type I hiatal hernia) - GE junction 37 cm from the incisors, diaphragmatic impression 40 cm from the incisors, confirmed by retroflexion:  Hill classification: Grade II  The esophagus and duodenum appeared normal.  Patchy edematous and erythematous mucosa with erosion in the antrum  Performed forceps biopsies in the body of the stomach, incisura and antrum to rule out H. pylori  Performed forceps biopsies in the duodenal bulb, 1st part of the duodenum and 2nd part of the duodenum to rule out celiac disease  4/8 colonoscopy:  The terminal ileum appeared normal.  One sessile, adenomatous-appearing Odalis Is polyp measuring 5-9 mm in the ascending colon; performed cold snare with complete en bloc removal and retrieved specimen  The cecum, hepatic flexure, transverse colon, splenic flexure, descending colon, rectosigmoid and rectum appeared normal. Random biopsies taken to rule out microscopic colitis.  Few small, scattered diverticula in the sigmoid colon  External and internal medium  hemorrhoids    Hospital Studies:  4/2 2D echocardiogram    Left Ventricle: Left ventricular cavity size is normal. Wall thickness is mildly increased. There is borderline concentric hypertrophy. The left ventricular ejection fraction is 70% by single dimension measurement. Systolic function is vigorous. Wall motion is normal. Diastolic function is moderately abnormal, consistent with grade II (pseudonormal) relaxation.  Left atrial filling pressure is elevated.    Left Atrium: The atrium is mildly dilated (35-41 mL/m2).    Aortic Valve: The aortic valve is trileaflet. The leaflets are moderately thickened. The leaflets are moderately calcified. There is moderately reduced mobility. There is mild regurgitation. There is moderate to severe stenosis. The aortic valve peak velocity is 3.3 m/s. The aortic valve mean gradient is 25 mmHg. The dimensionless velocity index is 0.38. The aortic valve area is 0.93 cm2. The stroke volume index is 46.90 ml/m2.    Mitral Valve: There is mild regurgitation. There is moderate stenosis.    Tricuspid Valve: There is mild regurgitation. The right ventricular systolic pressure is mildly to moderately elevated. The estimated right ventricular systolic pressure is 46.00 mmHg.    4/1: MRI carotids Motion degraded examination. Focal artifacts are noted along the bilateral carotid bulbs. There is no suspected extracranial carotid stenosis.  The cervical vertebral arteries are patent.     4/1: MRA head Cerebral MRA demonstrates no large branch vessel occlusion or focal hemodynamically significant stenosis. Fetal left PCA supply.     4/1 MRI brain No acute infarction, edema, or mass effect.  Chronic lacunar infarcts within the right anterior centrum semiovale and right posterior cerebellum. mild chronic microangiopathic ischemic changes.     4/1 CT abdomen/pelvis  Fluid seen throughout the colon in keeping with history of diarrheal illness. No focal inflammatory changes.  Minimal sigmoid  diverticulosis.  Limited evaluation of the solid organs without IV contrast. Subcentimeter right hepatic lobe hypodensity too small to characterize.  Limited study without IV contrast.    4/1 CT stroke alert.  1.  New area of hypodensity involving subcortical and deep white matter in the anterior right frontal lobe most likely late subacute to chronic infarct. Superimposed acute infarct in the less hypodense regions are not excluded. There is no mass effect or   hemorrhage.  2.  Stable old right cerebellar lacunar infarct      Results from last 7 days   Lab Units 04/08/24  0941 04/07/24  0556 04/06/24  0527   WBC Thousand/uL 5.84 7.45 9.62   HEMOGLOBIN g/dL 10.6* 10.2* 10.4*   HEMATOCRIT % 32.7* 31.7* 32.4*   PLATELETS Thousands/uL 243 255 243     Results from last 7 days   Lab Units 04/08/24  0941 04/07/24  0556 04/06/24  0527   POTASSIUM mmol/L 3.9 3.8 3.8   CHLORIDE mmol/L 110* 112* 115*   CO2 mmol/L 25 20* 20*   BUN mg/dL 13 17 15   CREATININE mg/dL 1.11 1.16 1.24   CALCIUM mg/dL 10.6* 10.0 9.8       History and Physical Exam:  Please refer to the Admission H&P note    Discharge Condition: Improved  Discharge Disposition: Home/Self Care    Discharge Note and Physical Exam:   Patient notes that she feels well.  She denies any pain or discomfort anywhere else.  She returned from her EGD and colonoscopy.  She notes she tolerated dinner without any difficulty.  Patient notes she feels well and declines additional hospital stay.  She wishes to be discharged home today, as soon as possible.  She notes she is ambulating and denies any dizziness or lightheadedness.  Speaking without difficulty.  Denies any abdominal pain, nausea or vomiting.  No other complaints.  Notes she feels back to her baseline.    Patient notes in the past when she was on Xarelto she developed a rash however it was not definitive whether the rash was from Xarelto or additional medication.  Patient notes she had been on Eliquis and tolerated that  without any difficulty.  She notes she has a current prescription bottle with Eliquis at home.  She is agreeable to restarting Eliquis again.    Vitals:    04/08/24 1337   BP: 120/59   Pulse: 58   Resp: 18   Temp:    SpO2: 100%     General:  Pleasant, non-tachypnic, non-dyspnic.  Conversant.  Sitting up in the chair at the bedside.  Able to speak in complete sentences without a stop for dyspnea.  Heart: Regular rate and rhythm, S1S2 present.  2/6 systolic ejection murmur.  No rub or gallop.  Lungs: Clear to auscultation bilaterally, no wheezing, rhonchi, or crackles.  Good air movement.  No accessory muscle use or respiratory distress.  Abdomen: soft, non-tender, non-distended, NABS.  No rebound or guarding.  No mass or peritoneal signs.  Extremities: no clubbing, cyanosis or edema.  2+ pedal pulses bilaterally.  Neurologic: Wake alert.  Fluent speech.  Interactive.  Smiling and joking.  Moving all 4 extremities.  Skin: warm and dry. No petechiae, purpura or rash.      Discharge Medications   Please see Medical Reconciliation Discharge Form    Discharge Follow Up Appointments:   Jacob Lorenzana MD: 1 week  Dr. Traylor:  2 weeks  Dr. Montalvo, cardiology: 2 weeks  Dr. Lujan: 2 weeks    Discharge  Statement   Total Time Spent today including physical exam, discussion with patient, review of current chart, review of outpatient cardiology office notes, review of EGD/colonoscopy report,, and discharge arrangements/care = 75 minutes.    Family: updated son  Rafael.  Reviewed all test results and fu needs.  Reviewed eliquis.  Reviewed elevated    This note has been constructed using a voice recognition system

## 2024-04-08 NOTE — ASSESSMENT & PLAN NOTE
Hypokalemia has been replaced.  Secondary to diarrhea.  Replete hypomagnesemia    Results from last 7 days   Lab Units 04/08/24  0941 04/07/24  0556 04/06/24  0527 04/05/24  0750   POTASSIUM mmol/L 3.9 3.8 3.8 3.8   MAGNESIUM mg/dL 1.8* 1.8* 1.4* 1.8*

## 2024-04-08 NOTE — ANESTHESIA PREPROCEDURE EVALUATION
"Procedure:  EGD  COLONOSCOPY    Relevant Problems   CARDIO   (+) Aortic valve insufficiency   (+) Essential hypertension   (+) Moderate pulmonary hypertension (HCC)   (+) Non-rheumatic aortic stenosis   (+) Nonrheumatic mitral valve regurgitation   (+) Nonrheumatic mitral valve stenosis   (+) Paroxysmal atrial fibrillation (HCC)   (+) SVT (supraventricular tachycardia)      GI/HEPATIC   (+) GERD (gastroesophageal reflux disease)      /RENAL   (+) JANELLE (acute kidney injury) (HCC)   (+) Hypertensive kidney disease with stage 3 chronic kidney disease (HCC)   (+) Stage 3 chronic kidney disease (HCC)      MUSCULOSKELETAL   (+) Lumbar degenerative disc disease      NEURO/PSYCH   (+) Depression with anxiety   (+) Depression, recurrent (HCC)   (+) Transient episode of aphasia      PULMONARY   (+) Pulmonary emphysema (HCC)      Neurology/Sleep   (+) Globus sensation      Other   (+) Diastolic dysfunction   (+) Moderate protein-calorie malnutrition (HCC)        Physical Exam    Airway    Mallampati score: II  TM Distance: >3 FB  Neck ROM: full     Dental   Comment: \"All crowns\", No notable dental hx     Cardiovascular  Rhythm: regular, Rate: normal, Cardiovascular exam normal    Pulmonary  Pulmonary exam normal Breath sounds clear to auscultation    Other Findings  post-pubertal.      Anesthesia Plan  ASA Score- 3 Emergent    Anesthesia Type- IV sedation with anesthesia with ASA Monitors.         Additional Monitors:     Airway Plan:     Comment: GA prn.       Plan Factors-    Chart reviewed. EKG reviewed.  Existing labs reviewed. Patient summary reviewed.    Patient is not a current smoker. Patient not instructed to abstain from smoking on day of procedure. Patient did not smoke on day of surgery.    There is medical exclusion for perioperative obstructive sleep apnea risk education.        Induction- intravenous.    Postoperative Plan-     Informed Consent- Anesthetic plan and risks discussed with patient.  I personally " reviewed this patient with the CRNA. Discussed and agreed on the Anesthesia Plan with the CRNA..

## 2024-04-08 NOTE — QUICK NOTE
Patient was seen and examined.  Completed preparation.  Plan for EGD/colonoscopy for chronic diarrhea.  Pending morning labs.  Heparin drip held at 6 AM today.  Replete electrolytes if necessary.  Consented for procedure. Further recommendations based on findings.

## 2024-04-08 NOTE — PLAN OF CARE
Problem: Potential for Falls  Goal: Patient will remain free of falls  Description: INTERVENTIONS:  - Educate patient/family on patient safety including physical limitations  - Instruct patient to call for assistance with activity   - Consult OT/PT to assist with strengthening/mobility   - Keep Call bell within reach  - Keep bed low and locked with side rails adjusted as appropriate  - Keep care items and personal belongings within reach  - Initiate and maintain comfort rounds  - Make Fall Risk Sign visible to staff  - Offer Toileting every 1 Hours, in advance of need  - Initiate/Maintain alarm  - Obtain necessary fall risk management equipment: alarms  - Apply yellow socks and bracelet for high fall risk patients  - Consider moving patient to room near nurses station  4/8/2024 1642 by Marquis Ross RN  Outcome: Adequate for Discharge  4/8/2024 0758 by Marquis Ross RN  Outcome: Progressing     Problem: DISCHARGE PLANNING  Goal: Discharge to home or other facility with appropriate resources  Description: INTERVENTIONS:  - Identify barriers to discharge w/patient and caregiver  - Arrange for needed discharge resources and transportation as appropriate  - Identify discharge learning needs (meds, wound care, etc.)  - Arrange for interpretive services to assist at discharge as needed  - Refer to Case Management Department for coordinating discharge planning if the patient needs post-hospital services based on physician/advanced practitioner order or complex needs related to functional status, cognitive ability, or social support system  4/8/2024 1642 by Marquis Ross, KANNAN  Outcome: Adequate for Discharge  4/8/2024 0758 by Marquis Ross RN  Outcome: Progressing     Problem: Knowledge Deficit  Goal: Patient/family/caregiver demonstrates understanding of disease process, treatment plan, medications, and discharge instructions  Description: Complete learning assessment and assess knowledge base.  Interventions:  - Provide  teaching at level of understanding  - Provide teaching via preferred learning methods  4/8/2024 1642 by Marquis Ross RN  Outcome: Adequate for Discharge  4/8/2024 0758 by Marquis Ross RN  Outcome: Progressing     Problem: NEUROSENSORY - ADULT  Goal: Achieves stable or improved neurological status  Description: INTERVENTIONS  - Monitor and report changes in neurological status  - Monitor vital signs such as temperature, blood pressure, glucose, and any other labs ordered   - Initiate measures to prevent increased intracranial pressure  - Monitor for seizure activity and implement precautions if appropriate      4/8/2024 1642 by Marquis Ross RN  Outcome: Adequate for Discharge  4/8/2024 0758 by Marquis Ross RN  Outcome: Progressing     Problem: Nutrition/Hydration-ADULT  Goal: Nutrient/Hydration intake appropriate for improving, restoring or maintaining nutritional needs  Description: Monitor and assess patient's nutrition/hydration status for malnutrition. Collaborate with interdisciplinary team and initiate plan and interventions as ordered.  Monitor patient's weight and dietary intake as ordered or per policy. Utilize nutrition screening tool and intervene as necessary. Determine patient's food preferences and provide high-protein, high-caloric foods as appropriate.     INTERVENTIONS:  - Monitor oral intake, urinary output, labs, and treatment plans  - Assess nutrition and hydration status and recommend course of action  - Evaluate amount of meals eaten  - Assist patient with eating if necessary   - Allow adequate time for meals  - Recommend/ encourage appropriate diets, oral nutritional supplements, and vitamin/mineral supplements  - Order, calculate, and assess calorie counts as needed  - Recommend, monitor, and adjust tube feedings and TPN/PPN based on assessed needs  - Assess need for intravenous fluids  - Provide specific nutrition/hydration education as appropriate  - Include patient/family/caregiver in  decisions related to nutrition  4/8/2024 1642 by Marquis Ross RN  Outcome: Adequate for Discharge  4/8/2024 0758 by Marquis Ross RN  Outcome: Progressing     Problem: Prexisting or High Potential for Compromised Skin Integrity  Goal: Skin integrity is maintained or improved  Description: INTERVENTIONS:  - Identify patients at risk for skin breakdown  - Assess and monitor skin integrity  - Assess and monitor nutrition and hydration status  - Monitor labs   - Assess for incontinence   - Turn and reposition patient  - Assist with mobility/ambulation  - Relieve pressure over bony prominences  - Avoid friction and shearing  - Provide appropriate hygiene as needed including keeping skin clean and dry  - Evaluate need for skin moisturizer/barrier cream  - Collaborate with interdisciplinary team   - Patient/family teaching  - Consider wound care consult   4/8/2024 1642 by Marquis Ross RN  Outcome: Adequate for Discharge  4/8/2024 0758 by Marquis Ross RN  Outcome: Progressing

## 2024-04-08 NOTE — ASSESSMENT & PLAN NOTE
Wt Readings from Last 3 Encounters:   04/08/24 61.2 kg (135 lb)   03/29/24 61.4 kg (135 lb 6.4 oz)   03/29/24 61.7 kg (136 lb)     Patient has a history of chronic diastolic congestive heart failure, followed by [facility] cardiology  Prior to admission on spironolactone.  Has been on hold due to kidney injury and low blood pressure.  4/4: 2D echocardiogram: Left ventricular ejection fraction 70%.  Normal wall motion.  Grade 2 diastolic dysfunction.  Left atrium mildly dilated.  Mild aortic regurgitation.  Moderate-severe aortic stenosis.  Moderate mitral stenosis.  Patient is clinically euvolemic.  Does not require daily diuretics  Continue outpatient follow-up with Dr. Montalvo

## 2024-04-08 NOTE — ASSESSMENT & PLAN NOTE
"Patient presented with transient aphasia: was a stroke alert in the ED.    Seen by neurology and loaded with DAPT initially  CTA brain: \"New area of hypodensity involving the subcortical and deep white matter in the anterior right frontal lobe most likely late subacute to chronic infarct.  Superimposed acute infarct in the left hypodense regions are not excluded.  \"\"Stable old right cerebellar lacunar infarct \"  MRA carotids: Motion degraded.  No extracranial carotid stenosis.  MRA head, \"cerebral MRA demonstrates no large branch vessel occlusion or focal hemodynamically significant stenosis.\"  MRI brain \"no acute infarction, edema, or mass effect.  Chronic lacunar infarcts within the right anterior centrum semiovale and right posterior cerebellum.  Mild chronic microangiopathic ischemic changes.  \"  Interrogation of loop recorder demonstrates episodes of atrial fibrillation, so patient was placed on a heparin drip while she underwent additional GI workup  Per neurology: Low suspicion for TIA  Neurology team recommends continuing aspirin, and statin: As her loop noted atrial fibrillation anticoagulation was recommended  "

## 2024-04-09 ENCOUNTER — TELEPHONE (OUTPATIENT)
Dept: FAMILY MEDICINE CLINIC | Facility: CLINIC | Age: 74
End: 2024-04-09

## 2024-04-09 ENCOUNTER — TRANSITIONAL CARE MANAGEMENT (OUTPATIENT)
Dept: FAMILY MEDICINE CLINIC | Facility: CLINIC | Age: 74
End: 2024-04-09

## 2024-04-09 ENCOUNTER — TELEPHONE (OUTPATIENT)
Dept: CARDIOLOGY CLINIC | Facility: CLINIC | Age: 74
End: 2024-04-09

## 2024-04-09 ENCOUNTER — TELEPHONE (OUTPATIENT)
Dept: GASTROENTEROLOGY | Facility: CLINIC | Age: 74
End: 2024-04-09

## 2024-04-09 LAB — VIT B1 BLD-SCNC: 193.9 NMOL/L (ref 66.5–200)

## 2024-04-09 NOTE — TELEPHONE ENCOUNTER
Pt called back to schedule. I am not able to schedule for appt sooner than June with Dr Lujan or Lashawn FERGUSON. Please call patient to schedule for sooner appt. Thanks

## 2024-04-09 NOTE — TELEPHONE ENCOUNTER
Our mutual patient is scheduled for procedure: COLONOSCOPY/EGD    On: 04/18/24      With: Dr. CONWAY    He/She is taking the following blood thinner:   ELIQUIS      Can this be stopped ___2___ days prior to the procedure?      Physician Approving clearance: ________________________

## 2024-04-09 NOTE — TELEPHONE ENCOUNTER
Mich Coppolak3 hours ago (9:17 AM)     MB  Our mutual patient is scheduled for procedure: COLONOSCOPY/EGD     On: 04/18/24       With: Dr. CONWAY     He/She is taking the following blood thinner:       ELIQUIS                           Can this be stopped ___2___ days prior to the procedure?

## 2024-04-10 ENCOUNTER — TELEPHONE (OUTPATIENT)
Dept: NEPHROLOGY | Facility: CLINIC | Age: 74
End: 2024-04-10

## 2024-04-10 ENCOUNTER — TELEPHONE (OUTPATIENT)
Age: 74
End: 2024-04-10

## 2024-04-10 ENCOUNTER — OFFICE VISIT (OUTPATIENT)
Dept: FAMILY MEDICINE CLINIC | Facility: CLINIC | Age: 74
End: 2024-04-10
Payer: MEDICARE

## 2024-04-10 VITALS
TEMPERATURE: 96.9 F | WEIGHT: 137.4 LBS | HEART RATE: 65 BPM | DIASTOLIC BLOOD PRESSURE: 68 MMHG | SYSTOLIC BLOOD PRESSURE: 119 MMHG | HEIGHT: 62 IN | BODY MASS INDEX: 25.28 KG/M2 | OXYGEN SATURATION: 96 %

## 2024-04-10 DIAGNOSIS — R19.7 DIARRHEA, UNSPECIFIED TYPE: ICD-10-CM

## 2024-04-10 DIAGNOSIS — D64.9 ANEMIA, UNSPECIFIED TYPE: ICD-10-CM

## 2024-04-10 DIAGNOSIS — I95.9 HYPOTENSION, UNSPECIFIED HYPOTENSION TYPE: ICD-10-CM

## 2024-04-10 DIAGNOSIS — N17.9 AKI (ACUTE KIDNEY INJURY) (HCC): Primary | ICD-10-CM

## 2024-04-10 DIAGNOSIS — I48.0 PAROXYSMAL ATRIAL FIBRILLATION (HCC): ICD-10-CM

## 2024-04-10 PROCEDURE — 99495 TRANSJ CARE MGMT MOD F2F 14D: CPT | Performed by: INTERNAL MEDICINE

## 2024-04-10 NOTE — ASSESSMENT & PLAN NOTE
Continue current dose of Eliquis and Cardizem.  Blood pressures are on the lower side but overall acceptable.

## 2024-04-10 NOTE — ASSESSMENT & PLAN NOTE
GFR currently close to baseline.  Follow-up with nephrology, avoid NSAIDs.  Continue oral hydration.  Recheck BMP in 7 days.

## 2024-04-10 NOTE — TELEPHONE ENCOUNTER
Pt called back. States she received message from mary Chavez from Gastro[facility]ology regarding EGD/Colonoscopy 4/18/2024 and that they sent prep and cardiac med hold request. Per chart review, Adrienne from [facility] sent information yesterday.    Pt had EGD/Colonoscopy while inpatient. Per discharge summary pt to follow up with GI for results. Pathology report pending.     Please verify ok to cancel procedures. Does pt need in office appt? If so, please have office reach out to schedule.

## 2024-04-10 NOTE — TELEPHONE ENCOUNTER
Placed call to pt - no answer, trying to schedule hospital follow up for 4 weeks with Dr. Lujan in the [facility]

## 2024-04-10 NOTE — PROGRESS NOTES
Assessment/Plan:    Paroxysmal atrial fibrillation (HCC)  Continue current dose of Eliquis and Cardizem.  Blood pressures are on the lower side but overall acceptable.    JANELLE (acute kidney injury) (HCC)  GFR currently close to baseline.  Follow-up with nephrology, avoid NSAIDs.  Continue oral hydration.  Recheck BMP in 7 days.    Diarrhea  She had colonoscopy done, biopsy results are pending.  She has follow-up with GI service.  Continue Lomotil current dose which seems to be controlling her symptoms.    Anemia  Likely due to hemodilution due to IV fluids.  Recheck CBC in 7 days.       Diagnoses and all orders for this visit:    JANELLE (acute kidney injury) (HCC)    Paroxysmal atrial fibrillation (HCC)  -     Basic metabolic panel; Future  -     Magnesium; Future    Anemia, unspecified type  -     CBC and differential; Future    Diarrhea, unspecified type    Hypotension, unspecified hypotension type          Subjective:      Patient ID: Almaz Hall is a 73 y.o. female.    Came today for TCM visit.  She was admitted to the hospital due to JANELLE due to diarrhea.  She also developed episode of TIA, MRI did not reveal any new changes and showed microangiopathic changes.  Her magnesium was running low and she was started on supplements.  Her JANELLE was corrected to her baseline GFR.  Colonoscopy was done, biopsy results are pending.  She has follow-up with nephrology, gastroenterology.  She was running blood pressures on the lower side and her spironolactone and losartan was held.        The following portions of the patient's history were reviewed and updated as appropriate: allergies, current medications, past family history, past medical history, past social history, past surgical history, and problem list.    Review of Systems   Constitutional:  Positive for unexpected weight change. Negative for chills and fatigue.   Gastrointestinal:  Positive for diarrhea. Negative for abdominal distention, abdominal pain, anal bleeding,  "blood in stool, constipation, nausea, rectal pain and vomiting.         Objective:      /68 (BP Location: Left arm, Patient Position: Sitting, Cuff Size: Standard)   Pulse 65   Temp (!) 96.9 °F (36.1 °C) (Tympanic)   Ht 5' 2\" (1.575 m)   Wt 62.3 kg (137 lb 6.4 oz)   LMP  (LMP Unknown)   SpO2 96%   BMI 25.13 kg/m²     Allergies   Allergen Reactions    Bystolic [Nebivolol Hcl] Bradycardia     Hear rate to 20's    Levofloxacin Hives    Vasotec [Enalapril] Hives    Amlodipine Rash    Ancef [Cefazolin] GI Intolerance     Cdiff    Cephalosporins Other (See Comments)     c.diff    Fruit Extracts      Annotation - [id number]: peaches and strawberries    Morphine GI Intolerance     Extreme vomiting      Other      FRUIT    Penicillins Hives    Tetracycline Edema    Xarelto [Rivaroxaban] Rash          Current Outpatient Medications:     albuterol (ProAir HFA) 90 mcg/act inhaler, Inhale 2 puffs every 6 (six) hours as needed for wheezing or shortness of breath, Disp: 8.5 g, Rfl: 0    apixaban (Eliquis) 5 mg, Take 1 tablet (5 mg total) by mouth 2 (two) times a day, Disp: 60 tablet, Rfl: 0    aspirin 81 mg chewable tablet, Chew 1 tablet (81 mg total) daily, Disp: 30 tablet, Rfl: 0    atorvastatin (LIPITOR) 20 mg tablet, TAKE 1 TABLET BY MOUTH EVERY DAY, Disp: 90 tablet, Rfl: 3    b complex vitamins capsule, Take 1 capsule by mouth daily, Disp: 30 capsule, Rfl: 0    cetirizine (ZyrTEC) 10 mg tablet, Take 10 mg by mouth as needed , Disp: , Rfl:     diltiazem (TIAZAC) 120 MG 24 hr capsule, TAKE 1 CAPSULE BY MOUTH EVERY DAY, Disp: 90 capsule, Rfl: 3    diphenoxylate-atropine (LOMOTIL) 2.5-0.025 mg per tablet, Take 1 tablet by mouth 4 (four) times a day as needed for diarrhea, Disp: 60 tablet, Rfl: 0    escitalopram (LEXAPRO) 10 mg tablet, TAKE 1 TABLET BY MOUTH EVERY DAY, Disp: 30 tablet, Rfl: 11    levalbuterol (XOPENEX HFA) 45 mcg/act inhaler, Inhale 1-2 puffs every 4 (four) hours as needed for wheezing, Disp: 15 g, " Rfl: 0    magnesium Oxide (MAG-OX) 400 mg TABS, Take 1 tablet (400 mg total) by mouth 2 (two) times a day, Disp: 60 tablet, Rfl: 0    mupirocin (BACTROBAN) 2 % ointment, Apply topically daily, Disp: 30 g, Rfl: 0    pantoprazole (PROTONIX) 40 mg tablet, Take 1 tablet (40 mg total) by mouth daily in the early morning, Disp: 30 tablet, Rfl: 0    polyethylene glycol (MiraLax) 17 GM/SCOOP powder, Take 238 g by mouth once for 1 dose Take 238 g my mouth. Use as directed, Disp: 238 g, Rfl: 0     There are no Patient Instructions on file for this visit.        Physical Exam  Constitutional:       General: She is not in acute distress.     Appearance: She is not ill-appearing or toxic-appearing.   Cardiovascular:      Heart sounds: Murmur heard.      No gallop.   Pulmonary:      Effort: No respiratory distress.      Breath sounds: No wheezing or rales.   Abdominal:      General: There is no distension.      Palpations: There is no mass.      Tenderness: There is abdominal tenderness. There is no guarding or rebound.      Hernia: No hernia is present.      Comments: Positive Prince sign.

## 2024-04-10 NOTE — TELEPHONE ENCOUNTER
Patients GI provider:  LIANG Mera     Number to return call: [phone]    Reason for call: Pt called stating pt was returning call.    Scheduled procedure/appointment date if applicable:  4/18/2024

## 2024-04-10 NOTE — ASSESSMENT & PLAN NOTE
She had colonoscopy done, biopsy results are pending.  She has follow-up with GI service.  Continue Lomotil current dose which seems to be controlling her symptoms.

## 2024-04-11 ENCOUNTER — TELEPHONE (OUTPATIENT)
Dept: GASTROENTEROLOGY | Facility: MEDICAL CENTER | Age: 74
End: 2024-04-11

## 2024-04-11 PROCEDURE — 88305 TISSUE EXAM BY PATHOLOGIST: CPT | Performed by: PATHOLOGY

## 2024-04-11 NOTE — TELEPHONE ENCOUNTER
Called patient to schedule an earlier appt   Was able to schedule to with Lavonne Jacobs at [facility] 4/18 9:30am

## 2024-04-11 NOTE — TELEPHONE ENCOUNTER
----- Message from Suresh Yan DO sent at 4/8/2024  1:30 PM EDT -----  Regarding: Follow up  Please schedule sooner appointment for patient for management of diarrhea.

## 2024-04-15 ENCOUNTER — LAB (OUTPATIENT)
Dept: LAB | Facility: IMAGING CENTER | Age: 74
End: 2024-04-15
Payer: MEDICARE

## 2024-04-15 DIAGNOSIS — I48.0 PAROXYSMAL ATRIAL FIBRILLATION (HCC): ICD-10-CM

## 2024-04-15 DIAGNOSIS — D64.9 ANEMIA, UNSPECIFIED TYPE: ICD-10-CM

## 2024-04-15 DIAGNOSIS — E83.52 HYPERCALCEMIA: ICD-10-CM

## 2024-04-15 DIAGNOSIS — N17.9 AKI (ACUTE KIDNEY INJURY) (HCC): ICD-10-CM

## 2024-04-15 DIAGNOSIS — N18.31 STAGE 3A CHRONIC KIDNEY DISEASE (HCC): ICD-10-CM

## 2024-04-15 LAB
ALBUMIN SERPL BCP-MCNC: 4 G/DL (ref 3.5–5)
ANION GAP SERPL CALCULATED.3IONS-SCNC: 13 MMOL/L (ref 4–13)
BASOPHILS # BLD AUTO: 0.07 THOUSANDS/ÂΜL (ref 0–0.1)
BASOPHILS NFR BLD AUTO: 1 % (ref 0–1)
BUN SERPL-MCNC: 20 MG/DL (ref 5–25)
CALCIUM SERPL-MCNC: 9.8 MG/DL (ref 8.4–10.2)
CHLORIDE SERPL-SCNC: 106 MMOL/L (ref 96–108)
CO2 SERPL-SCNC: 23 MMOL/L (ref 21–32)
CREAT SERPL-MCNC: 1.28 MG/DL (ref 0.6–1.3)
EOSINOPHIL # BLD AUTO: 0.1 THOUSAND/ÂΜL (ref 0–0.61)
EOSINOPHIL NFR BLD AUTO: 1 % (ref 0–6)
ERYTHROCYTE [DISTWIDTH] IN BLOOD BY AUTOMATED COUNT: 13.2 % (ref 11.6–15.1)
GFR SERPL CREATININE-BSD FRML MDRD: 42 ML/MIN/1.73SQ M
GLUCOSE P FAST SERPL-MCNC: 89 MG/DL (ref 65–99)
HCT VFR BLD AUTO: 37.7 % (ref 34.8–46.1)
HGB BLD-MCNC: 11.6 G/DL (ref 11.5–15.4)
IMM GRANULOCYTES # BLD AUTO: 0.02 THOUSAND/UL (ref 0–0.2)
IMM GRANULOCYTES NFR BLD AUTO: 0 % (ref 0–2)
LYMPHOCYTES # BLD AUTO: 1.95 THOUSANDS/ÂΜL (ref 0.6–4.47)
LYMPHOCYTES NFR BLD AUTO: 28 % (ref 14–44)
MAGNESIUM SERPL-MCNC: 1.5 MG/DL (ref 1.9–2.7)
MCH RBC QN AUTO: 30.7 PG (ref 26.8–34.3)
MCHC RBC AUTO-ENTMCNC: 30.8 G/DL (ref 31.4–37.4)
MCV RBC AUTO: 100 FL (ref 82–98)
MONOCYTES # BLD AUTO: 0.77 THOUSAND/ÂΜL (ref 0.17–1.22)
MONOCYTES NFR BLD AUTO: 11 % (ref 4–12)
NEUTROPHILS # BLD AUTO: 4.19 THOUSANDS/ÂΜL (ref 1.85–7.62)
NEUTS SEG NFR BLD AUTO: 59 % (ref 43–75)
NRBC BLD AUTO-RTO: 0 /100 WBCS
PHOSPHATE SERPL-MCNC: 3.9 MG/DL (ref 2.3–4.1)
PLATELET # BLD AUTO: 338 THOUSANDS/UL (ref 149–390)
PMV BLD AUTO: 10.5 FL (ref 8.9–12.7)
POTASSIUM SERPL-SCNC: 3.4 MMOL/L (ref 3.5–5.3)
RBC # BLD AUTO: 3.78 MILLION/UL (ref 3.81–5.12)
SODIUM SERPL-SCNC: 142 MMOL/L (ref 135–147)
WBC # BLD AUTO: 7.1 THOUSAND/UL (ref 4.31–10.16)

## 2024-04-15 PROCEDURE — 80069 RENAL FUNCTION PANEL: CPT

## 2024-04-15 PROCEDURE — 83735 ASSAY OF MAGNESIUM: CPT

## 2024-04-15 PROCEDURE — 85025 COMPLETE CBC W/AUTO DIFF WBC: CPT

## 2024-04-15 PROCEDURE — 36415 COLL VENOUS BLD VENIPUNCTURE: CPT

## 2024-04-15 NOTE — PROGRESS NOTES
Cardiology Follow Up    Almaz Hall  1950  [id number]  [facility]  [address]  [place] [id number] [phone]    1. PAF (paroxysmal atrial fibrillation) (HCC)        2. TIA (transient ischemic attack)        3. Aortic valve stenosis, etiology of cardiac valve disease unspecified        4. Diarrhea, unspecified type        5. Hypomagnesemia        6. Hypokalemia  potassium chloride (Klor-Con M20) 20 mEq tablet      7. Hypertension, unspecified type        8. Stage 3b chronic kidney disease (HCC)            Interval History:   Ms Almaz Hall was admitted to [facility] on 4/01 - 4/08/24 with JANELLE and transient aphasia.  She presented to the ED with transient aphasia stroke alert called neurology loaded with DAPT.  CTA new area of hypodensity involving the subcortical and deep white matter in the anterior right frontal lobe most likely late subacute due to chronic infarct.  Superimposed acute infarct in the left hypodense regions not excluded.  Stable old right cerebral lacunar infarct.  She was not found to have any carotid stenosis.  MRI showed large branch vessel occlusion of the focal hemodynamically significant stenosis.  Interrogation of loop recorder showed episodes of atrial fibrillation and she was placed on IV heparin.  IV heparin transition to Eliquis 5 mg twice daily.  Aphasia resolved.  on Admission admitted to a long hx of diarrhea with 10 Bowel movement today.  On Presentation JANELLE creatinine 1.8 felt to be due to diarrhea.  GI consulted.  She was found to have acute kidney injury due to prerenal azotemia secondary to GI losses.  With IV fluids with resolution of JANELLE creatinine returned to 1.11.  Discharged 135 pounds.  Please see discharge summary for completeness.       Almaz presents our office for recent hospitalization follow-up visit.  He continues with diarrhea 9 times a day.  She will  follow-up with GI in the near future.  4/15/24 sodium 142 potassium 3.4 magnesium 1.5.  Almaz denies chest pain, palpitations,  lightheadedness, dizziness, dyspnea with minimal or moderate exertion.  He is attempting to treat daily and is drinking IV liquid.        Medical History   Primary Cardiologist Dr Montalvo   AVRT ablation via  Left lateral pathway and right SVC atrial tachycardia ablation 12/2018  Atrial Fibrillation 9/02/22 sp cryoablation with pulmonary vein isolation posterior wall isolation.   Loop in situ   HFpEF LVEF 55%   Hypertension  Hyperlipidemia  Mod MS   Mild AI and mod AS  Mild TR  PHTN  COPD  CKD IIIb baseline creat  1.1 - 1.4 followed by Dr Lujan         7/19/22 TTE:   Left ventricle cavity size normal LVEF 60% wall motion is normal.  Right ventricular cavity size mildly dilated systolic function normal.  Left atrium mildly dilated.  Right atrium normal in size.  Aortic valve not well visualized mild reduced mobility mild regurgitation mild to moderate stenosis.  Mitral with moderately reduced mobility mild MR moderate MS.  Mod TR,  Pulmonic valve structure normal no evidence of regurgitation no evidence of stenosis.     Unable to tolerate high dose of Cardizem CD due to LE edema     4/05/24 EGD 3 cm type I hiatal hernia edematous and erythematous mucosa with erosion in the antrum.  Biopsy taken rule out H pylori and celiac disease     4/05/24 Colonoscopy 1 polyp ascending colon there was complete en bloc removal and retrieval specimen.,  Random biopsies taken.  6 few small scattered diverticula in the sigmoid colon.  External and internal medium hemorrhoids     Allergies: 12 Xarelto causing rash  Patient Active Problem List   Diagnosis    Depression with anxiety    Dyslipidemia    Essential hypertension    Irritable bowel syndrome with diarrhea    Lumbar degenerative disc disease    Vitamin D deficiency    Greater trochanteric bursitis of right hip    Pulmonary emphysema (HCC)    JANELLE (acute  kidney injury) (HCC)    SVT (supraventricular tachycardia)    Hypercalcemia    Diastolic dysfunction    Aortic valve insufficiency    Achilles tendinitis of left lower extremity    Posterior tibial tendinitis, left    Stage 3 chronic kidney disease (HCC)    Hypertensive kidney disease with stage 3 chronic kidney disease ([facility])    Depression, recurrent (HCC)    Non-rheumatic aortic stenosis    Nonrheumatic mitral valve regurgitation    Paroxysmal atrial fibrillation ([facility])    Moderate pulmonary hypertension (HCC)    Rectocele    Nonrheumatic mitral valve stenosis    Chronic diastolic CHF (congestive heart failure) ([facility])    Syncope    Globus sensation    PMB (postmenopausal bleeding)    Preop examination    S/P dilation and curettage    Diarrhea    RUQ pain    Elevated fecal calprotectin    Ulcer of left foot (HCC)    Moderate protein-calorie malnutrition (HCC)    GERD (gastroesophageal reflux disease)    Anemia     Past Medical History:   Diagnosis Date    Asthma     Atrial fibrillation ([facility])     with ablation    Bursitis of hip     Last Assessed:2017    COPD (chronic obstructive pulmonary disease) ([facility]) 5 years ago    Coronary artery disease     Herniated lumbar intervertebral disc     Hyperlipidemia     Hypertension 2 years    Pituitary adenoma ([facility])     Last Assessed:2015 ; Previously on bromocriptine    Spinal stenosis     SVT (supraventricular tachycardia)     with ablation    Systolic murmur of aorta      Social History     Socioeconomic History    Marital status: /Civil Union     Spouse name: Not on file    Number of children: Not on file    Years of education: Not on file    Highest education level: Not on file   Occupational History    Not on file   Tobacco Use    Smoking status: Former     Current packs/day: 0.00     Average packs/day: 1 pack/day for 30.0 years (30.0 ttl pk-yrs)     Types: Cigarettes     Start date: 1988     Quit date: 2018     Years since quittin.3     Smokeless tobacco: Never   Vaping Use    Vaping status: Never Used   Substance and Sexual Activity    Alcohol use: Not Currently     Alcohol/week: 1.0 standard drink of alcohol     Types: 1 Shots of liquor per week     Comment: social    Drug use: No    Sexual activity: Not Currently     Birth control/protection: Post-menopausal   Other Topics Concern    Not on file   Social History Narrative    Not on file     Social Determinants of Health     Financial Resource Strain: Low Risk  (9/19/2023)    Overall Financial Resource Strain (CARDIA)     Difficulty of Paying Living Expenses: Not hard at all   Food Insecurity: Unknown (4/2/2024)    Hunger Vital Sign     Worried About Running Out of Food in the Last Year: Not on file     Ran Out of Food in the Last Year: Never true   Transportation Needs: No Transportation Needs (4/2/2024)    PRAPARE - Transportation     Lack of Transportation (Medical): No     Lack of Transportation (Non-Medical): No   Physical Activity: Not on file   Stress: Not on file   Social Connections: Not on file   Intimate Partner Violence: Not on file   Housing Stability: Low Risk  (4/2/2024)    Housing Stability Vital Sign     Unable to Pay for Housing in the Last Year: No     Number of Places Lived in the Last Year: 1     Unstable Housing in the Last Year: No      Family History   Problem Relation Age of Onset    Colon cancer Mother     Coronary artery disease Mother     Colonic polyp Mother     Liver cancer Mother     Multiple sclerosis Father     Hypertension Brother     Hypertension Other     Breast cancer Neg Hx     Cancer Neg Hx      Past Surgical History:   Procedure Laterality Date    BREAST SURGERY      Incisional breast biopsy    CARDIAC ELECTROPHYSIOLOGY PROCEDURE N/A 09/02/2022    Procedure: Cardiac eps/afib ablation;  Surgeon: Fadi Goss MD;  Location: [facility];  Service: Cardiology    CARDIAC ELECTROPHYSIOLOGY PROCEDURE N/A 12/07/2022    Procedure: Cardiac loop recorder  implant;  Surgeon: Bao Kovacs MD;  Location:  CARDI[facility] CATH LAB;  Service: Cardiology    CARDIAC ELECTROPHYSIOLOGY STUDY AND ABLATION      CARDIAC LOOP RECORDER      CARPAL TUNNEL RELEASE Left     CHOLECYSTECTOMY      OTHER SURGICAL HISTORY      EXCISION OF PAROTID TUMOR/GLAND    PAROTIDECTOMY      VT COLONOSCOPY FLX DX W/COLLJ SPEC WHEN PFRMD N/A 06/06/2017    Procedure: COLONOSCOPY;  Surgeon: Elizabeth Leyva DO;  Location: [facility] GI LAB;  Service: Gastroenterology    VT HYSTEROSCOPY BX ENDOMETRIUM&/POLYPC W/WO D&C N/A 11/22/2023    Procedure: (D&C) W/ HYSTEROSCOPY;  Surgeon: Harini Kinney MD;  Location: [facility] OR;  Service: Gynecology    ROTATOR CUFF REPAIR      SALPINGOOPHORECTOMY Right        Current Outpatient Medications:     albuterol (ProAir HFA) 90 mcg/act inhaler, Inhale 2 puffs every 6 (six) hours as needed for wheezing or shortness of breath, Disp: 8.5 g, Rfl: 0    apixaban (Eliquis) 5 mg, Take 1 tablet (5 mg total) by mouth 2 (two) times a day, Disp: 60 tablet, Rfl: 0    aspirin 81 mg chewable tablet, Chew 1 tablet (81 mg total) daily, Disp: 30 tablet, Rfl: 0    atorvastatin (LIPITOR) 20 mg tablet, TAKE 1 TABLET BY MOUTH EVERY DAY, Disp: 90 tablet, Rfl: 3    b complex vitamins capsule, Take 1 capsule by mouth daily, Disp: 30 capsule, Rfl: 0    cetirizine (ZyrTEC) 10 mg tablet, Take 10 mg by mouth as needed , Disp: , Rfl:     diltiazem (TIAZAC) 120 MG 24 hr capsule, TAKE 1 CAPSULE BY MOUTH EVERY DAY, Disp: 90 capsule, Rfl: 3    diphenoxylate-atropine (LOMOTIL) 2.5-0.025 mg per tablet, Take 1 tablet by mouth 4 (four) times a day as needed for diarrhea, Disp: 60 tablet, Rfl: 0    escitalopram (LEXAPRO) 10 mg tablet, TAKE 1 TABLET BY MOUTH EVERY DAY, Disp: 30 tablet, Rfl: 11    levalbuterol (XOPENEX HFA) 45 mcg/act inhaler, Inhale 1-2 puffs every 4 (four) hours as needed for wheezing, Disp: 15 g, Rfl: 0    magnesium Oxide (MAG-OX) 400 mg TABS, Take 1 tablet (400 mg total) by mouth 2  (two) times a day, Disp: 60 tablet, Rfl: 0    mupirocin (BACTROBAN) 2 % ointment, Apply topically daily, Disp: 30 g, Rfl: 0    pantoprazole (PROTONIX) 40 mg tablet, Take 1 tablet (40 mg total) by mouth daily in the early morning, Disp: 30 tablet, Rfl: 0    polyethylene glycol (MiraLax) 17 GM/SCOOP powder, Take 238 g by mouth once for 1 dose Take 238 g my mouth. Use as directed, Disp: 238 g, Rfl: 0  Allergies   Allergen Reactions    Bystolic [Nebivolol Hcl] Bradycardia     Hear rate to 20's    Levofloxacin Hives    Vasotec [Enalapril] Hives    Amlodipine Rash    Ancef [Cefazolin] GI Intolerance     Cdiff    Cephalosporins Other (See Comments)     c.diff    Fruit Extracts      Annotation - [id number]: peaches and strawberries    Morphine GI Intolerance     Extreme vomiting      Other      FRUIT    Penicillins Hives    Tetracycline Edema    Xarelto [Rivaroxaban] Rash       Labs:  No results displayed because visit has over 200 results.      Appointment on 03/29/2024   Component Date Value    Magnesium 03/29/2024 1.7 (L)     WBC 03/29/2024 13.71 (H)     RBC 03/29/2024 4.29     Hemoglobin 03/29/2024 12.9     Hematocrit 03/29/2024 41.1     MCV 03/29/2024 96     MCH 03/29/2024 30.1     MCHC 03/29/2024 31.4     RDW 03/29/2024 12.7     MPV 03/29/2024 10.3     Platelets 03/29/2024 409 (H)     nRBC 03/29/2024 0     Segmented % 03/29/2024 75     Immature Grans % 03/29/2024 1     Lymphocytes % 03/29/2024 18     Monocytes % 03/29/2024 6     Eosinophils Relative 03/29/2024 0     Basophils Relative 03/29/2024 0     Absolute Neutrophils 03/29/2024 10.20 (H)     Absolute Immature Grans 03/29/2024 0.07     Absolute Lymphocytes 03/29/2024 2.49     Absolute Monocytes 03/29/2024 0.85     Eosinophils Absolute 03/29/2024 0.04     Basophils Absolute 03/29/2024 0.06     Sodium 03/29/2024 137     Potassium 03/29/2024 3.0 (L)     Chloride 03/29/2024 104     CO2 03/29/2024 20 (L)     ANION GAP 03/29/2024 13     BUN 03/29/2024 60 (H)      Creatinine 03/29/2024 1.89 (H)     Glucose, Fasting 03/29/2024 93     Calcium 03/29/2024 9.9     AST 03/29/2024 17     ALT 03/29/2024 14     Alkaline Phosphatase 03/29/2024 80     Total Protein 03/29/2024 7.3     Albumin 03/29/2024 4.3     Total Bilirubin 03/29/2024 0.41     eGFR 03/29/2024 26    Appointment on 03/20/2024   Component Date Value    Salmonella sp PCR 03/20/2024 Negative     Shigella sp/Enteroinvasi* 03/20/2024 Negative     Campylobacter sp (jejuni* 03/20/2024 Negative     Shiga toxin 1/Shiga toxi* 03/20/2024 Negative     Calprotectin 03/20/2024 444 (H)     C.difficile toxin by PCR 03/20/2024 Negative     Pancreatic Elastase-1 03/20/2024 206     Giardia Ag, Stl 03/20/2024 Negative    Appointment on 03/19/2024   Component Date Value    Color, UA 03/19/2024 Yellow     Clarity, UA 03/19/2024 Clear     Specific Gravity, UA 03/19/2024 1.022     pH, UA 03/19/2024 5.5     Leukocytes, UA 03/19/2024 Negative     Nitrite, UA 03/19/2024 Negative     Protein, UA 03/19/2024 50 (1+) (A)     Glucose, UA 03/19/2024 Negative     Ketones, UA 03/19/2024 Trace (A)     Urobilinogen, UA 03/19/2024 2.0 (A)     Bilirubin, UA 03/19/2024 Negative     Occult Blood, UA 03/19/2024 Negative     IgA 03/19/2024 101     Gliadin IgA 03/19/2024 2     Gliadin IgG 03/19/2024 1     Tissue Transglut Ab IGG 03/19/2024 <2     TISSUE TRANSGLUTAMINASE * 03/19/2024 <2     Endomysial IgA 03/19/2024 Negative     RBC, UA 03/19/2024 None Seen     WBC, UA 03/19/2024 1-2     Epithelial Cells 03/19/2024 Occasional     Bacteria, UA 03/19/2024 None Seen     Hyaline Casts, UA 03/19/2024 Innumerable (A)    Appointment on 03/13/2024   Component Date Value    Sodium 03/13/2024 139     Potassium 03/13/2024 3.7     Chloride 03/13/2024 109 (H)     CO2 03/13/2024 23     ANION GAP 03/13/2024 7     BUN 03/13/2024 23     Creatinine 03/13/2024 1.14     Glucose, Fasting 03/13/2024 73     Calcium 03/13/2024 10.0     AST 03/13/2024 15     ALT 03/13/2024 11     Alkaline  Phosphatase 03/13/2024 66     Total Protein 03/13/2024 6.9     Albumin 03/13/2024 4.4     Total Bilirubin 03/13/2024 0.68     eGFR 03/13/2024 48     TSH 3RD GENERATON 03/13/2024 2.517     WBC 03/13/2024 7.96     RBC 03/13/2024 4.16     Hemoglobin 03/13/2024 12.8     Hematocrit 03/13/2024 42.7     MCV 03/13/2024 103 (H)     MCH 03/13/2024 30.8     MCHC 03/13/2024 30.0 (L)     RDW 03/13/2024 12.7     MPV 03/13/2024 10.9     Platelets 03/13/2024 260     nRBC 03/13/2024 0     Segmented % 03/13/2024 63     Immature Grans % 03/13/2024 0     Lymphocytes % 03/13/2024 27     Monocytes % 03/13/2024 8     Eosinophils Relative 03/13/2024 1     Basophils Relative 03/13/2024 1     Absolute Neutrophils 03/13/2024 4.97     Absolute Immature Grans 03/13/2024 0.03     Absolute Lymphocytes 03/13/2024 2.13     Absolute Monocytes 03/13/2024 0.64     Eosinophils Absolute 03/13/2024 0.11     Basophils Absolute 03/13/2024 0.08     Cholesterol 03/13/2024 193     Triglycerides 03/13/2024 199 (H)     HDL, Direct 03/13/2024 53     LDL Calculated 03/13/2024 100     Non-HDL-Chol (CHOL-HDL) 03/13/2024 140     Vit D, 25-Hydroxy 03/13/2024 31.7     Hemoglobin A1C 03/13/2024 5.9 (H)     EAG 03/13/2024 123      Imaging: Colonoscopy    Addendum Date: 4/11/2024 Addendum:   [facility] Endoscopy [address] [facility] [address] [phone] DATE OF SERVICE: 4/08/24 PHYSICIAN(S): Attending: Will Traylor MD Fellow: Suresh Yan DO INDICATION: Diarrhea POST-OP DIAGNOSIS: See the impression below. HISTORY: Prior colonoscopy: 6 years ago. BOWEL PREPARATION: Golytely/Colyte/Trilyte PREPROCEDURE: Informed consent was obtained for the procedure, including sedation. Risks including but not limited to bleeding, infection, perforation, adverse drug reaction and aspiration were explained in detail. Also explained about less than 100% sensitivity with the exam and other alternatives. The patient was placed in the left lateral decubitus  position. Procedure: Colonoscopy DETAILS OF PROCEDURE: Patient was taken to the procedure room where a time out was performed to confirm correct patient and correct procedure. The patient underwent monitored anesthesia care, which was administered by an anesthesia professional. The patient's blood pressure, heart rate, level of consciousness, oxygen, respirations, ECG and ETCO2 were monitored throughout the procedure. A digital rectal exam was performed. A perianal exam was performed. The scope was introduced through the anus and advanced to the terminal ileum. Retroflexion was performed in the rectum. The quality of bowel preparation was evaluated using the [place] Bowel Preparation Scale with scores of: right colon = 2, transverse colon = 2, left colon = 2. The total BBPS score was 6. Bowel prep was adequate. The patient experienced no blood loss. The procedure was not difficult. The patient tolerated the procedure well. There were no apparent adverse events. ANESTHESIA INFORMATION: ASA: III Anesthesia Type: Anesthesia type not filed in the log. MEDICATIONS: sodium chloride 0.9 % infusion 400 mL*  *From user-documented volume simethicone (MYLICON) 40 mg in sterile water 120 mL 40 mg (Totals for administrations occurring from 1239 to 1318 on 04/08/24) FINDINGS: The terminal ileum appeared normal. One sessile, adenomatous-appearing Odalis Is polyp measuring 5-9 mm in the ascending colon; performed cold snare with complete en bloc removal and retrieved specimen The cecum, hepatic flexure, transverse colon, splenic flexure, descending colon, rectosigmoid and rectum appeared normal. Random biopsies taken to rule out microscopic colitis. Few small, scattered diverticula in the sigmoid colon External and internal medium hemorrhoids EVENTS: Procedure Events Event Event Time ENDO CECUM REACHED 4/8/2024  1:01 PM ENDO SCOPE OUT TIME 4/8/2024  1:17 PM SPECIMENS: ID Type Source Tests Collected by Time Destination 1 : r/o celiac  Tissue Duodenum TISSUE EXAM Suresh Yan DO 4/8/2024 12:47 PM  2 : r/o h pylori Tissue Stomach TISSUE EXAM Suresh Yan,  4/8/2024 12:50 PM  3 : r/o microscopic colitis Tissue Colon TISSUE EXAM Suresh Yan,  4/8/2024  1:05 PM  4 : polyp Tissue Large Intestine, Right/Ascending Colon TISSUE EXAM Suresh Yan,  4/8/2024  1:08 PM  EQUIPMENT: Colonoscope - IMPRESSION: The terminal ileum appeared normal. One Odalis Is polyp measuring 5-9 mm in the ascending colon; performed cold snare removal The cecum, hepatic flexure, transverse colon, splenic flexure, descending colon, rectosigmoid and rectum appeared normal. Scattered diverticulosis in the sigmoid colon Medium hemorrhoids RECOMMENDATION:  Repeat colonoscopy in 5 years  Personal history of colon polyps   Return to floor. Resume diet. Resume anticoagulation.= Continue Lomotil for diarrhea Outpatient GI follow up. GI will sign off.    Will Traylor MD     Result Date: 4/11/2024  Narrative: Table formatting from the original result was not included. [facility] [address] [facility] [address] [phone] DATE OF SERVICE: 4/08/24 PHYSICIAN(S): Attending: Will Traylor MD Fellow: Suresh Yan DO INDICATION: Diarrhea POST-OP DIAGNOSIS: See the impression below. HISTORY: Prior colonoscopy: 6 years ago. BOWEL PREPARATION: Golytely/Colyte/Trilyte PREPROCEDURE: Informed consent was obtained for the procedure, including sedation. Risks including but not limited to bleeding, infection, perforation, adverse drug reaction and aspiration were explained in detail. Also explained about less than 100% sensitivity with the exam and other alternatives. The patient was placed in the left lateral decubitus position. Procedure: Colonoscopy DETAILS OF PROCEDURE: Patient was taken to the procedure room where a time out was performed to confirm correct patient and correct procedure. The patient underwent monitored anesthesia  care, which was administered by an anesthesia professional. The patient's blood pressure, heart rate, level of consciousness, oxygen, respirations, ECG and ETCO2 were monitored throughout the procedure. A digital rectal exam was performed. A perianal exam was performed. The scope was introduced through the anus and advanced to the terminal ileum. Retroflexion was performed in the rectum. The quality of bowel preparation was evaluated using the [place] Bowel Preparation Scale with scores of: right colon = 2, transverse colon = 2, left colon = 2. The total BBPS score was 6. Bowel prep was adequate. The patient experienced no blood loss. The procedure was not difficult. The patient tolerated the procedure well. There were no apparent adverse events. ANESTHESIA INFORMATION: ASA: III Anesthesia Type: Anesthesia type not filed in the log. MEDICATIONS: sodium chloride 0.9 % infusion 400 mL*  *From user-documented volume simethicone (MYLICON) 40 mg in sterile water 120 mL 40 mg (Totals for administrations occurring from 1239 to 1318 on 04/08/24) FINDINGS: The terminal ileum appeared normal. One sessile, adenomatous-appearing Odalis Is polyp measuring 5-9 mm in the ascending colon; performed cold snare with complete en bloc removal and retrieved specimen The cecum, hepatic flexure, transverse colon, splenic flexure, descending colon, rectosigmoid and rectum appeared normal. Random biopsies taken to rule out microscopic colitis. Few small, scattered diverticula in the sigmoid colon External and internal medium hemorrhoids EVENTS: Procedure Events Event Event Time ENDO CECUM REACHED 4/8/2024  1:01 PM ENDO SCOPE OUT TIME 4/8/2024  1:17 PM SPECIMENS: ID Type Source Tests Collected by Time Destination 1 : r/o celiac Tissue Duodenum TISSUE EXAM Suresh  Farrah  4/8/2024 12:47 PM  2 : r/o h pylori Tissue Stomach TISSUE EXAM [facility]  [facility]  4/8/2024 12:50 PM  3 : r/o microscopic colitis Tissue Colon TISSUE EXAM Suresh G  DO Farrah 4/8/2024  1:05 PM  4 : polyp Tissue Large Intestine, Right/Ascending Colon TISSUE EXAM Suresh BROWN DO Farrah 4/8/2024  1:08 PM  EQUIPMENT: Colonoscope -     Impression: The terminal ileum appeared normal. One Odalis Is polyp measuring 5-9 mm in the ascending colon; performed cold snare removal The cecum, hepatic flexure, transverse colon, splenic flexure, descending colon, rectosigmoid and rectum appeared normal. Scattered diverticulosis in the sigmoid colon Medium hemorrhoids RECOMMENDATION:  Await pathology results  Repeat colonoscopy in 7 years  Personal history of colon polyps   Return to floor. Resume diet. Resume anticoagulation.= Continue Lomotil for diarrhea Outpatient GI follow up. GI will sign off.    Will Traylor MD     EGD    Result Date: 4/8/2024  Narrative: Table formatting from the original result was not included. [facility] Endoscopy [address] [facility] [address] [phone] DATE OF SERVICE: 4/08/24 PHYSICIAN(S): Attending: Will Traylor MD Fellow: Suresh Yan DO INDICATION: Diarrhea POST-OP DIAGNOSIS: See the impression below. PREPROCEDURE: Informed consent was obtained for the procedure, including sedation.  Risks of perforation, hemorrhage, adverse drug reaction and aspiration were discussed. The patient was placed in the left lateral decubitus position. Patient was explained about the risks and benefits of the procedure. Risks including but not limited to bleeding, infection, and perforation were explained in detail. Also explained about less than 100% sensitivity with the exam and other alternatives. PROCEDURE: EGD DETAILS OF PROCEDURE: Patient was taken to the procedure room where a time out was performed to confirm correct patient and correct procedure. The patient underwent monitored anesthesia care, which was administered by an anesthesia professional. The patient's blood pressure, heart rate, level of consciousness, respirations, oxygen,  ECG and ETCO2 were monitored throughout the procedure. The scope was introduced through the mouth and advanced to the second part of the duodenum. Retroflexion was performed in the fundus. The patient experienced no blood loss. The procedure was not difficult. The patient tolerated the procedure well. There were no apparent adverse events. ANESTHESIA INFORMATION: ASA: III Anesthesia Type: Anesthesia type not filed in the log. MEDICATIONS: simethicone (MYLICON) 40 mg in sterile water 120 mL 40 mg (Totals for administrations occurring from 1239 to 1252 on 04/08/24) FINDINGS: 3 cm sliding hiatal hernia (type I hiatal hernia) - GE junction 37 cm from the incisors, diaphragmatic impression 40 cm from the incisors, confirmed by retroflexion:  Hill classification: Grade II The esophagus and duodenum appeared normal. Patchy edematous and erythematous mucosa with erosion in the antrum Performed forceps biopsies in the body of the stomach, incisura and antrum to rule out H. pylori Performed forceps biopsies in the duodenal bulb, 1st part of the duodenum and 2nd part of the duodenum to rule out celiac disease SPECIMENS: ID Type Source Tests Collected by Time Destination 1 : r/o celiac Tissue Duodenum TISSUE EXAM [facility] 4/8/2024 12:47 PM  2 : r/o h pylori Tissue Stomach TISSUE EXAM [facility] 4/8/2024 12:50 PM      Impression: 3 cm type I hiatal hernia The esophagus and duodenum appeared normal. Edematous and erythematous mucosa with erosion in the antrum Performed forceps biopsies in the body of the stomach, incisura and antrum to rule out H. pylori Performed forceps biopsies in the duodenal bulb, 1st part of the duodenum and 2nd part of the duodenum to rule out celiac disease RECOMMENDATION:  Await pathology results Continue PPI 40 mg daily. Started on Carafate 1 g 4 times daily. Proceed with colonoscopy.   Will Traylor MD     Echo complete w/ contrast if indicated    Result Date:  4/2/2024  Narrative:   Left Ventricle: Left ventricular cavity size is normal. Wall thickness is mildly increased. There is borderline concentric hypertrophy. The left ventricular ejection fraction is 70% by single dimension measurement. Systolic function is vigorous. Wall motion is normal. Diastolic function is moderately abnormal, consistent with grade II (pseudonormal) relaxation.  Left atrial filling pressure is elevated.   Left Atrium: The atrium is mildly dilated (35-41 mL/m2).   Aortic Valve: The aortic valve is trileaflet. The leaflets are moderately thickened. The leaflets are moderately calcified. There is moderately reduced mobility. There is mild regurgitation. There is moderate to severe stenosis. The aortic valve peak velocity is 3.3 m/s. The aortic valve mean gradient is 25 mmHg. The dimensionless velocity index is 0.38. The aortic valve area is 0.93 cm2. The stroke volume index is 46.90 ml/m2.   Mitral Valve: There is mild regurgitation. There is moderate stenosis.   Tricuspid Valve: There is mild regurgitation. The right ventricular systolic pressure is mildly to moderately elevated. The estimated right ventricular systolic pressure is 46.00 mmHg.     MRA carotids wo contrast    Result Date: 4/1/2024  Narrative: MRA NECK WITHOUT CONTRAST INDICATION: stroke COMPARISON:  None. TECHNIQUE:  Time of Flight angiography with 3D reconstructions. IMAGE QUALITY: Motion degraded examination. FINDINGS: AORTIC ARCH:  Normal. VERTEBRAL ARTERIES:  The vertebral arteries are bilaterally symmetric with preserved enhancement . RIGHT CAROTID ARTERY: Focal motion artifact is noted along the carotid bulb. Preserved flow related signal within the common carotid artery, cervical internal carotid artery and external carotid artery. No suspected stenosis at the bifurcation. LEFT CAROTID ARTERY:  Focal motion artifact is noted along the carotid bulb. Preserved flow related signal within the common carotid artery, cervical  internal carotid artery and external carotid artery. No suspected stenosis at the bifurcation. NASCET criteria was used to determine the degree of internal carotid artery diameter stenosis.     Impression: Motion degraded examination. Focal artifacts are noted along the bilateral carotid bulbs. There is no suspected extracranial carotid stenosis. The cervical vertebral arteries are patent. Workstation performed: [id number]     MRA head wo contrast    Result Date: 4/1/2024  Narrative: MRA BRAIN WITHOUT CONTRAST INDICATION:  stroke COMPARISON: CTA head and neck 4/8/2023 TECHNIQUE:  Axial 3-D time-of-flight imaging with 3-D reconstructions performed without contrast. IV Contrast:  Not administered. FINDINGS: IMAGE QUALITY:  Diagnostic. ANATOMY INTERNAL CAROTID ARTERIES:  Normal flow related signal of the distal cervical, petrous and cavernous segments of the internal carotid arteries.  Normal ICA terminus. ANTERIOR CIRCULATION:  Normal A1 segments.  Normal anterior communicating artery.  Normal flow-related signal of the anterior cerebral arteries. MIDDLE CEREBRAL ARTERY CIRCULATION:  The M1 segment and middle cerebral artery branches demonstrate normal flow-related signal. DISTAL VERTEBRAL ARTERIES:  Distal vertebral arteries are patent with a normal vertebrobasilar junction.  The posterior inferior cerebellar artery origins are normal. BASILAR ARTERY:  Normal. POSTERIOR CEREBRAL ARTERIES:  The right posterior cerebral artery arises from the basilar tip.  There is fetal origin of the left posterior cerebral artery.  Both demonstrate no focal stenosis.  Patent posterior communicating arteries.     Impression: Cerebral MRA demonstrates no large branch vessel occlusion or focal hemodynamically significant stenosis. Fetal left PCA supply. Workstation performed: [id number]     MRI brain wo contrast    Result Date: 4/1/2024  Narrative: MRI BRAIN WITHOUT CONTRAST INDICATION: stroke , slurred speech. COMPARISON:   CT/CTA head  and neck 4/1/2024 TECHNIQUE:  Multiplanar, multisequence imaging of the brain was performed. IMAGE QUALITY:  Diagnostic. FINDINGS: BRAIN PARENCHYMA: Chronic lacunar infarcts are noted within the right anterior centrum semiovale with surrounding gliosis. Chronic lacunar infarct within the right posterior cerebellum. There is no discrete mass, mass effect or midline shift. There is no intracranial hemorrhage.  There is no evidence of acute infarction and diffusion imaging is unremarkable. Small scattered hyperintensities on T2/FLAIR imaging are noted in the periventricular and subcortical white matter demonstrating an appearance that is statistically most likely to represent mild microangiopathic change. VENTRICLES:  Normal for the patient's age. SELLA AND PITUITARY GLAND: Evidence of a partially empty sella. ORBITS:  Normal. PARANASAL SINUSES: Small left maxillary sinus retention cyst is noted. VASCULATURE:  Evaluation of the major intracranial vasculature demonstrates appropriate flow voids. CALVARIUM AND SKULL BASE: Marrow signal heterogeneity which can be seen with underlying red marrow proliferation. EXTRACRANIAL SOFT TISSUES:  Normal.     Impression: No acute infarction, edema, or mass effect. Chronic lacunar infarcts within the right anterior centrum semiovale and right posterior cerebellum. Mild chronic microangiopathic ischemic changes. Workstation performed: [id number]     CT abdomen pelvis wo contrast    Result Date: 4/1/2024  Narrative: CT ABDOMEN AND PELVIS WITHOUT IV CONTRAST INDICATION: Abdominal pain, diarrhea. COMPARISON: Right upper quadrant ultrasound 3/27/2024 TECHNIQUE: CT examination of the abdomen and pelvis was performed without intravenous contrast. Multiplanar 2D reformatted images were created from the source data. This examination, like all CT scans performed in the [facility], was performed utilizing techniques to minimize radiation dose exposure, including the use of  iterative reconstruction and automated exposure control. Radiation dose length product (DLP) for this visit: 370 mGy-cm Enteric Contrast: Administered. FINDINGS: ABDOMEN Evaluation of the solid organs is limited without IV contrast. LOWER CHEST: No clinically significant abnormality in the visualized lower chest. LIVER/BILIARY TREE: Small left hepatic lobe cyst. Subcentimeter hypodensity in the right hepatic lobe series 2/45, too small to characterize. A 1 cm suspected hemangioma in the left lobe on ultrasound is not clearly visible on noncontrast imaging. GALLBLADDER: Post cholecystectomy. SPLEEN: Unremarkable. PANCREAS: Unremarkable. ADRENAL GLANDS: Unremarkable. KIDNEYS/URETERS: Unremarkable. No hydronephrosis. STOMACH AND BOWEL: Evaluation of the bowel is limited without IV contrast. The stomach is poorly distended, assessment limited. Small hiatal hernia suggested. No evidence of small bowel obstruction. Duodenal diverticulum with air-fluid level. Fluid seen throughout the colon in keeping with history of diarrheal illness. Minimal sigmoid diverticulosis without diverticulitis. APPENDIX: No findings to suggest appendicitis. ABDOMINOPELVIC CAVITY: No ascites. No pneumoperitoneum. No lymphadenopathy. VESSELS: Atherosclerotic changes abdominal aorta without evidence of aneurysm. PELVIS REPRODUCTIVE ORGANS: Unremarkable for patient's age. URINARY BLADDER: Unremarkable. ABDOMINAL WALL/INGUINAL REGIONS: Unremarkable. BONES: No acute fracture or suspicious osseous lesion. Mild lumbar levoscoliosis with advanced multilevel degenerative changes of the spine. Grade 1 spondylolisthesis L4 on L5 secondary to facet arthropathy.     Impression: Fluid seen throughout the colon in keeping with history of diarrheal illness. No focal inflammatory changes. Minimal sigmoid diverticulosis. Limited evaluation of the solid organs without IV contrast. Subcentimeter right hepatic lobe hypodensity too small to characterize. Limited  study without IV contrast. Workstation performed: [id number]     CT stroke alert brain    Result Date: 4/1/2024  Narrative: CT BRAIN - STROKE ALERT PROTOCOL INDICATION:   Aphasia. COMPARISON: CTA head and neck 4/8/2023 TECHNIQUE:  CT examination of the brain was performed.  In addition to axial images, coronal reformatted images were created and submitted for interpretation. Radiation dose length product (DLP) for this visit:  901 mGy-cm .  This examination, like all CT scans performed in the [facility], was performed utilizing techniques to minimize radiation dose exposure, including the use of iterative reconstruction and automated exposure control. IMAGE QUALITY:  Diagnostic. FINDINGS: PARENCHYMA: New area of hypodensity involving subcortical and deep white matter in the anterior right frontal lobe. Stable chronic right cerebellar lacunar infarct (series 2 image 13). No intracranial mass, mass effect or midline shift. No acute parenchymal hemorrhage. Redemonstrated partially empty sella. VENTRICLES AND EXTRA-AXIAL SPACES:  Normal for the patient's age. VISUALIZED ORBITS: Normal visualized orbits. PARANASAL SINUSES: Normal visualized paranasal sinuses. CALVARIUM AND EXTRACRANIAL SOFT TISSUES:   Normal.     Impression: 1.  New area of hypodensity involving subcortical and deep white matter in the anterior right frontal lobe most likely late subacute to chronic infarct. Superimposed acute infarct in the less hypodense regions are not excluded. There is no mass effect or  hemorrhage. 2.  Stable old right cerebellar lacunar infarct. Findings were directly discussed with Isaac Rucker at approximately 2:09 p.m. Workstation performed: [id number]      right upper quadrant    Result Date: 4/1/2024  Narrative: RIGHT UPPER QUADRANT ULTRASOUND INDICATION: R10.11: Right upper quadrant pain. COMPARISON: None TECHNIQUE: Real-time ultrasound of the right upper quadrant was performed with a curvilinear  transducer with both volumetric sweeps and still imaging techniques. FINDINGS: PANCREAS: Visualized portions of the pancreas are within normal limits. AORTA AND IVC: Visualized portions are normal for patient age. LIVER: Size: Within normal range. The liver measures 14.0 cm in the midclavicular line. Contour: Surface contour is smooth. Parenchyma: Echogenicity and echotexture are within normal limits. A few small simple cysts are present. 1 cm echogenic lesion in the left hepatic lobe (image 61) likely reflects a hemangioma in the absence of background liver disease. Limited imaging of the main portal vein shows it to be patent and hepatopetal. BILIARY: Patient has undergone cholecystectomy. No intrahepatic biliary dilatation. CBD measures 6.0 mm. No choledocholithiasis. KIDNEY: Right kidney measures 9.2 x 4.8 x 3.6 cm. Volume 83.4 mL Kidney within normal limits. ASCITES: None.     Impression: 1.  No acute findings. 2.  A single 1 cm echogenic lesion in the left hepatic lobe is indeterminate in the absence of comparison imaging but likely reflects a hemangioma in a low-risk patient. Workstation performed: [id number]       Review of Systems:  Review of Systems   Constitutional:  Positive for fatigue.   Gastrointestinal:  Positive for diarrhea.   Musculoskeletal:  Positive for arthralgias and myalgias.   All other systems reviewed and are negative.      Physical Exam:  Physical Exam  Vitals reviewed.   Constitutional:       Appearance: Normal appearance.   HENT:      Head: Normocephalic.   Cardiovascular:      Rate and Rhythm: Normal rate and regular rhythm.      Pulses: Normal pulses.      Heart sounds: Murmur heard.      Comments: 3/6 LAKESHA  Pulmonary:      Effort: Pulmonary effort is normal.      Breath sounds: Normal breath sounds.   Musculoskeletal:         General: Normal range of motion.      Cervical back: Normal range of motion and neck supple.      Right lower leg: No edema.      Left lower leg: No edema.    Skin:     General: Skin is warm and dry.      Capillary Refill: Capillary refill takes less than 2 seconds.   Neurological:      General: No focal deficit present.      Mental Status: She is alert and oriented to person, place, and time.   Psychiatric:         Mood and Affect: Mood normal.         Behavior: Behavior normal.         Discussion/Summary:  # Paroxysmal Atrial Fibrillation [id number]= 4,  9/02/22 sp cryoablation with pulmonary vein isolation posterior wall isolation. Loop in situ , PAF found on loop patient with last hospitalization.  Likely due to to hypomagnesia and hypokalemia due to persistent diarrhea.  Continue on Eliquis 5 mg twice daily for stroke prevention, Cardizem  mg daily.  # Possible TIA with transient aphasia, MRI of the brain showed chronic lacunar infarcts with right anterior centrum semiovale and right posterior cerebellum.  Mild chronic microangiopathic ischemic changes.  Loop recorder interrogation showed  recurrent A-fib continue on Eliquis 5 mg twice daily, aspirin 81 mg daily, Lipitor 40 mg daily  # Aortic Stenosis TTE on 4/02/24 showed severe aortic stenosis aortic valve mean gradient 25 mmHg.  Aortic valve area 0.93 cm².  Asymptomatic.  Discussed referral to CT surgery in future for possible TAVR in the future once persistent/chronic diarrhea resolved.  Defer referral to primary cardiologist.    # Persistent chronic diarrhea since 12/2023 follow up with GI, continues with 9 episodes of diarrhea a day.  .    # Hypomagnesia 4/15/24 mag 1.5 continue on magnesium aside from milligrams twice daily  # Hypokalemia potassium 3.4, start K-Dur 20 mill equivalents p.o. daily if diarrhea resolves.  Potassium supplement  # Hypertension LUE sitting 106/54, BP running on low side due to persistent chronic diarrhea, continue hydrating.  Off most antihypertensive medication.  Continues on Cardizem  mg daily for history of PAF  # CKD IIIb baseline creat  1.1 - 1.4 followed by   Tai

## 2024-04-16 ENCOUNTER — OFFICE VISIT (OUTPATIENT)
Dept: CARDIOLOGY CLINIC | Facility: CLINIC | Age: 74
End: 2024-04-16
Payer: MEDICARE

## 2024-04-16 ENCOUNTER — TELEPHONE (OUTPATIENT)
Dept: NEPHROLOGY | Facility: CLINIC | Age: 74
End: 2024-04-16

## 2024-04-16 VITALS
DIASTOLIC BLOOD PRESSURE: 52 MMHG | HEART RATE: 80 BPM | OXYGEN SATURATION: 98 % | WEIGHT: 137.1 LBS | SYSTOLIC BLOOD PRESSURE: 108 MMHG | HEIGHT: 62 IN | BODY MASS INDEX: 25.23 KG/M2

## 2024-04-16 DIAGNOSIS — E83.42 HYPOMAGNESEMIA: ICD-10-CM

## 2024-04-16 DIAGNOSIS — I48.0 PAF (PAROXYSMAL ATRIAL FIBRILLATION) (HCC): Primary | ICD-10-CM

## 2024-04-16 DIAGNOSIS — R19.7 DIARRHEA, UNSPECIFIED TYPE: ICD-10-CM

## 2024-04-16 DIAGNOSIS — G45.9 TIA (TRANSIENT ISCHEMIC ATTACK): ICD-10-CM

## 2024-04-16 DIAGNOSIS — I10 HYPERTENSION, UNSPECIFIED TYPE: ICD-10-CM

## 2024-04-16 DIAGNOSIS — I35.0 AORTIC VALVE STENOSIS, ETIOLOGY OF CARDIAC VALVE DISEASE UNSPECIFIED: ICD-10-CM

## 2024-04-16 DIAGNOSIS — N18.32 STAGE 3B CHRONIC KIDNEY DISEASE (HCC): ICD-10-CM

## 2024-04-16 DIAGNOSIS — E87.6 HYPOKALEMIA: Primary | ICD-10-CM

## 2024-04-16 DIAGNOSIS — E87.6 HYPOKALEMIA: ICD-10-CM

## 2024-04-16 PROCEDURE — 99215 OFFICE O/P EST HI 40 MIN: CPT | Performed by: NURSE PRACTITIONER

## 2024-04-16 RX ORDER — POTASSIUM CHLORIDE 20 MEQ/1
20 TABLET, EXTENDED RELEASE ORAL DAILY
Qty: 30 TABLET | Refills: 3 | Status: SHIPPED | OUTPATIENT
Start: 2024-04-16

## 2024-04-16 NOTE — TELEPHONE ENCOUNTER
----- Message from Morenita Tillman MD sent at 4/16/2024  9:07 AM EDT -----  Please let the patient know that most recent lab work in terms of renal parameters are stable.    Please slightly increase potassium in her diet as the levels are borderline low.  Please also make sure she repeat renal function panel few days prior to her appointment with Dr. Lujan  Will discuss further at the upcoming visit with Dr lujan, let me know if they have any questions or concerns.    Thanks

## 2024-04-16 NOTE — RESULT ENCOUNTER NOTE
Please let the patient know that most recent lab work in terms of renal parameters are stable.    Please slightly increase potassium in her diet as the levels are borderline low.  Please also make sure she repeat renal function panel few days prior to her appointment with Dr. Scanlon  Will discuss further at the upcoming visit with Dr scanlon, let me know if they have any questions or concerns.    Thanks

## 2024-04-16 NOTE — PROGRESS NOTES
Discussed recent laboratory studies.  Recent labs show persistent hypokalemia as well as hypomagnesemia.  Recommend continuation of oral magnesium replacement.  Increase dietary potassium.  Try to switch Protonix to Pepcid as needed.  Follow-up appointment with gastroenterology later this week.  Reluctant to resume Aldactone, blood pressures at home have been in the 90s.  Continue to encourage oral intake.

## 2024-04-18 ENCOUNTER — OFFICE VISIT (OUTPATIENT)
Dept: GASTROENTEROLOGY | Facility: CLINIC | Age: 74
End: 2024-04-18
Payer: MEDICARE

## 2024-04-18 ENCOUNTER — TELEPHONE (OUTPATIENT)
Age: 74
End: 2024-04-18

## 2024-04-18 VITALS
WEIGHT: 137.8 LBS | SYSTOLIC BLOOD PRESSURE: 102 MMHG | BODY MASS INDEX: 24.41 KG/M2 | TEMPERATURE: 97.8 F | HEART RATE: 66 BPM | DIASTOLIC BLOOD PRESSURE: 65 MMHG | HEIGHT: 63 IN | OXYGEN SATURATION: 98 %

## 2024-04-18 DIAGNOSIS — R19.7 DIARRHEA, UNSPECIFIED TYPE: ICD-10-CM

## 2024-04-18 DIAGNOSIS — K58.0 IRRITABLE BOWEL SYNDROME WITH DIARRHEA: Primary | ICD-10-CM

## 2024-04-18 PROCEDURE — 99214 OFFICE O/P EST MOD 30 MIN: CPT | Performed by: PHYSICIAN ASSISTANT

## 2024-04-18 NOTE — TELEPHONE ENCOUNTER
PA for xifaxan    Submitted via    []CMM-KEY   [x]Michelle-Case ID # Case ID:[id number]   []Faxed to plan   []Other website   []Phone call Case ID #     Office notes sent, clinical questions answered. Awaiting determination    Turnaround time for your insurance to make a decision on your Prior Authorization can take 7-21 business days.

## 2024-04-18 NOTE — TELEPHONE ENCOUNTER
PA for Xifaxan Approved   Date(s) approved until 5/2/2024  Case #    Patient advised by [x] [employer] Message                      [] Phone call       Pharmacy advised by [x]Fax                                     []Phone call    Approval letter scanned into Media Yes

## 2024-04-18 NOTE — PROGRESS NOTES
[facility] - Outpatient Follow-up Note  Almaz Hall 74 y.o. female MRN: [id number]  Encounter: [id number]      Assessment and Plan    1. Diarrhea  Longstanding history of loose stools approximately 1-2 times per day but in December 2023 significantly worsened with 10+ bowel movements requiring hospitalization in April for JANELLE and hypokalemia. Workup including stool tests (bacterial panel, c diff, ova/parasites, giardia, pancreatic elastase, and fecal ankit) normal aside for a fecal ankit of 444. EGD and colonoscopy negative for etiology (no h pylori, celiac, IBD, or microscopic colitis). She continues to have 5+ watery bowel movements daily. Imodium, lomotil, and cholestyramine with no improvement.  -Repeat fecal ankit, if elevated consider CTE or MRE  -Start prior auth for xifaxan  -If not improvement consider stopping medications that can cause diarrhea (losartan, escitalopram)    Follow up 3 months     ______________________________________________________________________    History of Present Illness  Almaz Hall is a 74 y.o. female here for follow up evaluation of diarrhea. She has a longstanding history of loose stools approximately 1-2 times per day but in December 2023 loose stools significantly worsened. She was hospitalized in April with JANELLE and hypokalemia 2/2 her diarrhea. Stool test (bacterial panel, c diff, ova/parasites, giardia, pancreatic elastase, and fecal ankit) all normal aside for a fecal ankit of 444. She then underwent EGD and colonoscopy 4/8/24. EGD revealed a 3cm hiatal hernia and gastritis, biopsies of the stomach and duodenum normal. Colonoscopy revealed one sessile serrated polyp/adenoma which was removed, diverticulosis, and hemorrhoids. Random colon biopsies were negative for microscopic colitis.      Review of Systems   Constitutional:  Negative for activity change, appetite change, chills, fatigue, fever and unexpected weight change.         Past Medical  History  Past Medical History:   Diagnosis Date    Asthma     Atrial fibrillation (HCC)     with ablation    Bursitis of hip     Last Assessed:6/5/2017    COPD (chronic obstructive pulmonary disease) (HCC) 5 years ago    Coronary artery disease     Herniated lumbar intervertebral disc     Hyperlipidemia     Hypertension 2 years    Pituitary adenoma (HCC)     Last Assessed:2/12/2015 ; Previously on bromocriptine    Spinal stenosis     SVT (supraventricular tachycardia)     with ablation    Systolic murmur of aorta        Past Social history  Past Surgical History:   Procedure Laterality Date    BREAST SURGERY      Incisional breast biopsy    CARDIAC ELECTROPHYSIOLOGY PROCEDURE N/A 09/02/2022    Procedure: Cardiac eps/afib ablation;  Surgeon: Fadi Goss MD;  Location: [facility];  Service: Cardiology    CARDIAC ELECTROPHYSIOLOGY PROCEDURE N/A 12/07/2022    Procedure: Cardiac loop recorder implant;  Surgeon: Bao Kovacs MD;  Location:  C[facility] CATH LAB;  Service: Cardiology    CARDIAC ELECTROPHYSIOLOGY STUDY AND ABLATION      CARDIAC LOOP RECORDER      CARPAL TUNNEL RELEASE Left     CHOLECYSTECTOMY      OTHER SURGICAL HISTORY      EXCISION OF PAROTID TUMOR/GLAND    PAROTIDECTOMY      MA COLONOSCOPY FLX DX W/COLLJ SPEC WHEN PFRMD N/A 06/06/2017    Procedure: COLONOSCOPY;  Surgeon: Elizabeth Leyva DO;  Location: [facility] GI LAB;  Service: Gastroenterology    MA HYSTEROSCOPY BX ENDOMETRIUM&/POLYPC W/WO D&C N/A 11/22/2023    Procedure: (D&C) W/ HYSTEROSCOPY;  Surgeon: Harini Kinney MD;  Location: [facility] OR;  Service: Gynecology    ROTATOR CUFF REPAIR      SALPINGOOPHORECTOMY Right      Social History     Socioeconomic History    Marital status: /Civil Union     Spouse name: Not on file    Number of children: Not on file    Years of education: Not on file    Highest education level: Not on file   Occupational History    Not on file   Tobacco Use    Smoking status: Former     Current  packs/day: 0.00     Average packs/day: 1 pack/day for 30.0 years (30.0 ttl pk-yrs)     Types: Cigarettes     Start date: 1988     Quit date: 2018     Years since quittin.4    Smokeless tobacco: Never   Vaping Use    Vaping status: Never Used   Substance and Sexual Activity    Alcohol use: Not Currently     Alcohol/week: 1.0 standard drink of alcohol     Types: 1 Shots of liquor per week     Comment: social    Drug use: No    Sexual activity: Not Currently     Birth control/protection: Post-menopausal   Other Topics Concern    Not on file   Social History Narrative    Not on file     Social Determinants of Health     Financial Resource Strain: Low Risk  (2023)    Overall Financial Resource Strain (CARDIA)     Difficulty of Paying Living Expenses: Not hard at all   Food Insecurity: Unknown (2024)    Hunger Vital Sign     Worried About Running Out of Food in the Last Year: Not on file     Ran Out of Food in the Last Year: Never true   Transportation Needs: No Transportation Needs (2024)    PRAPARE - Transportation     Lack of Transportation (Medical): No     Lack of Transportation (Non-Medical): No   Physical Activity: Not on file   Stress: Not on file   Social Connections: Not on file   Intimate Partner Violence: Not on file   Housing Stability: Low Risk  (2024)    Housing Stability Vital Sign     Unable to Pay for Housing in the Last Year: No     Number of Places Lived in the Last Year: 1     Unstable Housing in the Last Year: No     Social History     Substance and Sexual Activity   Alcohol Use Not Currently    Alcohol/week: 1.0 standard drink of alcohol    Types: 1 Shots of liquor per week    Comment: social     Social History     Substance and Sexual Activity   Drug Use No     Social History     Tobacco Use   Smoking Status Former    Current packs/day: 0.00    Average packs/day: 1 pack/day for 30.0 years (30.0 ttl pk-yrs)    Types: Cigarettes    Start date: 1988    Quit date:  2018    Years since quittin.4   Smokeless Tobacco Never       Past Family History  Family History   Problem Relation Age of Onset    Colon cancer Mother     Coronary artery disease Mother     Colonic polyp Mother     Liver cancer Mother     Multiple sclerosis Father     Hypertension Brother     Hypertension Other     Breast cancer Neg Hx     Cancer Neg Hx        Current Medications  Current Outpatient Medications   Medication Sig Dispense Refill    apixaban (Eliquis) 5 mg Take 1 tablet (5 mg total) by mouth 2 (two) times a day 60 tablet 0    aspirin 81 mg chewable tablet Chew 1 tablet (81 mg total) daily 30 tablet 0    atorvastatin (LIPITOR) 20 mg tablet TAKE 1 TABLET BY MOUTH EVERY DAY 90 tablet 3    b complex vitamins capsule Take 1 capsule by mouth daily 30 capsule 0    cetirizine (ZyrTEC) 10 mg tablet Take 10 mg by mouth as needed       diltiazem (TIAZAC) 120 MG 24 hr capsule TAKE 1 CAPSULE BY MOUTH EVERY DAY 90 capsule 3    diphenoxylate-atropine (LOMOTIL) 2.5-0.025 mg per tablet Take 1 tablet by mouth 4 (four) times a day as needed for diarrhea 60 tablet 0    escitalopram (LEXAPRO) 10 mg tablet TAKE 1 TABLET BY MOUTH EVERY DAY 30 tablet 11    levalbuterol (XOPENEX HFA) 45 mcg/act inhaler Inhale 1-2 puffs every 4 (four) hours as needed for wheezing 15 g 0    magnesium Oxide (MAG-OX) 400 mg TABS Take 1 tablet (400 mg total) by mouth 2 (two) times a day 60 tablet 0    mupirocin (BACTROBAN) 2 % ointment Apply topically daily 30 g 0    pantoprazole (PROTONIX) 40 mg tablet Take 1 tablet (40 mg total) by mouth daily in the early morning 30 tablet 0    polyethylene glycol (MiraLax) 17 GM/SCOOP powder Take 238 g by mouth once for 1 dose Take 238 g my mouth. Use as directed (Patient not taking: Reported on 2024) 238 g 0    potassium chloride (Klor-Con M20) 20 mEq tablet Take 1 tablet (20 mEq total) by mouth daily 30 tablet 3     No current facility-administered medications for this visit.  "      Allergies  Allergies   Allergen Reactions    Bystolic [Nebivolol Hcl] Bradycardia     Hear rate to 20's    Levofloxacin Hives    Vasotec [Enalapril] Hives    Amlodipine Rash    Ancef [Cefazolin] GI Intolerance     Cdiff    Cephalosporins Other (See Comments)     c.diff    Fruit Extracts      Annotation - [id number]: peaches and strawberries    Morphine GI Intolerance     Extreme vomiting      Other      FRUIT    Penicillins Hives    Tetracycline Edema    Xarelto [Rivaroxaban] Rash         The following portions of the patient's history were reviewed and updated as appropriate: allergies, current medications, past medical history, past social history, past surgical history and problem list.      Vitals  Vitals:    04/18/24 0945   BP: 102/65   BP Location: Left arm   Patient Position: Sitting   Cuff Size: Standard   Pulse: 66   Temp: 97.8 °F (36.6 °C)   TempSrc: Tympanic   SpO2: 98%   Weight: 62.5 kg (137 lb 12.8 oz)   Height: 5' 3\" (1.6 m)       Physical Exam  Constitutional   General appearance: Patient is seated and in no acute distress, well appearing and well nourished.   Head and Face   Head and face: Normal.    Eyes   Conjunctiva and lids: No erythema, swelling or discharge.  Anicteric.  Ears, Nose, Mouth, and Throat   Hearing: Normal.    Neck: Supple, trachea midline.  Pulmonary   Respiratory effort: No increased work of breathing or signs of respiratory distress.    Cardiovascular    Examination of extremities for edema and/or varicosities: Normal.    Musculoskeletal   Gait and station: Normal    Skin   Skin and subcutaneous tissue: Warm, dry, and intact. No visible jaundice, lesions or rashes.  Psychiatric   Judgment and insight: Normal  Recent and remote memory:  Normal  Mood and affect: Normal       Results  No visits with results within 1 Day(s) from this visit.   Latest known visit with results is:   Lab on 04/15/2024   Component Date Value    Albumin 04/15/2024 4.0     Calcium 04/15/2024 9.8     " Phosphorus 04/15/2024 3.9     BUN 04/15/2024 20     Creatinine 04/15/2024 1.28     Sodium 04/15/2024 142     Potassium 04/15/2024 3.4 (L)     Chloride 04/15/2024 106     CO2 04/15/2024 23     ANION GAP 04/15/2024 13     eGFR 04/15/2024 42     Glucose, Fasting 04/15/2024 89     Magnesium 04/15/2024 1.5 (L)     WBC 04/15/2024 7.10     RBC 04/15/2024 3.78 (L)     Hemoglobin 04/15/2024 11.6     Hematocrit 04/15/2024 37.7     MCV 04/15/2024 100 (H)     MCH 04/15/2024 30.7     MCHC 04/15/2024 30.8 (L)     RDW 04/15/2024 13.2     MPV 04/15/2024 10.5     Platelets 04/15/2024 338     nRBC 04/15/2024 0     Segmented % 04/15/2024 59     Immature Grans % 04/15/2024 0     Lymphocytes % 04/15/2024 28     Monocytes % 04/15/2024 11     Eosinophils Relative 04/15/2024 1     Basophils Relative 04/15/2024 1     Absolute Neutrophils 04/15/2024 4.19     Absolute Immature Grans 04/15/2024 0.02     Absolute Lymphocytes 04/15/2024 1.95     Absolute Monocytes 04/15/2024 0.77     Eosinophils Absolute 04/15/2024 0.10     Basophils Absolute 04/15/2024 0.07        Radiology Results  Colonoscopy    Addendum Date: 4/11/2024 Addendum:   [facility] [address] [facility] [address] [phone] DATE OF SERVICE: 4/08/24 PHYSICIAN(S): Attending: Will Traylor MD Fellow: Suresh Yan DO INDICATION: Diarrhea POST-OP DIAGNOSIS: See the impression below. HISTORY: Prior colonoscopy: 6 years ago. BOWEL PREPARATION: Golytely/Colyte/Trilyte PREPROCEDURE: Informed consent was obtained for the procedure, including sedation. Risks including but not limited to bleeding, infection, perforation, adverse drug reaction and aspiration were explained in detail. Also explained about less than 100% sensitivity with the exam and other alternatives. The patient was placed in the left lateral decubitus position. Procedure: Colonoscopy DETAILS OF PROCEDURE: Patient was taken to the procedure room where a time out was performed to  confirm correct patient and correct procedure. The patient underwent monitored anesthesia care, which was administered by an anesthesia professional. The patient's blood pressure, heart rate, level of consciousness, oxygen, respirations, ECG and ETCO2 were monitored throughout the procedure. A digital rectal exam was performed. A perianal exam was performed. The scope was introduced through the anus and advanced to the terminal ileum. Retroflexion was performed in the rectum. The quality of bowel preparation was evaluated using the [place] Bowel Preparation Scale with scores of: right colon = 2, transverse colon = 2, left colon = 2. The total BBPS score was 6. Bowel prep was adequate. The patient experienced no blood loss. The procedure was not difficult. The patient tolerated the procedure well. There were no apparent adverse events. ANESTHESIA INFORMATION: ASA: III Anesthesia Type: Anesthesia type not filed in the log. MEDICATIONS: sodium chloride 0.9 % infusion 400 mL*  *From user-documented volume simethicone (MYLICON) 40 mg in sterile water 120 mL 40 mg (Totals for administrations occurring from 1239 to 1318 on 04/08/24) FINDINGS: The terminal ileum appeared normal. One sessile, adenomatous-appearing Odalis Is polyp measuring 5-9 mm in the ascending colon; performed cold snare with complete en bloc removal and retrieved specimen The cecum, hepatic flexure, transverse colon, splenic flexure, descending colon, rectosigmoid and rectum appeared normal. Random biopsies taken to rule out microscopic colitis. Few small, scattered diverticula in the sigmoid colon External and internal medium hemorrhoids EVENTS: Procedure Events Event Event Time ENDO CECUM REACHED 4/8/2024  1:01 PM ENDO SCOPE OUT TIME 4/8/2024  1:17 PM SPECIMENS: ID Type Source Tests Collected by Time Destination 1 : r/o celiac Tissue Duodenum TISSUE EXAM Suresh KEVIN Yan DO 4/8/2024 12:47 PM  2 : r/o h pylori Tissue Stomach TISSUE EXAM Suresh G  DO Farrah 4/8/2024 12:50 PM  3 : r/o microscopic colitis Tissue Colon TISSUE EXAM Suresh BROWN Jean Carlosvictoriano  4/8/2024  1:05 PM  4 : polyp Tissue Large Intestine, Right/Ascending Colon TISSUE EXAM Suresh BROWN Jean Carlosvictoriano  4/8/2024  1:08 PM  EQUIPMENT: Colonoscope - IMPRESSION: The terminal ileum appeared normal. One Odalis Is polyp measuring 5-9 mm in the ascending colon; performed cold snare removal The cecum, hepatic flexure, transverse colon, splenic flexure, descending colon, rectosigmoid and rectum appeared normal. Scattered diverticulosis in the sigmoid colon Medium hemorrhoids RECOMMENDATION:  Repeat colonoscopy in 5 years  Personal history of colon polyps   Return to floor. Resume diet. Resume anticoagulation.= Continue Lomotil for diarrhea Outpatient GI follow up. GI will sign off.    Will Traylor MD     Result Date: 4/11/2024  Narrative: Table formatting from the original result was not included. [facility] [address] [facility] [address] [phone] DATE OF SERVICE: 4/08/24 PHYSICIAN(S): Attending: Will Traylor MD Fellow: Suresh Yan DO INDICATION: Diarrhea POST-OP DIAGNOSIS: See the impression below. HISTORY: Prior colonoscopy: 6 years ago. BOWEL PREPARATION: Golytely/Colyte/Trilyte PREPROCEDURE: Informed consent was obtained for the procedure, including sedation. Risks including but not limited to bleeding, infection, perforation, adverse drug reaction and aspiration were explained in detail. Also explained about less than 100% sensitivity with the exam and other alternatives. The patient was placed in the left lateral decubitus position. Procedure: Colonoscopy DETAILS OF PROCEDURE: Patient was taken to the procedure room where a time out was performed to confirm correct patient and correct procedure. The patient underwent monitored anesthesia care, which was administered by an anesthesia professional. The patient's blood pressure, heart rate, level of  consciousness, oxygen, respirations, ECG and ETCO2 were monitored throughout the procedure. A digital rectal exam was performed. A perianal exam was performed. The scope was introduced through the anus and advanced to the terminal ileum. Retroflexion was performed in the rectum. The quality of bowel preparation was evaluated using the [place] Bowel Preparation Scale with scores of: right colon = 2, transverse colon = 2, left colon = 2. The total BBPS score was 6. Bowel prep was adequate. The patient experienced no blood loss. The procedure was not difficult. The patient tolerated the procedure well. There were no apparent adverse events. ANESTHESIA INFORMATION: ASA: III Anesthesia Type: Anesthesia type not filed in the log. MEDICATIONS: sodium chloride 0.9 % infusion 400 mL*  *From user-documented volume simethicone (MYLICON) 40 mg in sterile water 120 mL 40 mg (Totals for administrations occurring from 1239 to 1318 on 04/08/24) FINDINGS: The terminal ileum appeared normal. One sessile, adenomatous-appearing Odalis Is polyp measuring 5-9 mm in the ascending colon; performed cold snare with complete en bloc removal and retrieved specimen The cecum, hepatic flexure, transverse colon, splenic flexure, descending colon, rectosigmoid and rectum appeared normal. Random biopsies taken to rule out microscopic colitis. Few small, scattered diverticula in the sigmoid colon External and internal medium hemorrhoids EVENTS: Procedure Events Event Event Time ENDO CECUM REACHED 4/8/2024  1:01 PM ENDO SCOPE OUT TIME 4/8/2024  1:17 PM SPECIMENS: ID Type Source Tests Collected by Time Destination 1 : r/o celiac Tissue Duodenum TISSUE EXAM [facility],  4/8/2024 12:47 PM  2 : r/o h pylori Tissue Stomach TISSUE EXAM Suresh G [facility],  4/8/2024 12:50 PM  3 : r/o microscopic colitis Tissue Colon TISSUE EXAM Suresh G [facility] 4/8/2024  1:05 PM  4 : polyp Tissue Large Intestine, Right/Ascending Colon TISSUE EXAM Suresh G  DO Farrah 4/8/2024  1:08 PM  EQUIPMENT: Colonoscope -     Impression: The terminal ileum appeared normal. One Odalis Is polyp measuring 5-9 mm in the ascending colon; performed cold snare removal The cecum, hepatic flexure, transverse colon, splenic flexure, descending colon, rectosigmoid and rectum appeared normal. Scattered diverticulosis in the sigmoid colon Medium hemorrhoids RECOMMENDATION:  Await pathology results  Repeat colonoscopy in 7 years  Personal history of colon polyps   Return to floor. Resume diet. Resume anticoagulation.= Continue Lomotil for diarrhea Outpatient GI follow up. GI will sign off.    Will Traylor MD     EGD    Result Date: 4/8/2024  Narrative: Table formatting from the original result was not included. [facility] [address] [facility] [address] [phone] DATE OF SERVICE: 4/08/24 PHYSICIAN(S): Attending: Will Traylor MD Fellow: Suresh Yan DO INDICATION: Diarrhea POST-OP DIAGNOSIS: See the impression below. PREPROCEDURE: Informed consent was obtained for the procedure, including sedation.  Risks of perforation, hemorrhage, adverse drug reaction and aspiration were discussed. The patient was placed in the left lateral decubitus position. Patient was explained about the risks and benefits of the procedure. Risks including but not limited to bleeding, infection, and perforation were explained in detail. Also explained about less than 100% sensitivity with the exam and other alternatives. PROCEDURE: EGD DETAILS OF PROCEDURE: Patient was taken to the procedure room where a time out was performed to confirm correct patient and correct procedure. The patient underwent monitored anesthesia care, which was administered by an anesthesia professional. The patient's blood pressure, heart rate, level of consciousness, respirations, oxygen, ECG and ETCO2 were monitored throughout the procedure. The scope was introduced through the mouth and  advanced to the second part of the duodenum. Retroflexion was performed in the fundus. The patient experienced no blood loss. The procedure was not difficult. The patient tolerated the procedure well. There were no apparent adverse events. ANESTHESIA INFORMATION: ASA: III Anesthesia Type: Anesthesia type not filed in the log. MEDICATIONS: simethicone (MYLICON) 40 mg in sterile water 120 mL 40 mg (Totals for administrations occurring from 1239 to 1252 on 04/08/24) FINDINGS: 3 cm sliding hiatal hernia (type I hiatal hernia) - GE junction 37 cm from the incisors, diaphragmatic impression 40 cm from the incisors, confirmed by retroflexion:  Hill classification: Grade II The esophagus and duodenum appeared normal. Patchy edematous and erythematous mucosa with erosion in the antrum Performed forceps biopsies in the body of the stomach, incisura and antrum to rule out H. pylori Performed forceps biopsies in the duodenal bulb, 1st part of the duodenum and 2nd part of the duodenum to rule out celiac disease SPECIMENS: ID Type Source Tests Collected by Time Destination 1 : r/o celiac Tissue Duodenum TISSUE EXAM [facility] 4/8/2024 12:47 PM  2 : r/o h pylori Tissue Stomach TISSUE EXAM [facility] 4/8/2024 12:50 PM      Impression: 3 cm type I hiatal hernia The esophagus and duodenum appeared normal. Edematous and erythematous mucosa with erosion in the antrum Performed forceps biopsies in the body of the stomach, incisura and antrum to rule out H. pylori Performed forceps biopsies in the duodenal bulb, 1st part of the duodenum and 2nd part of the duodenum to rule out celiac disease RECOMMENDATION:  Await pathology results Continue PPI 40 mg daily. Started on Carafate 1 g 4 times daily. Proceed with colonoscopy.   Will Traylor MD     Echo complete w/ contrast if indicated    Result Date: 4/2/2024  Narrative:   Left Ventricle: Left ventricular cavity size is normal. Wall thickness is mildly increased.  There is borderline concentric hypertrophy. The left ventricular ejection fraction is 70% by single dimension measurement. Systolic function is vigorous. Wall motion is normal. Diastolic function is moderately abnormal, consistent with grade II (pseudonormal) relaxation.  Left atrial filling pressure is elevated.   Left Atrium: The atrium is mildly dilated (35-41 mL/m2).   Aortic Valve: The aortic valve is trileaflet. The leaflets are moderately thickened. The leaflets are moderately calcified. There is moderately reduced mobility. There is mild regurgitation. There is moderate to severe stenosis. The aortic valve peak velocity is 3.3 m/s. The aortic valve mean gradient is 25 mmHg. The dimensionless velocity index is 0.38. The aortic valve area is 0.93 cm2. The stroke volume index is 46.90 ml/m2.   Mitral Valve: There is mild regurgitation. There is moderate stenosis.   Tricuspid Valve: There is mild regurgitation. The right ventricular systolic pressure is mildly to moderately elevated. The estimated right ventricular systolic pressure is 46.00 mmHg.     MRA carotids wo contrast    Result Date: 4/1/2024  Narrative: MRA NECK WITHOUT CONTRAST INDICATION: stroke COMPARISON:  None. TECHNIQUE:  Time of Flight angiography with 3D reconstructions. IMAGE QUALITY: Motion degraded examination. FINDINGS: AORTIC ARCH:  Normal. VERTEBRAL ARTERIES:  The vertebral arteries are bilaterally symmetric with preserved enhancement . RIGHT CAROTID ARTERY: Focal motion artifact is noted along the carotid bulb. Preserved flow related signal within the common carotid artery, cervical internal carotid artery and external carotid artery. No suspected stenosis at the bifurcation. LEFT CAROTID ARTERY:  Focal motion artifact is noted along the carotid bulb. Preserved flow related signal within the common carotid artery, cervical internal carotid artery and external carotid artery. No suspected stenosis at the bifurcation. NASCET criteria was  used to determine the degree of internal carotid artery diameter stenosis.     Impression: Motion degraded examination. Focal artifacts are noted along the bilateral carotid bulbs. There is no suspected extracranial carotid stenosis. The cervical vertebral arteries are patent. Workstation performed: [id number]     MRA head wo contrast    Result Date: 4/1/2024  Narrative: MRA BRAIN WITHOUT CONTRAST INDICATION:  stroke COMPARISON: CTA head and neck 4/8/2023 TECHNIQUE:  Axial 3-D time-of-flight imaging with 3-D reconstructions performed without contrast. IV Contrast:  Not administered. FINDINGS: IMAGE QUALITY:  Diagnostic. ANATOMY INTERNAL CAROTID ARTERIES:  Normal flow related signal of the distal cervical, petrous and cavernous segments of the internal carotid arteries.  Normal ICA terminus. ANTERIOR CIRCULATION:  Normal A1 segments.  Normal anterior communicating artery.  Normal flow-related signal of the anterior cerebral arteries. MIDDLE CEREBRAL ARTERY CIRCULATION:  The M1 segment and middle cerebral artery branches demonstrate normal flow-related signal. DISTAL VERTEBRAL ARTERIES:  Distal vertebral arteries are patent with a normal vertebrobasilar junction.  The posterior inferior cerebellar artery origins are normal. BASILAR ARTERY:  Normal. POSTERIOR CEREBRAL ARTERIES:  The right posterior cerebral artery arises from the basilar tip.  There is fetal origin of the left posterior cerebral artery.  Both demonstrate no focal stenosis.  Patent posterior communicating arteries.     Impression: Cerebral MRA demonstrates no large branch vessel occlusion or focal hemodynamically significant stenosis. Fetal left PCA supply. Workstation performed: [id number]     MRI brain wo contrast    Result Date: 4/1/2024  Narrative: MRI BRAIN WITHOUT CONTRAST INDICATION: stroke , slurred speech. COMPARISON:   CT/CTA head and neck 4/1/2024 TECHNIQUE:  Multiplanar, multisequence imaging of the brain was performed. IMAGE QUALITY:   Diagnostic. FINDINGS: BRAIN PARENCHYMA: Chronic lacunar infarcts are noted within the right anterior centrum semiovale with surrounding gliosis. Chronic lacunar infarct within the right posterior cerebellum. There is no discrete mass, mass effect or midline shift. There is no intracranial hemorrhage.  There is no evidence of acute infarction and diffusion imaging is unremarkable. Small scattered hyperintensities on T2/FLAIR imaging are noted in the periventricular and subcortical white matter demonstrating an appearance that is statistically most likely to represent mild microangiopathic change. VENTRICLES:  Normal for the patient's age. SELLA AND PITUITARY GLAND: Evidence of a partially empty sella. ORBITS:  Normal. PARANASAL SINUSES: Small left maxillary sinus retention cyst is noted. VASCULATURE:  Evaluation of the major intracranial vasculature demonstrates appropriate flow voids. CALVARIUM AND SKULL BASE: Marrow signal heterogeneity which can be seen with underlying red marrow proliferation. EXTRACRANIAL SOFT TISSUES:  Normal.     Impression: No acute infarction, edema, or mass effect. Chronic lacunar infarcts within the right anterior centrum semiovale and right posterior cerebellum. Mild chronic microangiopathic ischemic changes. Workstation performed: [id number]     CT abdomen pelvis wo contrast    Result Date: 4/1/2024  Narrative: CT ABDOMEN AND PELVIS WITHOUT IV CONTRAST INDICATION: Abdominal pain, diarrhea. COMPARISON: Right upper quadrant ultrasound 3/27/2024 TECHNIQUE: CT examination of the abdomen and pelvis was performed without intravenous contrast. Multiplanar 2D reformatted images were created from the source data. This examination, like all CT scans performed in the [facility], was performed utilizing techniques to minimize radiation dose exposure, including the use of iterative reconstruction and automated exposure control. Radiation dose length product (DLP) for this visit: 370  mGy-cm Enteric Contrast: Administered. FINDINGS: ABDOMEN Evaluation of the solid organs is limited without IV contrast. LOWER CHEST: No clinically significant abnormality in the visualized lower chest. LIVER/BILIARY TREE: Small left hepatic lobe cyst. Subcentimeter hypodensity in the right hepatic lobe series 2/45, too small to characterize. A 1 cm suspected hemangioma in the left lobe on ultrasound is not clearly visible on noncontrast imaging. GALLBLADDER: Post cholecystectomy. SPLEEN: Unremarkable. PANCREAS: Unremarkable. ADRENAL GLANDS: Unremarkable. KIDNEYS/URETERS: Unremarkable. No hydronephrosis. STOMACH AND BOWEL: Evaluation of the bowel is limited without IV contrast. The stomach is poorly distended, assessment limited. Small hiatal hernia suggested. No evidence of small bowel obstruction. Duodenal diverticulum with air-fluid level. Fluid seen throughout the colon in keeping with history of diarrheal illness. Minimal sigmoid diverticulosis without diverticulitis. APPENDIX: No findings to suggest appendicitis. ABDOMINOPELVIC CAVITY: No ascites. No pneumoperitoneum. No lymphadenopathy. VESSELS: Atherosclerotic changes abdominal aorta without evidence of aneurysm. PELVIS REPRODUCTIVE ORGANS: Unremarkable for patient's age. URINARY BLADDER: Unremarkable. ABDOMINAL WALL/INGUINAL REGIONS: Unremarkable. BONES: No acute fracture or suspicious osseous lesion. Mild lumbar levoscoliosis with advanced multilevel degenerative changes of the spine. Grade 1 spondylolisthesis L4 on L5 secondary to facet arthropathy.     Impression: Fluid seen throughout the colon in keeping with history of diarrheal illness. No focal inflammatory changes. Minimal sigmoid diverticulosis. Limited evaluation of the solid organs without IV contrast. Subcentimeter right hepatic lobe hypodensity too small to characterize. Limited study without IV contrast. Workstation performed: [id number]     CT stroke alert brain    Result Date:  4/1/2024  Narrative: CT BRAIN - STROKE ALERT PROTOCOL INDICATION:   Aphasia. COMPARISON: CTA head and neck 4/8/2023 TECHNIQUE:  CT examination of the brain was performed.  In addition to axial images, coronal reformatted images were created and submitted for interpretation. Radiation dose length product (DLP) for this visit:  901 mGy-cm .  This examination, like all CT scans performed in the [facility], was performed utilizing techniques to minimize radiation dose exposure, including the use of iterative reconstruction and automated exposure control. IMAGE QUALITY:  Diagnostic. FINDINGS: PARENCHYMA: New area of hypodensity involving subcortical and deep white matter in the anterior right frontal lobe. Stable chronic right cerebellar lacunar infarct (series 2 image 13). No intracranial mass, mass effect or midline shift. No acute parenchymal hemorrhage. Redemonstrated partially empty sella. VENTRICLES AND EXTRA-AXIAL SPACES:  Normal for the patient's age. VISUALIZED ORBITS: Normal visualized orbits. PARANASAL SINUSES: Normal visualized paranasal sinuses. CALVARIUM AND EXTRACRANIAL SOFT TISSUES:   Normal.     Impression: 1.  New area of hypodensity involving subcortical and deep white matter in the anterior right frontal lobe most likely late subacute to chronic infarct. Superimposed acute infarct in the less hypodense regions are not excluded. There is no mass effect or  hemorrhage. 2.  Stable old right cerebellar lacunar infarct. Findings were directly discussed with Isaac Rucker at approximately 2:09 p.m. Workstation performed: [id number]      right upper quadrant    Result Date: 4/1/2024  Narrative: RIGHT UPPER QUADRANT ULTRASOUND INDICATION: R10.11: Right upper quadrant pain. COMPARISON: None TECHNIQUE: Real-time ultrasound of the right upper quadrant was performed with a curvilinear transducer with both volumetric sweeps and still imaging techniques. FINDINGS: PANCREAS: Visualized portions  of the pancreas are within normal limits. AORTA AND IVC: Visualized portions are normal for patient age. LIVER: Size: Within normal range. The liver measures 14.0 cm in the midclavicular line. Contour: Surface contour is smooth. Parenchyma: Echogenicity and echotexture are within normal limits. A few small simple cysts are present. 1 cm echogenic lesion in the left hepatic lobe (image 61) likely reflects a hemangioma in the absence of background liver disease. Limited imaging of the main portal vein shows it to be patent and hepatopetal. BILIARY: Patient has undergone cholecystectomy. No intrahepatic biliary dilatation. CBD measures 6.0 mm. No choledocholithiasis. KIDNEY: Right kidney measures 9.2 x 4.8 x 3.6 cm. Volume 83.4 mL Kidney within normal limits. ASCITES: None.     Impression: 1.  No acute findings. 2.  A single 1 cm echogenic lesion in the left hepatic lobe is indeterminate in the absence of comparison imaging but likely reflects a hemangioma in a low-risk patient. Workstation performed: [id number]       Orders  No orders of the defined types were placed in this encounter.

## 2024-04-19 ENCOUNTER — APPOINTMENT (OUTPATIENT)
Dept: LAB | Facility: IMAGING CENTER | Age: 74
End: 2024-04-19
Payer: MEDICARE

## 2024-04-19 DIAGNOSIS — R19.7 DIARRHEA, UNSPECIFIED TYPE: ICD-10-CM

## 2024-04-19 PROCEDURE — 83993 ASSAY FOR CALPROTECTIN FECAL: CPT

## 2024-04-19 NOTE — TELEPHONE ENCOUNTER
Called pt to advise Xifaxan has been approved by the insurance. Your pharmacy has been made aware of your approval, please call them to see when your medication will be available for .    [x]Spoke with pt. Verbal understanding  []LMOM   []L/M to call office back. Communication consent not updated in chart  []Other     Patient said that she picked up med at pharmacy 04/18/24

## 2024-04-21 DIAGNOSIS — R19.7 DIARRHEA, UNSPECIFIED TYPE: ICD-10-CM

## 2024-04-22 RX ORDER — DIPHENOXYLATE HYDROCHLORIDE AND ATROPINE SULFATE 2.5; .025 MG/1; MG/1
1 TABLET ORAL 4 TIMES DAILY PRN
Qty: 60 TABLET | Refills: 0 | Status: SHIPPED | OUTPATIENT
Start: 2024-04-22

## 2024-04-23 LAB — CALPROTECTIN STL-MCNT: 94 UG/G (ref 0–120)

## 2024-04-30 ENCOUNTER — LAB (OUTPATIENT)
Dept: LAB | Facility: IMAGING CENTER | Age: 74
End: 2024-04-30
Payer: MEDICARE

## 2024-04-30 ENCOUNTER — TELEPHONE (OUTPATIENT)
Dept: NEUROLOGY | Facility: CLINIC | Age: 74
End: 2024-04-30

## 2024-04-30 DIAGNOSIS — E83.52 HYPERCALCEMIA: ICD-10-CM

## 2024-04-30 DIAGNOSIS — N17.9 AKI (ACUTE KIDNEY INJURY) (HCC): ICD-10-CM

## 2024-04-30 DIAGNOSIS — E83.42 HYPOMAGNESEMIA: ICD-10-CM

## 2024-04-30 DIAGNOSIS — E87.6 HYPOKALEMIA: ICD-10-CM

## 2024-04-30 DIAGNOSIS — N18.31 STAGE 3A CHRONIC KIDNEY DISEASE (HCC): ICD-10-CM

## 2024-04-30 LAB
ALBUMIN SERPL BCP-MCNC: 3.8 G/DL (ref 3.5–5)
ANION GAP SERPL CALCULATED.3IONS-SCNC: 10 MMOL/L (ref 4–13)
BUN SERPL-MCNC: 15 MG/DL (ref 5–25)
CALCIUM SERPL-MCNC: 9.7 MG/DL (ref 8.4–10.2)
CHLORIDE SERPL-SCNC: 108 MMOL/L (ref 96–108)
CO2 SERPL-SCNC: 26 MMOL/L (ref 21–32)
CREAT SERPL-MCNC: 1.12 MG/DL (ref 0.6–1.3)
GFR SERPL CREATININE-BSD FRML MDRD: 49 ML/MIN/1.73SQ M
GLUCOSE P FAST SERPL-MCNC: 69 MG/DL (ref 65–99)
MAGNESIUM SERPL-MCNC: 1.7 MG/DL (ref 1.9–2.7)
PHOSPHATE SERPL-MCNC: 4.4 MG/DL (ref 2.3–4.1)
POTASSIUM SERPL-SCNC: 4.3 MMOL/L (ref 3.5–5.3)
SODIUM SERPL-SCNC: 144 MMOL/L (ref 135–147)

## 2024-04-30 PROCEDURE — 83735 ASSAY OF MAGNESIUM: CPT

## 2024-04-30 PROCEDURE — 36415 COLL VENOUS BLD VENIPUNCTURE: CPT

## 2024-04-30 PROCEDURE — 80069 RENAL FUNCTION PANEL: CPT

## 2024-04-30 NOTE — TELEPHONE ENCOUNTER
1ST ATTEMPT,     Called pt no answer, LMOM.    LETTER SENT     Thank you,     Soni       HFU/ [facility] ALL/ Transient episode of aphasia     DC- HOME- 4/8/2024    Almaz Hall will need follow up in in 6 weeks with neurovascular attending, AP, or resident .  She will not require outpatient neurological testing.

## 2024-05-01 ENCOUNTER — TELEPHONE (OUTPATIENT)
Dept: NEPHROLOGY | Facility: CLINIC | Age: 74
End: 2024-05-01

## 2024-05-01 NOTE — RESULT ENCOUNTER NOTE
Please let the patient know that most recent lab work in terms of renal parameters are stable.    Please follow low phosphorus diet as phosphorus level is running slightly high  Will discuss further at the upcoming visit with Dr. Lujan, let me know if they have any questions or concerns.    Thanks

## 2024-05-01 NOTE — TELEPHONE ENCOUNTER
----- Message from Morenita Tillman MD sent at 5/1/2024  9:07 AM EDT -----  Please let the patient know that most recent lab work in terms of renal parameters are stable.    Please follow low phosphorus diet as phosphorus level is running slightly high  Will discuss further at the upcoming visit with Dr. Lujan, let me know if they have any questions or concerns.    Thanks

## 2024-05-02 NOTE — TELEPHONE ENCOUNTER
I spoke to tyesha she is aware renal function stable. Follow low phos diet at this time. I sent diet through my chart.     - labs will be further discussed during next follow up.

## 2024-05-03 ENCOUNTER — PATIENT MESSAGE (OUTPATIENT)
Dept: GASTROENTEROLOGY | Facility: CLINIC | Age: 74
End: 2024-05-03

## 2024-05-03 DIAGNOSIS — R19.7 DIARRHEA, UNSPECIFIED TYPE: Primary | ICD-10-CM

## 2024-05-06 ENCOUNTER — TELEPHONE (OUTPATIENT)
Dept: NEUROLOGY | Facility: CLINIC | Age: 74
End: 2024-05-06

## 2024-05-06 NOTE — TELEPHONE ENCOUNTER
ADD ON  HFU/ [facility] ALL/ Transient episode of aphasia   DC- HOME- 4/8/2024  Almaz Hall will need follow up in in 6 weeks with neurovascular attending, AP, or resident . She will not require outpatient neurological testing.

## 2024-05-07 DIAGNOSIS — R19.7 DIARRHEA, UNSPECIFIED TYPE: Primary | ICD-10-CM

## 2024-05-07 DIAGNOSIS — R19.5 ELEVATED FECAL CALPROTECTIN: ICD-10-CM

## 2024-05-08 ENCOUNTER — OFFICE VISIT (OUTPATIENT)
Dept: NEUROLOGY | Facility: CLINIC | Age: 74
End: 2024-05-08
Payer: MEDICARE

## 2024-05-08 ENCOUNTER — TELEPHONE (OUTPATIENT)
Age: 74
End: 2024-05-08

## 2024-05-08 VITALS
BODY MASS INDEX: 24.38 KG/M2 | DIASTOLIC BLOOD PRESSURE: 53 MMHG | HEIGHT: 63 IN | SYSTOLIC BLOOD PRESSURE: 102 MMHG | HEART RATE: 73 BPM | WEIGHT: 137.6 LBS | OXYGEN SATURATION: 100 % | TEMPERATURE: 97.1 F

## 2024-05-08 DIAGNOSIS — K58.0 IRRITABLE BOWEL SYNDROME WITH DIARRHEA: Primary | ICD-10-CM

## 2024-05-08 DIAGNOSIS — Z86.73 HISTORY OF STROKE: ICD-10-CM

## 2024-05-08 DIAGNOSIS — I48.0 PAROXYSMAL ATRIAL FIBRILLATION (HCC): Primary | ICD-10-CM

## 2024-05-08 PROCEDURE — 99215 OFFICE O/P EST HI 40 MIN: CPT

## 2024-05-08 NOTE — TELEPHONE ENCOUNTER
PA for Xifaxan    Submitted via    []CMM-KEY   [x]Surescripts-Case ID #   Case ID: [id number]   Fax: [phone]     []Faxed to plan   []Other website   []Phone call Case ID #     Office notes sent, clinical questions answered. Awaiting determination    Turnaround time for your insurance to make a decision on your Prior Authorization can take 7-21 business days.

## 2024-05-08 NOTE — TELEPHONE ENCOUNTER
PA for Xifaxan Approved     Date(s) approved until 5/22/2024    Case #    Patient advised by          [x] [employer] Message  [] Phone call   []LMOM  []L/M to call office as no active Communication consent on file  []Unable to leave detailed message as VM not approved on Communication consent       Pharmacy advised by    [x]Fax  []Phone call    Approval letter scanned into Media Yes

## 2024-05-08 NOTE — ASSESSMENT & PLAN NOTE
Almaz Hall is a 74 year old female with recent episode of mild difficulty word finding. Although workup was negative for acute ischemia she was found to have chronic lacunar infarcts of the right anterior centrum semiovale and right posterior cerebellum that she was not previously aware of. Etiology of speech difficulty was thought to be related to diarrhea/weight loss/JANELLE, less likely TIA. Although loop recorder interrogation revealed possible reoccurrence of Afib, therefore her Eliquis was restarted. She now continues on Eliquis for Afib, Aspirin for hx of lacunar infarct and mitral valve stenosis, as well as Atorvastatin 20 mg daily. Her other vascular risk factors are well controlled at this time. She denies any acute or worsening neurologic symptoms concerning for recurrent TIA or Stroke, although she does note numbness and tingling in bilateral feet which is new since discharge. She is continuing to deal with chronic diarrhea and related electrolyte fluctuations. We discussed her numbness and tingling may be related to this, less likely related to a vascular event considering bilateral presentation with no other focal or lateralizing symptoms. Should this continue after resolution of diarrhea or progress she should let us know with low threshold to repeat MRI or consider lower extremity EMG. We reviewed her stroke risk factors and management of the same. She was provided stroke education and we reviewed stroke warning signs/symptoms and reasons to return by ambulance to the ER. She will follow up in 6 months; sooner if needed.        Plan:  - Continue with good blood pressure control; I would recommend monitoring at home at least 3 times per week; Goal of <130/80; at goal   - Continue with good cholesterol control; Goal LDL <70; at goal   - Continue with good blood sugar control; Goal HgbA1c <7.0; at goal   - Will defer monitoring of cholesterol and blood sugar and management of hypertensive medications to  the primary care provider  - Stay well hydrated by drinking enough water   - Eat a healthy diet, high in lean meats fish, turkey, chicken. Low in fats, cholesterol, sugars and sodium. Avoid canned foods,  get lets of fresh/frozen vegetables/fruits.  - Get routine exercise/physical activity as much as able to tolerate  - Keep follow ups with your other health care providers  - Continue Eliquis, aspirin and atorvastatin   - Fall precautions    I will plan for her to return to the office in 6 months time but would be happy to see her sooner if the need should arise.  If she has any symptoms concerning for TIA or stroke including sudden painless loss of vision or double vision, difficulty speaking or swallowing, vertigo/room spinning that does not quickly resolve, or weakness/numbness affecting 1 side of the face or body she should proceed by ambulance to the nearest emergency room immediately.

## 2024-05-08 NOTE — PROGRESS NOTES
Patient ID: Almaz Hall is a 74 y.o. female.    Assessment/Plan:    History of stroke  Almaz Hall is a 74 year old female with recent episode of mild difficulty word finding. Although workup was negative for acute ischemia she was found to have chronic lacunar infarcts of the right anterior centrum semiovale and right posterior cerebellum that she was not previously aware of. Etiology of speech difficulty was thought to be related to diarrhea/weight loss/JANELLE, less likely TIA. Although loop recorder interrogation revealed possible reoccurrence of Afib, therefore her Eliquis was restarted. She now continues on Eliquis for Afib, Aspirin for hx of lacunar infarct and mitral valve stenosis, as well as Atorvastatin 20 mg daily. Her other vascular risk factors are well controlled at this time. She denies any acute or worsening neurologic symptoms concerning for recurrent TIA or Stroke, although she does note numbness and tingling in bilateral feet which is new since discharge. She is continuing to deal with chronic diarrhea and related electrolyte fluctuations. We discussed her numbness and tingling may be related to this, less likely related to a vascular event considering bilateral presentation with no other focal or lateralizing symptoms. Should this continue after resolution of diarrhea or progress she should let us know with low threshold to repeat MRI or consider lower extremity EMG. We reviewed her stroke risk factors and management of the same. She was provided stroke education and we reviewed stroke warning signs/symptoms and reasons to return by ambulance to the ER. She will follow up in 6 months; sooner if needed.        Plan:  - Continue with good blood pressure control; I would recommend monitoring at home at least 3 times per week; Goal of <130/80; at goal   - Continue with good cholesterol control; Goal LDL <70; at goal   - Continue with good blood sugar control; Goal HgbA1c <7.0; at goal   - Will defer  monitoring of cholesterol and blood sugar and management of hypertensive medications to the primary care provider  - Stay well hydrated by drinking enough water   - Eat a healthy diet, high in lean meats fish, turkey, chicken. Low in fats, cholesterol, sugars and sodium. Avoid canned foods,  get lets of fresh/frozen vegetables/fruits.  - Get routine exercise/physical activity as much as able to tolerate  - Keep follow ups with your other health care providers  - Continue Eliquis, aspirin and atorvastatin   - Fall precautions    I will plan for her to return to the office in 6 months time but would be happy to see her sooner if the need should arise.  If she has any symptoms concerning for TIA or stroke including sudden painless loss of vision or double vision, difficulty speaking or swallowing, vertigo/room spinning that does not quickly resolve, or weakness/numbness affecting 1 side of the face or body she should proceed by ambulance to the nearest emergency room immediately.    Paroxysmal atrial fibrillation (HCC)  Maintained on Eliquis 5 mg bid  Followed by Cardiology  Loop recorder in place        Diagnoses and all orders for this visit:    Paroxysmal atrial fibrillation (HCC)    History of stroke         I have spent a total time of 45 minutes on 05/08/24 in caring for this patient including Diagnostic results, Prognosis, Risks and benefits of tx options, Instructions for management, Patient and family education, Importance of tx compliance, Risk factor reductions, Impressions, Documenting in the medical record, Reviewing / ordering tests, medicine, procedures  , and Obtaining or reviewing history  .      Subjective:    RADHA Hall is a 73-year-old female with atrial fibrillation status post ablation not on AC, CAD, HTN, HLD, SVT s/p ablation, and JANELLE on CKD stage IIIa with recent weight loss and diarrhea admitted 4/1 for ongoing JANELLE management.     Stroke alert activated 4/1 afternoon for acute onset  speech difficulty;described as intermittent garbled speech/word finding difficulty. NIHSS 0 in ED. BP 96/50 with . CTH showed right frontal hypodensity; CTA deferred. Deemed not a TNK candidate. Loaded with asa and plavix and admitted on the stroke pathway     Neuroimaging/work-up:  -CT head: Anterior right frontal lobe hypodensity, suggesting late subacute/chronic infarct, no acute hemorrhage nor other intracranial pathology  -CTA head/neck was deferred given low NIHSS (minimal suspicion for LVO and ongoing JANELLE)  -MRI brain: No acute infarction, edema, or mass effect.  Chronic lacunar infarcts within the right anterior centrum semiovale and right posterior cerebellum.  Mild chronic microangiopathic ischemic changes.  -MRA head:Cerebral MRA demonstrates no large branch vessel occlusion or focal hemodynamically significant stenosis. Fetal left PCA supply.  -MRA carotids:Motion degraded examination. Focal artifacts are noted along the bilateral carotid bulbs. There is no suspected extracranial carotid stenosis.The cervical vertebral arteries are patent.  -LDL 44  -A1c 5.6  Recommendations:  MRI negative for acute infarct; chronic lacunar infarcts noted. Unclear etiology of now resolved transient/intermittent episodes of mild word finding difficulty  -ECHO complete; report pending  -D/C plavix, continue asa 81mg daily monotherapy  -Continue Lipitor 20 mg daily  -interrogate loop monitor  -goal normotension; avoid hypotension  -no additional inpatient neurology recommendations; please reach out with any questions/concerns or change in exam  -Medical management and correction of any metabolic or infectious disturbances per primary service.   -no neurological contraindication for colonoscopy    Attending Attestation:    Assessment:  73-year-old woman with history of A-fib s/p ablation and loop recorder placement to monitor for recurrence, which had not been seen prior to this hospitalization, and she is therefore  not on AC, was sent to the ED on 4/1 due to abnormal lab results (significant JANELLE) in the setting of several months of diarrhea with many bowel movements per day and significant weight loss over the last 3-4 months, and a stroke alert was activated a few hours later while still in the ED when she briefly had difficulty speaking a few times, lasting seconds at a time.  MRI brain completed yesterday evening and was negative for an acute stroke, and MRA head/neck were unremarkable.  I continue to have low suspicion for TIA at this time based on what was described and observed with regards to her speech difficulty.  The description is also quite consistent with some difficulty with concentration/attention in the setting of her ongoing difficulties with diarrhea and weight loss and now potentially a new problem of kidney damage.  Fortunately her creatinine has improved significantly today.  Clearance for EGD/colonoscopy was requested by GI for concern for possible stroke/TIA, however, given the negative MRI and low concern for TIA as detailed, I do not have any neurologic concerns with regard to her having these procedures completed as soon as possible.     Plan:  -Continue aspirin 81 mg daily and Lipitor 20 mg daily (lower dose given her LDL was 44)  -No indication to continue Plavix at this time  -Still reasonable to interrogate her loop recorder.  -Recommend stroke risk factor optimization, including maintaining normal blood pressure.  Avoid hypotension.  -Consider starting multivitamin.  B1, B6, and B12 were checked due to her weight loss, and she was also started on thiamine supplementation just in case, level level is pending.  B12 is elevated and as well within the normal range (863).  -As mentioned above, at this time there are no neurologic concerns or contraindications when considering performing any necessary procedure.  Isaac Rucker MD        Addendum 4/5/2024: Notified by internal medicine that her loop  recorder interrogation showed possible recurrent A-fib.  Given that ablations for A-fib are noted not to be 100% effective, and that her loop recorder was placed only to monitor for recurrent A-fib, any recurrence of A-fib would seem to be a clear indication to restart anticoagulation if there are no contraindications.  If anticoagulation is recommended by cardiology, it should be discussed with GI prior to starting given her upcoming procedures, and it may be an indication to move the procedures up again so that anticoagulation could be started sooner rather than later for stroke prevention, if it is necessary (i.e. if there is recurrent A-fib).  There remain no neurologic concerns with regards to performing her necessary GI procedures that are being done to evaluate her 3-4 months of severe intractable diarrhea associated weight loss.    -----------------------------------------------------------------------------------------    She returns today in hospital follow up. She denies any prior known hx of CVA. Since discharge she denies any new or worsening neurologic symptoms concerning for TIA or Stroke. She does continue with chronic diarrhea for which she is following with GI.    Secondary Stroke Prevention  She continues on Eliquis 5 mg bid (restarted after this admission due to recurrent Afib) and Aspirin 81 mg daily (this is new since discharge)  She also takes Atorvastatin 20 mg daily    Compliant with her medications, no issues affording or obtaining medications.   Denies excessive bruising or bleeding     Deficits  She states her bilateral big toes and balls of her feet are numb/tingling- noticed this after being home from the hospital. This is always present, but not painful.  She does have intermittent dizziness when standing quickly or changing position quickly   Denies weakness, dizziness, headaches, vision changes or any new or worsening stroke like symptoms  Denies any difficulty with speaking or  swallowing     Monitoring  BP- she monitors at home BID-TID; reports readings are <120/70  Today BP is 102/53; asymptomatic   HgbA1c 4/2/24 5.6  4/2/24 total cholesterol 102, triglycerides 152, HDL 28, LDL 44  MRA Carotid 4/1/24- no suspected stenosis  MRI Brain wo 4/1/24: Chronic lacunar infarcts are noted within the right anterior centrum semiovale with surrounding gliosis. Chronic lacunar infarct within the right posterior cerebellum.   She continues to have a loop recorder in place      Safety  Lives at home with her   Independent of all ADLs  She reports a fall 2 months ago (prior to hospitalization) after losing her balance in the basement while turning; denies any falls afterward  She is managing her own medications and finances  assistive devices- none  Driving- no concerns other than stiff neck at times  Memory- no concerns    Substance use    Smoking- prior smoker 20-30 yrs x 1PPD. Quit in 2018  Etoh- none; denies etoh abuse, only social use in the past  Drugs- none  Caffeine- coffee 2 cups daily      Sleep  She does have difficulty staying asleep secondary to neck or hip pain  She snores  She does not report excessive daytime sleepiness  Never dx with LESLIE     Diet  She does follow a low cholesterol diet although admits she is not strict  Did try to go gluten free x 2 weeks; no change in diarrhea      Exercise  Denies any formal exercise     Mood  She does report anxiety; takes Lexapro  She feels more depressed since she has been sick  She admits to feeling more emotional; cries more easily      Follow ups  Follows with GI, PCP, Cardiology and Nephrology       The following portions of the patient's history were reviewed and updated as appropriate: allergies, current medications, past family history, past medical history, past social history, past surgical history, and problem list.     Objective:    Blood pressure 102/53, pulse 73, temperature (!) 97.1 °F (36.2 °C), temperature source Temporal,  "height 5' 3\" (1.6 m), weight 62.4 kg (137 lb 9.6 oz), SpO2 100%, not currently breastfeeding.    Neurological Exam    On neurological examination patient is alert, awake, oriented and in no distress. Speech is fluent without dysarthria or aphasia. She is able to rise easily without assistance from a seated position. Casual gait is normal including stance, stride, and arm swing.        ROS:    Review of Systems   Constitutional:  Negative for appetite change, fatigue and fever.   HENT: Negative.  Negative for hearing loss, tinnitus, trouble swallowing and voice change.    Eyes: Negative.  Negative for photophobia, pain and visual disturbance.   Respiratory: Negative.  Negative for shortness of breath.    Cardiovascular: Negative.  Negative for palpitations.   Gastrointestinal: Negative.  Negative for nausea and vomiting.   Endocrine: Negative.  Negative for cold intolerance.   Genitourinary: Negative.  Negative for dysuria, frequency and urgency.   Musculoskeletal:  Positive for neck pain (everyday into shoulders) and neck stiffness. Negative for back pain, gait problem and myalgias.   Skin: Negative.  Negative for rash.   Allergic/Immunologic: Negative.    Neurological:  Positive for numbness (on both of her feet they feel numb). Negative for dizziness, tremors, seizures, syncope, facial asymmetry, speech difficulty, weakness, light-headedness and headaches.   Hematological: Negative.  Does not bruise/bleed easily.   Psychiatric/Behavioral: Negative.  Negative for confusion, hallucinations and sleep disturbance.    All other systems reviewed and are negative.    Reviewed ROS as entered by medical assistant.                "

## 2024-05-08 NOTE — PATIENT INSTRUCTIONS
- Continue with good blood pressure control; I would recommend monitoring at home at least 3 times per week; Goal of <130/80; at goal   - Continue with good cholesterol control; Goal LDL <70; at goal   - Continue with good blood sugar control; Goal HgbA1c <7.0; at goal   - Will defer monitoring of cholesterol and blood sugar and management of hypertensive medications to the primary care provider  - Stay well hydrated by drinking enough water   - Eat a healthy diet, high in lean meats fish, turkey, chicken. Low in fats, cholesterol, sugars and sodium. Avoid canned foods,  get lets of fresh/frozen vegetables/fruits.  - Get routine exercise/physical activity as much as able to tolerate  - Keep follow ups with your other health care providers  - Continue Eliquis, aspirin and atorvastatin   - Fall precautions    I will plan for her to return to the office in 6 months time but would be happy to see her sooner if the need should arise.  If she has any symptoms concerning for TIA or stroke including sudden painless loss of vision or double vision, difficulty speaking or swallowing, vertigo/room spinning that does not quickly resolve, or weakness/numbness affecting 1 side of the face or body she should proceed by ambulance to the nearest emergency room immediately.

## 2024-05-15 ENCOUNTER — OFFICE VISIT (OUTPATIENT)
Dept: NEPHROLOGY | Facility: CLINIC | Age: 74
End: 2024-05-15
Payer: MEDICARE

## 2024-05-15 DIAGNOSIS — N18.32 HYPERTENSIVE KIDNEY DISEASE WITH STAGE 3B CHRONIC KIDNEY DISEASE (HCC): ICD-10-CM

## 2024-05-15 DIAGNOSIS — I51.89 DIASTOLIC DYSFUNCTION: ICD-10-CM

## 2024-05-15 DIAGNOSIS — E83.42 HYPOMAGNESEMIA: ICD-10-CM

## 2024-05-15 DIAGNOSIS — I12.9 HYPERTENSIVE KIDNEY DISEASE WITH STAGE 3B CHRONIC KIDNEY DISEASE (HCC): ICD-10-CM

## 2024-05-15 DIAGNOSIS — E87.6 HYPOKALEMIA: ICD-10-CM

## 2024-05-15 DIAGNOSIS — N18.31 STAGE 3A CHRONIC KIDNEY DISEASE (HCC): Primary | ICD-10-CM

## 2024-05-15 PROCEDURE — 99214 OFFICE O/P EST MOD 30 MIN: CPT | Performed by: INTERNAL MEDICINE

## 2024-05-15 RX ORDER — MAGNESIUM GLYCINATE 100 MG
100 CAPSULE ORAL 2 TIMES DAILY
Qty: 90 CAPSULE | Refills: 2 | Status: SHIPPED | OUTPATIENT
Start: 2024-05-15

## 2024-05-15 RX ORDER — SPIRONOLACTONE 25 MG/1
12.5 TABLET ORAL 3 TIMES WEEKLY
Qty: 36 TABLET | Refills: 2 | Status: SHIPPED | OUTPATIENT
Start: 2024-05-15

## 2024-05-15 NOTE — PROGRESS NOTES
NEPHROLOGY OUTPATIENT PROGRESS NOTE   Almaz Hall 74 y.o. female MRN: [id number]  Reason for visit: Recent acute kidney injury on chronic kidney disease    ASSESSMENT and PLAN:  Recent acute kidney injury with peak creatinine of 1.8.  Etiology secondary to impaired renal autoregulation given angiotensin receptor blocker use possibly depletion given longstanding diarrhea.  With IV fluids renal function returned to baseline  Chronic kidney disease, baseline creatinine 1.0-1.2 most recent creatinine 1.12 with an estimated GFR of 49.  History of hypokalemia, improved with dietary supplementation currently off prescription supplementation  Chronic diarrhea, following closely with GI.  Anticipating MRI.  Consider switching mag oxide to magnesium glycinate  Hypomagnesemia, current levels appear fairly stable, again consider switching from mag oxide to mag glycinate given diarrhea.  Hypertension blood pressure overall appears stable, continue to hold angiotensin receptor blocker.  Could consider resuming low-dose spironolactone given some slight lower extremity swelling but would only resume it 3 times per week.    Overall renal function has returned to her baseline.  She is complaining of some slight lower extremity swelling and is okay to resume spironolactone but just at 3 times per week.  Again also recommend switching to magnesium glycinate given ongoing diarrhea  Will repeat laboratory studies in 1 month assuming stable can follow-up in 6 months with repeat labs at that time.  Continue with home blood pressure monitoring.      SUBJECTIVE / INTERVAL HISTORY:  Unfortunate was recently hospitalized secondary to persistent diarrhea as well as abnormal kidney function with a creatinine of 1.8.  With IV fluids her renal function did improve.  Her angiotensin receptor blocker plus spironolactone has been on hold.  Unfortunately now still complains of persistent diarrhea and is following closely with gastroenterology.   Anticipating MRI of her abdomen.      OBJECTIVE:  LMP  (LMP Unknown)   There were no vitals filed for this visit.    Physical Exam  Constitutional:       Appearance: She is not ill-appearing.   Eyes:      General: No scleral icterus.  Cardiovascular:      Rate and Rhythm: Normal rate and regular rhythm.   Pulmonary:      Effort: Pulmonary effort is normal.      Breath sounds: Normal breath sounds.   Abdominal:      General: There is no distension.      Palpations: Abdomen is soft.      Tenderness: There is no abdominal tenderness.   Musculoskeletal:      Right lower leg: No edema.      Left lower leg: No edema.   Skin:     General: Skin is warm and dry.      Findings: No rash.   Neurological:      Mental Status: She is alert and oriented to person, place, and time.           Medications:    Current Outpatient Medications:     Magnesium Glycinate 100 MG CAPS, Take 100 mg by mouth 2 (two) times a day, Disp: 90 capsule, Rfl: 2    spironolactone (ALDACTONE) 25 mg tablet, Take 0.5 tablets (12.5 mg total) by mouth 3 (three) times a week, Disp: 36 tablet, Rfl: 2    apixaban (Eliquis) 5 mg, Take 1 tablet (5 mg total) by mouth 2 (two) times a day, Disp: 60 tablet, Rfl: 0    aspirin 81 mg chewable tablet, Chew 1 tablet (81 mg total) daily, Disp: 30 tablet, Rfl: 0    atorvastatin (LIPITOR) 20 mg tablet, TAKE 1 TABLET BY MOUTH EVERY DAY, Disp: 90 tablet, Rfl: 3    b complex vitamins capsule, Take 1 capsule by mouth daily, Disp: 30 capsule, Rfl: 0    cetirizine (ZyrTEC) 10 mg tablet, Take 10 mg by mouth as needed , Disp: , Rfl:     diltiazem (TIAZAC) 120 MG 24 hr capsule, TAKE 1 CAPSULE BY MOUTH EVERY DAY, Disp: 90 capsule, Rfl: 3    diphenoxylate-atropine (LOMOTIL) 2.5-0.025 mg per tablet, Take 1 tablet by mouth 4 (four) times a day as needed for diarrhea, Disp: 60 tablet, Rfl: 0    escitalopram (LEXAPRO) 10 mg tablet, TAKE 1 TABLET BY MOUTH EVERY DAY, Disp: 30 tablet, Rfl: 11    levalbuterol (XOPENEX HFA) 45 mcg/act inhaler,  Inhale 1-2 puffs every 4 (four) hours as needed for wheezing, Disp: 15 g, Rfl: 0    mupirocin (BACTROBAN) 2 % ointment, Apply topically daily (Patient not taking: Reported on 5/8/2024), Disp: 30 g, Rfl: 0    pantoprazole (PROTONIX) 40 mg tablet, Take 1 tablet (40 mg total) by mouth daily in the early morning, Disp: 30 tablet, Rfl: 0    polyethylene glycol (MiraLax) 17 GM/SCOOP powder, Take 238 g by mouth once for 1 dose Take 238 g my mouth. Use as directed (Patient not taking: Reported on 4/16/2024), Disp: 238 g, Rfl: 0    rifaximin (XIFAXAN) 550 mg tablet, Take 1 tablet (550 mg total) by mouth every 8 (eight) hours for 14 days (Patient not taking: Reported on 5/8/2024), Disp: 42 tablet, Rfl: 0    Laboratory Results:  Results for orders placed or performed in visit on 04/30/24   Renal function panel   Result Value Ref Range    Albumin 3.8 3.5 - 5.0 g/dL    Calcium 9.7 8.4 - 10.2 mg/dL    Phosphorus 4.4 (H) 2.3 - 4.1 mg/dL    BUN 15 5 - 25 mg/dL    Creatinine 1.12 0.60 - 1.30 mg/dL    Sodium 144 135 - 147 mmol/L    Potassium 4.3 3.5 - 5.3 mmol/L    Chloride 108 96 - 108 mmol/L    CO2 26 21 - 32 mmol/L    ANION GAP 10 4 - 13 mmol/L    eGFR 49 ml/min/1.73sq m    Glucose, Fasting 69 65 - 99 mg/dL   Magnesium   Result Value Ref Range    Magnesium 1.7 (L) 1.9 - 2.7 mg/dL

## 2024-05-16 DIAGNOSIS — R19.7 DIARRHEA, UNSPECIFIED TYPE: ICD-10-CM

## 2024-05-17 RX ORDER — DIPHENOXYLATE HYDROCHLORIDE AND ATROPINE SULFATE 2.5; .025 MG/1; MG/1
1 TABLET ORAL 4 TIMES DAILY PRN
Qty: 60 TABLET | Refills: 0 | Status: SHIPPED | OUTPATIENT
Start: 2024-05-17

## 2024-05-21 ENCOUNTER — OFFICE VISIT (OUTPATIENT)
Dept: PODIATRY | Facility: CLINIC | Age: 74
End: 2024-05-21
Payer: MEDICARE

## 2024-05-21 VITALS — SYSTOLIC BLOOD PRESSURE: 108 MMHG | HEART RATE: 80 BPM | DIASTOLIC BLOOD PRESSURE: 56 MMHG

## 2024-05-21 DIAGNOSIS — L97.529 ULCER OF LEFT FOOT, UNSPECIFIED ULCER STAGE (HCC): ICD-10-CM

## 2024-05-21 DIAGNOSIS — I48.0 PAROXYSMAL ATRIAL FIBRILLATION (HCC): ICD-10-CM

## 2024-05-21 PROCEDURE — 99202 OFFICE O/P NEW SF 15 MIN: CPT | Performed by: PODIATRIST

## 2024-05-21 NOTE — PROGRESS NOTES
Ambulatory Visit  Name: Almaz Hall      : 1950      MRN: [id number]  Encounter Provider: Kayode Randolph DPM  Encounter Date: 2024   Encounter department: [facility] [place]    Assessment & Plan   1. Ulcer of left foot, unspecified ulcer stage (HCC)  -     Ambulatory Referral to Podiatry  Wound is healed, no sign of infection or cellulitis  Otherwise normal foot exam  Reappoint as needed.     History of Present Illness     Almaz Hall is a 74 y.o. female who presents with left foot pain. In April she developed a small sore in between her left 4,5 toes that got cellulitis. She was put on antibiotics and it healed. IT has gotten a lot better since she made the appointment. PMH significant for previous stroke, CHF, emphysema, heart disease, CKD3.      Review of Systems  As stated in HPI, otherwise normal    Medical History Reviewed by provider this encounter:  Tobacco  Allergies  Meds  Problems  Med Hx  Surg Hx  Fam Hx       Objective     /56 (BP Location: Left arm, Patient Position: Sitting, Cuff Size: Standard)   Pulse 80   LMP  (LMP Unknown)     Physical Exam  Vitals reviewed.   Cardiovascular:      Pulses: Normal pulses.           Dorsalis pedis pulses are 2+ on the right side and 2+ on the left side.   Musculoskeletal:         General: No deformity.   Skin:     Capillary Refill: Capillary refill takes less than 2 seconds.      Findings: No bruising, erythema or lesion.   Neurological:      Mental Status: She is alert.      Sensory: No sensory deficit.      Gait: Gait normal.       Administrative Statements

## 2024-06-04 DIAGNOSIS — K21.9 GERD (GASTROESOPHAGEAL REFLUX DISEASE): ICD-10-CM

## 2024-06-04 RX ORDER — PANTOPRAZOLE SODIUM 40 MG/1
40 TABLET, DELAYED RELEASE ORAL
Qty: 30 TABLET | Refills: 11 | Status: SHIPPED | OUTPATIENT
Start: 2024-06-04

## 2024-06-05 ENCOUNTER — REMOTE DEVICE CLINIC VISIT (OUTPATIENT)
Dept: CARDIOLOGY CLINIC | Facility: CLINIC | Age: 74
End: 2024-06-05
Payer: MEDICARE

## 2024-06-05 DIAGNOSIS — Z95.818 PRESENCE OF OTHER CARDIAC IMPLANTS AND GRAFTS: Primary | ICD-10-CM

## 2024-06-05 PROCEDURE — 93298 REM INTERROG DEV EVAL SCRMS: CPT | Performed by: STUDENT IN AN ORGANIZED HEALTH CARE EDUCATION/TRAINING PROGRAM

## 2024-06-05 NOTE — PROGRESS NOTES
MDT LNQ22/ACTIVE SYSTEM IS MRI CONDITIONAL   CARELINK TRANSMISSION:  BATTERY VOLTAGE ADEQUATE.  NO PATIENT OR DEVICE ACTIVATED EPISODES.  NORMAL DEVICE FUNCTION. RG

## 2024-06-11 ENCOUNTER — APPOINTMENT (OUTPATIENT)
Dept: LAB | Facility: IMAGING CENTER | Age: 74
End: 2024-06-11
Payer: MEDICARE

## 2024-06-11 DIAGNOSIS — N18.31 STAGE 3A CHRONIC KIDNEY DISEASE (HCC): ICD-10-CM

## 2024-06-11 DIAGNOSIS — I12.9 HYPERTENSIVE KIDNEY DISEASE WITH STAGE 3B CHRONIC KIDNEY DISEASE (HCC): ICD-10-CM

## 2024-06-11 DIAGNOSIS — I51.89 DIASTOLIC DYSFUNCTION: ICD-10-CM

## 2024-06-11 DIAGNOSIS — E87.6 HYPOKALEMIA: ICD-10-CM

## 2024-06-11 DIAGNOSIS — E83.42 HYPOMAGNESEMIA: ICD-10-CM

## 2024-06-11 DIAGNOSIS — N18.32 HYPERTENSIVE KIDNEY DISEASE WITH STAGE 3B CHRONIC KIDNEY DISEASE (HCC): ICD-10-CM

## 2024-06-11 LAB
ANION GAP SERPL CALCULATED.3IONS-SCNC: 8 MMOL/L (ref 4–13)
BUN SERPL-MCNC: 17 MG/DL (ref 5–25)
CALCIUM SERPL-MCNC: 9.9 MG/DL (ref 8.4–10.2)
CHLORIDE SERPL-SCNC: 106 MMOL/L (ref 96–108)
CO2 SERPL-SCNC: 24 MMOL/L (ref 21–32)
CREAT SERPL-MCNC: 1.09 MG/DL (ref 0.6–1.3)
GFR SERPL CREATININE-BSD FRML MDRD: 50 ML/MIN/1.73SQ M
GLUCOSE P FAST SERPL-MCNC: 82 MG/DL (ref 65–99)
MAGNESIUM SERPL-MCNC: 1.4 MG/DL (ref 1.9–2.7)
POTASSIUM SERPL-SCNC: 3.8 MMOL/L (ref 3.5–5.3)
SODIUM SERPL-SCNC: 138 MMOL/L (ref 135–147)

## 2024-06-11 PROCEDURE — 83735 ASSAY OF MAGNESIUM: CPT

## 2024-06-11 PROCEDURE — 80048 BASIC METABOLIC PNL TOTAL CA: CPT

## 2024-06-11 PROCEDURE — 36415 COLL VENOUS BLD VENIPUNCTURE: CPT

## 2024-06-12 ENCOUNTER — TELEPHONE (OUTPATIENT)
Dept: OTHER | Facility: HOSPITAL | Age: 74
End: 2024-06-12

## 2024-06-12 DIAGNOSIS — E83.42 HYPOMAGNESEMIA: ICD-10-CM

## 2024-06-12 DIAGNOSIS — N18.32 STAGE 3B CHRONIC KIDNEY DISEASE (HCC): Primary | ICD-10-CM

## 2024-06-12 NOTE — TELEPHONE ENCOUNTER
Spoke with patient, reviewed laboratory studies.  Renal function remained stable, potassium normal.  Magnesium still low, recommend increasing mag supplementation to twice daily.  Repeat lab labs in 4 to 6 weeks.

## 2024-06-14 DIAGNOSIS — R19.7 DIARRHEA, UNSPECIFIED TYPE: ICD-10-CM

## 2024-06-14 RX ORDER — DIPHENOXYLATE HYDROCHLORIDE AND ATROPINE SULFATE 2.5; .025 MG/1; MG/1
1 TABLET ORAL 4 TIMES DAILY PRN
Qty: 60 TABLET | Refills: 0 | Status: SHIPPED | OUTPATIENT
Start: 2024-06-14

## 2024-07-01 DIAGNOSIS — K21.9 GERD (GASTROESOPHAGEAL REFLUX DISEASE): ICD-10-CM

## 2024-07-02 RX ORDER — PANTOPRAZOLE SODIUM 40 MG/1
40 TABLET, DELAYED RELEASE ORAL
Qty: 30 TABLET | Refills: 0 | Status: SHIPPED | OUTPATIENT
Start: 2024-07-02

## 2024-07-03 ENCOUNTER — HOSPITAL ENCOUNTER (OUTPATIENT)
Dept: MRI IMAGING | Facility: HOSPITAL | Age: 74
Discharge: HOME | End: 2024-07-03
Payer: MEDICARE

## 2024-07-03 DIAGNOSIS — R19.5 ELEVATED FECAL CALPROTECTIN: ICD-10-CM

## 2024-07-03 DIAGNOSIS — R19.7 DIARRHEA, UNSPECIFIED TYPE: ICD-10-CM

## 2024-07-03 PROCEDURE — 72197 MRI PELVIS W/O & W/DYE: CPT

## 2024-07-03 PROCEDURE — 74183 MRI ABD W/O CNTR FLWD CNTR: CPT

## 2024-07-03 PROCEDURE — A9585 GADOBUTROL INJECTION: HCPCS | Performed by: PHYSICIAN ASSISTANT

## 2024-07-03 RX ORDER — GADOBUTROL 604.72 MG/ML
6 INJECTION INTRAVENOUS
Status: COMPLETED | OUTPATIENT
Start: 2024-07-03 | End: 2024-07-03

## 2024-07-03 RX ADMIN — GADOBUTROL 6 ML: 604.72 INJECTION INTRAVENOUS at 09:48

## 2024-07-03 RX ADMIN — GLUCAGON 1 MG: KIT at 09:36

## 2024-07-10 ENCOUNTER — APPOINTMENT (OUTPATIENT)
Dept: LAB | Facility: IMAGING CENTER | Age: 74
End: 2024-07-10
Payer: MEDICARE

## 2024-07-10 DIAGNOSIS — E83.42 HYPOMAGNESEMIA: ICD-10-CM

## 2024-07-10 DIAGNOSIS — N18.32 STAGE 3B CHRONIC KIDNEY DISEASE (HCC): ICD-10-CM

## 2024-07-10 LAB
ANION GAP SERPL CALCULATED.3IONS-SCNC: 11 MMOL/L (ref 4–13)
BUN SERPL-MCNC: 14 MG/DL (ref 5–25)
CALCIUM SERPL-MCNC: 9.5 MG/DL (ref 8.4–10.2)
CHLORIDE SERPL-SCNC: 109 MMOL/L (ref 96–108)
CO2 SERPL-SCNC: 21 MMOL/L (ref 21–32)
CREAT SERPL-MCNC: 0.97 MG/DL (ref 0.6–1.3)
GFR SERPL CREATININE-BSD FRML MDRD: 58 ML/MIN/1.73SQ M
GLUCOSE P FAST SERPL-MCNC: 71 MG/DL (ref 65–99)
MAGNESIUM SERPL-MCNC: 1.4 MG/DL (ref 1.9–2.7)
POTASSIUM SERPL-SCNC: 3.4 MMOL/L (ref 3.5–5.3)
SODIUM SERPL-SCNC: 141 MMOL/L (ref 135–147)

## 2024-07-10 PROCEDURE — 80048 BASIC METABOLIC PNL TOTAL CA: CPT

## 2024-07-10 PROCEDURE — 83735 ASSAY OF MAGNESIUM: CPT

## 2024-07-10 PROCEDURE — 36415 COLL VENOUS BLD VENIPUNCTURE: CPT

## 2024-07-11 ENCOUNTER — TELEPHONE (OUTPATIENT)
Age: 74
End: 2024-07-11

## 2024-07-11 DIAGNOSIS — E83.42 HYPOMAGNESEMIA: Primary | ICD-10-CM

## 2024-07-11 DIAGNOSIS — E87.6 HYPOKALEMIA: ICD-10-CM

## 2024-07-11 RX ORDER — MONTELUKAST SODIUM 4 MG/1
1 TABLET, CHEWABLE ORAL 2 TIMES DAILY
Qty: 60 TABLET | Refills: 5 | Status: SHIPPED | OUTPATIENT
Start: 2024-07-11

## 2024-07-11 RX ORDER — POTASSIUM CHLORIDE 20 MEQ/1
20 TABLET, EXTENDED RELEASE ORAL DAILY
COMMUNITY

## 2024-07-11 NOTE — PROGRESS NOTES
Recently reviewed laboratory studies and discussed with patient.  Noted persistent hypokalemia as well as hypomagnesemia.  Patient with continued diarrhea without clear etiology.  She is supposed to be starting WelChol in the very near future.  For now we will resume potassium supplementation 20 mEq daily with continued magnesium supplementation.  Recommend repeating BMP and magnesium level in 2 to 4 weeks.

## 2024-07-19 DIAGNOSIS — K21.9 GERD (GASTROESOPHAGEAL REFLUX DISEASE): ICD-10-CM

## 2024-07-19 RX ORDER — PANTOPRAZOLE SODIUM 40 MG/1
40 TABLET, DELAYED RELEASE ORAL
Qty: 30 TABLET | Refills: 0 | Status: SHIPPED | OUTPATIENT
Start: 2024-07-19

## 2024-07-23 ENCOUNTER — OFFICE VISIT (OUTPATIENT)
Dept: CARDIOLOGY CLINIC | Facility: CLINIC | Age: 74
End: 2024-07-23
Payer: MEDICARE

## 2024-07-23 VITALS
BODY MASS INDEX: 23.74 KG/M2 | SYSTOLIC BLOOD PRESSURE: 124 MMHG | HEIGHT: 63 IN | WEIGHT: 134 LBS | HEART RATE: 96 BPM | DIASTOLIC BLOOD PRESSURE: 60 MMHG | OXYGEN SATURATION: 98 %

## 2024-07-23 DIAGNOSIS — I35.0 NON-RHEUMATIC AORTIC STENOSIS: ICD-10-CM

## 2024-07-23 DIAGNOSIS — N18.32 STAGE 3B CHRONIC KIDNEY DISEASE (HCC): ICD-10-CM

## 2024-07-23 DIAGNOSIS — I48.0 PAROXYSMAL ATRIAL FIBRILLATION (HCC): ICD-10-CM

## 2024-07-23 DIAGNOSIS — I47.10 SVT (SUPRAVENTRICULAR TACHYCARDIA): ICD-10-CM

## 2024-07-23 DIAGNOSIS — E87.6 HYPOKALEMIA: Primary | ICD-10-CM

## 2024-07-23 PROCEDURE — 99214 OFFICE O/P EST MOD 30 MIN: CPT | Performed by: INTERNAL MEDICINE

## 2024-07-23 RX ORDER — POTASSIUM CHLORIDE 20 MEQ/1
20 TABLET, EXTENDED RELEASE ORAL DAILY
Qty: 90 TABLET | Refills: 3 | Status: SHIPPED | OUTPATIENT
Start: 2024-07-23

## 2024-07-23 NOTE — PROGRESS NOTES
Cardiology Follow Up    Almaz Hall  1950  [id number]  [facility] [place]  [address]  [address]  [place] [id number] [phone]    1. Hypokalemia  potassium chloride (Klor-Con M20) 20 mEq tablet      2. Paroxysmal atrial fibrillation (HCC)        3. SVT (supraventricular tachycardia)        4. Non-rheumatic aortic stenosis        5. Stage 3b chronic kidney disease (HCC)          Discussion/Summary:    PAF:  I reviewed the loop recorder interrogations. Although these do seem to show sinus rhythm and then PACs, she had TIA. Agree with anticoagulation which should be long-term. She is currently on aspirin in addition per neurology recommendations. If desired, I would be okay tapering this down in the future but anticoagulation with Eliquis should continue long-term. She is in sinus rhythm with Cardizem currently.    Valve disease mixed:  Moderate to severe aortic stenosis. Moderate mitral stenosis. Currently no major degree of symptoms or limitations. Follow-up clinically in 6 months. Discussed the need for intervention in the future. She will eventually need referral to CT surgery. Discussed potential for surgical repair which would allow both valves to be addressed but longer recovery, higher risk. Otherwise, TAVR would leave the mitral stenosis unaddressed. Will likely need AVERY for better evaluation as she gets closer to needing a intervention    CKD:  As her diarrhea has improved, her renal function has improved, as well. She is back on spironolactone 3 times a week.    Hypokalemia, hypomagnesemia:  On supplementation, blood work followed by her nephrologist. I refilled the potassium since she was out of tablets.      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  She has a history of SVT.  She has undergone ablation in 2018.  Previously, she was on metoprolol, but said she had a very low heart rate with this.   Because she also has COPD, she was changed to diltiazem.  She has been tolerating this well.    Shortly after her SVT ablation, she was on Xarelto as well as amlodipine.  She had a rash to 1 of them, not sure which 1.  She has not been on anything else for hypertension previously.    In 2022, she developed afib. Saw EP, underwent ablation for this. Had some dCHF with valve disease and arrhythmia.    She has mixed valve disease - AS now moderate to severe, mild to moderate AI, moderate MS, mild to moderate MR.    In March 2024, she had outpatient lab work done which showed hypokalemia, hypomagnesemia, JANELLE. She was referred to the emergency room, but there actually had symptoms of aphasia and had TIA identified.      Interval History:    She returns for follow-up. In March, she had outpatient blood work which was abnormal and she was referred to the ER for JANELLE creatinine was 1.9. Potassium was 3. In the ER, and she had symptoms of aphasia. A stroke alert was called and she had evidence of old infarcts. Her loop recorder was interrogated and I reviewed the interrogation which is in the notes section. It appears that there was no A-fib which is the official readings of the loop recorder interrogations as an outpatient that she has had, also. However, given her symptoms she was advised to start Eliquis which she is tolerating.    She had a repeat echo done which showed some progression of the aortic stenosis which is now moderate to severe. Mitral stenosis remains moderate.    She has had no shortness of breath, PND, orthopnea. She went to the beach recently and felt a little tired and had to take breaks but overall was able to get around without any major problems.     She continues to have a lot of issues with diarrhea. She has been on a few different agents and finally now with the addition of cholestyramine this seems to be controlled. She was restarted on potassium and magnesium supplementation was changed. His  repeat blood work to be done next month, and she is on the spironolactone 3 times a week.      Problem List       Depression with anxiety    Dyslipidemia    Essential hypertension    Irritable bowel syndrome with diarrhea    Lumbar degenerative disc disease    Vitamin D deficiency    Greater trochanteric bursitis of right hip    Pulmonary emphysema (HCC)    SVT (supraventricular tachycardia) (HCC)    Medicare annual wellness visit, subsequent    Diastolic dysfunction    Mitral valve disorder    Aortic valve insufficiency    Achilles tendinitis of left lower extremity    Posterior tibial tendinitis, left    Obesity (BMI 30-39.9)    Stage 3 chronic kidney disease    Lab Results   Component Value Date    EGFR 58 07/10/2024    EGFR 50 2024    EGFR 49 2024    CREATININE 0.97 07/10/2024    CREATININE 1.09 2024    CREATININE 1.12 2024         Hypertensive kidney disease with stage 3 chronic kidney disease    Lab Results   Component Value Date    EGFR 58 07/10/2024    EGFR 50 2024    EGFR 49 2024    CREATININE 0.97 07/10/2024    CREATININE 1.09 2024    CREATININE 1.12 2024               Past Medical History:   Diagnosis Date    Asthma     Atrial fibrillation (HCC)     with ablation    Bursitis of hip     Last Assessed:2017    COPD (chronic obstructive pulmonary disease) (HCC) 5 years ago    Coronary artery disease     Herniated lumbar intervertebral disc     Hyperlipidemia     Hypertension 2 years    Pituitary adenoma (HCC)     Last Assessed:2015 ; Previously on bromocriptine    Spinal stenosis     SVT (supraventricular tachycardia)     with ablation    Systolic murmur of aorta      Social History     Tobacco Use    Smoking status: Former     Current packs/day: 0.00     Average packs/day: 1 pack/day for 30.0 years (30.0 ttl pk-yrs)     Types: Cigarettes     Start date: 1988     Quit date: 2018     Years since quittin.6    Smokeless tobacco: Never    Vaping Use    Vaping status: Never Used   Substance Use Topics    Alcohol use: Not Currently     Alcohol/week: 1.0 standard drink of alcohol     Types: 1 Shots of liquor per week     Comment: social    Drug use: No      Family History   Problem Relation Age of Onset    Colon cancer Mother     Coronary artery disease Mother     Colonic polyp Mother     Liver cancer Mother     Multiple sclerosis Father     Hypertension Brother     Hypertension Other     Breast cancer Neg Hx     Cancer Neg Hx      Past Surgical History:   Procedure Laterality Date    BREAST SURGERY      Incisional breast biopsy    CARDIAC ELECTROPHYSIOLOGY PROCEDURE N/A 09/02/2022    Procedure: Cardiac eps/afib ablation;  Surgeon: Fadi Goss MD;  [facility];  Service: Cardiology    CARDIAC ELECTROPHYSIOLOGY PROCEDURE N/A 12/07/2022    Procedure: Cardiac loop recorder implant;  Surgeon: Bao Kovacs MD;  [facility]: [facility];  Service: Cardiology    CARDIAC ELECTROPHYSIOLOGY STUDY AND ABLATION      CARDIAC LOOP RECORDER      CARPAL TUNNEL RELEASE Left     CHOLECYSTECTOMY      OTHER SURGICAL HISTORY      EXCISION OF PAROTID TUMOR/GLAND    PAROTIDECTOMY      WY COLONOSCOPY FLX DX W/COLLJ SPEC WHEN PFRMD N/A 06/06/2017    Procedure: COLONOSCOPY;  Surgeon: Elizabeth Leyva DO;  Location: [facility];  Service: Gastroenterology    WY HYSTEROSCOPY BX ENDOMETRIUM&/POLYPC W/WO D&C N/A 11/22/2023    Procedure: (D&C) W/ HYSTEROSCOPY;  Surgeon: Harini Kinney MD;  Location: [facility];  Service: Gynecology    ROTATOR CUFF REPAIR      SALPINGOOPHORECTOMY Right        Current Outpatient Medications:     apixaban (Eliquis) 5 mg, Take 1 tablet (5 mg total) by mouth 2 (two) times a day, Disp: 60 tablet, Rfl: 5    aspirin 81 mg chewable tablet, Chew 1 tablet (81 mg total) daily, Disp: 30 tablet, Rfl: 0    atorvastatin (LIPITOR) 20 mg tablet, TAKE 1 TABLET BY MOUTH EVERY DAY, Disp: 90 tablet, Rfl: 3    b complex vitamins  capsule, Take 1 capsule by mouth daily, Disp: 30 capsule, Rfl: 0    cetirizine (ZyrTEC) 10 mg tablet, Take 10 mg by mouth daily, Disp: , Rfl:     colestipol (COLESTID) 1 g tablet, Take 1 tablet (1 g total) by mouth 2 (two) times a day, Disp: 60 tablet, Rfl: 5    diltiazem (TIAZAC) 120 MG 24 hr capsule, TAKE 1 CAPSULE BY MOUTH EVERY DAY, Disp: 90 capsule, Rfl: 3    escitalopram (LEXAPRO) 10 mg tablet, TAKE 1 TABLET BY MOUTH EVERY DAY, Disp: 30 tablet, Rfl: 11    levalbuterol (XOPENEX HFA) 45 mcg/act inhaler, Inhale 1-2 puffs every 4 (four) hours as needed for wheezing, Disp: 15 g, Rfl: 0    Magnesium Glycinate 100 MG CAPS, Take 100 mg by mouth 2 (two) times a day, Disp: 90 capsule, Rfl: 2    pantoprazole (PROTONIX) 40 mg tablet, TAKE 1 TABLET BY MOUTH EVERY DAY IN THE EARLY MORNING, Disp: 30 tablet, Rfl: 0    potassium chloride (Klor-Con M20) 20 mEq tablet, Take 1 tablet (20 mEq total) by mouth daily, Disp: 90 tablet, Rfl: 3    Probiotic Product (PRO-BIOTIC BLEND PO), Take by mouth, Disp: , Rfl:     spironolactone (ALDACTONE) 25 mg tablet, Take 0.5 tablets (12.5 mg total) by mouth 3 (three) times a week, Disp: 36 tablet, Rfl: 2    diphenoxylate-atropine (LOMOTIL) 2.5-0.025 mg per tablet, Take 1 tablet by mouth 4 (four) times a day as needed for diarrhea (Patient not taking: Reported on 7/23/2024), Disp: 60 tablet, Rfl: 0  Allergies   Allergen Reactions    Bystolic [Nebivolol Hcl] Bradycardia     Hear rate to 20's    Levofloxacin Hives    Vasotec [Enalapril] Hives    Amlodipine Rash    Ancef [Cefazolin] GI Intolerance     Cdiff    Cephalosporins Other (See Comments)     c.diff    Fruit Extracts      Annotation - [id number]: peaches and strawberries    Morphine GI Intolerance     Extreme vomiting      Other      FRUIT    Penicillins Hives    Tetracycline Edema    Xarelto [Rivaroxaban] Rash       Vitals:    07/23/24 0756   BP: 124/60   BP Location: Right arm   Patient Position: Sitting   Cuff Size: Standard   Pulse: 96  "  SpO2: 98%   Weight: 60.8 kg (134 lb)   Height: 5' 3\" (1.6 m)     Vitals:    07/23/24 0756   Weight: 60.8 kg (134 lb)      Height: 5' 3\" (160 cm)   Body mass index is 23.74 kg/m².    Physical Exam:  GEN: Almaz Hall appears well, alert and oriented x 3, pleasant and cooperative   HEENT: pupils equal, round, and reactive to light; extraocular muscles intact  NECK: supple, no carotid bruits   HEART: regular + LAKESHA  LUNGS: clear to auscultation bilaterally; no wheezes, rales, or rhonchi   ABDOMEN: normal bowel sounds, soft, no tenderness, no distention  EXTREMITIES: peripheral pulses normal; no clubbing, cyanosis, or edema  NEURO: no focal findings   SKIN: normal without suspicious lesions on exposed skin    ROS:  Positive for diarrhea, weight loss.  Except as noted in HPI, is otherwise reviewed in detail and a 12 point review of systems is negative.  ROS reviewed and is unchanged      Labs:  Lab Results   Component Value Date     05/04/2015    K 3.4 (L) 07/10/2024     (H) 07/10/2024    CREATININE 0.97 07/10/2024    BUN 14 07/10/2024    CO2 21 07/10/2024    ALT 15 04/08/2024    AST 17 04/08/2024    INR 1.08 04/04/2024    GLUF 71 07/10/2024    HGBA1C 5.6 04/02/2024    WBC 7.10 04/15/2024    HGB 11.6 04/15/2024    HCT 37.7 04/15/2024     04/15/2024       Lab Results   Component Value Date    CHOL 187 05/04/2015    CHOL 199 08/20/2014     Lab Results   Component Value Date    LDLCALC 44 04/02/2024    LDLCALC 100 03/13/2024    LDLCALC 84 03/15/2023     Lab Results   Component Value Date    HDL 28 (L) 04/02/2024    HDL 53 03/13/2024    HDL 77 03/15/2023     Lab Results   Component Value Date    TRIG 152 (H) 04/02/2024    TRIG 199 (H) 03/13/2024    TRIG 162 (H) 03/15/2023       Testing:  Echo 4/2/24  Left Ventricle: Left ventricular cavity size is normal. Wall thickness is mildly increased. There is borderline concentric hypertrophy. The left ventricular ejection fraction is 70% by single dimension " measurement. Systolic function is vigorous. Wall motion is normal. Diastolic function is moderately abnormal, consistent with grade II (pseudonormal) relaxation.  Left atrial filling pressure is elevated.    Left Atrium: The atrium is mildly dilated (35-41 mL/m2).    Aortic Valve: The aortic valve is trileaflet. The leaflets are moderately thickened. The leaflets are moderately calcified. There is moderately reduced mobility. There is mild regurgitation. There is moderate to severe stenosis. The aortic valve peak velocity is 3.3 m/s. The aortic valve mean gradient is 25 mmHg. The dimensionless velocity index is 0.38. The aortic valve area is 0.93 cm2. The stroke volume index is 46.90 ml/m2.    Mitral Valve: There is mild regurgitation. There is moderate stenosis.    Tricuspid Valve: There is mild regurgitation. The right ventricular systolic pressure is mildly to moderately elevated. The estimated right ventricular systolic pressure is 46.00 mmHg.    Echo 5/11/23:  Left Ventricle: Left ventricular cavity size is normal. Wall thickness is mildly increased. There is concentric remodeling. Systolic function is normal. Wall motion is normal. Diastolic function is moderately abnormal, consistent with grade II (pseudonormal) relaxation.    Right Ventricle: Right ventricular cavity size is normal. Systolic function is normal.    Left Atrium: The atrium is mildly dilated (35-41 mL/m2).    Aortic Valve: The aortic valve is trileaflet. The leaflets are moderately thickened. The leaflets are moderately calcified. There is moderately reduced mobility. There is mild to moderate regurgitation. There is moderate stenosis. The aortic valve mean gradient is 20 mmHg. The dimensionless velocity index is 0.44. The aortic valve area is 1.23 cm2. The aortic valve velocity is increased due to stenosis.    Mitral Valve: The leaflets are not thickened. There is moderate focal calcification of the anterior leaflet and posterior leaflet  limited to the leaflet margin. There is mild annular calcification. There is moderate subvalvular calcification. There is mild to moderate regurgitation with a centrally directed jet. There is moderate to severe stenosis (MVA 1.4 cm2/MG 8 mmHg).    Tricuspid Valve: There is mild regurgitation. The right ventricular systolic pressure is mildly elevated. The estimated right ventricular systolic pressure is 48.00 mmHg.    Stress 5/11/23:  Stress ECG: No ST deviation is noted. The ECG was not diagnostic due to pharmacological (vasodilator) stress. The stress ECG is equivocal for ischemia after pharmacologic vasodilation.    Perfusion: There are no perfusion defects.    Stress Function: Left ventricular function post-stress is normal. Post-stress ejection fraction is 88 %.    Stress Combined Conclusion: Left ventricular perfusion is normal.      AVERY: 9/2022  Done pre ablation. AS probably mild-moderate.  MV not fully evaluated, visually some restriction movement and calcification.  EF preserved.    TTE 7/2022:  Left Ventricle: Left ventricular cavity size is normal. Wall thickness is mildly increased. The left ventricular ejection fraction is 60%. Systolic function is normal. Wall motion is normal.    Right Ventricle: Right ventricular cavity size is mildly dilated.    Left Atrium: The atrium is moderately dilated.    Aortic Valve: The aortic valve is trileaflet. The leaflets are not thickened. The leaflets are moderately calcified. There is mildly reduced mobility. There is mild regurgitation. There is mild to moderate stenosis. The aortic valve velocity is increased due to stenosis.    Mitral Valve: There is moderate calcification. There is moderately reduced mobility. There is mild annular calcification. There is mild regurgitation. There is moderate stenosis.    Tricuspid Valve: There is mild to moderate regurgitation.      Echo 5/13/21:  LEFT VENTRICLE:  Systolic function was vigorous. Ejection fraction was  estimated to be 65 %.  There were no regional wall motion abnormalities.  Wall thickness was at the upper limits of normal.     MITRAL VALVE:  There was mild annular calcification.  There was mild to moderate regurgitation.     AORTIC VALVE:  The valve was trileaflet. Leaflets exhibited moderately increased thickness, moderate calcification, and mild to moderately reduced cuspal separation.  There was mild to moderate stenosis.  There was mild to moderate regurgitation.  The peak valve velocity was 306 cm/s.  Valve mean gradient was 21.6 mmHg.     TRICUSPID VALVE:  There was mild regurgitation.  Pulmonary artery systolic pressure was mildly increased.  Estimated peak PA pressure was 39 mmHg.      Echo 1/2020:  LEFT VENTRICLE:  Systolic function was normal by visual assessment. Ejection fraction was estimated to be 65 %.  There were no regional wall motion abnormalities.  Doppler parameters were consistent with abnormal left ventricular relaxation (grade 1 diastolic dysfunction).     LEFT ATRIUM:  The atrium was mildly dilated.     MITRAL VALVE:  There was mild regurgitation.     AORTIC VALVE:  The valve was trileaflet. Leaflets exhibited mild calcification, reduced mobility, and sclerosis.  There was mild to moderate stenosis.  There was moderate regurgitation.  Valve mean gradient was 20.7 mmHg.      Sinus rhythm PAC. 74 BPM. PRWP.

## 2024-08-06 ENCOUNTER — APPOINTMENT (OUTPATIENT)
Dept: LAB | Facility: IMAGING CENTER | Age: 74
End: 2024-08-06
Payer: MEDICARE

## 2024-08-06 DIAGNOSIS — E83.42 HYPOMAGNESEMIA: ICD-10-CM

## 2024-08-06 DIAGNOSIS — I12.9 HYPERTENSIVE KIDNEY DISEASE WITH STAGE 3B CHRONIC KIDNEY DISEASE (HCC): ICD-10-CM

## 2024-08-06 DIAGNOSIS — N18.32 HYPERTENSIVE KIDNEY DISEASE WITH STAGE 3B CHRONIC KIDNEY DISEASE (HCC): ICD-10-CM

## 2024-08-06 DIAGNOSIS — E55.9 VITAMIN D DEFICIENCY: ICD-10-CM

## 2024-08-06 DIAGNOSIS — E87.6 HYPOKALEMIA: ICD-10-CM

## 2024-08-06 DIAGNOSIS — E83.52 HYPERCALCEMIA: ICD-10-CM

## 2024-08-06 DIAGNOSIS — N18.31 STAGE 3A CHRONIC KIDNEY DISEASE (HCC): ICD-10-CM

## 2024-08-06 LAB
ALBUMIN SERPL BCG-MCNC: 4 G/DL (ref 3.5–5)
ALP SERPL-CCNC: 73 U/L (ref 34–104)
ALT SERPL W P-5'-P-CCNC: 23 U/L (ref 7–52)
ANION GAP SERPL CALCULATED.3IONS-SCNC: 11 MMOL/L (ref 4–13)
AST SERPL W P-5'-P-CCNC: 22 U/L (ref 13–39)
BILIRUB SERPL-MCNC: 0.41 MG/DL (ref 0.2–1)
BUN SERPL-MCNC: 20 MG/DL (ref 5–25)
CALCIUM SERPL-MCNC: 9.4 MG/DL (ref 8.4–10.2)
CHLORIDE SERPL-SCNC: 104 MMOL/L (ref 96–108)
CO2 SERPL-SCNC: 23 MMOL/L (ref 21–32)
CREAT SERPL-MCNC: 1.21 MG/DL (ref 0.6–1.3)
CREAT UR-MCNC: 248.2 MG/DL
ERYTHROCYTE [DISTWIDTH] IN BLOOD BY AUTOMATED COUNT: 13.8 % (ref 11.6–15.1)
GFR SERPL CREATININE-BSD FRML MDRD: 44 ML/MIN/1.73SQ M
GLUCOSE P FAST SERPL-MCNC: 77 MG/DL (ref 65–99)
HCT VFR BLD AUTO: 35.9 % (ref 34.8–46.1)
HGB BLD-MCNC: 11.4 G/DL (ref 11.5–15.4)
MAGNESIUM SERPL-MCNC: 1.4 MG/DL (ref 1.9–2.7)
MCH RBC QN AUTO: 29.7 PG (ref 26.8–34.3)
MCHC RBC AUTO-ENTMCNC: 31.8 G/DL (ref 31.4–37.4)
MCV RBC AUTO: 94 FL (ref 82–98)
MICROALBUMIN UR-MCNC: 42.2 MG/L
MICROALBUMIN/CREAT 24H UR: 17 MG/G CREATININE (ref 0–30)
PLATELET # BLD AUTO: 272 THOUSANDS/UL (ref 149–390)
PMV BLD AUTO: 10.7 FL (ref 8.9–12.7)
POTASSIUM SERPL-SCNC: 4.4 MMOL/L (ref 3.5–5.3)
PROT SERPL-MCNC: 6.6 G/DL (ref 6.4–8.4)
PTH-INTACT SERPL-MCNC: 66.3 PG/ML (ref 12–88)
RBC # BLD AUTO: 3.84 MILLION/UL (ref 3.81–5.12)
SODIUM SERPL-SCNC: 138 MMOL/L (ref 135–147)
URATE SERPL-MCNC: 6.9 MG/DL (ref 2–7.5)
WBC # BLD AUTO: 5.02 THOUSAND/UL (ref 4.31–10.16)

## 2024-08-06 PROCEDURE — 80053 COMPREHEN METABOLIC PANEL: CPT

## 2024-08-06 PROCEDURE — 83970 ASSAY OF PARATHORMONE: CPT

## 2024-08-06 PROCEDURE — 83735 ASSAY OF MAGNESIUM: CPT

## 2024-08-06 PROCEDURE — 36415 COLL VENOUS BLD VENIPUNCTURE: CPT

## 2024-08-08 ENCOUNTER — OFFICE VISIT (OUTPATIENT)
Dept: URGENT CARE | Age: 74
End: 2024-08-08
Payer: MEDICARE

## 2024-08-08 VITALS
TEMPERATURE: 98.7 F | DIASTOLIC BLOOD PRESSURE: 57 MMHG | OXYGEN SATURATION: 94 % | HEART RATE: 84 BPM | SYSTOLIC BLOOD PRESSURE: 114 MMHG

## 2024-08-08 DIAGNOSIS — J06.9 ACUTE URI: Primary | ICD-10-CM

## 2024-08-08 PROCEDURE — 99213 OFFICE O/P EST LOW 20 MIN: CPT | Performed by: EMERGENCY MEDICINE

## 2024-08-08 PROCEDURE — G0463 HOSPITAL OUTPT CLINIC VISIT: HCPCS | Performed by: EMERGENCY MEDICINE

## 2024-08-08 NOTE — PROGRESS NOTES
[facility]        NAME: Almaz Hall is a 74 y.o. female  : 1950    MRN: [id number]  DATE: 2024  TIME: 9:13 AM    Assessment and Plan   Acute URI [J06.9]  1. Acute URI          4-5 days congestion, sore throat, previous fever ( 100-101) and chills. Taking tylenol. No current fever. No pain. Discussed symptom management. No wheezing, SOB. Minimal cough.     Patient Instructions       Follow up with PCP in 3-5 days.  Proceed to  ER if symptoms worsen.    If tests have been performed at [facility], our office will contact you with results if changes need to be made to the care plan discussed with you at the visit.  You can review your full results on [facility].    Chief Complaint     Chief Complaint   Patient presents with    Cold Like Symptoms     Started Monday with cold/ flu symptoms. Fever, sore throat, congestion, chills. Taking zyrtec daily.          History of Present Illness       4-5 days congestion, sore throat, previous fever ( 100-101) and chills. Taking tylenol. No current fever. No pain. Discussed symptom management. No wheezing, SOB. Minimal cough.         Review of Systems   Review of Systems   Constitutional:  Positive for fever. Negative for appetite change.        Resolved   HENT:  Positive for congestion, postnasal drip and sore throat.    Respiratory:  Negative for cough, shortness of breath and wheezing.    Cardiovascular:  Negative for chest pain.   All other systems reviewed and are negative.        Current Medications       Current Outpatient Medications:     apixaban (Eliquis) 5 mg, Take 1 tablet (5 mg total) by mouth 2 (two) times a day, Disp: 60 tablet, Rfl: 5    aspirin 81 mg chewable tablet, Chew 1 tablet (81 mg total) daily, Disp: 30 tablet, Rfl: 0    atorvastatin (LIPITOR) 20 mg tablet, TAKE 1 TABLET BY MOUTH EVERY DAY, Disp: 90 tablet, Rfl: 3    b complex vitamins capsule, Take 1 capsule by mouth daily, Disp: 30 capsule, Rfl: 0    cetirizine (ZyrTEC)  10 mg tablet, Take 10 mg by mouth daily, Disp: , Rfl:     colestipol (COLESTID) 1 g tablet, Take 1 tablet (1 g total) by mouth 2 (two) times a day, Disp: 60 tablet, Rfl: 5    diltiazem (TIAZAC) 120 MG 24 hr capsule, TAKE 1 CAPSULE BY MOUTH EVERY DAY, Disp: 90 capsule, Rfl: 3    diphenoxylate-atropine (LOMOTIL) 2.5-0.025 mg per tablet, Take 1 tablet by mouth 4 (four) times a day as needed for diarrhea (Patient not taking: Reported on 7/23/2024), Disp: 60 tablet, Rfl: 0    escitalopram (LEXAPRO) 10 mg tablet, TAKE 1 TABLET BY MOUTH EVERY DAY, Disp: 30 tablet, Rfl: 11    levalbuterol (XOPENEX HFA) 45 mcg/act inhaler, Inhale 1-2 puffs every 4 (four) hours as needed for wheezing, Disp: 15 g, Rfl: 0    Magnesium Glycinate 100 MG CAPS, Take 100 mg by mouth 2 (two) times a day, Disp: 90 capsule, Rfl: 2    pantoprazole (PROTONIX) 40 mg tablet, TAKE 1 TABLET BY MOUTH EVERY DAY IN THE EARLY MORNING, Disp: 30 tablet, Rfl: 0    potassium chloride (Klor-Con M20) 20 mEq tablet, Take 1 tablet (20 mEq total) by mouth daily, Disp: 90 tablet, Rfl: 3    Probiotic Product (PRO-BIOTIC BLEND PO), Take by mouth, Disp: , Rfl:     spironolactone (ALDACTONE) 25 mg tablet, Take 0.5 tablets (12.5 mg total) by mouth 3 (three) times a week, Disp: 36 tablet, Rfl: 2    Current Allergies     Allergies as of 08/08/2024 - Reviewed 08/08/2024   Allergen Reaction Noted    Bystolic [nebivolol hcl] Bradycardia 11/16/2023    Levofloxacin Hives 01/04/2018    Vasotec [enalapril] Hives 11/17/2022    Amlodipine Rash 01/08/2019    Ancef [cefazolin] GI Intolerance 08/27/2014    Cephalosporins Other (See Comments)     Fruit extracts  08/26/2013    Morphine GI Intolerance     Other  06/02/2017    Penicillins Hives 06/02/2017    Tetracycline Edema 06/02/2017    Xarelto [rivaroxaban] Rash 01/08/2019            The following portions of the patient's history were reviewed and updated as appropriate: allergies, current medications, past family history, past medical  history, past social history, past surgical history and problem list.     Past Medical History:   Diagnosis Date    Asthma     Atrial fibrillation (HCC)     with ablation    Bursitis of hip     Last Assessed:6/5/2017    COPD (chronic obstructive pulmonary disease) (HCC) 5 years ago    Coronary artery disease     Herniated lumbar intervertebral disc     Hyperlipidemia     Hypertension 2 years    Pituitary adenoma (HCC)     Last Assessed:2/12/2015 ; Previously on bromocriptine    Spinal stenosis     SVT (supraventricular tachycardia)     with ablation    Systolic murmur of aorta        Past Surgical History:   Procedure Laterality Date    BREAST SURGERY      Incisional breast biopsy    CARDIAC ELECTROPHYSIOLOGY PROCEDURE N/A 09/02/2022    Procedure: Cardiac eps/afib ablation;  Surgeon: Fadi Goss MD;  Location: [facility];  Service: Cardiology    CARDIAC ELECTROPHYSIOLOGY PROCEDURE N/A 12/07/2022    Procedure: Cardiac loop recorder implant;  Surgeon: Bao Kovacs MD;  Location:  C[facility] CATH LAB;  Service: Cardiology    CARDIAC ELECTROPHYSIOLOGY STUDY AND ABLATION      CARDIAC LOOP RECORDER      CARPAL TUNNEL RELEASE Left     CHOLECYSTECTOMY      OTHER SURGICAL HISTORY      EXCISION OF PAROTID TUMOR/GLAND    PAROTIDECTOMY      HI COLONOSCOPY FLX DX W/COLLJ SPEC WHEN PFRMD N/A 06/06/2017    Procedure: COLONOSCOPY;  Surgeon: Elizabeth Leyva DO;  Location: [facility];  Service: Gastroenterology    HI HYSTEROSCOPY BX ENDOMETRIUM&/POLYPC W/WO D&C N/A 11/22/2023    Procedure: (D&C) W/ HYSTEROSCOPY;  Surgeon: Harini Kinney MD;  Location: [facility] OR;  Service: Gynecology    ROTATOR CUFF REPAIR      SALPINGOOPHORECTOMY Right        Family History   Problem Relation Age of Onset    Colon cancer Mother     Coronary artery disease Mother     Colonic polyp Mother     Liver cancer Mother     Multiple sclerosis Father     Hypertension Brother     Hypertension Other     Breast cancer Neg Hx      Cancer Neg Hx          Medications have been verified.        Objective   /57   Pulse 84   Temp 98.7 °F (37.1 °C)   LMP  (LMP Unknown)   SpO2 94%   No LMP recorded (lmp unknown). Patient is postmenopausal.       Physical Exam     Physical Exam  Vitals reviewed.   Constitutional:       Appearance: Normal appearance.   HENT:      Right Ear: Tympanic membrane normal.      Left Ear: Tympanic membrane normal.      Nose: Congestion and rhinorrhea present.      Mouth/Throat:      Pharynx: No posterior oropharyngeal erythema.   Cardiovascular:      Rate and Rhythm: Normal rate and regular rhythm.      Pulses: Normal pulses.      Heart sounds: Normal heart sounds.   Pulmonary:      Effort: Pulmonary effort is normal.      Breath sounds: Normal breath sounds.   Musculoskeletal:         General: Normal range of motion.   Skin:     General: Skin is warm and dry.      Capillary Refill: Capillary refill takes less than 2 seconds.   Neurological:      General: No focal deficit present.      Mental Status: She is alert.

## 2024-08-26 DIAGNOSIS — F41.8 DEPRESSION WITH ANXIETY: ICD-10-CM

## 2024-08-26 DIAGNOSIS — K21.9 GERD (GASTROESOPHAGEAL REFLUX DISEASE): ICD-10-CM

## 2024-08-26 RX ORDER — ESCITALOPRAM OXALATE 10 MG/1
TABLET ORAL
Qty: 30 TABLET | Refills: 1 | Status: SHIPPED | OUTPATIENT
Start: 2024-08-26

## 2024-08-26 RX ORDER — PANTOPRAZOLE SODIUM 40 MG/1
40 TABLET, DELAYED RELEASE ORAL
Qty: 30 TABLET | Refills: 5 | Status: SHIPPED | OUTPATIENT
Start: 2024-08-26

## 2024-09-05 ENCOUNTER — REMOTE DEVICE CLINIC VISIT (OUTPATIENT)
Dept: CARDIOLOGY CLINIC | Facility: CLINIC | Age: 74
End: 2024-09-05
Payer: MEDICARE

## 2024-09-05 DIAGNOSIS — I48.0 PAROXYSMAL ATRIAL FIBRILLATION (HCC): Primary | ICD-10-CM

## 2024-09-05 PROCEDURE — 93298 REM INTERROG DEV EVAL SCRMS: CPT | Performed by: INTERNAL MEDICINE

## 2024-09-05 NOTE — PROGRESS NOTES
"MDT LNQ22/ACTIVE SYSTEM IS MRI CONDITIONAL   CARELINK TRANSMISSION: LOOP RECORDER. PRESENTING RHYTHM NSR @ 75 BPM. BATTERY STATUS \"OK.\" NO PATIENT OR DEVICE ACTIVATED EPISODES. NORMAL DEVICE FUNCTION. DL   "

## 2024-09-16 DIAGNOSIS — E78.00 HIGH CHOLESTEROL: ICD-10-CM

## 2024-09-17 RX ORDER — ATORVASTATIN CALCIUM 20 MG/1
TABLET, FILM COATED ORAL
Qty: 90 TABLET | Refills: 1 | Status: SHIPPED | OUTPATIENT
Start: 2024-09-17

## 2024-09-24 ENCOUNTER — OFFICE VISIT (OUTPATIENT)
Dept: FAMILY MEDICINE CLINIC | Facility: CLINIC | Age: 74
End: 2024-09-24
Payer: MEDICARE

## 2024-09-24 VITALS
DIASTOLIC BLOOD PRESSURE: 64 MMHG | TEMPERATURE: 98.7 F | RESPIRATION RATE: 16 BRPM | SYSTOLIC BLOOD PRESSURE: 102 MMHG | OXYGEN SATURATION: 98 % | BODY MASS INDEX: 24.27 KG/M2 | WEIGHT: 137 LBS | HEART RATE: 73 BPM | HEIGHT: 63 IN

## 2024-09-24 DIAGNOSIS — J43.9 PULMONARY EMPHYSEMA, UNSPECIFIED EMPHYSEMA TYPE (HCC): ICD-10-CM

## 2024-09-24 DIAGNOSIS — F41.8 DEPRESSION WITH ANXIETY: ICD-10-CM

## 2024-09-24 DIAGNOSIS — I50.32 CHRONIC DIASTOLIC CHF (CONGESTIVE HEART FAILURE) (HCC): ICD-10-CM

## 2024-09-24 DIAGNOSIS — I48.0 PAROXYSMAL ATRIAL FIBRILLATION (HCC): ICD-10-CM

## 2024-09-24 DIAGNOSIS — R59.1 LYMPHADENOPATHY: ICD-10-CM

## 2024-09-24 DIAGNOSIS — Z13.89 SCREENING FOR HEMATURIA OR PROTEINURIA: ICD-10-CM

## 2024-09-24 DIAGNOSIS — Z23 ENCOUNTER FOR IMMUNIZATION: ICD-10-CM

## 2024-09-24 DIAGNOSIS — Z13.1 SCREENING FOR DIABETES MELLITUS: ICD-10-CM

## 2024-09-24 DIAGNOSIS — Z13.29 SCREENING FOR HYPOTHYROIDISM: ICD-10-CM

## 2024-09-24 DIAGNOSIS — Z00.00 MEDICARE ANNUAL WELLNESS VISIT, SUBSEQUENT: Primary | ICD-10-CM

## 2024-09-24 DIAGNOSIS — I10 ESSENTIAL HYPERTENSION: ICD-10-CM

## 2024-09-24 DIAGNOSIS — N18.32 STAGE 3B CHRONIC KIDNEY DISEASE (HCC): ICD-10-CM

## 2024-09-24 DIAGNOSIS — F17.211 NICOTINE DEPENDENCE, CIGARETTES, IN REMISSION: ICD-10-CM

## 2024-09-24 DIAGNOSIS — Z13.0 SCREENING FOR DEFICIENCY ANEMIA: ICD-10-CM

## 2024-09-24 DIAGNOSIS — Z23 NEED FOR COVID-19 VACCINE: ICD-10-CM

## 2024-09-24 DIAGNOSIS — E78.2 MIXED HYPERLIPIDEMIA: ICD-10-CM

## 2024-09-24 PROBLEM — N17.9 AKI (ACUTE KIDNEY INJURY) (HCC): Status: RESOLVED | Noted: 2018-11-23 | Resolved: 2024-09-24

## 2024-09-24 PROCEDURE — 91320 SARSCV2 VAC 30MCG TRS-SUC IM: CPT

## 2024-09-24 PROCEDURE — G0008 ADMIN INFLUENZA VIRUS VAC: HCPCS

## 2024-09-24 PROCEDURE — 99214 OFFICE O/P EST MOD 30 MIN: CPT | Performed by: INTERNAL MEDICINE

## 2024-09-24 PROCEDURE — 90662 IIV NO PRSV INCREASED AG IM: CPT

## 2024-09-24 PROCEDURE — 90480 ADMN SARSCOV2 VAC 1/ONLY CMP: CPT

## 2024-09-24 PROCEDURE — G0439 PPPS, SUBSEQ VISIT: HCPCS | Performed by: INTERNAL MEDICINE

## 2024-09-24 NOTE — ASSESSMENT & PLAN NOTE
Lab Results   Component Value Date    EGFR 44 08/06/2024    EGFR 58 07/10/2024    EGFR 50 06/11/2024    CREATININE 1.21 08/06/2024    CREATININE 0.97 07/10/2024    CREATININE 1.09 06/11/2024     Avoid NSAIDs, recheck kidney function.

## 2024-09-24 NOTE — PATIENT INSTRUCTIONS
Medicare Preventive Visit Patient Instructions  Thank you for completing your Welcome to Medicare Visit or Medicare Annual Wellness Visit today. Your next wellness visit will be due in one year (9/25/2025).  The screening/preventive services that you may require over the next 5-10 years are detailed below. Some tests may not apply to you based off risk factors and/or age. Screening tests ordered at today's visit but not completed yet may show as past due. Also, please note that scanned in results may not display below.  Preventive Screenings:  Service Recommendations Previous Testing/Comments   Colorectal Cancer Screening  * Colonoscopy    * Fecal Occult Blood Test (FOBT)/Fecal Immunochemical Test (FIT)  * Fecal DNA/Cologuard Test  * Flexible Sigmoidoscopy Age: 45-75 years old   Colonoscopy: every 10 years (may be performed more frequently if at higher risk)  OR  FOBT/FIT: every 1 year  OR  Cologuard: every 3 years  OR  Sigmoidoscopy: every 5 years  Screening may be recommended earlier than age 45 if at higher risk for colorectal cancer. Also, an individualized decision between you and your healthcare provider will decide whether screening between the ages of 76-85 would be appropriate. Colonoscopy: 04/08/2024  FOBT/FIT: Not on file  Cologuard: Not on file  Sigmoidoscopy: Not on file          Breast Cancer Screening Age: 40+ years old  Frequency: every 1-2 years  Not required if history of left and right mastectomy Mammogram: 07/02/2024        Cervical Cancer Screening Between the ages of 21-29, pap smear recommended once every 3 years.   Between the ages of 30-65, can perform pap smear with HPV co-testing every 5 years.   Recommendations may differ for women with a history of total hysterectomy, cervical cancer, or abnormal pap smears in past. Pap Smear: 06/27/2023        Hepatitis C Screening Once for adults born between 1945 and 1965  More frequently in patients at high risk for Hepatitis C Hep C Antibody:  01/08/2019        Diabetes Screening 1-2 times per year if you're at risk for diabetes or have pre-diabetes Fasting glucose: 77 mg/dL (8/6/2024)  A1C: 5.6 % (4/2/2024)      Cholesterol Screening Once every 5 years if you don't have a lipid disorder. May order more often based on risk factors. Lipid panel: 04/02/2024          Other Preventive Screenings Covered by Medicare:  Abdominal Aortic Aneurysm (AAA) Screening: covered once if your at risk. You're considered to be at risk if you have a family history of AAA.  Lung Cancer Screening: covers low dose CT scan once per year if you meet all of the following conditions: (1) Age 55-77; (2) No signs or symptoms of lung cancer; (3) Current smoker or have quit smoking within the last 15 years; (4) You have a tobacco smoking history of at least 20 pack years (packs per day multiplied by number of years you smoked); (5) You get a written order from a healthcare provider.  Glaucoma Screening: covered annually if you're considered high risk: (1) You have diabetes OR (2) Family history of glaucoma OR (3)  aged 50 and older OR (4)  American aged 65 and older  Osteoporosis Screening: covered every 2 years if you meet one of the following conditions: (1) You're estrogen deficient and at risk for osteoporosis based off medical history and other findings; (2) Have a vertebral abnormality; (3) On glucocorticoid therapy for more than 3 months; (4) Have primary hyperparathyroidism; (5) On osteoporosis medications and need to assess response to drug therapy.   Last bone density test (DXA Scan): 04/26/2023.  HIV Screening: covered annually if you're between the age of 15-65. Also covered annually if you are younger than 15 and older than 65 with risk factors for HIV infection. For pregnant patients, it is covered up to 3 times per pregnancy.    Immunizations:  Immunization Recommendations   Influenza Vaccine Annual influenza vaccination during flu season is  recommended for all persons aged >= 6 months who do not have contraindications   Pneumococcal Vaccine   * Pneumococcal conjugate vaccine = PCV13 (Prevnar 13), PCV15 (Vaxneuvance), PCV20 (Prevnar 20)  * Pneumococcal polysaccharide vaccine = PPSV23 (Pneumovax) Adults 19-63 yo with certain risk factors or if 65+ yo  If never received any pneumonia vaccine: recommend Prevnar 20 (PCV20)  Give PCV20 if previously received 1 dose of PCV13 or PPSV23   Hepatitis B Vaccine 3 dose series if at intermediate or high risk (ex: diabetes, end stage renal disease, liver disease)   Respiratory syncytial virus (RSV) Vaccine - COVERED BY MEDICARE PART D  * RSVPreF3 (Arexvy) CDC recommends that adults 60 years of age and older may receive a single dose of RSV vaccine using shared clinical decision-making (SCDM)   Tetanus (Td) Vaccine - COST NOT COVERED BY MEDICARE PART B Following completion of primary series, a booster dose should be given every 10 years to maintain immunity against tetanus. Td may also be given as tetanus wound prophylaxis.   Tdap Vaccine - COST NOT COVERED BY MEDICARE PART B Recommended at least once for all adults. For pregnant patients, recommended with each pregnancy.   Shingles Vaccine (Shingrix) - COST NOT COVERED BY MEDICARE PART B  2 shot series recommended in those 19 years and older who have or will have weakened immune systems or those 50 years and older     Health Maintenance Due:      Topic Date Due   • Lung Cancer Screening  09/28/2024   • Breast Cancer Screening: Mammogram  07/02/2025   • Colorectal Cancer Screening  04/07/2029   • Hepatitis C Screening  Completed     Immunizations Due:      Topic Date Due   • COVID-19 Vaccine (5 - 2023-24 season) 09/01/2024   • Influenza Vaccine (1) 09/01/2024     Advance Directives   What are advance directives?  Advance directives are legal documents that state your wishes and plans for medical care. These plans are made ahead of time in case you lose your ability to  make decisions for yourself. Advance directives can apply to any medical decision, such as the treatments you want, and if you want to donate organs.   What are the types of advance directives?  There are many types of advance directives, and each state has rules about how to use them. You may choose a combination of any of the following:  Living will:  This is a written record of the treatment you want. You can also choose which treatments you do not want, which to limit, and which to stop at a certain time. This includes surgery, medicine, IV fluid, and tube feedings.   Durable power of  for healthcare (DPAHC):  This is a written record that states who you want to make healthcare choices for you when you are unable to make them for yourself. This person, called a proxy, is usually a family member or a friend. You may choose more than 1 proxy.  Do not resuscitate (DNR) order:  A DNR order is used in case your heart stops beating or you stop breathing. It is a request not to have certain forms of treatment, such as CPR. A DNR order may be included in other types of advance directives.  Medical directive:  This covers the care that you want if you are in a coma, near death, or unable to make decisions for yourself. You can list the treatments you want for each condition. Treatment may include pain medicine, surgery, blood transfusions, dialysis, IV or tube feedings, and a ventilator (breathing machine).  Values history:  This document has questions about your views, beliefs, and how you feel and think about life. This information can help others choose the care that you would choose.  Why are advance directives important?  An advance directive helps you control your care. Although spoken wishes may be used, it is better to have your wishes written down. Spoken wishes can be misunderstood, or not followed. Treatments may be given even if you do not want them. An advance directive may make it easier for your  family to make difficult choices about your care.       © Copyright "[employer] 2018 Information is for End User's use only and may not be sold, redistributed or otherwise used for commercial purposes. All illustrations and images included in CareNotes® are the copyrighted property of [employer] Inc[employer] or [employer]

## 2024-09-24 NOTE — ASSESSMENT & PLAN NOTE
Wt Readings from Last 3 Encounters:   09/24/24 62.1 kg (137 lb)   07/23/24 60.8 kg (134 lb)   05/15/24 63.4 kg (139 lb 12.8 oz)       Oral at baseline, continue current meds.

## 2024-09-24 NOTE — ASSESSMENT & PLAN NOTE
Continue follow-up with cardiology.  She seems to be in sinus rhythm right now, rate is good.  Orders:    Comprehensive metabolic panel; Future

## 2024-09-24 NOTE — PROGRESS NOTES
Ambulatory Visit  Name: Almaz Hall      : 1950      MRN: [id number]  Encounter Provider: Jacob Lorenzana MD  Encounter Date: 2024   Encounter department: [facility] PRIMARY [facility]ARE    Assessment & Plan  Nicotine dependence, cigarettes, in remission    Orders:    CT lung screening program; Future    Paroxysmal atrial fibrillation (HCC)  Continue follow-up with cardiology.  She seems to be in sinus rhythm right now, rate is good.  Orders:    Comprehensive metabolic panel; Future    Stage 3b chronic kidney disease (HCC)  Lab Results   Component Value Date    EGFR 44 2024    EGFR 58 07/10/2024    EGFR 50 2024    CREATININE 1.21 2024    CREATININE 0.97 07/10/2024    CREATININE 1.09 2024     Avoid NSAIDs, recheck kidney function.       Mixed hyperlipidemia  Recheck lipid panel.  Continue current dose of atorvastatin.  Orders:    Lipid panel; Future    Screening for diabetes mellitus    Orders:    Hemoglobin A1C; Future    Screening for hematuria or proteinuria    Orders:    UA (URINE) with reflex to Scope; Future    Screening for deficiency anemia    Orders:    CBC and differential; Future    Screening for hypothyroidism    Orders:    TSH, 3rd generation with Free T4 reflex; Future    Encounter for immunization    Orders:    influenza vaccine, high-dose, PF 0.5 mL (Fluzone High Dose)    Need for COVID-19 vaccine    Orders:    COVID-19 Pfizer mRNA vaccine 12 yr and older (Comirnaty pre-filled syringe)    Medicare annual wellness visit, subsequent         Essential hypertension  Blood pressures are okay.  Continue current medications.         Chronic diastolic CHF (congestive heart failure) (HCC)  Wt Readings from Last 3 Encounters:   24 62.1 kg (137 lb)   24 60.8 kg (134 lb)   05/15/24 63.4 kg (139 lb 12.8 oz)       Oral at baseline, continue current meds.             Pulmonary emphysema, unspecified emphysema type (HCC)         Lymphadenopathy  1  cm enlarged right preauricular lymph node.  Continue to observe.  If any  increase in size she will let me know.       Depression with anxiety  Continue current dose of Lexapro.  Overall stable.            Preventive health issues were discussed with patient, and age appropriate screening tests were ordered as noted in patient's After Visit Summary. Personalized health advice and appropriate referrals for health education or preventive services given if needed, as noted in patient's After Visit Summary.    History of Present Illness     Patient came today for Medicare wellness visit and to follow-up on her chronic problems.  She is up-to-date on her vaccines agreed for COVID and flu shot.  Vital signs are very acceptable.  Agreed for CT scan of the chest for lung cancer screening.  Up-to-date on colonoscopy next 1 will be in 2027, up-to-date on mammogram.  She does not smoke, eats healthy and trying to be physically active.       Patient Care Team:  Jacob Lorenzana MD as PCP - General (Internal Medicine)  DO Marquita Otero MD Kimberly Jegel Chaput, DO Kimberly Jegel Chaput, DO as Endoscopist  Talha Lujan DO (Nephrology)    Review of Systems   Constitutional:  Negative for activity change, appetite change, chills and fever.   HENT:  Negative for congestion, ear pain, rhinorrhea and sore throat.    Respiratory:  Negative for cough, shortness of breath and wheezing.    Cardiovascular:  Negative for chest pain, palpitations and leg swelling.   Gastrointestinal:  Positive for diarrhea. Negative for abdominal distention, abdominal pain, nausea and vomiting.   Genitourinary:  Negative for difficulty urinating, frequency and pelvic pain.   Musculoskeletal:  Negative for arthralgias, back pain and neck pain.   Skin:  Negative for rash.   Neurological:  Negative for dizziness, tremors, weakness, light-headedness, numbness and headaches.     Medical History Reviewed by provider this  encounter:       Annual Wellness Visit Questionnaire   Almaz is here for her Subsequent Wellness visit.     Health Risk Assessment:   Patient rates overall health as good. Patient feels that their physical health rating is same. Patient is very satisfied with their life. Eyesight was rated as same. Hearing was rated as same. Patient feels that their emotional and mental health rating is same. Patients states they are never, rarely angry. Patient states they are often unusually tired/fatigued. Pain experienced in the last 7 days has been some. Patient's pain rating has been 2/10. Patient states that she has experienced weight loss or gain in last 6 months.     Depression Screening:   PHQ-9 Score: 2      Fall Risk Screening:   In the past year, patient has experienced: history of falling in past year    Number of falls: 1  Injured during fall?: No    Feels unsteady when standing or walking?: No    Worried about falling?: No      Urinary Incontinence Screening:   Patient has leaked urine accidently in the last six months.     Home Safety:  Patient has trouble with stairs inside or outside of their home. Patient has working smoke alarms and has working carbon monoxide detector. Home safety hazards include: none.     Nutrition:   Current diet is Regular.     Medications:   Patient is currently taking over-the-counter supplements. OTC medications include: see medication list. Patient is able to manage medications.     Activities of Daily Living (ADLs)/Instrumental Activities of Daily Living (IADLs):   Walk and transfer into and out of bed and chair?: Yes  Dress and groom yourself?: Yes    Bathe or shower yourself?: Yes    Feed yourself? Yes  Do your laundry/housekeeping?: Yes  Manage your money, pay your bills and track your expenses?: Yes  Make your own meals?: Yes    Do your own shopping?: Yes    Previous Hospitalizations:   Any hospitalizations or ED visits within the last 12 months?: Yes    How many hospitalizations  have you had in the last year?: 1-2    Advance Care Planning:   Living will: Yes    Durable POA for healthcare: Yes    Advanced directive: Yes      PREVENTIVE SCREENINGS      Cardiovascular Screening:    General: Screening Not Indicated and History Lipid Disorder      Diabetes Screening:     General: Screening Current      Colorectal Cancer Screening:     General: Screening Current      Breast Cancer Screening:     General: Screening Current      Cervical Cancer Screening:    General: Screening Not Indicated      Lung Cancer Screening:     General: Screening Current      Hepatitis C Screening:    General: Screening Current    Screening, Brief Intervention, and Referral to Treatment (SBIRT)    Screening    Typical number of drinks in a week: 2    Single Item Drug Screening:  How often have you used an illegal drug (including marijuana) or a prescription medication for non-medical reasons in the past year? never    Single Item Drug Screen Score: 0  Interpretation: Negative screen for possible drug use disorder    Social Determinants of Health     Financial Resource Strain: Low Risk  (9/19/2023)    Overall Financial Resource Strain (CARDIA)     Difficulty of Paying Living Expenses: Not hard at all   Food Insecurity: No Food Insecurity (9/24/2024)    Hunger Vital Sign     Worried About Running Out of Food in the Last Year: Never true     Ran Out of Food in the Last Year: Never true   Transportation Needs: No Transportation Needs (9/24/2024)    PRAPARE - Transportation     Lack of Transportation (Medical): No     Lack of Transportation (Non-Medical): No   Housing Stability: Low Risk  (9/24/2024)    Housing Stability Vital Sign     Unable to Pay for Housing in the Last Year: No     Number of Times Moved in the Last Year: 1     Homeless in the Last Year: No   Utilities: Not At Risk (9/24/2024)    [facility] Utilities     Threatened with loss of utilities: No     No results found.    Objective     /64 (BP Location: Left arm,  "Patient Position: Sitting, Cuff Size: Standard)   Pulse 73   Temp 98.7 °F (37.1 °C) (Temporal)   Resp 16   Ht 5' 2.75\" (1.594 m)   Wt 62.1 kg (137 lb)   LMP  (LMP Unknown)   SpO2 98%   BMI 24.46 kg/m²     Physical Exam  Constitutional:       General: She is not in acute distress.     Appearance: She is not ill-appearing or toxic-appearing.   Cardiovascular:      Rate and Rhythm: Normal rate.      Heart sounds: Murmur heard.      No gallop.   Pulmonary:      Effort: No respiratory distress.      Breath sounds: No wheezing or rales.   Skin:     Comments: 1 cm lump anterior to the right ear likely a lymph node, painless         "

## 2024-10-13 DIAGNOSIS — N18.32 HYPERTENSIVE KIDNEY DISEASE WITH STAGE 3B CHRONIC KIDNEY DISEASE (HCC): ICD-10-CM

## 2024-10-13 DIAGNOSIS — I51.89 DIASTOLIC DYSFUNCTION: ICD-10-CM

## 2024-10-13 DIAGNOSIS — N18.31 STAGE 3A CHRONIC KIDNEY DISEASE (HCC): ICD-10-CM

## 2024-10-13 DIAGNOSIS — E87.6 HYPOKALEMIA: ICD-10-CM

## 2024-10-13 DIAGNOSIS — I12.9 HYPERTENSIVE KIDNEY DISEASE WITH STAGE 3B CHRONIC KIDNEY DISEASE (HCC): ICD-10-CM

## 2024-10-13 DIAGNOSIS — E83.42 HYPOMAGNESEMIA: ICD-10-CM

## 2024-10-14 RX ORDER — SPIRONOLACTONE 25 MG/1
25 TABLET ORAL DAILY
Qty: 90 TABLET | Refills: 1 | Status: SHIPPED | OUTPATIENT
Start: 2024-10-14

## 2024-10-18 ENCOUNTER — APPOINTMENT (OUTPATIENT)
Dept: LAB | Facility: IMAGING CENTER | Age: 74
End: 2024-10-18
Payer: MEDICARE

## 2024-10-18 DIAGNOSIS — I12.9 HYPERTENSIVE KIDNEY DISEASE WITH STAGE 3B CHRONIC KIDNEY DISEASE (HCC): ICD-10-CM

## 2024-10-18 DIAGNOSIS — E83.42 HYPOMAGNESEMIA: ICD-10-CM

## 2024-10-18 DIAGNOSIS — I51.89 DIASTOLIC DYSFUNCTION: ICD-10-CM

## 2024-10-18 DIAGNOSIS — E87.6 HYPOKALEMIA: ICD-10-CM

## 2024-10-18 DIAGNOSIS — N18.31 STAGE 3A CHRONIC KIDNEY DISEASE (HCC): ICD-10-CM

## 2024-10-18 DIAGNOSIS — N18.32 HYPERTENSIVE KIDNEY DISEASE WITH STAGE 3B CHRONIC KIDNEY DISEASE (HCC): ICD-10-CM

## 2024-10-18 LAB
ALBUMIN SERPL BCG-MCNC: 4.1 G/DL (ref 3.5–5)
ANION GAP SERPL CALCULATED.3IONS-SCNC: 9 MMOL/L (ref 4–13)
BUN SERPL-MCNC: 21 MG/DL (ref 5–25)
CALCIUM SERPL-MCNC: 9.7 MG/DL (ref 8.4–10.2)
CHLORIDE SERPL-SCNC: 106 MMOL/L (ref 96–108)
CO2 SERPL-SCNC: 25 MMOL/L (ref 21–32)
CREAT SERPL-MCNC: 0.98 MG/DL (ref 0.6–1.3)
GFR SERPL CREATININE-BSD FRML MDRD: 57 ML/MIN/1.73SQ M
GLUCOSE SERPL-MCNC: 79 MG/DL (ref 65–140)
PHOSPHATE SERPL-MCNC: 3.8 MG/DL (ref 2.3–4.1)
POTASSIUM SERPL-SCNC: 4.1 MMOL/L (ref 3.5–5.3)
SODIUM SERPL-SCNC: 140 MMOL/L (ref 135–147)
URATE SERPL-MCNC: 6.2 MG/DL (ref 2–7.5)

## 2024-10-18 PROCEDURE — 36415 COLL VENOUS BLD VENIPUNCTURE: CPT

## 2024-10-18 PROCEDURE — 80069 RENAL FUNCTION PANEL: CPT

## 2024-10-18 PROCEDURE — 84550 ASSAY OF BLOOD/URIC ACID: CPT

## 2024-10-31 DIAGNOSIS — F41.8 DEPRESSION WITH ANXIETY: ICD-10-CM

## 2024-10-31 RX ORDER — ESCITALOPRAM OXALATE 10 MG/1
TABLET ORAL
Qty: 30 TABLET | Refills: 0 | Status: SHIPPED | OUTPATIENT
Start: 2024-10-31

## 2024-11-05 DIAGNOSIS — I48.0 PAROXYSMAL ATRIAL FIBRILLATION (HCC): ICD-10-CM

## 2024-11-06 RX ORDER — APIXABAN 5 MG/1
5 TABLET, FILM COATED ORAL 2 TIMES DAILY
Qty: 60 TABLET | Refills: 5 | Status: SHIPPED | OUTPATIENT
Start: 2024-11-06

## 2024-11-08 ENCOUNTER — TELEPHONE (OUTPATIENT)
Dept: NEPHROLOGY | Facility: CLINIC | Age: 74
End: 2024-11-08

## 2024-11-12 ENCOUNTER — OFFICE VISIT (OUTPATIENT)
Age: 74
End: 2024-11-12
Payer: MEDICARE

## 2024-11-12 VITALS
SYSTOLIC BLOOD PRESSURE: 122 MMHG | HEART RATE: 73 BPM | HEIGHT: 62 IN | DIASTOLIC BLOOD PRESSURE: 60 MMHG | BODY MASS INDEX: 25.51 KG/M2 | WEIGHT: 138.6 LBS | OXYGEN SATURATION: 98 %

## 2024-11-12 DIAGNOSIS — Z86.73 HISTORY OF STROKE: Primary | ICD-10-CM

## 2024-11-12 DIAGNOSIS — R42 VERTIGO: ICD-10-CM

## 2024-11-12 DIAGNOSIS — R13.10 DYSPHAGIA: ICD-10-CM

## 2024-11-12 PROCEDURE — 99214 OFFICE O/P EST MOD 30 MIN: CPT

## 2024-11-12 NOTE — PATIENT INSTRUCTIONS
- Continue with good blood pressure control; I would recommend monitoring at home at least 3 times per week; Goal of <130/80; at goal   - Continue with good cholesterol control; Goal LDL <70; at goal   - Continue with good blood sugar control; Goal HgbA1c <7.0; at goal   - Will defer monitoring of cholesterol and blood sugar and management of hypertensive medications to the primary care provider  - Stay well hydrated by drinking enough water   - Eat a healthy diet, high in lean meats fish, turkey, chicken. Low in fats, cholesterol, sugars and sodium. Avoid canned foods,  get lets of fresh/frozen vegetables/fruits.  - Get routine exercise/physical activity as much as able to tolerate  - Referral to Vestibular therapy for vertigo  - Swallow evaluation for dysphagia   - Keep follow ups with your other health care providers  - Continue Eliquis and atorvastatin. Stop Aspirin 81 mg at this time.   - Patient defers option for lower extremity EMG for numbness   - Fall precautions     I will plan for her to return to the office in 6 months time but would be happy to see her sooner if the need should arise.  If she has any symptoms concerning for TIA or stroke including sudden painless loss of vision or double vision, difficulty speaking or swallowing, vertigo/room spinning that does not quickly resolve, or weakness/numbness affecting 1 side of the face or body she should proceed by ambulance to the nearest emergency room immediately.

## 2024-11-12 NOTE — PROGRESS NOTES
Ambulatory Visit  Name: Almaz Hall      : 1950      MRN: [id number]  Encounter Provider: LIANG Christensen  Encounter Date: 2024   Encounter department: [facility]    Assessment & Plan  History of stroke  Almaz Hall is a 74 year old female with episode of mild difficulty word finding 2024. Although workup was negative for acute ischemia she was found to have chronic lacunar infarcts of the right anterior centrum semiovale and right posterior cerebellum that she was not previously aware of. Etiology of speech difficulty was thought to be related to diarrhea/weight loss/JANELLE, less likely TIA. Although loop recorder interrogation revealed possible reoccurrence of Afib (although now thought to be more PVCs), but in the setting of TIA Eliquis was restarted and should continue lifelong. She now continues on Eliquis for Afib, Aspirin, as well as Atorvastatin 20 mg daily. Her Cardiologist has advised that there is no cardiac indication to continue Aspirin in addition to Eliquis. Therefore she was advised today she may discontinue Aspirin at this time. Her vascular risk factors are well controlled at this time. She denies any acute or worsening neurologic symptoms concerning for recurrent TIA or Stroke, although she does note continued numbness and tingling in bilateral feet. We discussed in the past that we could pursue EMG testing to further evaluate this, however she would like to defer this at this time given the numbness is not painful or affecting her balance.  We reviewed her stroke risk factors and management of the same. She was provided stroke education and we reviewed stroke warning signs/symptoms and reasons to return by ambulance to the ER. She will follow up in 6 months; sooner if needed. Other referrals to vestibular therapy for vertigo, and swallow evaluation for dysphagia also provided.        Orders:    Ambulatory referral to Physical Therapy;  Future    Vertigo  She does have intermittent dizziness when standing quickly or changing position quickly; worse in the last 2-3 weeks. She has also noticed this more with moving her head left or right or turning quickly. This lasts only a few seconds and then goes away.  She denies any room spinning.  HINTS Exam is negative for central pathology although right posterior cerebellum CVA could contribute to dizziness but would not explain sudden onset in the last 2-3 weeks  Description sounds concerning for orthostatic hypotension vs peripheral cause of vertigo  Referral to vestibular therapy provided     Orders:    Ambulatory referral to Physical Therapy; Future    Dysphagia  Also notes some difficulty with swallowing dry foods; this is occurring intermittently over the last few weeks. No issues with liquids or pills.  Swallow evaluation with speech therapy ordered today for further evaluation   Discussed cutting food into small pieces, chewing thoroughly, moistening dry foods as other precautions to take.     Orders:    FL barium swallow video w speech; Future          Patient Instructions   - Continue with good blood pressure control; I would recommend monitoring at home at least 3 times per week; Goal of <130/80; at goal   - Continue with good cholesterol control; Goal LDL <70; at goal   - Continue with good blood sugar control; Goal HgbA1c <7.0; at goal   - Will defer monitoring of cholesterol and blood sugar and management of hypertensive medications to the primary care provider  - Stay well hydrated by drinking enough water   - Eat a healthy diet, high in lean meats fish, turkey, chicken. Low in fats, cholesterol, sugars and sodium. Avoid canned foods,  get lets of fresh/frozen vegetables/fruits.  - Get routine exercise/physical activity as much as able to tolerate  - Referral to Vestibular therapy for vertigo  - Swallow evaluation for dysphagia   - Keep follow ups with your other health care providers  -  Continue Eliquis and atorvastatin. Stop Aspirin 81 mg at this time.   - Patient defers option for lower extremity EMG for numbness   - Fall precautions     I will plan for her to return to the office in 6 months time but would be happy to see her sooner if the need should arise.  If she has any symptoms concerning for TIA or stroke including sudden painless loss of vision or double vision, difficulty speaking or swallowing, vertigo/room spinning that does not quickly resolve, or weakness/numbness affecting 1 side of the face or body she should proceed by ambulance to the nearest emergency room immediately.     History of Present Illness     HPI Almaz Hall is a 74 year old female who presents to follow up on her hx of chronic lacunar infarcts of the right anterior centrum semiovale and right posterior cerebellum that she was not previously aware of, found during admission 4/2024. She was last seen 5/8/2024    Secondary Stroke Prevention  She continues on Eliquis 5 mg bid for Afib, Aspirin for hx of lacunar infarct and mitral valve stenosis, as well as Atorvastatin 20 mg daily   Compliant with her medications, no issues affording or obtaining medications.   Denies excessive bruising or bleeding     Deficits  She states her bilateral big toes and balls of her feet are numb/tingling- noticed this after being home from the hospital. This is always present, but not painful. She states this is not as intense but still constantly present.   She does have intermittent dizziness when standing quickly or changing position quickly; worse in the last 2-3 weeks. She has also noticed this more with moving her head left or right or turning quickly. This lasts only a few seconds and then goes away.  She denies any room spinning.  Denies weakness, headaches, vision changes or any new or worsening stroke like symptoms  Denies any difficulty with speaking  Also some difficulty with swallowing dry foods; this is occurring  intermittently over the last few weeks. No issues with liquids or pills.      Monitoring  BP- she monitors at home BID-TID; reports readings are <120-130/70-80  HR 80-88  Today BP is 122/60  HgbA1c 4/2/24 5.6  4/2/24 total cholesterol 102, triglycerides 152, HDL 28, LDL 44  MRA Carotid 4/1/24- no suspected stenosis  MRI Brain wo 4/1/24: Chronic lacunar infarcts are noted within the right anterior centrum semiovale with surrounding gliosis. Chronic lacunar infarct within the right posterior cerebellum.   She continues to have a loop recorder in place      Safety  Lives at home with her   Independent of all ADLs  No recent falls   She is managing her own medications and finances  assistive devices- none  Driving- no concerns other than stiff neck at times  Memory- no concerns     Substance use    Smoking- prior smoker 20-30 yrs x 1PPD. Quit in 2018  Etoh- none; denies etoh abuse, only social use in the past  Drugs- none  Caffeine- coffee 2 cups daily      Sleep  She does have difficulty staying asleep secondary to neck or hip pain  She snores  She does not report excessive daytime sleepiness  Never dx with LESLIE     Diet  She does follow a low cholesterol diet although admits she is not strict  Continues with chronic diarrhea - seeing [facility] in December  BMI 25.35     Exercise  Denies any formal exercise     Mood  She does report anxiety; takes Lexapro     Follow ups  Follows with GI, PCP, Cardiology and Nephrology   Per Cardiology's last note: Agree with anticoagulation which should be long-term. She is currently on aspirin in addition per neurology recommendations. If desired, I would be okay tapering this down in the future but anticoagulation with Eliquis should continue long-term.     Review of Systems   Constitutional:  Negative for appetite change, fatigue and fever.   HENT: Negative.  Negative for hearing loss, tinnitus, trouble swallowing and voice change.    Eyes: Negative.  Negative for  photophobia, pain and visual disturbance.   Respiratory: Negative.  Negative for shortness of breath.    Cardiovascular: Negative.  Negative for palpitations.   Gastrointestinal: Negative.  Negative for nausea and vomiting.   Endocrine: Negative.  Negative for cold intolerance.   Genitourinary: Negative.  Negative for dysuria, frequency and urgency.   Musculoskeletal:  Negative for back pain, gait problem, myalgias, neck pain and neck stiffness.   Skin: Negative.  Negative for rash.   Allergic/Immunologic: Negative.    Neurological:  Positive for dizziness (on set with movement). Negative for tremors, seizures, syncope, facial asymmetry, speech difficulty, weakness, light-headedness, numbness and headaches.   Hematological: Negative.  Does not bruise/bleed easily.   Psychiatric/Behavioral: Negative.  Negative for confusion, hallucinations and sleep disturbance.    All other systems reviewed and are negative.    I have personally reviewed the MA's review of systems and made changes as necessary.    Current Outpatient Medications on File Prior to Visit   Medication Sig Dispense Refill    aspirin 81 mg chewable tablet Chew 1 tablet (81 mg total) daily 30 tablet 0    atorvastatin (LIPITOR) 20 mg tablet TAKE 1 TABLET BY MOUTH EVERY DAY 90 tablet 1    b complex vitamins capsule Take 1 capsule by mouth daily 30 capsule 0    cetirizine (ZyrTEC) 10 mg tablet Take 10 mg by mouth daily      colestipol (COLESTID) 1 g tablet Take 1 tablet (1 g total) by mouth 2 (two) times a day 60 tablet 5    diltiazem (TIAZAC) 120 MG 24 hr capsule TAKE 1 CAPSULE BY MOUTH EVERY DAY 90 capsule 3    Eliquis 5 MG TAKE 1 TABLET BY MOUTH TWO TIMES DAILY 60 tablet 5    escitalopram (LEXAPRO) 10 mg tablet TAKE 1 TABLET BY MOUTH EVERY DAY 30 tablet 0    levalbuterol (XOPENEX HFA) 45 mcg/act inhaler Inhale 1-2 puffs every 4 (four) hours as needed for wheezing 15 g 0    Magnesium Glycinate 100 MG CAPS Take 100 mg by mouth 2 (two) times a day 90 capsule 2     pantoprazole (PROTONIX) 40 mg tablet TAKE 1 TABLET BY MOUTH EVERY DAY IN THE EARLY MORNING 30 tablet 5    potassium chloride (Klor-Con M20) 20 mEq tablet Take 1 tablet (20 mEq total) by mouth daily 90 tablet 3    Probiotic Product (PRO-BIOTIC BLEND PO) Take by mouth      spironolactone (ALDACTONE) 25 mg tablet TAKE 1 TABLET BY MOUTH EVERY DAY (Patient taking differently: Take 12.5 mg by mouth every other day Mon/Wed/Fri) 90 tablet 1    diphenoxylate-atropine (LOMOTIL) 2.5-0.025 mg per tablet Take 1 tablet by mouth 4 (four) times a day as needed for diarrhea (Patient not taking: Reported on 7/23/2024) 60 tablet 0     No current facility-administered medications on file prior to visit.      Objective     LMP  (LMP Unknown)     Neurologic Exam  On neurological examination patient is alert, awake, oriented and in no distress. Speech is fluent without dysarthria or aphasia. Cranial nerves 2-12 were symmetrically intact bilaterally. No evidence of any focal weakness or sensory loss in the upper or lower extremities. Motor testing reveals 5/5 strength of the bilateral upper and lower extremities.There was no pronator drift.  No fasciculations present. No abnormal involuntary movements. Finger- to-nose reveals no tremor or ataxia and intact proprioceptive function, no dysmetria was noted. Rapid alternating movement normal. Sensation was intact to vibration, light touch, and temperature in bilateral upper and lower extremities. Deep tendon reflexes were 2+ and symmetric in the bilateral upper and lower extremities. She is able to rise easily without assistance from a seated position. Casual gait is normal including stance, stride, and arm swing. Romberg is absent.     Hints Exam  No nystagmus  No skew deviation  Normal head impulse test   Deferred madhavi hallpike test as patient is driving today and is unaccompanied    Administrative Statements     I have spent a total time of 33 minutes in caring for this patient on the day  of the visit/encounter including Diagnostic results, Prognosis, Risks and benefits of tx options, Instructions for management, Patient and family education, Importance of tx compliance, Risk factor reductions, Impressions, Counseling / Coordination of care, Documenting in the medical record, Reviewing / ordering tests, medicine, procedures  , and Obtaining or reviewing history  .

## 2024-11-13 ENCOUNTER — OFFICE VISIT (OUTPATIENT)
Dept: NEPHROLOGY | Facility: CLINIC | Age: 74
End: 2024-11-13
Payer: MEDICARE

## 2024-11-13 VITALS
DIASTOLIC BLOOD PRESSURE: 58 MMHG | BODY MASS INDEX: 25.4 KG/M2 | WEIGHT: 138 LBS | HEIGHT: 62 IN | SYSTOLIC BLOOD PRESSURE: 112 MMHG

## 2024-11-13 DIAGNOSIS — N18.32 STAGE 3B CHRONIC KIDNEY DISEASE (HCC): Primary | ICD-10-CM

## 2024-11-13 DIAGNOSIS — I50.32 CHRONIC DIASTOLIC CHF (CONGESTIVE HEART FAILURE) (HCC): ICD-10-CM

## 2024-11-13 DIAGNOSIS — I12.9 HYPERTENSIVE KIDNEY DISEASE WITH STAGE 3B CHRONIC KIDNEY DISEASE (HCC): ICD-10-CM

## 2024-11-13 DIAGNOSIS — E83.42 HYPOMAGNESEMIA: ICD-10-CM

## 2024-11-13 DIAGNOSIS — N18.32 HYPERTENSIVE KIDNEY DISEASE WITH STAGE 3B CHRONIC KIDNEY DISEASE (HCC): ICD-10-CM

## 2024-11-13 PROCEDURE — 99214 OFFICE O/P EST MOD 30 MIN: CPT | Performed by: INTERNAL MEDICINE

## 2024-11-13 NOTE — PROGRESS NOTES
"NEPHROLOGY OUTPATIENT PROGRESS NOTE   Almaz Hall 74 y.o. female MRN: [id number]  Reason for visit: CKD    Assessment & Plan  Stage 3b chronic kidney disease (HCC)  Chronic kidney disease with baseline creatinine 0.9-1.2.  Recent creatinine 0.98 with an EGFR 57.  Hypertensive kidney disease with stage 3b chronic kidney disease (HCC)  Blood pressure overall appears to be stable, no changes in her current regimen  Chronic diastolic CHF (congestive heart failure) ([facility])  Volume status acceptable, continue with alternate day spironolactone.  Hypomagnesemia  Persistent hypomagnesemia despite magnesium supplementation.  Could consider changing to magnesium glycerophosphate    Summary:  Overall renal function remains quite stable  No significant proteinuria seen on previous albumin creatinine ratio  Consider alternative magnesium supplement  Recommend repeating BMP and magnesium level in approximately 4 to 6 weeks assuming stable recommend follow-up in 6 months.    Of note patient seeking second opinion for chronic diarrhea.    SUBJECTIVE / INTERVAL HISTORY:  Unfortunately still complaining of significant diarrhea which tends to occur just after eating.  Appetite is somewhat self-limited secondary to diarrhea.  Continues to supplement however appears no significant change with ongoing magnesium supplementation.  No reports of chest pain shortness of breath.  No significant lower extremity swelling.  Denies any dizziness or lightheadedness.      OBJECTIVE:  /58 (BP Location: Left arm, Patient Position: Sitting, Cuff Size: Adult)   Ht 5' 2\" (1.575 m)   Wt 62.6 kg (138 lb)   LMP  (LMP Unknown)   BMI 25.24 kg/m²   Vitals:    11/13/24 1425   Weight: 62.6 kg (138 lb)       Physical Exam  Constitutional:       Appearance: She is not ill-appearing.   Eyes:      General: No scleral icterus.  Cardiovascular:      Rate and Rhythm: Normal rate and regular rhythm.   Pulmonary:      Effort: Pulmonary effort is normal.      " Breath sounds: Normal breath sounds.   Abdominal:      General: There is no distension.      Palpations: Abdomen is soft.      Tenderness: There is no abdominal tenderness.   Musculoskeletal:      Right lower leg: No edema.      Left lower leg: No edema.   Skin:     General: Skin is warm and dry.      Findings: No rash.   Neurological:      Mental Status: She is alert and oriented to person, place, and time.           Medications:    Current Outpatient Medications:     atorvastatin (LIPITOR) 20 mg tablet, TAKE 1 TABLET BY MOUTH EVERY DAY, Disp: 90 tablet, Rfl: 1    b complex vitamins capsule, Take 1 capsule by mouth daily, Disp: 30 capsule, Rfl: 0    cetirizine (ZyrTEC) 10 mg tablet, Take 10 mg by mouth daily, Disp: , Rfl:     colestipol (COLESTID) 1 g tablet, Take 1 tablet (1 g total) by mouth 2 (two) times a day, Disp: 60 tablet, Rfl: 5    diltiazem (TIAZAC) 120 MG 24 hr capsule, TAKE 1 CAPSULE BY MOUTH EVERY DAY, Disp: 90 capsule, Rfl: 3    Eliquis 5 MG, TAKE 1 TABLET BY MOUTH TWO TIMES DAILY, Disp: 60 tablet, Rfl: 5    escitalopram (LEXAPRO) 10 mg tablet, TAKE 1 TABLET BY MOUTH EVERY DAY, Disp: 30 tablet, Rfl: 0    levalbuterol (XOPENEX HFA) 45 mcg/act inhaler, Inhale 1-2 puffs every 4 (four) hours as needed for wheezing, Disp: 15 g, Rfl: 0    Magnesium Glycinate 100 MG CAPS, Take 100 mg by mouth 2 (two) times a day (Patient taking differently: Take 400 mg by mouth 2 (two) times a day), Disp: 90 capsule, Rfl: 2    pantoprazole (PROTONIX) 40 mg tablet, TAKE 1 TABLET BY MOUTH EVERY DAY IN THE EARLY MORNING, Disp: 30 tablet, Rfl: 5    potassium chloride (Klor-Con M20) 20 mEq tablet, Take 1 tablet (20 mEq total) by mouth daily, Disp: 90 tablet, Rfl: 3    Probiotic Product (PRO-BIOTIC BLEND PO), Take by mouth, Disp: , Rfl:     spironolactone (ALDACTONE) 25 mg tablet, TAKE 1 TABLET BY MOUTH EVERY DAY (Patient taking differently: Take 12.5 mg by mouth every other day Mon/Wed/Fri), Disp: 90 tablet, Rfl: 1    aspirin 81  mg chewable tablet, Chew 1 tablet (81 mg total) daily (Patient not taking: Reported on 11/13/2024), Disp: 30 tablet, Rfl: 0    diphenoxylate-atropine (LOMOTIL) 2.5-0.025 mg per tablet, Take 1 tablet by mouth 4 (four) times a day as needed for diarrhea (Patient not taking: Reported on 7/23/2024), Disp: 60 tablet, Rfl: 0    Laboratory Results:  Results for orders placed or performed in visit on 10/18/24   Renal function panel    Collection Time: 10/18/24 12:20 PM   Result Value Ref Range    Albumin 4.1 3.5 - 5.0 g/dL    Calcium 9.7 8.4 - 10.2 mg/dL    Phosphorus 3.8 2.3 - 4.1 mg/dL    Glucose 79 65 - 140 mg/dL    BUN 21 5 - 25 mg/dL    Creatinine 0.98 0.60 - 1.30 mg/dL    Sodium 140 135 - 147 mmol/L    Potassium 4.1 3.5 - 5.3 mmol/L    Chloride 106 96 - 108 mmol/L    CO2 25 21 - 32 mmol/L    ANION GAP 9 4 - 13 mmol/L    eGFR 57 ml/min/1.73sq m   Uric acid    Collection Time: 10/18/24 12:20 PM   Result Value Ref Range    Uric Acid 6.2 2.0 - 7.5 mg/dL

## 2024-11-15 PROBLEM — R13.10 DYSPHAGIA: Status: ACTIVE | Noted: 2024-11-15

## 2024-11-15 PROBLEM — R42 VERTIGO: Status: ACTIVE | Noted: 2024-11-15

## 2024-11-15 NOTE — ASSESSMENT & PLAN NOTE
She does have intermittent dizziness when standing quickly or changing position quickly; worse in the last 2-3 weeks. She has also noticed this more with moving her head left or right or turning quickly. This lasts only a few seconds and then goes away.  She denies any room spinning.  HINTS Exam is negative for central pathology although right posterior cerebellum CVA could contribute to dizziness but would not explain sudden onset in the last 2-3 weeks  Description sounds concerning for orthostatic hypotension vs peripheral cause of vertigo  Referral to vestibular therapy provided     Orders:    Ambulatory referral to Physical Therapy; Future

## 2024-11-15 NOTE — ASSESSMENT & PLAN NOTE
Also notes some difficulty with swallowing dry foods; this is occurring intermittently over the last few weeks. No issues with liquids or pills.  Swallow evaluation with speech therapy ordered today for further evaluation   Discussed cutting food into small pieces, chewing thoroughly, moistening dry foods as other precautions to take.     Orders:    FL barium swallow video w speech; Future

## 2024-11-15 NOTE — ASSESSMENT & PLAN NOTE
Almaz Hall is a 74 year old female with episode of mild difficulty word finding 4/2024. Although workup was negative for acute ischemia she was found to have chronic lacunar infarcts of the right anterior centrum semiovale and right posterior cerebellum that she was not previously aware of. Etiology of speech difficulty was thought to be related to diarrhea/weight loss/JANELLE, less likely TIA. Although loop recorder interrogation revealed possible reoccurrence of Afib (although now thought to be more PVCs), but in the setting of TIA Eliquis was restarted and should continue lifelong. She now continues on Eliquis for Afib, Aspirin, as well as Atorvastatin 20 mg daily. Her Cardiologist has advised that there is no cardiac indication to continue Aspirin in addition to Eliquis. Therefore she was advised today she may discontinue Aspirin at this time. Her vascular risk factors are well controlled at this time. She denies any acute or worsening neurologic symptoms concerning for recurrent TIA or Stroke, although she does note continued numbness and tingling in bilateral feet. We discussed in the past that we could pursue EMG testing to further evaluate this, however she would like to defer this at this time given the numbness is not painful or affecting her balance.  We reviewed her stroke risk factors and management of the same. She was provided stroke education and we reviewed stroke warning signs/symptoms and reasons to return by ambulance to the ER. She will follow up in 6 months; sooner if needed. Other referrals to vestibular therapy for vertigo, and swallow evaluation for dysphagia also provided.        Orders:    Ambulatory referral to Physical Therapy; Future

## 2024-11-19 ENCOUNTER — HOSPITAL ENCOUNTER (OUTPATIENT)
Dept: RADIOLOGY | Facility: HOSPITAL | Age: 74
Discharge: HOME | End: 2024-11-19
Payer: MEDICARE

## 2024-11-19 DIAGNOSIS — R13.10 DYSPHAGIA: ICD-10-CM

## 2024-11-19 PROCEDURE — 92611 MOTION FLUOROSCOPY/SWALLOW: CPT

## 2024-11-19 PROCEDURE — 74230 X-RAY XM SWLNG FUNCJ C+: CPT

## 2024-11-19 NOTE — PROCEDURES
Video Swallow Study      Patient Name: Almaz Hall  Today's Date: 11/19/2024        Past Medical History  Past Medical History:   Diagnosis Date    Asthma     Atrial fibrillation (HCC)     with ablation    Bursitis of hip     Last Assessed:6/5/2017    COPD (chronic obstructive pulmonary disease) (HCC) 5 years ago    Coronary artery disease     Herniated lumbar intervertebral disc     Hyperlipidemia     Hypertension 2 years    Pituitary adenoma (HCC)     Last Assessed:2/12/2015 ; Previously on bromocriptine    Spinal stenosis     SVT (supraventricular tachycardia) (HCC)     with ablation    Systolic murmur of aorta         Past Surgical History  Past Surgical History:   Procedure Laterality Date    BREAST SURGERY      Incisional breast biopsy    CARDIAC ELECTROPHYSIOLOGY PROCEDURE N/A 09/02/2022    Procedure: Cardiac eps/afib ablation;  Surgeon: Fadi Goss MD;  Location: [facility];  Service: Cardiology    CARDIAC ELECTROPHYSIOLOGY PROCEDURE N/A 12/07/2022    Procedure: Cardiac loop recorder implant;  Surgeon: Bao Kovacs MD;  Location: [facility];  Service: Cardiology    CARDIAC ELECTROPHYSIOLOGY STUDY AND ABLATION      CARDIAC LOOP RECORDER      CARPAL TUNNEL RELEASE Left     CHOLECYSTECTOMY      OTHER SURGICAL HISTORY      EXCISION OF PAROTID TUMOR/GLAND    PAROTIDECTOMY      NE COLONOSCOPY FLX DX W/COLLJ SPEC WHEN PFRMD N/A 06/06/2017    Procedure: COLONOSCOPY;  Surgeon: Elizabeth Leyva DO;  Location: [facility];  Service: Gastroenterology    NE HYSTEROSCOPY BX ENDOMETRIUM&/POLYPC W/WO D&C N/A 11/22/2023    Procedure: (D&C) W/ HYSTEROSCOPY;  Surgeon: Harini Kinney MD;  Location: [facility] OR;  Service: Gynecology    ROTATOR CUFF REPAIR      SALPINGOOPHORECTOMY Right        Modified (Video) Barium Swallow Study    Summary:  Images are on PACS for review.     Oral stage: WNL for lip closure, mastication, bolus formation, control, and  transfer. Mild lingual residue. Mastication was intentionally prolonged w/ piecemeal transfer of solids for ease of swallow.     Pharyngeal stage: Mild pharyngoesophageal.   WNL for prompt swallow, velopharyngeal closure, anterior hyoid excursion, laryngeal vestibular closure, and  pharyngeal constriction. Partial or complete laryngeal elevation, inconsistent epiglottic inversion,? Trace/minimal obstruction of flow through UES/UES opening, intermittent trace po collection at ~ C 7. Wide column of contrast between tongue base and posterior pharyngeal wall.  Full or moderate vallecular retention w/ purees and solids. Mild w/ thin liquid. Trace PPW coating. Trace/to mild pyriform retention. Chin down not effective in reducing vallecular retention/improving clearance. Secondary swallow cleared partial retention. No penetration or aspiration. Small cervical osteophytes. CP prominence. Posterior pharyngeal wall bulges noted at C 3/4 and 4/5 most easily viewed on series 1 w/ puree.     Per gross esophageal screen: retention noted following purees and solids, primarily thoracic. The 13mm pill remained in the thoracic esophagus. Not cleared w/ additional liquid or small presentation of puree. Cleared w/ near full tsp. puree however   thoracic puree residue remained. Cued for secondary swallow. Final presentation of nectar cleared through to the stomach.       Recommendations:  Diet: Regular, avoid dry/dense foods as needed. Add moisture.  Liquids: thin  Meds:follow w/ additional PO to clear esophagus. Break if large as allowable.   Strategies: chew well, alternate food w/ small sips liquid, swallow x 2.  Frequent/thorough oral care  Upright position  F/u ST tx:Not at this time.   Reflux Precautions  Consider consult with: ENT/laryngologist for direct visualization and/or refer back to GI 2* today's findings. (Recent EGD 4/8/24)  Results reviewed with: pt  Repeat MBS as necessary  If a dedicated assessment of the esophagus is  desired:  Consider esophagram/routine barium swallow w/ barium tablet administration, FL Upper GI/UGI, or EGD (Recent egd in April)    Pt is a 74yof referred by neuro for MBS. Pt reported solid food dysphagia. Globus sensation.     PAS: 1    H&P/pertinent provider notes: (PMH noted above)  11/12/24:Per visit w/ neuro CRNP   History of stroke  Almaz Hall is a 74 year old female with episode of mild difficulty word finding 4/2024. Although workup was negative for acute ischemia she was found to have chronic lacunar infarcts of the right anterior centrum semiovale and right posterior cerebellum that she was not previously aware of. Etiology of speech difficulty was thought to be related to diarrhea/weight loss/JANELLE, less likely TIA. Although loop recorder interrogation revealed possible reoccurrence of Afib (although now thought to be more PVCs), but in the setting of TIA Eliquis was restarted and should continue lifelong. She now continues on Eliquis for Afib, Aspirin, as well as Atorvastatin 20 mg daily. Her Cardiologist has advised that there is no cardiac indication to continue Aspirin in addition to Eliquis. Therefore she was advised today she may discontinue Aspirin at this time. Her vascular risk factors are well controlled at this time. She denies any acute or worsening neurologic symptoms concerning for recurrent TIA or Stroke, although she does note continued numbness and tingling in bilateral feet. We discussed in the past that we could pursue EMG testing to further evaluate this, however she would like to defer this at this time given the numbness is not painful or affecting her balance.  We reviewed her stroke risk factors and management of the same. She was provided stroke education and we reviewed stroke warning signs/symptoms and reasons to return by ambulance to the ER. She will follow up in 6 months; sooner if needed. Other referrals to vestibular therapy for vertigo, and swallow evaluation for  dysphagia also provided.   Dysphagia  Also notes some difficulty with swallowing dry foods; this is occurring intermittently over the last few weeks. No issues with liquids or pills.  Swallow evaluation with speech therapy ordered today for further evaluation   Discussed cutting food into small pieces, chewing thoroughly, moistening dry foods as other precautions to take.   Orders:    FL barium swallow video w speech; Future      Previous MBS:  None known    EGD 4/8/24:  FINDINGS:  3 cm sliding hiatal hernia (type I hiatal hernia) - GE junction 37 cm from the incisors, diaphragmatic impression 40 cm from the incisors, confirmed by retroflexion:  Hill classification: Grade II  The esophagus and duodenum appeared normal.  Patchy edematous and erythematous mucosa with erosion in the antrum  Performed forceps biopsies in the body of the stomach, incisura and antrum to rule out H. pylori  Performed forceps biopsies in the duodenal bulb, 1st part of the duodenum and 2nd part of the duodenum to rule out celiac disease  IMPRESSION:  3 cm type I hiatal hernia  The esophagus and duodenum appeared normal.  Edematous and erythematous mucosa with erosion in the antrum  Performed forceps biopsies in the body of the stomach, incisura and antrum to rule out H. pylori  Performed forceps biopsies in the duodenal bulb, 1st part of the duodenum and 2nd part of the duodenum to rule out celiac disease  RECOMMENDATION:    Await pathology results     Continue PPI 40 mg daily.  Started on Carafate 1 g 4 times daily.  Proceed with colonoscopy.    Food allergies:   Food extracts, peach and strawberry   Current diet:  Regular   Premorbid diet:  Regular   Dentition:  WNL   O2 requirement:  RA   Oral mech:  WNL   Vocal quality/speech:  WNL   Cognitive status:  Alert       Consistencies administered: Puree,nutrigrain bar, hard cookie, bread, barium pill w/ thin, thin, nectar.    Pt was viewed standing laterally and AP

## 2024-11-25 DIAGNOSIS — F41.8 DEPRESSION WITH ANXIETY: ICD-10-CM

## 2024-11-25 DIAGNOSIS — I50.32 CHRONIC HEART FAILURE WITH PRESERVED EJECTION FRACTION (HFPEF) (HCC): ICD-10-CM

## 2024-11-26 RX ORDER — DILTIAZEM HYDROCHLORIDE 120 MG/1
120 CAPSULE, EXTENDED RELEASE ORAL DAILY
Qty: 90 CAPSULE | Refills: 1 | Status: SHIPPED | OUTPATIENT
Start: 2024-11-26

## 2024-11-26 RX ORDER — ESCITALOPRAM OXALATE 10 MG/1
10 TABLET ORAL DAILY
Qty: 30 TABLET | Refills: 5 | Status: SHIPPED | OUTPATIENT
Start: 2024-11-26

## 2024-12-11 ENCOUNTER — APPOINTMENT (OUTPATIENT)
Dept: LAB | Facility: IMAGING CENTER | Age: 74
End: 2024-12-11
Payer: MEDICARE

## 2024-12-11 DIAGNOSIS — N18.32 STAGE 3B CHRONIC KIDNEY DISEASE (HCC): ICD-10-CM

## 2024-12-11 DIAGNOSIS — I50.32 CHRONIC DIASTOLIC CHF (CONGESTIVE HEART FAILURE) (HCC): ICD-10-CM

## 2024-12-11 DIAGNOSIS — I12.9 HYPERTENSIVE KIDNEY DISEASE WITH STAGE 3B CHRONIC KIDNEY DISEASE (HCC): ICD-10-CM

## 2024-12-11 DIAGNOSIS — N18.32 HYPERTENSIVE KIDNEY DISEASE WITH STAGE 3B CHRONIC KIDNEY DISEASE (HCC): ICD-10-CM

## 2024-12-11 DIAGNOSIS — E83.42 HYPOMAGNESEMIA: ICD-10-CM

## 2024-12-11 LAB
ANION GAP SERPL CALCULATED.3IONS-SCNC: 8 MMOL/L (ref 4–13)
BUN SERPL-MCNC: 14 MG/DL (ref 5–25)
CALCIUM SERPL-MCNC: 10.2 MG/DL (ref 8.4–10.2)
CHLORIDE SERPL-SCNC: 107 MMOL/L (ref 96–108)
CO2 SERPL-SCNC: 25 MMOL/L (ref 21–32)
CREAT SERPL-MCNC: 0.89 MG/DL (ref 0.6–1.3)
GFR SERPL CREATININE-BSD FRML MDRD: 64 ML/MIN/1.73SQ M
GLUCOSE P FAST SERPL-MCNC: 80 MG/DL (ref 65–99)
MAGNESIUM SERPL-MCNC: 1.5 MG/DL (ref 1.9–2.7)
POTASSIUM SERPL-SCNC: 4.1 MMOL/L (ref 3.5–5.3)
SODIUM SERPL-SCNC: 140 MMOL/L (ref 135–147)

## 2024-12-11 PROCEDURE — 36415 COLL VENOUS BLD VENIPUNCTURE: CPT

## 2024-12-11 PROCEDURE — 83735 ASSAY OF MAGNESIUM: CPT

## 2024-12-11 PROCEDURE — 80048 BASIC METABOLIC PNL TOTAL CA: CPT

## 2024-12-16 ENCOUNTER — RESULTS FOLLOW-UP (OUTPATIENT)
Dept: OTHER | Facility: HOSPITAL | Age: 74
End: 2024-12-16

## 2024-12-17 ENCOUNTER — REMOTE DEVICE CLINIC VISIT (OUTPATIENT)
Dept: CARDIOLOGY CLINIC | Facility: CLINIC | Age: 74
End: 2024-12-17
Payer: MEDICARE

## 2024-12-17 ENCOUNTER — RESULTS FOLLOW-UP (OUTPATIENT)
Dept: CARDIOLOGY CLINIC | Facility: CLINIC | Age: 74
End: 2024-12-17

## 2024-12-17 DIAGNOSIS — I48.0 PAROXYSMAL ATRIAL FIBRILLATION (HCC): Primary | ICD-10-CM

## 2024-12-17 PROCEDURE — 93298 REM INTERROG DEV EVAL SCRMS: CPT | Performed by: INTERNAL MEDICINE

## 2024-12-17 NOTE — PROGRESS NOTES
"MDT LNQ22/ACTIVE SYSTEM IS MRI CONDITIONAL   CARELINK TRANSMISSION: LOOP RECORDER. PRESENTING RHYTHM NSR @ 86 BPM. BATTERY STATUS \"OK.\" NO PATIENT OR DEVICE ACTIVATED EPISODES. NORMAL DEVICE FUNCTION. DL   "

## 2024-12-27 ENCOUNTER — OFFICE VISIT (OUTPATIENT)
Dept: GASTROENTEROLOGY | Facility: MEDICAL CENTER | Age: 74
End: 2024-12-27
Payer: MEDICARE

## 2024-12-27 VITALS
TEMPERATURE: 98 F | WEIGHT: 138.8 LBS | HEART RATE: 74 BPM | BODY MASS INDEX: 25.54 KG/M2 | SYSTOLIC BLOOD PRESSURE: 124 MMHG | DIASTOLIC BLOOD PRESSURE: 74 MMHG | HEIGHT: 62 IN

## 2024-12-27 DIAGNOSIS — R13.10 DYSPHAGIA, UNSPECIFIED TYPE: ICD-10-CM

## 2024-12-27 DIAGNOSIS — K58.0 IRRITABLE BOWEL SYNDROME WITH DIARRHEA: Primary | ICD-10-CM

## 2024-12-27 PROCEDURE — 99214 OFFICE O/P EST MOD 30 MIN: CPT | Performed by: STUDENT IN AN ORGANIZED HEALTH CARE EDUCATION/TRAINING PROGRAM

## 2024-12-27 NOTE — PROGRESS NOTES
Name: Almaz Hall      : 1950      MRN: [id number]  Encounter Provider: Yuri Gongora MD  Encounter Date: 2024   Encounter department: [facility]  :  Assessment & Plan  Irritable bowel syndrome with diarrhea  Patient most likely has baseline IBS-D which was exacerbated by an episode of infectious diarrhea in 2023 (which could potentially explain the transiently elevated FCP in 3/2024). Extensive work-up has otherweise been unrevealing, supporting the diagnosis of IBS.    She has been on magnesium supplements since 2023 for hypomagnesemia which could certainly be contributing to diarrhea. I appreciate that the diarrhea is also probably exacerbating the hypomagnesemia, but I would recommend that she try stopping her magnesium supplement.     She is currently using Renamag 425 mg BID which theoretically has a lower incidence of diarrhea than other magnesium supplements, but I still maintain that she should try stopping it before we explore less desirable therapies such as a low-dose TCA.    I also asked her to start fiber supplementation to see if it helps bulk her stools.    - Trial of stopping magnesium. Will reach out to her nephrologist Dr. uLjan to let him know.   - Fiber supplementation    RTC in 6 weeks (Lavonne Jacobs for continuity)         Dysphagia, unspecified type  Noted to have mild oropharyngeal dysphagia. Symptoms sound like she could have some esophageal dysphagia as well although she just had a recent EGD 2024. Theoretically there could have been a Schatzki ring which was not documented which could be amenable to dilation, but patient is not interested at this time. Reassuringly, this makes malignancy unlikely.    - Monitor symptoms for now             History of Present Illness   HPI  Almaz Hall is a 74 y.o. female w/ hx of Afib on Eliquis, SVT s/p ablation, mod-severe AS, mod MS, CKD, chronic diarrhea, and hypomagnesemia who presents  "for follow-up.    Patient usually follows with the GI [facility], last seen 4/2024.    Patient has baseline looser stools about 2 times per day, but she had a severe diarrheal illness in 12/2023 resulting in admission for JANELLE and electrolyte abnormalities. This has continued over the past year with up to 6 loose stools per day. Denies hematochezia, nocturnal stools, or weight loss.     FCP was elevated at 444 (3/2024) but decreased to 94 (4/2024). Rest of work-up (including stool infectious studies, fecal elastase, and celiac serologies) were unrevealing. MRE 7/2024 unremarkable.    EGD 4/2024 showed a 3 cm hiatal hernia, antral erythema (negative H. Pylori, neg celiac). Colonoscopy 4/2024 showed a small SSP, sigmoid diverticulosis, hemorrhoids, and normal TI (negative microscopic colitis).    She has tried Lomotil, Imodium, and colestipol without improvement.  She has tried rifaximin x 2 without improvement. She has been on magnesium supplements since 12/2023 due to persistent hypomagnesemia.     She also complains of intermittent solid-food dysphagia in the last few weeks. VBS with SLP 11/2024 showed mild oropharyngeal dysphagia, prominent cricopharyngeus, and some esophageal retention.            Review of Systems  As per HPI         Objective   /74 (Patient Position: Sitting, Cuff Size: Standard)   Pulse 74   Temp 98 °F (36.7 °C) (Tympanic)   Ht 5' 2\" (1.575 m)   Wt 63 kg (138 lb 12.8 oz)   LMP  (LMP Unknown)   BMI 25.39 kg/m²      Physical Exam  General: No acute distress  Abdomen: Soft, non-specific tenderness in lower abd and RUQ, non-distended, normoactive bowel sounds  Neuro: Awake, alert, oriented x 3      "

## 2024-12-27 NOTE — ASSESSMENT & PLAN NOTE
Noted to have mild oropharyngeal dysphagia. Symptoms sound like she could have some esophageal dysphagia as well although she just had a recent EGD 4/2024. Theoretically there could have been a Schatzki ring which was not documented which could be amenable to dilation, but patient is not interested at this time. Reassuringly, this makes malignancy unlikely.    - Monitor symptoms for now

## 2024-12-27 NOTE — ASSESSMENT & PLAN NOTE
Patient most likely has baseline IBS-D which was exacerbated by an episode of infectious diarrhea in 12/2023 (which could potentially explain the transiently elevated FCP in 3/2024). Extensive work-up has otherweise been unrevealing, supporting the diagnosis of IBS.    She has been on magnesium supplements since 12/2023 for hypomagnesemia which could certainly be contributing to diarrhea. I appreciate that the diarrhea is also probably exacerbating the hypomagnesemia, but I would recommend that she try stopping her magnesium supplement.     She is currently using Renamag 425 mg BID which theoretically has a lower incidence of diarrhea than other magnesium supplements, but I still maintain that she should try stopping it before we explore less desirable therapies such as a low-dose TCA.    I also asked her to start fiber supplementation to see if it helps bulk her stools.    - Trial of stopping magnesium. Will reach out to her nephrologist Dr. Lujan to let him know.   - Fiber supplementation    RTC in 6 weeks (Lavonne Jacobs for continuity)

## 2025-01-02 ENCOUNTER — TELEPHONE (OUTPATIENT)
Dept: NEPHROLOGY | Facility: CLINIC | Age: 75
End: 2025-01-02

## 2025-01-02 DIAGNOSIS — I10 ESSENTIAL HYPERTENSION: ICD-10-CM

## 2025-01-02 DIAGNOSIS — E55.9 VITAMIN D DEFICIENCY: ICD-10-CM

## 2025-01-02 DIAGNOSIS — N18.32 STAGE 3B CHRONIC KIDNEY DISEASE (HCC): Primary | ICD-10-CM

## 2025-01-02 DIAGNOSIS — I12.9 HYPERTENSIVE KIDNEY DISEASE WITH STAGE 3B CHRONIC KIDNEY DISEASE (HCC): ICD-10-CM

## 2025-01-02 DIAGNOSIS — N18.32 HYPERTENSIVE KIDNEY DISEASE WITH STAGE 3B CHRONIC KIDNEY DISEASE (HCC): ICD-10-CM

## 2025-01-02 NOTE — TELEPHONE ENCOUNTER
Called patient and asked she repeat a BMP with magnesium level in approximately 4 weeks.   Labs have been added to the patients chart.

## 2025-01-02 NOTE — TELEPHONE ENCOUNTER
----- Message from Talha Lujan DO sent at 12/30/2024 12:27 PM EST -----  Regarding: FW: Trial of stopping magnesium supplement  Can we have her repeat a BMP with magnesium level in approximately 4 weeks.  Thank you.  ----- Message -----  From: Yuri Gongora MD  Sent: 12/27/2024   9:52 AM EST  To: Talha Lujan DO; #  Subject: Trial of stopping magnesium supplement           Hi,    I just saw Ms. Hall in the GI office for chronic diarrhea. We have agreed to a trial of stopping magnesium supplements to see if there's any change in her diarrhea. I understand she's on Renamag which should have fewer GI side effects, but I think it is still worth trying before exploring long-term pharmacologic therapy for IBS (such as a tricyclic antidepressant).     I wanted to let you know so that you could monitor the hypomagnesemia as appropriate.    PETER she will continue to follow up with Lavonne Jacobs in the GI office    Daniel Bernal

## 2025-01-17 ENCOUNTER — APPOINTMENT (OUTPATIENT)
Dept: LAB | Facility: IMAGING CENTER | Age: 75
End: 2025-01-17
Payer: MEDICARE

## 2025-01-17 DIAGNOSIS — E55.9 VITAMIN D DEFICIENCY: ICD-10-CM

## 2025-01-17 DIAGNOSIS — N18.32 STAGE 3B CHRONIC KIDNEY DISEASE (HCC): ICD-10-CM

## 2025-01-17 DIAGNOSIS — I10 ESSENTIAL HYPERTENSION: ICD-10-CM

## 2025-01-17 DIAGNOSIS — I12.9 HYPERTENSIVE KIDNEY DISEASE WITH STAGE 3B CHRONIC KIDNEY DISEASE (HCC): ICD-10-CM

## 2025-01-17 DIAGNOSIS — N18.32 HYPERTENSIVE KIDNEY DISEASE WITH STAGE 3B CHRONIC KIDNEY DISEASE (HCC): ICD-10-CM

## 2025-01-17 LAB
ANION GAP SERPL CALCULATED.3IONS-SCNC: 10 MMOL/L (ref 4–13)
BUN SERPL-MCNC: 21 MG/DL (ref 5–25)
CALCIUM SERPL-MCNC: 9.7 MG/DL (ref 8.4–10.2)
CHLORIDE SERPL-SCNC: 107 MMOL/L (ref 96–108)
CO2 SERPL-SCNC: 23 MMOL/L (ref 21–32)
CREAT SERPL-MCNC: 0.99 MG/DL (ref 0.6–1.3)
GFR SERPL CREATININE-BSD FRML MDRD: 57 ML/MIN/1.73SQ M
GLUCOSE P FAST SERPL-MCNC: 85 MG/DL (ref 65–99)
MAGNESIUM SERPL-MCNC: 1.4 MG/DL (ref 1.9–2.7)
POTASSIUM SERPL-SCNC: 3.7 MMOL/L (ref 3.5–5.3)
SODIUM SERPL-SCNC: 140 MMOL/L (ref 135–147)

## 2025-01-17 PROCEDURE — 36415 COLL VENOUS BLD VENIPUNCTURE: CPT

## 2025-01-17 PROCEDURE — 80048 BASIC METABOLIC PNL TOTAL CA: CPT

## 2025-01-17 PROCEDURE — 83735 ASSAY OF MAGNESIUM: CPT

## 2025-01-21 ENCOUNTER — RESULTS FOLLOW-UP (OUTPATIENT)
Dept: OTHER | Facility: HOSPITAL | Age: 75
End: 2025-01-21

## 2025-01-21 DIAGNOSIS — N18.32 HYPERTENSIVE KIDNEY DISEASE WITH STAGE 3B CHRONIC KIDNEY DISEASE (HCC): ICD-10-CM

## 2025-01-21 DIAGNOSIS — N18.32 STAGE 3B CHRONIC KIDNEY DISEASE (HCC): Primary | ICD-10-CM

## 2025-01-21 DIAGNOSIS — I10 ESSENTIAL HYPERTENSION: ICD-10-CM

## 2025-01-21 DIAGNOSIS — I12.9 HYPERTENSIVE KIDNEY DISEASE WITH STAGE 3B CHRONIC KIDNEY DISEASE (HCC): ICD-10-CM

## 2025-01-21 DIAGNOSIS — E55.9 VITAMIN D DEFICIENCY: ICD-10-CM

## 2025-01-28 ENCOUNTER — OFFICE VISIT (OUTPATIENT)
Dept: CARDIOLOGY CLINIC | Facility: CLINIC | Age: 75
End: 2025-01-28
Payer: MEDICARE

## 2025-01-28 VITALS
WEIGHT: 142 LBS | HEART RATE: 70 BPM | BODY MASS INDEX: 25.97 KG/M2 | SYSTOLIC BLOOD PRESSURE: 132 MMHG | DIASTOLIC BLOOD PRESSURE: 70 MMHG

## 2025-01-28 DIAGNOSIS — B96.89 BACTERIAL URI: ICD-10-CM

## 2025-01-28 DIAGNOSIS — J06.9 BACTERIAL URI: ICD-10-CM

## 2025-01-28 DIAGNOSIS — I50.32 CHRONIC DIASTOLIC CHF (CONGESTIVE HEART FAILURE) (HCC): ICD-10-CM

## 2025-01-28 DIAGNOSIS — I35.0 NON-RHEUMATIC AORTIC STENOSIS: Primary | ICD-10-CM

## 2025-01-28 DIAGNOSIS — R06.2 WHEEZING: ICD-10-CM

## 2025-01-28 DIAGNOSIS — I48.0 PAROXYSMAL ATRIAL FIBRILLATION (HCC): ICD-10-CM

## 2025-01-28 DIAGNOSIS — I34.2 NONRHEUMATIC MITRAL VALVE STENOSIS: ICD-10-CM

## 2025-01-28 PROCEDURE — 99214 OFFICE O/P EST MOD 30 MIN: CPT | Performed by: INTERNAL MEDICINE

## 2025-01-28 PROCEDURE — 93000 ELECTROCARDIOGRAM COMPLETE: CPT | Performed by: INTERNAL MEDICINE

## 2025-01-28 RX ORDER — LEVALBUTEROL TARTRATE 45 UG/1
1-2 AEROSOL, METERED ORAL EVERY 4 HOURS PRN
Qty: 15 G | Refills: 0 | Status: SHIPPED | OUTPATIENT
Start: 2025-01-28

## 2025-01-28 NOTE — PROGRESS NOTES
Cardiology Follow Up    Almaz Hall  1950  [id number]  [facility] [place]  [address]  [address]  [place] [id number] [phone]    1. Non-rheumatic aortic stenosis  POCT ECG    Echo complete w/ contrast if indicated      2. Nonrheumatic mitral valve stenosis  POCT ECG    Echo complete w/ contrast if indicated      3. Chronic diastolic CHF (congestive heart failure) (HCC)        4. Paroxysmal atrial fibrillation (HCC)        5. Bacterial URI  levalbuterol (XOPENEX HFA) 45 mcg/act inhaler      6. Wheezing  levalbuterol (XOPENEX HFA) 45 mcg/act inhaler        Discussion/Summary:    PAF:  Remains on Eliquis with her history of TIA. Tolerating this. Loop recorder in place without any evidence of A-fib recently.    Valve disease mixed:  Echocardiogram in April 2024 showed moderate to severe aortic stenosis and moderate mitral stenosis. On auscultation, preserved S2. She is asymptomatic and suspect that her valve disease has remained stable. I will order an echo to be done in 6 months prior to her follow-up with me. We once again discussed the potential for intervention in the future either with surgical approach to be able to address all valve disease if necessary, otherwise TAVR which would leave the mitral valve not intervened upon unless there was transcatheter mitral valve procedure done at the same time. Not needing loop diuretic currently.      CKD:  Remains on spironolactone 3 x/ week. Renal function stable.    Hypokalemia, hypomagnesemia:  Mg supplement was stopped because of concern it was contributing to diarrhea. Numbers are stable.      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  She has a history of SVT.  She has undergone ablation in 2018.  Previously, she was on metoprolol, but said she had a very low heart rate with this.  Because she also has COPD, she was changed to diltiazem.  She has been tolerating  this well.    Shortly after her SVT ablation, she was on Xarelto as well as amlodipine.  She had a rash to 1 of them, not sure which 1.  She has not been on anything else for hypertension previously.    In 2022, she developed afib. Saw EP, underwent ablation for this. Had some dCHF with valve disease and arrhythmia.    She has mixed valve disease - AS now moderate to severe, mild to moderate AI, moderate MS, mild to moderate MR.    In March 2024, she had outpatient lab work done which showed hypokalemia, hypomagnesemia, JANELLE. She was referred to the emergency room, but there actually had symptoms of aphasia and had TIA identified.  Started on eliquis.  Now taken off aspirin.    Interval History:    Cardiac status has been stable. Denies any significant dyspnea.    She is dealing with significant URI that she had. Still recovering and has some rhonchi on auscultation.    No PND, orthopnea, edema. Not requiring any loop diuretic. Remains on stable dose of spironolactone.    Saw gastroenterology for the diarrhea. She was taken off of the magnesium supplement because this could be causing diarrhea and that has helped somewhat. Her magnesium level is low but stable compared to before.      Problem List       Depression with anxiety    Dyslipidemia    Essential hypertension    Irritable bowel syndrome with diarrhea    Lumbar degenerative disc disease    Vitamin D deficiency    Greater trochanteric bursitis of right hip    Pulmonary emphysema (HCC)    SVT (supraventricular tachycardia) (HCC)    Medicare annual wellness visit, subsequent    Diastolic dysfunction    Mitral valve disorder    Aortic valve insufficiency    Achilles tendinitis of left lower extremity    Posterior tibial tendinitis, left    Obesity (BMI 30-39.9)    Stage 3 chronic kidney disease    Lab Results   Component Value Date    EGFR 57 01/17/2025    EGFR 64 12/11/2024    EGFR 57 10/18/2024    CREATININE 0.99 01/17/2025    CREATININE 0.89 12/11/2024     CREATININE 0.98 10/18/2024         Hypertensive kidney disease with stage 3 chronic kidney disease    Lab Results   Component Value Date    EGFR 57 2025    EGFR 64 2024    EGFR 57 10/18/2024    CREATININE 0.99 2025    CREATININE 0.89 2024    CREATININE 0.98 10/18/2024               Past Medical History:   Diagnosis Date    Asthma     Atrial fibrillation (HCC)     with ablation    Bursitis of hip     Last Assessed:2017    COPD (chronic obstructive pulmonary disease) (HCC) 5 years ago    Coronary artery disease     Herniated lumbar intervertebral disc     Hyperlipidemia     Hypertension 2 years    Pituitary adenoma (HCC)     Last Assessed:2015 ; Previously on bromocriptine    Spinal stenosis     SVT (supraventricular tachycardia) (HCC)     with ablation    Systolic murmur of aorta      Social History     Tobacco Use    Smoking status: Former     Current packs/day: 0.00     Average packs/day: 1 pack/day for 30.0 years (30.0 ttl pk-yrs)     Types: Cigarettes     Start date: 1988     Quit date: 2018     Years since quittin.1    Smokeless tobacco: Never   Vaping Use    Vaping status: Never Used   Substance Use Topics    Alcohol use: Not Currently     Alcohol/week: 1.0 standard drink of alcohol     Types: 1 Shots of liquor per week     Comment: social    Drug use: No      Family History   Problem Relation Age of Onset    Colon cancer Mother     Coronary artery disease Mother     Colonic polyp Mother     Liver cancer Mother     Multiple sclerosis Father     Hypertension Brother     Hypertension Other     Breast cancer Neg Hx     Cancer Neg Hx      Past Surgical History:   Procedure Laterality Date    BREAST SURGERY      Incisional breast biopsy    CARDIAC ELECTROPHYSIOLOGY PROCEDURE N/A 2022    Procedure: Cardiac eps/afib ablation;  Surgeon: Fadi Goss MD;  Location: [facility];  Service: Cardiology    CARDIAC ELECTROPHYSIOLOGY PROCEDURE N/A 2022     Procedure: Cardiac loop recorder implant;  Surgeon: Bao Kovacs MD;  Location:  CARDIAC CATH LAB;  Service: Cardiology    CARDIAC ELECTROPHYSIOLOGY STUDY AND ABLATION      CARDIAC LOOP RECORDER      CARPAL TUNNEL RELEASE Left     CHOLECYSTECTOMY      OTHER SURGICAL HISTORY      EXCISION OF PAROTID TUMOR/GLAND    PAROTIDECTOMY      CO COLONOSCOPY FLX DX W/COLLJ SPEC WHEN PFRMD N/A 06/06/2017    Procedure: COLONOSCOPY;  Surgeon: Elizabeth Leyva DO;  Location: [facility] GI LAB;  Service: Gastroenterology    CO HYSTEROSCOPY BX ENDOMETRIUM&/POLYPC W/WO D&C N/A 11/22/2023    Procedure: (D&C) W/ HYSTEROSCOPY;  Surgeon: Harini Kinney MD;  Location: [facility] OR;  Service: Gynecology    ROTATOR CUFF REPAIR      SALPINGOOPHORECTOMY Right        Current Outpatient Medications:     atorvastatin (LIPITOR) 20 mg tablet, TAKE 1 TABLET BY MOUTH EVERY DAY, Disp: 90 tablet, Rfl: 1    b complex vitamins capsule, Take 1 capsule by mouth daily, Disp: 30 capsule, Rfl: 0    cetirizine (ZyrTEC) 10 mg tablet, Take 10 mg by mouth daily, Disp: , Rfl:     diltiazem (TIAZAC) 120 MG 24 hr capsule, TAKE 1 CAPSULE BY MOUTH EVERY DAY, Disp: 90 capsule, Rfl: 1    Eliquis 5 MG, TAKE 1 TABLET BY MOUTH TWO TIMES DAILY, Disp: 60 tablet, Rfl: 5    escitalopram (LEXAPRO) 10 mg tablet, TAKE 1 TABLET BY MOUTH EVERY DAY, Disp: 30 tablet, Rfl: 5    levalbuterol (XOPENEX HFA) 45 mcg/act inhaler, Inhale 1-2 puffs every 4 (four) hours as needed for wheezing, Disp: 15 g, Rfl: 0    spironolactone (ALDACTONE) 25 mg tablet, TAKE 1 TABLET BY MOUTH EVERY DAY, Disp: 90 tablet, Rfl: 1  Allergies   Allergen Reactions    Bystolic [Nebivolol Hcl] Bradycardia     Hear rate to 20's    Levofloxacin Hives    Vasotec [Enalapril] Hives    Amlodipine Rash    Ancef [Cefazolin] GI Intolerance     Cdiff    Cephalosporins Other (See Comments)     c.diff    Fruit Extracts      Annotation - [id number]: peaches and strawberries    Morphine GI Intolerance     Extreme  vomiting      Other      FRUIT    Penicillins Hives    Tetracycline Edema    Xarelto [Rivaroxaban] Rash       Vitals:    01/28/25 0919   BP: 132/70   BP Location: Left arm   Patient Position: Sitting   Cuff Size: Standard   Pulse: 70   Weight: 64.4 kg (142 lb)     Vitals:    01/28/25 0919   Weight: 64.4 kg (142 lb)          Body mass index is 25.97 kg/m².    Physical Exam:  GEN: Almaz Hall appears well, alert and oriented x 3, pleasant and cooperative   HEENT: pupils equal, round, and reactive to light; extraocular muscles intact  NECK: supple, no carotid bruits   HEART: regular rhythm, + LAKESHA  LUNGS: rhonchi  ABDOMEN: normal bowel sounds, soft, no tenderness, no distention  EXTREMITIES: peripheral pulses normal; no clubbing, cyanosis, or edema  NEURO: no focal findings   SKIN: normal without suspicious lesions on exposed skin    ROS:  Positive for diarrhea, weight loss.  Except as noted in HPI, is otherwise reviewed in detail and a 12 point review of systems is negative.  ROS reviewed and is unchanged      Labs:  Lab Results   Component Value Date     05/04/2015    K 3.7 01/17/2025     01/17/2025    CREATININE 0.99 01/17/2025    BUN 21 01/17/2025    CO2 23 01/17/2025    ALT 23 08/06/2024    AST 22 08/06/2024    INR 1.08 04/04/2024    GLUF 85 01/17/2025    HGBA1C 5.6 04/02/2024    WBC 5.02 08/06/2024    HGB 11.4 (L) 08/06/2024    HCT 35.9 08/06/2024     08/06/2024       Lab Results   Component Value Date    CHOL 187 05/04/2015    CHOL 199 08/20/2014     Lab Results   Component Value Date    LDLCALC 44 04/02/2024    LDLCALC 100 03/13/2024    LDLCALC 84 03/15/2023     Lab Results   Component Value Date    HDL 28 (L) 04/02/2024    HDL 53 03/13/2024    HDL 77 03/15/2023     Lab Results   Component Value Date    TRIG 152 (H) 04/02/2024    TRIG 199 (H) 03/13/2024    TRIG 162 (H) 03/15/2023       Testing:  Echo 4/2/24  Left Ventricle: Left ventricular cavity size is normal. Wall thickness is mildly  increased. There is borderline concentric hypertrophy. The left ventricular ejection fraction is 70% by single dimension measurement. Systolic function is vigorous. Wall motion is normal. Diastolic function is moderately abnormal, consistent with grade II (pseudonormal) relaxation.  Left atrial filling pressure is elevated.    Left Atrium: The atrium is mildly dilated (35-41 mL/m2).    Aortic Valve: The aortic valve is trileaflet. The leaflets are moderately thickened. The leaflets are moderately calcified. There is moderately reduced mobility. There is mild regurgitation. There is moderate to severe stenosis. The aortic valve peak velocity is 3.3 m/s. The aortic valve mean gradient is 25 mmHg. The dimensionless velocity index is 0.38. The aortic valve area is 0.93 cm2. The stroke volume index is 46.90 ml/m2.    Mitral Valve: There is mild regurgitation. There is moderate stenosis.    Tricuspid Valve: There is mild regurgitation. The right ventricular systolic pressure is mildly to moderately elevated. The estimated right ventricular systolic pressure is 46.00 mmHg.    Echo 5/11/23:  Left Ventricle: Left ventricular cavity size is normal. Wall thickness is mildly increased. There is concentric remodeling. Systolic function is normal. Wall motion is normal. Diastolic function is moderately abnormal, consistent with grade II (pseudonormal) relaxation.    Right Ventricle: Right ventricular cavity size is normal. Systolic function is normal.    Left Atrium: The atrium is mildly dilated (35-41 mL/m2).    Aortic Valve: The aortic valve is trileaflet. The leaflets are moderately thickened. The leaflets are moderately calcified. There is moderately reduced mobility. There is mild to moderate regurgitation. There is moderate stenosis. The aortic valve mean gradient is 20 mmHg. The dimensionless velocity index is 0.44. The aortic valve area is 1.23 cm2. The aortic valve velocity is increased due to stenosis.    Mitral Valve:  The leaflets are not thickened. There is moderate focal calcification of the anterior leaflet and posterior leaflet limited to the leaflet margin. There is mild annular calcification. There is moderate subvalvular calcification. There is mild to moderate regurgitation with a centrally directed jet. There is moderate to severe stenosis (MVA 1.4 cm2/MG 8 mmHg).    Tricuspid Valve: There is mild regurgitation. The right ventricular systolic pressure is mildly elevated. The estimated right ventricular systolic pressure is 48.00 mmHg.    Stress 5/11/23:  Stress ECG: No ST deviation is noted. The ECG was not diagnostic due to pharmacological (vasodilator) stress. The stress ECG is equivocal for ischemia after pharmacologic vasodilation.    Perfusion: There are no perfusion defects.    Stress Function: Left ventricular function post-stress is normal. Post-stress ejection fraction is 88 %.    Stress Combined Conclusion: Left ventricular perfusion is normal.      AVERY: 9/2022  Done pre ablation. AS probably mild-moderate.  MV not fully evaluated, visually some restriction movement and calcification.  EF preserved.    TTE 7/2022:  Left Ventricle: Left ventricular cavity size is normal. Wall thickness is mildly increased. The left ventricular ejection fraction is 60%. Systolic function is normal. Wall motion is normal.    Right Ventricle: Right ventricular cavity size is mildly dilated.    Left Atrium: The atrium is moderately dilated.    Aortic Valve: The aortic valve is trileaflet. The leaflets are not thickened. The leaflets are moderately calcified. There is mildly reduced mobility. There is mild regurgitation. There is mild to moderate stenosis. The aortic valve velocity is increased due to stenosis.    Mitral Valve: There is moderate calcification. There is moderately reduced mobility. There is mild annular calcification. There is mild regurgitation. There is moderate stenosis.    Tricuspid Valve: There is mild to  moderate regurgitation.      Echo 5/13/21:  LEFT VENTRICLE:  Systolic function was vigorous. Ejection fraction was estimated to be 65 %.  There were no regional wall motion abnormalities.  Wall thickness was at the upper limits of normal.     MITRAL VALVE:  There was mild annular calcification.  There was mild to moderate regurgitation.     AORTIC VALVE:  The valve was trileaflet. Leaflets exhibited moderately increased thickness, moderate calcification, and mild to moderately reduced cuspal separation.  There was mild to moderate stenosis.  There was mild to moderate regurgitation.  The peak valve velocity was 306 cm/s.  Valve mean gradient was 21.6 mmHg.     TRICUSPID VALVE:  There was mild regurgitation.  Pulmonary artery systolic pressure was mildly increased.  Estimated peak PA pressure was 39 mmHg.      Echo 1/2020:  LEFT VENTRICLE:  Systolic function was normal by visual assessment. Ejection fraction was estimated to be 65 %.  There were no regional wall motion abnormalities.  Doppler parameters were consistent with abnormal left ventricular relaxation (grade 1 diastolic dysfunction).     LEFT ATRIUM:  The atrium was mildly dilated.     MITRAL VALVE:  There was mild regurgitation.     AORTIC VALVE:  The valve was trileaflet. Leaflets exhibited mild calcification, reduced mobility, and sclerosis.  There was mild to moderate stenosis.  There was moderate regurgitation.  Valve mean gradient was 20.7 mmHg.      EKG:  Sinus rhythm. 70 bpm. Normal EKG.

## 2025-01-31 NOTE — TELEPHONE ENCOUNTER
----- Message from Talha Lujan DO sent at 1/30/2025  3:52 PM EST -----  Messaged her approximately 1 week ago to check on her diarrhea.  Unfortunately have not gotten a response so far.  Can we please contact her as we should probably repeat a BMP as well as magnesium level given the fact she is now off of all magnesium supplementation.  Thank you.  ----- Message -----  From: Lab, Background User  Sent: 1/17/2025   7:00 PM EST  To: Talha Lujan DO

## 2025-01-31 NOTE — TELEPHONE ENCOUNTER
Patient was updated on her labs. She said her diarrhea has gotten better since stopping the magnesium, and she will go for repeat labs early next week.

## 2025-01-31 NOTE — TELEPHONE ENCOUNTER
Called patient and left a voicemail stating the following information:      Please repeat a BMP as well as magnesium level given the fact she is now off of all magnesium supplementation.     Asked the patient please call back with further questions.   Labs have been added to the patients chart.

## 2025-02-03 NOTE — TELEPHONE ENCOUNTER
----- Message from Talha Lujan DO sent at 1/31/2025  1:36 PM EST -----  Please have her repeat a renal function panel as well as magnesium level in 1 to 2 weeks.  Thank you.  ----- Message -----  From: Alia William MA  Sent: 1/31/2025  10:12 AM EST  To: DO DAORE RubinI  ----- Message -----  From: Rox Schrader  Sent: 1/31/2025   9:59 AM EST  To: Nephrology [facility]

## 2025-02-05 ENCOUNTER — OFFICE VISIT (OUTPATIENT)
Dept: URGENT CARE | Age: 75
End: 2025-02-05
Payer: MEDICARE

## 2025-02-05 ENCOUNTER — APPOINTMENT (OUTPATIENT)
Dept: RADIOLOGY | Age: 75
End: 2025-02-05
Payer: MEDICARE

## 2025-02-05 VITALS
TEMPERATURE: 97.7 F | OXYGEN SATURATION: 99 % | DIASTOLIC BLOOD PRESSURE: 60 MMHG | SYSTOLIC BLOOD PRESSURE: 115 MMHG | HEART RATE: 78 BPM | RESPIRATION RATE: 18 BRPM

## 2025-02-05 DIAGNOSIS — R05.1 ACUTE COUGH: ICD-10-CM

## 2025-02-05 DIAGNOSIS — R05.1 ACUTE COUGH: Primary | ICD-10-CM

## 2025-02-05 PROCEDURE — 71046 X-RAY EXAM CHEST 2 VIEWS: CPT

## 2025-02-05 PROCEDURE — 99213 OFFICE O/P EST LOW 20 MIN: CPT | Performed by: FAMILY MEDICINE

## 2025-02-05 PROCEDURE — G0463 HOSPITAL OUTPT CLINIC VISIT: HCPCS | Performed by: FAMILY MEDICINE

## 2025-02-05 RX ORDER — BENZONATATE 200 MG/1
200 CAPSULE ORAL 3 TIMES DAILY PRN
Qty: 20 CAPSULE | Refills: 0 | Status: SHIPPED | OUTPATIENT
Start: 2025-02-05

## 2025-02-05 RX ORDER — METHYLPREDNISOLONE 4 MG/1
TABLET ORAL
Qty: 21 TABLET | Refills: 0 | Status: SHIPPED | OUTPATIENT
Start: 2025-02-05

## 2025-02-05 NOTE — PROGRESS NOTES
[facility]        NAME: Almaz Hall is a 74 y.o. female  : 1950    MRN: [id number]  DATE: 2025  TIME: 6:27 PM    Assessment and Plan   Acute cough [R05.1]  1. Acute cough  XR chest pa and lateral    benzonatate (TESSALON) 200 MG capsule    methylPREDNISolone 4 MG tablet therapy pack        CXR - No acute pulmonary process seen, pending rad read      Patient Instructions     Your x-rays were read by the provider. A radiologist will also read the x-rays and you will be notified of any abnormalities.     Take steroids as prescribed.  Take Tessalon as needed as prescribed.  Continue Xopenex inhaler as needed as directed.    For cough you can take an over-the-counter expectorant such as plain Robitussion or Mucinex. A spoonful of honey at bedtime may also be helpful.    For cold symptoms with high blood pressure take Coricidin cough/cold.     Drink plenty of fluids to stay hydrated. Airborne or Emergen-C for extra vitamin C and zinc.    Follow up with your PCP in 3-5 days for persistent symptoms.    Go to the ER if symptoms worsen.     If tests are performed, our office will contact you with results only if changes need to made to the care plan discussed with you at the visit. You can review your full results on [facility].      Chief Complaint     Chief Complaint   Patient presents with    Cough     Onset 1/15 Pt states she has had a cough for 3 weeks, stuffy nose, sob when walking, states her lungs hurt when she takes a short breath          History of Present Illness       74-year-old female with PMH HTN, atrial fibrillation, pulmonary HTN, CHF, COPD, CKD stage 3 presenting with a cough x 3 weeks. Patient states she was ill with cold-like symptoms including fevers/chills, body aches, congestion, sore throat, and a cough. All symptoms other than cough have resolved. It is occasionally loose and productive of mucous. Reports episodes of post-tussive emesis after coughing fits.  Notes chest tightness and shortness of breath on exertion. Worse at night, wakes from sleep sweaty. Denies CP, palpitations, and leg swelling. Taking various OTC medications which have not provide her with much relief. Using Xopenex inhaler twice daily.         Review of Systems   Review of Systems   Constitutional:  Positive for diaphoresis. Negative for chills and fever.   HENT:  Positive for congestion. Negative for ear discharge, ear pain, sinus pressure and sore throat.    Eyes:  Negative for discharge and redness.   Respiratory:  Positive for cough, chest tightness, shortness of breath and wheezing.    Cardiovascular:  Negative for chest pain, palpitations and leg swelling.   Gastrointestinal:  Positive for vomiting (post-tussive). Negative for abdominal pain and diarrhea.   Skin:  Negative for rash.   Neurological:  Negative for dizziness, light-headedness and headaches.         Current Medications       Current Outpatient Medications:     benzonatate (TESSALON) 200 MG capsule, Take 1 capsule (200 mg total) by mouth 3 (three) times a day as needed for cough, Disp: 20 capsule, Rfl: 0    methylPREDNISolone 4 MG tablet therapy pack, Use as directed on package, Disp: 21 tablet, Rfl: 0    atorvastatin (LIPITOR) 20 mg tablet, TAKE 1 TABLET BY MOUTH EVERY DAY, Disp: 90 tablet, Rfl: 1    b complex vitamins capsule, Take 1 capsule by mouth daily, Disp: 30 capsule, Rfl: 0    cetirizine (ZyrTEC) 10 mg tablet, Take 10 mg by mouth daily, Disp: , Rfl:     diltiazem (TIAZAC) 120 MG 24 hr capsule, TAKE 1 CAPSULE BY MOUTH EVERY DAY, Disp: 90 capsule, Rfl: 1    Eliquis 5 MG, TAKE 1 TABLET BY MOUTH TWO TIMES DAILY, Disp: 60 tablet, Rfl: 5    escitalopram (LEXAPRO) 10 mg tablet, TAKE 1 TABLET BY MOUTH EVERY DAY, Disp: 30 tablet, Rfl: 5    levalbuterol (XOPENEX HFA) 45 mcg/act inhaler, Inhale 1-2 puffs every 4 (four) hours as needed for wheezing, Disp: 15 g, Rfl: 0    spironolactone (ALDACTONE) 25 mg tablet, TAKE 1 TABLET BY  MOUTH EVERY DAY, Disp: 90 tablet, Rfl: 1    Current Allergies     Allergies as of 02/05/2025 - Reviewed 02/05/2025   Allergen Reaction Noted    Bystolic [nebivolol hcl] Bradycardia 11/16/2023    Levofloxacin Hives 01/04/2018    Vasotec [enalapril] Hives 11/17/2022    Amlodipine Rash 01/08/2019    Ancef [cefazolin] GI Intolerance 08/27/2014    Cephalosporins Other (See Comments)     Fruit extracts  08/26/2013    Morphine GI Intolerance     Other  06/02/2017    Penicillins Hives 06/02/2017    Tetracycline Edema 06/02/2017    Xarelto [rivaroxaban] Rash 01/08/2019            The following portions of the patient's history were reviewed and updated as appropriate: allergies, current medications, past family history, past medical history, past social history, past surgical history and problem list.     Past Medical History:   Diagnosis Date    Asthma     Atrial fibrillation (HCC)     with ablation    Bursitis of hip     Last Assessed:6/5/2017    COPD (chronic obstructive pulmonary disease) ([facility]) 5 years ago    Coronary artery disease     Herniated lumbar intervertebral disc     Hyperlipidemia     Hypertension 2 years    Pituitary adenoma ([facility])     Last Assessed:2/12/2015 ; Previously on bromocriptine    Spinal stenosis     SVT (supraventricular tachycardia) ([facility])     with ablation    Systolic murmur of aorta        Past Surgical History:   Procedure Laterality Date    BREAST SURGERY      Incisional breast biopsy    CARDIAC ELECTROPHYSIOLOGY PROCEDURE N/A 09/02/2022    Procedure: Cardiac eps/afib ablation;  Surgeon: Fadi Goss MD;  Location: [facility];  Service: Cardiology    CARDIAC ELECTROPHYSIOLOGY PROCEDURE N/A 12/07/2022    Procedure: Cardiac loop recorder implant;  Surgeon: Bao Kovacs MD;  Location: [facility];  Service: Cardiology    CARDIAC ELECTROPHYSIOLOGY STUDY AND ABLATION      CARDIAC LOOP RECORDER      CARPAL TUNNEL RELEASE Left     CHOLECYSTECTOMY      OTHER SURGICAL HISTORY       EXCISION OF PAROTID TUMOR/GLAND    PAROTIDECTOMY      CT COLONOSCOPY FLX DX W/COLLJ SPEC WHEN PFRMD N/A 06/06/2017    Procedure: COLONOSCOPY;  Surgeon: Elizabeth Leyva DO;  Location: [facility] GI LAB;  Service: Gastroenterology    CT HYSTEROSCOPY BX ENDOMETRIUM&/POLYPC W/WO D&C N/A 11/22/2023    Procedure: (D&C) W/ HYSTEROSCOPY;  Surgeon: Harini Kinney MD;  Location: [facility] OR;  Service: Gynecology    ROTATOR CUFF REPAIR      SALPINGOOPHORECTOMY Right        Family History   Problem Relation Age of Onset    Colon cancer Mother     Coronary artery disease Mother     Colonic polyp Mother     Liver cancer Mother     Multiple sclerosis Father     Hypertension Brother     Hypertension Other     Breast cancer Neg Hx     Cancer Neg Hx          Medications have been verified.        Objective   /60   Pulse 78   Temp 97.7 °F (36.5 °C)   Resp 18   LMP  (LMP Unknown)   SpO2 99%        Physical Exam     Physical Exam  Vitals and nursing note reviewed.   Constitutional:       General: She is not in acute distress.     Appearance: She is not ill-appearing or diaphoretic.   HENT:      Head: Normocephalic and atraumatic.      Right Ear: Tympanic membrane, ear canal and external ear normal.      Left Ear: Tympanic membrane, ear canal and external ear normal.      Nose: Congestion present.      Mouth/Throat:      Mouth: Mucous membranes are moist.      Pharynx: Oropharynx is clear.   Eyes:      Conjunctiva/sclera: Conjunctivae normal.      Pupils: Pupils are equal, round, and reactive to light.   Cardiovascular:      Rate and Rhythm: Normal rate and regular rhythm.   Pulmonary:      Effort: Pulmonary effort is normal. No tachypnea, accessory muscle usage or respiratory distress.      Breath sounds: Decreased breath sounds present.   Musculoskeletal:         General: Normal range of motion.      Cervical back: Normal range of motion and neck supple.   Skin:     General: Skin is warm and dry.      Capillary  Refill: Capillary refill takes less than 2 seconds.   Neurological:      Mental Status: She is alert and oriented to person, place, and time.

## 2025-02-05 NOTE — PATIENT INSTRUCTIONS
Your x-rays were read by the provider. A radiologist will also read the x-rays and you will be notified of any abnormalities.     Take steroids as prescribed.  Take Tessalon as needed as prescribed.  Continue Xopenex inhaler as needed as directed.    For cough you can take an over-the-counter expectorant such as plain Robitussion or Mucinex. A spoonful of honey at bedtime may also be helpful.    For cold symptoms with high blood pressure take Coricidin cough/cold.     Drink plenty of fluids to stay hydrated. Airborne or Emergen-C for extra vitamin C and zinc.    Follow up with your PCP in 3-5 days for persistent symptoms.    Go to the ER if symptoms worsen.

## 2025-02-19 ENCOUNTER — APPOINTMENT (OUTPATIENT)
Dept: LAB | Facility: IMAGING CENTER | Age: 75
End: 2025-02-19
Payer: MEDICARE

## 2025-02-19 DIAGNOSIS — I12.9 HYPERTENSIVE KIDNEY DISEASE WITH STAGE 3B CHRONIC KIDNEY DISEASE (HCC): ICD-10-CM

## 2025-02-19 DIAGNOSIS — I50.32 CHRONIC DIASTOLIC CHF (CONGESTIVE HEART FAILURE) (HCC): ICD-10-CM

## 2025-02-19 DIAGNOSIS — E55.9 VITAMIN D DEFICIENCY: ICD-10-CM

## 2025-02-19 DIAGNOSIS — N18.32 HYPERTENSIVE KIDNEY DISEASE WITH STAGE 3B CHRONIC KIDNEY DISEASE (HCC): ICD-10-CM

## 2025-02-19 DIAGNOSIS — I10 ESSENTIAL HYPERTENSION: ICD-10-CM

## 2025-02-19 DIAGNOSIS — N18.32 STAGE 3B CHRONIC KIDNEY DISEASE (HCC): ICD-10-CM

## 2025-02-19 DIAGNOSIS — E83.42 HYPOMAGNESEMIA: ICD-10-CM

## 2025-02-19 LAB
ALBUMIN SERPL BCG-MCNC: 4.2 G/DL (ref 3.5–5)
ANION GAP SERPL CALCULATED.3IONS-SCNC: 9 MMOL/L (ref 4–13)
BUN SERPL-MCNC: 21 MG/DL (ref 5–25)
CALCIUM SERPL-MCNC: 9.6 MG/DL (ref 8.4–10.2)
CHLORIDE SERPL-SCNC: 103 MMOL/L (ref 96–108)
CO2 SERPL-SCNC: 28 MMOL/L (ref 21–32)
CREAT SERPL-MCNC: 1.09 MG/DL (ref 0.6–1.3)
ERYTHROCYTE [DISTWIDTH] IN BLOOD BY AUTOMATED COUNT: 15.4 % (ref 11.6–15.1)
GFR SERPL CREATININE-BSD FRML MDRD: 50 ML/MIN/1.73SQ M
GLUCOSE P FAST SERPL-MCNC: 90 MG/DL (ref 65–99)
HCT VFR BLD AUTO: 40.9 % (ref 34.8–46.1)
HGB BLD-MCNC: 12.8 G/DL (ref 11.5–15.4)
MAGNESIUM SERPL-MCNC: 1.6 MG/DL (ref 1.9–2.7)
MCH RBC QN AUTO: 30.7 PG (ref 26.8–34.3)
MCHC RBC AUTO-ENTMCNC: 31.3 G/DL (ref 31.4–37.4)
MCV RBC AUTO: 98 FL (ref 82–98)
PHOSPHATE SERPL-MCNC: 4.2 MG/DL (ref 2.3–4.1)
PLATELET # BLD AUTO: 246 THOUSANDS/UL (ref 149–390)
PMV BLD AUTO: 10.1 FL (ref 8.9–12.7)
POTASSIUM SERPL-SCNC: 4.6 MMOL/L (ref 3.5–5.3)
RBC # BLD AUTO: 4.17 MILLION/UL (ref 3.81–5.12)
SODIUM SERPL-SCNC: 140 MMOL/L (ref 135–147)
WBC # BLD AUTO: 8.68 THOUSAND/UL (ref 4.31–10.16)

## 2025-02-19 PROCEDURE — 83735 ASSAY OF MAGNESIUM: CPT

## 2025-02-19 PROCEDURE — 80069 RENAL FUNCTION PANEL: CPT

## 2025-02-19 PROCEDURE — 36415 COLL VENOUS BLD VENIPUNCTURE: CPT

## 2025-02-19 PROCEDURE — 85027 COMPLETE CBC AUTOMATED: CPT

## 2025-03-05 ENCOUNTER — TELEPHONE (OUTPATIENT)
Age: 75
End: 2025-03-05

## 2025-03-05 NOTE — TELEPHONE ENCOUNTER
Patient reports that she was exposed to the flu with grandson. This am she woke up with a fever of 101, chills, coughing and vomiting. She would like tamiflu. Unable to schedule a virtual visit. Warm transfer to the office.

## 2025-03-06 ENCOUNTER — TELEMEDICINE (OUTPATIENT)
Dept: FAMILY MEDICINE CLINIC | Facility: CLINIC | Age: 75
End: 2025-03-06
Payer: MEDICARE

## 2025-03-06 ENCOUNTER — TELEPHONE (OUTPATIENT)
Age: 75
End: 2025-03-06

## 2025-03-06 DIAGNOSIS — J11.1 INFLUENZA: Primary | ICD-10-CM

## 2025-03-06 PROCEDURE — G2211 COMPLEX E/M VISIT ADD ON: HCPCS | Performed by: FAMILY MEDICINE

## 2025-03-06 PROCEDURE — 99213 OFFICE O/P EST LOW 20 MIN: CPT | Performed by: FAMILY MEDICINE

## 2025-03-06 RX ORDER — LEVALBUTEROL TARTRATE 45 UG/1
1-2 AEROSOL, METERED ORAL EVERY 4 HOURS PRN
Qty: 15 G | Refills: 0 | Status: SHIPPED | OUTPATIENT
Start: 2025-03-06

## 2025-03-06 RX ORDER — OSELTAMIVIR PHOSPHATE 75 MG/1
75 CAPSULE ORAL EVERY 12 HOURS SCHEDULED
Qty: 10 CAPSULE | Refills: 0 | Status: SHIPPED | OUTPATIENT
Start: 2025-03-06 | End: 2025-03-11

## 2025-03-06 NOTE — TELEPHONE ENCOUNTER
PA for      levalbuterol (XOPENEX HFA) 45 mcg/act inhaler   SUBMITTED to [facility]    via    []CMM-KEY:   [x]Surescripts-Case ID # [id number]   []Availity-Auth ID # NDC #   []Faxed to plan   []Other website   []Phone call Case ID #     [x]PA sent as URGENT    All office notes, labs and other pertaining documents and studies sent. Clinical questions answered. Awaiting determination from insurance company.     Turnaround time for your insurance to make a decision on your Prior Authorization can take 7-21 business days.

## 2025-03-06 NOTE — TELEPHONE ENCOUNTER
Reason for call:   [x] Prior Auth  [] Other:     Caller:  [x] Patient  [] Pharmacy  Name:   Address:   Callback Number:     Medication: levalbuterol (XOPENEX HFA) 45 mcg/act inhaler     Dose/Frequency:  Inhale 1-2 puffs every 4 (four) hours as needed     Quantity: 15 g    Ordering Provider:   [x] PCP/Provider - Víctor Perea MD  [] Speciality/Provider -

## 2025-03-06 NOTE — PROGRESS NOTES
Virtual Regular VisitName: Almaz Hall      : 1950      MRN: [id number]  Encounter Provider: Víctor Perea MD  Encounter Date: 3/6/2025   Encounter department: [facility]  :  Assessment & Plan  Influenza    Most likely influenza due to exposure and typical symptoms start tamiflu, recommend in person evaluation if not improving, ER if worsening, severe or short of breath             History of Present Illness     HPI    74 year old presenting for cough, congestion, fever for one day.    Recently exposed the flu, Grandkids tested postive at pediatrician.    Today feels slighly improved from yesterday, temp 100.9, yesterday 102        Review of Systems   Constitutional:  Positive for fatigue. Negative for activity change and appetite change.   HENT:  Positive for sneezing.    Respiratory:  Positive for cough. Negative for apnea and chest tightness.    Cardiovascular:  Negative for chest pain and palpitations.   Musculoskeletal:  Positive for myalgias.       Objective   LMP  (LMP Unknown)     Pulse ox 94 at home. Pulse 98    Physical Exam  Constitutional:       Comments: Congestion on video otherwise normal   Cardiovascular:      Rate and Rhythm: Normal rate.   Neurological:      Mental Status: She is alert.       Administrative Statements   Encounter provider Víctor Perea MD    The Patient is located at Home and in the following state in which I hold an active license [place].    The patient was identified by name and date of birth. Almaz Hall was informed that this is a telemedicine visit and that the visit is being conducted through the Epic Embedded platform. She agrees to proceed..  My office door was closed. No one else was in the room.  She acknowledged consent and understanding of privacy and security of the video platform. The patient has agreed to participate and understands they can discontinue the visit at any time.    I have spent a total time of 15  minutes in caring for this patient on the day of the visit/encounter including Diagnostic results, Prognosis, Risks and benefits of tx options, Instructions for management, and Patient and family education, not including the time spent for establishing the audio/video connection.

## 2025-03-07 ENCOUNTER — APPOINTMENT (EMERGENCY)
Dept: RADIOLOGY | Facility: HOSPITAL | Age: 75
End: 2025-03-07
Payer: MEDICARE

## 2025-03-07 ENCOUNTER — HOSPITAL ENCOUNTER (EMERGENCY)
Facility: HOSPITAL | Age: 75
Discharge: HOME | End: 2025-03-07
Attending: EMERGENCY MEDICINE
Payer: MEDICARE

## 2025-03-07 ENCOUNTER — OFFICE VISIT (OUTPATIENT)
Dept: URGENT CARE | Age: 75
End: 2025-03-07
Payer: MEDICARE

## 2025-03-07 VITALS
OXYGEN SATURATION: 96 % | HEART RATE: 92 BPM | RESPIRATION RATE: 24 BRPM | SYSTOLIC BLOOD PRESSURE: 120 MMHG | TEMPERATURE: 98.9 F | DIASTOLIC BLOOD PRESSURE: 58 MMHG

## 2025-03-07 VITALS
RESPIRATION RATE: 17 BRPM | OXYGEN SATURATION: 94 % | HEART RATE: 96 BPM | DIASTOLIC BLOOD PRESSURE: 62 MMHG | SYSTOLIC BLOOD PRESSURE: 139 MMHG | TEMPERATURE: 98.9 F

## 2025-03-07 DIAGNOSIS — R06.09 DYSPNEA ON EXERTION: Primary | ICD-10-CM

## 2025-03-07 DIAGNOSIS — R06.02 SHORTNESS OF BREATH: ICD-10-CM

## 2025-03-07 DIAGNOSIS — J44.1 COPD EXACERBATION (HCC): Primary | ICD-10-CM

## 2025-03-07 DIAGNOSIS — J11.1 INFLUENZA: ICD-10-CM

## 2025-03-07 LAB
2HR DELTA HS TROPONIN: 0 NG/L
ANION GAP SERPL CALCULATED.3IONS-SCNC: 9 MMOL/L (ref 4–13)
BASOPHILS # BLD AUTO: 0.02 THOUSANDS/ÂΜL (ref 0–0.1)
BASOPHILS NFR BLD AUTO: 1 % (ref 0–1)
BUN SERPL-MCNC: 17 MG/DL (ref 5–25)
CALCIUM SERPL-MCNC: 9.1 MG/DL (ref 8.4–10.2)
CARDIAC TROPONIN I PNL SERPL HS: 7 NG/L (ref ?–50)
CARDIAC TROPONIN I PNL SERPL HS: 7 NG/L (ref ?–50)
CHLORIDE SERPL-SCNC: 112 MMOL/L (ref 96–108)
CO2 SERPL-SCNC: 20 MMOL/L (ref 21–32)
CREAT SERPL-MCNC: 1.18 MG/DL (ref 0.6–1.3)
EOSINOPHIL # BLD AUTO: 0 THOUSAND/ÂΜL (ref 0–0.61)
EOSINOPHIL NFR BLD AUTO: 0 % (ref 0–6)
ERYTHROCYTE [DISTWIDTH] IN BLOOD BY AUTOMATED COUNT: 15.3 % (ref 11.6–15.1)
GFR SERPL CREATININE-BSD FRML MDRD: 46 ML/MIN/1.73SQ M
GLUCOSE SERPL-MCNC: 83 MG/DL (ref 65–140)
HCT VFR BLD AUTO: 36.8 % (ref 34.8–46.1)
HGB BLD-MCNC: 11.3 G/DL (ref 11.5–15.4)
IMM GRANULOCYTES # BLD AUTO: 0.01 THOUSAND/UL (ref 0–0.2)
IMM GRANULOCYTES NFR BLD AUTO: 0 % (ref 0–2)
LYMPHOCYTES # BLD AUTO: 1.09 THOUSANDS/ÂΜL (ref 0.6–4.47)
LYMPHOCYTES NFR BLD AUTO: 26 % (ref 14–44)
MCH RBC QN AUTO: 30.8 PG (ref 26.8–34.3)
MCHC RBC AUTO-ENTMCNC: 30.7 G/DL (ref 31.4–37.4)
MCV RBC AUTO: 100 FL (ref 82–98)
MONOCYTES # BLD AUTO: 0.49 THOUSAND/ÂΜL (ref 0.17–1.22)
MONOCYTES NFR BLD AUTO: 12 % (ref 4–12)
NEUTROPHILS # BLD AUTO: 2.58 THOUSANDS/ÂΜL (ref 1.85–7.62)
NEUTS SEG NFR BLD AUTO: 61 % (ref 43–75)
NRBC BLD AUTO-RTO: 0 /100 WBCS
PLATELET # BLD AUTO: 164 THOUSANDS/UL (ref 149–390)
PMV BLD AUTO: 10.4 FL (ref 8.9–12.7)
POTASSIUM SERPL-SCNC: 3.2 MMOL/L (ref 3.5–5.3)
RBC # BLD AUTO: 3.67 MILLION/UL (ref 3.81–5.12)
SODIUM SERPL-SCNC: 141 MMOL/L (ref 135–147)
WBC # BLD AUTO: 4.19 THOUSAND/UL (ref 4.31–10.16)

## 2025-03-07 PROCEDURE — 36415 COLL VENOUS BLD VENIPUNCTURE: CPT | Performed by: EMERGENCY MEDICINE

## 2025-03-07 PROCEDURE — 85025 COMPLETE CBC W/AUTO DIFF WBC: CPT | Performed by: EMERGENCY MEDICINE

## 2025-03-07 PROCEDURE — G0463 HOSPITAL OUTPT CLINIC VISIT: HCPCS | Performed by: EMERGENCY MEDICINE

## 2025-03-07 PROCEDURE — 80048 BASIC METABOLIC PNL TOTAL CA: CPT | Performed by: EMERGENCY MEDICINE

## 2025-03-07 PROCEDURE — 84484 ASSAY OF TROPONIN QUANT: CPT | Performed by: EMERGENCY MEDICINE

## 2025-03-07 PROCEDURE — 99285 EMERGENCY DEPT VISIT HI MDM: CPT | Performed by: EMERGENCY MEDICINE

## 2025-03-07 PROCEDURE — 71046 X-RAY EXAM CHEST 2 VIEWS: CPT

## 2025-03-07 PROCEDURE — 99214 OFFICE O/P EST MOD 30 MIN: CPT | Performed by: EMERGENCY MEDICINE

## 2025-03-07 PROCEDURE — 94640 AIRWAY INHALATION TREATMENT: CPT | Performed by: EMERGENCY MEDICINE

## 2025-03-07 PROCEDURE — 99285 EMERGENCY DEPT VISIT HI MDM: CPT

## 2025-03-07 PROCEDURE — 93005 ELECTROCARDIOGRAM TRACING: CPT

## 2025-03-07 RX ORDER — PREDNISONE 20 MG/1
40 TABLET ORAL DAILY
Status: SHIPPED | OUTPATIENT
Start: 2025-03-07

## 2025-03-07 RX ORDER — ALBUTEROL SULFATE 0.83 MG/ML
2.5 SOLUTION RESPIRATORY (INHALATION) EVERY 6 HOURS PRN
Qty: 75 ML | Refills: 0 | Status: SHIPPED | OUTPATIENT
Start: 2025-03-07

## 2025-03-07 RX ORDER — ALBUTEROL SULFATE 90 UG/1
2 INHALANT RESPIRATORY (INHALATION) ONCE
Status: COMPLETED | OUTPATIENT
Start: 2025-03-07 | End: 2025-03-07

## 2025-03-07 RX ORDER — LEVALBUTEROL INHALATION SOLUTION 1.25 MG/3ML
1.25 SOLUTION RESPIRATORY (INHALATION) 3 TIMES DAILY
Qty: 75 ML | Refills: 0 | Status: SHIPPED | OUTPATIENT
Start: 2025-03-07

## 2025-03-07 RX ORDER — ALBUTEROL SULFATE 0.83 MG/ML
2.5 SOLUTION RESPIRATORY (INHALATION) ONCE
Status: COMPLETED | OUTPATIENT
Start: 2025-03-07 | End: 2025-03-07

## 2025-03-07 RX ADMIN — ALBUTEROL SULFATE 2 PUFF: 90 AEROSOL, METERED RESPIRATORY (INHALATION) at 15:05

## 2025-03-07 RX ADMIN — ALBUTEROL SULFATE 2.5 MG: 0.83 SOLUTION RESPIRATORY (INHALATION) at 13:41

## 2025-03-07 RX ADMIN — Medication 0.5 MG: at 13:41

## 2025-03-07 RX ADMIN — PREDNISONE 40 MG: 20 TABLET ORAL at 13:41

## 2025-03-07 NOTE — TELEPHONE ENCOUNTER
PA for levalbuterol (XOPENEX HFA) 45 mcg/act inhaler  DENIED    Reason:(Screenshot if applicable)        Message sent to office clinical pool Yes    Denial letter scanned into Media Yes    Appeal started No (Provider will need to decide if appeal is warranted and send clinical documentation to Prior Authorization Team for initiation.)    **Please follow up with your patient regarding denial and next steps**

## 2025-03-07 NOTE — PROGRESS NOTES
[facility]        NAME: Almaz Hall is a 74 y.o. female  : 1950    MRN: [id number]  DATE: 2025  TIME: 2:38 PM    Assessment and Plan   Dyspnea on exertion [R06.09]  1. Dyspnea on exertion  ECG 12 lead    ipratropium (ATROVENT) 0.02 % inhalation solution 0.5 mg    albuterol inhalation solution 2.5 mg    predniSONE tablet 40 mg    Transfer to other facility      2. Shortness of breath  ECG 12 lead    ipratropium (ATROVENT) 0.02 % inhalation solution 0.5 mg    albuterol inhalation solution 2.5 mg    predniSONE tablet 40 mg    Transfer to other facility        Twelve-lead EKG, my personal read interpretation: Sinus rhythm with occasional PACs at a ventricular rate of 86 with otherwise normal axis, intervals and nonspecific ST-T wave changes.    Prior 2D echocardiogram results, 2024:    Left Ventricle: Left ventricular cavity size is normal. Wall thickness is mildly increased. There is borderline concentric hypertrophy. The left ventricular ejection fraction is 70% by single dimension measurement. Systolic function is vigorous. Wall motion is normal. Diastolic function is moderately abnormal, consistent with grade II (pseudonormal) relaxation.  Left atrial filling pressure is elevated.    Left Atrium: The atrium is mildly dilated (35-41 mL/m2).    Aortic Valve: The aortic valve is trileaflet. The leaflets are moderately thickened. The leaflets are moderately calcified. There is moderately reduced mobility. There is mild regurgitation. There is moderate to severe stenosis. The aortic valve peak velocity is 3.3 m/s. The aortic valve mean gradient is 25 mmHg. The dimensionless velocity index is 0.38. The aortic valve area is 0.93 cm2. The stroke volume index is 46.90 ml/m2.    Mitral Valve: There is mild regurgitation. There is moderate stenosis.    Tricuspid Valve: There is mild regurgitation. The right ventricular systolic pressure is mildly to moderately elevated. The estimated right  ventricular systolic pressure is 46.00 mmHg.     Findings    Left Ventricle Left ventricular cavity size is normal. Wall thickness is mildly increased. There is borderline concentric hypertrophy. The left ventricular ejection fraction is 70% by single dimension measurement. Systolic function is vigorous. Wall motion is normal. Diastolic function is moderately abnormal, consistent with grade II (pseudonormal) relaxation.  Left atrial filling pressure is elevated.   Right Ventricle Right ventricular cavity size is normal. Systolic function is normal. Normal tricuspid annular plane systolic excursion (TAPSE) > 1.7 cm.   Left Atrium The atrium is mildly dilated (35-41 mL/m2).   Right Atrium The atrium is normal in size.   Aortic Valve The aortic valve is trileaflet. The leaflets are moderately thickened. The leaflets are moderately calcified. There is moderately reduced mobility. There is mild regurgitation. There is moderate to severe stenosis. The aortic valve peak velocity is 3.3 m/s. The aortic valve mean gradient is 25 mmHg. The dimensionless velocity index is 0.38. The aortic valve area is 0.93 cm2. The stroke volume index is 46.90 ml/m2.   Mitral Valve Rheumatic change of the mitral valve leaflets with calcification on the tip of the leaflets.  Decreased leaflet motion. There is mild regurgitation. There is moderate stenosis.   Tricuspid Valve Tricuspid valve structure is normal. There is mild regurgitation. There is no evidence of stenosis. The right ventricular systolic pressure is mildly to moderately elevated. The estimated right ventricular systolic pressure is 46.00 mmHg.   Pulmonic Valve There is no evidence of regurgitation. There is no evidence of stenosis.   Ascending Aorta The aortic root is normal in size. The ascending aorta is normal in size.   IVC/SVC The inferior vena cava size is 18.0 mm. The right atrial pressure is estimated at 3.0 mmHg. The inferior vena cava is normal in size. Respirophasic  "changes were normal.   Pericardium There is no pericardial effusion. The pericardium is normal in appearance.   Patient tachypneic and visibly short of breath at rest.  Advised patient, given shortness of breath and worsening clinical status and comorbidities, to seek care in the emergency room for further evaluation.   Advised transport via EMS in a monitored medical setting which patient declined.  Patient stated that she will have her  come to clinic and take  her to the ED via POV.  Advised patient of potential risks of doing so to include potential clinical deterioration, respiratory and cardiac arrest, and death.  Patient voiced understanding of these potential risk risks and consequences associated with traveling via private vehicle to the emergency room and insisted on having her  take her to the ED.  Patient did demonstrate medical capacity.  Patient was given 1 DuoNeb and 40 mg of oral prednisone in clinic with some relief prior to departure for ED while waiting for her .  She was otherwise able to ambulate independently and alert and oriented to person place time and event upon departure from clinic.    Mini neb    Performed by: Gerson Maddox DO  Authorized by: Gerson Maddox DO  Universal Protocol:  procedure performed by consultantConsent: Verbal consent obtained.  Risks and benefits: risks, benefits and alternatives were discussed  Consent given by: patient  Time out: Immediately prior to procedure a \"time out\" was called to verify the correct patient, procedure, equipment, support staff and site/side marked as required.  Timeout called at: 3/7/2025 1:48 PM.  Patient understanding: patient states understanding of the procedure being performed  Patient consent: the patient's understanding of the procedure matches consent given  Procedure consent: procedure consent matches procedure scheduled  Relevant documents: relevant documents present and verified  Test " "results: test results available and properly labeled  Site marked: the operative site was marked  Radiology Images displayed and confirmed. If images not available, report reviewed: imaging studies available  Required items: required blood products, implants, devices, and special equipment available  Patient identity confirmed: verbally with patient    Number of treatments:  1  Treatment 1:   Pre-Procedure     Symptoms:  Shortness of breath    Medication Administered:  Albuterol 2.5 mg and Atrovent 0.5 mg        Patient Instructions       Follow up with PCP in 3-5 days.  Proceed to  ER if symptoms worsen.    If tests have been performed at [facility], our office will contact you with results if changes need to be made to the care plan discussed with you at the visit.  You can review your full results on [facility].    Chief Complaint     Chief Complaint   Patient presents with    Shortness of Breath     SOB started yesterday. Influenza A dx virtual visit with PCP yesterday, put her on Tamiflu. Told to come here if breathing got worse to check for pneumonia. Lungs and rib pain, pain in sinuses, pressure in ears, cough, runny nose, fever         History of Present Illness       74-year-old female with history of moderate to severe AS, moderate pulmonary hypertension, paroxysmal A-fib on Eliquis, CKD, HFpEF, hypertension, hyperlipidemia, SVT status post ablation, COPD presents with a chief complaint of worsening shortness of breath and dyspnea on exertion which began approximately 2 days ago.  Patient states that for the past 1 month she has been sick \"on and off\" with URI-like symptoms to include nasal congestion, sore throat and dry nonproductive cough.  Patient states that her grandchildren recently tested positive for influenza and she has had recent, close contact with them.  States that her symptoms began to worsen the day after he tested positive for influenza.  Of note, patient did have a video visit " "yesterday, and was prescribed Tamiflu given close contact with influenza positive grandchildren and comorbidities.  Patient states that she is currently on day 2 of Tamiflu and exam at experience any relief and feels that she is getting worse.  She is also endorsing bilateral, sharp chest pain with coughing which resolves with rest.  States that she cannot fully take a few steps before becoming very short of breath.  She is also endorsing generalized weakness and having little to no appetite and states that she has had very little p.o. intake over the last 2 to 3 days.  Otherwise denies any nausea, vomiting, diarrhea, chest or abdominal pain.  She denies syncope, lightheadedness, or recent fall to the ground or head strike.  States that she has been unable to use her inhalers as they \"ran out\" and her prior authorization for Xopenex was denied yesterday.  She otherwise denies any leg swelling, weight gain, dietary indiscretion.  Patient is also endorsing severe orthopnea and states that she was unable to lay flat yesterday evening and had to sleep upright in recliner secondary to shortness of breath.  Patient lives at home with her .    Shortness of Breath  The current episode started in the past 7 days. The problem occurs constantly. The problem has been gradually worsening since onset. The problem is severe. Associated symptoms include coughing, fatigue, orthopnea, rhinorrhea, a sore throat and wheezing. Pertinent negatives include no chest pain, chest pressure, dizziness, hoarseness of voice, leg swelling, palpitations, stridor or sweats. The symptoms are aggravated by a supine position and activity.       Review of Systems   Review of Systems   Constitutional:  Positive for fatigue. Negative for activity change, appetite change, chills, diaphoresis, fever and unexpected weight change.   HENT:  Positive for congestion, postnasal drip, rhinorrhea and sore throat. Negative for dental problem, drooling, ear " discharge, ear pain, facial swelling, hearing loss, hoarse voice, mouth sores, nosebleeds, sinus pressure, sinus pain, sneezing, tinnitus, trouble swallowing and voice change.    Eyes:  Negative for pain and visual disturbance.   Respiratory:  Positive for cough, shortness of breath and wheezing. Negative for apnea, choking, chest tightness and stridor.    Cardiovascular:  Positive for orthopnea. Negative for chest pain, palpitations and leg swelling.   Gastrointestinal:  Negative for abdominal distention, abdominal pain, anal bleeding, blood in stool, constipation, diarrhea, nausea, rectal pain and vomiting.   Genitourinary:  Negative for dysuria and hematuria.   Musculoskeletal:  Negative for arthralgias, back pain, gait problem, joint swelling, myalgias, neck pain and neck stiffness.   Skin:  Negative for color change and rash.   Neurological:  Negative for dizziness, tremors, seizures, syncope, facial asymmetry, speech difficulty, weakness, light-headedness, numbness and headaches.   All other systems reviewed and are negative.        Current Medications       Current Facility-Administered Medications:     predniSONE tablet 40 mg, 40 mg, Oral, Daily, , 40 mg at 03/07/25 1341    Current Outpatient Medications:     atorvastatin (LIPITOR) 20 mg tablet, TAKE 1 TABLET BY MOUTH EVERY DAY, Disp: 90 tablet, Rfl: 1    b complex vitamins capsule, Take 1 capsule by mouth daily, Disp: 30 capsule, Rfl: 0    benzonatate (TESSALON) 200 MG capsule, Take 1 capsule (200 mg total) by mouth 3 (three) times a day as needed for cough, Disp: 20 capsule, Rfl: 0    cetirizine (ZyrTEC) 10 mg tablet, Take 10 mg by mouth daily, Disp: , Rfl:     diltiazem (TIAZAC) 120 MG 24 hr capsule, TAKE 1 CAPSULE BY MOUTH EVERY DAY, Disp: 90 capsule, Rfl: 1    Eliquis 5 MG, TAKE 1 TABLET BY MOUTH TWO TIMES DAILY, Disp: 60 tablet, Rfl: 5    escitalopram (LEXAPRO) 10 mg tablet, TAKE 1 TABLET BY MOUTH EVERY DAY, Disp: 30 tablet, Rfl: 5    oseltamivir  (TAMIFLU) 75 mg capsule, Take 1 capsule (75 mg total) by mouth every 12 (twelve) hours for 5 days, Disp: 10 capsule, Rfl: 0    spironolactone (ALDACTONE) 25 mg tablet, TAKE 1 TABLET BY MOUTH EVERY DAY, Disp: 90 tablet, Rfl: 1    levalbuterol (XOPENEX HFA) 45 mcg/act inhaler, Inhale 1-2 puffs every 4 (four) hours as needed for wheezing (Patient not taking: Reported on 3/7/2025), Disp: 15 g, Rfl: 0    methylPREDNISolone 4 MG tablet therapy pack, Use as directed on package (Patient not taking: Reported on 3/7/2025), Disp: 21 tablet, Rfl: 0    Current Allergies     Allergies as of 03/07/2025 - Reviewed 03/07/2025   Allergen Reaction Noted    Bystolic [nebivolol hcl] Bradycardia 11/16/2023    Levofloxacin Hives 01/04/2018    Vasotec [enalapril] Hives 11/17/2022    Amlodipine Rash 01/08/2019    Ancef [cefazolin] GI Intolerance 08/27/2014    Cephalosporins Other (See Comments)     Fruit extracts  08/26/2013    Morphine GI Intolerance     Other  06/02/2017    Penicillins Hives 06/02/2017    Tetracycline Edema 06/02/2017    Xarelto [rivaroxaban] Rash 01/08/2019            The following portions of the patient's history were reviewed and updated as appropriate: allergies, current medications, past family history, past medical history, past social history, past surgical history and problem list.     Past Medical History:   Diagnosis Date    Asthma     Atrial fibrillation (HCC)     with ablation    Bursitis of hip     Last Assessed:6/5/2017    COPD (chronic obstructive pulmonary disease) (HCC) 5 years ago    Coronary artery disease     Herniated lumbar intervertebral disc     Hyperlipidemia     Hypertension 2 years    Pituitary adenoma (HCC)     Last Assessed:2/12/2015 ; Previously on bromocriptine    Spinal stenosis     SVT (supraventricular tachycardia) (HCC)     with ablation    Systolic murmur of aorta        Past Surgical History:   Procedure Laterality Date    BREAST SURGERY      Incisional breast biopsy    CARDIAC  ELECTROPHYSIOLOGY PROCEDURE N/A 09/02/2022    Procedure: Cardiac eps/afib ablation;  Surgeon: Fadi Goss MD;  Location: [facility];  Service: Cardiology    CARDIAC ELECTROPHYSIOLOGY PROCEDURE N/A 12/07/2022    Procedure: Cardiac loop recorder implant;  Surgeon: Bao Kovacs MD;  Location: [facility];  Service: Cardiology    CARDIAC ELECTROPHYSIOLOGY STUDY AND ABLATION      CARDIAC LOOP RECORDER      CARPAL TUNNEL RELEASE Left     CHOLECYSTECTOMY      OTHER SURGICAL HISTORY      EXCISION OF PAROTID TUMOR/GLAND    PAROTIDECTOMY      DE COLONOSCOPY FLX DX W/COLLJ SPEC WHEN PFRMD N/A 06/06/2017    Procedure: COLONOSCOPY;  Surgeon: Elizabeth Leyva DO;  Location: [facility];  Service: Gastroenterology    DE HYSTEROSCOPY BX ENDOMETRIUM&/POLYPC W/WO D&C N/A 11/22/2023    Procedure: (D&C) W/ HYSTEROSCOPY;  Surgeon: Harini Kinney MD;  Location: [facility];  Service: Gynecology    ROTATOR CUFF REPAIR      SALPINGOOPHORECTOMY Right        Family History   Problem Relation Age of Onset    Colon cancer Mother     Coronary artery disease Mother     Colonic polyp Mother     Liver cancer Mother     Multiple sclerosis Father     Hypertension Brother     Hypertension Other     Breast cancer Neg Hx     Cancer Neg Hx          Medications have been verified.        Objective   /58 (BP Location: Right arm, Patient Position: Sitting, Cuff Size: Standard)   Pulse 92   Temp 98.9 °F (37.2 °C) (Tympanic)   Resp (!) 24   LMP  (LMP Unknown)   SpO2 96%   No LMP recorded (lmp unknown). Patient is postmenopausal.       Physical Exam     Physical Exam  Vitals and nursing note reviewed.   Constitutional:       General: She is not in acute distress.     Appearance: Normal appearance. She is well-developed and normal weight. She is not ill-appearing, toxic-appearing or diaphoretic.      Interventions: She is not intubated.  HENT:      Head: Normocephalic and atraumatic.      Right Ear: External ear  normal.      Left Ear: External ear normal.      Nose: Nose normal.      Mouth/Throat:      Mouth: Mucous membranes are moist.      Pharynx: No pharyngeal swelling, oropharyngeal exudate or posterior oropharyngeal erythema.   Eyes:      General: No scleral icterus.        Right eye: No discharge.         Left eye: No discharge.      Extraocular Movements: Extraocular movements intact.      Pupils: Pupils are equal, round, and reactive to light.   Neck:      Thyroid: No thyromegaly.      Vascular: No hepatojugular reflux or JVD.      Trachea: No tracheal deviation.   Cardiovascular:      Rate and Rhythm: Normal rate and regular rhythm. Extrasystoles are present.     Pulses: Normal pulses. No decreased pulses.           Carotid pulses are 2+ on the right side and 2+ on the left side.       Radial pulses are 2+ on the right side and 2+ on the left side.      Heart sounds: Murmur heard.      No friction rub. No gallop.   Pulmonary:      Effort: Pulmonary effort is normal. Tachypnea present. No bradypnea, accessory muscle usage, prolonged expiration, respiratory distress or retractions. She is not intubated.      Breath sounds: No stridor. Examination of the right-upper field reveals decreased breath sounds. Examination of the left-upper field reveals decreased breath sounds. Examination of the right-middle field reveals decreased breath sounds. Examination of the left-middle field reveals decreased breath sounds. Examination of the right-lower field reveals decreased breath sounds. Examination of the left-lower field reveals decreased breath sounds and rales. Decreased breath sounds and rales present. No wheezing or rhonchi.   Chest:      Chest wall: No tenderness.   Abdominal:      General: Abdomen is flat. There is no distension.      Palpations: Abdomen is soft. There is no mass.      Tenderness: There is no abdominal tenderness. There is no right CVA tenderness, left CVA tenderness, guarding or rebound.      Hernia:  No hernia is present.   Musculoskeletal:         General: No swelling, tenderness, deformity or signs of injury.      Cervical back: Normal range of motion and neck supple.      Right lower leg: No tenderness. No edema.      Left lower leg: No tenderness. No edema.   Lymphadenopathy:      Cervical: No cervical adenopathy.   Skin:     General: Skin is warm and dry.      Capillary Refill: Capillary refill takes less than 2 seconds.      Coloration: Skin is not cyanotic or pale.      Findings: No ecchymosis, erythema or rash.      Nails: There is no clubbing.   Neurological:      General: No focal deficit present.      Mental Status: She is alert and oriented to person, place, and time.      Cranial Nerves: No cranial nerve deficit.      Sensory: No sensory deficit.      Motor: No weakness.      Coordination: Coordination normal.      Gait: Gait normal.      Deep Tendon Reflexes: Reflexes normal.   Psychiatric:         Mood and Affect: Mood normal.         Behavior: Behavior normal.

## 2025-03-07 NOTE — ED NOTES
Patient did let this RN know her grandson is positive for flu A and she was around him. PCP is assuming she has it as well after virtual visit     Azucena Fairbanks RN  03/07/25 [id number]

## 2025-03-07 NOTE — TELEPHONE ENCOUNTER
PEP member called stating she has Shirley on the line from "[employer] to obtain a diagnosis code. [employer] had hung up. Awaiting for CB.

## 2025-03-07 NOTE — ED PROVIDER NOTES
"Time reflects when diagnosis was documented in both MDM as applicable and the Disposition within this note       Time User Action Codes Description Comment    3/7/2025  5:44 PM Pancho Osborn Add [J44.1] COPD exacerbation (HCC)     3/7/2025  5:44 PM Pancho Osborn Add [J11.1] Influenza           ED Disposition       ED Disposition   Discharge    Condition   Stable    Date/Time   Fri Mar 7, 2025  5:44 PM    Comment   Almaz Hall discharge to home/self care.                   Assessment & Plan       Medical Decision Making  74-year-old female with URI/flulike symptoms that started 1 to 2 days after exposure to grandchild who later tested positive for flu.  Patient was clinically noticed with influenza and started on Tamiflu by her PCP.  She has had some wheezing and shortness of breath and was seen in urgent care today who sent her to the ED for evaluation.  She reports some central chest \"soreness\" mostly with coughing or deep inspiration.  Cardiac workup today including EKG and troponin is reassuring.  No evidence of acute coronary syndrome.  Chest x-ray negative for pneumonia or pneumothorax.  Patient had already received a DuoNeb at urgent care and declined any further nebulizer treatment here in ED as her breathing feels better and neb treatments occasionally give her palpitations.  She does have an issue in that apparently her insurance has declined a prescription for Xopenex inhaler.  Patient has used albuterol inhalers in the past without complication so 1 was provided to her today.  I do think she would benefit from having a nebulizer, especially with acute illness, given her history.  Provided with nebulizer machine today and prescription for both Xopenex and albuterol.  We discussed that patient should use Xopenex preferentially if she has issues with palpitations but can use albuterol as needed should there be an insurance issue with the Xopenex nebulizer solution as well.  She was given steroids at " "urgent care and states she has a prescription at home for the same.  Discussed plan for close outpatient follow-up with her PCP.  Also discussed return precautions.    Amount and/or Complexity of Data Reviewed  Labs: ordered. Decision-making details documented in ED Course.  Radiology: ordered and independent interpretation performed. Decision-making details documented in ED Course.     Details: Chest x-ray independently interpreted by me: Negative for pneumonia or pneumothorax.  ECG/medicine tests: ordered and independent interpretation performed. Decision-making details documented in ED Course.     Details: Sinus rhythm with normal axis, normal intervals, no significant ST elevations or depressions to suggest cardiac ischemia.    Risk  Prescription drug management.        ED Course as of 03/07/25 1823   Fri Mar 07, 2025   1725 Patient again reevaluated.  She states her breathing feels \"good\" and still is not interested in any further nebulizer treatments right now.  We discussed plan for outpatient follow-up and management as long as her delta troponin is within normal limits.       Medications   albuterol (PROVENTIL HFA,VENTOLIN HFA) inhaler 2 puff (2 puffs Inhalation Given 3/7/25 1505)       ED Risk Strat Scores   HEART Risk Score      Flowsheet Row Most Recent Value   Heart Score Risk Calculator    History 0 Filed at: 03/07/2025 1816   ECG 0 Filed at: 03/07/2025 1816   Age 2 Filed at: 03/07/2025 1816   Risk Factors 1 Filed at: 03/07/2025 1816   Troponin 0 Filed at: 03/07/2025 1816   HEART Score 3 Filed at: 03/07/2025 1816          HEART Risk Score      Flowsheet Row Most Recent Value   Heart Score Risk Calculator    History 0 Filed at: 03/07/2025 1816   ECG 0 Filed at: 03/07/2025 1816   Age 2 Filed at: 03/07/2025 1816   Risk Factors 1 Filed at: 03/07/2025 1816   Troponin 0 Filed at: 03/07/2025 1816   HEART Score 3 Filed at: 03/07/2025 1816                              SBIRT 20yo+      Flowsheet Row Most Recent " Value   Initial Alcohol Screen: US AUDIT-C     1. How often do you have a drink containing alcohol? 0 Filed at: 03/07/2025 6492   2. How many drinks containing alcohol do you have on a typical day you are drinking?  0 Filed at: 03/07/2025 1439   3a. Male UNDER 65: How often do you have five or more drinks on one occasion? 0 Filed at: 03/07/2025 1433   3b. FEMALE Any Age, or MALE 65+: How often do you have 4 or more drinks on one occassion? 0 Filed at: 03/07/2025 1432   Audit-C Score 0 Filed at: 03/07/2025 1438   NELSON: How many times in the past year have you...    Used an illegal drug or used a prescription medication for non-medical reasons? Never Filed at: 03/07/2025 5811                            History of Present Illness       Chief Complaint   Patient presents with    Shortness of Breath     Pt c/o SOB since yesterday that is worse with exertion. Pt was seen at urgent care given a neb treatment but with hx of COPD pt was told to come to ED        Past Medical History:   Diagnosis Date    Asthma     Atrial fibrillation (HCC)     with ablation    Bursitis of hip     Last Assessed:6/5/2017    COPD (chronic obstructive pulmonary disease) (HCC) 5 years ago    Coronary artery disease     Herniated lumbar intervertebral disc     Hyperlipidemia     Hypertension 2 years    Pituitary adenoma (HCC)     Last Assessed:2/12/2015 ; Previously on bromocriptine    Spinal stenosis     SVT (supraventricular tachycardia) (HCC)     with ablation    Systolic murmur of aorta       Past Surgical History:   Procedure Laterality Date    BREAST SURGERY      Incisional breast biopsy    CARDIAC ELECTROPHYSIOLOGY PROCEDURE N/A 09/02/2022    Procedure: Cardiac eps/afib ablation;  Surgeon: Fadi Goss MD;  Location: [facility];  Service: Cardiology    CARDIAC ELECTROPHYSIOLOGY PROCEDURE N/A 12/07/2022    Procedure: Cardiac loop recorder implant;  Surgeon: Bao Kovacs MD;  Location: [facility];  Service:  Cardiology    CARDIAC ELECTROPHYSIOLOGY STUDY AND ABLATION      CARDIAC LOOP RECORDER      CARPAL TUNNEL RELEASE Left     CHOLECYSTECTOMY      OTHER SURGICAL HISTORY      EXCISION OF PAROTID TUMOR/GLAND    PAROTIDECTOMY      NJ COLONOSCOPY FLX DX W/COLLJ SPEC WHEN PFRMD N/A 2017    Procedure: COLONOSCOPY;  Surgeon: Elizabeth Leyva DO;  Location: [facility];  Service: Gastroenterology    NJ HYSTEROSCOPY BX ENDOMETRIUM&/POLYPC W/WO D&C N/A 2023    Procedure: (D&C) W/ HYSTEROSCOPY;  Surgeon: Harini Kinney MD;  Location: [facility];  Service: Gynecology    ROTATOR CUFF REPAIR      SALPINGOOPHORECTOMY Right       Family History   Problem Relation Age of Onset    Colon cancer Mother     Coronary artery disease Mother     Colonic polyp Mother     Liver cancer Mother     Multiple sclerosis Father     Hypertension Brother     Hypertension Other     Breast cancer Neg Hx     Cancer Neg Hx       Social History     Tobacco Use    Smoking status: Former     Current packs/day: 0.00     Average packs/day: 1 pack/day for 30.0 years (30.0 ttl pk-yrs)     Types: Cigarettes     Start date: 1988     Quit date: 2018     Years since quittin.2    Smokeless tobacco: Never   Vaping Use    Vaping status: Never Used   Substance Use Topics    Alcohol use: Not Currently     Alcohol/week: 1.0 standard drink of alcohol     Types: 1 Shots of liquor per week     Comment: social    Drug use: No      E-Cigarette/Vaping    E-Cigarette Use Never User       E-Cigarette/Vaping Substances    Nicotine No     THC No     CBD No     Flavoring No     Other No     Unknown No       I have reviewed and agree with the history as documented.     Almaz is a very pleasant 73 yo F with PMH of COPD, aortic and mitral valve stenosis, atrial fibrillation here for evaluation of cough, general fatigue and malaise for 2 to 3 days.  Her symptoms started about 2 days after she had been around one of her grandchildren who is  "subsequently been diagnosed with influenza.  Patient had a virtual visit with her PCP who started her on Tamiflu and she went to an urgent care facility today due to shortness of breath.  She received a DuoNeb treatment at urgent care and notes that this seems to have improved her shortness of breath/chest tightness significantly.  She was also given an initial dose of prednisone at urgent care.  States she is here in the ED because urgent care recommended she come to ED for evaluation.  Fortunately her vital signs are relatively reassuring.  Patient does not really feel very short of breath since her treatment, says she thinks she was referred to ED due to her cardiac history.  She does report some central chest and thoracic back discomfort which she describes as \"not really chest pain, more like soreness\" which she says is somewhat worse with coughing or deep inspiration.  Denies fevers, no rashes, no nausea or vomiting or diarrhea.      Shortness of Breath  Associated symptoms: cough    Associated symptoms: no abdominal pain, no chest pain, no fever, no rash, no sore throat and no vomiting        Review of Systems   Constitutional:  Positive for fatigue. Negative for chills and fever.   HENT:  Negative for sore throat.    Eyes:  Negative for visual disturbance.   Respiratory:  Positive for cough, chest tightness and shortness of breath.    Cardiovascular:  Negative for chest pain and palpitations.   Gastrointestinal:  Negative for abdominal pain, nausea and vomiting.   Genitourinary:  Negative for dysuria and hematuria.   Musculoskeletal:  Negative for arthralgias and back pain.   Skin:  Negative for color change and rash.   Neurological:  Negative for syncope.   All other systems reviewed and are negative.          Objective       ED Triage Vitals   Temperature Pulse Blood Pressure Respirations SpO2 Patient Position - Orthostatic VS   03/07/25 1434 03/07/25 1434 03/07/25 1434 03/07/25 1434 03/07/25 1435 03/07/25 " 1541   97.9 °F (36.6 °C) 83 121/59 22 99 % Sitting      Temp Source Heart Rate Source BP Location FiO2 (%) Pain Score    03/07/25 1747 03/07/25 1541 03/07/25 1434 -- 03/07/25 1541    Oral Monitor Right arm  2      Vitals      Date and Time Temp Pulse SpO2 Resp BP Pain Score FACES Pain Rating User   03/07/25 1747 98.9 °F (37.2 °C) 96 94 % 17 139/62 No Pain -- KU   03/07/25 1541 -- 92 95 % 16 155/73 2 -- MR   03/07/25 1435 -- -- 99 % -- -- -- -- BA   03/07/25 1434 97.9 °F (36.6 °C) 83 -- 22 121/59 -- -- BA            Physical Exam  Vitals and nursing note reviewed.   Constitutional:       General: She is not in acute distress.     Appearance: She is well-developed.   HENT:      Head: Normocephalic and atraumatic.   Eyes:      Conjunctiva/sclera: Conjunctivae normal.   Cardiovascular:      Rate and Rhythm: Normal rate and regular rhythm.      Heart sounds: No murmur heard.  Pulmonary:      Effort: Pulmonary effort is normal. No tachypnea or respiratory distress.      Breath sounds: Normal breath sounds.      Comments: Nonlabored respirations.  A few scattered wheezeswithout significant tachypnea, conversational dyspnea, accessory muscle use, or other clinical signs of respiratory distress.  Abdominal:      Palpations: Abdomen is soft.      Tenderness: There is no abdominal tenderness.   Musculoskeletal:         General: No swelling.      Cervical back: Neck supple.   Skin:     General: Skin is warm and dry.      Capillary Refill: Capillary refill takes less than 2 seconds.   Neurological:      General: No focal deficit present.      Mental Status: She is alert and oriented to person, place, and time.   Psychiatric:         Mood and Affect: Mood normal.         Results Reviewed       Procedure Component Value Units Date/Time    HS Troponin I 2hr [[id number]]  (Normal) Collected: 03/07/25 1706    Lab Status: Final result Specimen: Blood from Arm, Left Updated: 03/07/25 1731     hs TnI 2hr 7 ng/L      Delta 2hr hsTnI 0  ng/L     HS Troponin I 4hr [[id number]]     Lab Status: No result Specimen: Blood     HS Troponin 0hr (reflex protocol) [[id number]]  (Normal) Collected: 03/07/25 1508    Lab Status: Final result Specimen: Blood from Arm, Left Updated: 03/07/25 1539     hs TnI 0hr 7 ng/L     Basic metabolic panel [[id number]]  (Abnormal) Collected: 03/07/25 1508    Lab Status: Final result Specimen: Blood from Arm, Left Updated: 03/07/25 1531     Sodium 141 mmol/L      Potassium 3.2 mmol/L      Chloride 112 mmol/L      CO2 20 mmol/L      ANION GAP 9 mmol/L      BUN 17 mg/dL      Creatinine 1.18 mg/dL      Glucose 83 mg/dL      Calcium 9.1 mg/dL      eGFR 46 ml/min/1.73sq m     Narrative:      National Kid[employer] Disease Foundation guidelines for Chronic Kidney Disease (CKD):     Stage 1 with normal or high GFR (GFR > 90 mL/min/1.73 square meters)    Stage 2 Mild CKD (GFR = 60-89 mL/min/1.73 square meters)    Stage 3A Moderate CKD (GFR = 45-59 mL/min/1.73 square meters)    Stage 3B Moderate CKD (GFR = 30-44 mL/min/1.73 square meters)    Stage 4 Severe CKD (GFR = 15-29 mL/min/1.73 square meters)    Stage 5 End Stage CKD (GFR <15 mL/min/1.73 square meters)  Note: GFR calculation is accurate only with a steady state creatinine    CBC and differential [[id number]]  (Abnormal) Collected: 03/07/25 1508    Lab Status: Final result Specimen: Blood from Arm, Left Updated: 03/07/25 1513     WBC 4.19 Thousand/uL      RBC 3.67 Million/uL      Hemoglobin 11.3 g/dL      Hematocrit 36.8 %       fL      MCH 30.8 pg      MCHC 30.7 g/dL      RDW 15.3 %      MPV 10.4 fL      Platelets 164 Thousands/uL      nRBC 0 /100 WBCs      Segmented % 61 %      Immature Grans % 0 %      Lymphocytes % 26 %      Monocytes % 12 %      Eosinophils Relative 0 %      Basophils Relative 1 %      Absolute Neutrophils 2.58 Thousands/µL      Absolute Immature Grans 0.01 Thousand/uL      Absolute Lymphocytes 1.09 Thousands/µL      Absolute Monocytes 0.49 Thousand/µL       Eosinophils Absolute 0.00 Thousand/µL      Basophils Absolute 0.02 Thousands/µL             XR chest 2 views   Final Interpretation by Jasmyne Li MD (03/07 2889)      No acute cardiopulmonary disease.            Workstation performed: [id number]             Procedures    ED Medication and Procedure Management   Prior to Admission Medications   Prescriptions Last Dose Informant Patient Reported? Taking?   Eliquis 5 MG  Self No No   Sig: TAKE 1 TABLET BY MOUTH TWO TIMES DAILY   atorvastatin (LIPITOR) 20 mg tablet  Self No No   Sig: TAKE 1 TABLET BY MOUTH EVERY DAY   b complex vitamins capsule  Self No No   Sig: Take 1 capsule by mouth daily   benzonatate (TESSALON) 200 MG capsule   No No   Sig: Take 1 capsule (200 mg total) by mouth 3 (three) times a day as needed for cough   cetirizine (ZyrTEC) 10 mg tablet  Self Yes No   Sig: Take 10 mg by mouth daily   diltiazem (TIAZAC) 120 MG 24 hr capsule  Self No No   Sig: TAKE 1 CAPSULE BY MOUTH EVERY DAY   escitalopram (LEXAPRO) 10 mg tablet  Self No No   Sig: TAKE 1 TABLET BY MOUTH EVERY DAY   levalbuterol (XOPENEX HFA) 45 mcg/act inhaler   No No   Sig: Inhale 1-2 puffs every 4 (four) hours as needed for wheezing   Patient not taking: Reported on 3/7/2025   methylPREDNISolone 4 MG tablet therapy pack   No No   Sig: Use as directed on package   Patient not taking: Reported on 3/7/2025   oseltamivir (TAMIFLU) 75 mg capsule   No No   Sig: Take 1 capsule (75 mg total) by mouth every 12 (twelve) hours for 5 days   spironolactone (ALDACTONE) 25 mg tablet  Self No No   Sig: TAKE 1 TABLET BY MOUTH EVERY DAY      Facility-Administered Medications Last Administration Doses Remaining   albuterol inhalation solution 2.5 mg 3/7/2025  1:41 PM 0   ipratropium (ATROVENT) 0.02 % inhalation solution 0.5 mg 3/7/2025  1:41 PM 0   predniSONE tablet 40 mg 3/7/2025  1:41 PM         Discharge Medication List as of 3/7/2025  5:45 PM        START taking these medications    Details    albuterol (2.5 mg/3 mL) 0.083 % nebulizer solution Take 3 mL (2.5 mg total) by nebulization every 6 (six) hours as needed for wheezing or shortness of breath, Starting Fri 3/7/2025, Normal      levalbuterol (Xopenex) 1.25 mg/3 mL nebulizer solution Take 3 mL (1.25 mg total) by nebulization 3 (three) times a day, Starting Fri 3/7/2025, Normal           CONTINUE these medications which have NOT CHANGED    Details   atorvastatin (LIPITOR) 20 mg tablet TAKE 1 TABLET BY MOUTH EVERY DAY, Normal      b complex vitamins capsule Take 1 capsule by mouth daily, Starting Mon 4/8/2024, Normal      benzonatate (TESSALON) 200 MG capsule Take 1 capsule (200 mg total) by mouth 3 (three) times a day as needed for cough, Starting Wed 2/5/2025, Normal      cetirizine (ZyrTEC) 10 mg tablet Take 10 mg by mouth daily, Historical Med      diltiazem (TIAZAC) 120 MG 24 hr capsule TAKE 1 CAPSULE BY MOUTH EVERY DAY, Starting Tue 11/26/2024, Normal      Eliquis 5 MG TAKE 1 TABLET BY MOUTH TWO TIMES DAILY, Starting Wed 11/6/2024, Normal      escitalopram (LEXAPRO) 10 mg tablet TAKE 1 TABLET BY MOUTH EVERY DAY, Starting Tue 11/26/2024, Normal      levalbuterol (XOPENEX HFA) 45 mcg/act inhaler Inhale 1-2 puffs every 4 (four) hours as needed for wheezing, Starting Thu 3/6/2025, Normal      methylPREDNISolone 4 MG tablet therapy pack Use as directed on package, Normal      oseltamivir (TAMIFLU) 75 mg capsule Take 1 capsule (75 mg total) by mouth every 12 (twelve) hours for 5 days, Starting Thu 3/6/2025, Until Tue 3/11/2025, Normal      spironolactone (ALDACTONE) 25 mg tablet TAKE 1 TABLET BY MOUTH EVERY DAY, Starting Mon 10/14/2024, Normal           No discharge procedures on file.  ED SEPSIS DOCUMENTATION   Time reflects when diagnosis was documented in both MDM as applicable and the Disposition within this note       Time User Action Codes Description Comment    3/7/2025  5:44 PM Pancho Osborn Add [J44.1] COPD exacerbation (HCC)     3/7/2025   5:44 PM Pancho Osborn Add [J11.1] Influenza                  Pancho Osborn MD  03/07/25 1823

## 2025-03-09 LAB
ATRIAL RATE: 91 BPM
P AXIS: 75 DEGREES
PR INTERVAL: 154 MS
QRS AXIS: 23 DEGREES
QRSD INTERVAL: 80 MS
QT INTERVAL: 332 MS
QTC INTERVAL: 408 MS
T WAVE AXIS: 72 DEGREES
VENTRICULAR RATE: 91 BPM

## 2025-03-09 PROCEDURE — 93010 ELECTROCARDIOGRAM REPORT: CPT | Performed by: INTERNAL MEDICINE

## 2025-03-10 ENCOUNTER — VBI (OUTPATIENT)
Dept: FAMILY MEDICINE CLINIC | Facility: CLINIC | Age: 75
End: 2025-03-10

## 2025-03-10 NOTE — TELEPHONE ENCOUNTER
03/10/25 11:58 AM    Patient contacted post ED visit, [facility] department spoke with patient/caregiver and outreach was successful.    Thank you.  Julienne Wise MA  [facility]

## 2025-03-15 DIAGNOSIS — E78.00 HIGH CHOLESTEROL: ICD-10-CM

## 2025-03-17 RX ORDER — ATORVASTATIN CALCIUM 20 MG/1
20 TABLET, FILM COATED ORAL DAILY
Qty: 90 TABLET | Refills: 0 | Status: SHIPPED | OUTPATIENT
Start: 2025-03-17 | End: 2025-03-26 | Stop reason: SDUPTHER

## 2025-03-19 ENCOUNTER — HOSPITAL ENCOUNTER (OUTPATIENT)
Dept: RADIOLOGY | Facility: IMAGING CENTER | Age: 75
Discharge: HOME | End: 2025-03-19
Payer: MEDICARE

## 2025-03-19 ENCOUNTER — APPOINTMENT (OUTPATIENT)
Dept: LAB | Facility: IMAGING CENTER | Age: 75
End: 2025-03-19
Payer: MEDICARE

## 2025-03-19 DIAGNOSIS — F17.211 NICOTINE DEPENDENCE, CIGARETTES, IN REMISSION: ICD-10-CM

## 2025-03-19 DIAGNOSIS — Z13.1 SCREENING FOR DIABETES MELLITUS: ICD-10-CM

## 2025-03-19 DIAGNOSIS — Z13.0 SCREENING FOR DEFICIENCY ANEMIA: ICD-10-CM

## 2025-03-19 DIAGNOSIS — Z13.89 SCREENING FOR HEMATURIA OR PROTEINURIA: ICD-10-CM

## 2025-03-19 DIAGNOSIS — I48.0 PAROXYSMAL ATRIAL FIBRILLATION (HCC): ICD-10-CM

## 2025-03-19 DIAGNOSIS — E78.2 MIXED HYPERLIPIDEMIA: ICD-10-CM

## 2025-03-19 DIAGNOSIS — Z13.29 SCREENING FOR HYPOTHYROIDISM: ICD-10-CM

## 2025-03-19 LAB
BACTERIA UR QL AUTO: ABNORMAL /HPF
BASOPHILS # BLD AUTO: 0.06 THOUSANDS/ÂΜL (ref 0–0.1)
BASOPHILS NFR BLD AUTO: 1 % (ref 0–1)
BILIRUB UR QL STRIP: NEGATIVE
CAOX CRY URNS QL MICRO: ABNORMAL /HPF
CLARITY UR: CLEAR
COLOR UR: YELLOW
EOSINOPHIL # BLD AUTO: 0.08 THOUSAND/ÂΜL (ref 0–0.61)
EOSINOPHIL NFR BLD AUTO: 1 % (ref 0–6)
ERYTHROCYTE [DISTWIDTH] IN BLOOD BY AUTOMATED COUNT: 14.4 % (ref 11.6–15.1)
GLUCOSE UR STRIP-MCNC: NEGATIVE MG/DL
HCT VFR BLD AUTO: 38.7 % (ref 34.8–46.1)
HGB BLD-MCNC: 12 G/DL (ref 11.5–15.4)
HGB UR QL STRIP.AUTO: NEGATIVE
IMM GRANULOCYTES # BLD AUTO: 0.03 THOUSAND/UL (ref 0–0.2)
IMM GRANULOCYTES NFR BLD AUTO: 0 % (ref 0–2)
KETONES UR STRIP-MCNC: NEGATIVE MG/DL
LEUKOCYTE ESTERASE UR QL STRIP: ABNORMAL
LYMPHOCYTES # BLD AUTO: 2.45 THOUSANDS/ÂΜL (ref 0.6–4.47)
LYMPHOCYTES NFR BLD AUTO: 28 % (ref 14–44)
MCH RBC QN AUTO: 30.5 PG (ref 26.8–34.3)
MCHC RBC AUTO-ENTMCNC: 31 G/DL (ref 31.4–37.4)
MCV RBC AUTO: 98 FL (ref 82–98)
MONOCYTES # BLD AUTO: 0.76 THOUSAND/ÂΜL (ref 0.17–1.22)
MONOCYTES NFR BLD AUTO: 9 % (ref 4–12)
NEUTROPHILS # BLD AUTO: 5.54 THOUSANDS/ÂΜL (ref 1.85–7.62)
NEUTS SEG NFR BLD AUTO: 61 % (ref 43–75)
NITRITE UR QL STRIP: NEGATIVE
NON-SQ EPI CELLS URNS QL MICRO: ABNORMAL /HPF
NRBC BLD AUTO-RTO: 0 /100 WBCS
PH UR STRIP.AUTO: 6 [PH]
PLATELET # BLD AUTO: 297 THOUSANDS/UL (ref 149–390)
PMV BLD AUTO: 10.6 FL (ref 8.9–12.7)
PROT UR STRIP-MCNC: ABNORMAL MG/DL
RBC # BLD AUTO: 3.94 MILLION/UL (ref 3.81–5.12)
RBC #/AREA URNS AUTO: ABNORMAL /HPF
SP GR UR STRIP.AUTO: 1.02 (ref 1–1.03)
TSH SERPL DL<=0.05 MIU/L-ACNC: 1.84 UIU/ML (ref 0.45–4.5)
UROBILINOGEN UR STRIP-ACNC: <2 MG/DL
WBC # BLD AUTO: 8.92 THOUSAND/UL (ref 4.31–10.16)
WBC #/AREA URNS AUTO: ABNORMAL /HPF

## 2025-03-19 PROCEDURE — 80053 COMPREHEN METABOLIC PANEL: CPT

## 2025-03-19 PROCEDURE — 85025 COMPLETE CBC W/AUTO DIFF WBC: CPT

## 2025-03-19 PROCEDURE — 81001 URINALYSIS AUTO W/SCOPE: CPT

## 2025-03-19 PROCEDURE — 80061 LIPID PANEL: CPT

## 2025-03-19 PROCEDURE — 36415 COLL VENOUS BLD VENIPUNCTURE: CPT

## 2025-03-19 PROCEDURE — 84443 ASSAY THYROID STIM HORMONE: CPT

## 2025-03-19 PROCEDURE — 83036 HEMOGLOBIN GLYCOSYLATED A1C: CPT

## 2025-03-20 LAB
ALBUMIN SERPL BCG-MCNC: 3.9 G/DL (ref 3.5–5)
ALP SERPL-CCNC: 66 U/L (ref 34–104)
ALT SERPL W P-5'-P-CCNC: 16 U/L (ref 7–52)
ANION GAP SERPL CALCULATED.3IONS-SCNC: 8 MMOL/L (ref 4–13)
AST SERPL W P-5'-P-CCNC: 18 U/L (ref 13–39)
BILIRUB SERPL-MCNC: 0.54 MG/DL (ref 0.2–1)
BUN SERPL-MCNC: 16 MG/DL (ref 5–25)
CALCIUM SERPL-MCNC: 9.4 MG/DL (ref 8.4–10.2)
CHLORIDE SERPL-SCNC: 109 MMOL/L (ref 96–108)
CHOLEST SERPL-MCNC: 153 MG/DL (ref ?–200)
CO2 SERPL-SCNC: 26 MMOL/L (ref 21–32)
CREAT SERPL-MCNC: 0.93 MG/DL (ref 0.6–1.3)
EST. AVERAGE GLUCOSE BLD GHB EST-MCNC: 123 MG/DL
GFR SERPL CREATININE-BSD FRML MDRD: 61 ML/MIN/1.73SQ M
GLUCOSE P FAST SERPL-MCNC: 83 MG/DL (ref 65–99)
HBA1C MFR BLD: 5.9 %
HDLC SERPL-MCNC: 46 MG/DL
LDLC SERPL CALC-MCNC: 71 MG/DL (ref 0–100)
NONHDLC SERPL-MCNC: 107 MG/DL
POTASSIUM SERPL-SCNC: 3.4 MMOL/L (ref 3.5–5.3)
PROT SERPL-MCNC: 6.3 G/DL (ref 6.4–8.4)
SODIUM SERPL-SCNC: 143 MMOL/L (ref 135–147)
TRIGL SERPL-MCNC: 178 MG/DL (ref ?–150)

## 2025-03-24 ENCOUNTER — RA CDI HCC (OUTPATIENT)
Dept: OTHER | Facility: HOSPITAL | Age: 75
End: 2025-03-24

## 2025-03-24 ENCOUNTER — RESULTS FOLLOW-UP (OUTPATIENT)
Dept: FAMILY MEDICINE CLINIC | Facility: CLINIC | Age: 75
End: 2025-03-24

## 2025-03-24 NOTE — PROGRESS NOTES
I13.0  I27.20  HCC coding opportunities          Chart Reviewed number of suggestions sent to Provider: 2     Patients Insurance     Medicare Insurance: Medicare

## 2025-03-26 ENCOUNTER — OFFICE VISIT (OUTPATIENT)
Dept: FAMILY MEDICINE CLINIC | Facility: CLINIC | Age: 75
End: 2025-03-26
Payer: MEDICARE

## 2025-03-26 VITALS
DIASTOLIC BLOOD PRESSURE: 68 MMHG | BODY MASS INDEX: 26.68 KG/M2 | WEIGHT: 145 LBS | HEIGHT: 62 IN | HEART RATE: 75 BPM | RESPIRATION RATE: 16 BRPM | SYSTOLIC BLOOD PRESSURE: 122 MMHG | OXYGEN SATURATION: 99 %

## 2025-03-26 DIAGNOSIS — E78.00 HIGH CHOLESTEROL: ICD-10-CM

## 2025-03-26 DIAGNOSIS — J43.9 PULMONARY EMPHYSEMA, UNSPECIFIED EMPHYSEMA TYPE (HCC): Primary | ICD-10-CM

## 2025-03-26 DIAGNOSIS — L97.529 ULCER OF LEFT FOOT, UNSPECIFIED ULCER STAGE (HCC): ICD-10-CM

## 2025-03-26 DIAGNOSIS — E87.6 HYPOKALEMIA: ICD-10-CM

## 2025-03-26 DIAGNOSIS — R73.03 PREDIABETES: ICD-10-CM

## 2025-03-26 DIAGNOSIS — N18.31 STAGE 3A CHRONIC KIDNEY DISEASE (HCC): ICD-10-CM

## 2025-03-26 DIAGNOSIS — J41.0 SIMPLE CHRONIC BRONCHITIS (HCC): ICD-10-CM

## 2025-03-26 PROCEDURE — 99214 OFFICE O/P EST MOD 30 MIN: CPT | Performed by: INTERNAL MEDICINE

## 2025-03-26 PROCEDURE — G2211 COMPLEX E/M VISIT ADD ON: HCPCS | Performed by: INTERNAL MEDICINE

## 2025-03-26 RX ORDER — ATORVASTATIN CALCIUM 20 MG/1
20 TABLET, FILM COATED ORAL DAILY
Qty: 90 TABLET | Refills: 0 | Status: SHIPPED | OUTPATIENT
Start: 2025-03-26

## 2025-03-26 RX ORDER — ALBUTEROL SULFATE 90 UG/1
2 INHALANT RESPIRATORY (INHALATION) EVERY 6 HOURS PRN
Qty: 18 G | Refills: 5 | Status: SHIPPED | OUTPATIENT
Start: 2025-03-26

## 2025-03-26 NOTE — ASSESSMENT & PLAN NOTE
Lab Results   Component Value Date    EGFR 61 03/19/2025    EGFR 46 03/07/2025    EGFR 50 02/19/2025    CREATININE 0.93 03/19/2025    CREATININE 1.18 03/07/2025    CREATININE 1.09 02/19/2025   Overall stable.  Continue to monitor.

## 2025-03-26 NOTE — ASSESSMENT & PLAN NOTE
Currently at baseline.  Will start her on Trelegy.  Side effects discussed.    Orders:    fluticasone-umeclidinium-vilanterol 100-62.5-25 mcg/actuation inhaler; Inhale 1 puff daily Rinse mouth after use.

## 2025-03-26 NOTE — ASSESSMENT & PLAN NOTE
Recheck BMP and magnesium in few weeks.    Orders:    Basic metabolic panel; Future    Magnesium; Future

## 2025-03-26 NOTE — PROGRESS NOTES
Name: Almaz Hall      : 1950      MRN: [id number]  Encounter Provider: Jacob Lorenzana MD  Encounter Date: 3/26/2025   Encounter department: [facility] PRIMARY CARE    Assessment & Plan  High cholesterol  Recheck lipid panel with the next blood work.  Continue atorvastatin.  Orders:    atorvastatin (LIPITOR) 20 mg tablet; Take 1 tablet (20 mg total) by mouth daily    Simple chronic bronchitis (HCC)    Orders:    albuterol (Ventolin HFA) 90 mcg/act inhaler; Inhale 2 puffs every 6 (six) hours as needed for wheezing    Ulcer of left foot, unspecified ulcer stage (HCC)         Stage 3a chronic kidney disease (HCC)  Lab Results   Component Value Date    EGFR 61 2025    EGFR 46 2025    EGFR 50 2025    CREATININE 0.93 2025    CREATININE 1.18 2025    CREATININE 1.09 2025   Overall stable.  Continue to monitor.         Hypokalemia  Recheck BMP and magnesium in few weeks.    Orders:    Basic metabolic panel; Future    Magnesium; Future    Pulmonary emphysema, unspecified emphysema type (HCC)  Currently at baseline.  Will start her on Trelegy.  Side effects discussed.    Orders:    fluticasone-umeclidinium-vilanterol 100-62.5-25 mcg/actuation inhaler; Inhale 1 puff daily Rinse mouth after use.    Prediabetes  Hemoglobin A1c is slightly elevated.  Continue with low-carb diet.  Recheck in 3 months.              History of Present Illness     HPI  Review of Systems   Constitutional:  Negative for activity change, appetite change, chills and fever.   HENT:  Negative for congestion, ear pain, rhinorrhea and sore throat.    Respiratory:  Positive for shortness of breath (Baseline). Negative for cough and wheezing.    Cardiovascular:  Negative for chest pain, palpitations and leg swelling.   Gastrointestinal:  Negative for abdominal distention, abdominal pain, diarrhea, nausea and vomiting.   Genitourinary:  Negative for difficulty urinating, frequency and  pelvic pain.   Musculoskeletal:  Positive for back pain. Negative for arthralgias and neck pain.   Skin:  Negative for rash.   Neurological:  Negative for dizziness, tremors, weakness, light-headedness, numbness and headaches.     Past Medical History:   Diagnosis Date    Asthma     Atrial fibrillation (HCC)     with ablation    Bursitis of hip     Last Assessed:6/5/2017    COPD (chronic obstructive pulmonary disease) (HCC) 5 years ago    Coronary artery disease     Herniated lumbar intervertebral disc     Hyperlipidemia     Hypertension 2 years    Pituitary adenoma (HCC)     Last Assessed:2/12/2015 ; Previously on bromocriptine    Spinal stenosis     SVT (supraventricular tachycardia) (HCC)     with ablation    Systolic murmur of aorta      Past Surgical History:   Procedure Laterality Date    BREAST SURGERY      Incisional breast biopsy    CARDIAC ELECTROPHYSIOLOGY PROCEDURE N/A 09/02/2022    Procedure: Cardiac eps/afib ablation;  Surgeon: Fadi Goss MD;  Location: [facility];  Service: Cardiology    CARDIAC ELECTROPHYSIOLOGY PROCEDURE N/A 12/07/2022    Procedure: Cardiac loop recorder implant;  Surgeon: Bao Kovacs MD;  Location: [facility];  Service: Cardiology    CARDIAC ELECTROPHYSIOLOGY STUDY AND ABLATION      CARDIAC LOOP RECORDER      CARPAL TUNNEL RELEASE Left     CHOLECYSTECTOMY      OTHER SURGICAL HISTORY      EXCISION OF PAROTID TUMOR/GLAND    PAROTIDECTOMY      CT COLONOSCOPY FLX DX W/COLLJ SPEC WHEN PFRMD N/A 06/06/2017    Procedure: COLONOSCOPY;  Surgeon: Elizabeth Leyva DO;  Location: [facility];  Service: Gastroenterology    CT HYSTEROSCOPY BX ENDOMETRIUM&/POLYPC W/WO D&C N/A 11/22/2023    Procedure: (D&C) W/ HYSTEROSCOPY;  Surgeon: Harini Kinney MD;  Location: [facility] OR;  Service: Gynecology    ROTATOR CUFF REPAIR      SALPINGOOPHORECTOMY Right      Family History   Problem Relation Age of Onset    Colon cancer Mother     Coronary artery disease Mother      Colonic polyp Mother     Liver cancer Mother     Multiple sclerosis Father     Hypertension Brother     Hypertension Other     Breast cancer Neg Hx     Cancer Neg Hx      Social History     Tobacco Use    Smoking status: Former     Current packs/day: 0.00     Average packs/day: 1 pack/day for 30.0 years (30.0 ttl pk-yrs)     Types: Cigarettes     Start date: 1988     Quit date: 2018     Years since quittin.3    Smokeless tobacco: Never   Vaping Use    Vaping status: Never Used   Substance and Sexual Activity    Alcohol use: Not Currently     Alcohol/week: 1.0 standard drink of alcohol     Types: 1 Shots of liquor per week     Comment: social    Drug use: No    Sexual activity: Not Currently     Birth control/protection: Post-menopausal     Current Outpatient Medications on File Prior to Visit   Medication Sig    [DISCONTINUED] atorvastatin (LIPITOR) 20 mg tablet TAKE 1 TABLET BY MOUTH EVERY DAY    albuterol (2.5 mg/3 mL) 0.083 % nebulizer solution Take 3 mL (2.5 mg total) by nebulization every 6 (six) hours as needed for wheezing or shortness of breath    b complex vitamins capsule Take 1 capsule by mouth daily    benzonatate (TESSALON) 200 MG capsule Take 1 capsule (200 mg total) by mouth 3 (three) times a day as needed for cough    cetirizine (ZyrTEC) 10 mg tablet Take 10 mg by mouth daily    diltiazem (TIAZAC) 120 MG 24 hr capsule TAKE 1 CAPSULE BY MOUTH EVERY DAY    Eliquis 5 MG TAKE 1 TABLET BY MOUTH TWO TIMES DAILY    escitalopram (LEXAPRO) 10 mg tablet TAKE 1 TABLET BY MOUTH EVERY DAY    levalbuterol (Xopenex) 1.25 mg/3 mL nebulizer solution Take 3 mL (1.25 mg total) by nebulization 3 (three) times a day    spironolactone (ALDACTONE) 25 mg tablet TAKE 1 TABLET BY MOUTH EVERY DAY    [DISCONTINUED] levalbuterol (XOPENEX HFA) 45 mcg/act inhaler Inhale 1-2 puffs every 4 (four) hours as needed for wheezing (Patient not taking: Reported on 3/7/2025)    [DISCONTINUED] methylPREDNISolone 4 MG  "tablet therapy pack Use as directed on package (Patient not taking: Reported on 3/7/2025)     Allergies   Allergen Reactions    Bystolic [Nebivolol Hcl] Bradycardia     Hear rate to 20's    Levofloxacin Hives    Vasotec [Enalapril] Hives    Amlodipine Rash    Ancef [Cefazolin] GI Intolerance     Cdiff    Cephalosporins Other (See Comments)     c.diff    Fruit Extracts      Annotation - [id number]: peaches and strawberries    Morphine GI Intolerance     Extreme vomiting      Other      FRUIT    Penicillins Hives    Tetracycline Edema    Xarelto [Rivaroxaban] Rash     Immunization History   Administered Date(s) Administered    COVID-19 MODERNA VACC 0.5 ML IM 03/31/2021, 04/28/2021, 11/01/2021    COVID-19 PFIZER VACCINE 0.3 ML IM 12/18/2022    COVID-19 Pfizer mRNA vacc PF pipe-sucrose 12 yr and older (Comirnaty) 09/24/2024    H1N1, All Formulations 10/27/2009    INFLUENZA 01/17/2013, 11/14/2013, 09/10/2018, 10/10/2022, 09/19/2023    Influenza Quadrivalent, 6-35 Months IM 11/18/2015    Influenza Split High Dose Preservative Free IM 11/30/2017, 09/10/2018, 09/24/2024    Influenza, high dose seasonal 0.7 mL 10/28/2019, 10/21/2020, 09/30/2021, 09/19/2023    Influenza, seasonal, injectable 10/15/2016    Pneumococcal Conjugate 13-Valent 06/20/2017    Pneumococcal Polysaccharide PPV23 07/05/2018    TD (adult) Preservative Free 11/18/2015    Td (adult), Unspecified 05/14/2005    Tdap 11/18/2015    Zoster 10/21/2021    Zoster Vaccine Recombinant 01/22/2022     Objective   /68 (BP Location: Left arm, Patient Position: Sitting, Cuff Size: Standard)   Pulse 75   Resp 16   Ht 5' 2\" (1.575 m)   Wt 65.8 kg (145 lb)   LMP  (LMP Unknown)   SpO2 99%   BMI 26.52 kg/m²     Physical Exam  Constitutional:       General: She is not in acute distress.     Appearance: She is not ill-appearing or toxic-appearing.   Cardiovascular:      Heart sounds: No murmur heard.     No gallop.   Pulmonary:      Effort: No respiratory distress. "      Breath sounds: No wheezing or rales.   Psychiatric:         Mood and Affect: Mood normal.

## 2025-04-01 ENCOUNTER — TELEPHONE (OUTPATIENT)
Age: 75
End: 2025-04-01

## 2025-04-01 ENCOUNTER — OFFICE VISIT (OUTPATIENT)
Dept: GASTROENTEROLOGY | Facility: MEDICAL CENTER | Age: 75
End: 2025-04-01
Payer: MEDICARE

## 2025-04-01 VITALS
BODY MASS INDEX: 25.91 KG/M2 | TEMPERATURE: 98.2 F | WEIGHT: 146.2 LBS | OXYGEN SATURATION: 99 % | HEIGHT: 63 IN | SYSTOLIC BLOOD PRESSURE: 131 MMHG | HEART RATE: 73 BPM | DIASTOLIC BLOOD PRESSURE: 78 MMHG

## 2025-04-01 DIAGNOSIS — R19.7 DIARRHEA, UNSPECIFIED TYPE: Primary | ICD-10-CM

## 2025-04-01 DIAGNOSIS — R13.19 OTHER DYSPHAGIA: ICD-10-CM

## 2025-04-01 DIAGNOSIS — Z12.11 COLON CANCER SCREENING: ICD-10-CM

## 2025-04-01 PROCEDURE — 99214 OFFICE O/P EST MOD 30 MIN: CPT | Performed by: PHYSICIAN ASSISTANT

## 2025-04-01 NOTE — TELEPHONE ENCOUNTER
Caller: Almaz    Doctor: Dr. HUI    Reason for call: Pt calling to see if the application for the Handicap Placard was completed and sent through.  She dropped off a form several weeks ago and hasn't heard anything.    Call back#: [phone]

## 2025-04-01 NOTE — ASSESSMENT & PLAN NOTE
Barium swallow with speech revealed mild oropharyngeal dysphagia. Symptoms sound like she could have some esophageal dysphagia as well although she just had a recent EGD 4/2024. She will call if symptoms worsen

## 2025-04-01 NOTE — PROGRESS NOTES
Name: Almaz Hall      : 1950      MRN: [id number]  Encounter Provider: Lavonne Jacobs PA-C  Encounter Date: 2025   Encounter department: [facility]  :  Assessment & Plan  Diarrhea, unspecified type  Resolved since stopping magnesium supplementation. She will call if diarrhea returns        Other dysphagia  Barium swallow with speech revealed mild oropharyngeal dysphagia. Symptoms sound like she could have some esophageal dysphagia as well although she just had a recent EGD 2024. She will call if symptoms worsen       Colon cancer screening  Last colonoscopy 2024 with one polyp (sessile serrated polyp/adenoma) removed and a 5 year recall, repeat 2029 or sooner if needed         Follow up as needed      History of Present Illness   Almaz Hall is a 74 y.o. female who presents for follow up evaluation of diarrhea. She has a longstanding history of loose stools approximately 1-2 times per day but in 2023 loose stools significantly worsened. She was hospitalized in April with JANELLE and hypokalemia 2/2 her diarrhea. Stool test (bacterial panel, c diff, ova/parasites, giardia, pancreatic elastase, and fecal ankit) all normal aside for a fecal ankit of 444. She then underwent EGD and colonoscopy 24. EGD revealed a 3cm hiatal hernia and gastritis, biopsies of the stomach and duodenum normal. Colonoscopy revealed one sessile serrated polyp/adenoma which was removed, diverticulosis, and hemorrhoids. Random colon biopsies were negative for microscopic colitis. Repeat fecal ankit returned to normal at 94. She was taking magnesium for hypomagnesemia which she has stopped with complete resolution of her diarrhea. Is nor having one formed bowel movement daily.      Review of Systems   Constitutional:  Negative for chills and fever.   HENT:  Negative for ear pain and sore throat.    Eyes:  Negative for pain and visual disturbance.   Respiratory:  Negative for  cough and shortness of breath.    Cardiovascular:  Negative for chest pain and palpitations.   Gastrointestinal:  Negative for abdominal pain and vomiting.   Genitourinary:  Negative for dysuria and hematuria.   Musculoskeletal:  Negative for arthralgias and back pain.   Skin:  Negative for color change and rash.   Neurological:  Negative for seizures and syncope.   All other systems reviewed and are negative.   A complete review of systems is negative other than that noted above in the HPI.    Current Outpatient Medications   Medication Sig Dispense Refill    albuterol (2.5 mg/3 mL) 0.083 % nebulizer solution Take 3 mL (2.5 mg total) by nebulization every 6 (six) hours as needed for wheezing or shortness of breath 75 mL 0    albuterol (Ventolin HFA) 90 mcg/act inhaler Inhale 2 puffs every 6 (six) hours as needed for wheezing 18 g 5    atorvastatin (LIPITOR) 20 mg tablet Take 1 tablet (20 mg total) by mouth daily 90 tablet 0    b complex vitamins capsule Take 1 capsule by mouth daily 30 capsule 0    cetirizine (ZyrTEC) 10 mg tablet Take 10 mg by mouth daily      diltiazem (TIAZAC) 120 MG 24 hr capsule TAKE 1 CAPSULE BY MOUTH EVERY DAY 90 capsule 1    Eliquis 5 MG TAKE 1 TABLET BY MOUTH TWO TIMES DAILY 60 tablet 5    escitalopram (LEXAPRO) 10 mg tablet TAKE 1 TABLET BY MOUTH EVERY DAY 30 tablet 5    levalbuterol (Xopenex) 1.25 mg/3 mL nebulizer solution Take 3 mL (1.25 mg total) by nebulization 3 (three) times a day 75 mL 0    spironolactone (ALDACTONE) 25 mg tablet TAKE 1 TABLET BY MOUTH EVERY DAY (Patient taking differently: Take 25 mg by mouth daily M, W, F) 90 tablet 1    benzonatate (TESSALON) 200 MG capsule Take 1 capsule (200 mg total) by mouth 3 (three) times a day as needed for cough (Patient not taking: Reported on 4/1/2025) 20 capsule 0    fluticasone-umeclidinium-vilanterol 100-62.5-25 mcg/actuation inhaler Inhale 1 puff daily Rinse mouth after use. (Patient not taking: Reported on 4/1/2025) 1 each 5  "    Current Facility-Administered Medications   Medication Dose Route Frequency Provider Last Rate Last Admin    predniSONE tablet 40 mg  40 mg Oral Daily    40 mg at 03/07/25 1341     Objective   /78 (BP Location: Left arm, Patient Position: Sitting, Cuff Size: Standard)   Pulse 73   Temp 98.2 °F (36.8 °C) (Tympanic)   Ht 5' 3\" (1.6 m)   Wt 66.3 kg (146 lb 3.2 oz)   LMP  (LMP Unknown)   SpO2 99%   BMI 25.90 kg/m²     Physical Exam  Vitals and nursing note reviewed.   Constitutional:       General: She is not in acute distress.     Appearance: She is well-developed.   HENT:      Head: Normocephalic and atraumatic.   Eyes:      Conjunctiva/sclera: Conjunctivae normal.   Cardiovascular:      Rate and Rhythm: Normal rate and regular rhythm.      Heart sounds: No murmur heard.  Pulmonary:      Effort: Pulmonary effort is normal. No respiratory distress.      Breath sounds: Normal breath sounds.   Musculoskeletal:         General: No swelling.      Cervical back: Neck supple.   Skin:     General: Skin is warm and dry.      Capillary Refill: Capillary refill takes less than 2 seconds.   Neurological:      Mental Status: She is alert.   Psychiatric:         Mood and Affect: Mood normal.         Lab Results: I personally reviewed relevant lab results.    Results for orders placed during the hospital encounter of 04/01/24    Colonoscopy    Narrative  Table formatting from the original result was not included.  [facility]  [address] [address]  [phone]      DATE OF SERVICE:  4/08/24    PHYSICIAN(S):  Attending:  Will Traylor MD    Fellow:  Suresh Yan DO      INDICATION:  Diarrhea      POST-OP DIAGNOSIS:  See the impression below.    HISTORY:  Prior colonoscopy: 6 years ago.    BOWEL PREPARATION:  Golytely/Colyte/Trilyte      PREPROCEDURE:  Informed consent was obtained for the procedure, including sedation. Risks including but not limited to bleeding, " infection, perforation, adverse drug reaction and aspiration were explained in detail. Also explained about less than 100% sensitivity with the exam and other alternatives. The patient was placed in the left lateral decubitus position.    Procedure: Colonoscopy    DETAILS OF PROCEDURE:  Patient was taken to the procedure room where a time out was performed to confirm correct patient and correct procedure. The patient underwent monitored anesthesia care, which was administered by an anesthesia professional. The patient's blood pressure, heart rate, level of consciousness, oxygen, respirations, ECG and ETCO2 were monitored throughout the procedure. A digital rectal exam was performed. A perianal exam was performed. The scope was introduced through the anus and advanced to the terminal ileum. Retroflexion was performed in the rectum. The quality of bowel preparation was evaluated using the [place] Bowel Preparation Scale with scores of: right colon = 2, transverse colon = 2, left colon = 2. The total BBPS score was 6. Bowel prep was adequate. The patient experienced no blood loss. The procedure was not difficult. The patient tolerated the procedure well. There were no apparent adverse events.      ANESTHESIA INFORMATION:  ASA: III  Anesthesia Type: Anesthesia type not filed in the log.    MEDICATIONS:  sodium chloride 0.9 % infusion 400 mL*  *From user-documented volume  simethicone (MYLICON) 40 mg in sterile water 120 mL 40 mg  (Totals for administrations occurring from 1239 to 1318 on 04/08/24)        FINDINGS:  The terminal ileum appeared normal.  One sessile, adenomatous-appearing Odalis Is polyp measuring 5-9 mm in the ascending colon; performed cold snare with complete en bloc removal and retrieved specimen  The cecum, hepatic flexure, transverse colon, splenic flexure, descending colon, rectosigmoid and rectum appeared normal. Random biopsies taken to rule out microscopic colitis.  Few small, scattered diverticula  in the sigmoid colon  External and internal medium hemorrhoids      EVENTS:  Procedure Events  Event Event Time  ENDO CECUM REACHED 4/8/2024  1:01 PM  ENDO SCOPE OUT TIME 4/8/2024  1:17 PM      SPECIMENS:  ID Type Source Tests Collected by Time Destination  1 : r/o celiac Tissue Duodenum TISSUE EXAM Suresh [facility],  4/8/2024 12:47 PM  2 : r/o h pylori Tissue Stomach TISSUE EXAM Suresh [facility], DO 4/8/2024 12:50 PM  3 : r/o microscopic colitis Tissue Colon TISSUE EXAM Suresh [facility], DO 4/8/2024  1:05 PM  4 : polyp Tissue Large Intestine, Right/Ascending [facility] 4/8/2024  1:08 PM      EQUIPMENT:  Colonoscope -          Impression  The terminal ileum appeared normal.  One Odalis Is polyp measuring 5-9 mm in the ascending colon; performed cold snare removal  The cecum, hepatic flexure, transverse colon, splenic flexure, descending colon, rectosigmoid and rectum appeared normal.  Scattered diverticulosis in the sigmoid colon  Medium hemorrhoids        RECOMMENDATION:  Repeat colonoscopy in 5 years  Personal history of colon polyps    Return to floor.  Resume diet.  Resume anticoagulation.=  Continue Lomotil for diarrhea  Outpatient GI follow up.  GI will sign off.            Will Traylor MD

## 2025-04-08 ENCOUNTER — OFFICE VISIT (OUTPATIENT)
Dept: URGENT CARE | Age: 75
End: 2025-04-08
Payer: MEDICARE

## 2025-04-08 VITALS — HEART RATE: 87 BPM | RESPIRATION RATE: 18 BRPM | OXYGEN SATURATION: 94 %

## 2025-04-08 DIAGNOSIS — H61.22 IMPACTED CERUMEN, LEFT EAR: Primary | ICD-10-CM

## 2025-04-08 PROCEDURE — 69209 REMOVE IMPACTED EAR WAX UNI: CPT

## 2025-04-08 PROCEDURE — 99213 OFFICE O/P EST LOW 20 MIN: CPT

## 2025-04-08 PROCEDURE — G0463 HOSPITAL OUTPT CLINIC VISIT: HCPCS

## 2025-04-08 NOTE — PATIENT INSTRUCTIONS
Acetaminophen for pain.  PCP follow-up in 2-3 days as needed.  Proceed to the ER if symptoms worsen.

## 2025-04-08 NOTE — PROGRESS NOTES
[facility]        NAME: Almaz Hall is a 74 y.o. female  : 1950    MRN: [id number]  DATE: 2025  TIME: 5:12 PM      Assessment and Plan     Impacted cerumen, left ear [H61.22]  1. Impacted cerumen, left ear  Ear cerumen removal            Patient Instructions     Acetaminophen for pain.  PCP follow-up in 2-3 days as needed.  Proceed to the ER if symptoms worsen.     Chief Complaint     Chief Complaint   Patient presents with    Cerumen Impaction         History of Present Illness     Patient is a 74-year-old female who presents with blockage of left ear.  States symptoms started proximately week ago.  States she has been using peroxide with no relief.  Last use Debrox yesterday.         Review of Systems     Review of Systems   Constitutional:  Negative for fever.   HENT:  Positive for ear pain. Negative for ear discharge.    All other systems reviewed and are negative.        Current Medications       Current Outpatient Medications:     albuterol (2.5 mg/3 mL) 0.083 % nebulizer solution, Take 3 mL (2.5 mg total) by nebulization every 6 (six) hours as needed for wheezing or shortness of breath, Disp: 75 mL, Rfl: 0    albuterol (Ventolin HFA) 90 mcg/act inhaler, Inhale 2 puffs every 6 (six) hours as needed for wheezing, Disp: 18 g, Rfl: 5    atorvastatin (LIPITOR) 20 mg tablet, Take 1 tablet (20 mg total) by mouth daily, Disp: 90 tablet, Rfl: 0    b complex vitamins capsule, Take 1 capsule by mouth daily, Disp: 30 capsule, Rfl: 0    benzonatate (TESSALON) 200 MG capsule, Take 1 capsule (200 mg total) by mouth 3 (three) times a day as needed for cough (Patient not taking: Reported on 2025), Disp: 20 capsule, Rfl: 0    cetirizine (ZyrTEC) 10 mg tablet, Take 10 mg by mouth daily, Disp: , Rfl:     diltiazem (TIAZAC) 120 MG 24 hr capsule, TAKE 1 CAPSULE BY MOUTH EVERY DAY, Disp: 90 capsule, Rfl: 1    Eliquis 5 MG, TAKE 1 TABLET BY MOUTH TWO TIMES DAILY, Disp: 60 tablet, Rfl: 5     escitalopram (LEXAPRO) 10 mg tablet, TAKE 1 TABLET BY MOUTH EVERY DAY, Disp: 30 tablet, Rfl: 5    fluticasone-umeclidinium-vilanterol 100-62.5-25 mcg/actuation inhaler, Inhale 1 puff daily Rinse mouth after use. (Patient not taking: Reported on 4/1/2025), Disp: 1 each, Rfl: 5    levalbuterol (Xopenex) 1.25 mg/3 mL nebulizer solution, Take 3 mL (1.25 mg total) by nebulization 3 (three) times a day, Disp: 75 mL, Rfl: 0    spironolactone (ALDACTONE) 25 mg tablet, TAKE 1 TABLET BY MOUTH EVERY DAY (Patient taking differently: Take 25 mg by mouth daily M, W, F), Disp: 90 tablet, Rfl: 1    Current Facility-Administered Medications:     predniSONE tablet 40 mg, 40 mg, Oral, Daily, , 40 mg at 03/07/25 1341    Current Allergies     Allergies as of 04/08/2025 - Reviewed 04/08/2025   Allergen Reaction Noted    Bystolic [nebivolol hcl] Bradycardia 11/16/2023    Levofloxacin Hives 01/04/2018    Vasotec [enalapril] Hives 11/17/2022    Amlodipine Rash 01/08/2019    Ancef [cefazolin] GI Intolerance 08/27/2014    Cephalosporins Other (See Comments)     Fruit extracts  08/26/2013    Morphine GI Intolerance     Other  06/02/2017    Penicillins Hives 06/02/2017    Tetracycline Edema 06/02/2017    Xarelto [rivaroxaban] Rash 01/08/2019              The following portions of the patient's history were reviewed and updated as appropriate: allergies, current medications, past family history, past medical history, past social history, past surgical history and problem list.     Past Medical History:   Diagnosis Date    Asthma     Atrial fibrillation ([facility])     with ablation    Bursitis of hip     Last Assessed:6/5/2017    COPD (chronic obstructive pulmonary disease) ([facility]) 5 years ago    Coronary artery disease     Herniated lumbar intervertebral disc     Hyperlipidemia     Hypertension 2 years    Pituitary adenoma ([facility])     Last Assessed:2/12/2015 ; Previously on bromocriptine    Spinal stenosis     SVT (supraventricular tachycardia) ([facility])      with ablation    Systolic murmur of aorta        Past Surgical History:   Procedure Laterality Date    BREAST SURGERY      Incisional breast biopsy    CARDIAC ELECTROPHYSIOLOGY PROCEDURE N/A 09/02/2022    Procedure: Cardiac eps/afib ablation;  Surgeon: Fadi Goss MD;  Location: [facility];  Service: Cardiology    CARDIAC ELECTROPHYSIOLOGY PROCEDURE N/A 12/07/2022    Procedure: Cardiac loop recorder implant;  Surgeon: Bao Kovacs MD;  Location: [facility];  Service: Cardiology    CARDIAC ELECTROPHYSIOLOGY STUDY AND ABLATION      CARDIAC LOOP RECORDER      CARPAL TUNNEL RELEASE Left     CHOLECYSTECTOMY      OTHER SURGICAL HISTORY      EXCISION OF PAROTID TUMOR/GLAND    PAROTIDECTOMY      VT COLONOSCOPY FLX DX W/COLLJ SPEC WHEN PFRMD N/A 06/06/2017    Procedure: COLONOSCOPY;  Surgeon: Elizabeth Leyva DO;  Location: [facility];  Service: Gastroenterology    VT HYSTEROSCOPY BX ENDOMETRIUM&/POLYPC W/WO D&C N/A 11/22/2023    Procedure: (D&C) W/ HYSTEROSCOPY;  Surgeon: Harini Kinney MD;  Location: [facility] OR;  Service: Gynecology    ROTATOR CUFF REPAIR      SALPINGOOPHORECTOMY Right        Family History   Problem Relation Age of Onset    Colon cancer Mother     Coronary artery disease Mother     Colonic polyp Mother     Liver cancer Mother     Multiple sclerosis Father     Hypertension Brother     Hypertension Other     Breast cancer Neg Hx     Cancer Neg Hx          Medications have been verified.        Objective     Pulse 87   Resp 18   LMP  (LMP Unknown)   SpO2 94%   No LMP recorded (lmp unknown). Patient is postmenopausal.         Physical Exam     Physical Exam  Vitals and nursing note reviewed.   Constitutional:       General: She is not in acute distress.     Appearance: Normal appearance. She is not ill-appearing, toxic-appearing or diaphoretic.   HENT:      Left Ear: No swelling or tenderness. There is impacted cerumen. Tympanic membrane is scarred. Tympanic membrane  is not injected, perforated or erythematous.      Ears:      Comments: Left ear visiualized post cerumen removal- TM clear- no signs of infection.   Neurological:      Mental Status: She is alert.   Psychiatric:         Mood and Affect: Mood normal.         Behavior: Behavior normal.         Thought Content: Thought content normal.         Judgment: Judgment normal.     Ear cerumen removal    Date/Time: 4/8/2025 5:00 PM    Performed by: LIANG Garza  Authorized by: LIANG Garza  Universal Protocol:  procedure performed by consultantConsent: Verbal consent obtained.  Risks and benefits: risks, benefits and alternatives were discussed  Consent given by: patient  Patient understanding: patient states understanding of the procedure being performed    Patient location:  Clinic  Procedure details:     Local anesthetic:  None    Location:  L ear    Procedure type: irrigation only      Approach:  External  Post-procedure details:     Complication:  None    Hearing quality:  Normal    Patient tolerance of procedure:  Tolerated well, no immediate complications

## 2025-04-18 ENCOUNTER — APPOINTMENT (OUTPATIENT)
Dept: LAB | Facility: IMAGING CENTER | Age: 75
End: 2025-04-18
Payer: MEDICARE

## 2025-04-18 DIAGNOSIS — I50.32 CHRONIC DIASTOLIC CHF (CONGESTIVE HEART FAILURE) (HCC): ICD-10-CM

## 2025-04-18 DIAGNOSIS — E83.42 HYPOMAGNESEMIA: ICD-10-CM

## 2025-04-18 DIAGNOSIS — N18.32 HYPERTENSIVE KIDNEY DISEASE WITH STAGE 3B CHRONIC KIDNEY DISEASE (HCC): ICD-10-CM

## 2025-04-18 DIAGNOSIS — N18.32 STAGE 3B CHRONIC KIDNEY DISEASE (HCC): ICD-10-CM

## 2025-04-18 DIAGNOSIS — E87.6 HYPOKALEMIA: ICD-10-CM

## 2025-04-18 DIAGNOSIS — I12.9 HYPERTENSIVE KIDNEY DISEASE WITH STAGE 3B CHRONIC KIDNEY DISEASE (HCC): ICD-10-CM

## 2025-04-18 LAB
ALBUMIN SERPL BCG-MCNC: 4.3 G/DL (ref 3.5–5)
ANION GAP SERPL CALCULATED.3IONS-SCNC: 9 MMOL/L (ref 4–13)
BUN SERPL-MCNC: 16 MG/DL (ref 5–25)
CALCIUM SERPL-MCNC: 9.6 MG/DL (ref 8.4–10.2)
CHLORIDE SERPL-SCNC: 106 MMOL/L (ref 96–108)
CO2 SERPL-SCNC: 27 MMOL/L (ref 21–32)
CREAT SERPL-MCNC: 0.96 MG/DL (ref 0.6–1.3)
GFR SERPL CREATININE-BSD FRML MDRD: 58 ML/MIN/1.73SQ M
GLUCOSE P FAST SERPL-MCNC: 86 MG/DL (ref 65–99)
MAGNESIUM SERPL-MCNC: 1.5 MG/DL (ref 1.9–2.7)
PHOSPHATE SERPL-MCNC: 3.3 MG/DL (ref 2.3–4.1)
POTASSIUM SERPL-SCNC: 4.1 MMOL/L (ref 3.5–5.3)
PTH-INTACT SERPL-MCNC: 85.8 PG/ML (ref 12–88)
SODIUM SERPL-SCNC: 142 MMOL/L (ref 135–147)
URATE SERPL-MCNC: 6.4 MG/DL (ref 2–7.5)

## 2025-04-18 PROCEDURE — 83735 ASSAY OF MAGNESIUM: CPT

## 2025-04-18 PROCEDURE — 84550 ASSAY OF BLOOD/URIC ACID: CPT

## 2025-04-18 PROCEDURE — 83970 ASSAY OF PARATHORMONE: CPT

## 2025-04-18 PROCEDURE — 36415 COLL VENOUS BLD VENIPUNCTURE: CPT

## 2025-04-18 PROCEDURE — 80069 RENAL FUNCTION PANEL: CPT

## 2025-05-08 ENCOUNTER — OFFICE VISIT (OUTPATIENT)
Dept: NEPHROLOGY | Facility: CLINIC | Age: 75
End: 2025-05-08
Payer: MEDICARE

## 2025-05-08 VITALS
DIASTOLIC BLOOD PRESSURE: 72 MMHG | HEART RATE: 79 BPM | WEIGHT: 149 LBS | HEIGHT: 63 IN | BODY MASS INDEX: 26.4 KG/M2 | SYSTOLIC BLOOD PRESSURE: 130 MMHG

## 2025-05-08 DIAGNOSIS — I12.9 HYPERTENSIVE KIDNEY DISEASE WITH STAGE 3A CHRONIC KIDNEY DISEASE (HCC): ICD-10-CM

## 2025-05-08 DIAGNOSIS — N18.31 STAGE 3A CHRONIC KIDNEY DISEASE (HCC): Primary | ICD-10-CM

## 2025-05-08 DIAGNOSIS — I50.32 CHRONIC DIASTOLIC CHF (CONGESTIVE HEART FAILURE) (HCC): ICD-10-CM

## 2025-05-08 DIAGNOSIS — N18.31 HYPERTENSIVE KIDNEY DISEASE WITH STAGE 3A CHRONIC KIDNEY DISEASE (HCC): ICD-10-CM

## 2025-05-08 DIAGNOSIS — E83.42 HYPOMAGNESEMIA: ICD-10-CM

## 2025-05-08 PROCEDURE — 99214 OFFICE O/P EST MOD 30 MIN: CPT | Performed by: INTERNAL MEDICINE

## 2025-05-08 RX ORDER — MAGNESIUM 30 MG
425 TABLET ORAL DAILY
COMMUNITY

## 2025-05-08 NOTE — ASSESSMENT & PLAN NOTE
Chronic kidney disease, stage IIIa, baseline creatinine 0.8-1.2 with a recent creatinine of 0.93, estimated GFR 61 prior eGFR between 44-64  Prior urine microscopy and March bland.  Prior albumin creatinine ratio normal at 17

## 2025-05-08 NOTE — PROGRESS NOTES
Name: Almaz Hall      : 1950      MRN: [id number]  Encounter Provider: Talha Lujan DO  Encounter Date: 2025   Encounter department: [facility] [place]  :  Assessment & Plan  Stage 3a chronic kidney disease (HCC)  Chronic kidney disease, stage IIIa, baseline creatinine 0.8-1.2 with a recent creatinine of 0.93, estimated GFR 61 prior eGFR between 44-64  Prior urine microscopy and .  Prior albumin creatinine ratio normal at 17  Hypertensive kidney disease with stage 3a chronic kidney disease (HCC)  Overall appears to be stable, no changes in her current regimen.    Chronic diastolic CHF (congestive heart failure) (HCC)  Volume status appears stable, continue with spironolactone  Prior echocardiogram showed ejection fraction of 70% with LVH, noted mitral stenosis and moderate to severe aortic stenosis.  Continue to follow closely with cardiology.  Hypomagnesemia  Reasonable control, continue with Renamag 425 at bedtime, spironolactone to help prevent magnesium wasting.  Was recent level stable at 1.5    Summary:  Overall renal function is stable  Magnesium level acceptable  Continue with low-dose supplementation plus spironolactone  Continue with Q 3-month laboratory studies for observation otherwise we will plan to see her in 3 months with repeat labs at that time.      History of Present Illness   HPI  Almaz Hall is a 75 y.o. female who presents CKD/electrolyte follow-up.    Overall she is doing well.  Diarrhea has resolved.  Currently using fiber in the morning with low-dose magnesium supplementation at nighttime.  Denies any further diarrhea.  Denies any chest pain shortness of breath.  Note nausea vomiting diarrhea.  Weight has stabilized.    History obtained from: patient    Review of Systems  Current Outpatient Medications on File Prior to Visit   Medication Sig Dispense Refill    albuterol (2.5 mg/3 mL) 0.083 % nebulizer solution Take 3 mL (2.5 mg total) by  "nebulization every 6 (six) hours as needed for wheezing or shortness of breath 75 mL 0    albuterol (Ventolin HFA) 90 mcg/act inhaler Inhale 2 puffs every 6 (six) hours as needed for wheezing 18 g 5    atorvastatin (LIPITOR) 20 mg tablet Take 1 tablet (20 mg total) by mouth daily 90 tablet 0    cetirizine (ZyrTEC) 10 mg tablet Take 10 mg by mouth daily      diltiazem (TIAZAC) 120 MG 24 hr capsule TAKE 1 CAPSULE BY MOUTH EVERY DAY 90 capsule 1    Eliquis 5 MG TAKE 1 TABLET BY MOUTH TWO TIMES DAILY 60 tablet 5    escitalopram (LEXAPRO) 10 mg tablet TAKE 1 TABLET BY MOUTH EVERY DAY 30 tablet 5    fluticasone-umeclidinium-vilanterol 100-62.5-25 mcg/actuation inhaler Inhale 1 puff daily Rinse mouth after use. 1 each 5    magnesium 30 MG tablet Take 425 mg by mouth in the morning RENAMAG 425 MG AT BEDTIME      spironolactone (ALDACTONE) 25 mg tablet TAKE 1 TABLET BY MOUTH EVERY DAY 90 tablet 1    b complex vitamins capsule Take 1 capsule by mouth daily (Patient not taking: Reported on 5/8/2025) 30 capsule 0    benzonatate (TESSALON) 200 MG capsule Take 1 capsule (200 mg total) by mouth 3 (three) times a day as needed for cough (Patient not taking: Reported on 5/8/2025) 20 capsule 0    levalbuterol (Xopenex) 1.25 mg/3 mL nebulizer solution Take 3 mL (1.25 mg total) by nebulization 3 (three) times a day (Patient not taking: Reported on 5/8/2025) 75 mL 0     Current Facility-Administered Medications on File Prior to Visit   Medication Dose Route Frequency Provider Last Rate Last Admin    [DISCONTINUED] predniSONE tablet 40 mg  40 mg Oral Daily    40 mg at 03/07/25 1341           Objective   /72 (BP Location: Left arm, Patient Position: Sitting, Cuff Size: Standard)   Pulse 79   Ht 5' 3\" (1.6 m)   Wt 67.6 kg (149 lb)   LMP  (LMP Unknown)   BMI 26.39 kg/m²      Physical Exam  Constitutional:       Appearance: She is not ill-appearing.   Eyes:      General: No scleral icterus.  Cardiovascular:      Rate and Rhythm: " Normal rate and regular rhythm.   Pulmonary:      Effort: Pulmonary effort is normal.      Breath sounds: Normal breath sounds.   Abdominal:      General: There is no distension.      Palpations: Abdomen is soft.      Tenderness: There is no abdominal tenderness.   Musculoskeletal:      Right lower leg: No edema.   Skin:     General: Skin is warm and dry.      Findings: No rash.   Neurological:      Mental Status: She is alert and oriented to person, place, and time.         Administrative Statements   I have spent a total time of 25 minutes in caring for this patient on the day of the visit/encounter including Diagnostic results, Impressions, Counseling / Coordination of care, Documenting in the medical record, Reviewing/placing orders in the medical record (including tests, medications, and/or procedures), and Obtaining or reviewing history  .

## 2025-05-08 NOTE — ASSESSMENT & PLAN NOTE
Reasonable control, continue with Renamag 425 at bedtime, spironolactone to help prevent magnesium wasting.  Was recent level stable at 1.5    Summary:  Overall renal function is stable  Magnesium level acceptable  Continue with low-dose supplementation plus spironolactone  Continue with Q 3-month laboratory studies for observation otherwise we will plan to see her in 3 months with repeat labs at that time.

## 2025-05-08 NOTE — ASSESSMENT & PLAN NOTE
Volume status appears stable, continue with spironolactone  Prior echocardiogram showed ejection fraction of 70% with LVH, noted mitral stenosis and moderate to severe aortic stenosis.  Continue to follow closely with cardiology.

## 2025-05-12 DIAGNOSIS — I48.0 PAROXYSMAL ATRIAL FIBRILLATION (HCC): ICD-10-CM

## 2025-05-12 RX ORDER — APIXABAN 5 MG/1
5 TABLET, FILM COATED ORAL 2 TIMES DAILY
Qty: 60 TABLET | Refills: 5 | Status: SHIPPED | OUTPATIENT
Start: 2025-05-12

## 2025-05-24 DIAGNOSIS — I50.32 CHRONIC HEART FAILURE WITH PRESERVED EJECTION FRACTION (HFPEF) (HCC): ICD-10-CM

## 2025-05-25 RX ORDER — DILTIAZEM HYDROCHLORIDE 120 MG/1
120 CAPSULE, EXTENDED RELEASE ORAL DAILY
Qty: 90 CAPSULE | Refills: 1 | Status: SHIPPED | OUTPATIENT
Start: 2025-05-25

## 2025-05-30 DIAGNOSIS — F41.8 DEPRESSION WITH ANXIETY: ICD-10-CM

## 2025-05-30 RX ORDER — ESCITALOPRAM OXALATE 10 MG/1
10 TABLET ORAL DAILY
Qty: 30 TABLET | Refills: 5 | Status: SHIPPED | OUTPATIENT
Start: 2025-05-30

## 2025-06-11 DIAGNOSIS — E78.00 HIGH CHOLESTEROL: ICD-10-CM

## 2025-06-11 RX ORDER — ATORVASTATIN CALCIUM 20 MG/1
20 TABLET, FILM COATED ORAL DAILY
Qty: 90 TABLET | Refills: 1 | Status: SHIPPED | OUTPATIENT
Start: 2025-06-11

## 2025-06-13 ENCOUNTER — REMOTE DEVICE CLINIC VISIT (OUTPATIENT)
Dept: CARDIOLOGY CLINIC | Facility: CLINIC | Age: 75
End: 2025-06-13
Payer: MEDICARE

## 2025-06-13 DIAGNOSIS — I48.0 PAF (PAROXYSMAL ATRIAL FIBRILLATION) (HCC): Primary | ICD-10-CM

## 2025-06-13 PROCEDURE — 93298 REM INTERROG DEV EVAL SCRMS: CPT | Performed by: INTERNAL MEDICINE

## 2025-06-13 NOTE — PROGRESS NOTES
"MDT LNQ22/ACTIVE SYSTEM IS MRI CONDITIONAL   CARELINK TRANSMISSION: LOOP RECORDER. PRESENTING RHYTHM NSR @ 74 BPM. BATTERY STATUS \"OK.\" NO PATIENT OR DEVICE ACTIVATED EPISODES. NORMAL DEVICE FUNCTION. DL   "

## 2025-06-14 ENCOUNTER — RESULTS FOLLOW-UP (OUTPATIENT)
Dept: CARDIOLOGY CLINIC | Facility: CLINIC | Age: 75
End: 2025-06-14

## 2025-06-17 ENCOUNTER — ANNUAL EXAM (OUTPATIENT)
Dept: OBGYN CLINIC | Facility: MEDICAL CENTER | Age: 75
End: 2025-06-17
Payer: MEDICARE

## 2025-06-17 VITALS
HEIGHT: 63 IN | DIASTOLIC BLOOD PRESSURE: 80 MMHG | BODY MASS INDEX: 26.93 KG/M2 | SYSTOLIC BLOOD PRESSURE: 126 MMHG | WEIGHT: 152 LBS

## 2025-06-17 DIAGNOSIS — N81.6 RECTOCELE: ICD-10-CM

## 2025-06-17 DIAGNOSIS — Z01.419 ENCOUNTER FOR GYNECOLOGICAL EXAMINATION: Primary | ICD-10-CM

## 2025-06-17 DIAGNOSIS — R32 URINARY INCONTINENCE IN FEMALE: ICD-10-CM

## 2025-06-17 DIAGNOSIS — R10.2 PELVIC PAIN: ICD-10-CM

## 2025-06-17 PROCEDURE — G0101 CA SCREEN;PELVIC/BREAST EXAM: HCPCS | Performed by: OBSTETRICS & GYNECOLOGY

## 2025-06-17 NOTE — PROGRESS NOTES
"Name: Almaz Hall      : 1950      MRN: [id number]  Encounter Provider: Harini Kinney MD  Encounter Date: 2025   Encounter department: [facility]  :  Assessment & Plan  Pelvic pain    Orders:  •  US pelvis complete w transvaginal; Future    Urinary incontinence in female    Orders:  •  Ambulatory Referral to Urogynecology; Future  discussed  referral to  urogyn  / states had appt years ago with  Dr Chu  and would prefer a  second  opinion     Rectocele    Orders:  •  Ambulatory Referral to Urogynecology; Future    Encounter for gynecological examination       pap   - up to date   Mammo  in  - order  in chart    Colonoscopy  up to date  - clear for  5 days        History of Present Illness   HPI  Almaz Hall is a 75 y.o. female who presents for annual  gyn  exam   Having increased  urinary incontinence using depends   Also  having  lower left  pelvic discomfort    History obtained from: patient    Review of Systems   HENT: Negative.     Respiratory: Negative.     Cardiovascular: Negative.    Genitourinary:  Positive for pelvic pain.   Neurological: Negative.    Psychiatric/Behavioral: Negative.          Objective   /80   Ht 5' 3\" (1.6 m)   Wt 68.9 kg (152 lb)   LMP  (LMP Unknown)   BMI 26.93 kg/m²      Physical Exam  Vitals reviewed.   Constitutional:       Appearance: Normal appearance.   HENT:      Head: Normocephalic and atraumatic.      Nose: Nose normal.     Eyes:      Conjunctiva/sclera: Conjunctivae normal.     Pulmonary:      Effort: Pulmonary effort is normal.   Chest:   Breasts:     Right: Normal.      Left: Normal.   Abdominal:      General: There is no distension.      Palpations: Abdomen is soft. There is no mass.      Tenderness: There is no abdominal tenderness. There is no guarding or rebound.      Hernia: No hernia is present.   Genitourinary:     General: Normal vulva.      Vagina: Normal.      Cervix: Normal.      " Adnexa: Right adnexa normal and left adnexa normal.      Comments: Rectocele present  on  exam     Neurological:      General: No focal deficit present.      Mental Status: She is alert and oriented to person, place, and time.     Psychiatric:         Mood and Affect: Mood normal.         Behavior: Behavior normal.

## 2025-07-15 ENCOUNTER — APPOINTMENT (OUTPATIENT)
Dept: LAB | Facility: IMAGING CENTER | Age: 75
End: 2025-07-15
Payer: MEDICARE

## 2025-07-15 ENCOUNTER — HOSPITAL ENCOUNTER (OUTPATIENT)
Dept: RADIOLOGY | Facility: IMAGING CENTER | Age: 75
Discharge: HOME | End: 2025-07-15
Attending: OBSTETRICS & GYNECOLOGY
Payer: MEDICARE

## 2025-07-15 DIAGNOSIS — I12.9 HYPERTENSIVE KIDNEY DISEASE WITH STAGE 3A CHRONIC KIDNEY DISEASE (HCC): ICD-10-CM

## 2025-07-15 DIAGNOSIS — E83.42 HYPOMAGNESEMIA: ICD-10-CM

## 2025-07-15 DIAGNOSIS — R10.2 PELVIC PAIN: ICD-10-CM

## 2025-07-15 DIAGNOSIS — N18.31 STAGE 3A CHRONIC KIDNEY DISEASE (HCC): ICD-10-CM

## 2025-07-15 DIAGNOSIS — N18.31 HYPERTENSIVE KIDNEY DISEASE WITH STAGE 3A CHRONIC KIDNEY DISEASE (HCC): ICD-10-CM

## 2025-07-15 DIAGNOSIS — I50.32 CHRONIC DIASTOLIC CHF (CONGESTIVE HEART FAILURE) (HCC): ICD-10-CM

## 2025-07-15 LAB
ALBUMIN SERPL BCG-MCNC: 4.1 G/DL (ref 3.5–5)
ANION GAP SERPL CALCULATED.3IONS-SCNC: 10 MMOL/L (ref 4–13)
BUN SERPL-MCNC: 22 MG/DL (ref 5–25)
CALCIUM SERPL-MCNC: 9.5 MG/DL (ref 8.4–10.2)
CHLORIDE SERPL-SCNC: 100 MMOL/L (ref 96–108)
CO2 SERPL-SCNC: 28 MMOL/L (ref 21–32)
CREAT SERPL-MCNC: 1.06 MG/DL (ref 0.6–1.3)
ERYTHROCYTE [DISTWIDTH] IN BLOOD BY AUTOMATED COUNT: 14.2 % (ref 11.6–15.1)
GFR SERPL CREATININE-BSD FRML MDRD: 52 ML/MIN/1.73SQ M
GLUCOSE P FAST SERPL-MCNC: 84 MG/DL (ref 65–99)
HCT VFR BLD AUTO: 41.2 % (ref 34.8–46.1)
HGB BLD-MCNC: 13.3 G/DL (ref 11.5–15.4)
MAGNESIUM SERPL-MCNC: 1.7 MG/DL (ref 1.9–2.7)
MCH RBC QN AUTO: 31.8 PG (ref 26.8–34.3)
MCHC RBC AUTO-ENTMCNC: 32.3 G/DL (ref 31.4–37.4)
MCV RBC AUTO: 99 FL (ref 82–98)
PHOSPHATE SERPL-MCNC: 3.6 MG/DL (ref 2.3–4.1)
PLATELET # BLD AUTO: 243 THOUSANDS/UL (ref 149–390)
PMV BLD AUTO: 10.7 FL (ref 8.9–12.7)
POTASSIUM SERPL-SCNC: 4.6 MMOL/L (ref 3.5–5.3)
RBC # BLD AUTO: 4.18 MILLION/UL (ref 3.81–5.12)
SODIUM SERPL-SCNC: 138 MMOL/L (ref 135–147)
URATE SERPL-MCNC: 6.4 MG/DL (ref 2–7.5)
WBC # BLD AUTO: 7.59 THOUSAND/UL (ref 4.31–10.16)

## 2025-07-15 PROCEDURE — 84550 ASSAY OF BLOOD/URIC ACID: CPT

## 2025-07-15 PROCEDURE — 80069 RENAL FUNCTION PANEL: CPT

## 2025-07-15 PROCEDURE — 85027 COMPLETE CBC AUTOMATED: CPT

## 2025-07-15 PROCEDURE — 76856 US EXAM PELVIC COMPLETE: CPT

## 2025-07-15 PROCEDURE — 36415 COLL VENOUS BLD VENIPUNCTURE: CPT

## 2025-07-15 PROCEDURE — 76830 TRANSVAGINAL US NON-OB: CPT

## 2025-07-15 PROCEDURE — 83735 ASSAY OF MAGNESIUM: CPT

## 2025-07-16 ENCOUNTER — RESULTS FOLLOW-UP (OUTPATIENT)
Dept: OTHER | Facility: HOSPITAL | Age: 75
End: 2025-07-16

## 2025-07-16 NOTE — TELEPHONE ENCOUNTER
Message left.  Overall kidney function looks stable.  Magnesium level has continued to slowly improve.  Would continue with her current regimen.

## 2025-07-24 ENCOUNTER — RESULTS FOLLOW-UP (OUTPATIENT)
Dept: LABOR AND DELIVERY | Facility: HOSPITAL | Age: 75
End: 2025-07-24

## 2025-07-24 NOTE — RESULT ENCOUNTER NOTE
Please inform patient I reviewed her  pelvic US  showing stable  findings/ known fibroids  present but these have not grown  in size   If  continues to have pain  I am happy to see her for  follow  up

## 2025-07-25 NOTE — TELEPHONE ENCOUNTER
Patient returned call and advised per Dr. Rosalba Gu :   pelvic US  showing stable  findings/ known fibroids  present but these have not grown  in size   If  continues to have pain, can schedule for  follow  up.     She verbalized understanding and offers no questions or concerns at this time.

## 2025-07-28 ENCOUNTER — HOSPITAL ENCOUNTER (OUTPATIENT)
Dept: NON INVASIVE DIAGNOSTICS | Facility: CLINIC | Age: 75
Discharge: HOME | End: 2025-07-28
Payer: MEDICARE

## 2025-07-28 VITALS
HEART RATE: 79 BPM | WEIGHT: 152 LBS | HEIGHT: 63 IN | SYSTOLIC BLOOD PRESSURE: 126 MMHG | DIASTOLIC BLOOD PRESSURE: 80 MMHG | BODY MASS INDEX: 26.93 KG/M2

## 2025-07-28 DIAGNOSIS — I35.0 NON-RHEUMATIC AORTIC STENOSIS: ICD-10-CM

## 2025-07-28 DIAGNOSIS — I34.2 NONRHEUMATIC MITRAL VALVE STENOSIS: ICD-10-CM

## 2025-07-28 LAB
AORTIC ROOT: 2.8 CM
AORTIC VALVE MEAN VELOCITY: 21.4 M/S
ASCENDING AORTA: 3.2 CM
AV AREA BY CONTINUOUS VTI: 0.9 CM2
AV AREA PEAK VELOCITY: 0.9 CM2
AV LVOT MEAN GRADIENT: 2 MMHG
AV LVOT PEAK GRADIENT: 3 MMHG
AV MEAN PRESS GRAD SYS DOP V1V2: 23.8 MMHG
AV ORIFICE AREA US: 0.81 CM2
AV PEAK GRADIENT: 39.4 MMHG
AV REGURGITATION PRESSURE HALF TIME: 458 MS
AV VELOCITY RATIO: 0.29
AV VMAX SYS DOP: 3.1 M/S
BSA FOR ECHO PROCEDURE: 1.72 M2
DOP CALC AO VTI: 71.9 CM
DOP CALC LVOT AREA: 2.83 CM2
DOP CALC LVOT CARDIAC INDEX: 2.41 L/MIN/M2
DOP CALC LVOT CARDIAC OUTPUT: 4.15 L/MIN
DOP CALC LVOT DIAMETER: 1.9 CM
DOP CALC LVOT PEAK VEL VTI: 20.5 CM
DOP CALC LVOT PEAK VEL: 0.9 M/S
DOP CALC LVOT STROKE INDEX: 33.1 ML/M2
DOP CALC LVOT STROKE VOLUME: 58.09 CM3
DOP CALC MV VTI: 70.3 CM
E WAVE DECELERATION TIME: 340 MS
E/A RATIO: 1.52
FRACTIONAL SHORTENING: 30 (ref 28–44)
INTERVENTRICULAR SEPTUM IN DIASTOLE (PARASTERNAL SHORT AXIS VIEW): 1.2 CM
INTERVENTRICULAR SEPTUM: 1.2 CM (ref 0.6–1.1)
LAAS-AP2: 20.3 CM2
LAAS-AP4: 31.7 CM2
LEFT ATRIUM SIZE: 5 CM
LEFT ATRIUM VOLUME (MOD BIPLANE): 94 ML
LEFT ATRIUM VOLUME INDEX (MOD BIPLANE): 54.7 ML/M2
LEFT INTERNAL DIMENSION IN SYSTOLE: 3.2 CM (ref 2.1–4)
LEFT VENTRICULAR INTERNAL DIMENSION IN DIASTOLE: 4.6 CM (ref 3.5–6)
LEFT VENTRICULAR POSTERIOR WALL IN END DIASTOLE: 1 CM
LEFT VENTRICULAR STROKE VOLUME: 56 ML
LV EF US.2D.A4C+ESTIMATED: 51 %
LVSV (TEICH): 56 ML
MV E'TISSUE VEL-LAT: 7 CM/S
MV E'TISSUE VEL-SEP: 6 CM/S
MV MEAN GRADIENT: 9 MMHG
MV PEAK A VEL: 1.25 M/S
MV PEAK E VEL: 190 CM/S
MV PEAK GRADIENT: 21 MMHG
MV STENOSIS PRESSURE HALF TIME: 138 MS
MV VALVE AREA BY CONTINUITY EQUATION: 0.83 CM2
MV VALVE AREA P 1/2 METHOD: 1.59 CM2
RA PRESSURE ESTIMATED: 3 MMHG
RIGHT ATRIUM AREA SYSTOLE A4C: 14.3 CM2
RIGHT VENTRICLE ID DIMENSION: 3.1 CM
RV PSP: 54 MMHG
SL CV AV DECELERATION TIME RETROGRADE: 1580 MS
SL CV AV PEAK GRADIENT RETROGRADE: 58 MMHG
SL CV LEFT ATRIUM LENGTH A2C: 5.4 CM
SL CV LV EF: 50
SL CV PED ECHO LEFT VENTRICLE DIASTOLIC VOLUME (MOD BIPLANE) 2D: 98 ML
SL CV PED ECHO LEFT VENTRICLE SYSTOLIC VOLUME (MOD BIPLANE) 2D: 41 ML
TR MAX PG: 51 MMHG
TR PEAK VELOCITY: 3.6 M/S
TRICUSPID ANNULAR PLANE SYSTOLIC EXCURSION: 2.2 CM
TRICUSPID VALVE PEAK REGURGITATION VELOCITY: 3.57 M/S

## 2025-07-28 PROCEDURE — 93306 TTE W/DOPPLER COMPLETE: CPT

## 2025-07-28 PROCEDURE — 93306 TTE W/DOPPLER COMPLETE: CPT | Performed by: INTERNAL MEDICINE

## 2025-07-31 ENCOUNTER — OFFICE VISIT (OUTPATIENT)
Dept: CARDIOLOGY CLINIC | Facility: CLINIC | Age: 75
End: 2025-07-31
Payer: MEDICARE

## 2025-07-31 VITALS
WEIGHT: 152 LBS | HEART RATE: 71 BPM | HEIGHT: 63 IN | BODY MASS INDEX: 26.93 KG/M2 | SYSTOLIC BLOOD PRESSURE: 128 MMHG | DIASTOLIC BLOOD PRESSURE: 74 MMHG

## 2025-07-31 DIAGNOSIS — I34.2 NONRHEUMATIC MITRAL VALVE STENOSIS: ICD-10-CM

## 2025-07-31 DIAGNOSIS — I48.0 PAROXYSMAL ATRIAL FIBRILLATION (HCC): ICD-10-CM

## 2025-07-31 DIAGNOSIS — I47.10 SVT (SUPRAVENTRICULAR TACHYCARDIA) (HCC): ICD-10-CM

## 2025-07-31 DIAGNOSIS — I35.0 NON-RHEUMATIC AORTIC STENOSIS: Primary | ICD-10-CM

## 2025-07-31 DIAGNOSIS — I50.32 CHRONIC DIASTOLIC CHF (CONGESTIVE HEART FAILURE) (HCC): ICD-10-CM

## 2025-07-31 LAB
ATRIAL RATE: 71 BPM
P AXIS: 50 DEGREES
PR INTERVAL: 168 MS
QRS AXIS: -38 DEGREES
QRSD INTERVAL: 78 MS
QT INTERVAL: 392 MS
QTC INTERVAL: 425 MS
T WAVE AXIS: 44 DEGREES
VENTRICULAR RATE: 71 BPM

## 2025-07-31 PROCEDURE — 93000 ELECTROCARDIOGRAM COMPLETE: CPT | Performed by: INTERNAL MEDICINE

## 2025-07-31 PROCEDURE — 99215 OFFICE O/P EST HI 40 MIN: CPT | Performed by: INTERNAL MEDICINE

## 2025-07-31 RX ORDER — PHENOL 1.4 %
AEROSOL, SPRAY (ML) MUCOUS MEMBRANE
COMMUNITY

## 2025-08-01 ENCOUNTER — TELEPHONE (OUTPATIENT)
Dept: NON INVASIVE DIAGNOSTICS | Facility: HOSPITAL | Age: 75
End: 2025-08-01

## 2025-08-10 ENCOUNTER — OFFICE VISIT (OUTPATIENT)
Dept: URGENT CARE | Age: 75
End: 2025-08-10
Payer: MEDICARE

## 2025-08-11 ENCOUNTER — DOCUMENTATION (OUTPATIENT)
Dept: ADMINISTRATIVE | Facility: OTHER | Age: 75
End: 2025-08-11

## 2025-08-14 ENCOUNTER — HOSPITAL ENCOUNTER (OUTPATIENT)
Dept: NON INVASIVE DIAGNOSTICS | Facility: HOSPITAL | Age: 75
Discharge: HOME | End: 2025-08-14

## (undated) DEVICE — PVC URETHRAL CATHETER: Brand: DOVER

## (undated) DEVICE — 4-PORT MANIFOLD: Brand: NEPTUNE 2

## (undated) DEVICE — GLOVE SRG LF STRL BGL SKNSNS 5.5 PF

## (undated) DEVICE — GUIDE SHEATH SLO 8.5 FR

## (undated) DEVICE — PINNACLE INTRODUCER SHEATH: Brand: PINNACLE

## (undated) DEVICE — CATH EP 7FR DI-DIR 10-POLE DEFL CS 2-8-2 FJ

## (undated) DEVICE — Device: Brand: PROTRACK PIGTAIL WIRE

## (undated) DEVICE — NEEDLE TRANSSEPTAL BRK-1 71CM

## (undated) DEVICE — TUBING SET COOL POINT

## (undated) DEVICE — CYSTO TUBING SINGLE IRRIGATION

## (undated) DEVICE — GLOVE INDICATOR UNDERGLOVE SZ 6 BLUE

## (undated) DEVICE — DRAPE EQUIPMENT RF WAND

## (undated) DEVICE — UNDER BUTTOCKS DRAPE W/FLUID CONTROL POUCH: Brand: CONVERTORS

## (undated) DEVICE — CATH ULTRASOUND ACUNAV ICE 8FR 90CM GE VIVID-I

## (undated) DEVICE — REF PATCH ENSITE PRECISION

## (undated) DEVICE — CATH ABLATION TACTICATH SE BI-DIR FJ CRV

## (undated) DEVICE — DGW .035 FC J3MM 150CM TEF: Brand: EMERALD

## (undated) DEVICE — SCD SEQUENTIAL COMPRESSION COMFORT SLEEVE MEDIUM KNEE LENGTH: Brand: KENDALL SCD

## (undated) DEVICE — CATH ABLATION CRYOCATH ARCTIC FRONT ADV PRO 28MM

## (undated) DEVICE — INTRO SHEATH 8FR 24CM ARROW-FLEX

## (undated) DEVICE — LIGHT GLOVE GREEN

## (undated) DEVICE — CATH COAXIAL UMBILICAL

## (undated) DEVICE — PREMIUM DRY TRAY LF: Brand: MEDLINE INDUSTRIES, INC.

## (undated) DEVICE — STRL ALLENTOWN HYSTEROSCOPY PK: Brand: CARDINAL HEALTH

## (undated) DEVICE — CATH ABLATION FLEXCATH ADVANCE OD12 FR STEERABLE

## (undated) DEVICE — CATH EP ACHIEVE 20 MM MAPPING LOOP

## (undated) DEVICE — IV EXTENSION TUBING 33 IN

## (undated) DEVICE — CABLE CRYOCATH ELECTRICAL UMBILICAL

## (undated) DEVICE — CABLE CATHETER ACHIEVE MAPPING